# Patient Record
Sex: FEMALE | Race: WHITE | NOT HISPANIC OR LATINO | Employment: OTHER | URBAN - NONMETROPOLITAN AREA
[De-identification: names, ages, dates, MRNs, and addresses within clinical notes are randomized per-mention and may not be internally consistent; named-entity substitution may affect disease eponyms.]

---

## 2018-01-30 ENCOUNTER — TRANSCRIBE ORDERS (OUTPATIENT)
Dept: ADMINISTRATIVE | Facility: HOSPITAL | Age: 53
End: 2018-01-30

## 2018-01-30 ENCOUNTER — LAB (OUTPATIENT)
Dept: LAB | Facility: HOSPITAL | Age: 53
End: 2018-01-30

## 2018-01-30 DIAGNOSIS — K74.60 CIRRHOSIS OF LIVER WITHOUT ASCITES, UNSPECIFIED HEPATIC CIRRHOSIS TYPE (HCC): ICD-10-CM

## 2018-01-30 DIAGNOSIS — K74.60 CIRRHOSIS OF LIVER WITHOUT ASCITES, UNSPECIFIED HEPATIC CIRRHOSIS TYPE (HCC): Primary | ICD-10-CM

## 2018-01-30 LAB
ALBUMIN SERPL-MCNC: 3.7 G/DL (ref 3.5–5)
ALBUMIN/GLOB SERPL: 1.1 G/DL (ref 1–2)
ALP SERPL-CCNC: 118 U/L (ref 38–126)
ALT SERPL W P-5'-P-CCNC: 48 U/L (ref 13–69)
ANION GAP SERPL CALCULATED.3IONS-SCNC: 20.3 MMOL/L
AST SERPL-CCNC: 65 U/L (ref 15–46)
BILIRUB SERPL-MCNC: 3.1 MG/DL (ref 0.2–1.3)
BUN BLD-MCNC: 46 MG/DL (ref 7–20)
BUN/CREAT SERPL: 27.1 (ref 7.1–23.5)
CALCIUM SPEC-SCNC: 9.6 MG/DL (ref 8.4–10.2)
CHLORIDE SERPL-SCNC: 106 MMOL/L (ref 98–107)
CO2 SERPL-SCNC: 21 MMOL/L (ref 26–30)
CREAT BLD-MCNC: 1.7 MG/DL (ref 0.6–1.3)
DEPRECATED RDW RBC AUTO: 58 FL (ref 37–54)
ERYTHROCYTE [DISTWIDTH] IN BLOOD BY AUTOMATED COUNT: 16.5 % (ref 11.5–14.5)
GFR SERPL CREATININE-BSD FRML MDRD: 31 ML/MIN/1.73
GLOBULIN UR ELPH-MCNC: 3.4 GM/DL
GLUCOSE BLD-MCNC: 217 MG/DL (ref 74–98)
HCT VFR BLD AUTO: 21.8 % (ref 37–47)
HGB BLD-MCNC: 7.2 G/DL (ref 12–16)
INR PPP: 1.99 (ref 0.9–1.1)
MCH RBC QN AUTO: 32 PG (ref 27–31)
MCHC RBC AUTO-ENTMCNC: 33 G/DL (ref 30–37)
MCV RBC AUTO: 96.9 FL (ref 81–99)
PLATELET # BLD AUTO: 59 10*3/MM3 (ref 130–400)
PMV BLD AUTO: 11.8 FL (ref 6–12)
POTASSIUM BLD-SCNC: 4.3 MMOL/L (ref 3.5–5.1)
PROT SERPL-MCNC: 7.1 G/DL (ref 6.3–8.2)
PROTHROMBIN TIME: 22.5 SECONDS (ref 9.3–12.1)
RBC # BLD AUTO: 2.25 10*6/MM3 (ref 4.2–5.4)
SODIUM BLD-SCNC: 143 MMOL/L (ref 137–145)
WBC NRBC COR # BLD: 2.84 10*3/MM3 (ref 4.8–10.8)

## 2018-01-30 PROCEDURE — 36415 COLL VENOUS BLD VENIPUNCTURE: CPT

## 2018-01-30 PROCEDURE — 85027 COMPLETE CBC AUTOMATED: CPT

## 2018-01-30 PROCEDURE — 85610 PROTHROMBIN TIME: CPT

## 2018-01-30 PROCEDURE — 80053 COMPREHEN METABOLIC PANEL: CPT

## 2018-02-01 ENCOUNTER — HOSPITAL ENCOUNTER (OUTPATIENT)
Dept: INFUSION THERAPY | Facility: HOSPITAL | Age: 53
Setting detail: INFUSION SERIES
Discharge: HOME | End: 2018-02-01

## 2018-02-01 VITALS
SYSTOLIC BLOOD PRESSURE: 94 MMHG | DIASTOLIC BLOOD PRESSURE: 47 MMHG | RESPIRATION RATE: 18 BRPM | HEART RATE: 90 BPM | TEMPERATURE: 98.2 F | OXYGEN SATURATION: 96 %

## 2018-02-01 DIAGNOSIS — D64.9 ANEMIA, UNSPECIFIED TYPE: Primary | ICD-10-CM

## 2018-02-01 LAB
ABO GROUP BLD: NORMAL
ABO GROUP BLD: NORMAL
BLD GP AB SCN SERPL QL: NEGATIVE
RH BLD: POSITIVE
RH BLD: POSITIVE

## 2018-02-01 PROCEDURE — 86901 BLOOD TYPING SEROLOGIC RH(D): CPT

## 2018-02-01 PROCEDURE — 86900 BLOOD TYPING SEROLOGIC ABO: CPT

## 2018-02-01 PROCEDURE — 86920 COMPATIBILITY TEST SPIN: CPT

## 2018-02-01 PROCEDURE — 86900 BLOOD TYPING SEROLOGIC ABO: CPT | Performed by: INTERNAL MEDICINE

## 2018-02-01 PROCEDURE — 86850 RBC ANTIBODY SCREEN: CPT | Performed by: INTERNAL MEDICINE

## 2018-02-01 PROCEDURE — P9016 RBC LEUKOCYTES REDUCED: HCPCS

## 2018-02-01 PROCEDURE — 86901 BLOOD TYPING SEROLOGIC RH(D): CPT | Performed by: INTERNAL MEDICINE

## 2018-02-01 PROCEDURE — 36430 TRANSFUSION BLD/BLD COMPNT: CPT

## 2018-02-01 RX ORDER — SODIUM CHLORIDE 9 MG/ML
250 INJECTION, SOLUTION INTRAVENOUS AS NEEDED
Status: DISCONTINUED | OUTPATIENT
Start: 2018-02-01 | End: 2018-02-03 | Stop reason: HOSPADM

## 2018-02-01 RX ORDER — LEVOTHYROXINE SODIUM 0.15 MG/1
150 TABLET ORAL DAILY
COMMUNITY
End: 2019-09-11

## 2018-02-01 RX ORDER — ATORVASTATIN CALCIUM 20 MG/1
20 TABLET, FILM COATED ORAL DAILY
COMMUNITY
End: 2019-09-11

## 2018-02-01 RX ORDER — PANTOPRAZOLE SODIUM 40 MG/1
40 TABLET, DELAYED RELEASE ORAL DAILY
COMMUNITY

## 2018-02-01 RX ORDER — FUROSEMIDE 80 MG
80 TABLET ORAL 2 TIMES DAILY
COMMUNITY
End: 2019-09-11

## 2018-02-01 RX ORDER — QUETIAPINE 200 MG/1
200 TABLET, FILM COATED, EXTENDED RELEASE ORAL NIGHTLY
COMMUNITY
End: 2019-09-11

## 2018-02-01 RX ORDER — SPIRONOLACTONE 100 MG/1
100 TABLET, FILM COATED ORAL DAILY
COMMUNITY
End: 2021-03-02

## 2018-02-01 RX ADMIN — SODIUM CHLORIDE 250 ML: 9 INJECTION, SOLUTION INTRAVENOUS at 13:55

## 2018-02-02 LAB
ABO + RH BLD: NORMAL
BH BB BLOOD EXPIRATION DATE: NORMAL
BH BB BLOOD TYPE BARCODE: 5100
BH BB DISPENSE STATUS: NORMAL
BH BB PRODUCT CODE: NORMAL
BH BB UNIT NUMBER: NORMAL
CROSSMATCH INTERPRETATION: NORMAL
UNIT  ABO: NORMAL
UNIT  RH: NORMAL

## 2019-06-28 ENCOUNTER — LAB REQUISITION (OUTPATIENT)
Dept: LAB | Facility: HOSPITAL | Age: 54
End: 2019-06-28

## 2019-06-28 DIAGNOSIS — Z00.00 ROUTINE GENERAL MEDICAL EXAMINATION AT A HEALTH CARE FACILITY: ICD-10-CM

## 2019-06-28 LAB
ALBUMIN SERPL-MCNC: 3.6 G/DL (ref 3.5–5.2)
ALBUMIN/GLOB SERPL: 1.2 G/DL
ALP SERPL-CCNC: 113 U/L (ref 39–117)
ALT SERPL W P-5'-P-CCNC: 20 U/L (ref 1–33)
ANION GAP SERPL CALCULATED.3IONS-SCNC: 16 MMOL/L (ref 5–15)
AST SERPL-CCNC: 33 U/L (ref 1–32)
BASOPHILS # BLD AUTO: 0.03 10*3/MM3 (ref 0–0.2)
BASOPHILS NFR BLD AUTO: 1.4 % (ref 0–1.5)
BILIRUB SERPL-MCNC: 1 MG/DL (ref 0.2–1.2)
BUN BLD-MCNC: 28 MG/DL (ref 6–20)
BUN/CREAT SERPL: 20.6 (ref 7–25)
CALCIUM SPEC-SCNC: 9.1 MG/DL (ref 8.6–10.5)
CHLORIDE SERPL-SCNC: 100 MMOL/L (ref 98–107)
CO2 SERPL-SCNC: 23 MMOL/L (ref 22–29)
CREAT BLD-MCNC: 1.36 MG/DL (ref 0.57–1)
DEPRECATED RDW RBC AUTO: 57.6 FL (ref 37–54)
EOSINOPHIL # BLD AUTO: 0.03 10*3/MM3 (ref 0–0.4)
EOSINOPHIL NFR BLD AUTO: 1.4 % (ref 0.3–6.2)
ERYTHROCYTE [DISTWIDTH] IN BLOOD BY AUTOMATED COUNT: 16.7 % (ref 12.3–15.4)
GFR SERPL CREATININE-BSD FRML MDRD: 40 ML/MIN/1.73
GLOBULIN UR ELPH-MCNC: 2.9 GM/DL
GLUCOSE BLD-MCNC: 141 MG/DL (ref 65–99)
HCT VFR BLD AUTO: 28.4 % (ref 34–46.6)
HGB BLD-MCNC: 9 G/DL (ref 12–15.9)
IMM GRANULOCYTES # BLD AUTO: 0.04 10*3/MM3 (ref 0–0.05)
IMM GRANULOCYTES NFR BLD AUTO: 1.9 % (ref 0–0.5)
INR PPP: 1.7 (ref 0.85–1.16)
LYMPHOCYTES # BLD AUTO: 0.34 10*3/MM3 (ref 0.7–3.1)
LYMPHOCYTES NFR BLD AUTO: 16.3 % (ref 19.6–45.3)
MCH RBC QN AUTO: 29.6 PG (ref 26.6–33)
MCHC RBC AUTO-ENTMCNC: 31.7 G/DL (ref 31.5–35.7)
MCV RBC AUTO: 93.4 FL (ref 79–97)
MONOCYTES # BLD AUTO: 0.25 10*3/MM3 (ref 0.1–0.9)
MONOCYTES NFR BLD AUTO: 12 % (ref 5–12)
NEUTROPHILS # BLD AUTO: 1.39 10*3/MM3 (ref 1.7–7)
NEUTROPHILS NFR BLD AUTO: 67 % (ref 42.7–76)
NRBC BLD AUTO-RTO: 0 /100 WBC (ref 0–0.2)
PLATELET # BLD AUTO: 65 10*3/MM3 (ref 140–450)
PMV BLD AUTO: 12.7 FL (ref 6–12)
POTASSIUM BLD-SCNC: 3.6 MMOL/L (ref 3.5–5.2)
PROT SERPL-MCNC: 6.5 G/DL (ref 6–8.5)
PROTHROMBIN TIME: 19.2 SECONDS (ref 11.2–14.3)
RBC # BLD AUTO: 3.04 10*6/MM3 (ref 3.77–5.28)
SODIUM BLD-SCNC: 139 MMOL/L (ref 136–145)
WBC NRBC COR # BLD: 2.08 10*3/MM3 (ref 3.4–10.8)

## 2019-06-28 PROCEDURE — 80053 COMPREHEN METABOLIC PANEL: CPT

## 2019-06-28 PROCEDURE — 85025 COMPLETE CBC W/AUTO DIFF WBC: CPT

## 2019-06-28 PROCEDURE — 85610 PROTHROMBIN TIME: CPT

## 2019-09-11 PROBLEM — Z78.0 MENOPAUSE: Status: ACTIVE | Noted: 2019-09-11

## 2019-10-29 ENCOUNTER — LAB REQUISITION (OUTPATIENT)
Dept: LAB | Facility: HOSPITAL | Age: 54
End: 2019-10-29

## 2019-10-29 DIAGNOSIS — K75.81 NONALCOHOLIC STEATOHEPATITIS (NASH): ICD-10-CM

## 2019-10-29 DIAGNOSIS — Z51.81 ENCOUNTER FOR THERAPEUTIC DRUG LEVEL MONITORING: ICD-10-CM

## 2019-10-29 DIAGNOSIS — D63.8 ANEMIA IN OTHER CHRONIC DISEASES CLASSIFIED ELSEWHERE: ICD-10-CM

## 2019-10-29 DIAGNOSIS — Z00.00 ROUTINE GENERAL MEDICAL EXAMINATION AT A HEALTH CARE FACILITY: ICD-10-CM

## 2019-10-29 LAB
ALBUMIN SERPL-MCNC: 3.2 G/DL (ref 3.5–5.2)
ALBUMIN/GLOB SERPL: 1.2 G/DL
ALP SERPL-CCNC: 112 U/L (ref 39–117)
ALT SERPL W P-5'-P-CCNC: 14 U/L (ref 1–33)
ANION GAP SERPL CALCULATED.3IONS-SCNC: 14 MMOL/L (ref 5–15)
AST SERPL-CCNC: 29 U/L (ref 1–32)
BASOPHILS # BLD AUTO: 0.01 10*3/MM3 (ref 0–0.2)
BASOPHILS NFR BLD AUTO: 0.5 % (ref 0–1.5)
BILIRUB SERPL-MCNC: 1.3 MG/DL (ref 0.2–1.2)
BUN BLD-MCNC: 16 MG/DL (ref 6–20)
BUN/CREAT SERPL: 19 (ref 7–25)
CALCIUM SPEC-SCNC: 8.3 MG/DL (ref 8.6–10.5)
CHLORIDE SERPL-SCNC: 105 MMOL/L (ref 98–107)
CO2 SERPL-SCNC: 21 MMOL/L (ref 22–29)
CREAT BLD-MCNC: 0.84 MG/DL (ref 0.57–1)
DEPRECATED RDW RBC AUTO: 54.1 FL (ref 37–54)
EOSINOPHIL # BLD AUTO: 0.03 10*3/MM3 (ref 0–0.4)
EOSINOPHIL NFR BLD AUTO: 1.6 % (ref 0.3–6.2)
ERYTHROCYTE [DISTWIDTH] IN BLOOD BY AUTOMATED COUNT: 16 % (ref 12.3–15.4)
GFR SERPL CREATININE-BSD FRML MDRD: 70 ML/MIN/1.73
GLOBULIN UR ELPH-MCNC: 2.7 GM/DL
GLUCOSE BLD-MCNC: 173 MG/DL (ref 65–99)
HCT VFR BLD AUTO: 25.9 % (ref 34–46.6)
HGB BLD-MCNC: 7.9 G/DL (ref 12–15.9)
IMM GRANULOCYTES # BLD AUTO: 0.02 10*3/MM3 (ref 0–0.05)
IMM GRANULOCYTES NFR BLD AUTO: 1.1 % (ref 0–0.5)
INR PPP: 1.79 (ref 0.85–1.16)
LYMPHOCYTES # BLD AUTO: 0.25 10*3/MM3 (ref 0.7–3.1)
LYMPHOCYTES NFR BLD AUTO: 13.7 % (ref 19.6–45.3)
MCH RBC QN AUTO: 28.1 PG (ref 26.6–33)
MCHC RBC AUTO-ENTMCNC: 30.5 G/DL (ref 31.5–35.7)
MCV RBC AUTO: 92.2 FL (ref 79–97)
MONOCYTES # BLD AUTO: 0.15 10*3/MM3 (ref 0.1–0.9)
MONOCYTES NFR BLD AUTO: 8.2 % (ref 5–12)
NEUTROPHILS # BLD AUTO: 1.37 10*3/MM3 (ref 1.7–7)
NEUTROPHILS NFR BLD AUTO: 74.9 % (ref 42.7–76)
NRBC BLD AUTO-RTO: 0 /100 WBC (ref 0–0.2)
PLATELET # BLD AUTO: 62 10*3/MM3 (ref 140–450)
PMV BLD AUTO: 12.8 FL (ref 6–12)
POTASSIUM BLD-SCNC: 3.3 MMOL/L (ref 3.5–5.2)
PROT SERPL-MCNC: 5.9 G/DL (ref 6–8.5)
PROTHROMBIN TIME: 20 SECONDS (ref 11.2–14.3)
RBC # BLD AUTO: 2.81 10*6/MM3 (ref 3.77–5.28)
SODIUM BLD-SCNC: 140 MMOL/L (ref 136–145)
WBC NRBC COR # BLD: 1.83 10*3/MM3 (ref 3.4–10.8)

## 2019-10-29 PROCEDURE — 80053 COMPREHEN METABOLIC PANEL: CPT

## 2019-10-29 PROCEDURE — 85025 COMPLETE CBC W/AUTO DIFF WBC: CPT

## 2019-10-29 PROCEDURE — 85610 PROTHROMBIN TIME: CPT

## 2020-10-02 ENCOUNTER — APPOINTMENT (OUTPATIENT)
Dept: GENERAL RADIOLOGY | Facility: HOSPITAL | Age: 55
End: 2020-10-02

## 2020-10-02 ENCOUNTER — HOSPITAL ENCOUNTER (EMERGENCY)
Facility: HOSPITAL | Age: 55
Discharge: HOME | End: 2020-10-03
Attending: EMERGENCY MEDICINE | Admitting: EMERGENCY MEDICINE

## 2020-10-02 ENCOUNTER — APPOINTMENT (OUTPATIENT)
Dept: CT IMAGING | Facility: HOSPITAL | Age: 55
End: 2020-10-02

## 2020-10-02 DIAGNOSIS — Z86.39 HISTORY OF HYPOTHYROIDISM: ICD-10-CM

## 2020-10-02 DIAGNOSIS — Z86.73 HISTORY OF STROKE: ICD-10-CM

## 2020-10-02 DIAGNOSIS — K12.1 STOMATITIS: Primary | ICD-10-CM

## 2020-10-02 DIAGNOSIS — Z94.4 HISTORY OF LIVER TRANSPLANT (HCC): ICD-10-CM

## 2020-10-02 DIAGNOSIS — T88.7XXA NON-DOSE-RELATED ADVERSE EFFECT OF MEDICATION, INITIAL ENCOUNTER: ICD-10-CM

## 2020-10-02 DIAGNOSIS — F32.A ANXIETY AND DEPRESSION: ICD-10-CM

## 2020-10-02 DIAGNOSIS — F41.9 ANXIETY AND DEPRESSION: ICD-10-CM

## 2020-10-02 LAB
ALBUMIN SERPL-MCNC: 4.5 G/DL (ref 3.5–5.2)
ALBUMIN/GLOB SERPL: 1.6 G/DL
ALP SERPL-CCNC: 177 U/L (ref 39–117)
ALT SERPL W P-5'-P-CCNC: 84 U/L (ref 1–33)
ANION GAP SERPL CALCULATED.3IONS-SCNC: 14 MMOL/L (ref 5–15)
AST SERPL-CCNC: 45 U/L (ref 1–32)
BASOPHILS # BLD AUTO: 0.04 10*3/MM3 (ref 0–0.2)
BASOPHILS NFR BLD AUTO: 1.2 % (ref 0–1.5)
BILIRUB SERPL-MCNC: 0.8 MG/DL (ref 0–1.2)
BUN SERPL-MCNC: 18 MG/DL (ref 6–20)
BUN/CREAT SERPL: 19.6 (ref 7–25)
CALCIUM SPEC-SCNC: 9.4 MG/DL (ref 8.6–10.5)
CHLORIDE SERPL-SCNC: 100 MMOL/L (ref 98–107)
CO2 SERPL-SCNC: 28 MMOL/L (ref 22–29)
CREAT SERPL-MCNC: 0.92 MG/DL (ref 0.57–1)
CRP SERPL-MCNC: 2.65 MG/DL (ref 0–0.5)
D-LACTATE SERPL-SCNC: 0.7 MMOL/L (ref 0.5–2)
DEPRECATED RDW RBC AUTO: 51.8 FL (ref 37–54)
EOSINOPHIL # BLD AUTO: 0.06 10*3/MM3 (ref 0–0.4)
EOSINOPHIL NFR BLD AUTO: 1.7 % (ref 0.3–6.2)
ERYTHROCYTE [DISTWIDTH] IN BLOOD BY AUTOMATED COUNT: 16.6 % (ref 12.3–15.4)
ERYTHROCYTE [SEDIMENTATION RATE] IN BLOOD: 29 MM/HR (ref 0–30)
GFR SERPL CREATININE-BSD FRML MDRD: 63 ML/MIN/1.73
GLOBULIN UR ELPH-MCNC: 2.8 GM/DL
GLUCOSE SERPL-MCNC: 117 MG/DL (ref 65–99)
HCT VFR BLD AUTO: 44.8 % (ref 34–46.6)
HETEROPH AB SER QL LA: NEGATIVE
HGB BLD-MCNC: 14.2 G/DL (ref 12–15.9)
IMM GRANULOCYTES # BLD AUTO: 0.05 10*3/MM3 (ref 0–0.05)
IMM GRANULOCYTES NFR BLD AUTO: 1.4 % (ref 0–0.5)
LIPASE SERPL-CCNC: 17 U/L (ref 13–60)
LYMPHOCYTES # BLD AUTO: 0.62 10*3/MM3 (ref 0.7–3.1)
LYMPHOCYTES NFR BLD AUTO: 17.9 % (ref 19.6–45.3)
MAGNESIUM SERPL-MCNC: 2.1 MG/DL (ref 1.6–2.6)
MCH RBC QN AUTO: 27.4 PG (ref 26.6–33)
MCHC RBC AUTO-ENTMCNC: 31.7 G/DL (ref 31.5–35.7)
MCV RBC AUTO: 86.5 FL (ref 79–97)
MONOCYTES # BLD AUTO: 0.31 10*3/MM3 (ref 0.1–0.9)
MONOCYTES NFR BLD AUTO: 8.9 % (ref 5–12)
NEUTROPHILS NFR BLD AUTO: 2.39 10*3/MM3 (ref 1.7–7)
NEUTROPHILS NFR BLD AUTO: 68.9 % (ref 42.7–76)
NRBC BLD AUTO-RTO: 0 /100 WBC (ref 0–0.2)
PLATELET # BLD AUTO: 171 10*3/MM3 (ref 140–450)
PMV BLD AUTO: 11.6 FL (ref 6–12)
POTASSIUM SERPL-SCNC: 3.3 MMOL/L (ref 3.5–5.2)
PROT SERPL-MCNC: 7.3 G/DL (ref 6–8.5)
RBC # BLD AUTO: 5.18 10*6/MM3 (ref 3.77–5.28)
SODIUM SERPL-SCNC: 142 MMOL/L (ref 136–145)
WBC # BLD AUTO: 3.47 10*3/MM3 (ref 3.4–10.8)

## 2020-10-02 PROCEDURE — 83605 ASSAY OF LACTIC ACID: CPT | Performed by: PHYSICIAN ASSISTANT

## 2020-10-02 PROCEDURE — 80053 COMPREHEN METABOLIC PANEL: CPT | Performed by: PHYSICIAN ASSISTANT

## 2020-10-02 PROCEDURE — 74176 CT ABD & PELVIS W/O CONTRAST: CPT

## 2020-10-02 PROCEDURE — 83690 ASSAY OF LIPASE: CPT | Performed by: PHYSICIAN ASSISTANT

## 2020-10-02 PROCEDURE — 71045 X-RAY EXAM CHEST 1 VIEW: CPT

## 2020-10-02 PROCEDURE — 85025 COMPLETE CBC W/AUTO DIFF WBC: CPT | Performed by: PHYSICIAN ASSISTANT

## 2020-10-02 PROCEDURE — 85652 RBC SED RATE AUTOMATED: CPT | Performed by: PHYSICIAN ASSISTANT

## 2020-10-02 PROCEDURE — 86140 C-REACTIVE PROTEIN: CPT | Performed by: PHYSICIAN ASSISTANT

## 2020-10-02 PROCEDURE — 86308 HETEROPHILE ANTIBODY SCREEN: CPT | Performed by: PHYSICIAN ASSISTANT

## 2020-10-02 PROCEDURE — 86665 EPSTEIN-BARR CAPSID VCA: CPT | Performed by: PHYSICIAN ASSISTANT

## 2020-10-02 PROCEDURE — 81001 URINALYSIS AUTO W/SCOPE: CPT | Performed by: PHYSICIAN ASSISTANT

## 2020-10-02 PROCEDURE — 83735 ASSAY OF MAGNESIUM: CPT | Performed by: PHYSICIAN ASSISTANT

## 2020-10-02 PROCEDURE — 99284 EMERGENCY DEPT VISIT MOD MDM: CPT

## 2020-10-02 RX ORDER — POTASSIUM CHLORIDE 750 MG/1
40 CAPSULE, EXTENDED RELEASE ORAL ONCE
Status: COMPLETED | OUTPATIENT
Start: 2020-10-02 | End: 2020-10-02

## 2020-10-02 RX ADMIN — Medication 10 ML: at 21:00

## 2020-10-02 RX ADMIN — SODIUM CHLORIDE 1000 ML: 9 INJECTION, SOLUTION INTRAVENOUS at 21:00

## 2020-10-02 RX ADMIN — POTASSIUM CHLORIDE 40 MEQ: 750 CAPSULE, EXTENDED RELEASE ORAL at 23:53

## 2020-10-03 VITALS
WEIGHT: 150 LBS | SYSTOLIC BLOOD PRESSURE: 144 MMHG | HEART RATE: 91 BPM | BODY MASS INDEX: 28.32 KG/M2 | HEIGHT: 61 IN | OXYGEN SATURATION: 97 % | TEMPERATURE: 98 F | DIASTOLIC BLOOD PRESSURE: 104 MMHG | RESPIRATION RATE: 20 BRPM

## 2020-10-03 LAB
BACTERIA UR QL AUTO: ABNORMAL /HPF
BILIRUB UR QL STRIP: NEGATIVE
CLARITY UR: CLEAR
COLOR UR: ABNORMAL
GLUCOSE UR STRIP-MCNC: NEGATIVE MG/DL
HGB UR QL STRIP.AUTO: ABNORMAL
HYALINE CASTS UR QL AUTO: ABNORMAL /LPF
KETONES UR QL STRIP: NEGATIVE
LEUKOCYTE ESTERASE UR QL STRIP.AUTO: NEGATIVE
NITRITE UR QL STRIP: NEGATIVE
PH UR STRIP.AUTO: <=5 [PH] (ref 5–8)
PROT UR QL STRIP: ABNORMAL
RBC # UR: ABNORMAL /HPF
REF LAB TEST METHOD: ABNORMAL
SP GR UR STRIP: 1.03 (ref 1–1.03)
SQUAMOUS #/AREA URNS HPF: ABNORMAL /HPF
TRANS CELLS #/AREA URNS HPF: ABNORMAL /HPF
UROBILINOGEN UR QL STRIP: ABNORMAL
WBC UR QL AUTO: ABNORMAL /HPF

## 2020-10-03 PROCEDURE — 63710000001 PREDNISONE PER 1 MG: Performed by: PHYSICIAN ASSISTANT

## 2020-10-03 RX ORDER — PREDNISONE 20 MG/1
20 TABLET ORAL DAILY
Qty: 7 TABLET | Refills: 0 | Status: ON HOLD | OUTPATIENT
Start: 2020-10-03 | End: 2022-02-01

## 2020-10-03 RX ORDER — PREDNISONE 20 MG/1
20 TABLET ORAL ONCE
Status: COMPLETED | OUTPATIENT
Start: 2020-10-03 | End: 2020-10-03

## 2020-10-03 RX ADMIN — PREDNISONE 20 MG: 20 TABLET ORAL at 01:15

## 2020-10-03 NOTE — ED PROVIDER NOTES
Subjective   This is a 55-year-old female that presents to the ER with multiple complaints.  The main complaint she has is several ulcers in her mouth causing painful swallowing.  Patient says that she is unable to eat or drink anything due to the increased pain.  She has history of liver transplant in March, 2020 in [place] and is closely followed by them.  She receives her care at home through home health and a physician that works closely with her transplant team.  Patient reports the painful mouth ulcers for the last 3 days.  She says that she had a negative rapid strep test and is awaiting blood work to check for celiac disease.  Patient says that 2 days ago her primary physician called her in [facility] for questionable sinus infection.  She denies any fever, chills, nasal congestion, or ear pain.  Patient says that she takes Bactrim 3 days a week for antirejection.  Patient also reports increased diarrhea for 3 months, as well as diffuse abdominal cramping.  She has had 2-3 loose stools today but denies any blood or mucus.  Patient says that primary care physician is planning on checking her for mono and Guy-Barr antibodies due to some increased pain and swelling in the cervical lymph node region.  Patient denies any chest pain or shortness of breath.  She feels generally weak and dehydrated.  She denies any dizziness, near-syncope, or syncope.  Last EGD was prior to liver transplant.  Past medical history is significant for cirrhosis of the liver, status post liver transplant in March, 2020, hyperlipidemia, thyroid cancer, anxiety, hypothyroid, stroke, osteoporosis and osteoarthritis, and history of meningioma.      History provided by:  Patient  Illness  Location:  Generalized weakness, dehydration, multiple ulcers to mouth with painful swallowing, intermittent abdominal cramping, and diarrhea.  Duration:  3 days (Diarrhea x 3 months)  Timing:  Constant  Progression:  Unchanged  Chronicity:  New  Context:   Patient has multiple complaints.  See HPI for details  Relieved by:  Nothing  Worsened by:  Swallowing food or liquids  Ineffective treatments:  Patient has tried Orajel, Magic mouthwash, and most recently nystatin suspension  Associated symptoms: abdominal pain (diffuse crampy pain), diarrhea (x 2-3 today) and fatigue    Associated symptoms: no chest pain, no congestion, no cough, no ear pain, no fever, no headaches, no loss of consciousness, no myalgias, no nausea, no rhinorrhea, no shortness of breath, no sore throat, no vomiting and no wheezing        Review of Systems   Constitutional: Positive for activity change, appetite change and fatigue. Negative for chills, diaphoresis and fever.   HENT: Positive for mouth sores. Negative for congestion, ear discharge, ear pain, rhinorrhea and sore throat.    Respiratory: Negative.  Negative for cough, shortness of breath and wheezing.    Cardiovascular: Negative.  Negative for chest pain, palpitations and leg swelling.   Gastrointestinal: Positive for abdominal pain (diffuse crampy pain) and diarrhea (x 2-3 today). Negative for blood in stool, constipation, nausea and vomiting.        History of liver transplant in 3/2020     Genitourinary: Positive for decreased urine volume. Negative for dysuria, flank pain, frequency and urgency.   Musculoskeletal: Negative.  Negative for back pain and myalgias.   Skin: Negative.  Negative for color change.   Neurological: Positive for weakness. Negative for dizziness, loss of consciousness, syncope and headaches.   Hematological:        Swelling and pain to anterior cervical chain.   All other systems reviewed and are negative.      Past Medical History:   Diagnosis Date   • Anxiety    • Cirrhosis of liver (CMS/HCC)    • Depression    • Hypercholesteremia    • Hypothyroid    • Meningioma (CMS/HCC)    • Osteoarthritis    • Osteoporosis    • Renal impairment    • Stroke (CMS/HCC)    • Thyroid cancer (CMS/HCC)    • Vision impairment      left eye       Allergies   Allergen Reactions   • Penicillins Shortness Of Breath       Past Surgical History:   Procedure Laterality Date   • APPENDECTOMY     •  SECTION      x3   • CHOLECYSTECTOMY     • D&C HYSTEROSCOPY LAPAROSCOPY      x2 for endometriosis   • LIVER SURGERY      removed cysts    • ORIF ANKLE FRACTURE     • THYROIDECTOMY     • TONSILLECTOMY     • TOTAL ABDOMINAL HYSTERECTOMY WITH SALPINGO OOPHORECTOMY Bilateral        Family History   Problem Relation Age of Onset   • Colon cancer Father    • Osteoporosis Mother    • Ovarian cancer Paternal Grandmother    • Breast cancer Maternal Grandmother        Social History     Socioeconomic History   • Marital status:      Spouse name: Not on file   • Number of children: Not on file   • Years of education: Not on file   • Highest education level: Not on file   Tobacco Use   • Smoking status: Never Smoker   • Smokeless tobacco: Never Used   Substance and Sexual Activity   • Alcohol use: No     Frequency: Never   • Drug use: No   • Sexual activity: Yes     Partners: Male     Birth control/protection: Surgical, Post-menopausal           Objective   Physical Exam  Vitals signs and nursing note reviewed.   Constitutional:       Appearance: Normal appearance.   HENT:      Head: Normocephalic and atraumatic.      Right Ear: Tympanic membrane normal.      Left Ear: Tympanic membrane normal.      Nose: Nose normal.      Mouth/Throat:      Mouth: Mucous membranes are dry. Oral lesions present.      Pharynx: Oropharynx is clear.      Comments: Oral mucous membranes are dry.  Patient has multiple aphthous ulcers to the buccal mucosa and tongue, as well as gingiva.  No exudate or evidence of candidiasis.  Eyes:      Extraocular Movements: Extraocular movements intact.      Conjunctiva/sclera: Conjunctivae normal.      Pupils: Pupils are equal, round, and reactive to light.   Neck:      Musculoskeletal: Normal range of motion and neck supple.    Cardiovascular:      Rate and Rhythm: Normal rate and regular rhythm.      Pulses: Normal pulses.      Heart sounds: Normal heart sounds.      Comments: No pedal edema to lower extremities.  Strong distal pulses.  Pulmonary:      Effort: Pulmonary effort is normal.      Breath sounds: Normal breath sounds.      Comments: Lungs are clear to auscultation bilaterally.  Abdominal:      General: Bowel sounds are normal. There is no distension.      Palpations: Abdomen is soft.      Tenderness: There is generalized abdominal tenderness. There is no right CVA tenderness, left CVA tenderness, guarding or rebound. Negative signs include Suazo's sign and Rovsing's sign.      Hernia: No hernia is present.      Comments: Abdomen soft without distention.  Diffuse mild tenderness.  No rebound or guarding.  No flank or CVA tenderness.  Abdominal exam is benign.   Musculoskeletal: Normal range of motion.   Lymphadenopathy:      Cervical: Cervical adenopathy present.      Right cervical: Superficial cervical adenopathy present. No posterior cervical adenopathy.     Left cervical: Superficial cervical adenopathy present. No posterior cervical adenopathy.      Comments: Mild soft tissue swelling with adenopathy to the bilateral anterior cervical chain.  No posterior cervical lymphadenopathy appreciated.   Skin:     General: Skin is warm and dry.   Neurological:      General: No focal deficit present.      Mental Status: She is alert.      Cranial Nerves: Cranial nerves are intact.      Sensory: Sensation is intact.      Motor: Motor function is intact.      Comments: Generalized weakness.         Procedures           ED Course  ED Course as of Oct 03 0048   Fri Oct 02, 2020   2258 CBC reveals normal white blood cell count at 3.47.  Differential is within normal limits.  Chemistries were essentially normal except for potassium level of 3.3.  ALT is 84, AST 45, and alk phos 177.  Lactic acid is 0.7.  Magnesium is 2.1.  Sed rate is 29  and lipase is 17.  CRP is elevated at 2.65.  Chest x-ray shows no acute cardiopulmonary process.  CT scan of the abdomen and pelvis with contrast shows no renal or ureteral stones and no hydronephrosis.  Hepatic transplant.  Steatosis is noted within the transplanted liver.  No bowel obstruction.  No ascites.  Status post appendectomy and hysterectomy.    [FC]   Sat Oct 03, 2020   0037 Urinalysis showed 13-20 white blood cells, no bacteria, negative leukocytes and negative nitrite.  Patient denies any dysuria, urgency, or frequency.  Monospot is negative.  Awaiting Guy-Leslie viral antibodies, and those are send out studies.  Discussed the case in detail with Dr. Vee, ER attending physician.  He called on-call physician at the [facility] to discuss the case since 1 of her antirejection medications has a high incidence of stomatitis.  After discussion with the patient, she was recently increased on this antirejection medication, Zortress, 4 days ago and that is when the ulcerations in her mouth really increased.    [FC]   0041 Dr. Vee discussed the case in detail with Dr. Vasques, specialist from [facility].  He is very familiar with the patient.  He gets phone calls from her almost daily.  After reviewing epic, he had recently increased her Zortress from 0.5 mg q 12 hours to 1.5 mg q 12 hours.  This is apparently what has caused worsening of the stomatitis.  He recommended patient go back to taking Zortress 0.5 mg q 12 hours, he also recommended increasing Cellcept to 1000 mg BID and starting prednisone 20 mg by mouth daily.  We will prescribe Magic mouthwash on discharge, as well.  Patient needs to call him on Monday to let him know how she is doing.    [FC]      ED Course User Index  [FC] Simin Howell PA-C                Recent Results (from the past 24 hour(s))   Mononucleosis Screen    Collection Time: 10/02/20  9:23 PM    Specimen: Blood   Result Value Ref Range     Monospot Negative Negative   Comprehensive Metabolic Panel    Collection Time: 10/02/20  9:58 PM    Specimen: Blood   Result Value Ref Range    Glucose 117 (H) 65 - 99 mg/dL    BUN 18 6 - 20 mg/dL    Creatinine 0.92 0.57 - 1.00 mg/dL    Sodium 142 136 - 145 mmol/L    Potassium 3.3 (L) 3.5 - 5.2 mmol/L    Chloride 100 98 - 107 mmol/L    CO2 28.0 22.0 - 29.0 mmol/L    Calcium 9.4 8.6 - 10.5 mg/dL    Total Protein 7.3 6.0 - 8.5 g/dL    Albumin 4.50 3.50 - 5.20 g/dL    ALT (SGPT) 84 (H) 1 - 33 U/L    AST (SGOT) 45 (H) 1 - 32 U/L    Alkaline Phosphatase 177 (H) 39 - 117 U/L    Total Bilirubin 0.8 0.0 - 1.2 mg/dL    eGFR Non African Amer 63 >60 mL/min/1.73    Globulin 2.8 gm/dL    A/G Ratio 1.6 g/dL    BUN/Creatinine Ratio 19.6 7.0 - 25.0    Anion Gap 14.0 5.0 - 15.0 mmol/L   Lipase    Collection Time: 10/02/20  9:58 PM    Specimen: Blood   Result Value Ref Range    Lipase 17 13 - 60 U/L   Lactic Acid, Plasma    Collection Time: 10/02/20  9:58 PM    Specimen: Blood   Result Value Ref Range    Lactate 0.7 0.5 - 2.0 mmol/L   C-reactive Protein    Collection Time: 10/02/20  9:58 PM    Specimen: Blood   Result Value Ref Range    C-Reactive Protein 2.65 (H) 0.00 - 0.50 mg/dL   Sedimentation Rate    Collection Time: 10/02/20  9:58 PM    Specimen: Blood   Result Value Ref Range    Sed Rate 29 0 - 30 mm/hr   Magnesium    Collection Time: 10/02/20  9:58 PM    Specimen: Blood   Result Value Ref Range    Magnesium 2.1 1.6 - 2.6 mg/dL   CBC Auto Differential    Collection Time: 10/02/20  9:58 PM    Specimen: Blood   Result Value Ref Range    WBC 3.47 3.40 - 10.80 10*3/mm3    RBC 5.18 3.77 - 5.28 10*6/mm3    Hemoglobin 14.2 12.0 - 15.9 g/dL    Hematocrit 44.8 34.0 - 46.6 %    MCV 86.5 79.0 - 97.0 fL    MCH 27.4 26.6 - 33.0 pg    MCHC 31.7 31.5 - 35.7 g/dL    RDW 16.6 (H) 12.3 - 15.4 %    RDW-SD 51.8 37.0 - 54.0 fl    MPV 11.6 6.0 - 12.0 fL    Platelets 171 140 - 450 10*3/mm3    Neutrophil % 68.9 42.7 - 76.0 %    Lymphocyte % 17.9 (L)  19.6 - 45.3 %    Monocyte % 8.9 5.0 - 12.0 %    Eosinophil % 1.7 0.3 - 6.2 %    Basophil % 1.2 0.0 - 1.5 %    Immature Grans % 1.4 (H) 0.0 - 0.5 %    Neutrophils, Absolute 2.39 1.70 - 7.00 10*3/mm3    Lymphocytes, Absolute 0.62 (L) 0.70 - 3.10 10*3/mm3    Monocytes, Absolute 0.31 0.10 - 0.90 10*3/mm3    Eosinophils, Absolute 0.06 0.00 - 0.40 10*3/mm3    Basophils, Absolute 0.04 0.00 - 0.20 10*3/mm3    Immature Grans, Absolute 0.05 0.00 - 0.05 10*3/mm3    nRBC 0.0 0.0 - 0.2 /100 WBC   Urinalysis With Microscopic If Indicated (No Culture) - Urine, Clean Catch    Collection Time: 10/02/20 11:22 PM    Specimen: Urine, Clean Catch   Result Value Ref Range    Color, UA Dark Yellow (A) Yellow, Straw    Appearance, UA Clear Clear    pH, UA <=5.0 5.0 - 8.0    Specific Gravity, UA 1.028 1.001 - 1.030    Glucose, UA Negative Negative    Ketones, UA Negative Negative    Bilirubin, UA Negative Negative    Blood, UA Trace (A) Negative    Protein, UA >=300 mg/dL (3+) (A) Negative    Leuk Esterase, UA Negative Negative    Nitrite, UA Negative Negative    Urobilinogen, UA 1.0 E.U./dL 0.2 - 1.0 E.U./dL   Urinalysis, Microscopic Only - Urine, Clean Catch    Collection Time: 10/02/20 11:22 PM    Specimen: Urine, Clean Catch   Result Value Ref Range    RBC, UA 0-2 None Seen, 0-2 /HPF    WBC, UA 13-20 (A) None Seen, 0-2 /HPF    Bacteria, UA None Seen None Seen, Trace /HPF    Squamous Epithelial Cells, UA 3-6 (A) None Seen, 0-2 /HPF    Transitional Epithelial Cells, UA 0-2 0 - 2 /HPF    Hyaline Casts, UA None Seen 0 - 6 /LPF    Methodology Manual Light Microscopy      Note: In addition to lab results from this visit, the labs listed above may include labs taken at another facility or during a different encounter within the last 24 hours. Please correlate lab times with ED admission and discharge times for further clarification of the services performed during this visit.    CT Abdomen Pelvis Without Contrast   Final Result      1. No  "renal or ureteral stones. No hydronephrosis.   2. Hepatic transplant. Steatosis is noted within the transplanted liver.   3. No bowel obstruction.   4. Appendectomy and hysterectomy.   5. No ascites.               Signer Name: Velasquez Tom MD    Signed: 10/2/2020 10:47 PM    Workstation Name: [facility]     Radiology [facility]      XR Chest 1 View   Final Result   No acute cardiopulmonary findings.      Signer Name: Velasquez Tom MD    Signed: 10/2/2020 10:03 PM    Workstation Name: [facility]     Radiology [facility]        Vitals:    10/02/20 1818 10/02/20 2324 10/03/20 0000   BP: (!) 172/105 (!) 173/103 139/98   BP Location: Left arm     Patient Position: Sitting     Pulse: 91     Resp: 20     Temp: 98 °F (36.7 °C)     TempSrc: Infrared     SpO2: 95% 97% 93%   Weight: 68 kg (150 lb)     Height: 154.9 cm (61\")       Medications   predniSONE (DELTASONE) tablet 20 mg (has no administration in time range)   sodium chloride 0.9 % bolus 1,000 mL (0 mL Intravenous Stopped 10/2/20 2353)   magic mouthwash oral supsension 10 mL (10 mL Swish & Swallow Given by Other 10/2/20 2100)   potassium chloride (MICRO-K) CR capsule 40 mEq (40 mEq Oral Given 10/2/20 2353)     ECG/EMG Results (last 24 hours)     ** No results found for the last 24 hours. **        No orders to display                                  MDM    Final diagnoses:   Stomatitis   Non-dose-related adverse effect of medication, initial encounter   History of liver transplant (CMS/HCC)   History of stroke   History of hypothyroidism   Anxiety and depression            Simin Howell PA-C  10/03/20 0048    "

## 2020-10-03 NOTE — DISCHARGE INSTRUCTIONS
ER evaluation revealed stable work-up.  Discussed the case in detail with transplant specialist, Dr. Vasques.  He feels that stomatitis is due to increased dosage of Zortress from 0.5 mg every 12 hours to 1.5 mg every 12 hours.  He recommends patient to go back to original dosage of Zotress 0.5 mg every 12 hours.  She needs to increase her Cellcept to 1000 mg we will also prescribe prednisone 20 mg by mouth daily x1 week and Magic mouthwash which patienttwice daily, and can swish and spit 10 mL 5 times daily for the next 5 days.  Return to the ER if any worsening symptoms.  Continue with all other current medical management.

## 2020-10-05 LAB — EBV VCA IGM SER IA-ACNC: <36 U/ML (ref 0–35.9)

## 2021-03-02 ENCOUNTER — OFFICE VISIT (OUTPATIENT)
Dept: OBSTETRICS AND GYNECOLOGY | Facility: CLINIC | Age: 56
End: 2021-03-02

## 2021-03-02 VITALS
DIASTOLIC BLOOD PRESSURE: 84 MMHG | WEIGHT: 161.6 LBS | SYSTOLIC BLOOD PRESSURE: 120 MMHG | HEIGHT: 61 IN | BODY MASS INDEX: 30.51 KG/M2

## 2021-03-02 DIAGNOSIS — N94.12 DEEP DYSPAREUNIA: Primary | ICD-10-CM

## 2021-03-02 DIAGNOSIS — N95.2 VAGINAL ATROPHY: ICD-10-CM

## 2021-03-02 DIAGNOSIS — Z78.0 MENOPAUSE: ICD-10-CM

## 2021-03-02 PROCEDURE — 99396 PREV VISIT EST AGE 40-64: CPT | Performed by: OBSTETRICS & GYNECOLOGY

## 2021-03-02 RX ORDER — FLUTICASONE PROPIONATE 50 MCG
2 SPRAY, SUSPENSION (ML) NASAL 2 TIMES DAILY
COMMUNITY
Start: 2021-02-24 | End: 2022-07-28 | Stop reason: SDUPTHER

## 2021-03-02 RX ORDER — ROPINIROLE 2 MG/1
TABLET, FILM COATED ORAL
Status: ON HOLD | COMMUNITY
Start: 2021-02-17 | End: 2022-02-01

## 2021-03-02 RX ORDER — TRAZODONE HYDROCHLORIDE 100 MG/1
200 TABLET ORAL
COMMUNITY
Start: 2021-02-02 | End: 2022-12-11 | Stop reason: SDUPTHER

## 2021-03-02 RX ORDER — ALBUTEROL SULFATE 90 UG/1
2 AEROSOL, METERED RESPIRATORY (INHALATION)
Status: ON HOLD | COMMUNITY
Start: 2021-02-22 | End: 2022-02-01

## 2021-03-02 RX ORDER — SERTRALINE HYDROCHLORIDE 100 MG/1
100 TABLET, FILM COATED ORAL DAILY
COMMUNITY
Start: 2021-02-02 | End: 2022-03-28

## 2021-03-02 RX ORDER — MAGNESIUM OXIDE 400 MG/1
2 TABLET ORAL DAILY
Status: ON HOLD | COMMUNITY
Start: 2020-12-09 | End: 2022-02-01

## 2021-03-02 RX ORDER — GABAPENTIN 400 MG/1
400 CAPSULE ORAL 2 TIMES DAILY
Status: ON HOLD | COMMUNITY
Start: 2021-02-08 | End: 2022-02-01

## 2021-03-02 RX ORDER — POTASSIUM CHLORIDE 750 MG/1
10 CAPSULE, EXTENDED RELEASE ORAL DAILY
Status: ON HOLD | COMMUNITY
End: 2022-02-07 | Stop reason: SDUPTHER

## 2021-03-02 RX ORDER — METHOCARBAMOL 500 MG/1
500 TABLET, FILM COATED ORAL 3 TIMES DAILY PRN
Status: ON HOLD | COMMUNITY
Start: 2021-02-08 | End: 2022-02-07 | Stop reason: SDUPTHER

## 2021-03-02 RX ORDER — EVEROLIMUS TABLETS 0.5 MG/1
TABLET ORAL
Status: ON HOLD | COMMUNITY
Start: 2021-01-11 | End: 2022-02-07 | Stop reason: SDUPTHER

## 2021-03-02 RX ORDER — LEVOTHYROXINE SODIUM 0.12 MG/1
125 TABLET ORAL EVERY MORNING
COMMUNITY
Start: 2020-12-18 | End: 2022-01-11

## 2021-03-02 RX ORDER — ZOLPIDEM TARTRATE 5 MG/1
5 TABLET ORAL NIGHTLY
COMMUNITY
Start: 2021-02-22

## 2021-03-02 RX ORDER — BUPROPION HYDROCHLORIDE 100 MG/1
100 TABLET, EXTENDED RELEASE ORAL DAILY
COMMUNITY
Start: 2021-02-16 | End: 2022-04-28

## 2021-03-02 RX ORDER — ALLOPURINOL 300 MG/1
300 TABLET ORAL DAILY
COMMUNITY
Start: 2020-12-16 | End: 2022-07-28

## 2021-03-02 RX ORDER — COLCHICINE 0.6 MG/1
0.6 TABLET ORAL DAILY
COMMUNITY
Start: 2021-02-16 | End: 2022-04-05

## 2021-03-02 RX ORDER — ERGOCALCIFEROL 1.25 MG/1
50000 CAPSULE ORAL
COMMUNITY
Start: 2021-02-16

## 2021-03-02 RX ORDER — MYCOPHENOLIC ACID 360 MG/1
1 TABLET, DELAYED RELEASE ORAL 2 TIMES DAILY
COMMUNITY
Start: 2021-02-18 | End: 2022-01-05

## 2021-03-02 RX ORDER — AMLODIPINE BESYLATE 5 MG/1
5 TABLET ORAL DAILY
Status: ON HOLD | COMMUNITY
Start: 2021-01-12 | End: 2022-02-01

## 2021-03-02 NOTE — PROGRESS NOTES
Chief Complaint   Patient presents with   • Annual Exam     decreased libido.   • Dyspareunia   • Shortness of Breath   • Chest Pain       Lizette Frias is a 56 y.o. year old  presenting to be seen for her annual exam.  This patient has a complex medical history of hepatic failure with 2 liver transplants.  Her second transplant is also developing cystic change.  She is on anti-- rejection medication.  She has complaints of fatigue, lethargy, insomnia, and has now developed vaginal dryness and deep dyspareunia.  She also complains of decreased libido.  She has had an abdominal hysterectomy/bilateral salpingo-oophorectomy for adenomyosis and endometriosis.  She has previously had 3  sections, laparoscopy, cholecystectomy, and an appendectomy.    SCREENING TESTS  No recent mammograms here  Year 2012 2016 2017   Age                         PAP                         HPV high risk                         Mammogram                         REGIS score                         Breast MRI                         Lipids                         Vitamin D                         Colonoscopy                         DEXA  Frax (hip/any)                         Ovarian Screen                             She exercises regularly: no.  She wears her seat belt: yes.  She has concerns about domestic violence: no.  She has noticed changes in height: no    GYN screening history:  · No mammogram data- done elsewhere.    No Additional Complaints Reported    The following portions of the patient's history were reviewed and updated as appropriate:vital signs and   She  has a past medical history of Anxiety, Cirrhosis of liver (CMS/HCC), Depression, Hypercholesteremia, Hypothyroid, Meningioma (CMS/HCC), Osteoarthritis, Osteoporosis, Renal impairment, Stroke (CMS/HCC), Thyroid cancer (CMS/HCC), and Vision impairment.  She does not  have any pertinent problems on file.  She  has a past surgical history that includes Thyroidectomy; Cholecystectomy; Tonsillectomy; Appendectomy; ORIF ankle fracture; Liver surgery;  section; d&c hysteroscopy laparoscopy; Total abdominal hysterectomy w/ bilateral salpingoophorectomy (Bilateral); and Liver transplantation.  Her family history includes Breast cancer in her maternal grandmother; Colon cancer in her father; Osteoporosis in her mother; Ovarian cancer in her paternal grandmother.  She  reports that she has never smoked. She has never used smokeless tobacco. She reports that she does not drink alcohol or use drugs.  Current Outpatient Medications   Medication Sig Dispense Refill   • albuterol sulfate  (90 Base) MCG/ACT inhaler Inhale 2 puffs.     • cycloSPORINE (RESTASIS OP) Apply  to eye(s) as directed by provider.     • everolimus (ZORTRESS) 0.5 MG tablet Take 1mg (2 tabs) by mouth in the AM and 0.5mg (1 tab) in the evening.     • zolpidem (AMBIEN) 5 MG tablet Take 5 mg by mouth Every Night.     • allopurinol (ZYLOPRIM) 300 MG tablet Take 300 mg by mouth Daily.     • amLODIPine (NORVASC) 5 MG tablet Take 5 mg by mouth Daily.     • buPROPion SR (WELLBUTRIN SR) 100 MG 12 hr tablet TAKE 1 TABLET EVERY DAY FOR DEPRESSION     • colchicine 0.6 MG tablet TAKE 1 TABLET(S) EVERY DAY. MAY TAKE ADD L TAB FOR GOUT FLARES     • ferrous sulfate 325 (65 FE) MG tablet Take 3 tablets by mouth Daily.     • fluticasone (FLONASE) 50 MCG/ACT nasal spray      • fluticasone (VERAMYST) 27.5 MCG/SPRAY nasal spray 2 sprays into each nostril 2 (Two) Times a Day.     • gabapentin (NEURONTIN) 400 MG capsule Take 400 mg by mouth 2 (Two) Times a Day.     • levothyroxine (SYNTHROID, LEVOTHROID) 125 MCG tablet Take 125 mcg by mouth Every Morning.     • magnesium oxide (MAG-OX) 400 MG tablet Take 2 tablets by mouth Daily.     • methocarbamol (ROBAXIN) 500 MG tablet Take 500 mg by mouth 3 (Three) Times a Day As Needed.    "  • mycophenolate (MYFORTIC) 360 MG tablet delayed-release EC tablet Take 1 tablet by mouth 2 (two) times a day.     • pantoprazole (PROTONIX) 40 MG EC tablet Take 40 mg by mouth Daily.     • potassium chloride (MICRO-K) 10 MEQ CR capsule Take 10 mEq by mouth Daily.     • predniSONE (DELTASONE) 20 MG tablet Take 1 tablet by mouth Daily. 7 tablet 0   • rOPINIRole (REQUIP) 2 MG tablet TAKE 1 2 TABLET(S) ORAL EVERY NIGHT AT BEDTIME     • sertraline (ZOLOFT) 100 MG tablet Take 100 mg by mouth Daily.     • traZODone (DESYREL) 100 MG tablet Take 200 mg by mouth every night at bedtime.     • vitamin D (ERGOCALCIFEROL) 1.25 MG (63257 UT) capsule capsule Take 50,000 Units by mouth Every 7 (Seven) Days.         No current facility-administered medications for this visit.      She is allergic to penicillins..    Review of Systems  A review of systems was taken.  She denies cough, fever, and loss of her sense of taste or smell  Constitutional: positive for fatigue, malaise and night sweats  Respiratory: positive for dyspnea on exertion  Cardiovascular: positive for dyspnea  Gastrointestinal: negative  Genitourinary:positive for dyspareunia  Musculoskeletal:negative  Behavioral/Psych: negative   Counseling/Anticipatory Guidance Discussed: nutrition, physical activity, healthy weight, injury prevention, immunizations, screenings and self-breast exam      /84   Ht 154.9 cm (61\")   Wt 73.3 kg (161 lb 9.6 oz)   Breastfeeding No   BMI 30.53 kg/m²     MEDICALLY INDICATED   Physical Exam    General:  appears stated age   Skin:  No suspicious lesions seen   Thyroid: normal to inspection and palpation   Lungs:  breathing is unlabored  clear to auscultation bilaterally   Heart:  regular rate and rhythm, S1, S2 normal, no murmur, click, rub or gallop  normal apical impulse   Breasts:  Examined in supine position  Symmetric without masses or skin dimpling  Nipples normal without inversion, lesions or discharge  There are no " palpable axillary nodes   Abdomen: soft, non-tender; no masses  no umbilical or inguinal hernias are present  no hepato-splenomegaly   Pelvis: Clinical staff was present for exam  External genitalia:  normal appearance of the external genitalia including Bartholin's and [place]'s glands.  Vaginal:  atrophic mucosal changes are present;  Cervix:  absent.  Uterus:  absent.  Adnexa:  absent, bilateral.  Rectal:  anus visually normal appearing. recto-vaginal exam unremarkable and confirms findings;     Lab Review   CBC results and CMP results    Imaging  No data reviewed        Advance directives- YES      ASSESSMENT  Problems Addressed this Visit        Genitourinary and Reproductive     Menopause      Other Visit Diagnoses     Deep dyspareunia    -  Primary    Vaginal atrophy          Diagnoses       Codes Comments    Deep dyspareunia    -  Primary ICD-10-CM: N94.12  ICD-9-CM: 625.0     Menopause     ICD-10-CM: Z78.0  ICD-9-CM: 627.2     Vaginal atrophy     ICD-10-CM: N95.2  ICD-9-CM: 627.3               Substance History:   reports that she has never smoked. She has never used smokeless tobacco.   reports no history of alcohol use.   reports no history of drug use.    Substance use counseling is not indicated based on patient history.      PLAN    · Medications prescribed this encounter:  No orders of the defined types were placed in this encounter.  · Monthly self breast assessment, I have encouraged the patient to have breast imaging here  · If her oncologist approves I will start her on estradiol vaginal cream, 0.1 mg/gram in a dose of 0.5 g intravaginally twice a week and testosterone cream, 1% (5 mg) applied by topical route daily  · Calcium, 600 mg/ Vit. D, 400 IU daily; regular weight-bearing exercise  · Follow up: 12 month(s)  *Please note that portions of this documentation may have been completed with a voice recognition program.  Efforts were made to edit this dictation, but occasional words may have been  mistranscribed.       This note was electronically signed.    ROBERT Burnette MD  March 2, 2021  16:36 EST

## 2021-03-09 ENCOUNTER — APPOINTMENT (OUTPATIENT)
Dept: GENERAL RADIOLOGY | Facility: HOSPITAL | Age: 56
End: 2021-03-09

## 2021-03-09 ENCOUNTER — APPOINTMENT (OUTPATIENT)
Dept: CT IMAGING | Facility: HOSPITAL | Age: 56
End: 2021-03-09

## 2021-03-09 ENCOUNTER — HOSPITAL ENCOUNTER (EMERGENCY)
Facility: HOSPITAL | Age: 56
Discharge: HOME | End: 2021-03-09
Attending: EMERGENCY MEDICINE | Admitting: EMERGENCY MEDICINE

## 2021-03-09 VITALS
SYSTOLIC BLOOD PRESSURE: 144 MMHG | WEIGHT: 161 LBS | BODY MASS INDEX: 30.4 KG/M2 | HEIGHT: 61 IN | DIASTOLIC BLOOD PRESSURE: 90 MMHG | OXYGEN SATURATION: 96 % | TEMPERATURE: 98 F | RESPIRATION RATE: 16 BRPM | HEART RATE: 80 BPM

## 2021-03-09 DIAGNOSIS — R07.89 CHEST WALL PAIN: Primary | ICD-10-CM

## 2021-03-09 LAB
ALBUMIN SERPL-MCNC: 4.2 G/DL (ref 3.5–5.2)
ALBUMIN/GLOB SERPL: 1.9 G/DL
ALP SERPL-CCNC: 120 U/L (ref 39–117)
ALT SERPL W P-5'-P-CCNC: 36 U/L (ref 1–33)
ANION GAP SERPL CALCULATED.3IONS-SCNC: 11 MMOL/L (ref 5–15)
AST SERPL-CCNC: 17 U/L (ref 1–32)
B PARAPERT DNA SPEC QL NAA+PROBE: NOT DETECTED
B PERT DNA SPEC QL NAA+PROBE: NOT DETECTED
BASOPHILS # BLD AUTO: 0.02 10*3/MM3 (ref 0–0.2)
BASOPHILS NFR BLD AUTO: 0.6 % (ref 0–1.5)
BILIRUB SERPL-MCNC: 0.7 MG/DL (ref 0–1.2)
BUN SERPL-MCNC: 18 MG/DL (ref 6–20)
BUN/CREAT SERPL: 24 (ref 7–25)
C PNEUM DNA NPH QL NAA+NON-PROBE: NOT DETECTED
CALCIUM SPEC-SCNC: 8.9 MG/DL (ref 8.6–10.5)
CHLORIDE SERPL-SCNC: 104 MMOL/L (ref 98–107)
CO2 SERPL-SCNC: 25 MMOL/L (ref 22–29)
CREAT SERPL-MCNC: 0.75 MG/DL (ref 0.57–1)
DEPRECATED RDW RBC AUTO: 45.5 FL (ref 37–54)
EOSINOPHIL # BLD AUTO: 0.01 10*3/MM3 (ref 0–0.4)
EOSINOPHIL NFR BLD AUTO: 0.3 % (ref 0.3–6.2)
ERYTHROCYTE [DISTWIDTH] IN BLOOD BY AUTOMATED COUNT: 14.2 % (ref 12.3–15.4)
FLUAV SUBTYP SPEC NAA+PROBE: NOT DETECTED
FLUBV RNA ISLT QL NAA+PROBE: NOT DETECTED
GFR SERPL CREATININE-BSD FRML MDRD: 80 ML/MIN/1.73
GLOBULIN UR ELPH-MCNC: 2.2 GM/DL
GLUCOSE SERPL-MCNC: 131 MG/DL (ref 65–99)
HADV DNA SPEC NAA+PROBE: NOT DETECTED
HCOV 229E RNA SPEC QL NAA+PROBE: NOT DETECTED
HCOV HKU1 RNA SPEC QL NAA+PROBE: NOT DETECTED
HCOV NL63 RNA SPEC QL NAA+PROBE: NOT DETECTED
HCOV OC43 RNA SPEC QL NAA+PROBE: NOT DETECTED
HCT VFR BLD AUTO: 42.8 % (ref 34–46.6)
HGB BLD-MCNC: 13.8 G/DL (ref 12–15.9)
HMPV RNA NPH QL NAA+NON-PROBE: NOT DETECTED
HOLD SPECIMEN: NORMAL
HPIV1 RNA SPEC QL NAA+PROBE: NOT DETECTED
HPIV2 RNA SPEC QL NAA+PROBE: NOT DETECTED
HPIV3 RNA NPH QL NAA+PROBE: NOT DETECTED
HPIV4 P GENE NPH QL NAA+PROBE: NOT DETECTED
IMM GRANULOCYTES # BLD AUTO: 0.1 10*3/MM3 (ref 0–0.05)
IMM GRANULOCYTES NFR BLD AUTO: 3.2 % (ref 0–0.5)
LIPASE SERPL-CCNC: 16 U/L (ref 13–60)
LYMPHOCYTES # BLD AUTO: 0.6 10*3/MM3 (ref 0.7–3.1)
LYMPHOCYTES NFR BLD AUTO: 19 % (ref 19.6–45.3)
M PNEUMO IGG SER IA-ACNC: NOT DETECTED
MCH RBC QN AUTO: 28.6 PG (ref 26.6–33)
MCHC RBC AUTO-ENTMCNC: 32.2 G/DL (ref 31.5–35.7)
MCV RBC AUTO: 88.6 FL (ref 79–97)
MONOCYTES # BLD AUTO: 0.11 10*3/MM3 (ref 0.1–0.9)
MONOCYTES NFR BLD AUTO: 3.5 % (ref 5–12)
NEUTROPHILS NFR BLD AUTO: 2.31 10*3/MM3 (ref 1.7–7)
NEUTROPHILS NFR BLD AUTO: 73.4 % (ref 42.7–76)
NRBC BLD AUTO-RTO: 0 /100 WBC (ref 0–0.2)
NT-PROBNP SERPL-MCNC: 38.6 PG/ML (ref 0–900)
PLATELET # BLD AUTO: 129 10*3/MM3 (ref 140–450)
PMV BLD AUTO: 11 FL (ref 6–12)
POTASSIUM SERPL-SCNC: 4.2 MMOL/L (ref 3.5–5.2)
PROT SERPL-MCNC: 6.4 G/DL (ref 6–8.5)
RBC # BLD AUTO: 4.83 10*6/MM3 (ref 3.77–5.28)
RHINOVIRUS RNA SPEC NAA+PROBE: NOT DETECTED
RSV RNA NPH QL NAA+NON-PROBE: NOT DETECTED
SARS-COV-2 RNA NPH QL NAA+NON-PROBE: NOT DETECTED
SODIUM SERPL-SCNC: 140 MMOL/L (ref 136–145)
TROPONIN T SERPL-MCNC: <0.01 NG/ML (ref 0–0.03)
WBC # BLD AUTO: 3.15 10*3/MM3 (ref 3.4–10.8)
WHOLE BLOOD HOLD SPECIMEN: NORMAL
WHOLE BLOOD HOLD SPECIMEN: NORMAL

## 2021-03-09 PROCEDURE — 93010 ELECTROCARDIOGRAM REPORT: CPT | Performed by: INTERNAL MEDICINE

## 2021-03-09 PROCEDURE — 83690 ASSAY OF LIPASE: CPT | Performed by: EMERGENCY MEDICINE

## 2021-03-09 PROCEDURE — 0202U NFCT DS 22 TRGT SARS-COV-2: CPT | Performed by: EMERGENCY MEDICINE

## 2021-03-09 PROCEDURE — 93005 ELECTROCARDIOGRAM TRACING: CPT | Performed by: EMERGENCY MEDICINE

## 2021-03-09 PROCEDURE — 0 IOPAMIDOL PER 1 ML: Performed by: EMERGENCY MEDICINE

## 2021-03-09 PROCEDURE — 85025 COMPLETE CBC W/AUTO DIFF WBC: CPT

## 2021-03-09 PROCEDURE — 84484 ASSAY OF TROPONIN QUANT: CPT | Performed by: EMERGENCY MEDICINE

## 2021-03-09 PROCEDURE — 71045 X-RAY EXAM CHEST 1 VIEW: CPT

## 2021-03-09 PROCEDURE — 71275 CT ANGIOGRAPHY CHEST: CPT

## 2021-03-09 PROCEDURE — 80053 COMPREHEN METABOLIC PANEL: CPT | Performed by: EMERGENCY MEDICINE

## 2021-03-09 PROCEDURE — 83880 ASSAY OF NATRIURETIC PEPTIDE: CPT | Performed by: EMERGENCY MEDICINE

## 2021-03-09 PROCEDURE — 70450 CT HEAD/BRAIN W/O DYE: CPT

## 2021-03-09 PROCEDURE — 99284 EMERGENCY DEPT VISIT MOD MDM: CPT

## 2021-03-09 RX ORDER — SODIUM CHLORIDE 0.9 % (FLUSH) 0.9 %
10 SYRINGE (ML) INJECTION AS NEEDED
Status: DISCONTINUED | OUTPATIENT
Start: 2021-03-09 | End: 2021-03-09 | Stop reason: HOSPADM

## 2021-03-09 RX ORDER — ASPIRIN 81 MG/1
324 TABLET, CHEWABLE ORAL ONCE
Status: DISCONTINUED | OUTPATIENT
Start: 2021-03-09 | End: 2021-03-09 | Stop reason: HOSPADM

## 2021-03-09 RX ADMIN — IOPAMIDOL 80 ML: 755 INJECTION, SOLUTION INTRAVENOUS at 16:18

## 2021-03-09 NOTE — ED PROVIDER NOTES
"/ EMERGENCY DEPARTMENT ENCOUNTER max  -s       Pt Name: Lizette Frias  MRN: [id number]  Pt :   1965  Room Number:    Date of encounter:  3/9/2021  PCP: Provider, No Known  ED Provider: Michael Cassidy MD    Historian: Patient      HPI:  Chief Complaint: Chest pain      Context: Lizette Frias is a 56 y.o. female who presents to the ED c/o left-sided chest discomfort which has been ongoing for roughly 1 week.  Patient notes significantly increased discomfort when she takes a deep breath, coughs, moves her left shoulder/arm.  The discomfort is gradually been increasing.  She notes severe pain with trying to sit up unassisted while in bed.  She rates her discomfort 5 out of 10 on the pain scale.  She reports a chronic \"allergy cough\" but notes that her cough is worsened over the last 4 days.  Continues to be nonproductive.  She has received her first dose of Covid vaccine roughly 1 week ago.  Patient also reports a generalized headache for which she is been evaluated by her PCP.  This is been ongoing for the last 6 weeks.  Nothing new or unusual about the headache since onset.  She reports that no imaging has been performed on her head thus far during this episode.  The patient also reports a history of liver transplant on 3/20/2020 in [facility].      PAST MEDICAL HISTORY  Active Ambulatory Problems     Diagnosis Date Noted   • Menopause 2019   • Thyroid cancer (CMS/HCC)    • Stroke (CMS/HCC)    • Renal impairment    • Vision impairment    • Osteoporosis    • Osteoarthritis    • Meningioma (CMS/HCC)    • Hypothyroid    • Hypercholesteremia    • Depression    • Cirrhosis of liver (CMS/HCC)    • Anxiety      Resolved Ambulatory Problems     Diagnosis Date Noted   • No Resolved Ambulatory Problems     No Additional Past Medical History         PAST SURGICAL HISTORY  Past Surgical History:   Procedure Laterality Date   • APPENDECTOMY     •  SECTION      x3   • CHOLECYSTECTOMY   "   • D&C HYSTEROSCOPY LAPAROSCOPY      x2 for endometriosis   • LIVER SURGERY      removed cysts    • LIVER TRANSPLANTATION     • ORIF ANKLE FRACTURE     • THYROIDECTOMY     • TONSILLECTOMY     • TOTAL ABDOMINAL HYSTERECTOMY WITH SALPINGO OOPHORECTOMY Bilateral          FAMILY HISTORY  Family History   Problem Relation Age of Onset   • Colon cancer Father    • Osteoporosis Mother    • Ovarian cancer Paternal Grandmother    • Breast cancer Maternal Grandmother          SOCIAL HISTORY  Social History     Socioeconomic History   • Marital status:      Spouse name: Not on file   • Number of children: Not on file   • Years of education: Not on file   • Highest education level: Not on file   Tobacco Use   • Smoking status: Never Smoker   • Smokeless tobacco: Never Used   Substance and Sexual Activity   • Alcohol use: No   • Drug use: No   • Sexual activity: Yes     Partners: Male     Birth control/protection: Surgical, Post-menopausal         ALLERGIES  Penicillins        REVIEW OF SYSTEMS  Review of Systems     All systems reviewed and negative except for those discussed in HPI.       PHYSICAL EXAM    I have reviewed the triage vital signs and nursing notes.    ED Triage Vitals [03/09/21 1431]   Temp Heart Rate Resp BP SpO2   98 °F (36.7 °C) 85 16 158/92 98 %      Temp src Heart Rate Source Patient Position BP Location FiO2 (%)   Oral Monitor Sitting Left arm --       Physical Exam  GENERAL:   Appears nontoxic but a little uncomfortable.  She winces in pain with movements of her left upper extremity and when simply moving from semi-Fowlers to sitting upright..  HENT: Nares patent.  No sinus tenderness  EYES: No scleral icterus.  CV: Regular rhythm, regular rate.  No murmurs gallops rubs clear to auscultation  RESPIRATORY: Normal effort.  No audible wheezes, rales or rhonchi.  ABDOMEN: Soft, nontender.  MUSCULOSKELETAL: No deformities.   NEURO: Alert, moves all extremities, follows commands.  SKIN: Warm, dry, no  rash visualized.        LAB RESULTS  Recent Results (from the past 24 hour(s))   ECG 12 Lead    Collection Time: 03/09/21  2:35 PM   Result Value Ref Range    QT Interval 394 ms    QTC Interval 445 ms   Troponin    Collection Time: 03/09/21  2:41 PM    Specimen: Blood   Result Value Ref Range    Troponin T <0.010 0.000 - 0.030 ng/mL   Comprehensive Metabolic Panel    Collection Time: 03/09/21  2:41 PM    Specimen: Blood   Result Value Ref Range    Glucose 131 (H) 65 - 99 mg/dL    BUN 18 6 - 20 mg/dL    Creatinine 0.75 0.57 - 1.00 mg/dL    Sodium 140 136 - 145 mmol/L    Potassium 4.2 3.5 - 5.2 mmol/L    Chloride 104 98 - 107 mmol/L    CO2 25.0 22.0 - 29.0 mmol/L    Calcium 8.9 8.6 - 10.5 mg/dL    Total Protein 6.4 6.0 - 8.5 g/dL    Albumin 4.20 3.50 - 5.20 g/dL    ALT (SGPT) 36 (H) 1 - 33 U/L    AST (SGOT) 17 1 - 32 U/L    Alkaline Phosphatase 120 (H) 39 - 117 U/L    Total Bilirubin 0.7 0.0 - 1.2 mg/dL    eGFR Non African Amer 80 >60 mL/min/1.73    Globulin 2.2 gm/dL    A/G Ratio 1.9 g/dL    BUN/Creatinine Ratio 24.0 7.0 - 25.0    Anion Gap 11.0 5.0 - 15.0 mmol/L   Lipase    Collection Time: 03/09/21  2:41 PM    Specimen: Blood   Result Value Ref Range    Lipase 16 13 - 60 U/L   BNP    Collection Time: 03/09/21  2:41 PM    Specimen: Blood   Result Value Ref Range    proBNP 38.6 0.0 - 900.0 pg/mL   Light Blue Top    Collection Time: 03/09/21  2:41 PM   Result Value Ref Range    Extra Tube hold for add-on    Green Top (Gel)    Collection Time: 03/09/21  2:41 PM   Result Value Ref Range    Extra Tube Hold for add-ons.    Lavender Top    Collection Time: 03/09/21  2:41 PM   Result Value Ref Range    Extra Tube hold for add-on    Gold Top - SST    Collection Time: 03/09/21  2:41 PM   Result Value Ref Range    Extra Tube Hold for add-ons.    Gray Top - Ice    Collection Time: 03/09/21  2:41 PM   Result Value Ref Range    Extra Tube Hold for add-ons.    CBC Auto Differential    Collection Time: 03/09/21  2:41 PM     Specimen: Blood   Result Value Ref Range    WBC 3.15 (L) 3.40 - 10.80 10*3/mm3    RBC 4.83 3.77 - 5.28 10*6/mm3    Hemoglobin 13.8 12.0 - 15.9 g/dL    Hematocrit 42.8 34.0 - 46.6 %    MCV 88.6 79.0 - 97.0 fL    MCH 28.6 26.6 - 33.0 pg    MCHC 32.2 31.5 - 35.7 g/dL    RDW 14.2 12.3 - 15.4 %    RDW-SD 45.5 37.0 - 54.0 fl    MPV 11.0 6.0 - 12.0 fL    Platelets 129 (L) 140 - 450 10*3/mm3    Neutrophil % 73.4 42.7 - 76.0 %    Lymphocyte % 19.0 (L) 19.6 - 45.3 %    Monocyte % 3.5 (L) 5.0 - 12.0 %    Eosinophil % 0.3 0.3 - 6.2 %    Basophil % 0.6 0.0 - 1.5 %    Immature Grans % 3.2 (H) 0.0 - 0.5 %    Neutrophils, Absolute 2.31 1.70 - 7.00 10*3/mm3    Lymphocytes, Absolute 0.60 (L) 0.70 - 3.10 10*3/mm3    Monocytes, Absolute 0.11 0.10 - 0.90 10*3/mm3    Eosinophils, Absolute 0.01 0.00 - 0.40 10*3/mm3    Basophils, Absolute 0.02 0.00 - 0.20 10*3/mm3    Immature Grans, Absolute 0.10 (H) 0.00 - 0.05 10*3/mm3    nRBC 0.0 0.0 - 0.2 /100 WBC   Respiratory Panel PCR w/COVID-19(SARS-CoV-2) JENNIFER/NELA/RIGO/PAD/COR/MAD/GARRETT In-House, NP Swab in [facility], 3-4 HR TAT - Swab, Nasopharynx    Collection Time: 03/09/21  3:46 PM    Specimen: Nasopharynx; Swab   Result Value Ref Range    ADENOVIRUS, PCR Not Detected Not Detected    Coronavirus 229E Not Detected Not Detected    Coronavirus HKU1 Not Detected Not Detected    Coronavirus NL63 Not Detected Not Detected    Coronavirus OC43 Not Detected Not Detected    COVID19 Not Detected Not Detected - Ref. Range    Human Metapneumovirus Not Detected Not Detected    Human Rhinovirus/Enterovirus Not Detected Not Detected    Influenza A PCR Not Detected Not Detected    Influenza B PCR Not Detected Not Detected    Parainfluenza Virus 1 Not Detected Not Detected    Parainfluenza Virus 2 Not Detected Not Detected    Parainfluenza Virus 3 Not Detected Not Detected    Parainfluenza Virus 4 Not Detected Not Detected    RSV, PCR Not Detected Not Detected    Bordetella pertussis pcr Not Detected Not Detected     Bordetella parapertussis PCR Not Detected Not Detected    Chlamydophila pneumoniae PCR Not Detected Not Detected    Mycoplasma pneumo by PCR Not Detected Not Detected       If labs were ordered, I independently reviewed the results.        RADIOLOGY  CT Head Without Contrast    Result Date: 3/9/2021  EXAMINATION: CT HEAD WO CONTRAST-  INDICATION: headache  TECHNIQUE: Axial noncontrast CT of the head with multiplanar reconstruction  The radiation dose reduction device was turned on for each scan per the ALARA (As Low as Reasonably Achievable) protocol.  COMPARISON: NONE  FINDINGS: Gray-white differentiation is maintained and there is no evidence of intracranial hemorrhage, mass or mass effect. The ventricles are normal in size and figuration. The orbits are normal and the paranasal sinuses are grossly clear.      No acute intracranial abnormality.   This report was finalized on 3/9/2021 4:43 PM by Adama Bunch.      XR Chest 1 View    Result Date: 3/9/2021  EXAMINATION: XR CHEST 1 VW-  INDICATION: Chest Pain triage protocol  COMPARISON: 10/2/2020  FINDINGS: No focal airspace opacity. No pleural effusion or pneumothorax. Normal heart and mediastinal contours.      No evidence of acute disease in the chest.  This report was finalized on 3/9/2021 3:02 PM by Adama Bunch.      CT Angiogram Chest    Result Date: 3/9/2021  EXAMINATION: CT ANGIOGRAM CHEST-  INDICATION: cp, left ?pe  TECHNIQUE: Axial pulmonary arterial phase IV contrast enhanced CT angiogram of the chest per PE protocol. Two-dimensional reconstructions were postprocessed.  The radiation dose reduction device was turned on for each scan per the ALARA (As Low as Reasonably Achievable) protocol.  COMPARISON: NONE  FINDINGS: No pathologic axillary adenopathy or other worrisome body wall soft tissue finding in the chest. No acute findings in the partially imaged upper abdomen. Normal caliber thoracic aorta. No pleural or pericardial effusion. No  pathologic mediastinal or hilar adenopathy. The pulmonary arteries are well-opacified, without evidence of filling defect concerning for acute pulmonary embolus. There is no evidence of acute fracture or aggressive osseous lesion. Evaluation of the lung parenchyma demonstrates no evidence of acute infectious or inflammatory process.      No pulmonary embolus or other acute process in the chest.   This report was finalized on 3/9/2021 4:40 PM by Adama Bunch.        I ordered and reviewed the above noted radiographic studies.    I viewed images of chest x-ray which showed no active disease per my independent interpretation.  See radiologist's dictation for official interpretation.        PROCEDURES    Procedures    ECG 12 Lead   Final Result   Test Reason : chest pain   Blood Pressure : **/** mmHG   Vent. Rate : 077 BPM     Atrial Rate : 077 BPM      P-R Int : 154 ms          QRS Dur : 098 ms       QT Int : 394 ms       P-R-T Axes : 046 017 052 degrees      QTc Int : 445 ms      Normal sinus rhythm   Cannot rule out Anterior infarct , age undetermined   Abnormal ECG   No previous ECGs available   Reconfirmed by FRANCOIS NICOLE MD ([id number]) on 3/10/2021 12:52:23 AM      Referred By:  EDMD           Confirmed By:FRANCOIS NICOLE MD          MEDICATIONS GIVEN IN ER    Medications   iopamidol (ISOVUE-370) 76 % injection 100 mL (80 mL Intravenous Given 3/9/21 1618)         PROGRESS, DATA ANALYSIS, CONSULTS, AND MEDICAL DECISION MAKING    All labs have been independently reviewed by me.  All radiology studies have been reviewed by me and the radiologist dictating the report.   EKG's have been independently viewed and interpreted by me.      Differential diagnoses: Chest discomfort which could reflect pulmonary embolus, costochondritis, ACS, etc.  Chronic headache which could reflect intracranial neoplasm, sinusitis, migraine, etc.    ED Course as of Mar 10 0052   Tue Mar 09, 2021   1836 Stress before xplt.  Pcp - will see pt  this week.    [MS]   1912 HEART Pathway for Early Discharge in Acute Chest Pain - MDCalc  Calculated on Mar 09 2021 7:12 PM  3 points -> HEART Pathway Score  Low risk -> 0.9-1.7% 30-day MACE Repeat troponin at 3 hours and if negative, discharge home with outpatient follow-up.    [MS]   1913 The patient has been advised against NSAID use.  I do not believe that narcotics are indicated or would be beneficial in her care.  I feel she needs follow-up with her PCP and she already has an appointment scheduled for this week.  Given the duration of her headache asked her to discuss neurology follow-up with her PCP.    [MS]      ED Course User Index  [MS] Michael Cassidy MD             AS OF 00:52 EST VITALS:    BP - 144/90  HR - 80  TEMP - 98 °F (36.7 °C) (Oral)  O2 SATS - 96%        DIAGNOSIS  Final diagnoses:   Chest wall pain         DISPOSITION  DISCHARGE    FOLLOW-UP  Yisel Cottrell, APRN  [address] [id number]  [phone]      NEXT AVAILABLE APPOINTMENT - RECHECK OF CONDITION    [facility] Emergency Department  [address] [address] [phone]    IF YOU HAVE ANY CONCERN OF WORSENING CONDITION         Medication List      No changes were made to your prescriptions during this visit.                  Michael Cassidy MD  03/10/21 0053

## 2021-03-10 LAB
QT INTERVAL: 394 MS
QTC INTERVAL: 445 MS

## 2021-03-10 NOTE — DISCHARGE INSTRUCTIONS
Utilize Tylenol and heat to chest wall and left shoulder region.  Perform range of motion exercises at least 4 times a day to include your entire left upper extremity.    Return to the emergency department if you have any concerns worsening condition.    Please review the medications you are supposed to be taking according to prior physician recommendations. I have not changed your home medications during this visit. If your discharge instructions indicate that I have changed your home medications, this is not the case, and you should disregard. If you have any questions about the medication you should be taking at home, please call your physician.

## 2021-04-01 ENCOUNTER — TELEPHONE (OUTPATIENT)
Dept: OBSTETRICS AND GYNECOLOGY | Facility: CLINIC | Age: 56
End: 2021-04-01

## 2021-04-01 DIAGNOSIS — N95.2 VAGINAL ATROPHY: Primary | ICD-10-CM

## 2021-04-01 RX ORDER — ESTRADIOL 0.1 MG/G
CREAM VAGINAL
Qty: 42.5 G | Refills: 2 | Status: SHIPPED | OUTPATIENT
Start: 2021-04-01 | End: 2022-03-03

## 2021-04-01 NOTE — TELEPHONE ENCOUNTER
Patient has been informed that she has clearance to take testosterone cream and to use estradiol vaginal cream.  Estradiol cream prescription has been sent electronically to  [facility] in [place].  Testosterone cream prescription will be mailed to patient and she will fill at [facility].    The patient has been informed that she will need to have labs drawn in 4 months and will call the office prior to that time to make sure orders are in Epic.  Patient also requested number for scheduling a mammogram, and this was given to her.

## 2021-04-01 NOTE — TELEPHONE ENCOUNTER
----- Message from Adama Burnette MD sent at 4/1/2021 11:56 AM EDT -----  The patient's medical oncologist from the [facility] has authorized me to prescribe estradiol vaginal cream to treat vaginal atrophy and topical testosterone cream to treat altered libido.  She should be started on estradiol cream, 0.5 g intravaginally twice a week and topical testosterone cream, 1% (5 mg) applied by topical route daily.  ----- Message -----  From: Lizette An  Sent: 4/1/2021  11:51 AM EDT  To: Adama Burnette MD

## 2021-04-20 ENCOUNTER — TELEPHONE (OUTPATIENT)
Dept: OBSTETRICS AND GYNECOLOGY | Facility: CLINIC | Age: 56
End: 2021-04-20

## 2021-04-20 NOTE — TELEPHONE ENCOUNTER
JUST PICKED UP HER PRESCRIPTION FROM PHARMACY - PHARMACIST TOLD HER TO RUB ON HER ARM - SHE THOUGHT SHE WAS TO INSERT VAGINALLY - I TOLD HER NOT TO DO ANYTHING UNTIL THE NURSE CALLS HER TO MAKE SURE HOW TO APPLY

## 2021-04-20 NOTE — TELEPHONE ENCOUNTER
Patient has been given prescriptions for estradiol vaginal cream and testosterone cream.  I have called the patient to clarify directions.  The patient has been advised that the testosterone cream is to be applied topically every day.  The patient has been advised that the estradiol vaginal cream is to be inserted into the vagina as directed 2 X week.    The patient states that she understands the directions.

## 2021-08-16 ENCOUNTER — HOSPITAL ENCOUNTER (EMERGENCY)
Facility: HOSPITAL | Age: 56
Discharge: HOME | End: 2021-08-17
Attending: EMERGENCY MEDICINE | Admitting: EMERGENCY MEDICINE

## 2021-08-16 ENCOUNTER — APPOINTMENT (OUTPATIENT)
Dept: GENERAL RADIOLOGY | Facility: HOSPITAL | Age: 56
End: 2021-08-16

## 2021-08-16 ENCOUNTER — APPOINTMENT (OUTPATIENT)
Dept: CT IMAGING | Facility: HOSPITAL | Age: 56
End: 2021-08-16

## 2021-08-16 DIAGNOSIS — Z86.79 HISTORY OF HYPERTENSION: ICD-10-CM

## 2021-08-16 DIAGNOSIS — Z94.4 HISTORY OF LIVER TRANSPLANT (HCC): ICD-10-CM

## 2021-08-16 DIAGNOSIS — Z86.73 HISTORY OF STROKE: ICD-10-CM

## 2021-08-16 DIAGNOSIS — R06.02 SHORTNESS OF BREATH: Primary | ICD-10-CM

## 2021-08-16 DIAGNOSIS — R14.0 ABDOMINAL DISTENTION: ICD-10-CM

## 2021-08-16 DIAGNOSIS — K75.81 NASH (NONALCOHOLIC STEATOHEPATITIS): ICD-10-CM

## 2021-08-16 DIAGNOSIS — R63.5 WEIGHT GAIN: ICD-10-CM

## 2021-08-16 LAB
ALBUMIN SERPL-MCNC: 4.4 G/DL (ref 3.5–5.2)
ALBUMIN/GLOB SERPL: 1.8 G/DL
ALP SERPL-CCNC: 137 U/L (ref 39–117)
ALT SERPL W P-5'-P-CCNC: 107 U/L (ref 1–33)
ANION GAP SERPL CALCULATED.3IONS-SCNC: 9 MMOL/L (ref 5–15)
AST SERPL-CCNC: 52 U/L (ref 1–32)
BASOPHILS # BLD AUTO: 0.07 10*3/MM3 (ref 0–0.2)
BASOPHILS NFR BLD AUTO: 1.8 % (ref 0–1.5)
BILIRUB SERPL-MCNC: 1 MG/DL (ref 0–1.2)
BUN SERPL-MCNC: 18 MG/DL (ref 6–20)
BUN/CREAT SERPL: 19.8 (ref 7–25)
CALCIUM SPEC-SCNC: 9.3 MG/DL (ref 8.6–10.5)
CHLORIDE SERPL-SCNC: 103 MMOL/L (ref 98–107)
CO2 SERPL-SCNC: 25 MMOL/L (ref 22–29)
CREAT SERPL-MCNC: 0.91 MG/DL (ref 0.57–1)
DEPRECATED RDW RBC AUTO: 49.8 FL (ref 37–54)
EOSINOPHIL # BLD AUTO: 0.03 10*3/MM3 (ref 0–0.4)
EOSINOPHIL NFR BLD AUTO: 0.8 % (ref 0.3–6.2)
ERYTHROCYTE [DISTWIDTH] IN BLOOD BY AUTOMATED COUNT: 14.8 % (ref 12.3–15.4)
GFR SERPL CREATININE-BSD FRML MDRD: 64 ML/MIN/1.73
GLOBULIN UR ELPH-MCNC: 2.5 GM/DL
GLUCOSE SERPL-MCNC: 114 MG/DL (ref 65–99)
HCT VFR BLD AUTO: 43.6 % (ref 34–46.6)
HGB BLD-MCNC: 14.3 G/DL (ref 12–15.9)
HOLD SPECIMEN: NORMAL
IMM GRANULOCYTES # BLD AUTO: 0.17 10*3/MM3 (ref 0–0.05)
IMM GRANULOCYTES NFR BLD AUTO: 4.3 % (ref 0–0.5)
LYMPHOCYTES # BLD AUTO: 1.27 10*3/MM3 (ref 0.7–3.1)
LYMPHOCYTES NFR BLD AUTO: 31.8 % (ref 19.6–45.3)
MCH RBC QN AUTO: 30.1 PG (ref 26.6–33)
MCHC RBC AUTO-ENTMCNC: 32.8 G/DL (ref 31.5–35.7)
MCV RBC AUTO: 91.8 FL (ref 79–97)
MONOCYTES # BLD AUTO: 0.44 10*3/MM3 (ref 0.1–0.9)
MONOCYTES NFR BLD AUTO: 11 % (ref 5–12)
NEUTROPHILS NFR BLD AUTO: 2.02 10*3/MM3 (ref 1.7–7)
NEUTROPHILS NFR BLD AUTO: 50.3 % (ref 42.7–76)
NRBC BLD AUTO-RTO: 0 /100 WBC (ref 0–0.2)
NT-PROBNP SERPL-MCNC: 109.1 PG/ML (ref 0–900)
PLATELET # BLD AUTO: 168 10*3/MM3 (ref 140–450)
PMV BLD AUTO: 11.9 FL (ref 6–12)
POTASSIUM SERPL-SCNC: 4.1 MMOL/L (ref 3.5–5.2)
PROT SERPL-MCNC: 6.9 G/DL (ref 6–8.5)
RBC # BLD AUTO: 4.75 10*6/MM3 (ref 3.77–5.28)
SODIUM SERPL-SCNC: 137 MMOL/L (ref 136–145)
TROPONIN T SERPL-MCNC: <0.01 NG/ML (ref 0–0.03)
TROPONIN T SERPL-MCNC: <0.01 NG/ML (ref 0–0.03)
WBC # BLD AUTO: 4 10*3/MM3 (ref 3.4–10.8)
WHOLE BLOOD HOLD SPECIMEN: NORMAL

## 2021-08-16 PROCEDURE — 93005 ELECTROCARDIOGRAM TRACING: CPT | Performed by: PHYSICIAN ASSISTANT

## 2021-08-16 PROCEDURE — 85025 COMPLETE CBC W/AUTO DIFF WBC: CPT

## 2021-08-16 PROCEDURE — 80053 COMPREHEN METABOLIC PANEL: CPT

## 2021-08-16 PROCEDURE — 93005 ELECTROCARDIOGRAM TRACING: CPT | Performed by: EMERGENCY MEDICINE

## 2021-08-16 PROCEDURE — 99283 EMERGENCY DEPT VISIT LOW MDM: CPT

## 2021-08-16 PROCEDURE — 93005 ELECTROCARDIOGRAM TRACING: CPT

## 2021-08-16 PROCEDURE — 84550 ASSAY OF BLOOD/URIC ACID: CPT | Performed by: PHYSICIAN ASSISTANT

## 2021-08-16 PROCEDURE — 74176 CT ABD & PELVIS W/O CONTRAST: CPT

## 2021-08-16 PROCEDURE — 83880 ASSAY OF NATRIURETIC PEPTIDE: CPT

## 2021-08-16 PROCEDURE — 84484 ASSAY OF TROPONIN QUANT: CPT

## 2021-08-16 PROCEDURE — 71045 X-RAY EXAM CHEST 1 VIEW: CPT

## 2021-08-16 PROCEDURE — 71250 CT THORAX DX C-: CPT

## 2021-08-16 PROCEDURE — 84484 ASSAY OF TROPONIN QUANT: CPT | Performed by: PHYSICIAN ASSISTANT

## 2021-08-16 RX ORDER — SODIUM CHLORIDE 0.9 % (FLUSH) 0.9 %
10 SYRINGE (ML) INJECTION AS NEEDED
Status: DISCONTINUED | OUTPATIENT
Start: 2021-08-16 | End: 2021-08-17 | Stop reason: HOSPADM

## 2021-08-17 VITALS
HEIGHT: 61 IN | HEART RATE: 63 BPM | SYSTOLIC BLOOD PRESSURE: 120 MMHG | BODY MASS INDEX: 33.61 KG/M2 | TEMPERATURE: 98.8 F | WEIGHT: 178 LBS | DIASTOLIC BLOOD PRESSURE: 76 MMHG | OXYGEN SATURATION: 95 % | RESPIRATION RATE: 16 BRPM

## 2021-08-17 LAB
QT INTERVAL: 416 MS
QT INTERVAL: 418 MS
QTC INTERVAL: 429 MS
QTC INTERVAL: 438 MS
URATE SERPL-MCNC: 6.2 MG/DL (ref 2.4–5.7)

## 2021-08-17 NOTE — ED PROVIDER NOTES
Subjective   This is a 56-year-old female that presents to the ER with shortness of breath that has been persistent for the last week as well as abdominal distention and pedal edema with 16 pound weight gain over the last week.  Patient has history of liver transplant, with surgery being performed in [place].  She did very well initially following transplant, but she has now started to develop symptoms of cirrhosis, which she had prior to her transplant.  She was advised that she has significant fatty deposits on her new liver, and that MORRISON is more than likely genetic.  Patient has had increased abdominal distention, bloating, shortness of breath with exertion, and increased pedal edema.  She utilizes Aldactone as a diuretic, and her PCP recently called in Lasix, but she has not gotten this filled yet.  Patient does not follow with any GI here in [place], [place].  She denies any URI symptoms including rhinorrhea, nasal congestion, or cough.  She denies fever or chills.  Patient denies any chest pain.  She reports chronic diarrhea since May, 2021.  She has been evaluated by GI and she ultimately tested positive for Salmonella and cholera.  Patient completed 3 different antibiotic courses.  She still continues to have diarrhea, but it has markedly improved.  She usually has approximately 6 loose stools per day.  She denies any melena or hematochezia.  She denies any recent antibiotics.  Last EGD/colonoscopy was earlier in  and EGD revealed esophageal varices and patient did have some benign colon polyps.  She denies any dysuria, urgency, or frequency.  Past medical history is significant for cirrhosis of the liver secondary to MORRISON status post liver transplant in , history of thyroid cancer, hypertension, hyperlipidemia, hypothyroid, anxiety, osteoporosis, and stroke.  Previous abdominal surgeries include cholecystectomy, appendectomy, liver transplant, , and total abdominal  hysterectomy.  No other concerns at this time.      History provided by:  Patient  Shortness of Breath  Duration:  1 week  Timing:  Constant  Progression:  Worsening  Chronicity:  New  Context comment:  History of MORRISON cirrhosis with liver transplant in 3/2020.  Follows in [place].  Told MORRISON was genetic because she is now starting to have recurrent sxs.  Increased pedal edema, weight gain, SOA.  Relieved by:  Nothing  Worsened by:  Nothing  Ineffective treatments: Spironolactone and new Rx for Lasix, but patient says it's not ready at the pharmacy.  Associated symptoms: no abdominal pain, no chest pain, no cough, no diaphoresis, no fever, no sputum production, no syncope, no vomiting and no wheezing    Risk factors comment:  History of MORRISON s/p liver transplant in 3/2021      Review of Systems   Constitutional: Positive for unexpected weight change (16 lb weight gain in 1 week). Negative for activity change, appetite change, diaphoresis and fever.   HENT: Negative.  Negative for congestion, postnasal drip, rhinorrhea, sinus pressure and sinus pain.    Respiratory: Positive for chest tightness and shortness of breath. Negative for cough, sputum production and wheezing.    Cardiovascular: Positive for leg swelling. Negative for chest pain, palpitations and syncope.        History of pulmonary hypertension x 4 months.  Followed by Pulmonology, [facility].   Gastrointestinal: Positive for abdominal distention and diarrhea. Negative for abdominal pain, nausea and vomiting.        History of MORRISON s/p liver transplant in 3/2020.  History of chronic diarrhea since 5/2021.  Treated for cholera and salmonella.  Still having 6 loose stools daily.  Last EGD/colonoscopy was earlier in 2021 and pt reports esophageal varices and colon polyps.   Genitourinary: Negative.  Negative for decreased urine volume, dysuria, flank pain and frequency.   Musculoskeletal: Negative.  Negative for back pain.   Neurological: Negative.   Negative for dizziness, syncope and weakness.   All other systems reviewed and are negative.      Past Medical History:   Diagnosis Date   • Anxiety    • Cirrhosis of liver (CMS/HCC)    • Depression    • Hypercholesteremia    • Hypertension    • Hypothyroid    • Meningioma (CMS/HCC)    • Osteoarthritis    • Osteoporosis    • Renal impairment    • Stroke (CMS/HCC)    • Thyroid cancer (CMS/HCC)    • Vision impairment     left eye       Allergies   Allergen Reactions   • Penicillins Shortness Of Breath       Past Surgical History:   Procedure Laterality Date   • APPENDECTOMY     •  SECTION      x3   • CHOLECYSTECTOMY     • D & C HYSTEROSCOPY LAPAROSCOPY      x2 for endometriosis   • LIVER SURGERY      removed cysts    • LIVER TRANSPLANTATION     • ORIF ANKLE FRACTURE     • THYROIDECTOMY     • TONSILLECTOMY     • TOTAL ABDOMINAL HYSTERECTOMY WITH SALPINGO OOPHORECTOMY Bilateral        Family History   Problem Relation Age of Onset   • Colon cancer Father    • Osteoporosis Mother    • Ovarian cancer Paternal Grandmother    • Breast cancer Maternal Grandmother        Social History     Socioeconomic History   • Marital status:      Spouse name: Not on file   • Number of children: Not on file   • Years of education: Not on file   • Highest education level: Not on file   Tobacco Use   • Smoking status: Never Smoker   • Smokeless tobacco: Never Used   Substance and Sexual Activity   • Alcohol use: No   • Drug use: Not Currently     Comment: Tried an edible last week.    • Sexual activity: Yes     Partners: Male     Birth control/protection: Surgical, Post-menopausal           Objective   Physical Exam  Vitals and nursing note reviewed.   Constitutional:       Appearance: Normal appearance.   HENT:      Head: Normocephalic and atraumatic.      Right Ear: Tympanic membrane normal.      Left Ear: Tympanic membrane normal.      Nose: Nose normal.      Mouth/Throat:      Mouth: Mucous membranes are moist.       Pharynx: Oropharynx is clear.   Eyes:      Extraocular Movements: Extraocular movements intact.      Conjunctiva/sclera: Conjunctivae normal.      Pupils: Pupils are equal, round, and reactive to light.   Cardiovascular:      Rate and Rhythm: Normal rate and regular rhythm.  No extrasystoles are present.     Pulses: Normal pulses.      Heart sounds: Normal heart sounds.      Comments: Regular rate and rhythm.  No ectopy.  Patient does not have any pedal edema to bilateral lower extremities.  Pulmonary:      Effort: Pulmonary effort is normal. No tachypnea or accessory muscle usage.      Breath sounds: Normal breath sounds. No decreased air movement. No decreased breath sounds, wheezing, rhonchi or rales.      Comments: No tachypnea or retractions.  Lungs are clear to auscultation bilaterally.  There is no evidence of rales or decreased breath sounds concerning for consolidation.  Abdominal:      General: Bowel sounds are normal. There is distension.      Palpations: Abdomen is soft.      Tenderness: There is no abdominal tenderness. There is no right CVA tenderness, left CVA tenderness, guarding or rebound.      Comments: Positive abdominal distention.  Soft with active bowel sounds.  Nontender to palpation.  No flank or CVA tenderness.   Musculoskeletal:         General: Normal range of motion.      Cervical back: Normal range of motion and neck supple.      Right lower leg: No edema.      Left lower leg: No edema.   Skin:     General: Skin is warm and dry.      Coloration: Skin is not jaundiced.      Comments: No jaundice appreciated.   Neurological:      General: No focal deficit present.      Mental Status: She is alert.      Cranial Nerves: Cranial nerves are intact.      Sensory: Sensation is intact.      Motor: Motor function is intact.      Coordination: Coordination is intact.      Comments: Neuro intact nonfocal.   Psychiatric:         Mood and Affect: Mood normal.         Speech: Speech normal.          Behavior: Behavior normal. Behavior is cooperative.         Cognition and Memory: Cognition normal.      Comments: Pleasant talkative.  No acute sign of pain or distress.         Procedures           ED Course  ED Course as of Aug 17 0509   Tue Aug 17, 2021   0504 EKGs x2 showed normal sinus rhythm.  No acute ST-T wave changes consistent with ischemia.  CBC was within normal limits.  Chemistries revealed elevated LFTs.  , AST 52, and alk phos 137, normal total bilirubin at 1.0.  BNP is 109.  Troponin is less than 0.010x2 sets.  CT of the chest without contrast reveals very subtle faint groundglass infiltrates in the right upper lobe.  The remaining lung fields are clear.  No effusion.  CT of the abdomen/pelvis without contrast reveals post liver transplant with no acute findings in the abdomen or pelvis.  There was no evidence of ascites.  Patient has no evidence of pleural effusions or overt failure on chest CT.  O2 sat is 95 to 98% on room air.  Vital signs and work-up are stable.  Patient requested that we perform a uric acid level that her PCP requested.  We did perform this and it was mildly elevated at 6.2.  Patient needs to contact her specialist in [place] that perform liver transplant.  She has follow-up in September, and with increased abdominal distention, she needs to see if they want to evaluate her sooner.  We also will give her follow-up information for LAQUITA Parker here at [facility] so that she can have a GI on board with her care here in [place].  She may utilize spironolactone and take the new Rx for Lasix as prescribed.  Exam and vital signs are stable.  Patient is agreeable with above treatment plan.  Return to the ER if worsening symptoms.    [FC]      ED Course User Index  [FC] Simin Howell PA-C                 Recent Results (from the past 24 hour(s))   Comprehensive Metabolic Panel    Collection Time: 08/16/21  5:52 PM    Specimen: Blood   Result Value Ref  Range    Glucose 114 (H) 65 - 99 mg/dL    BUN 18 6 - 20 mg/dL    Creatinine 0.91 0.57 - 1.00 mg/dL    Sodium 137 136 - 145 mmol/L    Potassium 4.1 3.5 - 5.2 mmol/L    Chloride 103 98 - 107 mmol/L    CO2 25.0 22.0 - 29.0 mmol/L    Calcium 9.3 8.6 - 10.5 mg/dL    Total Protein 6.9 6.0 - 8.5 g/dL    Albumin 4.40 3.50 - 5.20 g/dL    ALT (SGPT) 107 (H) 1 - 33 U/L    AST (SGOT) 52 (H) 1 - 32 U/L    Alkaline Phosphatase 137 (H) 39 - 117 U/L    Total Bilirubin 1.0 0.0 - 1.2 mg/dL    eGFR Non African Amer 64 >60 mL/min/1.73    Globulin 2.5 gm/dL    A/G Ratio 1.8 g/dL    BUN/Creatinine Ratio 19.8 7.0 - 25.0    Anion Gap 9.0 5.0 - 15.0 mmol/L   BNP    Collection Time: 08/16/21  5:52 PM    Specimen: Blood   Result Value Ref Range    proBNP 109.1 0.0 - 900.0 pg/mL   Troponin    Collection Time: 08/16/21  5:52 PM    Specimen: Blood   Result Value Ref Range    Troponin T <0.010 0.000 - 0.030 ng/mL   Green Top (Gel)    Collection Time: 08/16/21  5:52 PM   Result Value Ref Range    Extra Tube Hold for add-ons.    Lavender Top    Collection Time: 08/16/21  5:52 PM   Result Value Ref Range    Extra Tube hold for add-on    Gold Top - SST    Collection Time: 08/16/21  5:52 PM   Result Value Ref Range    Extra Tube Hold for add-ons.    Gray Top    Collection Time: 08/16/21  5:52 PM   Result Value Ref Range    Extra Tube Hold for add-ons.    CBC Auto Differential    Collection Time: 08/16/21  5:52 PM    Specimen: Blood   Result Value Ref Range    WBC 4.00 3.40 - 10.80 10*3/mm3    RBC 4.75 3.77 - 5.28 10*6/mm3    Hemoglobin 14.3 12.0 - 15.9 g/dL    Hematocrit 43.6 34.0 - 46.6 %    MCV 91.8 79.0 - 97.0 fL    MCH 30.1 26.6 - 33.0 pg    MCHC 32.8 31.5 - 35.7 g/dL    RDW 14.8 12.3 - 15.4 %    RDW-SD 49.8 37.0 - 54.0 fl    MPV 11.9 6.0 - 12.0 fL    Platelets 168 140 - 450 10*3/mm3    Neutrophil % 50.3 42.7 - 76.0 %    Lymphocyte % 31.8 19.6 - 45.3 %    Monocyte % 11.0 5.0 - 12.0 %    Eosinophil % 0.8 0.3 - 6.2 %    Basophil % 1.8 (H) 0.0 - 1.5  %    Immature Grans % 4.3 (H) 0.0 - 0.5 %    Neutrophils, Absolute 2.02 1.70 - 7.00 10*3/mm3    Lymphocytes, Absolute 1.27 0.70 - 3.10 10*3/mm3    Monocytes, Absolute 0.44 0.10 - 0.90 10*3/mm3    Eosinophils, Absolute 0.03 0.00 - 0.40 10*3/mm3    Basophils, Absolute 0.07 0.00 - 0.20 10*3/mm3    Immature Grans, Absolute 0.17 (H) 0.00 - 0.05 10*3/mm3    nRBC 0.0 0.0 - 0.2 /100 WBC   Troponin    Collection Time: 08/16/21 11:08 PM    Specimen: Blood   Result Value Ref Range    Troponin T <0.010 0.000 - 0.030 ng/mL   Uric Acid    Collection Time: 08/16/21 11:08 PM    Specimen: Blood   Result Value Ref Range    Uric Acid 6.2 (H) 2.4 - 5.7 mg/dL     Note: In addition to lab results from this visit, the labs listed above may include labs taken at another facility or during a different encounter within the last 24 hours. Please correlate lab times with ED admission and discharge times for further clarification of the services performed during this visit.    CT Chest Without Contrast Diagnostic   Final Result   There are very subtle faint groundglass infiltrates in the right upper lobe. The remaining lung fields are clear. No effusion.      Signer Name: Velasquez Thomas MD    Signed: 8/16/2021 11:43 PM    Workstation Name: [facility]      CT Abdomen Pelvis Without Contrast   Final Result   Post liver transplant with no acute findings in the abdomen or pelvis.               Signer Name: Velasquez Thomas MD    Signed: 8/16/2021 11:45 PM    [facility]      XR Chest 1 View   Preliminary Result   No acute cardiopulmonary disease.                Vitals:    08/16/21 1730 08/16/21 1746 08/16/21 2232 08/17/21 0030   BP: 155/94  128/74 120/76   BP Location:   Right arm Right arm   Patient Position:   Lying Lying   Pulse: 68  66 63   Resp: 20  16 16   Temp: 98.8 °F (37.1 °C)      SpO2: 96%  98% 95%   Weight:  80.7 kg (178 lb)     Height: 154.9  "cm (61\")        Medications - No data to display  ECG/EMG Results (last 24 hours)     Procedure Component Value Units Date/Time    ECG 12 Lead [[id number]] Collected: 08/16/21 1748     Updated: 08/16/21 1747        ECG 12 Lead         ECG 12 Lead                                          MDM    Final diagnoses:   Shortness of breath   Abdominal distention   Weight gain   MORRISON (nonalcoholic steatohepatitis)   History of liver transplant (CMS/HCC)   History of hypertension   History of stroke       ED Disposition  ED Disposition     ED Disposition Condition Comment    Discharge Stable           Dano Serrano MD  [address] [place] [address]  [phone]    Call in 1 day  Call in the morning for close recheck    Transplant specialist at [place]    Call in 1 day  Call in the morning to see if they want to see her sooner than September, 2021    [facility] Emergency Department  [address] [address] [facility] [address] [phone]    If symptoms worsen         Medication List      No changes were made to your prescriptions during this visit.          Simin Howell PA-C  08/17/21 0509    "

## 2021-08-17 NOTE — DISCHARGE INSTRUCTIONS
ER evaluation revealed normal cardiac work-up with 2 stable EKGs and 2 normal troponins.  CT of the chest and abdomen revealed no evidence of heart failure, pleural effusion or fluid, or evidence of pneumonia.  There was no evidence of ascites on CT of the abdomen/pelvis.  All other labs, including BNP and cardiac enzymes or troponins were within normal limits.  Recommend patient to follow-up with LAQUITA Parker here at [facility] to see if he will consult on her MORRISON with history of liver transplant.  She needs to call her liver transplant specialist in [place] to see if they want to see her sooner than September.  Patient was recently prescribed Lasix and she needs to take that as directed.  Continue with all other current medical management.  Return to the ER if worsening symptoms.

## 2021-12-30 ENCOUNTER — APPOINTMENT (OUTPATIENT)
Dept: GENERAL RADIOLOGY | Facility: HOSPITAL | Age: 56
End: 2021-12-30

## 2021-12-30 ENCOUNTER — HOSPITAL ENCOUNTER (EMERGENCY)
Facility: HOSPITAL | Age: 56
Discharge: HOME | End: 2021-12-30
Attending: EMERGENCY MEDICINE | Admitting: EMERGENCY MEDICINE

## 2021-12-30 VITALS
SYSTOLIC BLOOD PRESSURE: 147 MMHG | HEIGHT: 61 IN | WEIGHT: 179 LBS | OXYGEN SATURATION: 93 % | TEMPERATURE: 98.9 F | DIASTOLIC BLOOD PRESSURE: 92 MMHG | RESPIRATION RATE: 19 BRPM | HEART RATE: 92 BPM | BODY MASS INDEX: 33.79 KG/M2

## 2021-12-30 DIAGNOSIS — R07.9 CHEST PAIN, UNSPECIFIED TYPE: Primary | ICD-10-CM

## 2021-12-30 DIAGNOSIS — R06.00 DYSPNEA, UNSPECIFIED TYPE: ICD-10-CM

## 2021-12-30 LAB
ALBUMIN SERPL-MCNC: 4.6 G/DL (ref 3.5–5.2)
ALBUMIN/GLOB SERPL: 2.3 G/DL
ALP SERPL-CCNC: 86 U/L (ref 39–117)
ALT SERPL W P-5'-P-CCNC: 74 U/L (ref 1–33)
ANION GAP SERPL CALCULATED.3IONS-SCNC: 19 MMOL/L (ref 5–15)
AST SERPL-CCNC: 37 U/L (ref 1–32)
BASOPHILS # BLD AUTO: 0.08 10*3/MM3 (ref 0–0.2)
BASOPHILS NFR BLD AUTO: 1.1 % (ref 0–1.5)
BILIRUB SERPL-MCNC: 0.8 MG/DL (ref 0–1.2)
BUN SERPL-MCNC: 19 MG/DL (ref 6–20)
BUN/CREAT SERPL: 19.4 (ref 7–25)
CALCIUM SPEC-SCNC: 8.6 MG/DL (ref 8.6–10.5)
CHLORIDE SERPL-SCNC: 102 MMOL/L (ref 98–107)
CO2 SERPL-SCNC: 20 MMOL/L (ref 22–29)
CREAT SERPL-MCNC: 0.98 MG/DL (ref 0.57–1)
DEPRECATED RDW RBC AUTO: 47 FL (ref 37–54)
EOSINOPHIL # BLD AUTO: 0.01 10*3/MM3 (ref 0–0.4)
EOSINOPHIL NFR BLD AUTO: 0.1 % (ref 0.3–6.2)
ERYTHROCYTE [DISTWIDTH] IN BLOOD BY AUTOMATED COUNT: 14.5 % (ref 12.3–15.4)
FLUAV SUBTYP SPEC NAA+PROBE: NOT DETECTED
FLUBV RNA ISLT QL NAA+PROBE: NOT DETECTED
GFR SERPL CREATININE-BSD FRML MDRD: 58 ML/MIN/1.73
GLOBULIN UR ELPH-MCNC: 2 GM/DL
GLUCOSE SERPL-MCNC: 248 MG/DL (ref 65–99)
HCT VFR BLD AUTO: 43.4 % (ref 34–46.6)
HGB BLD-MCNC: 14.5 G/DL (ref 12–15.9)
HOLD SPECIMEN: NORMAL
IMM GRANULOCYTES # BLD AUTO: 0.38 10*3/MM3 (ref 0–0.05)
IMM GRANULOCYTES NFR BLD AUTO: 5.2 % (ref 0–0.5)
LIPASE SERPL-CCNC: 18 U/L (ref 13–60)
LYMPHOCYTES # BLD AUTO: 0.64 10*3/MM3 (ref 0.7–3.1)
LYMPHOCYTES NFR BLD AUTO: 8.8 % (ref 19.6–45.3)
MCH RBC QN AUTO: 30.1 PG (ref 26.6–33)
MCHC RBC AUTO-ENTMCNC: 33.4 G/DL (ref 31.5–35.7)
MCV RBC AUTO: 90 FL (ref 79–97)
MONOCYTES # BLD AUTO: 0.26 10*3/MM3 (ref 0.1–0.9)
MONOCYTES NFR BLD AUTO: 3.6 % (ref 5–12)
NEUTROPHILS NFR BLD AUTO: 5.87 10*3/MM3 (ref 1.7–7)
NEUTROPHILS NFR BLD AUTO: 81.2 % (ref 42.7–76)
NRBC BLD AUTO-RTO: 0 /100 WBC (ref 0–0.2)
NT-PROBNP SERPL-MCNC: 183.5 PG/ML (ref 0–900)
PLATELET # BLD AUTO: 145 10*3/MM3 (ref 140–450)
PMV BLD AUTO: 10.9 FL (ref 6–12)
POTASSIUM SERPL-SCNC: 3.5 MMOL/L (ref 3.5–5.2)
PROT SERPL-MCNC: 6.6 G/DL (ref 6–8.5)
RBC # BLD AUTO: 4.82 10*6/MM3 (ref 3.77–5.28)
SARS-COV-2 RNA PNL SPEC NAA+PROBE: NOT DETECTED
SODIUM SERPL-SCNC: 141 MMOL/L (ref 136–145)
TROPONIN T SERPL-MCNC: <0.01 NG/ML (ref 0–0.03)
WBC NRBC COR # BLD: 7.24 10*3/MM3 (ref 3.4–10.8)
WHOLE BLOOD HOLD SPECIMEN: NORMAL
WHOLE BLOOD HOLD SPECIMEN: NORMAL

## 2021-12-30 PROCEDURE — 84484 ASSAY OF TROPONIN QUANT: CPT | Performed by: EMERGENCY MEDICINE

## 2021-12-30 PROCEDURE — 87636 SARSCOV2 & INF A&B AMP PRB: CPT | Performed by: EMERGENCY MEDICINE

## 2021-12-30 PROCEDURE — C9803 HOPD COVID-19 SPEC COLLECT: HCPCS | Performed by: EMERGENCY MEDICINE

## 2021-12-30 PROCEDURE — 83690 ASSAY OF LIPASE: CPT | Performed by: EMERGENCY MEDICINE

## 2021-12-30 PROCEDURE — 80053 COMPREHEN METABOLIC PANEL: CPT | Performed by: EMERGENCY MEDICINE

## 2021-12-30 PROCEDURE — 99284 EMERGENCY DEPT VISIT MOD MDM: CPT

## 2021-12-30 PROCEDURE — 83880 ASSAY OF NATRIURETIC PEPTIDE: CPT | Performed by: EMERGENCY MEDICINE

## 2021-12-30 PROCEDURE — 85025 COMPLETE CBC W/AUTO DIFF WBC: CPT | Performed by: EMERGENCY MEDICINE

## 2021-12-30 PROCEDURE — 71045 X-RAY EXAM CHEST 1 VIEW: CPT

## 2021-12-30 PROCEDURE — 93005 ELECTROCARDIOGRAM TRACING: CPT | Performed by: EMERGENCY MEDICINE

## 2021-12-30 RX ORDER — SODIUM CHLORIDE 0.9 % (FLUSH) 0.9 %
10 SYRINGE (ML) INJECTION AS NEEDED
Status: DISCONTINUED | OUTPATIENT
Start: 2021-12-30 | End: 2021-12-30 | Stop reason: HOSPADM

## 2021-12-30 RX ORDER — ASPIRIN 81 MG/1
324 TABLET, CHEWABLE ORAL ONCE
Status: DISCONTINUED | OUTPATIENT
Start: 2021-12-30 | End: 2021-12-30 | Stop reason: HOSPADM

## 2021-12-30 RX ORDER — ACETAMINOPHEN 500 MG
1000 TABLET ORAL ONCE
Status: COMPLETED | OUTPATIENT
Start: 2021-12-30 | End: 2021-12-30

## 2021-12-30 RX ADMIN — ACETAMINOPHEN 1000 MG: 500 TABLET ORAL at 18:19

## 2022-01-01 LAB
QT INTERVAL: 394 MS
QTC INTERVAL: 484 MS

## 2022-01-05 ENCOUNTER — HOSPITAL ENCOUNTER (OUTPATIENT)
Dept: CARDIOLOGY | Facility: HOSPITAL | Age: 57
Discharge: HOME | End: 2022-01-05

## 2022-01-05 ENCOUNTER — OFFICE VISIT (OUTPATIENT)
Dept: CARDIOLOGY | Facility: HOSPITAL | Age: 57
End: 2022-01-05

## 2022-01-05 VITALS
SYSTOLIC BLOOD PRESSURE: 141 MMHG | TEMPERATURE: 96.9 F | HEART RATE: 86 BPM | HEIGHT: 61 IN | OXYGEN SATURATION: 97 % | WEIGHT: 170 LBS | DIASTOLIC BLOOD PRESSURE: 83 MMHG | BODY MASS INDEX: 32.1 KG/M2 | RESPIRATION RATE: 18 BRPM

## 2022-01-05 DIAGNOSIS — R53.83 OTHER FATIGUE: ICD-10-CM

## 2022-01-05 DIAGNOSIS — E78.00 HYPERCHOLESTEREMIA: ICD-10-CM

## 2022-01-05 DIAGNOSIS — I10 PRIMARY HYPERTENSION: ICD-10-CM

## 2022-01-05 DIAGNOSIS — R07.2 PRECORDIAL PAIN: Primary | ICD-10-CM

## 2022-01-05 DIAGNOSIS — I63.9 CEREBROVASCULAR ACCIDENT (CVA), UNSPECIFIED MECHANISM: ICD-10-CM

## 2022-01-05 DIAGNOSIS — R07.2 PRECORDIAL PAIN: ICD-10-CM

## 2022-01-05 DIAGNOSIS — R06.09 DOE (DYSPNEA ON EXERTION): ICD-10-CM

## 2022-01-05 LAB
QT INTERVAL: 398 MS
QTC INTERVAL: 486 MS

## 2022-01-05 PROCEDURE — 99204 OFFICE O/P NEW MOD 45 MIN: CPT | Performed by: NURSE PRACTITIONER

## 2022-01-05 PROCEDURE — 93005 ELECTROCARDIOGRAM TRACING: CPT | Performed by: NURSE PRACTITIONER

## 2022-01-05 PROCEDURE — 93010 ELECTROCARDIOGRAM REPORT: CPT | Performed by: INTERNAL MEDICINE

## 2022-01-05 RX ORDER — SULFAMETHOXAZOLE AND TRIMETHOPRIM 800; 160 MG/1; MG/1
TABLET ORAL
COMMUNITY
Start: 2021-12-23 | End: 2022-01-06

## 2022-01-05 RX ORDER — AMLODIPINE BESYLATE 10 MG/1
TABLET ORAL
Status: ON HOLD | COMMUNITY
Start: 2021-10-19 | End: 2022-02-07 | Stop reason: SDUPTHER

## 2022-01-05 RX ORDER — TACROLIMUS 0.5 MG/1
2 CAPSULE ORAL EVERY 12 HOURS
Status: ON HOLD | COMMUNITY
Start: 2021-09-18 | End: 2022-02-07 | Stop reason: SDUPTHER

## 2022-01-05 RX ORDER — BENZONATATE 100 MG/1
CAPSULE ORAL
Status: ON HOLD | COMMUNITY
End: 2022-02-01

## 2022-01-05 RX ORDER — METOPROLOL SUCCINATE 50 MG/1
50 TABLET, EXTENDED RELEASE ORAL 2 TIMES DAILY
COMMUNITY
End: 2022-03-03

## 2022-01-05 RX ORDER — SPIRONOLACTONE 50 MG/1
TABLET, FILM COATED ORAL
Status: ON HOLD | COMMUNITY
Start: 2021-10-26 | End: 2022-02-07 | Stop reason: SDUPTHER

## 2022-01-05 RX ORDER — IPRATROPIUM BROMIDE AND ALBUTEROL SULFATE 2.5; .5 MG/3ML; MG/3ML
3 SOLUTION RESPIRATORY (INHALATION) EVERY 4 HOURS PRN
COMMUNITY
End: 2023-02-17

## 2022-01-05 RX ORDER — CHOLECALCIFEROL (VITAMIN D3) 1250 MCG
50000 CAPSULE ORAL
COMMUNITY
End: 2022-03-08 | Stop reason: SDUPTHER

## 2022-01-05 RX ORDER — GABAPENTIN 100 MG/1
CAPSULE ORAL EVERY 12 HOURS SCHEDULED
Status: ON HOLD | COMMUNITY
Start: 2021-07-08 | End: 2022-02-07 | Stop reason: SDUPTHER

## 2022-01-05 RX ORDER — CHOLECALCIFEROL (VITAMIN D3) 125 MCG
CAPSULE ORAL
Status: ON HOLD | COMMUNITY
Start: 2021-07-05 | End: 2022-02-07 | Stop reason: SDUPTHER

## 2022-01-05 RX ORDER — LEVALBUTEROL TARTRATE 45 UG/1
AEROSOL, METERED ORAL
COMMUNITY
Start: 2021-10-12 | End: 2022-03-03

## 2022-01-05 RX ORDER — NITROGLYCERIN 0.4 MG/1
0.4 TABLET SUBLINGUAL
COMMUNITY

## 2022-01-05 RX ORDER — ATORVASTATIN CALCIUM 40 MG/1
1 TABLET, FILM COATED ORAL DAILY
COMMUNITY
End: 2022-12-24

## 2022-01-05 RX ORDER — SODIUM CHLORIDE 0.65 %
DROPS NASAL
COMMUNITY
End: 2022-03-02

## 2022-01-05 RX ORDER — TRAZODONE HYDROCHLORIDE 100 MG/1
TABLET ORAL
COMMUNITY
Start: 2021-03-23 | End: 2022-01-05

## 2022-01-05 RX ORDER — CLONIDINE HYDROCHLORIDE 0.1 MG/1
0.1 TABLET ORAL DAILY PRN
COMMUNITY
End: 2023-02-24

## 2022-01-05 RX ORDER — MIRTAZAPINE 30 MG/1
TABLET, FILM COATED ORAL
Status: ON HOLD | COMMUNITY
Start: 2021-10-12 | End: 2022-02-07 | Stop reason: SDUPTHER

## 2022-01-05 RX ORDER — MONTELUKAST SODIUM 10 MG/1
TABLET ORAL
Status: ON HOLD | COMMUNITY
Start: 2021-10-09 | End: 2022-02-01

## 2022-01-05 RX ORDER — ROPINIROLE 2 MG/1
4 TABLET, FILM COATED ORAL NIGHTLY
COMMUNITY
Start: 2021-07-15 | End: 2022-03-28

## 2022-01-05 RX ORDER — BUPROPION HYDROCHLORIDE 150 MG/1
TABLET, EXTENDED RELEASE ORAL
COMMUNITY
Start: 2021-10-23 | End: 2022-02-07 | Stop reason: HOSPADM

## 2022-01-05 RX ORDER — LEVOTHYROXINE SODIUM 175 UG/1
175 TABLET ORAL DAILY
COMMUNITY
Start: 2021-12-12 | End: 2022-06-13 | Stop reason: SDUPTHER

## 2022-01-05 RX ORDER — ISOSORBIDE DINITRATE 30 MG/1
TABLET ORAL
COMMUNITY
End: 2022-01-21 | Stop reason: SDUPTHER

## 2022-01-05 RX ORDER — CYCLOSPORINE 0.5 MG/ML
EMULSION OPHTHALMIC EVERY 12 HOURS
Status: ON HOLD | COMMUNITY
End: 2022-02-01

## 2022-01-05 RX ORDER — MYCOPHENOLIC ACID 360 MG/1
2 TABLET, DELAYED RELEASE ORAL 2 TIMES DAILY
Status: ON HOLD | COMMUNITY
Start: 2021-10-26 | End: 2022-02-07 | Stop reason: SDUPTHER

## 2022-01-05 RX ORDER — PREDNISONE 10 MG/1
10 TABLET ORAL
Status: ON HOLD | COMMUNITY
Start: 2021-03-23 | End: 2022-02-07 | Stop reason: SDUPTHER

## 2022-01-05 RX ORDER — ALENDRONATE SODIUM 70 MG/1
TABLET ORAL
Status: ON HOLD | COMMUNITY
Start: 2021-12-14 | End: 2022-02-01

## 2022-01-05 RX ORDER — TRAMADOL HYDROCHLORIDE 50 MG/1
TABLET ORAL EVERY 12 HOURS SCHEDULED
Status: ON HOLD | COMMUNITY
Start: 2021-10-19 | End: 2022-02-07 | Stop reason: SDUPTHER

## 2022-01-05 RX ORDER — POTASSIUM CHLORIDE 750 MG/1
10 CAPSULE, EXTENDED RELEASE ORAL
COMMUNITY
Start: 2021-03-23 | End: 2022-01-05

## 2022-01-05 RX ORDER — ICOSAPENT ETHYL 1000 MG/1
2 CAPSULE ORAL 2 TIMES DAILY WITH MEALS
COMMUNITY

## 2022-01-05 RX ORDER — FUROSEMIDE 20 MG/1
20 TABLET ORAL DAILY
Status: ON HOLD | COMMUNITY
End: 2022-02-07 | Stop reason: SDUPTHER

## 2022-01-05 NOTE — PROGRESS NOTES
"Chief Complaint  Establish Care and Chest Pain    Subjective    History of Present Illness {CC  Problem List  Visit  Diagnosis   Encounters  Notes  Medications  Labs  Result Review Imaging  Media :23}       History of Present Illness   56-year-old female presents the office today at the request of Dr. Pabon for ongoing evaluation of her chest pain and dyspnea.  Patient presented to [facility] ED on 12/30/2021 with complaints of dyspnea and chest pain.  She reports chest pain has been intermittent for the past few months.  She describes the chest pain as a squeezing sensation with intermittent sharp pains.  Shortness of breath is constant.  She has been evaluated by pulmonary and was diagnosed with interstitial lung disease.  Patient notes that she had a liver transplant March 2020 in [place] secondary to [place].  She had an echo at [facility] yesterday, results pending.  She reports that her primary care recently increased her Imdur from 30 mg to 60 mg and she is scheduled to pick that up today.  Reports she has taken intermittent nitroglycerin with improvement in her symptoms.  Chest pain comes in \"waves\" and is not related to exertion.  Patient reports often experiencing chest pain while seated.  She does note radiation into her back.  Notes significant fatigue.  Objective     Vital Signs:   Vitals:    01/05/22 1354 01/05/22 1356 01/05/22 1357   BP: 147/83 156/92 141/83   BP Location: Right arm Left arm Left arm   Patient Position: Sitting Standing Sitting   Cuff Size: Adult Adult Adult   Pulse: 85 92 86   Resp:   18   Temp: 96.9 °F (36.1 °C) 96.9 °F (36.1 °C) 96.9 °F (36.1 °C)   TempSrc: Temporal Temporal Temporal   SpO2: 97% 96% 97%   Weight:   77.1 kg (170 lb)   Height:   154.9 cm (61\")     Body mass index is 32.12 kg/m².  Physical Exam  Vitals and nursing note reviewed.   Constitutional:       Appearance: Normal appearance.   HENT:      Head: Normocephalic.   Eyes:      Pupils: " Pupils are equal, round, and reactive to light.   Cardiovascular:      Rate and Rhythm: Normal rate and regular rhythm.      Pulses: Normal pulses.      Heart sounds: Normal heart sounds. No murmur heard.      Pulmonary:      Effort: Pulmonary effort is normal.      Breath sounds: Normal breath sounds.   Chest:      Comments: Large breasts noted   Abdominal:      General: Bowel sounds are normal.      Palpations: Abdomen is soft.   Musculoskeletal:         General: Normal range of motion.      Cervical back: Normal range of motion.      Right lower leg: No edema.      Left lower leg: No edema.   Skin:     General: Skin is warm and dry.      Capillary Refill: Capillary refill takes less than 2 seconds.   Neurological:      Mental Status: She is alert and oriented to person, place, and time.   Psychiatric:         Mood and Affect: Mood normal.         Thought Content: Thought content normal.              Result Review  Data Reviewed:{ Labs  Result Review  Imaging  Med Tab  Media :23}     Ek21:   Normal sinus rhythm  Cannot rule out Anterior infarct , age undetermined  Abnormal ECG  When compared with ECG of 16-AUG-2021 23:03,  Minimal criteria for Anterior infarct are now present  Nonspecific T wave abnormality, worse in Anterolateral leads  QT has lengthened  Confirmed by JANET MORAN () on 2022 1:14:03 AM    EKG today shows normal sinus rhythm at 90 bpm, possible inferior infarct, possible anterior infarct age undetermined      Lab Results   Component Value Date    GLUCOSE 248 (H) 2021    CALCIUM 8.6 2021     2021    K 3.5 2021    CO2 20.0 (L) 2021     2021    BUN 19 2021    CREATININE 0.98 2021    EGFRIFNONA 58 (L) 2021    BCR 19.4 2021    ANIONGAP 19.0 (H) 2021     Lab Results   Component Value Date    WBC 7.24 2021    HGB 14.5 2021    HCT 43.4 2021    MCV 90.0 2021     2021      Assessment and Plan {CC Problem List  Visit Diagnosis  ROS  Review (Popup)  Health Maintenance  Quality  BestPractice  Medications  SmartSets  SnapShot Encounters  Media :23}   1. Precordial pain  Nitroglycerin 0.4 mg given sublingual p.o. in office with improvement in chest pain  Patient instructed to begin increased dose of Imdur 60 mg daily today  - Stress Test With Pet Myocardial Perfusion (Multi Study); Future PET due to large breast and body morphology  - ECG 12 Lead; Future  Patient instructed to present to ED if chest pain does not relieve with NTG  2. HSULTZ (dyspnea on exertion)  Recently diagnosed with interstitial lung disease and followed by Dr. Galicia at [facility]  - Stress Test With Pet Myocardial Perfusion (Multi Study); Future    3. Other fatigue    - Stress Test With Pet Myocardial Perfusion (Multi Study); Future    4. Hypercholesteremia  Stable on Lipitor  - Stress Test With Pet Myocardial Perfusion (Multi Study); Future    5. Cerebrovascular accident (CVA), unspecified mechanism (HCC)  History of CVA   - Stress Test With Pet Myocardial Perfusion (Multi Study); Future    6. Primary hypertension  Well-controlled on amlodipine, Toprol, Aldactone with use of as needed clonidine  - Stress Test With Pet Myocardial Perfusion (Multi Study); Future        Follow Up {Instructions Charge Capture  Follow-up Communications :23}   No follow-ups on file.    Patient was given instructions and counseling regarding her condition or for health maintenance advice. Please see specific information pulled into the AVS if appropriate.  Patient was instructed to call the [facility] with any questions, concerns, or worsening symptoms.

## 2022-01-06 ENCOUNTER — HOSPITAL ENCOUNTER (OUTPATIENT)
Dept: CARDIOLOGY | Facility: HOSPITAL | Age: 57
Discharge: HOME | End: 2022-01-06
Admitting: NURSE PRACTITIONER

## 2022-01-06 VITALS
HEIGHT: 61 IN | WEIGHT: 169.97 LBS | DIASTOLIC BLOOD PRESSURE: 78 MMHG | HEART RATE: 65 BPM | SYSTOLIC BLOOD PRESSURE: 121 MMHG | BODY MASS INDEX: 32.09 KG/M2

## 2022-01-06 DIAGNOSIS — R53.83 OTHER FATIGUE: ICD-10-CM

## 2022-01-06 DIAGNOSIS — I63.9 CEREBROVASCULAR ACCIDENT (CVA), UNSPECIFIED MECHANISM: ICD-10-CM

## 2022-01-06 DIAGNOSIS — E78.00 HYPERCHOLESTEREMIA: ICD-10-CM

## 2022-01-06 DIAGNOSIS — R07.2 PRECORDIAL PAIN: ICD-10-CM

## 2022-01-06 DIAGNOSIS — R06.09 DOE (DYSPNEA ON EXERTION): ICD-10-CM

## 2022-01-06 DIAGNOSIS — I10 PRIMARY HYPERTENSION: ICD-10-CM

## 2022-01-06 LAB
BH CV REST NUCLEAR ISOTOPE DOSE: 29.9 MCI
BH CV STRESS BP STAGE 1: NORMAL
BH CV STRESS BP STAGE 2: NORMAL
BH CV STRESS BP STAGE 4: NORMAL
BH CV STRESS COMMENTS STAGE 1: NORMAL
BH CV STRESS DOSE REGADENOSON STAGE 1: 0.4
BH CV STRESS DURATION MIN STAGE 1: 1
BH CV STRESS DURATION MIN STAGE 2: 1
BH CV STRESS DURATION MIN STAGE 3: 1
BH CV STRESS DURATION MIN STAGE 4: 1
BH CV STRESS HR STAGE 1: 69
BH CV STRESS HR STAGE 2: 77
BH CV STRESS HR STAGE 3: 77
BH CV STRESS HR STAGE 4: 75
BH CV STRESS NUCLEAR ISOTOPE DOSE: 29.9 MCI
BH CV STRESS O2 STAGE 1: 97
BH CV STRESS O2 STAGE 2: 98
BH CV STRESS O2 STAGE 3: 97
BH CV STRESS PROTOCOL 1: NORMAL
BH CV STRESS RECOVERY BP: NORMAL MMHG
BH CV STRESS RECOVERY HR: 73 BPM
BH CV STRESS RECOVERY O2: 96 %
BH CV STRESS STAGE 1: 1
BH CV STRESS STAGE 2: 2
BH CV STRESS STAGE 3: 3
BH CV STRESS STAGE 4: 4
LV EF NUC BP: 73 %
MAXIMAL PREDICTED HEART RATE: 163 BPM
PERCENT MAX PREDICTED HR: 47.85 %
STRESS BASELINE BP: NORMAL MMHG
STRESS BASELINE HR: 65 BPM
STRESS O2 SAT REST: 95 %
STRESS PERCENT HR: 56 %
STRESS POST EXERCISE DUR MIN: 4 MIN
STRESS POST EXERCISE DUR SEC: 0 SEC
STRESS POST O2 SAT PEAK: 96 %
STRESS POST PEAK BP: NORMAL MMHG
STRESS POST PEAK HR: 78 BPM
STRESS TARGET HR: 139 BPM

## 2022-01-06 PROCEDURE — 93018 CV STRESS TEST I&R ONLY: CPT | Performed by: INTERNAL MEDICINE

## 2022-01-06 PROCEDURE — 25010000002 REGADENOSON 0.4 MG/5ML SOLUTION: Performed by: NURSE PRACTITIONER

## 2022-01-06 PROCEDURE — 93017 CV STRESS TEST TRACING ONLY: CPT

## 2022-01-06 PROCEDURE — 78492 MYOCRD IMG PET MLT RST&STRS: CPT

## 2022-01-06 PROCEDURE — 0 RUBIDIUM CHLORIDE: Performed by: NURSE PRACTITIONER

## 2022-01-06 PROCEDURE — A9555 RB82 RUBIDIUM: HCPCS | Performed by: NURSE PRACTITIONER

## 2022-01-06 PROCEDURE — 78492 MYOCRD IMG PET MLT RST&STRS: CPT | Performed by: INTERNAL MEDICINE

## 2022-01-06 RX ADMIN — RUBIDIUM CHLORIDE RB-82 1 DOSE: 150 INJECTION, SOLUTION INTRAVENOUS at 14:51

## 2022-01-06 RX ADMIN — REGADENOSON 0.4 MG: 0.08 INJECTION, SOLUTION INTRAVENOUS at 14:55

## 2022-01-06 RX ADMIN — RUBIDIUM CHLORIDE RB-82 1 DOSE: 150 INJECTION, SOLUTION INTRAVENOUS at 15:03

## 2022-01-07 ENCOUNTER — TELEPHONE (OUTPATIENT)
Dept: CARDIOLOGY | Facility: HOSPITAL | Age: 57
End: 2022-01-07

## 2022-01-07 NOTE — TELEPHONE ENCOUNTER
Reviewed stress with patient and stress showed no blockages. Will request echo from [facility] and will refer to cardiology for further evaluation

## 2022-01-11 ENCOUNTER — LAB (OUTPATIENT)
Dept: LAB | Facility: HOSPITAL | Age: 57
End: 2022-01-11

## 2022-01-11 ENCOUNTER — OFFICE VISIT (OUTPATIENT)
Dept: ENDOCRINOLOGY | Facility: CLINIC | Age: 57
End: 2022-01-11

## 2022-01-11 VITALS
BODY MASS INDEX: 34.17 KG/M2 | DIASTOLIC BLOOD PRESSURE: 80 MMHG | WEIGHT: 181 LBS | SYSTOLIC BLOOD PRESSURE: 128 MMHG | HEIGHT: 61 IN | OXYGEN SATURATION: 96 % | HEART RATE: 97 BPM

## 2022-01-11 DIAGNOSIS — C73 THYROID CANCER: ICD-10-CM

## 2022-01-11 DIAGNOSIS — E89.0 POSTOPERATIVE HYPOTHYROIDISM: ICD-10-CM

## 2022-01-11 DIAGNOSIS — E89.0 POSTOPERATIVE HYPOTHYROIDISM: Primary | ICD-10-CM

## 2022-01-11 DIAGNOSIS — Z85.850 PERSONAL HISTORY OF MALIGNANT NEOPLASM OF THYROID: ICD-10-CM

## 2022-01-11 PROCEDURE — 86800 THYROGLOBULIN ANTIBODY: CPT

## 2022-01-11 PROCEDURE — 99203 OFFICE O/P NEW LOW 30 MIN: CPT | Performed by: INTERNAL MEDICINE

## 2022-01-11 PROCEDURE — 84432 ASSAY OF THYROGLOBULIN: CPT

## 2022-01-11 PROCEDURE — 84443 ASSAY THYROID STIM HORMONE: CPT

## 2022-01-11 PROCEDURE — 84439 ASSAY OF FREE THYROXINE: CPT

## 2022-01-11 NOTE — PROGRESS NOTES
"     Office Note      Date: 2022  Patient Name: Lizette Frias  MRN: [id number]  : 1965    Chief Complaint   Patient presents with   • Hypothyroidism       History of Present Illness:   Lizette Frias is a 56 y.o. female who presents for Hypothyroidism  She is seen as a new patient today  ------------------------------------------  Her thyroid story starts some 18 years ago. She had thyroidectomy for papillary thyroid cancer. She recalls getting one treatment with [facility].  She had follow up scans which showed no recurrence. ( I had seen her way back then )  She is currently on levothyroxine 175.  And they have to increase her dose.  Things have changed over the last couple of years. She had to have a liver transplant from [place].   Since then she is on anti- rejection drugs, has had trouble with her heart and lungs     Her tsh had come down from 13 to 8 with the latest increase and her dose was kept at 175.  She notes weight gain   She is still on prednisone     Subjective          Review of Systems:   Review of Systems   Constitutional: Positive for fatigue and unexpected weight change.   HENT: Positive for trouble swallowing.    Respiratory: Positive for shortness of breath and wheezing.    Cardiovascular: Positive for chest pain.   Skin:        Dry skin    Psychiatric/Behavioral: Positive for sleep disturbance.       The following portions of the patient's history were reviewed and updated as appropriate: allergies, current medications, past family history, past medical history, past social history, past surgical history and problem list.    Objective     Visit Vitals  /80 (BP Location: Left arm, Patient Position: Sitting, Cuff Size: Adult)   Pulse 97   Ht 154.9 cm (61\")   Wt 82.1 kg (181 lb)   SpO2 96%   BMI 34.20 kg/m²       Labs:    CBC w/DIFF  Lab Results   Component Value Date    WBC 7.24 2021    RBC 4.82 2021    HGB 14.5 2021    HCT 43.4 2021    MCV 90.0 2021 "    MCH 30.1 12/30/2021    MCHC 33.4 12/30/2021    RDW 14.5 12/30/2021    RDWSD 47.0 12/30/2021    MPV 10.9 12/30/2021     12/30/2021    NEUTRORELPCT 81.2 (H) 12/30/2021    LYMPHORELPCT 8.8 (L) 12/30/2021    MONORELPCT 3.6 (L) 12/30/2021    EOSRELPCT 0.1 (L) 12/30/2021    BASORELPCT 1.1 12/30/2021    AUTOIGPER 5.2 (H) 12/30/2021    NEUTROABS 5.87 12/30/2021    LYMPHSABS 0.64 (L) 12/30/2021    MONOSABS 0.26 12/30/2021    EOSABS 0.01 12/30/2021    BASOSABS 0.08 12/30/2021    AUTOIGNUM 0.38 (H) 12/30/2021    NRBC 0.0 12/30/2021       T4  Free T4   Date Value Ref Range Status   01/16/2014 0.70 (L) 0.89 - 1.76 ng/dL Final       TSH  No results found for: TSHBASE     Physical Exam:  Physical Exam  Vitals reviewed.   Constitutional:       Comments: Cushingoid    HENT:      Head: Normocephalic and atraumatic.   Eyes:      Extraocular Movements: Extraocular movements intact.   Neck:      Comments: No palpable thyroid tissue  Lymphadenopathy:      Cervical: No cervical adenopathy.   Psychiatric:         Mood and Affect: Mood normal.         Thought Content: Thought content normal.         Judgment: Judgment normal.         Assessment / Plan      Assessment & Plan:  Problem List Items Addressed This Visit        Other    Personal history of malignant neoplasm of thyroid    Overview     Surgery in about 2000 for papillary thyroid cancer. I 131 therapy with clean scan afterwards.          Current Assessment & Plan     Low risk of recurrence. But not zero. Will check tg          Relevant Orders    Thyroglobulin + Thyroglobulin Antibody (UK)    Hypothyroid - Primary    Overview     Due to remote thyroidectomy for cancer         Current Assessment & Plan     Has had trouble with high tsh lately. This is likely due to altered metabolism of thyroid medication due to all her other meds and her new liver          Relevant Medications    predniSONE (DELTASONE) 20 MG tablet    metoprolol succinate XL (TOPROL-XL) 50 MG 24 hr tablet     predniSONE (DELTASONE) 10 MG tablet    levothyroxine (SYNTHROID, LEVOTHROID) 175 MCG tablet    Other Relevant Orders    TSH    T4, Free           Ming Stroud MD   01/11/2022

## 2022-01-11 NOTE — ASSESSMENT & PLAN NOTE
Has had trouble with high tsh lately. This is likely due to altered metabolism of thyroid medication due to all her other meds and her new liver

## 2022-01-12 LAB
T4 FREE SERPL-MCNC: 1.41 NG/DL (ref 0.93–1.7)
TSH SERPL DL<=0.05 MIU/L-ACNC: 3.23 UIU/ML (ref 0.27–4.2)

## 2022-01-13 LAB — REF LAB TEST METHOD: NORMAL

## 2022-01-14 ENCOUNTER — DOCUMENTATION (OUTPATIENT)
Dept: CARDIOLOGY | Facility: HOSPITAL | Age: 57
End: 2022-01-14

## 2022-01-14 DIAGNOSIS — R06.09 DOE (DYSPNEA ON EXERTION): ICD-10-CM

## 2022-01-14 DIAGNOSIS — E78.00 HYPERCHOLESTEREMIA: ICD-10-CM

## 2022-01-14 DIAGNOSIS — R53.83 OTHER FATIGUE: ICD-10-CM

## 2022-01-14 DIAGNOSIS — I63.9 CEREBROVASCULAR ACCIDENT (CVA), UNSPECIFIED MECHANISM: ICD-10-CM

## 2022-01-14 DIAGNOSIS — I10 PRIMARY HYPERTENSION: ICD-10-CM

## 2022-01-14 DIAGNOSIS — R07.2 PRECORDIAL PAIN: Primary | ICD-10-CM

## 2022-01-14 DIAGNOSIS — I51.7 LVH (LEFT VENTRICULAR HYPERTROPHY): ICD-10-CM

## 2022-01-14 NOTE — PROGRESS NOTES
Reviewed echo from [facility] January 3, 2022: EF 70% with concentric LVH, dilated left atrium, trace AR, small anterior pericardial effusion with no tamponade physiology, grade 1 diastolic dysfunction with no significant valvular abnormalities noted.  Patient is requesting a referral to cardiology.  Ambulatory referral placed to cardiology.

## 2022-01-21 ENCOUNTER — APPOINTMENT (OUTPATIENT)
Dept: GENERAL RADIOLOGY | Facility: HOSPITAL | Age: 57
End: 2022-01-21

## 2022-01-21 ENCOUNTER — HOSPITAL ENCOUNTER (EMERGENCY)
Facility: HOSPITAL | Age: 57
Discharge: HOME | End: 2022-01-21
Attending: EMERGENCY MEDICINE | Admitting: EMERGENCY MEDICINE

## 2022-01-21 VITALS
TEMPERATURE: 98.5 F | SYSTOLIC BLOOD PRESSURE: 121 MMHG | HEART RATE: 95 BPM | WEIGHT: 179 LBS | RESPIRATION RATE: 18 BRPM | DIASTOLIC BLOOD PRESSURE: 80 MMHG | BODY MASS INDEX: 33.79 KG/M2 | HEIGHT: 61 IN | OXYGEN SATURATION: 92 %

## 2022-01-21 DIAGNOSIS — Z91.89 MULTIPLE RISK FACTORS FOR CORONARY ARTERY DISEASE: ICD-10-CM

## 2022-01-21 DIAGNOSIS — R07.2 PRECORDIAL CHEST PAIN: Primary | ICD-10-CM

## 2022-01-21 LAB
ALBUMIN SERPL-MCNC: 4.4 G/DL (ref 3.5–5.2)
ALBUMIN/GLOB SERPL: 1.9 G/DL
ALP SERPL-CCNC: 73 U/L (ref 39–117)
ALT SERPL W P-5'-P-CCNC: 126 U/L (ref 1–33)
ANION GAP SERPL CALCULATED.3IONS-SCNC: 16 MMOL/L (ref 5–15)
AST SERPL-CCNC: 71 U/L (ref 1–32)
BASOPHILS # BLD AUTO: 0.05 10*3/MM3 (ref 0–0.2)
BASOPHILS NFR BLD AUTO: 1.2 % (ref 0–1.5)
BILIRUB SERPL-MCNC: 1.5 MG/DL (ref 0–1.2)
BUN SERPL-MCNC: 27 MG/DL (ref 6–20)
BUN/CREAT SERPL: 29.7 (ref 7–25)
CALCIUM SPEC-SCNC: 9.5 MG/DL (ref 8.6–10.5)
CHLORIDE SERPL-SCNC: 101 MMOL/L (ref 98–107)
CO2 SERPL-SCNC: 23 MMOL/L (ref 22–29)
CREAT SERPL-MCNC: 0.91 MG/DL (ref 0.57–1)
D DIMER PPP FEU-MCNC: <0.27 MCGFEU/ML (ref 0–0.56)
DEPRECATED RDW RBC AUTO: 44.9 FL (ref 37–54)
EOSINOPHIL # BLD AUTO: 0.02 10*3/MM3 (ref 0–0.4)
EOSINOPHIL NFR BLD AUTO: 0.5 % (ref 0.3–6.2)
ERYTHROCYTE [DISTWIDTH] IN BLOOD BY AUTOMATED COUNT: 13.8 % (ref 12.3–15.4)
GFR SERPL CREATININE-BSD FRML MDRD: 64 ML/MIN/1.73
GLOBULIN UR ELPH-MCNC: 2.3 GM/DL
GLUCOSE SERPL-MCNC: 137 MG/DL (ref 65–99)
HCT VFR BLD AUTO: 42.6 % (ref 34–46.6)
HGB BLD-MCNC: 14.7 G/DL (ref 12–15.9)
HOLD SPECIMEN: NORMAL
IMM GRANULOCYTES # BLD AUTO: 0.11 10*3/MM3 (ref 0–0.05)
IMM GRANULOCYTES NFR BLD AUTO: 2.7 % (ref 0–0.5)
LIPASE SERPL-CCNC: 18 U/L (ref 13–60)
LYMPHOCYTES # BLD AUTO: 0.63 10*3/MM3 (ref 0.7–3.1)
LYMPHOCYTES NFR BLD AUTO: 15.6 % (ref 19.6–45.3)
MCH RBC QN AUTO: 30.6 PG (ref 26.6–33)
MCHC RBC AUTO-ENTMCNC: 34.5 G/DL (ref 31.5–35.7)
MCV RBC AUTO: 88.6 FL (ref 79–97)
MONOCYTES # BLD AUTO: 0.44 10*3/MM3 (ref 0.1–0.9)
MONOCYTES NFR BLD AUTO: 10.9 % (ref 5–12)
NEUTROPHILS NFR BLD AUTO: 2.78 10*3/MM3 (ref 1.7–7)
NEUTROPHILS NFR BLD AUTO: 69.1 % (ref 42.7–76)
NRBC BLD AUTO-RTO: 0 /100 WBC (ref 0–0.2)
NT-PROBNP SERPL-MCNC: 70.9 PG/ML (ref 0–900)
PLATELET # BLD AUTO: 142 10*3/MM3 (ref 140–450)
PMV BLD AUTO: 11 FL (ref 6–12)
POTASSIUM SERPL-SCNC: 3.7 MMOL/L (ref 3.5–5.2)
PROT SERPL-MCNC: 6.7 G/DL (ref 6–8.5)
QT INTERVAL: 376 MS
QT INTERVAL: 388 MS
QTC INTERVAL: 479 MS
QTC INTERVAL: 480 MS
RBC # BLD AUTO: 4.81 10*6/MM3 (ref 3.77–5.28)
SODIUM SERPL-SCNC: 140 MMOL/L (ref 136–145)
TROPONIN T SERPL-MCNC: <0.01 NG/ML (ref 0–0.03)
TROPONIN T SERPL-MCNC: <0.01 NG/ML (ref 0–0.03)
WBC NRBC COR # BLD: 4.03 10*3/MM3 (ref 3.4–10.8)
WHOLE BLOOD HOLD SPECIMEN: NORMAL
WHOLE BLOOD HOLD SPECIMEN: NORMAL

## 2022-01-21 PROCEDURE — 71045 X-RAY EXAM CHEST 1 VIEW: CPT

## 2022-01-21 PROCEDURE — 83690 ASSAY OF LIPASE: CPT | Performed by: EMERGENCY MEDICINE

## 2022-01-21 PROCEDURE — 96366 THER/PROPH/DIAG IV INF ADDON: CPT

## 2022-01-21 PROCEDURE — 85025 COMPLETE CBC W/AUTO DIFF WBC: CPT | Performed by: EMERGENCY MEDICINE

## 2022-01-21 PROCEDURE — 96365 THER/PROPH/DIAG IV INF INIT: CPT

## 2022-01-21 PROCEDURE — 83880 ASSAY OF NATRIURETIC PEPTIDE: CPT | Performed by: EMERGENCY MEDICINE

## 2022-01-21 PROCEDURE — 99283 EMERGENCY DEPT VISIT LOW MDM: CPT | Performed by: INTERNAL MEDICINE

## 2022-01-21 PROCEDURE — 93005 ELECTROCARDIOGRAM TRACING: CPT | Performed by: EMERGENCY MEDICINE

## 2022-01-21 PROCEDURE — 80053 COMPREHEN METABOLIC PANEL: CPT | Performed by: EMERGENCY MEDICINE

## 2022-01-21 PROCEDURE — 84484 ASSAY OF TROPONIN QUANT: CPT | Performed by: EMERGENCY MEDICINE

## 2022-01-21 PROCEDURE — 99284 EMERGENCY DEPT VISIT MOD MDM: CPT

## 2022-01-21 PROCEDURE — 85379 FIBRIN DEGRADATION QUANT: CPT | Performed by: EMERGENCY MEDICINE

## 2022-01-21 RX ORDER — SODIUM CHLORIDE 0.9 % (FLUSH) 0.9 %
10 SYRINGE (ML) INJECTION AS NEEDED
Status: DISCONTINUED | OUTPATIENT
Start: 2022-01-21 | End: 2022-01-21 | Stop reason: HOSPADM

## 2022-01-21 RX ORDER — NITROGLYCERIN 0.4 MG/1
0.4 TABLET SUBLINGUAL ONCE
Status: COMPLETED | OUTPATIENT
Start: 2022-01-21 | End: 2022-01-21

## 2022-01-21 RX ORDER — NITROGLYCERIN 20 MG/100ML
10-50 INJECTION INTRAVENOUS
Status: DISCONTINUED | OUTPATIENT
Start: 2022-01-21 | End: 2022-01-21 | Stop reason: HOSPADM

## 2022-01-21 RX ORDER — ISOSORBIDE DINITRATE 30 MG/1
30 TABLET ORAL 3 TIMES DAILY
Qty: 90 TABLET | Refills: 3 | Status: SHIPPED | OUTPATIENT
Start: 2022-01-21 | End: 2022-05-05

## 2022-01-21 RX ORDER — ASPIRIN 81 MG/1
324 TABLET, CHEWABLE ORAL ONCE
Status: DISCONTINUED | OUTPATIENT
Start: 2022-01-21 | End: 2022-01-21 | Stop reason: HOSPADM

## 2022-01-21 RX ADMIN — NITROGLYCERIN 0.4 MG: 0.4 TABLET SUBLINGUAL at 12:12

## 2022-01-21 RX ADMIN — LIDOCAINE HYDROCHLORIDE: 20 SOLUTION ORAL; TOPICAL at 14:26

## 2022-01-21 RX ADMIN — NITROGLYCERIN 10 MCG/MIN: 20 INJECTION INTRAVENOUS at 12:13

## 2022-01-21 NOTE — DISCHARGE INSTRUCTIONS
Follow recommendations for medication changes given to you by Dr. Ventura.    Please review the medications you are supposed to be taking according to prior physician recommendations. I have not changed your home medications during this visit. If your discharge instructions indicate that I have changed your home medications, this is not the case, and you should disregard. If you have any questions about the medication you should be taking at home, please call your physician.

## 2022-01-21 NOTE — H&P
[facility]   Consult Note    Referring Provider: Dr. Cassidy    Reason for Consultation: chest pain    Patient Care Team:  System, Provider Not In as PCP - General  Ming Stroud MD as Consulting Physician (Endocrinology)     Problem List:  1. Chest pain  2. Hypertension  3. Dyslipidemia  4. Borderline DM  5. STEVE with CPAP and oxygen  6. Anxiety/depression  7. Liver cirrhosis  8. Hypothyroidism  9. Meningioma  10. Arthritis  11. Gout  12. Osteoporosis  13. History of CVA  1. 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 History of thyroid cancer  15. Bilateral hearing aids  16. Surgeries:  1. Appendectomy  2.  section x3  3. Cholecystectomy  4. D&C  5. Liver cyst removal  6. Liver transplant  7. Ankle ORIF, right  8. Thyroidectomy  9. BRITNI  10. Right ear cyst removal with mastoid surgery          Allergies   Allergen Reactions   • Penicillins Shortness Of Breath   • Cyclosporine Swelling           Current Facility-Administered Medications:   •  aspirin chewable tablet 324 mg, 324 mg, Oral, Once, Michael Cassidy MD  •  nitroglycerin (TRIDIL) 200 mcg/ml infusion, 10-50 mcg/min, Intravenous, Titrated, Michael Cassidy MD, Last Rate: 3 mL/hr at 22 1213, 10 mcg/min at 22 1213  •  sodium chloride 0.9 % flush 10 mL, 10 mL, Intravenous, PRN, Michael Cassidy MD    Current Outpatient Medications:   •  albuterol sulfate  (90 Base) MCG/ACT inhaler, Inhale 2 puffs., Disp: , Rfl:   •  alendronate (FOSAMAX) 70 MG tablet, alendronate 70 mg tablet  Take 1 tablet every week by oral route., Disp: , Rfl:   •  allopurinol (ZYLOPRIM) 300 MG tablet, Take 300 mg by mouth Daily., Disp: , Rfl:   •  amLODIPine (NORVASC) 10 MG tablet, , Disp: , Rfl:   •  amLODIPine (NORVASC) 5 MG tablet, Take 5 mg by mouth Daily., Disp: , Rfl:   •  atorvastatin (LIPITOR) 40 MG tablet, Take 1 tablet by mouth Daily., Disp: , Rfl:   •  benzonatate  (TESSALON) 100 MG capsule, Tessalon Noel, Disp: , Rfl:   •  buPROPion SR (WELLBUTRIN SR) 100 MG 12 hr tablet, Take 150 mg by mouth Daily., Disp: , Rfl:   •  buPROPion SR (WELLBUTRIN SR) 150 MG 12 hr tablet, , Disp: , Rfl:   •  calcium carbonate-vitamin d 600-400 MG-UNIT per tablet, , Disp: , Rfl:   •  Cholecalciferol (Vitamin D3) 1.25 MG (51734 UT) capsule, Vitamin D3  2000 units, Disp: , Rfl:   •  cloNIDine (CATAPRES) 0.1 MG tablet, Take 0.1 mg by mouth Daily As Needed for High Blood Pressure., Disp: , Rfl:   •  colchicine 0.6 MG tablet, TAKE 1 TABLET(S) EVERY DAY. MAY TAKE ADD L TAB FOR GOUT FLARES, Disp: , Rfl:   •  cycloSPORINE (Restasis) 0.05 % ophthalmic emulsion, Apply  to eye(s) as directed by provider Every 12 (Twelve) Hours., Disp: , Rfl:   •  estradiol (ESTRACE VAGINAL) 0.1 MG/GM vaginal cream, Insert o.5 gm intravaginally 2 times each week, Disp: 42.5 g, Rfl: 2  •  everolimus (ZORTRESS) 0.5 MG tablet, Take 1mg (2 tabs) by mouth in the AM and 0.5mg (1 tab) in the evening., Disp: , Rfl:   •  ferrous sulfate 325 (65 FE) MG tablet, Take 3 tablets by mouth Daily., Disp: , Rfl:   •  fluticasone (FLONASE) 50 MCG/ACT nasal spray, , Disp: , Rfl:   •  fluticasone (VERAMYST) 27.5 MCG/SPRAY nasal spray, 2 sprays into each nostril 2 (Two) Times a Day., Disp: , Rfl:   •  furosemide (LASIX) 20 MG tablet, Take 20 mg by mouth Daily., Disp: , Rfl:   •  gabapentin (NEURONTIN) 100 MG capsule, Every 12 (Twelve) Hours., Disp: , Rfl:   •  gabapentin (NEURONTIN) 400 MG capsule, Take 400 mg by mouth 2 (Two) Times a Day., Disp: , Rfl:   •  icosapent ethyl (Vascepa) 1 g capsule capsule, Take 2 capsules by mouth., Disp: , Rfl:   •  ipratropium-albuterol (DUO-NEB) 0.5-2.5 mg/3 ml nebulizer, ipratropium 0.5 mg-albuterol 2.5 mg/2.5 mL solution for nebulization  Inhale by inhalation route., Disp: , Rfl:   •  isosorbide dinitrate (ISORDIL) 30 MG tablet, isosorbide dinitrate 30 mg tablet  Take 1 tablet every day by oral route., Disp: ,  Rfl:   •  levalbuterol (XOPENEX HFA) 45 MCG/ACT inhaler, , Disp: , Rfl:   •  levothyroxine (SYNTHROID, LEVOTHROID) 175 MCG tablet, 200 mcg., Disp: , Rfl:   •  magnesium oxide (MAG-OX) 400 MG tablet, Take 2 tablets by mouth Daily., Disp: , Rfl:   •  melatonin 5 MG tablet tablet, melatonin 5 mg tablet  Take 1 tablet by oral route., Disp: , Rfl:   •  methocarbamol (ROBAXIN) 500 MG tablet, Take 500 mg by mouth 3 (Three) Times a Day As Needed., Disp: , Rfl:   •  metoprolol succinate XL (TOPROL-XL) 50 MG 24 hr tablet, 50 mg 2 (Two) Times a Day., Disp: , Rfl:   •  mirtazapine (REMERON) 30 MG tablet, , Disp: , Rfl:   •  montelukast (SINGULAIR) 10 MG tablet, , Disp: , Rfl:   •  mycophenolate (MYFORTIC) 360 MG tablet delayed-release EC tablet, Take 2 tablets by mouth 2 (Two) Times a Day., Disp: , Rfl:   •  nitroglycerin (NITROSTAT) 0.4 MG SL tablet, Place 0.4 mg under the tongue., Disp: , Rfl:   •  pantoprazole (PROTONIX) 40 MG EC tablet, Take 40 mg by mouth Daily., Disp: , Rfl:   •  potassium chloride (MICRO-K) 10 MEQ CR capsule, Take 10 mEq by mouth Daily., Disp: , Rfl:   •  predniSONE (DELTASONE) 10 MG tablet, Take 10 mg by mouth., Disp: , Rfl:   •  predniSONE (DELTASONE) 20 MG tablet, Take 1 tablet by mouth Daily., Disp: 7 tablet, Rfl: 0  •  rOPINIRole (REQUIP) 2 MG tablet, , Disp: , Rfl:   •  rOPINIRole (REQUIP) 2 MG tablet, ropinirole 2 mg tablet  Take 1 tablet every day by oral route., Disp: , Rfl:   •  Saline (Brady Saline Nasal Drops) 0.65 % solution, Ayr Saline Gel nasal spray  Take by nasal route., Disp: , Rfl:   •  sertraline (ZOLOFT) 100 MG tablet, Take 100 mg by mouth Daily., Disp: , Rfl:   •  spironolactone (ALDACTONE) 50 MG tablet, , Disp: , Rfl:   •  tacrolimus (PROGRAF) 0.5 MG capsule, Take 2 capsules by mouth Every 12 (Twelve) Hours., Disp: , Rfl:   •  traMADol (ULTRAM) 50 MG tablet, Every 12 (Twelve) Hours., Disp: , Rfl:   •  traZODone (DESYREL) 100 MG tablet, Take 200 mg by mouth every night at bedtime.,  Disp: , Rfl:   •  vitamin D (ERGOCALCIFEROL) 1.25 MG (42015 UT) capsule capsule, Take 50,000 Units by mouth Every 7 (Seven) Days.  , Disp: , Rfl:   •  zolpidem (AMBIEN) 5 MG tablet, Take 5 mg by mouth Every Night., Disp: , Rfl:     nitroglycerin, 10-50 mcg/min, Last Rate: 10 mcg/min (01/21/22 1213)        (Not in a hospital admission)        Subjective .   History of present illness:    Patient is a 56-year-old  female who is being seen today for further evaluation of chest pain.  She has no previous history of obstructive coronary disease.  She actually underwent myocardial perfusion study earlier this year which was normal.  She notes that over the last 2 to 3 months she has had recurrent midsternal chest pain.  This has been increasing in frequency and severity over the course of the last 2 months.  It is now occurring on a daily basis.  Typically she has been able to alleviate this discomfort with nitroglycerin.  However, she has had a episode that started today which was not alleviated by 3 sublingual nitro and she subsequently presented to the hospital for further evaluation.  Here she is currently on IV nitroglycerin and still rates pain 5/10.  She has been recently evaluated by pulmonary per her report at which time she was told that she had a relatively normal evaluation.  States that she had a CT of her chest with contrast that was negative per her report.  She notes that her chest pain can occur with exertion and at rest.  She does have some associated radiation into her shoulders.  Notes that she becomes severely short of breath with almost any type of exertion.  Notes that just walking room to room in her house she becomes short of breath.  States that she was told by pulmonary that they believe that this is more cardiac related.      Social History     Socioeconomic History   • Marital status:    Tobacco Use   • Smoking status: Never Smoker   • Smokeless tobacco: Never Used   Vaping Use  "  • Vaping Use: Never used   Substance and Sexual Activity   • Alcohol use: No   • Drug use: Not Currently     Comment: Tried an edible last week.    • Sexual activity: Yes     Partners: Male     Birth control/protection: Surgical, Post-menopausal     Family History   Problem Relation Age of Onset   • Colon cancer Father    • Osteoporosis Mother    • Rheum arthritis Mother    • Ovarian cancer Paternal Grandmother    • Breast cancer Maternal Grandmother    • Stroke Maternal Grandmother          Review of Systems:  Review of Systems   Constitutional: Positive for malaise/fatigue. Negative for fever.   HENT: Negative for nosebleeds.    Eyes: Negative for redness and visual disturbance.   Cardiovascular: Positive for chest pain and dyspnea on exertion. Negative for orthopnea, palpitations and paroxysmal nocturnal dyspnea.   Respiratory: Positive for shortness of breath, snoring and wheezing. Negative for cough and sputum production.    Hematologic/Lymphatic: Negative for bleeding problem.   Skin: Negative for flushing, itching and rash.   Musculoskeletal: Positive for arthritis and joint pain. Negative for falls and muscle cramps.   Gastrointestinal: Negative for abdominal pain, diarrhea, heartburn, nausea and vomiting.   Genitourinary: Negative for hematuria.   Neurological: Negative for excessive daytime sleepiness, dizziness, headaches, tremors and weakness.   Psychiatric/Behavioral: Negative for substance abuse. The patient is nervous/anxious.               Objective   Vitals:  /94   Pulse 95   Temp 98.5 °F (36.9 °C) (Oral)   Resp 18   Ht 154.9 cm (61\")   Wt 81.2 kg (179 lb)   SpO2 93%   BMI 33.82 kg/m²        Vitals reviewed.   Constitutional:       Appearance: Well-developed.      Comments: Obese  Complains of chest pain despite IV NTG   Neck:      Vascular: No JVD.      Trachea: No tracheal deviation.   Pulmonary:      Effort: Pulmonary effort is normal.      Comments: decreased  Cardiovascular:      " Normal rate. Regular rhythm.   Pulses:     Intact distal pulses.   Edema:     Peripheral edema absent.   Abdominal:      General: Bowel sounds are normal.      Palpations: Abdomen is soft.      Tenderness: There is no abdominal tenderness.   Musculoskeletal:         General: No deformity. Skin:     General: Skin is warm and dry.   Neurological:      Mental Status: Alert and oriented to person, place, and time.              Results Review:  I reviewed the patient's new clinical results.  Results from last 7 days   Lab Units 01/21/22  1141   WBC 10*3/mm3 4.03   HEMOGLOBIN g/dL 14.7   HEMATOCRIT % 42.6   PLATELETS 10*3/mm3 142     Results from last 7 days   Lab Units 01/21/22  1141   SODIUM mmol/L 140   POTASSIUM mmol/L 3.7   CHLORIDE mmol/L 101   CO2 mmol/L 23.0   BUN mg/dL 27*   CREATININE mg/dL 0.91   CALCIUM mg/dL 9.5   BILIRUBIN mg/dL 1.5*   ALK PHOS U/L 73   ALT (SGPT) U/L 126*   AST (SGOT) U/L 71*   GLUCOSE mg/dL 137*     Results from last 7 days   Lab Units 01/21/22  1141   SODIUM mmol/L 140   POTASSIUM mmol/L 3.7   CHLORIDE mmol/L 101   CO2 mmol/L 23.0   BUN mg/dL 27*   CREATININE mg/dL 0.91   GLUCOSE mg/dL 137*   CALCIUM mg/dL 9.5         Lab Results   Lab Value Date/Time    TROPONINT <0.010 01/21/2022 1141    TROPONINT <0.010 12/30/2021 1706    TROPONINT <0.010 08/16/2021 2308    TROPONINT <0.010 08/16/2021 1752    TROPONINT <0.010 03/09/2021 1441             Results from last 7 days   Lab Units 01/21/22  1141   PROBNP pg/mL 70.9           Tele:  SR    EKG: SR possible Old AMI      Assessment/Plan     1. Persistent chest pain, ongoing chest pain despite IV NTG. No acute EKG changes. Troponin normal x 2. CTA reviewed from March of last year with no significant coronary calcification. MPS 1/6/2022 normal.  2. Hypertension, stable  3. Dyslipidemia  4. History of liver transplant  5. History of CVA related to hypotension intraoperatively during liver surgery in remote past  6. Borderline DM per patient  report    -Currently no clinical evidence for ACS.  Cannot completely exclude underlying obstructive coronary disease however underlying coronary microvascular dysfunction versus intermittent vasospasm are more likely.    Plan:    1. Stable for discharge from the ED from a cardiac standpoint  2. Increase Isordil to 30 mg p.o. 3 times daily.  3. We will arrange for outpatient cardiac catheterization +/- acetylcholine provocation testing      AISLINN العراقي obtained past medical, family history, social history, review of systems and functioned as a scribe for the remainder of the dictation for Dr. Ventura.       Dictated utilizing Dragon dictation

## 2022-01-21 NOTE — ED PROVIDER NOTES
EMERGENCY DEPARTMENT ENCOUNTER    Pt Name: Lizette Frias  MRN: [id number]  Pt :   1965  Room Number:    Date of encounter:  2022  PCP: Bill Ventura III, MD  ED Provider: Michael Cassidy MD    Historian: Patient      HPI:  Chief Complaint: Chest discomfort        Context: Lizette Frias is a 56 y.o. female who presents to the ED c/o anterior chest discomfort ongoing for the last 3 months but significantly worse since early this morning.  The patient has been taking 1 or 2 sublingual nitroglycerin on a daily basis and this has been relieving her discomfort previously.  This morning she took 2 sublingual nitroglycerin and continued to have pain that she rated 7-8 on the pain scale.  In route our facility she had a third nitroglycerin which partially relieved her discomfort.  It is now returned to the 7 out of 10 region.  She describes this as a pressure sensation.  She also notes that her blood pressure was elevated.  She has taken her clonidine and metoprolol this morning.  The patient has been evaluated by pulmonology, Dr. Ramsey Galicia.  The patient reports that Dr. Glass feels this is more likely to be cardiac than pulmonary in etiology.  Within the last 3 weeks she had undergone echocardiogram and CTA of the chest both being negative.  Patient denies history of tobacco, alcohol, recreational drug use.  She has no first-degree family history of coronary artery disease.        PAST MEDICAL HISTORY  Past Medical History:   Diagnosis Date   • Anxiety    • Cirrhosis of liver (HCC)    • Depression    • Hypercholesteremia    • Hypertension    • Hypothyroid    • Meningioma (HCC)    • Osteoarthritis    • Osteoporosis    • Renal impairment    • Stroke (HCC)    • Thyroid cancer (HCC)    • Vision impairment     left eye         PAST SURGICAL HISTORY  Past Surgical History:   Procedure Laterality Date   • APPENDECTOMY     •  SECTION      x3   • CHOLECYSTECTOMY     • D & C HYSTEROSCOPY  LAPAROSCOPY      x2 for endometriosis   • LIVER SURGERY      removed cysts    • LIVER TRANSPLANTATION     • ORIF ANKLE FRACTURE     • THYROIDECTOMY     • TONSILLECTOMY     • TOTAL ABDOMINAL HYSTERECTOMY WITH SALPINGO OOPHORECTOMY Bilateral          FAMILY HISTORY  Family History   Problem Relation Age of Onset   • Colon cancer Father    • Osteoporosis Mother    • Rheum arthritis Mother    • Ovarian cancer Paternal Grandmother    • Breast cancer Maternal Grandmother    • Stroke Maternal Grandmother          SOCIAL HISTORY  Social History     Socioeconomic History   • Marital status:    Tobacco Use   • Smoking status: Never Smoker   • Smokeless tobacco: Never Used   Vaping Use   • Vaping Use: Never used   Substance and Sexual Activity   • Alcohol use: No   • Drug use: Not Currently     Comment: Tried an edible last week.    • Sexual activity: Yes     Partners: Male     Birth control/protection: Surgical, Post-menopausal         ALLERGIES  Penicillins and Cyclosporine        REVIEW OF SYSTEMS  Review of Systems       All systems reviewed and negative except for those discussed in HPI.       PHYSICAL EXAM    I have reviewed the triage vital signs and nursing notes.    ED Triage Vitals [01/21/22 1138]   Temp Heart Rate Resp BP SpO2   98.5 °F (36.9 °C) 101 18 142/94 93 %      Temp src Heart Rate Source Patient Position BP Location FiO2 (%)   Oral -- -- -- --       Physical Exam  GENERAL:   Appears mildly anxious but nontoxic  HENT: Nares patent.  EYES: No scleral icterus.  CV: Regular rhythm, tachycardic rate.  No murmurs gallops rubs.  RESPIRATORY: Normal effort.  No audible wheezes, rales or rhonchi.  Clear to auscultation  ABDOMEN: Soft, nontender  MUSCULOSKELETAL: No deformities.  Some reproduction of chest discomfort with palpation of the lower half of the sternum.  NEURO: Alert, moves all extremities, follows commands.  SKIN: Warm, dry, no rash visualized.        LAB RESULTS  Recent Results (from the past 24  hour(s))   Troponin    Collection Time: 01/21/22 11:41 AM    Specimen: Blood   Result Value Ref Range    Troponin T <0.010 0.000 - 0.030 ng/mL   Comprehensive Metabolic Panel    Collection Time: 01/21/22 11:41 AM    Specimen: Blood   Result Value Ref Range    Glucose 137 (H) 65 - 99 mg/dL    BUN 27 (H) 6 - 20 mg/dL    Creatinine 0.91 0.57 - 1.00 mg/dL    Sodium 140 136 - 145 mmol/L    Potassium 3.7 3.5 - 5.2 mmol/L    Chloride 101 98 - 107 mmol/L    CO2 23.0 22.0 - 29.0 mmol/L    Calcium 9.5 8.6 - 10.5 mg/dL    Total Protein 6.7 6.0 - 8.5 g/dL    Albumin 4.40 3.50 - 5.20 g/dL    ALT (SGPT) 126 (H) 1 - 33 U/L    AST (SGOT) 71 (H) 1 - 32 U/L    Alkaline Phosphatase 73 39 - 117 U/L    Total Bilirubin 1.5 (H) 0.0 - 1.2 mg/dL    eGFR Non African Amer 64 >60 mL/min/1.73    Globulin 2.3 gm/dL    A/G Ratio 1.9 g/dL    BUN/Creatinine Ratio 29.7 (H) 7.0 - 25.0    Anion Gap 16.0 (H) 5.0 - 15.0 mmol/L   Lipase    Collection Time: 01/21/22 11:41 AM    Specimen: Blood   Result Value Ref Range    Lipase 18 13 - 60 U/L   BNP    Collection Time: 01/21/22 11:41 AM    Specimen: Blood   Result Value Ref Range    proBNP 70.9 0.0 - 900.0 pg/mL   Green Top (Gel)    Collection Time: 01/21/22 11:41 AM   Result Value Ref Range    Extra Tube Hold for add-ons.    Lavender Top    Collection Time: 01/21/22 11:41 AM   Result Value Ref Range    Extra Tube hold for add-on    Gold Top - SST    Collection Time: 01/21/22 11:41 AM   Result Value Ref Range    Extra Tube Hold for add-ons.    Gray Top    Collection Time: 01/21/22 11:41 AM   Result Value Ref Range    Extra Tube Hold for add-ons.    Light Blue Top    Collection Time: 01/21/22 11:41 AM   Result Value Ref Range    Extra Tube hold for add-on    CBC Auto Differential    Collection Time: 01/21/22 11:41 AM    Specimen: Blood   Result Value Ref Range    WBC 4.03 3.40 - 10.80 10*3/mm3    RBC 4.81 3.77 - 5.28 10*6/mm3    Hemoglobin 14.7 12.0 - 15.9 g/dL    Hematocrit 42.6 34.0 - 46.6 %    MCV 88.6  79.0 - 97.0 fL    MCH 30.6 26.6 - 33.0 pg    MCHC 34.5 31.5 - 35.7 g/dL    RDW 13.8 12.3 - 15.4 %    RDW-SD 44.9 37.0 - 54.0 fl    MPV 11.0 6.0 - 12.0 fL    Platelets 142 140 - 450 10*3/mm3    Neutrophil % 69.1 42.7 - 76.0 %    Lymphocyte % 15.6 (L) 19.6 - 45.3 %    Monocyte % 10.9 5.0 - 12.0 %    Eosinophil % 0.5 0.3 - 6.2 %    Basophil % 1.2 0.0 - 1.5 %    Immature Grans % 2.7 (H) 0.0 - 0.5 %    Neutrophils, Absolute 2.78 1.70 - 7.00 10*3/mm3    Lymphocytes, Absolute 0.63 (L) 0.70 - 3.10 10*3/mm3    Monocytes, Absolute 0.44 0.10 - 0.90 10*3/mm3    Eosinophils, Absolute 0.02 0.00 - 0.40 10*3/mm3    Basophils, Absolute 0.05 0.00 - 0.20 10*3/mm3    Immature Grans, Absolute 0.11 (H) 0.00 - 0.05 10*3/mm3    nRBC 0.0 0.0 - 0.2 /100 WBC   D-dimer, Quantitative    Collection Time: 01/21/22 11:41 AM    Specimen: Blood   Result Value Ref Range    D-Dimer, Quantitative <0.27 0.00 - 0.56 MCGFEU/mL   ECG 12 Lead    Collection Time: 01/21/22 11:43 AM   Result Value Ref Range    QT Interval 376 ms    QTC Interval 480 ms   Troponin    Collection Time: 01/21/22  1:49 PM    Specimen: Blood   Result Value Ref Range    Troponin T <0.010 0.000 - 0.030 ng/mL   ECG 12 Lead    Collection Time: 01/21/22  1:50 PM   Result Value Ref Range    QT Interval 388 ms    QTC Interval 479 ms       If labs were ordered, I independently reviewed the results.        RADIOLOGY  XR Chest 1 View    Result Date: 1/21/2022  EXAMINATION: XR CHEST 1 VW-  INDICATION: Chest Pain triage protocol  TECHNIQUE: Frontal view of the chest  COMPARISON: 12/30/2021  FINDINGS:  Normal cardiomediastinal silhouette. Similar streaky linear opacity in the left base for prior, likely scarring or atelectasis. Otherwise lungs are clear without focal consolidation. No pleural effusion or pneumothorax. No acute osseous findings.       Stable chest without acute cardiopulmonary findings.  This report was finalized on 1/21/2022 12:51 PM by Bronson Watkins MD.        I ordered and  reviewed the above noted radiographic studies.      I viewed images of chest x-ray which showed no active disease per my independent interpretation.    See radiologist's dictation for official interpretation.        PROCEDURES    Procedures    ECG 12 Lead   Final Result   Test Reason : CHEST PAIN   Blood Pressure :   */*   mmHG   Vent. Rate :  92 BPM     Atrial Rate :  92 BPM      P-R Int : 142 ms          QRS Dur :  96 ms       QT Int : 388 ms       P-R-T Axes :  41   5  54 degrees      QTc Int : 479 ms      Normal sinus rhythm   Cannot rule out Anterior infarct (cited on or before 30-DEC-2021)   Abnormal ECG   When compared with ECG of 21-JAN-2022 11:43,   No significant change was found   Confirmed by FRANCOIS NICOLE MD ([id number]) on 1/21/2022 4:01:41 PM      Referred By: BONNIE           Confirmed By: FRANCOIS NICOLE MD      ECG 12 Lead   Final Result   Test Reason : CHEST PAIN   Blood Pressure :   */*   mmHG   Vent. Rate :  98 BPM     Atrial Rate :  98 BPM      P-R Int : 142 ms          QRS Dur :  94 ms       QT Int : 376 ms       P-R-T Axes :  39   5  41 degrees      QTc Int : 480 ms      Normal sinus rhythm   Cannot rule out Anterior infarct (cited on or before 30-DEC-2021)   Abnormal ECG   Confirmed by FRANCOIS NICOLE MD ([id number]) on 1/21/2022 12:07:56 PM      Referred By: EDMD           Confirmed By: FRANCOIS NICOLE MD          MEDICATIONS GIVEN IN ER    Medications   sodium chloride 0.9 % flush 10 mL (has no administration in time range)   aspirin chewable tablet 324 mg (324 mg Oral Not Given 1/21/22 1140)   nitroglycerin (TRIDIL) 200 mcg/ml infusion (50 mcg/min Intravenous Currently Infusing 1/21/22 1547)   nitroglycerin (NITROSTAT) SL tablet 0.4 mg (0.4 mg Sublingual Given 1/21/22 1212)   aluminum-magnesium hydroxide-simethicone (MAALOX MAX) 400-400-40 MG/5ML 15 mL, Lidocaine Viscous HCl (XYLOCAINE) 2 % 15 mL suspension ( Oral Given 1/21/22 1426)         PROGRESS, DATA ANALYSIS, CONSULTS, AND MEDICAL DECISION  MAKING    All labs have been independently reviewed by me.  All radiology studies have been reviewed by me and the radiologist dictating the report.   EKG's have been independently viewed and interpreted by me.      Differential diagnoses: ACS versus PE versus anxiety versus pneumonia, etc.      ED Course as of 01/21/22 1602   Fri Jan 21, 2022   1147 Records review, 1/5/2022  Nuclear Perfusion Findings    Study Impression Myocardial perfusion imaging indicates a normal myocardial perfusion study with no evidence of ischemia. Impressions are consistent with a low risk study. Rest EF = 67%  Stress EF = 73%.     [MS]   1157 HEART Pathway for Early Discharge in Acute Chest Pain - MDCalc  Calculated on Jan 21 2022 11:57 AM  4 points -> HEART Pathway Score  High risk -> 12-65% 30-day MACE Admit to hospital or observation. Further testing indicated. [MS]   1204 I have consulted the on-call physician through [facility], [place], for evaluation in the emergency department. [MS]   1355 The patient received a sublingual nitroglycerin followed by nitroglycerin drip.  Dosage has been gradually titrated upward.  Patient still complains of pain 7 out of 10 on the pain scale.  I have ordered a GI cocktail.  Currently awaiting cardiology consultation. [MS]   1451 The patient reports no change status post GI cocktail.  Cardiology nurse practitioner has evaluated.  We are awaiting cardiologist evaluation.  Patient appears stable at this point. [MS]   1600 The patient was seen by Dr. Ventura, cardiology.  He is adjusted her medications and we will set her up for an outpatient cardiac catheterization.  He spoke with her about discharge instructions and she is comfortable with this plan.  Her long-acting nitrate will be increased by him. [MS]      ED Course User Index  [MS] Michael Cassidy MD             AS OF 16:02 EST VITALS:    BP - 121/80  HR - 95  TEMP - 98.5 °F (36.9 °C) (Oral)  O2 SATS - 92%                   DIAGNOSIS  Final diagnoses:   Precordial chest pain   Multiple risk factors for coronary artery disease         DISPOSITION  DISCHARGE    Patient discharged in stable condition.    Reviewed implications of results, diagnosis, meds, responsibility to follow up, warning signs and symptoms of possible worsening, potential complications and reasons to return to ER.    Patient/Family voiced understanding of above instructions.    Discussed plan for discharge, as there is no emergent indication for admission.  Pt/family is agreeable and understands need for follow up and possible repeat testing.  Pt/family is aware that discharge does not mean that nothing is wrong but that it indicates no emergency is currently present that requires admission and they must continue care with follow-up as given below or with a physician of their choice.     FOLLOW-UP  Bill Ventura III, MD  [address] [facility]  [address]  [place] [address]  [phone]      NEXT AVAILABLE APPOINTMENT - RECHECK OF CONDITION    [facility] Emergency Department  [address] [place] [id number] [phone]    IF YOU HAVE ANY CONCERN OF WORSENING CONDITION         Medication List      Changed    isosorbide dinitrate 30 MG tablet  Commonly known as: ISORDIL  Take 1 tablet by mouth 3 (Three) Times a Day.  What changed: See the new instructions.           Where to Get Your Medications      These medications were sent to [id number] - [place], [place] - [address] [place] [address] - [phone]  - [phone] [address] [address]    Phone: [phone]   · isosorbide dinitrate 30 MG tablet                  Michael Cassidy MD  01/21/22 6552

## 2022-02-01 ENCOUNTER — HOSPITAL ENCOUNTER (INPATIENT)
Facility: HOSPITAL | Age: 57
LOS: 6 days | Discharge: HOME | End: 2022-02-07
Attending: EMERGENCY MEDICINE | Admitting: INTERNAL MEDICINE

## 2022-02-01 ENCOUNTER — APPOINTMENT (OUTPATIENT)
Dept: GENERAL RADIOLOGY | Facility: HOSPITAL | Age: 57
End: 2022-02-01

## 2022-02-01 DIAGNOSIS — J12.82 PNEUMONIA DUE TO COVID-19 VIRUS: Primary | ICD-10-CM

## 2022-02-01 DIAGNOSIS — R07.89 CHEST PAIN, ATYPICAL: ICD-10-CM

## 2022-02-01 DIAGNOSIS — E43 SEVERE MALNUTRITION: ICD-10-CM

## 2022-02-01 DIAGNOSIS — U07.1 PNEUMONIA DUE TO COVID-19 VIRUS: Primary | ICD-10-CM

## 2022-02-01 DIAGNOSIS — Z79.899 IMMUNOSUPPRESSION DUE TO DRUG THERAPY: ICD-10-CM

## 2022-02-01 DIAGNOSIS — M54.50 CHRONIC LOW BACK PAIN, UNSPECIFIED BACK PAIN LATERALITY, UNSPECIFIED WHETHER SCIATICA PRESENT: ICD-10-CM

## 2022-02-01 DIAGNOSIS — E87.6 HYPOKALEMIA: ICD-10-CM

## 2022-02-01 DIAGNOSIS — D84.821 IMMUNOSUPPRESSION DUE TO DRUG THERAPY: ICD-10-CM

## 2022-02-01 DIAGNOSIS — H54.7 VISION IMPAIRMENT: ICD-10-CM

## 2022-02-01 DIAGNOSIS — N28.9 ACUTE RENAL INSUFFICIENCY: ICD-10-CM

## 2022-02-01 DIAGNOSIS — N28.9 RENAL IMPAIRMENT: ICD-10-CM

## 2022-02-01 DIAGNOSIS — E86.9 VOLUME DEPLETION: ICD-10-CM

## 2022-02-01 DIAGNOSIS — G89.29 CHRONIC LOW BACK PAIN, UNSPECIFIED BACK PAIN LATERALITY, UNSPECIFIED WHETHER SCIATICA PRESENT: ICD-10-CM

## 2022-02-01 PROBLEM — E83.42 HYPOMAGNESEMIA: Status: ACTIVE | Noted: 2022-02-01

## 2022-02-01 PROBLEM — Z94.4 LIVER TRANSPLANT RECIPIENT: Status: ACTIVE | Noted: 2022-02-01

## 2022-02-01 PROBLEM — R79.89 ELEVATED SERUM CREATININE: Status: ACTIVE | Noted: 2022-02-01

## 2022-02-01 LAB
ALBUMIN SERPL-MCNC: 4.3 G/DL (ref 3.5–5.2)
ALBUMIN/GLOB SERPL: 1.5 G/DL
ALP SERPL-CCNC: 87 U/L (ref 39–117)
ALT SERPL W P-5'-P-CCNC: 106 U/L (ref 1–33)
ANION GAP SERPL CALCULATED.3IONS-SCNC: 16 MMOL/L (ref 5–15)
ANISOCYTOSIS BLD QL: ABNORMAL
AST SERPL-CCNC: 92 U/L (ref 1–32)
BACTERIA UR QL AUTO: ABNORMAL /HPF
BASOPHILS # BLD MANUAL: 0 10*3/MM3 (ref 0–0.2)
BASOPHILS NFR BLD MANUAL: 0 % (ref 0–1.5)
BILIRUB SERPL-MCNC: 0.6 MG/DL (ref 0–1.2)
BILIRUB UR QL STRIP: NEGATIVE
BUN SERPL-MCNC: 34 MG/DL (ref 6–20)
BUN/CREAT SERPL: 23 (ref 7–25)
CALCIUM SPEC-SCNC: 9.3 MG/DL (ref 8.6–10.5)
CHLORIDE SERPL-SCNC: 102 MMOL/L (ref 98–107)
CLARITY UR: CLEAR
CO2 SERPL-SCNC: 23 MMOL/L (ref 22–29)
COLOR UR: YELLOW
CREAT SERPL-MCNC: 1.48 MG/DL (ref 0.57–1)
D DIMER PPP FEU-MCNC: 0.32 MCGFEU/ML (ref 0–0.56)
D-LACTATE SERPL-SCNC: 1 MMOL/L (ref 0.5–2)
DEPRECATED RDW RBC AUTO: 44.5 FL (ref 37–54)
EOSINOPHIL # BLD MANUAL: 0 10*3/MM3 (ref 0–0.4)
EOSINOPHIL NFR BLD MANUAL: 0 % (ref 0.3–6.2)
ERYTHROCYTE [DISTWIDTH] IN BLOOD BY AUTOMATED COUNT: 13.6 % (ref 12.3–15.4)
FLUAV RNA RESP QL NAA+PROBE: NOT DETECTED
FLUBV RNA RESP QL NAA+PROBE: NOT DETECTED
GFR SERPL CREATININE-BSD FRML MDRD: 36 ML/MIN/1.73
GLOBULIN UR ELPH-MCNC: 2.9 GM/DL
GLUCOSE SERPL-MCNC: 123 MG/DL (ref 65–99)
GLUCOSE UR STRIP-MCNC: NEGATIVE MG/DL
HCT VFR BLD AUTO: 47 % (ref 34–46.6)
HGB BLD-MCNC: 15.6 G/DL (ref 12–15.9)
HGB UR QL STRIP.AUTO: NEGATIVE
HOLD SPECIMEN: NORMAL
HYALINE CASTS UR QL AUTO: ABNORMAL /LPF
KETONES UR QL STRIP: ABNORMAL
LEUKOCYTE ESTERASE UR QL STRIP.AUTO: ABNORMAL
LIPASE SERPL-CCNC: 27 U/L (ref 13–60)
LYMPHOCYTES # BLD MANUAL: 0.55 10*3/MM3 (ref 0.7–3.1)
LYMPHOCYTES NFR BLD MANUAL: 5 % (ref 5–12)
MAGNESIUM SERPL-MCNC: 1.4 MG/DL (ref 1.6–2.6)
MCH RBC QN AUTO: 29.8 PG (ref 26.6–33)
MCHC RBC AUTO-ENTMCNC: 33.2 G/DL (ref 31.5–35.7)
MCV RBC AUTO: 89.7 FL (ref 79–97)
METAMYELOCYTES NFR BLD MANUAL: 5 % (ref 0–0)
MONOCYTES # BLD: 0.15 10*3/MM3 (ref 0.1–0.9)
NEUTROPHILS # BLD AUTO: 2.19 10*3/MM3 (ref 1.7–7)
NEUTROPHILS NFR BLD MANUAL: 66 % (ref 42.7–76)
NEUTS BAND NFR BLD MANUAL: 6 % (ref 0–5)
NITRITE UR QL STRIP: NEGATIVE
PH UR STRIP.AUTO: 5.5 [PH] (ref 5–8)
PLAT MORPH BLD: NORMAL
PLATELET # BLD AUTO: 169 10*3/MM3 (ref 140–450)
PMV BLD AUTO: 11.2 FL (ref 6–12)
POTASSIUM SERPL-SCNC: 3 MMOL/L (ref 3.5–5.2)
PROCALCITONIN SERPL-MCNC: 0.06 NG/ML (ref 0–0.25)
PROT SERPL-MCNC: 7.2 G/DL (ref 6–8.5)
PROT UR QL STRIP: ABNORMAL
RBC # BLD AUTO: 5.24 10*6/MM3 (ref 3.77–5.28)
RBC # UR STRIP: ABNORMAL /HPF
REF LAB TEST METHOD: ABNORMAL
SARS-COV-2 RNA RESP QL NAA+PROBE: DETECTED
SODIUM SERPL-SCNC: 141 MMOL/L (ref 136–145)
SP GR UR STRIP: 1.02 (ref 1–1.03)
SQUAMOUS #/AREA URNS HPF: ABNORMAL /HPF
TROPONIN T SERPL-MCNC: <0.01 NG/ML (ref 0–0.03)
UROBILINOGEN UR QL STRIP: ABNORMAL
VARIANT LYMPHS NFR BLD MANUAL: 18 % (ref 19.6–45.3)
WBC # UR STRIP: ABNORMAL /HPF
WBC MORPH BLD: NORMAL
WBC NRBC COR # BLD: 3.04 10*3/MM3 (ref 3.4–10.8)
WHOLE BLOOD HOLD SPECIMEN: NORMAL
WHOLE BLOOD HOLD SPECIMEN: NORMAL

## 2022-02-01 PROCEDURE — 25010000002 ENOXAPARIN PER 10 MG: Performed by: INTERNAL MEDICINE

## 2022-02-01 PROCEDURE — 25010000002 REMDESIVIR 100 MG/20ML SOLUTION 1 EACH VIAL: Performed by: INTERNAL MEDICINE

## 2022-02-01 PROCEDURE — 84145 PROCALCITONIN (PCT): CPT | Performed by: INTERNAL MEDICINE

## 2022-02-01 PROCEDURE — XW033E5 INTRODUCTION OF REMDESIVIR ANTI-INFECTIVE INTO PERIPHERAL VEIN, PERCUTANEOUS APPROACH, NEW TECHNOLOGY GROUP 5: ICD-10-PCS | Performed by: INTERNAL MEDICINE

## 2022-02-01 PROCEDURE — 99223 1ST HOSP IP/OBS HIGH 75: CPT | Performed by: INTERNAL MEDICINE

## 2022-02-01 PROCEDURE — 25010000002 ONDANSETRON PER 1 MG: Performed by: INTERNAL MEDICINE

## 2022-02-01 PROCEDURE — 87636 SARSCOV2 & INF A&B AMP PRB: CPT | Performed by: INTERNAL MEDICINE

## 2022-02-01 PROCEDURE — 85025 COMPLETE CBC W/AUTO DIFF WBC: CPT | Performed by: EMERGENCY MEDICINE

## 2022-02-01 PROCEDURE — 85007 BL SMEAR W/DIFF WBC COUNT: CPT | Performed by: EMERGENCY MEDICINE

## 2022-02-01 PROCEDURE — 99284 EMERGENCY DEPT VISIT MOD MDM: CPT

## 2022-02-01 PROCEDURE — 81001 URINALYSIS AUTO W/SCOPE: CPT

## 2022-02-01 PROCEDURE — 71045 X-RAY EXAM CHEST 1 VIEW: CPT

## 2022-02-01 PROCEDURE — 93005 ELECTROCARDIOGRAM TRACING: CPT | Performed by: INTERNAL MEDICINE

## 2022-02-01 PROCEDURE — 85379 FIBRIN DEGRADATION QUANT: CPT | Performed by: INTERNAL MEDICINE

## 2022-02-01 PROCEDURE — 83690 ASSAY OF LIPASE: CPT

## 2022-02-01 PROCEDURE — 83735 ASSAY OF MAGNESIUM: CPT | Performed by: EMERGENCY MEDICINE

## 2022-02-01 PROCEDURE — 83605 ASSAY OF LACTIC ACID: CPT

## 2022-02-01 PROCEDURE — 80053 COMPREHEN METABOLIC PANEL: CPT

## 2022-02-01 PROCEDURE — 25010000002 DEXAMETHASONE PER 1 MG: Performed by: INTERNAL MEDICINE

## 2022-02-01 PROCEDURE — 84484 ASSAY OF TROPONIN QUANT: CPT | Performed by: INTERNAL MEDICINE

## 2022-02-01 RX ORDER — SODIUM CHLORIDE 9 MG/ML
10 INJECTION INTRAVENOUS AS NEEDED
Status: DISCONTINUED | OUTPATIENT
Start: 2022-02-01 | End: 2022-02-07 | Stop reason: HOSPADM

## 2022-02-01 RX ORDER — METOPROLOL SUCCINATE 50 MG/1
50 TABLET, EXTENDED RELEASE ORAL 2 TIMES DAILY
Status: DISCONTINUED | OUTPATIENT
Start: 2022-02-01 | End: 2022-02-07 | Stop reason: HOSPADM

## 2022-02-01 RX ORDER — METHOCARBAMOL 500 MG/1
500 TABLET, FILM COATED ORAL 3 TIMES DAILY PRN
Status: DISCONTINUED | OUTPATIENT
Start: 2022-02-01 | End: 2022-02-07 | Stop reason: HOSPADM

## 2022-02-01 RX ORDER — ONDANSETRON 4 MG/1
4 TABLET, FILM COATED ORAL EVERY 6 HOURS PRN
Status: DISCONTINUED | OUTPATIENT
Start: 2022-02-01 | End: 2022-02-07 | Stop reason: HOSPADM

## 2022-02-01 RX ORDER — POTASSIUM CHLORIDE 750 MG/1
40 CAPSULE, EXTENDED RELEASE ORAL AS NEEDED
Status: DISCONTINUED | OUTPATIENT
Start: 2022-02-01 | End: 2022-02-07 | Stop reason: HOSPADM

## 2022-02-01 RX ORDER — SODIUM CHLORIDE 0.9 % (FLUSH) 0.9 %
1-10 SYRINGE (ML) INJECTION AS NEEDED
Status: DISCONTINUED | OUTPATIENT
Start: 2022-02-01 | End: 2022-02-07 | Stop reason: HOSPADM

## 2022-02-01 RX ORDER — GABAPENTIN 100 MG/1
100 CAPSULE ORAL EVERY 12 HOURS SCHEDULED
Status: DISCONTINUED | OUTPATIENT
Start: 2022-02-01 | End: 2022-02-07 | Stop reason: HOSPADM

## 2022-02-01 RX ORDER — MIRTAZAPINE 15 MG/1
30 TABLET, FILM COATED ORAL NIGHTLY
Status: DISCONTINUED | OUTPATIENT
Start: 2022-02-01 | End: 2022-02-05

## 2022-02-01 RX ORDER — POTASSIUM CHLORIDE 1.5 G/1.77G
40 POWDER, FOR SOLUTION ORAL AS NEEDED
Status: DISCONTINUED | OUTPATIENT
Start: 2022-02-01 | End: 2022-02-07 | Stop reason: HOSPADM

## 2022-02-01 RX ORDER — TACROLIMUS 0.5 MG/1
0.5 CAPSULE ORAL EVERY 12 HOURS SCHEDULED
Status: DISCONTINUED | OUTPATIENT
Start: 2022-02-02 | End: 2022-02-01

## 2022-02-01 RX ORDER — ALLOPURINOL 300 MG/1
300 TABLET ORAL DAILY
Status: DISCONTINUED | OUTPATIENT
Start: 2022-02-02 | End: 2022-02-07 | Stop reason: HOSPADM

## 2022-02-01 RX ORDER — FLUTICASONE PROPIONATE 50 MCG
2 SPRAY, SUSPENSION (ML) NASAL DAILY
Status: DISCONTINUED | OUTPATIENT
Start: 2022-02-02 | End: 2022-02-07 | Stop reason: HOSPADM

## 2022-02-01 RX ORDER — MAGNESIUM SULFATE HEPTAHYDRATE 40 MG/ML
2 INJECTION, SOLUTION INTRAVENOUS AS NEEDED
Status: DISCONTINUED | OUTPATIENT
Start: 2022-02-01 | End: 2022-02-07 | Stop reason: HOSPADM

## 2022-02-01 RX ORDER — AMLODIPINE BESYLATE 10 MG/1
10 TABLET ORAL
Status: DISCONTINUED | OUTPATIENT
Start: 2022-02-02 | End: 2022-02-07 | Stop reason: HOSPADM

## 2022-02-01 RX ORDER — DEXTROMETHORPHAN POLISTIREX 30 MG/5ML
60 SUSPENSION ORAL EVERY 12 HOURS SCHEDULED
Status: DISCONTINUED | OUTPATIENT
Start: 2022-02-01 | End: 2022-02-07 | Stop reason: HOSPADM

## 2022-02-01 RX ORDER — BUPROPION HYDROCHLORIDE 150 MG/1
150 TABLET, EXTENDED RELEASE ORAL DAILY
Status: DISCONTINUED | OUTPATIENT
Start: 2022-02-02 | End: 2022-02-07 | Stop reason: HOSPADM

## 2022-02-01 RX ORDER — DEXAMETHASONE SODIUM PHOSPHATE 4 MG/ML
6 INJECTION, SOLUTION INTRA-ARTICULAR; INTRALESIONAL; INTRAMUSCULAR; INTRAVENOUS; SOFT TISSUE DAILY
Status: DISCONTINUED | OUTPATIENT
Start: 2022-02-01 | End: 2022-02-02

## 2022-02-01 RX ORDER — ACETAMINOPHEN 325 MG/1
650 TABLET ORAL EVERY 4 HOURS PRN
Status: DISCONTINUED | OUTPATIENT
Start: 2022-02-01 | End: 2022-02-07 | Stop reason: HOSPADM

## 2022-02-01 RX ORDER — ATORVASTATIN CALCIUM 40 MG/1
40 TABLET, FILM COATED ORAL DAILY
Status: DISCONTINUED | OUTPATIENT
Start: 2022-02-02 | End: 2022-02-07 | Stop reason: HOSPADM

## 2022-02-01 RX ORDER — ZOLPIDEM TARTRATE 5 MG/1
5 TABLET ORAL NIGHTLY
Status: DISCONTINUED | OUTPATIENT
Start: 2022-02-01 | End: 2022-02-07 | Stop reason: HOSPADM

## 2022-02-01 RX ORDER — SODIUM CHLORIDE 0.9 % (FLUSH) 0.9 %
10 SYRINGE (ML) INJECTION EVERY 12 HOURS SCHEDULED
Status: DISCONTINUED | OUTPATIENT
Start: 2022-02-01 | End: 2022-02-07 | Stop reason: HOSPADM

## 2022-02-01 RX ORDER — TACROLIMUS 1 MG/1
1 CAPSULE ORAL EVERY 12 HOURS SCHEDULED
Status: DISCONTINUED | OUTPATIENT
Start: 2022-02-02 | End: 2022-02-02

## 2022-02-01 RX ORDER — LEVOTHYROXINE SODIUM 0.1 MG/1
200 TABLET ORAL
Status: DISCONTINUED | OUTPATIENT
Start: 2022-02-02 | End: 2022-02-07 | Stop reason: HOSPADM

## 2022-02-01 RX ORDER — TRAZODONE HYDROCHLORIDE 100 MG/1
200 TABLET ORAL NIGHTLY
Status: DISCONTINUED | OUTPATIENT
Start: 2022-02-01 | End: 2022-02-07 | Stop reason: HOSPADM

## 2022-02-01 RX ORDER — PANTOPRAZOLE SODIUM 40 MG/1
40 TABLET, DELAYED RELEASE ORAL DAILY
Status: DISCONTINUED | OUTPATIENT
Start: 2022-02-02 | End: 2022-02-07 | Stop reason: HOSPADM

## 2022-02-01 RX ORDER — SERTRALINE HYDROCHLORIDE 100 MG/1
100 TABLET, FILM COATED ORAL DAILY
Status: DISCONTINUED | OUTPATIENT
Start: 2022-02-02 | End: 2022-02-07 | Stop reason: HOSPADM

## 2022-02-01 RX ORDER — BENZONATATE 100 MG/1
100 CAPSULE ORAL 3 TIMES DAILY PRN
Status: DISCONTINUED | OUTPATIENT
Start: 2022-02-01 | End: 2022-02-07 | Stop reason: HOSPADM

## 2022-02-01 RX ORDER — ACETAMINOPHEN 650 MG/1
650 SUPPOSITORY RECTAL EVERY 4 HOURS PRN
Status: DISCONTINUED | OUTPATIENT
Start: 2022-02-01 | End: 2022-02-07 | Stop reason: HOSPADM

## 2022-02-01 RX ORDER — MAGNESIUM SULFATE HEPTAHYDRATE 40 MG/ML
4 INJECTION, SOLUTION INTRAVENOUS AS NEEDED
Status: DISCONTINUED | OUTPATIENT
Start: 2022-02-01 | End: 2022-02-07 | Stop reason: HOSPADM

## 2022-02-01 RX ORDER — CHOLECALCIFEROL (VITAMIN D3) 125 MCG
5 CAPSULE ORAL NIGHTLY PRN
Status: DISCONTINUED | OUTPATIENT
Start: 2022-02-01 | End: 2022-02-05

## 2022-02-01 RX ORDER — MYCOPHENOLIC ACID 180 MG/1
360 TABLET, DELAYED RELEASE ORAL EVERY 12 HOURS SCHEDULED
Status: DISCONTINUED | OUTPATIENT
Start: 2022-02-02 | End: 2022-02-02

## 2022-02-01 RX ORDER — ACETAMINOPHEN 160 MG/5ML
650 SOLUTION ORAL EVERY 4 HOURS PRN
Status: DISCONTINUED | OUTPATIENT
Start: 2022-02-01 | End: 2022-02-07 | Stop reason: HOSPADM

## 2022-02-01 RX ORDER — ISOSORBIDE DINITRATE 20 MG/1
30 TABLET ORAL 3 TIMES DAILY
Status: DISCONTINUED | OUTPATIENT
Start: 2022-02-01 | End: 2022-02-07 | Stop reason: HOSPADM

## 2022-02-01 RX ORDER — ROPINIROLE 2 MG/1
2 TABLET, FILM COATED ORAL DAILY
Status: DISCONTINUED | OUTPATIENT
Start: 2022-02-02 | End: 2022-02-07 | Stop reason: HOSPADM

## 2022-02-01 RX ORDER — ALBUTEROL SULFATE 90 UG/1
2 AEROSOL, METERED RESPIRATORY (INHALATION)
Status: DISCONTINUED | OUTPATIENT
Start: 2022-02-01 | End: 2022-02-05

## 2022-02-01 RX ORDER — ONDANSETRON 2 MG/ML
4 INJECTION INTRAMUSCULAR; INTRAVENOUS EVERY 6 HOURS PRN
Status: DISCONTINUED | OUTPATIENT
Start: 2022-02-01 | End: 2022-02-07 | Stop reason: HOSPADM

## 2022-02-01 RX ORDER — POTASSIUM CHLORIDE 7.45 MG/ML
10 INJECTION INTRAVENOUS
Status: DISCONTINUED | OUTPATIENT
Start: 2022-02-01 | End: 2022-02-07 | Stop reason: HOSPADM

## 2022-02-01 RX ADMIN — METOPROLOL SUCCINATE 50 MG: 50 TABLET, EXTENDED RELEASE ORAL at 22:47

## 2022-02-01 RX ADMIN — MIRTAZAPINE 30 MG: 15 TABLET, FILM COATED ORAL at 22:47

## 2022-02-01 RX ADMIN — ACETAMINOPHEN 650 MG: 325 TABLET, FILM COATED ORAL at 22:43

## 2022-02-01 RX ADMIN — POTASSIUM CHLORIDE 40 MEQ: 10 CAPSULE, COATED, EXTENDED RELEASE ORAL at 22:48

## 2022-02-01 RX ADMIN — GABAPENTIN 100 MG: 100 CAPSULE ORAL at 22:47

## 2022-02-01 RX ADMIN — TRAZODONE HYDROCHLORIDE 200 MG: 100 TABLET ORAL at 22:48

## 2022-02-01 RX ADMIN — ZOLPIDEM TARTRATE 5 MG: 5 TABLET ORAL at 22:47

## 2022-02-01 RX ADMIN — ISOSORBIDE DINITRATE 30 MG: 20 TABLET ORAL at 22:47

## 2022-02-01 RX ADMIN — DEXAMETHASONE SODIUM PHOSPHATE 6 MG: 4 INJECTION, SOLUTION INTRA-ARTICULAR; INTRALESIONAL; INTRAMUSCULAR; INTRAVENOUS; SOFT TISSUE at 22:46

## 2022-02-01 RX ADMIN — ENOXAPARIN SODIUM 40 MG: 40 INJECTION SUBCUTANEOUS at 22:46

## 2022-02-01 RX ADMIN — NITROGLYCERIN 1 INCH: 20 OINTMENT TOPICAL at 20:48

## 2022-02-01 RX ADMIN — SODIUM CHLORIDE 1000 ML: 9 INJECTION, SOLUTION INTRAVENOUS at 15:45

## 2022-02-01 RX ADMIN — REMDESIVIR 200 MG: 100 INJECTION, POWDER, LYOPHILIZED, FOR SOLUTION INTRAVENOUS at 22:44

## 2022-02-01 RX ADMIN — ONDANSETRON 4 MG: 2 INJECTION INTRAMUSCULAR; INTRAVENOUS at 23:25

## 2022-02-01 NOTE — ED PROVIDER NOTES
Subjective   Ms. Juarez is a 55 yo female here with ongoing COVID illness. She became sick about 10 days ago with fever and chills and respiratory symptoms. A couple days later she developed vomiting and diarrhea and has been ill with those ever since, including today. She is not able to take anything in because it makes her feel sick. The diarrhea is quite watery. Eating and drinking make her feel worse, nothing makes her feel better. She is also having increased problems with dyspnea, noting increasing problems with shortness of breath. She is winded just going to the bathroom and back. Her cough has been persistent. She is immunocompromised s/p kidney transplant and wasn't able to tolerate taking some of her medications today due to her GI distress.  Past and social histories reviewed.      History provided by:  Patient      Review of Systems   Constitutional: Positive for chills and fever.   HENT: Negative.    Respiratory: Positive for cough and shortness of breath.    Cardiovascular: Negative.  Negative for chest pain and leg swelling.   Gastrointestinal: Positive for diarrhea, nausea and vomiting.   Neurological: Positive for weakness.   Psychiatric/Behavioral: Negative.    All other systems reviewed and are negative.      Past Medical History:   Diagnosis Date   • Anxiety    • Cirrhosis of liver (HCC)    • Depression    • Hypercholesteremia    • Hypertension    • Hypothyroid    • Meningioma (HCC)    • Osteoarthritis    • Osteoporosis    • Renal impairment    • Stroke (HCC)    • Thyroid cancer (HCC)    • Vision impairment     left eye       Allergies   Allergen Reactions   • Penicillins Shortness Of Breath   • Cyclosporine Swelling       Past Surgical History:   Procedure Laterality Date   • APPENDECTOMY     •  SECTION      x3   • CHOLECYSTECTOMY     • D & C HYSTEROSCOPY LAPAROSCOPY      x2 for endometriosis   • LIVER SURGERY      removed cysts    • LIVER TRANSPLANTATION     • ORIF ANKLE FRACTURE      • THYROIDECTOMY     • TONSILLECTOMY     • TOTAL ABDOMINAL HYSTERECTOMY WITH SALPINGO OOPHORECTOMY Bilateral        Family History   Problem Relation Age of Onset   • Colon cancer Father    • Osteoporosis Mother    • Rheum arthritis Mother    • Ovarian cancer Paternal Grandmother    • Breast cancer Maternal Grandmother    • Stroke Maternal Grandmother        Social History     Socioeconomic History   • Marital status:    Tobacco Use   • Smoking status: Never Smoker   • Smokeless tobacco: Never Used   Vaping Use   • Vaping Use: Never used   Substance and Sexual Activity   • Alcohol use: No   • Drug use: Not Currently     Comment: Tried an edible last week.    • Sexual activity: Yes     Partners: Male     Birth control/protection: Surgical, Post-menopausal           Objective   Physical Exam  Vitals and nursing note reviewed.   Constitutional:       General: She is not in acute distress.     Appearance: She is obese.      Comments: Weary appearing female, not toxic though.   HENT:      Head: Atraumatic.      Mouth/Throat:      Comments: Airway patent  Eyes:      Conjunctiva/sclera: Conjunctivae normal.   Neck:      Trachea: Phonation normal.   Cardiovascular:      Rate and Rhythm: Normal rate and regular rhythm.      Heart sounds: Normal heart sounds.   Pulmonary:      Effort: No respiratory distress.      Breath sounds: Normal breath sounds.      Comments: A little tachypneic  Abdominal:      Palpations: Abdomen is soft. There is no mass.      Tenderness: There is no abdominal tenderness. There is no guarding.   Musculoskeletal:      Cervical back: Neck supple.      Right lower leg: No edema.      Left lower leg: No edema.   Skin:     General: Skin is warm and dry.   Neurological:      Mental Status: She is alert and oriented to person, place, and time.   Psychiatric:         Behavior: Behavior normal.         Procedures           ED Course  ED Course as of 02/01/22 1827   Tue Feb 01, 2022   1632 While  getting IV fluids, she was on a continuous pulse ox with sats generally in the mid to upper 90s when a good pleth was present. Awaiting CXR but anticipate discharge with symptomatic treatment and potassium replacement. [LI]   1740 She relates getting breathless at home and finding her pulse ox reading 88-92% after simple exertion such as going to the bathroom and back. With her immunosuppression, significant infiltrate on CXR, and this additional information about desaturation, I will discuss hospitalization with our admitting doctor. [LI]      ED Course User Index  [LI] Jose Obrien MD      Recent Results (from the past 24 hour(s))   Comprehensive Metabolic Panel    Collection Time: 02/01/22  2:58 PM    Specimen: Blood   Result Value Ref Range    Glucose 123 (H) 65 - 99 mg/dL    BUN 34 (H) 6 - 20 mg/dL    Creatinine 1.48 (H) 0.57 - 1.00 mg/dL    Sodium 141 136 - 145 mmol/L    Potassium 3.0 (L) 3.5 - 5.2 mmol/L    Chloride 102 98 - 107 mmol/L    CO2 23.0 22.0 - 29.0 mmol/L    Calcium 9.3 8.6 - 10.5 mg/dL    Total Protein 7.2 6.0 - 8.5 g/dL    Albumin 4.30 3.50 - 5.20 g/dL    ALT (SGPT) 106 (H) 1 - 33 U/L    AST (SGOT) 92 (H) 1 - 32 U/L    Alkaline Phosphatase 87 39 - 117 U/L    Total Bilirubin 0.6 0.0 - 1.2 mg/dL    eGFR Non African Amer 36 (L) >60 mL/min/1.73    Globulin 2.9 gm/dL    A/G Ratio 1.5 g/dL    BUN/Creatinine Ratio 23.0 7.0 - 25.0    Anion Gap 16.0 (H) 5.0 - 15.0 mmol/L   Lipase    Collection Time: 02/01/22  2:58 PM    Specimen: Blood   Result Value Ref Range    Lipase 27 13 - 60 U/L   Urinalysis With Microscopic If Indicated (No Culture) - Urine, Clean Catch    Collection Time: 02/01/22  2:58 PM    Specimen: Urine, Clean Catch   Result Value Ref Range    Color, UA Yellow Yellow, Straw    Appearance, UA Clear Clear    pH, UA 5.5 5.0 - 8.0    Specific Gravity, UA 1.018 1.001 - 1.030    Glucose, UA Negative Negative    Ketones, UA Trace (A) Negative    Bilirubin, UA Negative Negative    Blood, UA  Negative Negative    Protein,  mg/dL (2+) (A) Negative    Leuk Esterase, UA Trace (A) Negative    Nitrite, UA Negative Negative    Urobilinogen, UA 0.2 E.U./dL 0.2 - 1.0 E.U./dL   Lactic Acid, Plasma    Collection Time: 02/01/22  2:58 PM    Specimen: Blood   Result Value Ref Range    Lactate 1.0 0.5 - 2.0 mmol/L   Green Top (Gel)    Collection Time: 02/01/22  2:58 PM   Result Value Ref Range    Extra Tube Hold for add-ons.    Lavender Top    Collection Time: 02/01/22  2:58 PM   Result Value Ref Range    Extra Tube hold for add-on    Gold Top - SST    Collection Time: 02/01/22  2:58 PM   Result Value Ref Range    Extra Tube Hold for add-ons.    Light Blue Top    Collection Time: 02/01/22  2:58 PM   Result Value Ref Range    Extra Tube hold for add-on    CBC Auto Differential    Collection Time: 02/01/22  2:58 PM    Specimen: Blood   Result Value Ref Range    WBC 3.04 (L) 3.40 - 10.80 10*3/mm3    RBC 5.24 3.77 - 5.28 10*6/mm3    Hemoglobin 15.6 12.0 - 15.9 g/dL    Hematocrit 47.0 (H) 34.0 - 46.6 %    MCV 89.7 79.0 - 97.0 fL    MCH 29.8 26.6 - 33.0 pg    MCHC 33.2 31.5 - 35.7 g/dL    RDW 13.6 12.3 - 15.4 %    RDW-SD 44.5 37.0 - 54.0 fl    MPV 11.2 6.0 - 12.0 fL    Platelets 169 140 - 450 10*3/mm3   Urinalysis, Microscopic Only - Urine, Clean Catch    Collection Time: 02/01/22  2:58 PM    Specimen: Urine, Clean Catch   Result Value Ref Range    RBC, UA 3-6 (A) None Seen, 0-2 /HPF    WBC, UA 6-12 (A) None Seen, 0-2 /HPF    Bacteria, UA None Seen None Seen, Trace /HPF    Squamous Epithelial Cells, UA 7-12 (A) None Seen, 0-2 /HPF    Hyaline Casts, UA 7-12 0 - 6 /LPF    Methodology Automated Microscopy    Magnesium    Collection Time: 02/01/22  2:58 PM    Specimen: Blood   Result Value Ref Range    Magnesium 1.4 (L) 1.6 - 2.6 mg/dL   Manual Differential    Collection Time: 02/01/22  2:58 PM    Specimen: Blood   Result Value Ref Range    Neutrophil % 66.0 42.7 - 76.0 %    Lymphocyte % 18.0 (L) 19.6 - 45.3 %    Monocyte  "% 5.0 5.0 - 12.0 %    Eosinophil % 0.0 (L) 0.3 - 6.2 %    Basophil % 0.0 0.0 - 1.5 %    Bands %  6.0 (H) 0.0 - 5.0 %    Metamyelocyte % 5.0 (H) 0.0 - 0.0 %    Neutrophils Absolute 2.19 1.70 - 7.00 10*3/mm3    Lymphocytes Absolute 0.55 (L) 0.70 - 3.10 10*3/mm3    Monocytes Absolute 0.15 0.10 - 0.90 10*3/mm3    Eosinophils Absolute 0.00 0.00 - 0.40 10*3/mm3    Basophils Absolute 0.00 0.00 - 0.20 10*3/mm3    Anisocytosis Large/3+ None Seen    WBC Morphology Normal Normal    Platelet Morphology Normal Normal   Procalcitonin    Collection Time: 02/01/22  2:58 PM    Specimen: Blood   Result Value Ref Range    Procalcitonin 0.06 0.00 - 0.25 ng/mL     Note: In addition to lab results from this visit, the labs listed above may include labs taken at another facility or during a different encounter within the last 24 hours. Please correlate lab times with ED admission and discharge times for further clarification of the services performed during this visit.    XR Chest 1 View   Final Result           1.  New patchy bilateral airspace disease consistent with multifocal   pneumonia.           This report was finalized on 2/1/2022 4:52 PM by Nael Horn MD.            Vitals:    02/01/22 1349 02/01/22 1620   BP: 121/89    Pulse: 82    Resp: 24    Temp: 99.6 °F (37.6 °C)    TempSrc: Oral    SpO2: 92% 99%   Weight: 75.8 kg (167 lb)    Height: 154.9 cm (61\")      Medications   Sodium Chloride (PF) 0.9 % 10 mL (has no administration in time range)   sodium chloride 0.9 % bolus 1,000 mL (0 mL Intravenous Stopped 2/1/22 1620)     ECG/EMG Results (last 24 hours)     ** No results found for the last 24 hours. **        No orders to display                                                  MDM    Final diagnoses:   Pneumonia due to COVID-19 virus   Volume depletion   Immunosuppression due to drug therapy (HCC)   Acute renal insufficiency   Hypokalemia       ED Disposition  ED Disposition     ED Disposition Condition Comment    Decision " to Admit  Level of Care: Telemetry [5]   Diagnosis: Pneumonia due to COVID-19 virus [[id number]]   Admitting Physician: NADIA RENTERIA [[id number]]   Attending Physician: NADIA RENTERIA [[id number]]   Isolate for COVID?: Yes [1]   Certification: I Certify That Inpatient Hospital Services Are Medically Necessary For Greater Than 2 Midnights            No follow-up provider specified.       Medication List      No changes were made to your prescriptions during this visit.          Jose Obrien MD  02/01/22 6510

## 2022-02-02 LAB
ALBUMIN SERPL-MCNC: 3.6 G/DL (ref 3.5–5.2)
ALBUMIN/GLOB SERPL: 1.4 G/DL
ALP SERPL-CCNC: 72 U/L (ref 39–117)
ALT SERPL W P-5'-P-CCNC: 89 U/L (ref 1–33)
ANION GAP SERPL CALCULATED.3IONS-SCNC: 14 MMOL/L (ref 5–15)
APTT PPP: 33.8 SECONDS (ref 22–39)
AST SERPL-CCNC: 89 U/L (ref 1–32)
BASOPHILS # BLD AUTO: 0.02 10*3/MM3 (ref 0–0.2)
BASOPHILS NFR BLD AUTO: 1 % (ref 0–1.5)
BILIRUB CONJ SERPL-MCNC: <0.2 MG/DL (ref 0–0.3)
BILIRUB SERPL-MCNC: 0.4 MG/DL (ref 0–1.2)
BUN SERPL-MCNC: 25 MG/DL (ref 6–20)
BUN/CREAT SERPL: 22.3 (ref 7–25)
CALCIUM SPEC-SCNC: 8.3 MG/DL (ref 8.6–10.5)
CHLORIDE SERPL-SCNC: 108 MMOL/L (ref 98–107)
CHOLEST SERPL-MCNC: 95 MG/DL (ref 0–200)
CO2 SERPL-SCNC: 20 MMOL/L (ref 22–29)
CREAT SERPL-MCNC: 1.12 MG/DL (ref 0.57–1)
CRP SERPL-MCNC: 6.45 MG/DL (ref 0–0.5)
DEPRECATED RDW RBC AUTO: 46.7 FL (ref 37–54)
EOSINOPHIL # BLD AUTO: 0 10*3/MM3 (ref 0–0.4)
EOSINOPHIL NFR BLD AUTO: 0 % (ref 0.3–6.2)
ERYTHROCYTE [DISTWIDTH] IN BLOOD BY AUTOMATED COUNT: 13.8 % (ref 12.3–15.4)
GFR SERPL CREATININE-BSD FRML MDRD: 50 ML/MIN/1.73
GLOBULIN UR ELPH-MCNC: 2.5 GM/DL
GLUCOSE SERPL-MCNC: 206 MG/DL (ref 65–99)
HBA1C MFR BLD: 6.1 % (ref 4.8–5.6)
HCT VFR BLD AUTO: 41.3 % (ref 34–46.6)
HDLC SERPL-MCNC: 22 MG/DL (ref 40–60)
HGB BLD-MCNC: 13.5 G/DL (ref 12–15.9)
IMM GRANULOCYTES # BLD AUTO: 0.18 10*3/MM3 (ref 0–0.05)
IMM GRANULOCYTES NFR BLD AUTO: 8.8 % (ref 0–0.5)
INR PPP: 1.06 (ref 0.85–1.16)
LDLC SERPL CALC-MCNC: 40 MG/DL (ref 0–100)
LDLC/HDLC SERPL: 1.46 {RATIO}
LYMPHOCYTES # BLD AUTO: 0.32 10*3/MM3 (ref 0.7–3.1)
LYMPHOCYTES NFR BLD AUTO: 15.7 % (ref 19.6–45.3)
MCH RBC QN AUTO: 30.1 PG (ref 26.6–33)
MCHC RBC AUTO-ENTMCNC: 32.7 G/DL (ref 31.5–35.7)
MCV RBC AUTO: 92 FL (ref 79–97)
MONOCYTES # BLD AUTO: 0.12 10*3/MM3 (ref 0.1–0.9)
MONOCYTES NFR BLD AUTO: 5.9 % (ref 5–12)
NEUTROPHILS NFR BLD AUTO: 1.4 10*3/MM3 (ref 1.7–7)
NEUTROPHILS NFR BLD AUTO: 68.6 % (ref 42.7–76)
NRBC BLD AUTO-RTO: 0 /100 WBC (ref 0–0.2)
PLAT MORPH BLD: NORMAL
PLATELET # BLD AUTO: 128 10*3/MM3 (ref 140–450)
PMV BLD AUTO: 12.1 FL (ref 6–12)
POLYCHROMASIA BLD QL SMEAR: NORMAL
POTASSIUM SERPL-SCNC: 4.4 MMOL/L (ref 3.5–5.2)
PROT SERPL-MCNC: 6.1 G/DL (ref 6–8.5)
PROTHROMBIN TIME: 13.5 SECONDS (ref 11.4–14.4)
RBC # BLD AUTO: 4.49 10*6/MM3 (ref 3.77–5.28)
SODIUM SERPL-SCNC: 142 MMOL/L (ref 136–145)
TRIGL SERPL-MCNC: 204 MG/DL (ref 0–150)
TSH SERPL DL<=0.05 MIU/L-ACNC: 0.25 UIU/ML (ref 0.27–4.2)
UFH PPP CHRO-ACNC: 0.1 IU/ML (ref 0.3–0.7)
VLDLC SERPL-MCNC: 33 MG/DL (ref 5–40)
WBC MORPH BLD: NORMAL
WBC NRBC COR # BLD: 2.04 10*3/MM3 (ref 3.4–10.8)

## 2022-02-02 PROCEDURE — 80050 GENERAL HEALTH PANEL: CPT | Performed by: INTERNAL MEDICINE

## 2022-02-02 PROCEDURE — 63710000001 MYCOPHENOLATE PER 180 MG: Performed by: INTERNAL MEDICINE

## 2022-02-02 PROCEDURE — 86769 SARS-COV-2 COVID-19 ANTIBODY: CPT | Performed by: INTERNAL MEDICINE

## 2022-02-02 PROCEDURE — 83036 HEMOGLOBIN GLYCOSYLATED A1C: CPT | Performed by: INTERNAL MEDICINE

## 2022-02-02 PROCEDURE — 85610 PROTHROMBIN TIME: CPT

## 2022-02-02 PROCEDURE — 25010000002 MAGNESIUM SULFATE 2 GM/50ML SOLUTION: Performed by: INTERNAL MEDICINE

## 2022-02-02 PROCEDURE — 63710000001 DEXAMETHASONE PER 0.25 MG: Performed by: INTERNAL MEDICINE

## 2022-02-02 PROCEDURE — 99233 SBSQ HOSP IP/OBS HIGH 50: CPT | Performed by: INTERNAL MEDICINE

## 2022-02-02 PROCEDURE — 94799 UNLISTED PULMONARY SVC/PX: CPT

## 2022-02-02 PROCEDURE — 94640 AIRWAY INHALATION TREATMENT: CPT

## 2022-02-02 PROCEDURE — 85730 THROMBOPLASTIN TIME PARTIAL: CPT

## 2022-02-02 PROCEDURE — 97535 SELF CARE MNGMENT TRAINING: CPT

## 2022-02-02 PROCEDURE — 85007 BL SMEAR W/DIFF WBC COUNT: CPT | Performed by: INTERNAL MEDICINE

## 2022-02-02 PROCEDURE — 25010000002 REMDESIVIR 100 MG/20ML SOLUTION 1 EACH VIAL: Performed by: INTERNAL MEDICINE

## 2022-02-02 PROCEDURE — 63710000001 TACROLIMUS PER 1 MG: Performed by: INTERNAL MEDICINE

## 2022-02-02 PROCEDURE — 25010000002 ENOXAPARIN PER 10 MG: Performed by: INTERNAL MEDICINE

## 2022-02-02 PROCEDURE — 97166 OT EVAL MOD COMPLEX 45 MIN: CPT

## 2022-02-02 PROCEDURE — 80061 LIPID PANEL: CPT | Performed by: INTERNAL MEDICINE

## 2022-02-02 PROCEDURE — 86140 C-REACTIVE PROTEIN: CPT | Performed by: NURSE PRACTITIONER

## 2022-02-02 PROCEDURE — 82248 BILIRUBIN DIRECT: CPT | Performed by: INTERNAL MEDICINE

## 2022-02-02 PROCEDURE — 85520 HEPARIN ASSAY: CPT

## 2022-02-02 PROCEDURE — 94664 DEMO&/EVAL PT USE INHALER: CPT

## 2022-02-02 PROCEDURE — 94760 N-INVAS EAR/PLS OXIMETRY 1: CPT

## 2022-02-02 RX ADMIN — METOPROLOL SUCCINATE 50 MG: 50 TABLET, EXTENDED RELEASE ORAL at 20:39

## 2022-02-02 RX ADMIN — POTASSIUM CHLORIDE 40 MEQ: 10 CAPSULE, COATED, EXTENDED RELEASE ORAL at 03:35

## 2022-02-02 RX ADMIN — PANTOPRAZOLE SODIUM 40 MG: 40 TABLET, DELAYED RELEASE ORAL at 08:24

## 2022-02-02 RX ADMIN — ISOSORBIDE DINITRATE 30 MG: 20 TABLET ORAL at 20:38

## 2022-02-02 RX ADMIN — GABAPENTIN 100 MG: 100 CAPSULE ORAL at 08:26

## 2022-02-02 RX ADMIN — ISOSORBIDE DINITRATE 30 MG: 20 TABLET ORAL at 08:24

## 2022-02-02 RX ADMIN — FLUTICASONE PROPIONATE 2 SPRAY: 50 SPRAY, METERED NASAL at 08:27

## 2022-02-02 RX ADMIN — Medication 60 MG: at 00:07

## 2022-02-02 RX ADMIN — BUPROPION HYDROCHLORIDE 150 MG: 150 TABLET, EXTENDED RELEASE ORAL at 08:27

## 2022-02-02 RX ADMIN — POTASSIUM CHLORIDE 40 MEQ: 10 CAPSULE, COATED, EXTENDED RELEASE ORAL at 08:43

## 2022-02-02 RX ADMIN — TRAZODONE HYDROCHLORIDE 200 MG: 100 TABLET ORAL at 20:40

## 2022-02-02 RX ADMIN — REMDESIVIR 100 MG: 100 INJECTION, POWDER, LYOPHILIZED, FOR SOLUTION INTRAVENOUS at 17:04

## 2022-02-02 RX ADMIN — ALLOPURINOL 300 MG: 300 TABLET ORAL at 08:24

## 2022-02-02 RX ADMIN — ACETAMINOPHEN 650 MG: 325 TABLET, FILM COATED ORAL at 13:58

## 2022-02-02 RX ADMIN — ZOLPIDEM TARTRATE 5 MG: 5 TABLET ORAL at 20:38

## 2022-02-02 RX ADMIN — SODIUM CHLORIDE, PRESERVATIVE FREE 10 ML: 5 INJECTION INTRAVENOUS at 20:42

## 2022-02-02 RX ADMIN — METHOCARBAMOL 500 MG: 500 TABLET, FILM COATED ORAL at 03:35

## 2022-02-02 RX ADMIN — METOPROLOL SUCCINATE 50 MG: 50 TABLET, EXTENDED RELEASE ORAL at 08:23

## 2022-02-02 RX ADMIN — ENOXAPARIN SODIUM 40 MG: 40 INJECTION SUBCUTANEOUS at 20:37

## 2022-02-02 RX ADMIN — MIRTAZAPINE 30 MG: 15 TABLET, FILM COATED ORAL at 20:38

## 2022-02-02 RX ADMIN — ISOSORBIDE DINITRATE 30 MG: 20 TABLET ORAL at 17:06

## 2022-02-02 RX ADMIN — AMLODIPINE BESYLATE 10 MG: 10 TABLET ORAL at 08:24

## 2022-02-02 RX ADMIN — GABAPENTIN 100 MG: 100 CAPSULE ORAL at 20:40

## 2022-02-02 RX ADMIN — Medication 60 MG: at 20:40

## 2022-02-02 RX ADMIN — MYCOPHENOLIC ACID 360 MG: 180 TABLET, DELAYED RELEASE ORAL at 00:08

## 2022-02-02 RX ADMIN — SERTRALINE HYDROCHLORIDE 100 MG: 100 TABLET ORAL at 08:23

## 2022-02-02 RX ADMIN — SODIUM CHLORIDE, PRESERVATIVE FREE 10 ML: 5 INJECTION INTRAVENOUS at 08:27

## 2022-02-02 RX ADMIN — DEXAMETHASONE 6 MG: 2 TABLET ORAL at 08:43

## 2022-02-02 RX ADMIN — LEVOTHYROXINE SODIUM 200 MCG: 100 TABLET ORAL at 06:11

## 2022-02-02 RX ADMIN — ATORVASTATIN CALCIUM 40 MG: 40 TABLET, FILM COATED ORAL at 08:26

## 2022-02-02 RX ADMIN — ALBUTEROL SULFATE 2 PUFF: 90 AEROSOL, METERED RESPIRATORY (INHALATION) at 12:32

## 2022-02-02 RX ADMIN — MAGNESIUM SULFATE HEPTAHYDRATE 2 G: 2 INJECTION, SOLUTION INTRAVENOUS at 08:42

## 2022-02-02 RX ADMIN — ALBUTEROL SULFATE 2 PUFF: 90 AEROSOL, METERED RESPIRATORY (INHALATION) at 15:30

## 2022-02-02 RX ADMIN — Medication 60 MG: at 08:26

## 2022-02-02 RX ADMIN — TACROLIMUS 1 MG: 0.5 CAPSULE ORAL at 00:07

## 2022-02-02 RX ADMIN — MAGNESIUM SULFATE HEPTAHYDRATE 2 G: 2 INJECTION, SOLUTION INTRAVENOUS at 03:36

## 2022-02-02 RX ADMIN — MAGNESIUM SULFATE HEPTAHYDRATE 2 G: 2 INJECTION, SOLUTION INTRAVENOUS at 01:37

## 2022-02-02 NOTE — PLAN OF CARE
Goal Outcome Evaluation:  Plan of Care Reviewed With: patient           Outcome Summary: OT silvaal complete. Pt presents w/ SOA, decreased activity tolerance, generalized weakness, and mild balance deficits limiting her ADL independence. Pt CGA for bed mobility, STS, and bed-to-chair w/ no AD, dep for LBD, min A for UBD, SUA for grooming tasks in supported sititng. Pt req PLB instruction throughout session d/t noted O2 desat w/ minimal activity. Pt would benefit from continued skilled IPOT services to address current functional deficits. Recommend home w/ A & OP vs HHOT/PT at discharge.

## 2022-02-02 NOTE — THERAPY EVALUATION
Patient Name: Lizette Frias  : 1965    MRN: [id number]                              Today's Date: 2022       Admit Date: 2022    Visit Dx:     ICD-10-CM ICD-9-CM   1. Pneumonia due to COVID-19 virus  U07.1 480.8    J12.82 079.89   2. Volume depletion  E86.9 276.50   3. Immunosuppression due to drug therapy (HCC)  D84.821 V58.69    Z79.899    4. Acute renal insufficiency  N28.9 593.9   5. Hypokalemia  E87.6 276.8     Patient Active Problem List   Diagnosis   • Menopause   • Personal history of malignant neoplasm of thyroid   • Stroke (HCC)   • Renal impairment   • Vision impairment   • Osteoporosis   • Osteoarthritis   • Meningioma (HCC)   • Hypothyroid   • Hypercholesteremia   • Depression   • Cirrhosis of liver (HCC)   • Anxiety   • Pneumonia due to COVID-19 virus   • Elevated serum creatinine   • Liver transplant recipient (HCC)   • Chest pain, atypical   • Hypokalemia   • Hypomagnesemia     Past Medical History:   Diagnosis Date   • Anxiety    • Cirrhosis of liver (HCC)    • Depression    • Hypercholesteremia    • Hypertension    • Hypothyroid    • Meningioma (HCC)    • Osteoarthritis    • Osteoporosis    • Renal impairment    • Stroke (HCC)    • Thyroid cancer (HCC)    • Vision impairment     left eye     Past Surgical History:   Procedure Laterality Date   • APPENDECTOMY     •  SECTION      x3   • CHOLECYSTECTOMY     • D & C HYSTEROSCOPY LAPAROSCOPY      x2 for endometriosis   • LIVER SURGERY      removed cysts    • LIVER TRANSPLANTATION     • ORIF ANKLE FRACTURE     • THYROIDECTOMY     • TONSILLECTOMY     • TOTAL ABDOMINAL HYSTERECTOMY WITH SALPINGO OOPHORECTOMY Bilateral       General Information     Row Name 22 1006          OT Time and Intention    Document Type evaluation  -MA     Mode of Treatment individual therapy; occupational therapy  -MA     Row Name 22 1006          General Information    Patient Profile Reviewed yes  -MA     Prior Level of Function  independent:; bed mobility; ADL's; transfer; all household mobility  Pt denies DME use at home, pt reports she has a CPAP and has been using 2LNC w/ activity during day for ~ past 3 months. Pt reports home O2 is not portable. Pt reports difficulty with stairs at baseline d/t visual deficits  -MA     Existing Precautions/Restrictions fall; oxygen therapy device and L/min; other (see comments)  hx of stroke w/ residual L visual deficits (minimal peripheral vision only in L eye), SOA w/ minimal activity.  -MA     Barriers to Rehab medically complex; previous functional deficit  -MA     Row Name 02/02/22 1006          Living Environment    Lives With spouse  -MA     Row Name 02/02/22 1006          Home Main Entrance    Number of Stairs, Main Entrance one  -MA     Row Name 02/02/22 1006          Stairs Within Home, Primary    Number of Stairs, Within Home, Primary none  -MA     Row Name 02/02/22 1006          Cognition    Orientation Status (Cognition) oriented x 4  -MA     Row Name 02/02/22 1006          Safety Issues, Functional Mobility    Safety Issues Affecting Function (Mobility) safety precaution awareness; safety precautions follow-through/compliance; sequencing abilities  -MA     Impairments Affecting Function (Mobility) balance; endurance/activity tolerance; strength; shortness of breath; visual/perceptual  -MA           User Key  (r) = Recorded By, (t) = Taken By, (c) = Cosigned By    Initials Name Provider Type    Sommer Cheung OT Occupational Therapist                 Mobility/ADL's     Row Name 02/02/22 1009          Bed Mobility    Bed Mobility supine-sit  -MA     Supine-Sit [place] (Bed Mobility) contact guard; verbal cues  -MA     Assistive Device (Bed Mobility) bed rails; head of bed elevated  -MA     Comment (Bed Mobility) extended time and effort required  -MA     Row Name 02/02/22 1009          Transfers    Transfers sit-stand transfer; bed-chair transfer  -MA     Comment (Transfers) Pt  CGA for STS & 3 steps from bed-to-chair, noted O2 desat to 84% on 2LNC, O2 sats quickly returned >89% w/ sitting rest break & PLB instruction.  -MA     Bed-Chair [place] (Transfers) contact guard; verbal cues  -MA     Assistive Device (Bed-Chair Transfers) --  No AD  -MA     Sit-Stand [place] (Transfers) contact guard; verbal cues  -MA     Row Name 02/02/22 1009          Sit-Stand Transfer    Assistive Device (Sit-Stand Transfers) --  No AD  -MA     Row Name 02/02/22 1009          Functional Mobility    Functional Mobility- Comment Deferred to PT  -MA     [facility] Name 02/02/22 1009          Activities of Daily Living    BADL Assessment/Intervention lower body dressing; upper body dressing; grooming  -MA     [facility] Name 02/02/22 1009          Lower Body Dressing Assessment/Training    [place] Level (Lower Body Dressing) don; socks; dependent (less than 25% patient effort); verbal cues  -MA     Position (Lower Body Dressing) edge of bed sitting  -MA     Comment (Lower Body Dressing) Pt dep for donning socks, unable to attempt d/t SOA w/ minimal activity. Pt may benefit from AE training for LB ADLs.  -MA     Row Name 02/02/22 1009          Upper Body Dressing Assessment/Training    [place] Level (Upper Body Dressing) don; pajama/robe; minimum assist (75% patient effort); verbal cues  -MA     Position (Upper Body Dressing) edge of bed sitting  -MA     Row Name 02/02/22 1009          Grooming Assessment/Training    [place] Level (Grooming) oral care regimen; hair care, combing/brushing; set up  -MA     Position (Grooming) supported sitting  -MA           User Key  (r) = Recorded By, (t) = Taken By, (c) = Cosigned By    Initials Name Provider Type    Sommer Cheung OT Occupational Therapist               Obj/Interventions     Row Name 02/02/22 1011          Sensory Assessment (Somatosensory)    Sensory Assessment (Somatosensory) UE sensation intact  -[facility] Name 02/02/22 1011          Vision  Assessment/Intervention    Visual Impairment/Limitations visual/perceptual impairments present  -MA     Vision Assessment Comment Hx of CVA, minimal peripheral vision only in L eye.  -MA     Row Name 02/02/22 1011          Strength Comprehensive (MMT)    General Manual Muscle Testing (MMT) Assessment upper extremity strength deficits identified  -MA     Comment, General Manual Muscle Testing (MMT) Assessment BUE grossly 4/5  -MA     Row Name 02/02/22 1011          Balance    Balance Assessment sitting static balance; sitting dynamic balance; standing static balance; standing dynamic balance  -MA     Static Sitting Balance WFL; unsupported; sitting, edge of bed; sitting in chair  -MA     Dynamic Sitting Balance WFL; unsupported; sitting in chair; sitting, edge of bed  -MA     Static Standing Balance mild impairment; supported; standing  -MA     Dynamic Standing Balance mild impairment; supported; standing  -MA     Balance Interventions sit to stand; occupation based/functional task  -MA           User Key  (r) = Recorded By, (t) = Taken By, (c) = Cosigned By    Initials Name Provider Type    MA Sommer Hester OT Occupational Therapist               Goals/Plan     [facility] Name 02/02/22 1015          Transfer Goal 1 (OT)    Activity/Assistive Device (Transfer Goal 1, OT) bed-to-chair/chair-to-bed; toilet; commode  -MA     [place] Level/Cues Needed (Transfer Goal 1, OT) supervision required  -MA     Time Frame (Transfer Goal 1, OT) long term goal (LTG); 10 days  -MA     Progress/Outcome (Transfer Goal 1, OT) goal ongoing  -MA     Row Name 02/02/22 1015          Dressing Goal 1 (OT)    Activity/Device (Dressing Goal 1, OT) lower body dressing  don/doff socks w/ AAD  -MA     [place]/Cues Needed (Dressing Goal 1, OT) minimum assist (75% or more patient effort); verbal cues required  -MA     Time Frame (Dressing Goal 1, OT) long term goal (LTG); 10 days  -MA     Progress/Outcome (Dressing Goal 1, OT) goal ongoing   -MA     Row Name 02/02/22 1015          Grooming Goal 1 (OT)    Activity/Device (Grooming Goal 1, OT) hair care; oral care; wash face, hands  standing sinkside maintaining O2 sats >89%  -MA     [place] (Grooming Goal 1, OT) set-up required  -MA     Time Frame (Grooming Goal 1, OT) long term goal (LTG); 10 days  -MA     Progress/Outcome (Grooming Goal 1, OT) goal ongoing  -MA           User Key  (r) = Recorded By, (t) = Taken By, (c) = Cosigned By    Initials Name Provider Type    Sommer Cheung, REYES Occupational Therapist               Clinical Impression     Row Name 02/02/22 1012          Pain Assessment    Additional Documentation Pain Scale: Numbers Pre/Post-Treatment (Group)  -[facility] Name 02/02/22 1012          Pain Scale: Numbers Pre/Post-Treatment    Pretreatment Pain Rating 0/10 - no pain  -MA     Posttreatment Pain Rating 0/10 - no pain  -[facility] Name 02/02/22 1012          Plan of Care Review    Plan of Care Reviewed With patient  -MA     Outcome Summary OT eval complete. Pt presents w/ SOA, decreased activity tolerance, generalized weakness, and mild balance deficits limiting her ADL independence. Pt CGA for bed mobility, STS, and bed-to-chair w/ no AD, dep for LBD, min A for UBD, SUA for grooming tasks in supported sititng. Pt req PLB instruction throughout session d/t noted O2 desat w/ minimal activity. Pt would benefit from continued skilled IPOT services to address current functional deficits. Recommend home w/ A & OP vs HHOT/PT at discharge.  -MA     Row Name 02/02/22 1012          Therapy Assessment/Plan (OT)    Rehab Potential (OT) good, to achieve stated therapy goals  -MA     Criteria for Skilled Therapeutic Interventions Met (OT) yes; skilled treatment is necessary  -MA     Therapy Frequency (OT) daily  -MA     Row Name 02/02/22 1012          Therapy Plan Review/Discharge Plan (OT)    Anticipated Discharge Disposition (OT) home with assist; home with home health; home with  outpatient therapy services  -MA     Row Name 02/02/22 1012          Vital Signs    Pre Systolic BP Rehab 146  -MA     Pre Treatment Diastolic BP 96  -MA     Pre SpO2 (%) 95  -MA     O2 Delivery Pre Treatment nasal cannula  2LNC  -MA     Intra SpO2 (%) 84  -MA     O2 Delivery Intra Treatment nasal cannula  2LNC  -MA     Post SpO2 (%) 94  -MA     O2 Delivery Post Treatment nasal cannula  2LNC  -MA     Pre Patient Position Supine  -MA     Intra Patient Position Standing  -MA     Post Patient Position Sitting  -MA     Row Name 02/02/22 1012          Positioning and Restraints    Pre-Treatment Position in bed  -MA     Post Treatment Position chair  -MA     In Chair notified nsg; reclined; call light within reach; encouraged to call for assist; exit alarm on; waffle cushion; legs elevated  -MA           User Key  (r) = Recorded By, (t) = Taken By, (c) = Cosigned By    Initials Name Provider Type    Sommer Cheung OT Occupational Therapist               Outcome Measures     Row Name 02/02/22 1016          How much help from another is currently needed...    Putting on and taking off regular lower body clothing? 2  -MA     Bathing (including washing, rinsing, and drying) 2  -MA     Toileting (which includes using toilet bed pan or urinal) 3  -MA     Putting on and taking off regular upper body clothing 3  -MA     Taking care of personal grooming (such as brushing teeth) 3  -MA     Eating meals 4  -MA     AM-PAC 6 Clicks Score (OT) 17  -MA     Row Name 02/02/22 1016          Functional Assessment    Outcome Measure Options AM-PAC 6 Clicks Daily Activity (OT)  -MA           User Key  (r) = Recorded By, (t) = Taken By, (c) = Cosigned By    Initials Name Provider Type    Sommer Cheung OT Occupational Therapist                Occupational Therapy Education                 Title: PT OT SLP Therapies (In Progress)     Topic: Occupational Therapy (In Progress)     Point: ADL training (Done)     Description:   Instruct  learner(s) on proper safety adaptation and remediation techniques during self care or transfers.   Instruct in proper use of assistive devices.              Learning Progress Summary           Patient Acceptance, E, VU by MA at 2/2/2022 1017                   Point: Home exercise program (Not Started)     Description:   Instruct learner(s) on appropriate technique for monitoring, assisting and/or progressing therapeutic exercises/activities.              Learner Progress:  Not documented in this visit.          Point: Precautions (Done)     Description:   Instruct learner(s) on prescribed precautions during self-care and functional transfers.              Learning Progress Summary           Patient Acceptance, E, VU by MA at 2/2/2022 1017                   Point: Body mechanics (Done)     Description:   Instruct learner(s) on proper positioning and spine alignment during self-care, functional mobility activities and/or exercises.              Learning Progress Summary           Patient Acceptance, E, VU by MA at 2/2/2022 1017                               User Key     Initials Effective Dates Name Provider Type Discipline    MA 11/19/20 -  Sommer Hester OT Occupational Therapist OT              OT Recommendation and Plan  Therapy Frequency (OT): daily  Plan of Care Review  Plan of Care Reviewed With: patient  Outcome Summary: OT eval complete. Pt presents w/ SOA, decreased activity tolerance, generalized weakness, and mild balance deficits limiting her ADL independence. Pt CGA for bed mobility, STS, and bed-to-chair w/ no AD, dep for LBD, min A for UBD, SUA for grooming tasks in supported sititng. Pt req PLB instruction throughout session d/t noted O2 desat w/ minimal activity. Pt would benefit from continued skilled IPOT services to address current functional deficits. Recommend home w/ A & OP vs HHOT/PT at discharge.     Time Calculation:    Time Calculation- OT     Row Name 02/02/22 1018             Time  Calculation- OT    OT Start Time 0930  -MA      OT Received On 02/02/22  -MA      OT Goal Re-Cert Due Date 02/12/22  -MA              Timed Charges    22057 - OT Therapeutic Activity Minutes 4  -MA      00311 - OT Self Care/Mgmt Minutes 6  -MA              Untimed Charges    OT Eval/Re-eval Minutes 35  -MA              Total Minutes    Timed Charges Total Minutes 10  -MA      Untimed Charges Total Minutes 35  -MA       Total Minutes 45  -MA            User Key  (r) = Recorded By, (t) = Taken By, (c) = Cosigned By    Initials Name Provider Type    Sommer Cheung OT Occupational Therapist              Therapy Charges for Today     Code Description Service Date Service Provider Modifiers Qty    90398243984 HC OT SELF CARE/MGMT/TRAIN EA 15 MIN 2/2/2022 Sommer Hester OT GO 1    98066168135 HC OT EVAL MOD COMPLEXITY 3 2/2/2022 Sommer Hester OT GO 1               Sommer Hester OT  2/2/2022

## 2022-02-02 NOTE — CASE MANAGEMENT/SOCIAL WORK
Discharge Planning Assessment  [facility]     Patient Name: Lizetet Frias  MRN: [id number]  Today's Date: 2/2/2022    Admit Date: 2/1/2022     Discharge Needs Assessment     Row Name 02/02/22 0957       Living Environment    Lives With spouse    Name(s) of Who Lives With Patient [place]- spouse    Current Living Arrangements home/apartment/condo    Primary Care Provided by self    Provides Primary Care For no one    Family Caregiver if Needed spouse    Family Caregiver Names [place]- spouse    Quality of Family Relationships helpful; supportive    Able to Return to Prior Arrangements yes       Resource/Environmental Concerns    Resource/Environmental Concerns none    Transportation Concerns car, none       Transition Planning    Patient/Family Anticipates Transition to home with family    Patient/Family Anticipated Services at Transition     Transportation Anticipated family or friend will provide       Discharge Needs Assessment    Equipment Currently Used at Home cpap; oxygen; pulse ox    Concerns to be Addressed discharge planning    Anticipated Changes Related to Illness none    Equipment Needed After Discharge none    Provided Post Acute Provider List? N/A    N/A Provider List Comment Current with [facility] for DME               Discharge Plan     Row Name 02/02/22 0959       Plan    Plan Home    Patient/Family in Agreement with Plan yes    Plan Comments Spoke with pt. by phone due to Covid positive. Lives with her spouse in [facility] Independent with ADL's at baseline. Uses a CPAP with O2 supplied through [employer]. Has a pulse oximeter. Her plan is to dc to home. Family to provide transport.    Final Discharge Disposition Code 01 - home or self-care              Continued Care and Services - Admitted Since 2/1/2022    Coordination has not been started for this encounter.          Demographic Summary    No documentation.                Functional Status     Row Name 02/02/22 0957       Functional  Status    Usual Activity Tolerance good       Functional Status, IADL    Medications independent    Meal Preparation independent    Housekeeping independent    Laundry independent    Shopping independent       Mental Status Summary    Recent Changes in Mental Status/Cognitive Functioning no changes               Psychosocial    No documentation.                Abuse/Neglect    No documentation.                Legal    No documentation.                Substance Abuse    No documentation.                Patient Forms    No documentation.                   Quiana Thomas RN

## 2022-02-02 NOTE — PROGRESS NOTES
[facility] Medicine Services  PROGRESS NOTE    Patient Name: Lizette Frias  : 1965  MRN: [id number]    Date of Admission: 2022  Primary Care Physician: Yisel Cottrell APRN    Subjective   Subjective     CC:  Covid    HPI:  More short of breath with activity but feeling better overall    ROS:  Gen- No fevers, chills  CV- No chest pain, palpitations  Resp- No cough, mild dyspnea  GI- No N/V/D, abd pain        Objective   Objective     Vital Signs:   Temp:  [98.6 °F (37 °C)-101.1 °F (38.4 °C)] 98.6 °F (37 °C)  Heart Rate:  [82-97] 85  Resp:  [18-24] 18  BP: (101-141)/(51-89) 141/80  Flow (L/min):  [2] 2     Physical Exam:  Constitutional: No acute distress, awake, alert  HENT: NCAT, mucous membranes moist  Respiratory:mild conversational dypsnea, no accessory muscle use  Cardiovascular: RRR, no murmurs, rubs, or gallops  Gastrointestinal: obese, soft, nontender, nondistended  Musculoskeletal: trace edema  Psychiatric: Appropriate affect, cooperative  Neurologic: Oriented x 3, speech clear  Skin: No rashes      Results Reviewed:  LAB RESULTS:      Lab 22  0520 22  1458   WBC 2.04* 3.04*   HEMOGLOBIN 13.5 15.6   HEMATOCRIT 41.3 47.0*   PLATELETS 128* 169   NEUTROS ABS 1.40* 2.19   IMMATURE GRANS (ABS) 0.18*  --    LYMPHS ABS 0.32*  --    MONOS ABS 0.12  --    EOS ABS 0.00 0.00   MCV 92.0 89.7   PROCALCITONIN  --  0.06   LACTATE  --  1.0   D DIMER QUANT  --  0.32         Lab 22  0520 22  1458   SODIUM 142 141   POTASSIUM 4.4 3.0*   CHLORIDE 108* 102   CO2 20.0* 23.0   ANION GAP 14.0 16.0*   BUN 25* 34*   CREATININE 1.12* 1.48*   GLUCOSE 206* 123*   CALCIUM 8.3* 9.3   MAGNESIUM  --  1.4*   TSH 0.247*  --          Lab 22  0520 22  1458   TOTAL PROTEIN 6.1 7.2   ALBUMIN 3.60 4.30   GLOBULIN 2.5 2.9   ALT (SGPT) 89* 106*   AST (SGOT) 89* 92*   BILIRUBIN 0.4 0.6   BILIRUBIN DIRECT <0.2  --    ALK PHOS 72 87   LIPASE  --  27         Lab  02/01/22 2059   TROPONIN T <0.010         Lab 02/02/22  0520   CHOLESTEROL 95   LDL CHOL 40   HDL CHOL 22*   TRIGLYCERIDES 204*             Brief Urine Lab Results  (Last result in the past 365 days)      Color   Clarity   Blood   Leuk Est   Nitrite   Protein   CREAT   Urine HCG        02/01/22 1458 Yellow   Clear   Negative   Trace   Negative   100 mg/dL (2+)                 Microbiology Results Abnormal     None          XR Chest 1 View    Result Date: 2/1/2022  XR CHEST 1 VW-  Date of Exam: 2/1/2022 3:51 PM  Indication: cough, COVID.  Comparison:?1/21/2022  Technique:?A single view of the chest was obtained.  FINDINGS:  ?Heart size upper vessels are within normal limits.  There is been interval development of patchy bilateral airspace disease compatible with multifocal pneumonia.  No pleural effusion or pneumothorax.  Bony structures are unremarkable.  Multiple surgical clips project over the upper abdomen.        Impression:   1.  New patchy bilateral airspace disease consistent with multifocal pneumonia.   This report was finalized on 2/1/2022 4:52 PM by Nael Horn MD.            I have reviewed the medications:  Scheduled Meds:albuterol sulfate HFA, 2 puff, Inhalation, 4x Daily - RT  allopurinol, 300 mg, Oral, Daily  amLODIPine, 10 mg, Oral, Q24H  atorvastatin, 40 mg, Oral, Daily  buPROPion SR, 150 mg, Oral, Daily  dexamethasone, 6 mg, Oral, Daily With Breakfast  dextromethorphan polistirex ER, 60 mg, Oral, Q12H  enoxaparin, 40 mg, Subcutaneous, Q24H  fluticasone, 2 spray, Each Nare, Daily  gabapentin, 100 mg, Oral, Q12H  isosorbide dinitrate, 30 mg, Oral, TID  levothyroxine, 200 mcg, Oral, Q AM  metoprolol succinate XL, 50 mg, Oral, BID  mirtazapine, 30 mg, Oral, Nightly  nitroglycerin, 1 inch, Topical, Q6H  pantoprazole, 40 mg, Oral, Daily  remdesivir, 100 mg, Intravenous, Daily With Lunch  rOPINIRole, 2 mg, Oral, Daily  sertraline, 100 mg, Oral, Daily  sodium chloride, 10 mL, Intravenous,  Q12H  traZODone, 200 mg, Oral, Nightly  zolpidem, 5 mg, Oral, Nightly      Continuous Infusions:Pharmacy Consult - Remdesivir,       PRN Meds:.•  acetaminophen **OR** acetaminophen **OR** acetaminophen  •  benzonatate  •  magnesium sulfate **OR** magnesium sulfate **OR** magnesium sulfate  •  melatonin  •  methocarbamol  •  ondansetron **OR** ondansetron  •  Pharmacy Consult - Remdesivir  •  potassium chloride **OR** potassium chloride **OR** potassium chloride  •  Sodium Chloride (PF)  •  sodium chloride    Assessment/Plan   Assessment & Plan     Active Hospital Problems    Diagnosis  POA   • Pneumonia due to COVID-19 virus [U07.1, J12.82]  Yes   • Elevated serum creatinine [R79.89]  Unknown   • Liver transplant recipient (HCC) [Z94.4]  Not Applicable   • Chest pain, atypical [R07.89]  Unknown   • Hypokalemia [E87.6]  Unknown   • Hypomagnesemia [E83.42]  Unknown   • Hypothyroid [E03.9]  Yes   • Cirrhosis of liver (HCC) [K74.60]  Yes   • Personal history of malignant neoplasm of thyroid [Z85.850]  Not Applicable   • Vision impairment [H54.7]  Yes   • Stroke (HCC) [I63.9]  Yes      Resolved Hospital Problems   No resolved problems to display.        Brief Hospital Course to date:  Lizette Frias is a 56 y.o. 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  PT/OT consulted  --Continue Decadron and Remdesivir, day 2  --ID following  --Fully vaccinated and boosted  -- initially symptomatic on 1/22/22, out of isolation on 2/11/22  -- d-dimer negative, defer CTA chest  -- procal wnl, defer ABX     H/o  MORRISON cirrhosis s/p liver transplant  Elevated LFTs  Chronic Immunosuppression   -- hold immunosuppressants for now-on tacrolimus and CellCept  --Hold home prednisone dose (used for immunosuppression) secondary to Decadron for treatment of Covid infection     Leukopenia  -- likely related to above  -- monitor     ZAYRA  --Improved, recheck in a.m.    Chest pain  -- apparently this is chronic, had recent MPS in January which was unremarkable  -- pt reports that Dr. Ventura is to set up outpatient heart cath to investigate for Pulm HTN, likely right heart cath  --Troponin negative  --dc nitro paste    Hypokalemia  Hypomagenesemia  --replacement per protocol     HTN  HLD  --continue home meds      Hypothyroidism  --continue Synthroid     H/o Gout  -- continue Allopurinol   --Colchicine on hold     Mood Disorder  -- pt on multiple psychiatric meds, continue      H/o CVA  -- with residual left eye blindness     STEVE  --CPAP at night    DVT prophylaxis:  Medical DVT prophylaxis orders are present.            Disposition: I expect the patient to be discharged TBD    CODE STATUS:   Code Status and Medical Interventions:   Ordered at: 02/01/22 6365     Level Of Support Discussed With:    Patient     Code Status (Patient has no pulse and is not breathing):    CPR (Attempt to Resuscitate)     Medical Interventions (Patient has pulse or is breathing):    Full Support       Amy Wilson, DO  02/02/22

## 2022-02-02 NOTE — ED NOTES
Report given to Adriana SOLIS on 6A. Patient ready for transport.      Peyman Michele, IRMA  02/01/22 1922

## 2022-02-02 NOTE — PROGRESS NOTES
Malnutrition Severity Assessment    Patient Name:  Lizette Frias  YOB: 1965  MRN: [id number]  Admit Date:  2/1/2022    Patient meets criteria for : Severe Malnutrition (Patient currently meets criteria for Severe, Acute Malnutrition based on reported intake hx and documented/reported weight hx.)    Malnutrition Severity Assessment  Malnutrition Type: Acute Disease or Injury - Related Malnutrition  Malnutrition Type (last 8 hours)     Malnutrition Severity Assessment     Row Name 02/02/22 1213       Malnutrition Severity Assessment    Malnutrition Type Acute Disease or Injury - Related Malnutrition    Row Name 02/02/22 1213       Insufficient Energy Intake     Insufficient Energy Intake Findings Severe  <50% of usual PO intake x 9 days    Row Name 02/02/22 1213       Unintentional Weight Loss     Unintentional Weight Loss Findings Severe  Unintentional weight loss of 13 lbs (7.2) x 5 days    Row Name 02/02/22 1213       Criteria Met (Must meet criteria for severity in at least 2 of these categories: M Wasting, Fat Loss, Fluid, Secondary Signs, Wt. Status, Intake)    Patient meets criteria for  Severe Malnutrition  Patient currently meets criteria for Severe, Acute Malnutrition based on reported intake hx and documented/reported weight hx.                Electronically signed by:  Lupe Miranda RD  02/02/22 12:13 EST

## 2022-02-02 NOTE — PROGRESS NOTES
"Clinical Nutrition   Reason For Visit: Identified at risk by screening criteria, MST score 2+, Unintentional weight loss    Patient Name: Lizette Frias  YOB: 1965  MRN: [id number]  Date of Encounter: 22 12:04 EST  Admission date: 2022      Patient currently meets criteria for Severe, Acute Malnutrition based on <50% of usual PO intake x 9 days (since ) and unintentional weight loss of 13 lbs (7.2%) x 5 days. MSA note available in EMR. MD may include dx in hospital diagnoses list as deemed appropriate.    Nutrition Assessment     Admission Problem List:  Pneumonia due to COVID-19 virus  Elevated serum creatinine  Hypokalemia  Hypomagnesemia  Elevated LFTs      Applicable PMH:  Prediabetes  CVA with residual left eye blindness  MORRISON cirrhosis s/p liver transplant (3/2020) with chronic immunosuppression      Applicable medical tests/procedures since admission:        PMH: She  has a past medical history of Anxiety, Cirrhosis of liver (HCC), Depression, Hypercholesteremia, Hypertension, Hypothyroid, Meningioma (HCC), Osteoarthritis, Osteoporosis, Renal impairment, Stroke (HCC), Thyroid cancer (HCC), and Vision impairment.   PSxH: She  has a past surgical history that includes Thyroidectomy; Cholecystectomy; Tonsillectomy; Appendectomy; ORIF ankle fracture; Liver surgery;  section; d & c hysteroscopy laparoscopy; Total abdominal hysterectomy w/ bilateral salpingoophorectomy (Bilateral); and Liver transplantation.        Reported/Observed/Food/Nutrition Related History   RD called and spoke to patient via phone due to current airborne isolation. Patient reports <50% of usual PO intake since () with resulting unintentional weight loss of 13 lbs. Reports weighing 180 lbs on () and then 167 lbs prior to this admission (x 5 days). Typically \"doctors up\" and drinks peach Boost Breeze at home. Dislikes full liquid ONS drinks. Declines ONS drinks during this admission. NKFA. Denies " difficulty chewing/swallowing. Reports appetite starting to improve a little bit today. RD obtained meal orders for tonight and tomorrow morning to share with diet office.      Anthropometrics   Height: 61 in  Weight: 167 lbs (2/1 standing scale at home per pt)  BMI: 31.6  BMI classification: Obese Class I: 30-34.9kg/m2   IBW: 105 lbs    UBW: 180 lbs per patient;   Per EMR - 181 lbs (1/11/22 MDIRINEO)    Weight change: Unintentional weight loss of 13 lbs (7.2%) x 5 days.      Labs reviewed   Labs reviewed: Yes    Medications reviewed   Medications reviewed: Yes  Scheduled: steroid, remeron, protonix  PRN: magnesium, KCl, zofran    Current Nutrition Prescription   PO: Diet Regular; Cardiac, Consistent Carbohydrate    Average PO intake: insufficient data    Nutrition Diagnosis     Problem Malnutrition  (severe, acute)   Etiology Poor appetite, nausea   Signs/Symptoms <50% of usual PO intake x 9 days; Unintentional weight loss of 13 lbs (7.2) x 5 days       Goal:   General: Nutrition to support treatment  PO: Increase intake    Intervention   Intervention: Follow treatment progress, Care plan reviewed, Advise alternate selection, Interview for preferences, Encourage intake, Supplement offered/refused     -Communicated food/beverage preferences to diet office.    Monitoring/Evaluation:   Monitoring/Evaluation: Per protocol, PO intake, Pertinent labs, Weight, Symptoms    Lupe Miranda RD  Time Spent: 45 min

## 2022-02-02 NOTE — H&P
[facility] Medicine Services  HISTORY AND PHYSICAL    Patient Name: Lizette Frias  : 1965  MRN: [id number]  Primary Care Physician: Yisel Cottrell APRN  Date of admission: 2022      Subjective   Subjective     Chief Complaint:  SOA    HPI:  Lizette Frias is a 56 y.o. female fully vaccinated and boosted for COVID19 with PMH of HTN, HLD, borderline DM, STEVE on CPAP QHS, Anxiety/depression, thyroid cancer s/p resection with resultant hypothyroidism, meningoima, gout, h/o CVA during liver surgery (due to hypotension) with residual left eye blindness, and MORRISON cirrhosis s/p liver transplant in 2020 who is on chronic immunosuppression and presented with COVID19 infection. Pt reports that she initially started having symptoms ten days ago with cough.  She was diagnosed the day after she became symptomatic.  Her symptoms have worsened to the point where she was unable to ambulate much due to her dyspnea. Pt also reports nausea and anorexia that has worsened since symptom onset. Pt's initial O2 sat in the ED was 99% on RA, however, it was noted that she desatted to 89% with minimal exertion.     COVID Details:    Symptoms:    [] NONE [] Fever [x]  Cough [x] Shortness of breath [] Change in taste/smell      Review of Systems   General- no F/C  HEENT- no blurry vision, no nasal congestion, no hearing loss, no sore throat, no dysphagia, no oral ulcers, no dental caries, no bleeding gums  CVS- intermittent chest pain (has been seen recently by Cards and planned for outpatient heart cath)   Pulm- as above  GI- no diarrhea, no constipation, no BRBPR, no nausea, no vomiting  MSK- no joint pain, no swelling, no erythema  - no dysuria, no hematuria  Neuro- no headaches, no focal motor deficits  Psych- no SI/ HI, no depression/anxiety    All other systems reviewed and are negative.     Personal History     Past Medical History:   Diagnosis Date   • Anxiety    • Cirrhosis of  liver (HCC)    • Depression    • Hypercholesteremia    • Hypertension    • Hypothyroid    • Meningioma (HCC)    • Osteoarthritis    • Osteoporosis    • Renal impairment    • Stroke (HCC)    • Thyroid cancer (HCC)    • Vision impairment     left eye       Past Surgical History:   Procedure Laterality Date   • APPENDECTOMY     •  SECTION      x3   • CHOLECYSTECTOMY     • D & C HYSTEROSCOPY LAPAROSCOPY      x2 for endometriosis   • LIVER SURGERY      removed cysts    • LIVER TRANSPLANTATION     • ORIF ANKLE FRACTURE     • THYROIDECTOMY     • TONSILLECTOMY     • TOTAL ABDOMINAL HYSTERECTOMY WITH SALPINGO OOPHORECTOMY Bilateral        Family History:  family history includes Breast cancer in her maternal grandmother; Colon cancer in her father; Osteoporosis in her mother; Ovarian cancer in her paternal grandmother; Rheum arthritis in her mother; Stroke in her maternal grandmother. Otherwise pertinent FHx was reviewed and unremarkable.     Social History:  reports that she has never smoked. She has never used smokeless tobacco. She reports previous drug use. She reports that she does not drink alcohol.  Social History     Social History Narrative    Caffeine: decaf, occ soda       Medications:  Available home medication information reviewed.  Medications Prior to Admission   Medication Sig Dispense Refill Last Dose   • allopurinol (ZYLOPRIM) 300 MG tablet Take 300 mg by mouth Daily.      • amLODIPine (NORVASC) 10 MG tablet       • atorvastatin (LIPITOR) 40 MG tablet Take 1 tablet by mouth Daily.      • buPROPion SR (WELLBUTRIN SR) 100 MG 12 hr tablet Take 150 mg by mouth Daily.      • buPROPion SR (WELLBUTRIN SR) 150 MG 12 hr tablet       • calcium carbonate-vitamin d 600-400 MG-UNIT per tablet       • Cholecalciferol (Vitamin D3) 1.25 MG (31633 UT) capsule Vitamin D3   2000 units      • cloNIDine (CATAPRES) 0.1 MG tablet Take 0.1 mg by mouth Daily As Needed for High Blood Pressure.      • colchicine 0.6 MG tablet  TAKE 1 TABLET(S) EVERY DAY. MAY TAKE ADD L TAB FOR GOUT FLARES      • estradiol (ESTRACE VAGINAL) 0.1 MG/GM vaginal cream Insert o.5 gm intravaginally 2 times each week 42.5 g 2    • everolimus (ZORTRESS) 0.5 MG tablet Take 1mg (2 tabs) by mouth in the AM and 0.5mg (1 tab) in the evening.      • fluticasone (FLONASE) 50 MCG/ACT nasal spray       • fluticasone (VERAMYST) 27.5 MCG/SPRAY nasal spray 2 sprays into each nostril 2 (Two) Times a Day.      • furosemide (LASIX) 20 MG tablet Take 20 mg by mouth Daily.      • gabapentin (NEURONTIN) 100 MG capsule Every 12 (Twelve) Hours.      • icosapent ethyl (Vascepa) 1 g capsule capsule Take 2 capsules by mouth.      • ipratropium-albuterol (DUO-NEB) 0.5-2.5 mg/3 ml nebulizer ipratropium 0.5 mg-albuterol 2.5 mg/2.5 mL solution for nebulization   Inhale by inhalation route.      • isosorbide dinitrate (ISORDIL) 30 MG tablet Take 1 tablet by mouth 3 (Three) Times a Day. 90 tablet 3    • levalbuterol (XOPENEX HFA) 45 MCG/ACT inhaler       • levothyroxine (SYNTHROID, LEVOTHROID) 175 MCG tablet 200 mcg.      • melatonin 5 MG tablet tablet melatonin 5 mg tablet   Take 1 tablet by oral route.      • methocarbamol (ROBAXIN) 500 MG tablet Take 500 mg by mouth 3 (Three) Times a Day As Needed.      • metoprolol succinate XL (TOPROL-XL) 50 MG 24 hr tablet 50 mg 2 (Two) Times a Day.      • mirtazapine (REMERON) 30 MG tablet       • mycophenolate (MYFORTIC) 360 MG tablet delayed-release EC tablet Take 2 tablets by mouth 2 (Two) Times a Day.      • nitroglycerin (NITROSTAT) 0.4 MG SL tablet Place 0.4 mg under the tongue.      • pantoprazole (PROTONIX) 40 MG EC tablet Take 40 mg by mouth Daily.      • potassium chloride (MICRO-K) 10 MEQ CR capsule Take 10 mEq by mouth Daily.      • predniSONE (DELTASONE) 10 MG tablet Take 10 mg by mouth.      • rOPINIRole (REQUIP) 2 MG tablet ropinirole 2 mg tablet   Take 1 tablet every day by oral route.      • Saline (Lamont Saline Nasal Drops) 0.65 %  solution Ayr Saline Gel nasal spray   Take by nasal route.      • sertraline (ZOLOFT) 100 MG tablet Take 100 mg by mouth Daily.      • spironolactone (ALDACTONE) 50 MG tablet       • tacrolimus (PROGRAF) 0.5 MG capsule Take 2 capsules by mouth Every 12 (Twelve) Hours.      • traMADol (ULTRAM) 50 MG tablet Every 12 (Twelve) Hours.      • traZODone (DESYREL) 100 MG tablet Take 200 mg by mouth every night at bedtime.      • vitamin D (ERGOCALCIFEROL) 1.25 MG (19312 UT) capsule capsule Take 50,000 Units by mouth Every 7 (Seven) Days.        • zolpidem (AMBIEN) 5 MG tablet Take 5 mg by mouth Every Night.          Allergies   Allergen Reactions   • Penicillins Shortness Of Breath   • Cyclosporine Swelling       Objective   Objective     Vital Signs:   Temp:  [99.6 °F (37.6 °C)-101.1 °F (38.4 °C)] 101.1 °F (38.4 °C)  Heart Rate:  [82-89] 87  Resp:  [20-24] 20  BP: (121-131)/(82-89) 130/86       Physical Exam   Constitutional: Awake, alert, obese, mildly dyspneic with conversation  Eyes: PERRLA, sclerae anicteric, no conjunctival injection  HENT: NCAT, mucous membranes moist  Neck: Supple, no thyromegaly, no lymphadenopathy, trachea midline  Respiratory: poor air movement bilaterally, mildly dyspneic with conversation but on RA  Cardiovascular: RRR, no murmurs, rubs, or gallops, palpable pedal pulses bilaterally  Gastrointestinal: Positive bowel sounds, soft, nontender, nondistended  Musculoskeletal: No bilateral ankle edema, no clubbing or cyanosis to extremities  Psychiatric: Appropriate affect, cooperative  Neurologic: Oriented x 3, strength symmetric in all extremities, Cranial Nerves grossly intact to confrontation, speech clear  Skin: No rashes      Result Review:  I have personally reviewed the results from the time of this admission to 2/1/2022 21:40 EST and agree with these findings:  [x]  Laboratory  [x]  Microbiology  [x]  Radiology  []  EKG/Telemetry   []  Cardiology/Vascular   []  Pathology  [x]  Old  records  []  Other:  Most notable findings include: mild leukopenia, hypokalemia, hypomagnesemia, ZAYRA, mildly elevated LFTs      LAB RESULTS:      Lab 02/01/22  1458   WBC 3.04*   HEMOGLOBIN 15.6   HEMATOCRIT 47.0*   PLATELETS 169   NEUTROS ABS 2.19   EOS ABS 0.00   MCV 89.7   PROCALCITONIN 0.06   LACTATE 1.0   D DIMER QUANT 0.32         Lab 02/01/22  1458   SODIUM 141   POTASSIUM 3.0*   CHLORIDE 102   CO2 23.0   ANION GAP 16.0*   BUN 34*   CREATININE 1.48*   GLUCOSE 123*   CALCIUM 9.3   MAGNESIUM 1.4*         Lab 02/01/22  1458   TOTAL PROTEIN 7.2   ALBUMIN 4.30   GLOBULIN 2.9   ALT (SGPT) 106*   AST (SGOT) 92*   BILIRUBIN 0.6   ALK PHOS 87   LIPASE 27         Lab 02/01/22  2059   TROPONIN T <0.010                 UA    Urinalysis 2/1/22 2/1/22    1458 1458   Squamous Epithelial Cells, UA  7-12 (A)   Specific Gravity, UA 1.018    Ketones, UA Trace (A)    Blood, UA Negative    Leukocytes, UA Trace (A)    Nitrite, UA Negative    RBC, UA  3-6 (A)   WBC, UA  6-12 (A)   Bacteria, UA  None Seen   (A) Abnormal value              Microbiology Results (last 10 days)     Procedure Component Value - Date/Time    COVID PRE-OP / PRE-PROCEDURE SCREENING ORDER (NO ISOLATION) - Swab, Nasopharynx [[id number]]  (Abnormal) Collected: 02/01/22 1819    Lab Status: Final result Specimen: Swab from Nasopharynx Updated: 02/01/22 1902    Narrative:      The following orders were created for panel order COVID PRE-OP / PRE-PROCEDURE SCREENING ORDER (NO ISOLATION) - Swab, Nasopharynx.  Procedure                               Abnormality         Status                     ---------                               -----------         ------                     COVID-19 and FLU A/B PCR...[[id number]]  Abnormal            Final result                 Please view results for these tests on the individual orders.    COVID-19 and FLU A/B PCR - Swab, Nasopharynx [[id number]]  (Abnormal) Collected: 02/01/22 1819    Lab Status: Final result Specimen: Swab  from Nasopharynx Updated: 02/01/22 1902     COVID19 Detected     Influenza A PCR Not Detected     Influenza B PCR Not Detected    Narrative:      Fact sheet for providers: https://www.fda.gov/media/614478/download    Fact sheet for patients: https://www.fda.gov/media/148997/download    Test performed by PCR.  Influenza A and Influenza B negative results should be considered presumptive in samples that have a positive SARS-CoV-2 result.    Competitive inhibition studies showed that SARS-CoV-2 virus, when present at concentrations above 3.6E+04 copies/mL, can inhibit the detection and amplification of influenza A and influenza B virus RNA if present at or below 1.8E+02 copies/mL or 4.9E+02 copies/mL, respectively, and may lead to false negative influenza virus results. If co-infection with influenza A or influenza B virus is suspected in samples with a positive SARS-CoV-2 result, the sample should be re-tested with another FDA cleared, approved, or authorized influenza test, if influenza virus detection would change clinical management.          XR Chest 1 View    Result Date: 2/1/2022  XR CHEST 1 VW-  Date of Exam: 2/1/2022 3:51 PM  Indication: cough, COVID.  Comparison:?1/21/2022  Technique:?A single view of the chest was obtained.  FINDINGS:  ?Heart size upper vessels are within normal limits.  There is been interval development of patchy bilateral airspace disease compatible with multifocal pneumonia.  No pleural effusion or pneumothorax.  Bony structures are unremarkable.  Multiple surgical clips project over the upper abdomen.        Impression:   1.  New patchy bilateral airspace disease consistent with multifocal pneumonia.   This report was finalized on 2/1/2022 4:52 PM by Nael Horn MD.            Assessment/Plan   Assessment & Plan     Active Hospital Problems    Diagnosis  POA   • Pneumonia due to COVID-19 virus [U07.1, J12.82]  Yes     Priority: High   • Elevated serum creatinine [R79.89]  Unknown      Priority: High   • Liver transplant recipient (HCC) [Z94.4]  Not Applicable     Priority: High   • Chest pain, atypical [R07.89]  Unknown     Priority: Low   • Hypothyroid [E03.9]  Yes     Priority: Low     Due to remote thyroidectomy for cancer     • Cirrhosis of liver (HCC) [K74.60]  Yes     Priority: Low   • Personal history of malignant neoplasm of thyroid [Z85.850]  Not Applicable     Priority: Low     Surgery in about 2000 for papillary thyroid cancer. I 131 therapy with clean scan afterwards.      • Stroke (HCC) [I63.9]  Yes     Priority: Low     History of stroke during initial liver surgery     • Vision impairment [H54.7]  Yes     left eye         Ms. Frias is a 57 yo WF fully vaccinated and boosted for COVID19 with PMH of HTN, HLD, borderline DM, STEVE on CPAP QHS, Anxiety/depression, thyroid cancer s/p resection with resultant hypothyroidism, meningoima, gout, h/o CVA during liver surgery (due to hypotension) with residual left eye blindness, and MORRISON cirrhosis s/p liver transplant in March 2020 who is on chronic immunosuppression and presented with COVID19 infection. Admitted to our service for symptomatic COVID19.    Plan:    COVID19 PNA  -- pt is not hypoxic at rest, however, significant desats on ambulation   -- given this and her immunosuppression, will initiate Decadron and Remdesivir  -- consult ID in am as well given her complexity  --  Fully vaccinated and boosted  -- initially symptomatic on 1/22/22, out of isolation on 2/11/22  -- d-dimer negative, defer CTA chest  -- procal wnl, defer ABX    H/o MORRISON cirrhosis s/p liver transplant  Elevated LFTs  Chronic Immunosuppression   -- hold immunosuppressants for now, hold PDS 10mg as pt is on Decadron  -- consult ID for above    Leukopenia  -- likely related to above  -- monitor    Elevated Creatinine  -- monitor with initiation of Remdesivir    Chest pain  -- apparently this is chronic, had recent MPS in January which was unremarkable  -- pt reports  that Dr. Ventura is to set up outpatient heart cath to investigate for Pulm HTN (so I suspect it's a RHC)  -- NTG paste for this evening  -- troponin negative x 1, will repeat in am  -- EKG unremarkable    Hypokalemia  Hypomagenesemia  --replacement per protocol    HTN  HLD  --continue home meds (of note, she is on Metoprolol succinate BID, will need to verify this in am)    Hypothyroidism  --continue Synthroid    H/o Gout  -- continue Allopurinol   --hold colchicine due to ZAYRA    Mood Disorder  -- pt on multiple psychiatric meds, continue     H/o CVA  -- with residual left eye blindness    STEVE  -- CPAP QHS    DVT prophylaxis:  LMWH      CODE STATUS:  Full Code  Code Status and Medical Interventions:   Ordered at: 02/01/22 2138     Level Of Support Discussed With:    Patient     Code Status (Patient has no pulse and is not breathing):    CPR (Attempt to Resuscitate)     Medical Interventions (Patient has pulse or is breathing):    Full Support       Admission Status:  I believe this patient meets INPATIENT status due to COVID19 PNA.  I feel patient’s risk for adverse outcomes and need for care warrant INPATIENT evaluation and I predict the patient’s care encounter to likely last beyond 2 midnights.      Melinda Serrano MD  02/01/22

## 2022-02-02 NOTE — CONSULTS
INFECTIOUS DISEASE CONSULT/INITIAL HOSPITAL VISIT    Lizette Frias  1965  [id number]    Date of Consult: 2022    Admission Date: 2022      Requesting Provider: No ref. provider found  Evaluating Physician: Michael Edwards MD    Reason for Consultation: COVID 19 pneumonia     History of present illness:    Patient is a 56 y.o. female, with PMH  MORRISON/cirrhosis/liver transplant, STEVE, CAD, thyroid cancer/resection, CVA, fully vaccinated, seen today for COVID 19 pneumonia.  She developed fever and chills, with shortness of breath, cough, congestion  On 22, the day after she was in the ED with chest pain.  She had tested positive for COVID last Saturday by her HH nurse. Over the past several days,she developed nausea, vomiting prompting her presentation to the ED. She is currently on 2L. Admitting labs with PCT 0.06, WBC 3.04, normal lactate, AST 92, , Scr 1.48, CXR with patchy bilateral airspace disease consistent with multifocal pneumonia. She was started on Remdesivir, Dexamethasone and we were consulted for evaluation and treatment.  She has not received Evusheld preventative monoclonal antibody therapy and has not been given any information regarding this by her transplant physicians.    Past Medical History:   Diagnosis Date   • Anxiety    • Cirrhosis of liver (HCC)    • Depression    • Hypercholesteremia    • Hypertension    • Hypothyroid    • Meningioma (HCC)    • Osteoarthritis    • Osteoporosis    • Renal impairment    • Stroke (HCC)    • Thyroid cancer (HCC)    • Vision impairment     left eye       Past Surgical History:   Procedure Laterality Date   • APPENDECTOMY     •  SECTION      x3   • CHOLECYSTECTOMY     • D & C HYSTEROSCOPY LAPAROSCOPY      x2 for endometriosis   • LIVER SURGERY      removed cysts    • LIVER TRANSPLANTATION     • ORIF ANKLE FRACTURE     • THYROIDECTOMY     • TONSILLECTOMY     • TOTAL ABDOMINAL HYSTERECTOMY WITH SALPINGO OOPHORECTOMY Bilateral         Family History   Problem Relation Age of Onset   • Colon cancer Father    • Osteoporosis Mother    • Rheum arthritis Mother    • Ovarian cancer Paternal Grandmother    • Breast cancer Maternal Grandmother    • Stroke Maternal Grandmother        Social History     Socioeconomic History   • Marital status:    Tobacco Use   • Smoking status: Never Smoker   • Smokeless tobacco: Never Used   Vaping Use   • Vaping Use: Never used   Substance and Sexual Activity   • Alcohol use: No   • Drug use: Not Currently     Comment: Tried an edible last week.    • Sexual activity: Yes     Partners: Male     Birth control/protection: Surgical, Post-menopausal       Allergies   Allergen Reactions   • Penicillins Shortness Of Breath   • Cyclosporine Swelling         Medication:    Current Facility-Administered Medications:   •  acetaminophen (TYLENOL) tablet 650 mg, 650 mg, Oral, Q4H PRN, 650 mg at 02/02/22 1358 **OR** acetaminophen (TYLENOL) 160 MG/5ML solution 650 mg, 650 mg, Oral, Q4H PRN **OR** acetaminophen (TYLENOL) suppository 650 mg, 650 mg, Rectal, Q4H PRN, Melinda Serrano MD  •  albuterol sulfate HFA (PROVENTIL HFA;VENTOLIN HFA;PROAIR HFA) inhaler 2 puff, 2 puff, Inhalation, 4x Daily - RT, Melinda Serrano MD, 2 puff at 02/02/22 1232  •  allopurinol (ZYLOPRIM) tablet 300 mg, 300 mg, Oral, Daily, Melinda Serrano MD, 300 mg at 02/02/22 0824  •  amLODIPine (NORVASC) tablet 10 mg, 10 mg, Oral, Q24H, Melinda Serrano MD, 10 mg at 02/02/22 0824  •  atorvastatin (LIPITOR) tablet 40 mg, 40 mg, Oral, Daily, Melinda Serrano MD, 40 mg at 02/02/22 0826  •  benzonatate (TESSALON) capsule 100 mg, 100 mg, Oral, TID PRN, Melinda Serrano MD  •  buPROPion SR (WELLBUTRIN SR) 12 hr tablet 150 mg, 150 mg, Oral, Daily, Melinda Serrano MD, 150 mg at 02/02/22 0827  •  dexamethasone (DECADRON) tablet 6 mg, 6 mg, Oral, Daily With Breakfast, Amy Wilson DO, 6 mg at 02/02/22 0843  •   dextromethorphan polistirex ER (DELSYM) 30 MG/5ML oral suspension 60 mg, 60 mg, Oral, Q12H, Melinda Serrano MD, 60 mg at 02/02/22 0826  •  enoxaparin (LOVENOX) syringe 40 mg, 40 mg, Subcutaneous, Q24H, Melinda Serrano MD, 40 mg at 02/01/22 2246  •  fluticasone (FLONASE) 50 MCG/ACT nasal spray 2 spray, 2 spray, Each Nare, Daily, Melinda Serrano MD, 2 spray at 02/02/22 0827  •  gabapentin (NEURONTIN) capsule 100 mg, 100 mg, Oral, Q12H, Melinda Serrano MD, 100 mg at 02/02/22 0826  •  isosorbide dinitrate (ISORDIL) tablet 30 mg, 30 mg, Oral, TID, Melinda Serrano MD, 30 mg at 02/02/22 0824  •  levothyroxine (SYNTHROID, LEVOTHROID) tablet 200 mcg, 200 mcg, Oral, Q AM, Melinda Serrano MD, 200 mcg at 02/02/22 0611  •  Magnesium Sulfate 2 gram Bolus, followed by 8 gram infusion (total Mg dose 10 grams)- Mg less than or equal to 1mg/dL, 2 g, Intravenous, PRN, 2 g at 02/02/22 0842 **OR** Magnesium Sulfate 2 gram / 50mL Infusion (GIVE X 3 BAGS TO EQUAL 6GM TOTAL DOSE) - Mg 1.1 - 1.5 mg/dl, 2 g, Intravenous, PRN, Last Rate: 25 mL/hr at 02/02/22 0336, 2 g at 02/02/22 0336 **OR** Magnesium Sulfate 4 gram infusion- Mg 1.6-1.9 mg/dL, 4 g, Intravenous, PRN, Melinda Serrano MD  •  melatonin tablet 5 mg, 5 mg, Oral, Nightly PRN, Melinda Serrano MD  •  methocarbamol (ROBAXIN) tablet 500 mg, 500 mg, Oral, TID PRN, Melinda Serrano MD, 500 mg at 02/02/22 0335  •  metoprolol succinate XL (TOPROL-XL) 24 hr tablet 50 mg, 50 mg, Oral, BID, Melinda Serrano MD, 50 mg at 02/02/22 0823  •  mirtazapine (REMERON) tablet 30 mg, 30 mg, Oral, Nightly, Melinda Serrano MD, 30 mg at 02/01/22 2247  •  ondansetron (ZOFRAN) tablet 4 mg, 4 mg, Oral, Q6H PRN **OR** ondansetron (ZOFRAN) injection 4 mg, 4 mg, Intravenous, Q6H PRN, Melinda Serrano MD, 4 mg at 02/01/22 2325  •  pantoprazole (PROTONIX) EC tablet 40 mg, 40 mg, Oral, Daily, Melinda Serrano MD, 40 mg at 02/02/22  "0824  •  Pharmacy Consult - Remdesivir, , Does not apply, Continuous PRN, Melinda Serrano MD  •  potassium chloride (MICRO-K) CR capsule 40 mEq, 40 mEq, Oral, PRN, 40 mEq at 02/02/22 0843 **OR** potassium chloride (KLOR-CON) packet 40 mEq, 40 mEq, Oral, PRN **OR** potassium chloride 10 mEq in 100 mL IVPB, 10 mEq, Intravenous, Q1H PRN, Melinda Serrano MD  •  [COMPLETED] remdesivir 200 mg in sodium chloride 0.9 % 290 mL IVPB (powder vial), 200 mg, Intravenous, Q24H, 200 mg at 02/01/22 2244 **FOLLOWED BY** remdesivir 100 mg in sodium chloride 0.9 % 250 mL IVPB (powder vial), 100 mg, Intravenous, Daily With Lunch, Melinda Serrano MD  •  rOPINIRole (REQUIP) tablet 2 mg, 2 mg, Oral, Daily, Melinda Serrano MD  •  sertraline (ZOLOFT) tablet 100 mg, 100 mg, Oral, Daily, Melinda Serrano MD, 100 mg at 02/02/22 0823  •  Sodium Chloride (PF) 0.9 % 10 mL, 10 mL, Intravenous, PRN, Jose Obrine MD  •  sodium chloride 0.9 % flush 1-10 mL, 1-10 mL, Intravenous, PRN, Melinda Serrano MD  •  sodium chloride 0.9 % flush 10 mL, 10 mL, Intravenous, Q12H, Melinda Serrano MD, 10 mL at 02/02/22 0827  •  traZODone (DESYREL) tablet 200 mg, 200 mg, Oral, Nightly, Melinda Serrnao MD, 200 mg at 02/01/22 2248  •  zolpidem (AMBIEN) tablet 5 mg, 5 mg, Oral, Nightly, Melinda Serrano MD, 5 mg at 02/01/22 2247    Antibiotics:  Anti-Infectives (From admission, onward)    Ordered     Dose/Rate Route Frequency Start Stop    02/01/22 2138  remdesivir 100 mg in sodium chloride 0.9 % 250 mL IVPB (powder vial)        Ordering Provider: Melinda Serrano MD   \"Followed by\" Linked Group Details    100 mg  over 60 Minutes Intravenous Daily With Lunch 02/02/22 1800 02/06/22 1159    02/01/22 2132  remdesivir 200 mg in sodium chloride 0.9 % 290 mL IVPB (powder vial)        Ordering Provider: Melinda Serrano MD   \"Followed by\" Linked Group Details    200 mg  over 60 Minutes Intravenous " Every 24 Hours 22 2359 22 2349            Review of Systems:  Constitutional-- No Fever,  + chills or sweats.  Poor appetite, general malaise. + fatigue.  HEENT-- No new vision, hearing or throat complaints.  No epistaxis or oral sores.  Denies odynophagia or dysphagia. No headache, photophobia or neck stiffness.  CV-- No chest pain, palpitation or syncope  Resp-- + SOB/cough  GI- + nausea, vomiting, or diarrhea.  No hematochezia, melena, or hematemesis. Denies jaundice   -- No dysuria, hematuria, or flank pain.  Denies hesitancy, urgency or flank pain.  Lymph- no swollen lymph nodes in neck/axilla or groin.   Heme- No active bruising or bleeding; no Hx of DVT or PE.  MS-- no swelling or pain in the bones or joints of arms/legs.  No new back pain.  Neuro-- No acute focal weakness or numbness in the arms or legs.  No seizures.  Skin--No rashes or lesions      Physical Exam:   Vital Signs  Temp (24hrs), Av.6 °F (37.6 °C), Min:98.5 °F (36.9 °C), Max:101.1 °F (38.4 °C)    Temp  Min: 98.5 °F (36.9 °C)  Max: 101.1 °F (38.4 °C)  BP  Min: 101/51  Max: 146/96  Pulse  Min: 76  Max: 97  Resp  Min: 18  Max: 20  SpO2  Min: 90 %  Max: 99 %    GENERAL: Awake and alert, in no acute distress. cushingoid appearance   HEENT: Normocephalic, atraumatic.  PERRL. EOMI. No conjunctival injection. No icterus. Oropharynx clear without evidence of thrush or exudate. No evidence of peridontal disease.    NECK: Supple without nuchal rigidity. No mass.  LYMPH: No cervical, axillary or inguinal lymphadenopathy.  HEART: RRR; No murmur, rubs, gallops.   LUNGS: Clear to auscultation bilaterally without wheezing, rales, rhonchi. Normal respiratory effort. Nonlabored. No dullness.  ABDOMEN: Soft, nontender, nondistended. Positive bowel sounds. No rebound or guarding. NO mass or HSM.  EXT:  No cyanosis, clubbing or edema. No cord.  :  Without Banuelos catheter.  MSK:  No joint effusions   SKIN: Warm and dry without cutaneous eruptions  on Inspection/palpation.    NEURO: Oriented to PPT. Motor strength 5/5/  PSYCHIATRIC: Normal insight and judgement. Cooperative with PE    Laboratory Data    Results from last 7 days   Lab Units 02/02/22  0520 02/01/22  1458   WBC 10*3/mm3 2.04* 3.04*   HEMOGLOBIN g/dL 13.5 15.6   HEMATOCRIT % 41.3 47.0*   PLATELETS 10*3/mm3 128* 169     Results from last 7 days   Lab Units 02/02/22  0520   SODIUM mmol/L 142   POTASSIUM mmol/L 4.4   CHLORIDE mmol/L 108*   CO2 mmol/L 20.0*   BUN mg/dL 25*   CREATININE mg/dL 1.12*   GLUCOSE mg/dL 206*   CALCIUM mg/dL 8.3*     Results from last 7 days   Lab Units 02/02/22  0520   ALK PHOS U/L 72   BILIRUBIN mg/dL 0.4   BILIRUBIN DIRECT mg/dL <0.2   ALT (SGPT) U/L 89*   AST (SGOT) U/L 89*         Results from last 7 days   Lab Units 02/02/22  0520   CRP mg/dL 6.45*     Results from last 7 days   Lab Units 02/01/22  1458   LACTATE mmol/L 1.0             Estimated Creatinine Clearance: 53.9 mL/min (A) (by C-G formula based on SCr of 1.12 mg/dL (H)).      Microbiology:  No results found for: ACANTHNAEG, AFBCX, BPERTUSSISCX, BLOODCX  No results found for: BCIDPCR, CXREFLEX, CSFCX, CULTURETIS  No results found for: CULTURES, HSVCX, URCX  No results found for: EYECULTURE, GCCX, HSVCULTURE, LABHSV  No results found for: LEGIONELLA, MRSACX, MUMPSCX, MYCOPLASCX  No results found for: NOCARDIACX, STOOLCX  No results found for: THROATCX, UNSTIMCULT, URINECX, CULTURE, VZVCULTUR  No results found for: VIRALCULTU, WOUNDCX        Radiology:  Imaging Results (Last 72 Hours)     Procedure Component Value Units Date/Time    XR Chest 1 View [[id number]] Collected: 02/01/22 1652     Updated: 02/01/22 1655    Narrative:      XR CHEST 1 VW-     Date of Exam: 2/1/2022 3:51 PM     Indication: cough, COVID.     Comparison:?1/21/2022     Technique:?A single view of the chest was obtained.     FINDINGS:     ?Heart size upper vessels are within normal limits.  There is been  interval development of patchy bilateral  airspace disease compatible  with multifocal pneumonia.  No pleural effusion or pneumothorax.  Bony  structures are unremarkable.  Multiple surgical clips project over the  upper abdomen.             Impression:            1.  New patchy bilateral airspace disease consistent with multifocal  pneumonia.        This report was finalized on 2022 4:52 PM by Nael Horn MD.         I read her radiographic studies.      Impression:   1.  COVID 19 pneumonia- in a fully vaccinated/boosted host who is profoundly immunocompromised due to her liver transplant..  She is unlikely to have responded optimally to vaccination.  I will check a spike protein antibody.  She should have already received Evusheld preventative monoclonal antibody therapy.  I discussed this issue with her today.  I will plan for her to proceed with Evusheld approximately 1 month after discharge.  We will treat her with remdesivir and Decadron.  We discussed potential for transaminitis on remdesivir, however her ALT is actually improved on remdesivir today.  2. Acute hypoxic respiratory failure, on 2L   3.  MORRISON/cirrhoisis s/p liver transplant   4.  Acute/chronic kidney disease  5.  Pyuria/UTI asymptomatic  6.  CVA  7.  Immunocompromised host    PLAN/RECOMMENDATIONS:   Thank you for asking us to see Lizette Frias, I recommend the followin. Dexamethasone x 10 days   2.  Remdesivir IV x 5 days   3.  Monitor CBC CMP CRP PCT  4.  DVT prophylaxis   5.  COVID-19 Roberto protein antibody  6.  Recommend Evusheld MAB therapy one month after discharge     Dr. Edwards has obtained the history, performed the physical exam and formulated the above treatment plan.         Michael Edwards MD  2022  14:37 EST

## 2022-02-03 PROBLEM — E43 SEVERE MALNUTRITION: Status: ACTIVE | Noted: 2022-02-03

## 2022-02-03 LAB
ALBUMIN SERPL-MCNC: 3.5 G/DL (ref 3.5–5.2)
ALBUMIN/GLOB SERPL: 1.4 G/DL
ALP SERPL-CCNC: 68 U/L (ref 39–117)
ALT SERPL W P-5'-P-CCNC: 60 U/L (ref 1–33)
ANION GAP SERPL CALCULATED.3IONS-SCNC: 11 MMOL/L (ref 5–15)
AST SERPL-CCNC: 35 U/L (ref 1–32)
BASOPHILS # BLD AUTO: 0.01 10*3/MM3 (ref 0–0.2)
BASOPHILS NFR BLD AUTO: 0.5 % (ref 0–1.5)
BILIRUB CONJ SERPL-MCNC: <0.2 MG/DL (ref 0–0.3)
BILIRUB SERPL-MCNC: 0.3 MG/DL (ref 0–1.2)
BUN SERPL-MCNC: 23 MG/DL (ref 6–20)
BUN/CREAT SERPL: 30.7 (ref 7–25)
CALCIUM SPEC-SCNC: 8.3 MG/DL (ref 8.6–10.5)
CHLORIDE SERPL-SCNC: 112 MMOL/L (ref 98–107)
CO2 SERPL-SCNC: 19 MMOL/L (ref 22–29)
CREAT SERPL-MCNC: 0.75 MG/DL (ref 0.57–1)
DEPRECATED RDW RBC AUTO: 44.5 FL (ref 37–54)
EOSINOPHIL # BLD AUTO: 0 10*3/MM3 (ref 0–0.4)
EOSINOPHIL NFR BLD AUTO: 0 % (ref 0.3–6.2)
ERYTHROCYTE [DISTWIDTH] IN BLOOD BY AUTOMATED COUNT: 13.8 % (ref 12.3–15.4)
GFR SERPL CREATININE-BSD FRML MDRD: 80 ML/MIN/1.73
GLOBULIN UR ELPH-MCNC: 2.5 GM/DL
GLUCOSE SERPL-MCNC: 143 MG/DL (ref 65–99)
HCT VFR BLD AUTO: 38.1 % (ref 34–46.6)
HGB BLD-MCNC: 12.9 G/DL (ref 12–15.9)
IMM GRANULOCYTES # BLD AUTO: 0.09 10*3/MM3 (ref 0–0.05)
IMM GRANULOCYTES NFR BLD AUTO: 4.8 % (ref 0–0.5)
LYMPHOCYTES # BLD AUTO: 0.39 10*3/MM3 (ref 0.7–3.1)
LYMPHOCYTES NFR BLD AUTO: 21 % (ref 19.6–45.3)
MCH RBC QN AUTO: 29.6 PG (ref 26.6–33)
MCHC RBC AUTO-ENTMCNC: 33.9 G/DL (ref 31.5–35.7)
MCV RBC AUTO: 87.4 FL (ref 79–97)
MONOCYTES # BLD AUTO: 0.21 10*3/MM3 (ref 0.1–0.9)
MONOCYTES NFR BLD AUTO: 11.3 % (ref 5–12)
NEUTROPHILS NFR BLD AUTO: 1.16 10*3/MM3 (ref 1.7–7)
NEUTROPHILS NFR BLD AUTO: 62.4 % (ref 42.7–76)
NRBC BLD AUTO-RTO: 0 /100 WBC (ref 0–0.2)
PLATELET # BLD AUTO: 128 10*3/MM3 (ref 140–450)
PMV BLD AUTO: 11.7 FL (ref 6–12)
POTASSIUM SERPL-SCNC: 5.2 MMOL/L (ref 3.5–5.2)
PROT SERPL-MCNC: 6 G/DL (ref 6–8.5)
RBC # BLD AUTO: 4.36 10*6/MM3 (ref 3.77–5.28)
SARS-COV-2 AB SERPL IA-ACNC: 12.7 U/ML
SARS-COV-2 AB SERPL-IMP: POSITIVE
SODIUM SERPL-SCNC: 142 MMOL/L (ref 136–145)
T4 FREE SERPL-MCNC: 1.76 NG/DL (ref 0.93–1.7)
WBC NRBC COR # BLD: 1.86 10*3/MM3 (ref 3.4–10.8)

## 2022-02-03 PROCEDURE — 99233 SBSQ HOSP IP/OBS HIGH 50: CPT | Performed by: INTERNAL MEDICINE

## 2022-02-03 PROCEDURE — 25010000002 REMDESIVIR 100 MG/20ML SOLUTION 1 EACH VIAL: Performed by: INTERNAL MEDICINE

## 2022-02-03 PROCEDURE — 25010000002 ONDANSETRON PER 1 MG: Performed by: INTERNAL MEDICINE

## 2022-02-03 PROCEDURE — 82248 BILIRUBIN DIRECT: CPT | Performed by: INTERNAL MEDICINE

## 2022-02-03 PROCEDURE — 84439 ASSAY OF FREE THYROXINE: CPT | Performed by: INTERNAL MEDICINE

## 2022-02-03 PROCEDURE — 85025 COMPLETE CBC W/AUTO DIFF WBC: CPT | Performed by: INTERNAL MEDICINE

## 2022-02-03 PROCEDURE — 63710000001 DEXAMETHASONE PER 0.25 MG: Performed by: INTERNAL MEDICINE

## 2022-02-03 PROCEDURE — 80053 COMPREHEN METABOLIC PANEL: CPT | Performed by: INTERNAL MEDICINE

## 2022-02-03 PROCEDURE — 97116 GAIT TRAINING THERAPY: CPT

## 2022-02-03 PROCEDURE — 25010000002 ENOXAPARIN PER 10 MG: Performed by: INTERNAL MEDICINE

## 2022-02-03 PROCEDURE — 97162 PT EVAL MOD COMPLEX 30 MIN: CPT

## 2022-02-03 RX ORDER — HYDRALAZINE HYDROCHLORIDE 25 MG/1
25 TABLET, FILM COATED ORAL EVERY 8 HOURS PRN
Status: DISCONTINUED | OUTPATIENT
Start: 2022-02-03 | End: 2022-02-07 | Stop reason: HOSPADM

## 2022-02-03 RX ORDER — HYDROXYZINE HYDROCHLORIDE 25 MG/1
25 TABLET, FILM COATED ORAL 3 TIMES DAILY PRN
Status: DISCONTINUED | OUTPATIENT
Start: 2022-02-03 | End: 2022-02-07 | Stop reason: HOSPADM

## 2022-02-03 RX ADMIN — PANTOPRAZOLE SODIUM 40 MG: 40 TABLET, DELAYED RELEASE ORAL at 09:01

## 2022-02-03 RX ADMIN — ATORVASTATIN CALCIUM 40 MG: 40 TABLET, FILM COATED ORAL at 08:58

## 2022-02-03 RX ADMIN — METOPROLOL SUCCINATE 50 MG: 50 TABLET, EXTENDED RELEASE ORAL at 20:33

## 2022-02-03 RX ADMIN — ISOSORBIDE DINITRATE 30 MG: 20 TABLET ORAL at 08:59

## 2022-02-03 RX ADMIN — GABAPENTIN 100 MG: 100 CAPSULE ORAL at 20:33

## 2022-02-03 RX ADMIN — ISOSORBIDE DINITRATE 30 MG: 20 TABLET ORAL at 17:01

## 2022-02-03 RX ADMIN — SODIUM CHLORIDE, PRESERVATIVE FREE 10 ML: 5 INJECTION INTRAVENOUS at 20:42

## 2022-02-03 RX ADMIN — ENOXAPARIN SODIUM 40 MG: 40 INJECTION SUBCUTANEOUS at 20:34

## 2022-02-03 RX ADMIN — ONDANSETRON 4 MG: 2 INJECTION INTRAMUSCULAR; INTRAVENOUS at 17:05

## 2022-02-03 RX ADMIN — TRAZODONE HYDROCHLORIDE 200 MG: 100 TABLET ORAL at 20:34

## 2022-02-03 RX ADMIN — AMLODIPINE BESYLATE 10 MG: 10 TABLET ORAL at 08:56

## 2022-02-03 RX ADMIN — BUPROPION HYDROCHLORIDE 150 MG: 150 TABLET, EXTENDED RELEASE ORAL at 08:57

## 2022-02-03 RX ADMIN — Medication 60 MG: at 09:01

## 2022-02-03 RX ADMIN — METHOCARBAMOL 500 MG: 500 TABLET, FILM COATED ORAL at 08:57

## 2022-02-03 RX ADMIN — Medication 60 MG: at 20:33

## 2022-02-03 RX ADMIN — METOPROLOL SUCCINATE 50 MG: 50 TABLET, EXTENDED RELEASE ORAL at 08:56

## 2022-02-03 RX ADMIN — MIRTAZAPINE 30 MG: 15 TABLET, FILM COATED ORAL at 20:34

## 2022-02-03 RX ADMIN — LEVOTHYROXINE SODIUM 200 MCG: 100 TABLET ORAL at 09:00

## 2022-02-03 RX ADMIN — GABAPENTIN 100 MG: 100 CAPSULE ORAL at 08:58

## 2022-02-03 RX ADMIN — DEXAMETHASONE 6 MG: 2 TABLET ORAL at 08:58

## 2022-02-03 RX ADMIN — ZOLPIDEM TARTRATE 5 MG: 5 TABLET ORAL at 20:34

## 2022-02-03 RX ADMIN — ROPINIROLE HYDROCHLORIDE 2 MG: 2 TABLET, FILM COATED ORAL at 08:58

## 2022-02-03 RX ADMIN — SERTRALINE HYDROCHLORIDE 100 MG: 100 TABLET ORAL at 09:00

## 2022-02-03 RX ADMIN — FLUTICASONE PROPIONATE 2 SPRAY: 50 SPRAY, METERED NASAL at 09:03

## 2022-02-03 RX ADMIN — ISOSORBIDE DINITRATE 30 MG: 20 TABLET ORAL at 20:33

## 2022-02-03 RX ADMIN — ALLOPURINOL 300 MG: 300 TABLET ORAL at 09:01

## 2022-02-03 RX ADMIN — SODIUM CHLORIDE, PRESERVATIVE FREE 10 ML: 5 INJECTION INTRAVENOUS at 09:02

## 2022-02-03 RX ADMIN — REMDESIVIR 100 MG: 100 INJECTION, POWDER, LYOPHILIZED, FOR SOLUTION INTRAVENOUS at 11:08

## 2022-02-03 NOTE — PROGRESS NOTES
INFECTIOUS DISEASE Progress Note    Lizette Frias  1965  [id number]    Admission Date: 2022      Requesting Provider: No ref. provider found  Evaluating Physician: Michael Edwards MD    Reason for Consultation: COVID 19 pneumonia     History of present illness:    22: Patient is a 56 y.o. female, with PMH  MORRISON/cirrhosis/liver transplant, STEVE, CAD, thyroid cancer/resection, CVA, fully vaccinated, seen today for COVID 19 pneumonia.  She developed fever and chills, with shortness of breath, cough, congestion  On 22, the day after she was in the ED with chest pain.  She had tested positive for COVID last Saturday by her HH nurse. Over the past several days,she developed nausea, vomiting prompting her presentation to the ED. She is currently on 2L. Admitting labs with PCT 0.06, WBC 3.04, normal lactate, AST 92, , Scr 1.48, CXR with patchy bilateral airspace disease consistent with multifocal pneumonia. She was started on Remdesivir, Dexamethasone and we were consulted for evaluation and treatment.  She has not received Evusheld preventative monoclonal antibody therapy and has not been given any information regarding this by her transplant physicians.    2/3/22: Her fevers have abated and she has been afebrile over the last 24 hours. Her O2 saturation is 90-92% on 2 L of oxygen. Her ALT has continued to improve and is now down to 60. Later this afternoon, she has required 4 L of oxygen.  She denies increased cough and sputum production.  Her immunosuppressive therapy has been held.        Past Medical History:   Diagnosis Date   • Anxiety    • Cirrhosis of liver (HCC)    • Depression    • Hypercholesteremia    • Hypertension    • Hypothyroid    • Meningioma (HCC)    • Osteoarthritis    • Osteoporosis    • Renal impairment    • Stroke (HCC)    • Thyroid cancer (HCC)    • Vision impairment     left eye       Past Surgical History:   Procedure Laterality Date   • APPENDECTOMY     •   SECTION      x3   • CHOLECYSTECTOMY     • D & C HYSTEROSCOPY LAPAROSCOPY      x2 for endometriosis   • LIVER SURGERY      removed cysts    • LIVER TRANSPLANTATION     • ORIF ANKLE FRACTURE     • THYROIDECTOMY     • TONSILLECTOMY     • TOTAL ABDOMINAL HYSTERECTOMY WITH SALPINGO OOPHORECTOMY Bilateral        Family History   Problem Relation Age of Onset   • Colon cancer Father    • Osteoporosis Mother    • Rheum arthritis Mother    • Ovarian cancer Paternal Grandmother    • Breast cancer Maternal Grandmother    • Stroke Maternal Grandmother        Social History     Socioeconomic History   • Marital status:    Tobacco Use   • Smoking status: Never Smoker   • Smokeless tobacco: Never Used   Vaping Use   • Vaping Use: Never used   Substance and Sexual Activity   • Alcohol use: No   • Drug use: Not Currently     Comment: Tried an edible last week.    • Sexual activity: Yes     Partners: Male     Birth control/protection: Surgical, Post-menopausal       Allergies   Allergen Reactions   • Penicillins Shortness Of Breath   • Cyclosporine Swelling         Medication:    Current Facility-Administered Medications:   •  acetaminophen (TYLENOL) tablet 650 mg, 650 mg, Oral, Q4H PRN, 650 mg at 02/02/22 1358 **OR** acetaminophen (TYLENOL) 160 MG/5ML solution 650 mg, 650 mg, Oral, Q4H PRN **OR** acetaminophen (TYLENOL) suppository 650 mg, 650 mg, Rectal, Q4H PRN, Melinda Serrano MD  •  albuterol sulfate HFA (PROVENTIL HFA;VENTOLIN HFA;PROAIR HFA) inhaler 2 puff, 2 puff, Inhalation, 4x Daily - RT, Melinda Serrano MD, 2 puff at 02/02/22 1530  •  allopurinol (ZYLOPRIM) tablet 300 mg, 300 mg, Oral, Daily, Melinda Serrano MD, 300 mg at 02/02/22 0824  •  amLODIPine (NORVASC) tablet 10 mg, 10 mg, Oral, Q24H, Melinda Serrano MD, 10 mg at 02/02/22 0824  •  atorvastatin (LIPITOR) tablet 40 mg, 40 mg, Oral, Daily, Melinda Serrano MD, 40 mg at 02/02/22 0826  •  benzonatate (TESSALON) capsule 100 mg,  100 mg, Oral, TID PRN, Melinda Serrano MD  •  buPROPion SR (WELLBUTRIN SR) 12 hr tablet 150 mg, 150 mg, Oral, Daily, Melinda Serrano MD, 150 mg at 02/02/22 0827  •  dexamethasone (DECADRON) tablet 6 mg, 6 mg, Oral, Daily With Breakfast, Amy iWlson DO, 6 mg at 02/02/22 0843  •  dextromethorphan polistirex ER (DELSYM) 30 MG/5ML oral suspension 60 mg, 60 mg, Oral, Q12H, Melinda Serrano MD, 60 mg at 02/02/22 2040  •  enoxaparin (LOVENOX) syringe 40 mg, 40 mg, Subcutaneous, Q24H, Melinda Serrano MD, 40 mg at 02/02/22 2037  •  fluticasone (FLONASE) 50 MCG/ACT nasal spray 2 spray, 2 spray, Each Nare, Daily, Melinda Serrano MD, 2 spray at 02/02/22 0827  •  gabapentin (NEURONTIN) capsule 100 mg, 100 mg, Oral, Q12H, Melinda Serrano MD, 100 mg at 02/02/22 2040  •  isosorbide dinitrate (ISORDIL) tablet 30 mg, 30 mg, Oral, TID, Melinda Serrano MD, 30 mg at 02/02/22 2038  •  levothyroxine (SYNTHROID, LEVOTHROID) tablet 200 mcg, 200 mcg, Oral, Q AM, Melinda Serrano MD, 200 mcg at 02/02/22 0611  •  Magnesium Sulfate 2 gram Bolus, followed by 8 gram infusion (total Mg dose 10 grams)- Mg less than or equal to 1mg/dL, 2 g, Intravenous, PRN, 2 g at 02/02/22 0842 **OR** Magnesium Sulfate 2 gram / 50mL Infusion (GIVE X 3 BAGS TO EQUAL 6GM TOTAL DOSE) - Mg 1.1 - 1.5 mg/dl, 2 g, Intravenous, PRN, Last Rate: 25 mL/hr at 02/02/22 0336, 2 g at 02/02/22 0336 **OR** Magnesium Sulfate 4 gram infusion- Mg 1.6-1.9 mg/dL, 4 g, Intravenous, PRN, Melinda Serrano MD  •  melatonin tablet 5 mg, 5 mg, Oral, Nightly PRN, Melinda Serrano MD  •  methocarbamol (ROBAXIN) tablet 500 mg, 500 mg, Oral, TID PRN, Melinda Serrano MD, 500 mg at 02/02/22 0335  •  metoprolol succinate XL (TOPROL-XL) 24 hr tablet 50 mg, 50 mg, Oral, BID, Melinda Serrano MD, 50 mg at 02/02/22 2039  •  mirtazapine (REMERON) tablet 30 mg, 30 mg, Oral, Nightly, Melinda Serrano MD, 30 mg at  "02/02/22 2038  •  ondansetron (ZOFRAN) tablet 4 mg, 4 mg, Oral, Q6H PRN **OR** ondansetron (ZOFRAN) injection 4 mg, 4 mg, Intravenous, Q6H PRN, Melinda Serrano MD, 4 mg at 02/01/22 2325  •  pantoprazole (PROTONIX) EC tablet 40 mg, 40 mg, Oral, Daily, Melinda Serrano MD, 40 mg at 02/02/22 0824  •  Pharmacy Consult - Remdesivir, , Does not apply, Continuous PRN, Melinda Serrano MD  •  potassium chloride (MICRO-K) CR capsule 40 mEq, 40 mEq, Oral, PRN, 40 mEq at 02/02/22 0843 **OR** potassium chloride (KLOR-CON) packet 40 mEq, 40 mEq, Oral, PRN **OR** potassium chloride 10 mEq in 100 mL IVPB, 10 mEq, Intravenous, Q1H PRN, Melinda Serrano MD  •  [COMPLETED] remdesivir 200 mg in sodium chloride 0.9 % 290 mL IVPB (powder vial), 200 mg, Intravenous, Q24H, 200 mg at 02/01/22 2244 **FOLLOWED BY** remdesivir 100 mg in sodium chloride 0.9 % 250 mL IVPB (powder vial), 100 mg, Intravenous, Daily With Lunch, Melinda Serrano MD, 100 mg at 02/02/22 1704  •  rOPINIRole (REQUIP) tablet 2 mg, 2 mg, Oral, Daily, Melinda Serrano MD  •  sertraline (ZOLOFT) tablet 100 mg, 100 mg, Oral, Daily, Melinda Serrano MD, 100 mg at 02/02/22 0823  •  Sodium Chloride (PF) 0.9 % 10 mL, 10 mL, Intravenous, PRN, Jose Obrien MD  •  sodium chloride 0.9 % flush 1-10 mL, 1-10 mL, Intravenous, PRN, Melinda Serrano MD  •  sodium chloride 0.9 % flush 10 mL, 10 mL, Intravenous, Q12H, Melinda Serrano MD, 10 mL at 02/02/22 2042  •  traZODone (DESYREL) tablet 200 mg, 200 mg, Oral, Nightly, Melinda Serrano MD, 200 mg at 02/02/22 2040  •  zolpidem (AMBIEN) tablet 5 mg, 5 mg, Oral, Nightly, Melinda Serrano MD, 5 mg at 02/02/22 2038    Antibiotics:  Anti-Infectives (From admission, onward)    Ordered     Dose/Rate Route Frequency Start Stop    02/01/22 2138  remdesivir 100 mg in sodium chloride 0.9 % 250 mL IVPB (powder vial)        Ordering Provider: Melinda Serrano MD   \"Followed " "by\" Linked Group Details    100 mg  over 60 Minutes Intravenous Daily With Lunch 22 1800 22 1159    22 2138  remdesivir 200 mg in sodium chloride 0.9 % 290 mL IVPB (powder vial)        Ordering Provider: Melinda Serrano MD   \"Followed by\" Linked Group Details    200 mg  over 60 Minutes Intravenous Every 24 Hours 22 2359 22 2348            Review of Systems:  see HPI      Physical Exam:   Vital Signs  Temp (24hrs), Av.4 °F (36.9 °C), Min:98 °F (36.7 °C), Max:98.6 °F (37 °C)    Temp  Min: 98 °F (36.7 °C)  Max: 98.6 °F (37 °C)  BP  Min: 123/79  Max: 146/96  Pulse  Min: 62  Max: 85  Resp  Min: 18  Max: 18  SpO2  Min: 90 %  Max: 94 %    GENERAL: Awake and alert, in no acute distress. cushingoid appearance   HEENT: Normocephalic, atraumatic.  PERRL. EOMI. No conjunctival injection. No icterus. Oropharynx clear without evidence of thrush or exudate..  NECK: Supple .  HEART: RRR; No murmur, rubs, gallops.   LUNGS: Clear to auscultation bilaterally without wheezing, rales, rhonchi. Normal respiratory effort. Nonlabored. .  ABDOMEN: Soft, nontender, nondistended. . No rebound or guarding. NO mass or HSM.  EXT:  No cyanosis, clubbing or edema.   :  Without Banuelos catheter.  MSK:  No joint effusions   SKIN: Warm and dry without cutaneous eruptions on Inspection/palpation.    NEURO: Oriented to PPT. Motor strength 5/5  PSYCHIATRIC: Normal insight and judgement. Cooperative with PE    Laboratory Data    Results from last 7 days   Lab Units 22  0520 22  1458   WBC 10*3/mm3 2.04* 3.04*   HEMOGLOBIN g/dL 13.5 15.6   HEMATOCRIT % 41.3 47.0*   PLATELETS 10*3/mm3 128* 169     Results from last 7 days   Lab Units 22  0535   SODIUM mmol/L 142   POTASSIUM mmol/L 5.2   CHLORIDE mmol/L 112*   CO2 mmol/L 19.0*   BUN mg/dL 23*   CREATININE mg/dL 0.75   GLUCOSE mg/dL 143*   CALCIUM mg/dL 8.3*     Results from last 7 days   Lab Units 22  0535   ALK PHOS U/L 68   BILIRUBIN mg/dL " 0.3   BILIRUBIN DIRECT mg/dL <0.2   ALT (SGPT) U/L 60*   AST (SGOT) U/L 35*         Results from last 7 days   Lab Units 02/02/22  0520   CRP mg/dL 6.45*     Results from last 7 days   Lab Units 02/01/22  1458   LACTATE mmol/L 1.0             Estimated Creatinine Clearance: 80.5 mL/min (by C-G formula based on SCr of 0.75 mg/dL).      Microbiology:  No results found for: ACANTHNAEG, AFBCX, BPERTUSSISCX, BLOODCX  No results found for: BCIDPCR, CXREFLEX, CSFCX, CULTURETIS  No results found for: CULTURES, HSVCX, URCX  No results found for: EYECULTURE, GCCX, HSVCULTURE, LABHSV  No results found for: LEGIONELLA, MRSACX, MUMPSCX, MYCOPLASCX  No results found for: NOCARDIACX, STOOLCX  No results found for: THROATCX, UNSTIMCULT, URINECX, CULTURE, VZVCULTUR  No results found for: VIRALCULTU, WOUNDCX        Radiology:  Imaging Results (Last 72 Hours)     Procedure Component Value Units Date/Time    XR Chest 1 View [[id number]] Collected: 02/01/22 1652     Updated: 02/01/22 1655    Narrative:      XR CHEST 1 VW-     Date of Exam: 2/1/2022 3:51 PM     Indication: cough, COVID.     Comparison:?1/21/2022     Technique:?A single view of the chest was obtained.     FINDINGS:     ?Heart size upper vessels are within normal limits.  There is been  interval development of patchy bilateral airspace disease compatible  with multifocal pneumonia.  No pleural effusion or pneumothorax.  Bony  structures are unremarkable.  Multiple surgical clips project over the  upper abdomen.             Impression:            1.  New patchy bilateral airspace disease consistent with multifocal  pneumonia.        This report was finalized on 2/1/2022 4:52 PM by Nael Horn MD.         I read her radiographic studies.      Impression:   1.  COVID 19 pneumonia- in a fully vaccinated/boosted host who is profoundly immunocompromised due to her liver transplant..  Not surprisingly, she did not respond optimally to vaccination with a very low spike protein  antibody level. She should have already received Evusheld preventative monoclonal antibody therapy.  I discussed this issue with her and her  today. I will plan for her to proceed with Evusheld approximately 1 month after discharge.  We will treat her with remdesivir and Decadron.  We will monitor  for transaminitis on remdesivir  2.  Acute hypoxic respiratory failure-Worse.  3.  MORRISON/cirrhoisis s/p liver transplant 2020  4.  Acute/chronic kidney disease  5.  Pyuria/UTI asymptomatic  6.  CVA  7.  Immunocompromised host    PLAN/RECOMMENDATIONS:   1. Dexamethasone x 10 days   2.  Remdesivir IV x 5 days   3.  Monitor CBC CMP CRP PCT  4.  DVT prophylaxis   5.  Consider Tocilizumab if she requires high flow oxygen.  6.  Recommend Evusheld MAB therapy one month after discharge   7.  Hold immunosuppressive therapy    I discussed her complex situation with Dr. Wilson.  We will hold her immunosuppressive therapy for the time being.  Dr. Wilson has contacted her transplant coordinator and has attempted to contact her transplant physician.  If she worsens, we will need to consider giving her tociluzimab  I spent over 35 minutes on her complex care today.       Michael Edwards MD  2/3/2022  07:04 EST

## 2022-02-03 NOTE — PLAN OF CARE
Goal Outcome Evaluation:           Progress: no change  Outcome Summary: PT eval complete. Pt presented with decreased activity tolerance, generalized weakness, SOA, mild balance deficits, and decreased functional endurance limiting her functional mobility. Pt performed bed mobility and STS with CGA. Pt amb 40' with FWW and CGA. Pt demonstrated sigificant SOA, increased RR and depth of breathing limiting her mobility. O2 saturation at 88% when returned to sitting. IPPT warranted. Recommend home with assist and HHPT to address identified deficits.

## 2022-02-03 NOTE — PLAN OF CARE
Goal Outcome Evaluation:           Progress: no change  Outcome Summary: VSS. Pt remains NSR and on 2LNC. Pt is very SHULTZ. Anytime of movement will cause her to dyspneic Pt does have a nonproductive dry cough at times. Improved with Delsym. Instructed on IS use this shift.

## 2022-02-03 NOTE — THERAPY EVALUATION
Patient Name: Lizette Frias  : 1965    MRN: [id number]                              Today's Date: 2/3/2022       Admit Date: 2022    Visit Dx:     ICD-10-CM ICD-9-CM   1. Pneumonia due to COVID-19 virus  U07.1 480.8    J12.82 079.89   2. Volume depletion  E86.9 276.50   3. Immunosuppression due to drug therapy (HCC)  D84.821 V58.69    Z79.899    4. Acute renal insufficiency  N28.9 593.9   5. Hypokalemia  E87.6 276.8     Patient Active Problem List   Diagnosis   • Menopause   • Personal history of malignant neoplasm of thyroid   • Stroke (HCC)   • Renal impairment   • Vision impairment   • Osteoporosis   • Osteoarthritis   • Meningioma (HCC)   • Hypothyroid   • Hypercholesteremia   • Depression   • Cirrhosis of liver (HCC)   • Anxiety   • Pneumonia due to COVID-19 virus   • Elevated serum creatinine   • Liver transplant recipient (HCC)   • Chest pain, atypical   • Hypokalemia   • Hypomagnesemia   • Severe malnutrition (CMS/HCC)     Past Medical History:   Diagnosis Date   • Anxiety    • Cirrhosis of liver (HCC)    • Depression    • Hypercholesteremia    • Hypertension    • Hypothyroid    • Meningioma (HCC)    • Osteoarthritis    • Osteoporosis    • Renal impairment    • Stroke (HCC)    • Thyroid cancer (HCC)    • Vision impairment     left eye     Past Surgical History:   Procedure Laterality Date   • APPENDECTOMY     •  SECTION      x3   • CHOLECYSTECTOMY     • D & C HYSTEROSCOPY LAPAROSCOPY      x2 for endometriosis   • LIVER SURGERY      removed cysts    • LIVER TRANSPLANTATION     • ORIF ANKLE FRACTURE     • THYROIDECTOMY     • TONSILLECTOMY     • TOTAL ABDOMINAL HYSTERECTOMY WITH SALPINGO OOPHORECTOMY Bilateral       General Information     Row Name 22 0951          Physical Therapy Time and Intention    Document Type evaluation  -HP     Mode of Treatment physical therapy  -HP     Row Name 22 0951          General Information    Patient Profile Reviewed yes  -HP     Prior Level  of Function independent:; all household mobility; bed mobility; ADL's  Pt denied DME use at home but reported she uses her furniture for stability and would get SOA walking <>bathrooim for ~3mo. Difficulty with stairs due to visual issues, does not leave house much but holds onto spouse's arm when she does.  -     Existing Precautions/Restrictions fall; oxygen therapy device and L/min; other (see comments)  hx of stroke w/ residual L visual deficits (minimal peripheral vision only in L eye), SOA w/ minimal activity.  -     Barriers to Rehab medically complex; previous functional deficit; visual deficit  -     Row Name 02/03/22 0951          Living Environment    Lives With spouse  -     Row Name 02/03/22 0951          Home Main Entrance    Number of Stairs, Main Entrance one  -     Row Name 02/03/22 0951          Stairs Within Home, Primary    Number of Stairs, Within Home, Primary none  -     Row Name 02/03/22 0951          Cognition    Orientation Status (Cognition) oriented x 4  -     Row Name 02/03/22 0951          Safety Issues, Functional Mobility    Safety Issues Affecting Function (Mobility) insight into deficits/self-awareness; awareness of need for assistance; safety precaution awareness  -     Impairments Affecting Function (Mobility) balance; endurance/activity tolerance; strength; shortness of breath; visual/perceptual  -           User Key  (r) = Recorded By, (t) = Taken By, (c) = Cosigned By    Initials Name Provider Type     Dee Mota, PT Physical Therapist               Mobility     Row Name 02/03/22 0955          Bed Mobility    Bed Mobility supine-sit; scooting/bridging; sit-supine  -     Scooting/Bridging [place] (Bed Mobility) contact guard; verbal cues; nonverbal cues (demo/gesture)  -     Supine-Sit [place] (Bed Mobility) contact guard; verbal cues  -     Sit-Supine [place] (Bed Mobility) contact guard; verbal cues  -     Assistive Device (Bed  Mobility) bed rails; head of bed elevated  -     Comment (Bed Mobility) Pt perrformed bed mobility with increased time and effort and SOA requiring rest break to recover  -     Row Name 02/03/22 0955          Transfers    Comment (Transfers) Pt performed STS from EOB with CGA and FWW. O2 saturation remained >88% throughout transition  -     Row Name 02/03/22 0955          Sit-Stand Transfer    Sit-Stand [place] (Transfers) contact guard; verbal cues  -     Assistive Device (Sit-Stand Transfers) walker, front-wheeled  -     Row Name 02/03/22 0955          Gait/Stairs (Locomotion)    Golden Level (Gait) contact guard  -     Assistive Device (Gait) walker, front-wheeled  -     Distance in Feet (Gait) 40  -     Deviations/Abnormal Patterns (Gait) bilateral deviations; tino decreased; gait speed decreased; stride length decreased  -     Bilateral Gait Deviations forward flexed posture  -     Comment (Gait/Stairs) Pt amb 40' with FWW and CGA. Pt demonstrated sigificant SOA, increased RR and depth of breathing limiting her mobility. O2 saturation at 88% when returned to sitting.  -           User Key  (r) = Recorded By, (t) = Taken By, (c) = Cosigned By    Initials Name Provider Type     Dee Mota, PT Physical Therapist               Obj/Interventions     Row Name 02/03/22 0958          Range of Motion Comprehensive    General Range of Motion no range of motion deficits identified  -     Row Name 02/03/22 0958          Strength Comprehensive (MMT)    General Manual Muscle Testing (MMT) Assessment lower extremity strength deficits identified  -     Comment, General Manual Muscle Testing (MMT) Assessment BLE grossly 4/5  -     Row Name 02/03/22 0958          Balance    Balance Assessment sitting static balance; sitting dynamic balance; standing static balance; standing dynamic balance  -     Static Sitting Balance WFL; sitting, edge of bed  -     Dynamic Sitting Balance  WFL; sitting, edge of bed  -HP     Static Standing Balance WFL; supported  -HP     Dynamic Standing Balance mild impairment; supported  -HP     Balance Interventions occupation based/functional task; sitting; standing; sit to stand  -HP           User Key  (r) = Recorded By, (t) = Taken By, (c) = Cosigned By    Initials Name Provider Type     Dee Mota PT Physical Therapist               Goals/Plan     Row Name 02/03/22 1002          Bed Mobility Goal 1 (PT)    Activity/Assistive Device (Bed Mobility Goal 1, PT) sit to supine/supine to sit  -HP     [place] Level/Cues Needed (Bed Mobility Goal 1, PT) modified independence  -HP     Time Frame (Bed Mobility Goal 1, PT) long term goal (LTG); 5 days  -HP     Progress/Outcomes (Bed Mobility Goal 1, PT) goal ongoing  -     Row Name 02/03/22 1002          Transfer Goal 1 (PT)    Activity/Assistive Device (Transfer Goal 1, PT) sit-to-stand/stand-to-sit  -HP     [place] Level/Cues Needed (Transfer Goal 1, PT) modified independence  -HP     Time Frame (Transfer Goal 1, PT) long term goal (LTG); 5 days  -HP     Progress/Outcome (Transfer Goal 1, PT) goal ongoing  -     Row Name 02/03/22 1002          Gait Training Goal 1 (PT)    Activity/Assistive Device (Gait Training Goal 1, PT) gait (walking locomotion)  -HP     [place] Level (Gait Training Goal 1, PT) supervision required  -HP     Distance (Gait Training Goal 1, PT) 100  -HP     Time Frame (Gait Training Goal 1, PT) long term goal (LTG); 5 days  -HP     Progress/Outcome (Gait Training Goal 1, PT) goal ongoing  -           User Key  (r) = Recorded By, (t) = Taken By, (c) = Cosigned By    Initials Name Provider Type     Dee Mota PT Physical Therapist               Clinical Impression     Row Name 02/03/22 0959          Pain    Additional Documentation Pain Scale: Numbers Pre/Post-Treatment (Group)  -     Row Name 02/03/22 0959          Pain Scale: Numbers Pre/Post-Treatment     Pretreatment Pain Rating 0/10 - no pain  -HP     Posttreatment Pain Rating 0/10 - no pain  -HP     Row Name 02/03/22 0959          Plan of Care Review    Progress no change  -HP     Outcome Summary PT eval complete. Pt presented with decreased activity tolerance, generalized weakness, SOA, mild balance deficits, and decreased functional endurance limiting her functional mobility. Pt performed bed mobility and STS with CGA. Pt amb 40' with FWW and CGA. Pt demonstrated sigificant SOA, increased RR and depth of breathing limiting her mobility. O2 saturation at 88% when returned to sitting. IPPT warranted. Recommend home with assist and HHPT to address identified deficits.  -     Row Name 02/03/22 0959          Therapy Assessment/Plan (PT)    Rehab Potential (PT) good, to achieve stated therapy goals  -     Criteria for Skilled Interventions Met (PT) yes; meets criteria; skilled treatment is necessary  -HP     Predicted Duration of Therapy Intervention (PT) two weeks  -HP     Row Name 02/03/22 0959          Vital Signs    Pre Systolic BP Rehab --  VSS; RN cleared for therapy  -HP     Pre SpO2 (%) 90  -HP     O2 Delivery Pre Treatment supplemental O2  -HP     Intra SpO2 (%) 88  -HP     O2 Delivery Intra Treatment supplemental O2  -HP     Post SpO2 (%) 91  -HP     O2 Delivery Post Treatment supplemental O2  -HP     Pre Patient Position Supine  -HP     Intra Patient Position Standing  -HP     Post Patient Position Supine  -HP     Row Name 02/03/22 0959          Positioning and Restraints    Pre-Treatment Position in bed  -HP     Post Treatment Position bed  -HP     In Bed notified nsg; supine; fowlers; call light within reach; encouraged to call for assist; exit alarm on; side rails up x2; legs elevated  -HP           User Key  (r) = Recorded By, (t) = Taken By, (c) = Cosigned By    Initials Name Provider Type    HP Dee Mota, PT Physical Therapist               Outcome Measures     Row Name 02/03/22 1004           How much help from another person do you currently need...    Turning from your back to your side while in flat bed without using bedrails? 4  -HP     Moving from lying on back to sitting on the side of a flat bed without bedrails? 4  -HP     Moving to and from a bed to a chair (including a wheelchair)? 3  -HP     Standing up from a chair using your arms (e.g., wheelchair, bedside chair)? 3  -HP     Climbing 3-5 steps with a railing? 2  -HP     To walk in hospital room? 2  -HP     AM-PAC 6 Clicks Score (PT) 18  -HP     Row Name 02/03/22 1004          Functional Assessment    Outcome Measure Options AM-PAC 6 Clicks Basic Mobility (PT)  -           User Key  (r) = Recorded By, (t) = Taken By, (c) = Cosigned By    Initials Name Provider Type     Dee Mota, FRIEDA Physical Therapist                             Physical Therapy Education                 Title: PT OT SLP Therapies (In Progress)     Topic: Physical Therapy (In Progress)     Point: Mobility training (Done)     Learning Progress Summary           Patient Acceptance, E,D, VU,NR by  at 2/3/2022 1004                   Point: Home exercise program (Not Started)     Learner Progress:  Not documented in this visit.          Point: Body mechanics (Done)     Learning Progress Summary           Patient Acceptance, E,D, VU,NR by  at 2/3/2022 1004                   Point: Precautions (Done)     Learning Progress Summary           Patient Acceptance, E,D, VU,NR by  at 2/3/2022 1004                               User Key     Initials Effective Dates Name Provider Type Discipline     06/01/21 -  Dee Mota, FRIEDA Physical Therapist PT              PT Recommendation and Plan  Planned Therapy Interventions (PT): balance training, bed mobility training, gait training, home exercise program, patient/family education, strengthening, stretching, transfer training  Progress: no change  Outcome Summary: PT eval complete. Pt presented with decreased activity  tolerance, generalized weakness, SOA, mild balance deficits, and decreased functional endurance limiting her functional mobility. Pt performed bed mobility and STS with CGA. Pt amb 40' with FWW and CGA. Pt demonstrated sigificant SOA, increased RR and depth of breathing limiting her mobility. O2 saturation at 88% when returned to sitting. IPPT warranted. Recommend home with assist and HHPT to address identified deficits.     Time Calculation:    PT Charges     Row Name 02/03/22 0858             Time Calculation    Start Time 0858  -HP      PT Received On 02/03/22  -HP      PT Goal Re-Cert Due Date 02/13/22  -HP              Timed Charges    41226 - Gait Training Minutes  10  -HP              Untimed Charges    PT Eval/Re-eval Minutes 48  -HP              Total Minutes    Timed Charges Total Minutes 10  -HP      Untimed Charges Total Minutes 48  -HP       Total Minutes 58  -HP            User Key  (r) = Recorded By, (t) = Taken By, (c) = Cosigned By    Initials Name Provider Type     Dee Mota, PT Physical Therapist              Therapy Charges for Today     Code Description Service Date Service Provider Modifiers Qty    57969170438 HC GAIT TRAINING EA 15 MIN 2/3/2022 Dee Mota, PT GP 1    11041638679 HC PT EVAL MOD COMPLEXITY 4 2/3/2022 Dee Mota, PT GP 1          PT G-Codes  Outcome Measure Options: AM-PAC 6 Clicks Basic Mobility (PT)  AM-PAC 6 Clicks Score (PT): 18  AM-PAC 6 Clicks Score (OT): 17    Dee Mota PT  2/3/2022

## 2022-02-03 NOTE — CASE MANAGEMENT/SOCIAL WORK
Continued Stay Note  [facility]     Patient Name: Lizette Frias  MRN: [id number]  Today's Date: 2/3/2022    Admit Date: 2/1/2022     Discharge Plan     Row Name 02/03/22 1144       Plan    Plan Home with HH    Plan Comments Unable to reach pt. by phone. Per PT/OT recommendations, will arrange HH closer to dc. Will monitor O2 needs.    Final Discharge Disposition Code 06 - home with home health care               Discharge Codes    No documentation.                     Quiana Thomas RN

## 2022-02-03 NOTE — PROGRESS NOTES
[facility] Medicine Services  PROGRESS NOTE    Patient Name: Lizette Frias  : 1965  MRN: [id number]    Date of Admission: 2022  Primary Care Physician: Yisel Cottrell APRN    Subjective   Subjective     CC:  COVID     HPI:  More anxious today, BP elevated and feels like she is having more trouble breathing and worried about needing a ventilator    ROS:  Gen- No fevers, chills  CV- No chest pain, palpitations  Resp- +c ough, + dyspnea  GI- No N/V/D, abd pain         Objective   Objective     Vital Signs:   Temp:  [98 °F (36.7 °C)-98.6 °F (37 °C)] 98.3 °F (36.8 °C)  Heart Rate:  [62-78] 78  Resp:  [18-20] 20  BP: (123-133)/(79-92) 133/92  Flow (L/min):  [1-2] 2     Physical Exam:  Constitutional: Up in bed, awake, alert  HENT: NCAT, mucous membranes moist  Respiratory: + cough, mild converstational dyspnea, anxious  Cardiovascular: RRR, no murmurs, rubs, or gallops  Gastrointestinal: obese, soft, nontender, nondistended  Musculoskeletal: trace LE edema  Psychiatric: Appropriate affect, cooperative  Neurologic: Oriented x 3, strength symmetric in all extremities, Cranial Nerves grossly intact to confrontation, speech clear  Skin: No rashes      Results Reviewed:  LAB RESULTS:      Lab 22  0535 22  1441 22  0520 22  1458   WBC 1.86*  --  2.04* 3.04*   HEMOGLOBIN 12.9  --  13.5 15.6   HEMATOCRIT 38.1  --  41.3 47.0*   PLATELETS 128*  --  128* 169   NEUTROS ABS 1.16*  --  1.40* 2.19   IMMATURE GRANS (ABS) 0.09*  --  0.18*  --    LYMPHS ABS 0.39*  --  0.32*  --    MONOS ABS 0.21  --  0.12  --    EOS ABS 0.00  --  0.00 0.00   MCV 87.4  --  92.0 89.7   CRP  --   --  6.45*  --    PROCALCITONIN  --   --   --  0.06   LACTATE  --   --   --  1.0   PROTIME  --  13.5  --   --    APTT  --  33.8  --   --    HEPARIN ANTI-XA  --  0.10*  --   --    D DIMER QUANT  --   --   --  0.32         [facility] 22  0535 22  0520 22  1458   SODIUM 142 142 141    POTASSIUM 5.2 4.4 3.0*   CHLORIDE 112* 108* 102   CO2 19.0* 20.0* 23.0   ANION GAP 11.0 14.0 16.0*   BUN 23* 25* 34*   CREATININE 0.75 1.12* 1.48*   GLUCOSE 143* 206* 123*   CALCIUM 8.3* 8.3* 9.3   MAGNESIUM  --   --  1.4*   HEMOGLOBIN A1C  --  6.10*  --    TSH  --  0.247*  --          Lab 02/03/22  0535 02/02/22  0520 02/01/22  1458   TOTAL PROTEIN 6.0 6.1 7.2   ALBUMIN 3.50 3.60 4.30   GLOBULIN 2.5 2.5 2.9   ALT (SGPT) 60* 89* 106*   AST (SGOT) 35* 89* 92*   BILIRUBIN 0.3 0.4 0.6   BILIRUBIN DIRECT <0.2 <0.2  --    ALK PHOS 68 72 87   LIPASE  --   --  27         Lab 02/02/22  1441 02/01/22 2059   TROPONIN T  --  <0.010   PROTIME 13.5  --    INR 1.06  --          Lab 02/02/22  0520   CHOLESTEROL 95   LDL CHOL 40   HDL CHOL 22*   TRIGLYCERIDES 204*             Brief Urine Lab Results  (Last result in the past 365 days)      Color   Clarity   Blood   Leuk Est   Nitrite   Protein   CREAT   Urine HCG        02/01/22 1458 Yellow   Clear   Negative   Trace   Negative   100 mg/dL (2+)                 Microbiology Results Abnormal     None          XR Chest 1 View    Result Date: 2/1/2022  XR CHEST 1 VW-  Date of Exam: 2/1/2022 3:51 PM  Indication: cough, COVID.  Comparison:?1/21/2022  Technique:?A single view of the chest was obtained.  FINDINGS:  ?Heart size upper vessels are within normal limits.  There is been interval development of patchy bilateral airspace disease compatible with multifocal pneumonia.  No pleural effusion or pneumothorax.  Bony structures are unremarkable.  Multiple surgical clips project over the upper abdomen.        Impression:   1.  New patchy bilateral airspace disease consistent with multifocal pneumonia.   This report was finalized on 2/1/2022 4:52 PM by Nael Horn MD.            I have reviewed the medications:  Scheduled Meds:albuterol sulfate HFA, 2 puff, Inhalation, 4x Daily - RT  allopurinol, 300 mg, Oral, Daily  amLODIPine, 10 mg, Oral, Q24H  atorvastatin, 40 mg, Oral,  Daily  buPROPion SR, 150 mg, Oral, Daily  dexamethasone, 6 mg, Oral, Daily With Breakfast  dextromethorphan polistirex ER, 60 mg, Oral, Q12H  enoxaparin, 40 mg, Subcutaneous, Q24H  fluticasone, 2 spray, Each Nare, Daily  gabapentin, 100 mg, Oral, Q12H  isosorbide dinitrate, 30 mg, Oral, TID  levothyroxine, 200 mcg, Oral, Q AM  metoprolol succinate XL, 50 mg, Oral, BID  mirtazapine, 30 mg, Oral, Nightly  pantoprazole, 40 mg, Oral, Daily  remdesivir, 100 mg, Intravenous, Daily With Lunch  rOPINIRole, 2 mg, Oral, Daily  sertraline, 100 mg, Oral, Daily  sodium chloride, 10 mL, Intravenous, Q12H  traZODone, 200 mg, Oral, Nightly  zolpidem, 5 mg, Oral, Nightly      Continuous Infusions:Pharmacy Consult - Remdesivir,       PRN Meds:.•  acetaminophen **OR** acetaminophen **OR** acetaminophen  •  benzonatate  •  hydrALAZINE  •  hydrOXYzine  •  magnesium sulfate **OR** magnesium sulfate **OR** magnesium sulfate  •  melatonin  •  methocarbamol  •  ondansetron **OR** ondansetron  •  Pharmacy Consult - Remdesivir  •  potassium chloride **OR** potassium chloride **OR** potassium chloride  •  Sodium Chloride (PF)  •  sodium chloride    Assessment/Plan   Assessment & Plan     Active Hospital Problems    Diagnosis  POA   • Severe malnutrition (CMS/HCC) [E43]  Yes   • Pneumonia due to COVID-19 virus [U07.1, J12.82]  Yes   • Elevated serum creatinine [R79.89]  Unknown   • Liver transplant recipient (HCC) [Z94.4]  Not Applicable   • Chest pain, atypical [R07.89]  Unknown   • Hypokalemia [E87.6]  Unknown   • Hypomagnesemia [E83.42]  Unknown   • Hypothyroid [E03.9]  Yes   • Cirrhosis of liver (HCC) [K74.60]  Yes   • Personal history of malignant neoplasm of thyroid [Z85.850]  Not Applicable   • Vision impairment [H54.7]  Yes   • Stroke (HCC) [I63.9]  Yes      Resolved Hospital Problems   No resolved problems to display.        Brief Hospital Course to date:  Lizette Frias is a 56 y.o. female  fully vaccinated and boosted for COVID19  with PMH of HTN, HLD, borderline DM, STEVE on CPAP QHS, Anxiety/depression, thyroid cancer s/p resection with resultant hypothyroidism, meningoima, gout, h/o CVA during liver surgery (due to hypotension) with residual left eye blindness, and MORRISON cirrhosis s/p liver transplant in March 2020 who is on chronic immunosuppression, presented with COVID19 infection-symptomatic     COVID19 PNA  --Desats with activity, O2 as needed.  PT/OT consulted  --Continue Decadron and Remdesivir, day 3  --ID following, discussed with Dr. Edwards -recommended that she get Evusheld preventative monoclonal antibody therapy from transplant center 1 month after DC  --Fully vaccinated and boosted  -- initially symptomatic on 1/22/22, out of isolation on 2/11/22  -- d-dimer negative, CTA chest deferred  --Pro-Mt normal     H/o MORRISON cirrhosis s/p liver transplant  Elevated LFTs  Chronic Immunosuppression   -- hold immunosuppressants for now-on tacrolimus and CellCept- txp team recommends holding for at least 2 weeks after covid diagnosis  --Hold home prednisone dose (used for immunosuppression) secondary to Decadron for treatment of Covid infection     Leukopenia  -- likely related to above  -- monitor     ZAYRA  --Resolved, monitor for now     Chest pain  -- apparently this is chronic, had recent MPS in January which was unremarkable  -- pt reports that Dr. Ventura is to set up outpatient heart cath to investigate for Pulm HTN, likely right heart cath  --Troponin negative  --nitro paste discontinued      --add hydroxyzine     Hypokalemia  Hypomagenesemia  --replacement per protocol     HTN  HLD  --continue home meds   --add prn hydralazine for sbp above 160     Hypothyroidism  --continue Synthroid  --TSH low, free t4 also low- recommend recheck in 4-6 weeks with PCP      H/o Gout  -- continue Allopurinol, can resume colchicine at DC     Mood Disorder  --continue home meds     H/o CVA  -- with residual left eye blindness     STEVE  --CPAP at  night    Updated her  today    DVT prophylaxis:  Medical DVT prophylaxis orders are present.       AM-PAC 6 Clicks Score (PT): 18 (02/03/22 1004)    Disposition: I expect the patient to be discharged with completion of remdesivir, day 3 and stable resp status    CODE STATUS:   Code Status and Medical Interventions:   Ordered at: 02/01/22 2030     Level Of Support Discussed With:    Patient     Code Status (Patient has no pulse and is not breathing):    CPR (Attempt to Resuscitate)     Medical Interventions (Patient has pulse or is breathing):    Full Support       Amy Wilson, DO  02/03/22

## 2022-02-04 LAB
ALBUMIN SERPL-MCNC: 3.6 G/DL (ref 3.5–5.2)
ALBUMIN/GLOB SERPL: 1.5 G/DL
ALP SERPL-CCNC: 70 U/L (ref 39–117)
ALT SERPL W P-5'-P-CCNC: 56 U/L (ref 1–33)
ANION GAP SERPL CALCULATED.3IONS-SCNC: 12 MMOL/L (ref 5–15)
AST SERPL-CCNC: 38 U/L (ref 1–32)
BASOPHILS # BLD AUTO: 0.03 10*3/MM3 (ref 0–0.2)
BASOPHILS NFR BLD AUTO: 1.1 % (ref 0–1.5)
BILIRUB CONJ SERPL-MCNC: <0.2 MG/DL (ref 0–0.3)
BILIRUB SERPL-MCNC: 0.4 MG/DL (ref 0–1.2)
BUN SERPL-MCNC: 21 MG/DL (ref 6–20)
BUN/CREAT SERPL: 30.9 (ref 7–25)
CALCIUM SPEC-SCNC: 8.3 MG/DL (ref 8.6–10.5)
CHLORIDE SERPL-SCNC: 110 MMOL/L (ref 98–107)
CO2 SERPL-SCNC: 19 MMOL/L (ref 22–29)
CREAT SERPL-MCNC: 0.68 MG/DL (ref 0.57–1)
DEPRECATED RDW RBC AUTO: 46.6 FL (ref 37–54)
EOSINOPHIL # BLD AUTO: 0 10*3/MM3 (ref 0–0.4)
EOSINOPHIL NFR BLD AUTO: 0 % (ref 0.3–6.2)
ERYTHROCYTE [DISTWIDTH] IN BLOOD BY AUTOMATED COUNT: 14 % (ref 12.3–15.4)
GFR SERPL CREATININE-BSD FRML MDRD: 89 ML/MIN/1.73
GLOBULIN UR ELPH-MCNC: 2.4 GM/DL
GLUCOSE SERPL-MCNC: 90 MG/DL (ref 65–99)
HCT VFR BLD AUTO: 42.2 % (ref 34–46.6)
HGB BLD-MCNC: 13.8 G/DL (ref 12–15.9)
IMM GRANULOCYTES # BLD AUTO: 0.14 10*3/MM3 (ref 0–0.05)
IMM GRANULOCYTES NFR BLD AUTO: 5 % (ref 0–0.5)
LYMPHOCYTES # BLD AUTO: 0.5 10*3/MM3 (ref 0.7–3.1)
LYMPHOCYTES NFR BLD AUTO: 17.8 % (ref 19.6–45.3)
MCH RBC QN AUTO: 29.7 PG (ref 26.6–33)
MCHC RBC AUTO-ENTMCNC: 32.7 G/DL (ref 31.5–35.7)
MCV RBC AUTO: 90.8 FL (ref 79–97)
MONOCYTES # BLD AUTO: 0.24 10*3/MM3 (ref 0.1–0.9)
MONOCYTES NFR BLD AUTO: 8.5 % (ref 5–12)
NEUTROPHILS NFR BLD AUTO: 1.9 10*3/MM3 (ref 1.7–7)
NEUTROPHILS NFR BLD AUTO: 67.6 % (ref 42.7–76)
NRBC BLD AUTO-RTO: 0 /100 WBC (ref 0–0.2)
PLATELET # BLD AUTO: 171 10*3/MM3 (ref 140–450)
PMV BLD AUTO: 11.5 FL (ref 6–12)
POTASSIUM SERPL-SCNC: 4.8 MMOL/L (ref 3.5–5.2)
PROT SERPL-MCNC: 6 G/DL (ref 6–8.5)
RBC # BLD AUTO: 4.65 10*6/MM3 (ref 3.77–5.28)
SODIUM SERPL-SCNC: 141 MMOL/L (ref 136–145)
WBC NRBC COR # BLD: 2.81 10*3/MM3 (ref 3.4–10.8)

## 2022-02-04 PROCEDURE — 25010000002 ENOXAPARIN PER 10 MG: Performed by: INTERNAL MEDICINE

## 2022-02-04 PROCEDURE — 85025 COMPLETE CBC W/AUTO DIFF WBC: CPT | Performed by: INTERNAL MEDICINE

## 2022-02-04 PROCEDURE — 99233 SBSQ HOSP IP/OBS HIGH 50: CPT | Performed by: INTERNAL MEDICINE

## 2022-02-04 PROCEDURE — 25010000002 REMDESIVIR 100 MG/20ML SOLUTION 1 EACH VIAL: Performed by: INTERNAL MEDICINE

## 2022-02-04 PROCEDURE — 82248 BILIRUBIN DIRECT: CPT | Performed by: INTERNAL MEDICINE

## 2022-02-04 PROCEDURE — 80053 COMPREHEN METABOLIC PANEL: CPT | Performed by: INTERNAL MEDICINE

## 2022-02-04 PROCEDURE — 63710000001 DEXAMETHASONE PER 0.25 MG: Performed by: INTERNAL MEDICINE

## 2022-02-04 RX ORDER — FAMOTIDINE 20 MG/1
20 TABLET, FILM COATED ORAL
Status: DISCONTINUED | OUTPATIENT
Start: 2022-02-04 | End: 2022-02-07 | Stop reason: HOSPADM

## 2022-02-04 RX ADMIN — Medication 60 MG: at 08:18

## 2022-02-04 RX ADMIN — METOPROLOL SUCCINATE 50 MG: 50 TABLET, EXTENDED RELEASE ORAL at 08:19

## 2022-02-04 RX ADMIN — GABAPENTIN 100 MG: 100 CAPSULE ORAL at 08:16

## 2022-02-04 RX ADMIN — ALLOPURINOL 300 MG: 300 TABLET ORAL at 08:18

## 2022-02-04 RX ADMIN — Medication 60 MG: at 21:05

## 2022-02-04 RX ADMIN — AMLODIPINE BESYLATE 10 MG: 10 TABLET ORAL at 08:19

## 2022-02-04 RX ADMIN — DEXAMETHASONE 6 MG: 2 TABLET ORAL at 08:16

## 2022-02-04 RX ADMIN — BENZONATATE 100 MG: 100 CAPSULE ORAL at 03:29

## 2022-02-04 RX ADMIN — BUPROPION HYDROCHLORIDE 150 MG: 150 TABLET, EXTENDED RELEASE ORAL at 08:23

## 2022-02-04 RX ADMIN — PANTOPRAZOLE SODIUM 40 MG: 40 TABLET, DELAYED RELEASE ORAL at 08:18

## 2022-02-04 RX ADMIN — GABAPENTIN 100 MG: 100 CAPSULE ORAL at 21:06

## 2022-02-04 RX ADMIN — ISOSORBIDE DINITRATE 30 MG: 20 TABLET ORAL at 17:30

## 2022-02-04 RX ADMIN — METOPROLOL SUCCINATE 50 MG: 50 TABLET, EXTENDED RELEASE ORAL at 21:06

## 2022-02-04 RX ADMIN — ROPINIROLE HYDROCHLORIDE 2 MG: 2 TABLET, FILM COATED ORAL at 08:17

## 2022-02-04 RX ADMIN — Medication 5 MG: at 03:29

## 2022-02-04 RX ADMIN — SODIUM CHLORIDE, PRESERVATIVE FREE 10 ML: 5 INJECTION INTRAVENOUS at 21:12

## 2022-02-04 RX ADMIN — METHOCARBAMOL 500 MG: 500 TABLET, FILM COATED ORAL at 03:29

## 2022-02-04 RX ADMIN — SERTRALINE HYDROCHLORIDE 100 MG: 100 TABLET ORAL at 08:18

## 2022-02-04 RX ADMIN — MIRTAZAPINE 30 MG: 15 TABLET, FILM COATED ORAL at 21:06

## 2022-02-04 RX ADMIN — ISOSORBIDE DINITRATE 30 MG: 20 TABLET ORAL at 21:05

## 2022-02-04 RX ADMIN — FLUTICASONE PROPIONATE 2 SPRAY: 50 SPRAY, METERED NASAL at 08:19

## 2022-02-04 RX ADMIN — TRAZODONE HYDROCHLORIDE 200 MG: 100 TABLET ORAL at 21:05

## 2022-02-04 RX ADMIN — FAMOTIDINE 20 MG: 20 TABLET, FILM COATED ORAL at 09:06

## 2022-02-04 RX ADMIN — ZOLPIDEM TARTRATE 5 MG: 5 TABLET ORAL at 21:06

## 2022-02-04 RX ADMIN — ATORVASTATIN CALCIUM 40 MG: 40 TABLET, FILM COATED ORAL at 08:17

## 2022-02-04 RX ADMIN — SODIUM CHLORIDE, PRESERVATIVE FREE 10 ML: 5 INJECTION INTRAVENOUS at 08:19

## 2022-02-04 RX ADMIN — REMDESIVIR 100 MG: 100 INJECTION, POWDER, LYOPHILIZED, FOR SOLUTION INTRAVENOUS at 11:27

## 2022-02-04 RX ADMIN — ENOXAPARIN SODIUM 40 MG: 40 INJECTION SUBCUTANEOUS at 21:06

## 2022-02-04 RX ADMIN — LEVOTHYROXINE SODIUM 200 MCG: 100 TABLET ORAL at 05:53

## 2022-02-04 RX ADMIN — ISOSORBIDE DINITRATE 30 MG: 20 TABLET ORAL at 08:16

## 2022-02-04 RX ADMIN — FAMOTIDINE 20 MG: 20 TABLET, FILM COATED ORAL at 17:30

## 2022-02-04 NOTE — CASE MANAGEMENT/SOCIAL WORK
Continued Stay Note  [facility]     Patient Name: Lizette Frias  MRN: [id number]  Today's Date: 2/4/2022    Admit Date: 2/1/2022     Discharge Plan     Row Name 02/04/22 1159       Plan    Plan Home with HH    Patient/Family in Agreement with Plan yes    Plan Comments Have arranged HH through [facility] have spoken with Mike. Still on 4L O2 NC. Will monitor for home O2 needs through [facility]. Will need new order for continuous O2.    Final Discharge Disposition Code 06 - home with home health care               Discharge Codes    No documentation.                     Quiana Thomas RN

## 2022-02-04 NOTE — PROGRESS NOTES
Discussed home health with patient and she is agreeable to [facility]--Mike Carlton RN [facility] Liaison

## 2022-02-04 NOTE — PROGRESS NOTES
[facility] Medicine Services  PROGRESS NOTE    Patient Name: Lizette Frias  : 1965  MRN: [id number]    Date of Admission: 2022  Primary Care Physician: Yisel Cottrell APRN    Subjective   Subjective     CC:  COVID     HPI:  On 4L. Patient feeling ok. Reports she has difficulty understanding with masks and has trouble hearing. Feels like she is doing better. Asking if she can go home soon.     ROS:  Gen- No fevers, chills  CV- No chest pain, palpitations  Resp- +c ough, + dyspnea  GI- No N/V/D, abd pain         Objective   Objective     Vital Signs:   Temp:  [98.3 °F (36.8 °C)-98.6 °F (37 °C)] 98.4 °F (36.9 °C)  Heart Rate:  [68-78] 68  Resp:  [16-20] 16  BP: (133-158)/(89-96) 158/96  Flow (L/min):  [2-4] 4     Physical Exam:  Constitutional: Up in bed, awake, alert  HENT: NCAT, mucous membranes moist  Respiratory: + cough, good effort, high 90s on 4L  Cardiovascular: RRR, no murmurs, rubs, or gallops  Psychiatric: Appropriate affect, cooperative  Neurologic: Oriented x 3, strength symmetric in all extremities, Cranial Nerves grossly intact to confrontation, speech clear  Skin: No rashes      Results Reviewed:  LAB RESULTS:      Lab 22  0351 22  0535 22  1441 22  0520 22  1458   WBC 2.81* 1.86*  --  2.04* 3.04*   HEMOGLOBIN 13.8 12.9  --  13.5 15.6   HEMATOCRIT 42.2 38.1  --  41.3 47.0*   PLATELETS 171 128*  --  128* 169   NEUTROS ABS 1.90 1.16*  --  1.40* 2.19   IMMATURE GRANS (ABS) 0.14* 0.09*  --  0.18*  --    LYMPHS ABS 0.50* 0.39*  --  0.32*  --    MONOS ABS 0.24 0.21  --  0.12  --    EOS ABS 0.00 0.00  --  0.00 0.00   MCV 90.8 87.4  --  92.0 89.7   CRP  --   --   --  6.45*  --    PROCALCITONIN  --   --   --   --  0.06   LACTATE  --   --   --   --  1.0   PROTIME  --   --  13.5  --   --    APTT  --   --  33.8  --   --    HEPARIN ANTI-XA  --   --  0.10*  --   --    D DIMER QUANT  --   --   --   --  0.32         Lab 22  0351  02/03/22  0535 02/02/22  0520 02/01/22  1458   SODIUM 141 142 142 141   POTASSIUM 4.8 5.2 4.4 3.0*   CHLORIDE 110* 112* 108* 102   CO2 19.0* 19.0* 20.0* 23.0   ANION GAP 12.0 11.0 14.0 16.0*   BUN 21* 23* 25* 34*   CREATININE 0.68 0.75 1.12* 1.48*   GLUCOSE 90 143* 206* 123*   CALCIUM 8.3* 8.3* 8.3* 9.3   MAGNESIUM  --   --   --  1.4*   HEMOGLOBIN A1C  --   --  6.10*  --    TSH  --   --  0.247*  --          Lab 02/04/22  0351 02/03/22  0535 02/02/22  0520 02/01/22  1458   TOTAL PROTEIN 6.0 6.0 6.1 7.2   ALBUMIN 3.60 3.50 3.60 4.30   GLOBULIN 2.4 2.5 2.5 2.9   ALT (SGPT) 56* 60* 89* 106*   AST (SGOT) 38* 35* 89* 92*   BILIRUBIN 0.4 0.3 0.4 0.6   BILIRUBIN DIRECT <0.2 <0.2 <0.2  --    ALK PHOS 70 68 72 87   LIPASE  --   --   --  27         Lab 02/02/22  1441 02/01/22 2059   TROPONIN T  --  <0.010   PROTIME 13.5  --    INR 1.06  --          Lab 02/02/22  0520   CHOLESTEROL 95   LDL CHOL 40   HDL CHOL 22*   TRIGLYCERIDES 204*             Brief Urine Lab Results  (Last result in the past 365 days)      Color   Clarity   Blood   Leuk Est   Nitrite   Protein   CREAT   Urine HCG        02/01/22 1458 Yellow   Clear   Negative   Trace   Negative   100 mg/dL (2+)                 Microbiology Results Abnormal     None          No radiology results from the last 24 hrs        I have reviewed the medications:  Scheduled Meds:albuterol sulfate HFA, 2 puff, Inhalation, 4x Daily - RT  allopurinol, 300 mg, Oral, Daily  amLODIPine, 10 mg, Oral, Q24H  atorvastatin, 40 mg, Oral, Daily  buPROPion SR, 150 mg, Oral, Daily  dexamethasone, 6 mg, Oral, Daily With Breakfast  dextromethorphan polistirex ER, 60 mg, Oral, Q12H  enoxaparin, 40 mg, Subcutaneous, Q24H  fluticasone, 2 spray, Each Nare, Daily  gabapentin, 100 mg, Oral, Q12H  isosorbide dinitrate, 30 mg, Oral, TID  levothyroxine, 200 mcg, Oral, Q AM  metoprolol succinate XL, 50 mg, Oral, BID  mirtazapine, 30 mg, Oral, Nightly  pantoprazole, 40 mg, Oral, Daily  remdesivir, 100 mg,  Intravenous, Daily With Lunch  rOPINIRole, 2 mg, Oral, Daily  sertraline, 100 mg, Oral, Daily  sodium chloride, 10 mL, Intravenous, Q12H  traZODone, 200 mg, Oral, Nightly  zolpidem, 5 mg, Oral, Nightly      Continuous Infusions:Pharmacy Consult - Remdesivir,       PRN Meds:.•  acetaminophen **OR** acetaminophen **OR** acetaminophen  •  benzonatate  •  hydrALAZINE  •  hydrOXYzine  •  magnesium sulfate **OR** magnesium sulfate **OR** magnesium sulfate  •  melatonin  •  methocarbamol  •  ondansetron **OR** ondansetron  •  Pharmacy Consult - Remdesivir  •  potassium chloride **OR** potassium chloride **OR** potassium chloride  •  Sodium Chloride (PF)  •  sodium chloride    Assessment/Plan   Assessment & Plan     Active Hospital Problems    Diagnosis  POA   • Severe malnutrition (CMS/HCC) [E43]  Yes   • Pneumonia due to COVID-19 virus [U07.1, J12.82]  Yes   • Elevated serum creatinine [R79.89]  Unknown   • Liver transplant recipient (HCC) [Z94.4]  Not Applicable   • Chest pain, atypical [R07.89]  Unknown   • Hypokalemia [E87.6]  Unknown   • Hypomagnesemia [E83.42]  Unknown   • Hypothyroid [E03.9]  Yes   • Cirrhosis of liver (HCC) [K74.60]  Yes   • Personal history of malignant neoplasm of thyroid [Z85.850]  Not Applicable   • Vision impairment [H54.7]  Yes   • Stroke (HCC) [I63.9]  Yes      Resolved Hospital Problems   No resolved problems to display.        Brief Hospital Course to date:  Lizette Frias is a 56 y.o. female  fully vaccinated and boosted for COVID19 with PMH of HTN, HLD, borderline DM, STEVE on CPAP QHS, Anxiety/depression, thyroid cancer s/p resection with resultant hypothyroidism, meningoima, gout, h/o CVA during liver surgery (due to hypotension) with residual left eye blindness, and MORRISON cirrhosis s/p liver transplant in March 2020 who is on chronic immunosuppression, presented with COVID19 infection-symptomatic     COVID19 PNA  --Desats with activity, O2 as needed.  PT/OT consulted  --Continue  Decadron and Remdesivir, day 4  --ID following, my partner discussed with Dr. Edwards -recommended that she get Evusheld preventative monoclonal antibody therapy from transplant center 1 month after DC. My partner d/w transplant coordinator 2/3, I will attempt to get in touch with them today   --Fully vaccinated and boosted  -- initially symptomatic on 1/22/22, out of isolation on 2/11/22  -- d-dimer negative, CTA chest deferred  --Pro-Mt normal     H/o MORRISON cirrhosis s/p liver transplant  Elevated LFTs  Chronic Immunosuppression   -- hold immunosuppressants for now-on tacrolimus and CellCept  --Hold home prednisone dose (used for immunosuppression) secondary to Decadron for treatment of Covid infection     Leukopenia  -- likely related to above  -- monitor     ZAYRA  --Resolved, monitor for now     Chest pain  -- apparently this is chronic, had recent MPS in January which was unremarkable  -- pt reports that Dr. Ventura is to set up outpatient heart cath to investigate for Pulm HTN, likely right heart cath  --Troponin negative  --nitro paste discontinued, no current pain      --add hydroxyzine     Hypokalemia  Hypomagenesemia  --replacement per protocol     HTN  HLD  --continue home meds   --add prn hydralazine for sbp above 160     Hypothyroidism  --continue Synthroid  --TSH low, add free T4     H/o Gout  -- continue Allopurinol, can resume colchicine at DC     Mood Disorder  --continue home meds     H/o CVA  -- with residual left eye blindness     STEVE  --CPAP at night      I have attempted to reach the transplant line today but no answer after being on hold for over 30 min      Updated son Saúl 137pm- he plans to update the remainder of the family     DVT prophylaxis:  Medical DVT prophylaxis orders are present.       AM-PAC 6 Clicks Score (PT): 18 (02/03/22 1004)    Disposition: I expect the patient to be discharged with completion of remdesivir, day  4 and stable resp status    CODE STATUS:   Code Status and  Medical Interventions:   Ordered at: 02/01/22 2138     Level Of Support Discussed With:    Patient     Code Status (Patient has no pulse and is not breathing):    CPR (Attempt to Resuscitate)     Medical Interventions (Patient has pulse or is breathing):    Full Support       Jasmina Mcnair MD  02/04/22

## 2022-02-04 NOTE — PROGRESS NOTES
INFECTIOUS DISEASE Progress Note    Lizette Frias  1965  [id number]    Admission Date: 2/1/2022      Requesting Provider: No ref. provider found  Evaluating Physician: Michael Edwards MD    Reason for Consultation: COVID 19 pneumonia     History of present illness:    2/2/22: Patient is a 56 y.o. female, with PMH  MORRISON/cirrhosis/liver transplant, STEVE, CAD, thyroid cancer/resection, CVA, fully vaccinated, seen today for COVID 19 pneumonia.  She developed fever and chills, with shortness of breath, cough, congestion  On 1/22/22, the day after she was in the ED with chest pain.  She had tested positive for COVID last Saturday by her HH nurse. Over the past several days,she developed nausea, vomiting prompting her presentation to the ED. She is currently on 2L. Admitting labs with PCT 0.06, WBC 3.04, normal lactate, AST 92, , Scr 1.48, CXR with patchy bilateral airspace disease consistent with multifocal pneumonia. She was started on Remdesivir, Dexamethasone and we were consulted for evaluation and treatment.  She has not received Evusheld preventative monoclonal antibody therapy and has not been given any information regarding this by her transplant physicians.    2/3/22: Her fevers have abated and she has been afebrile over the last 24 hours. Her O2 saturation is 90-92% on 2 L of oxygen. Her ALT has continued to improve and is now down to 60. Later this afternoon, she has required 4 L of oxygen.  She denies increased cough and sputum production.  Her immunosuppressive therapy has been held.    2/4/22: She is currently on 4L of oxygen with an O2 saturation of 89-93%. She remains afebrile.  Her ALT today is down to 56.  Her white blood cell count is 2.8. She will complete her remdesivir are 2/5.  She feels better today.  She denies increased cough and sputum production.  She has anorexia but denies nausea and vomiting.        Past Medical History:   Diagnosis Date   • Anxiety    • Cirrhosis of liver  (HCC)    • Depression    • Hypercholesteremia    • Hypertension    • Hypothyroid    • Meningioma (HCC)    • Osteoarthritis    • Osteoporosis    • Renal impairment    • Stroke (HCC)    • Thyroid cancer (HCC)    • Vision impairment     left eye       Past Surgical History:   Procedure Laterality Date   • APPENDECTOMY     •  SECTION      x3   • CHOLECYSTECTOMY     • D & C HYSTEROSCOPY LAPAROSCOPY      x2 for endometriosis   • LIVER SURGERY      removed cysts    • LIVER TRANSPLANTATION     • ORIF ANKLE FRACTURE     • THYROIDECTOMY     • TONSILLECTOMY     • TOTAL ABDOMINAL HYSTERECTOMY WITH SALPINGO OOPHORECTOMY Bilateral        Family History   Problem Relation Age of Onset   • Colon cancer Father    • Osteoporosis Mother    • Rheum arthritis Mother    • Ovarian cancer Paternal Grandmother    • Breast cancer Maternal Grandmother    • Stroke Maternal Grandmother        Social History     Socioeconomic History   • Marital status:    Tobacco Use   • Smoking status: Never Smoker   • Smokeless tobacco: Never Used   Vaping Use   • Vaping Use: Never used   Substance and Sexual Activity   • Alcohol use: No   • Drug use: Not Currently     Comment: Tried an edible last week.    • Sexual activity: Yes     Partners: Male     Birth control/protection: Surgical, Post-menopausal       Allergies   Allergen Reactions   • Penicillins Shortness Of Breath   • Cyclosporine Swelling         Medication:    Current Facility-Administered Medications:   •  acetaminophen (TYLENOL) tablet 650 mg, 650 mg, Oral, Q4H PRN, 650 mg at 22 1358 **OR** acetaminophen (TYLENOL) 160 MG/5ML solution 650 mg, 650 mg, Oral, Q4H PRN **OR** acetaminophen (TYLENOL) suppository 650 mg, 650 mg, Rectal, Q4H PRN, Melinda Serrano MD  •  albuterol sulfate HFA (PROVENTIL HFA;VENTOLIN HFA;PROAIR HFA) inhaler 2 puff, 2 puff, Inhalation, 4x Daily - RT, Melinda Serrano MD, 2 puff at 22 1530  •  allopurinol (ZYLOPRIM) tablet 300 mg, 300  mg, Oral, Daily, Melinda Serrano MD, 300 mg at 02/03/22 0901  •  amLODIPine (NORVASC) tablet 10 mg, 10 mg, Oral, Q24H, Melinda Serrano MD, 10 mg at 02/03/22 0856  •  atorvastatin (LIPITOR) tablet 40 mg, 40 mg, Oral, Daily, Melinda Serrano MD, 40 mg at 02/03/22 0858  •  benzonatate (TESSALON) capsule 100 mg, 100 mg, Oral, TID PRN, Melinad Serrano MD, 100 mg at 02/04/22 0329  •  buPROPion SR (WELLBUTRIN SR) 12 hr tablet 150 mg, 150 mg, Oral, Daily, Melinda Serrano MD, 150 mg at 02/03/22 0857  •  dexamethasone (DECADRON) tablet 6 mg, 6 mg, Oral, Daily With Breakfast, Amy Wilson DO, 6 mg at 02/03/22 0858  •  dextromethorphan polistirex ER (DELSYM) 30 MG/5ML oral suspension 60 mg, 60 mg, Oral, Q12H, Melinda Serrano MD, 60 mg at 02/03/22 2033  •  enoxaparin (LOVENOX) syringe 40 mg, 40 mg, Subcutaneous, Q24H, Melinda Serrano MD, 40 mg at 02/03/22 2034  •  fluticasone (FLONASE) 50 MCG/ACT nasal spray 2 spray, 2 spray, Each Nare, Daily, Melinda Serrano MD, 2 spray at 02/03/22 0903  •  gabapentin (NEURONTIN) capsule 100 mg, 100 mg, Oral, Q12H, Melinda Serrano MD, 100 mg at 02/03/22 2033  •  hydrALAZINE (APRESOLINE) tablet 25 mg, 25 mg, Oral, Q8H PRN, Amy Wilson, DO  •  hydrOXYzine (ATARAX) tablet 25 mg, 25 mg, Oral, TID PRN, Amy Wilson, DO  •  isosorbide dinitrate (ISORDIL) tablet 30 mg, 30 mg, Oral, TID, Melinda Serrano MD, 30 mg at 02/03/22 2033  •  levothyroxine (SYNTHROID, LEVOTHROID) tablet 200 mcg, 200 mcg, Oral, Q AM, Melinda Serrano MD, 200 mcg at 02/04/22 0553  •  Magnesium Sulfate 2 gram Bolus, followed by 8 gram infusion (total Mg dose 10 grams)- Mg less than or equal to 1mg/dL, 2 g, Intravenous, PRN, 2 g at 02/02/22 0842 **OR** Magnesium Sulfate 2 gram / 50mL Infusion (GIVE X 3 BAGS TO EQUAL 6GM TOTAL DOSE) - Mg 1.1 - 1.5 mg/dl, 2 g, Intravenous, PRN, Last Rate: 25 mL/hr at 02/02/22 0336, 2 g at 02/02/22 0336 **OR**  Magnesium Sulfate 4 gram infusion- Mg 1.6-1.9 mg/dL, 4 g, Intravenous, PRN, Melinda Serrano MD  •  melatonin tablet 5 mg, 5 mg, Oral, Nightly PRN, Melinda Serrano MD, 5 mg at 02/04/22 0329  •  methocarbamol (ROBAXIN) tablet 500 mg, 500 mg, Oral, TID PRN, Melinda Serrano MD, 500 mg at 02/04/22 0329  •  metoprolol succinate XL (TOPROL-XL) 24 hr tablet 50 mg, 50 mg, Oral, BID, Melinda Serrano MD, 50 mg at 02/03/22 2033  •  mirtazapine (REMERON) tablet 30 mg, 30 mg, Oral, Nightly, Melinda Serrano MD, 30 mg at 02/03/22 2034  •  ondansetron (ZOFRAN) tablet 4 mg, 4 mg, Oral, Q6H PRN **OR** ondansetron (ZOFRAN) injection 4 mg, 4 mg, Intravenous, Q6H PRN, Melinda Serrano MD, 4 mg at 02/03/22 1705  •  pantoprazole (PROTONIX) EC tablet 40 mg, 40 mg, Oral, Daily, Melinda Serrano MD, 40 mg at 02/03/22 0901  •  Pharmacy Consult - Remdesivir, , Does not apply, Continuous PRN, Mleinda Serrano MD  •  potassium chloride (MICRO-K) CR capsule 40 mEq, 40 mEq, Oral, PRN, 40 mEq at 02/02/22 0843 **OR** potassium chloride (KLOR-CON) packet 40 mEq, 40 mEq, Oral, PRN **OR** potassium chloride 10 mEq in 100 mL IVPB, 10 mEq, Intravenous, Q1H PRN, Melinda Serrano MD  •  [COMPLETED] remdesivir 200 mg in sodium chloride 0.9 % 290 mL IVPB (powder vial), 200 mg, Intravenous, Q24H, 200 mg at 02/01/22 2244 **FOLLOWED BY** remdesivir 100 mg in sodium chloride 0.9 % 250 mL IVPB (powder vial), 100 mg, Intravenous, Daily With Lunch, Melinda Serrano MD, 100 mg at 02/03/22 1108  •  rOPINIRole (REQUIP) tablet 2 mg, 2 mg, Oral, Daily, Melinda Serrano MD, 2 mg at 02/03/22 0858  •  sertraline (ZOLOFT) tablet 100 mg, 100 mg, Oral, Daily, Melinda Serrano MD, 100 mg at 02/03/22 0900  •  Sodium Chloride (PF) 0.9 % 10 mL, 10 mL, Intravenous, PRN, Jose Obrien MD  •  sodium chloride 0.9 % flush 1-10 mL, 1-10 mL, Intravenous, PRN, Melinda Serrano MD  •  sodium chloride 0.9  "% flush 10 mL, 10 mL, Intravenous, Q12H, Melinda Serrano MD, 10 mL at 22  •  traZODone (DESYREL) tablet 200 mg, 200 mg, Oral, Nightly, Melinda Serrano MD, 200 mg at 22  •  zolpidem (AMBIEN) tablet 5 mg, 5 mg, Oral, Nightly, Melinda Serrano MD, 5 mg at 22    Antibiotics:  Anti-Infectives (From admission, onward)    Ordered     Dose/Rate Route Frequency Start Stop    22  remdesivir 100 mg in sodium chloride 0.9 % 250 mL IVPB (powder vial)        Ordering Provider: Melinda Serrano MD   \"Followed by\" Linked Group Details    100 mg  over 60 Minutes Intravenous Daily With Lunch 22 1800 22 1159    22  remdesivir 200 mg in sodium chloride 0.9 % 290 mL IVPB (powder vial)        Ordering Provider: Melinda Serrano MD   \"Followed by\" Linked Group Details    200 mg  over 60 Minutes Intravenous Every 24 Hours 22 2359 22 234            Review of Systems:  see HPI      Physical Exam:   Vital Signs  Temp (24hrs), Av.5 °F (36.9 °C), Min:98.3 °F (36.8 °C), Max:98.6 °F (37 °C)    Temp  Min: 98.3 °F (36.8 °C)  Max: 98.6 °F (37 °C)  BP  Min: 133/89  Max: 158/96  Pulse  Min: 68  Max: 78  Resp  Min: 16  Max: 20  SpO2  Min: 92 %  Max: 96 %    GENERAL: Awake and alert, in no acute distress. cushingoid appearance   HEENT: Normocephalic, atraumatic.  PERRL. EOMI. No conjunctival injection. No icterus. Oropharynx clear without evidence of thrush or exudate..  NECK: Supple .  HEART: RRR; No murmur, rubs, gallops.   LUNGS: Clear to auscultation bilaterally without wheezing, rales, rhonchi. Normal respiratory effort. Nonlabored. .  ABDOMEN: Soft, nontender, nondistended. . No rebound or guarding. NO mass or HSM.  EXT:  No cyanosis, clubbing or edema.   :  Without Banuelos catheter.  MSK:  No joint effusions   SKIN: Warm and dry without cutaneous eruptions on Inspection/palpation.    NEURO: Oriented to PPT. Motor strength " 5/5  PSYCHIATRIC: Normal insight and judgement. Cooperative with PE    Laboratory Data    Results from last 7 days   Lab Units 02/04/22  0351 02/03/22  0535 02/02/22  0520   WBC 10*3/mm3 2.81* 1.86* 2.04*   HEMOGLOBIN g/dL 13.8 12.9 13.5   HEMATOCRIT % 42.2 38.1 41.3   PLATELETS 10*3/mm3 171 128* 128*     Results from last 7 days   Lab Units 02/04/22  0351   SODIUM mmol/L 141   POTASSIUM mmol/L 4.8   CHLORIDE mmol/L 110*   CO2 mmol/L 19.0*   BUN mg/dL 21*   CREATININE mg/dL 0.68   GLUCOSE mg/dL 90   CALCIUM mg/dL 8.3*     Results from last 7 days   Lab Units 02/04/22  0351   ALK PHOS U/L 70   BILIRUBIN mg/dL 0.4   BILIRUBIN DIRECT mg/dL <0.2   ALT (SGPT) U/L 56*   AST (SGOT) U/L 38*         Results from last 7 days   Lab Units 02/02/22  0520   CRP mg/dL 6.45*     Results from last 7 days   Lab Units 02/01/22  1458   LACTATE mmol/L 1.0             Estimated Creatinine Clearance: 88.8 mL/min (by C-G formula based on SCr of 0.68 mg/dL).      Microbiology:  No results found for: ACANTHNAEG, AFBCX, BPERTUSSISCX, BLOODCX  No results found for: BCIDPCR, CXREFLEX, CSFCX, CULTURETIS  No results found for: CULTURES, HSVCX, URCX  No results found for: EYECULTURE, GCCX, HSVCULTURE, LABHSV  No results found for: LEGIONELLA, MRSACX, MUMPSCX, MYCOPLASCX  No results found for: NOCARDIACX, STOOLCX  No results found for: THROATCX, UNSTIMCULT, URINECX, CULTURE, VZVCULTUR  No results found for: VIRALCULTU, WOUNDCX        Radiology:  Imaging Results (Last 72 Hours)     Procedure Component Value Units Date/Time    XR Chest 1 View [[id number]] Collected: 02/01/22 1652     Updated: 02/01/22 1655    Narrative:      XR CHEST 1 VW-     Date of Exam: 2/1/2022 3:51 PM     Indication: cough, COVID.     Comparison:?1/21/2022     Technique:?A single view of the chest was obtained.     FINDINGS:     ?Heart size upper vessels are within normal limits.  There is been  interval development of patchy bilateral airspace disease compatible  with  multifocal pneumonia.  No pleural effusion or pneumothorax.  Bony  structures are unremarkable.  Multiple surgical clips project over the  upper abdomen.             Impression:            1.  New patchy bilateral airspace disease consistent with multifocal  pneumonia.        This report was finalized on 2/1/2022 4:52 PM by Nael Horn MD.         I read her radiographic studies.      Impression:   1.  COVID 19 pneumonia- in a fully vaccinated/boosted host who is profoundly immunocompromised due to her liver transplant..  Not surprisingly, she did not respond optimally to vaccination with a very low spike protein antibody level. I will plan for her to proceed with Evusheld approximately 1 month after discharge.  She is on Decadron and remdesivir. We will monitor  for transaminitis on remdesivir  2.  Acute hypoxic respiratory failure  3.  MORRISON/cirrhoisis s/p liver transplant 2020  4.  Acute/chronic kidney disease  5.  Pyuria/UTI asymptomatic  6.  CVA  7.  Immunocompromised host    PLAN/RECOMMENDATIONS:   1.  Dexamethasone x 10 days   2.  Remdesivir IV x 5 days-this will be completed on 2/5.   3.  Monitor CBC CMP CRP PCT  4.  DVT prophylaxis   5.  Consider Tocilizumab if she requires high flow oxygen.  6.  Evusheld MAB therapy one month after discharge   7.  Hold immunosuppressive therapy    We will hold her immunosuppressive therapy for the time being.  Dr. Wilson has contacted her transplant coordinator and has attempted to contact her transplant physician.  Dr. Mcnair also attempted to contact her transplant team today.  If she worsens, we will need to consider giving her tociluzimab    I will plan to see her again on Monday.  If new problems develop prior to that, my partners are available to assess her.    Michael Edwards MD  2/4/2022  07:26 EST

## 2022-02-04 NOTE — PLAN OF CARE
Goal Outcome Evaluation:  Pt A&Ox4, VSS, O2 >90% on 4LNC. Remains in NSR. Pt continues to have shortness of breath and wheezes with exertion. Pt up to toilet with assist. Pt rested well overnight, no acute events.

## 2022-02-05 LAB
ALBUMIN SERPL-MCNC: 3.5 G/DL (ref 3.5–5.2)
ALBUMIN/GLOB SERPL: 1.4 G/DL
ALP SERPL-CCNC: 73 U/L (ref 39–117)
ALT SERPL W P-5'-P-CCNC: 50 U/L (ref 1–33)
ANION GAP SERPL CALCULATED.3IONS-SCNC: 10 MMOL/L (ref 5–15)
AST SERPL-CCNC: 30 U/L (ref 1–32)
BASOPHILS # BLD MANUAL: 0 10*3/MM3 (ref 0–0.2)
BASOPHILS NFR BLD MANUAL: 0 % (ref 0–1.5)
BILIRUB CONJ SERPL-MCNC: <0.2 MG/DL (ref 0–0.3)
BILIRUB SERPL-MCNC: 0.5 MG/DL (ref 0–1.2)
BUN SERPL-MCNC: 20 MG/DL (ref 6–20)
BUN/CREAT SERPL: 24.7 (ref 7–25)
CALCIUM SPEC-SCNC: 8.4 MG/DL (ref 8.6–10.5)
CHLORIDE SERPL-SCNC: 110 MMOL/L (ref 98–107)
CO2 SERPL-SCNC: 23 MMOL/L (ref 22–29)
CREAT SERPL-MCNC: 0.81 MG/DL (ref 0.57–1)
DEPRECATED RDW RBC AUTO: 46.3 FL (ref 37–54)
EOSINOPHIL # BLD MANUAL: 0 10*3/MM3 (ref 0–0.4)
EOSINOPHIL NFR BLD MANUAL: 0 % (ref 0.3–6.2)
ERYTHROCYTE [DISTWIDTH] IN BLOOD BY AUTOMATED COUNT: 13.6 % (ref 12.3–15.4)
GFR SERPL CREATININE-BSD FRML MDRD: 73 ML/MIN/1.73
GLOBULIN UR ELPH-MCNC: 2.5 GM/DL
GLUCOSE SERPL-MCNC: 106 MG/DL (ref 65–99)
HCT VFR BLD AUTO: 42 % (ref 34–46.6)
HGB BLD-MCNC: 13.4 G/DL (ref 12–15.9)
LYMPHOCYTES # BLD MANUAL: 0.37 10*3/MM3 (ref 0.7–3.1)
LYMPHOCYTES NFR BLD MANUAL: 6 % (ref 5–12)
MCH RBC QN AUTO: 29.4 PG (ref 26.6–33)
MCHC RBC AUTO-ENTMCNC: 31.9 G/DL (ref 31.5–35.7)
MCV RBC AUTO: 92.1 FL (ref 79–97)
METAMYELOCYTES NFR BLD MANUAL: 1 % (ref 0–0)
MONOCYTES # BLD: 0.17 10*3/MM3 (ref 0.1–0.9)
NEUTROPHILS # BLD AUTO: 2.3 10*3/MM3 (ref 1.7–7)
NEUTROPHILS NFR BLD MANUAL: 72 % (ref 42.7–76)
NEUTS BAND NFR BLD MANUAL: 8 % (ref 0–5)
PLAT MORPH BLD: NORMAL
PLATELET # BLD AUTO: 150 10*3/MM3 (ref 140–450)
PMV BLD AUTO: 11.6 FL (ref 6–12)
POTASSIUM SERPL-SCNC: 4.6 MMOL/L (ref 3.5–5.2)
PROT SERPL-MCNC: 6 G/DL (ref 6–8.5)
RBC # BLD AUTO: 4.56 10*6/MM3 (ref 3.77–5.28)
RBC MORPH BLD: NORMAL
SCAN SLIDE: NORMAL
SODIUM SERPL-SCNC: 143 MMOL/L (ref 136–145)
VARIANT LYMPHS NFR BLD MANUAL: 13 % (ref 19.6–45.3)
WBC MORPH BLD: NORMAL
WBC NRBC COR # BLD: 2.87 10*3/MM3 (ref 3.4–10.8)

## 2022-02-05 PROCEDURE — 99233 SBSQ HOSP IP/OBS HIGH 50: CPT | Performed by: PHYSICIAN ASSISTANT

## 2022-02-05 PROCEDURE — 63710000001 DEXAMETHASONE PER 0.25 MG: Performed by: INTERNAL MEDICINE

## 2022-02-05 PROCEDURE — 25010000002 REMDESIVIR 100 MG/20ML SOLUTION 1 EACH VIAL: Performed by: INTERNAL MEDICINE

## 2022-02-05 PROCEDURE — 97110 THERAPEUTIC EXERCISES: CPT

## 2022-02-05 PROCEDURE — 85025 COMPLETE CBC W/AUTO DIFF WBC: CPT | Performed by: INTERNAL MEDICINE

## 2022-02-05 PROCEDURE — 97530 THERAPEUTIC ACTIVITIES: CPT

## 2022-02-05 PROCEDURE — 25010000002 ENOXAPARIN PER 10 MG: Performed by: INTERNAL MEDICINE

## 2022-02-05 PROCEDURE — 80053 COMPREHEN METABOLIC PANEL: CPT | Performed by: INTERNAL MEDICINE

## 2022-02-05 PROCEDURE — 82248 BILIRUBIN DIRECT: CPT | Performed by: INTERNAL MEDICINE

## 2022-02-05 PROCEDURE — 85007 BL SMEAR W/DIFF WBC COUNT: CPT | Performed by: INTERNAL MEDICINE

## 2022-02-05 RX ORDER — ALBUTEROL SULFATE 90 UG/1
2 AEROSOL, METERED RESPIRATORY (INHALATION) EVERY 4 HOURS PRN
Status: DISCONTINUED | OUTPATIENT
Start: 2022-02-05 | End: 2022-02-07 | Stop reason: HOSPADM

## 2022-02-05 RX ORDER — CHOLECALCIFEROL (VITAMIN D3) 125 MCG
5 CAPSULE ORAL NIGHTLY
Status: DISCONTINUED | OUTPATIENT
Start: 2022-02-05 | End: 2022-02-07 | Stop reason: HOSPADM

## 2022-02-05 RX ORDER — MIRTAZAPINE 15 MG/1
7.5 TABLET, FILM COATED ORAL NIGHTLY
Status: DISCONTINUED | OUTPATIENT
Start: 2022-02-05 | End: 2022-02-07 | Stop reason: HOSPADM

## 2022-02-05 RX ADMIN — AMLODIPINE BESYLATE 10 MG: 10 TABLET ORAL at 08:46

## 2022-02-05 RX ADMIN — Medication 60 MG: at 21:20

## 2022-02-05 RX ADMIN — MIRTAZAPINE 7.5 MG: 15 TABLET, FILM COATED ORAL at 21:20

## 2022-02-05 RX ADMIN — Medication 5 MG: at 21:19

## 2022-02-05 RX ADMIN — PANTOPRAZOLE SODIUM 40 MG: 40 TABLET, DELAYED RELEASE ORAL at 08:46

## 2022-02-05 RX ADMIN — FAMOTIDINE 20 MG: 20 TABLET, FILM COATED ORAL at 16:56

## 2022-02-05 RX ADMIN — ISOSORBIDE DINITRATE 30 MG: 20 TABLET ORAL at 08:46

## 2022-02-05 RX ADMIN — SODIUM CHLORIDE, PRESERVATIVE FREE 10 ML: 5 INJECTION INTRAVENOUS at 08:48

## 2022-02-05 RX ADMIN — LEVOTHYROXINE SODIUM 200 MCG: 100 TABLET ORAL at 05:44

## 2022-02-05 RX ADMIN — FAMOTIDINE 20 MG: 20 TABLET, FILM COATED ORAL at 08:45

## 2022-02-05 RX ADMIN — ALLOPURINOL 300 MG: 300 TABLET ORAL at 08:45

## 2022-02-05 RX ADMIN — GABAPENTIN 100 MG: 100 CAPSULE ORAL at 08:46

## 2022-02-05 RX ADMIN — BUPROPION HYDROCHLORIDE 150 MG: 150 TABLET, EXTENDED RELEASE ORAL at 12:32

## 2022-02-05 RX ADMIN — ENOXAPARIN SODIUM 40 MG: 40 INJECTION SUBCUTANEOUS at 21:20

## 2022-02-05 RX ADMIN — Medication 60 MG: at 08:47

## 2022-02-05 RX ADMIN — ROPINIROLE HYDROCHLORIDE 2 MG: 2 TABLET, FILM COATED ORAL at 08:47

## 2022-02-05 RX ADMIN — TRAZODONE HYDROCHLORIDE 200 MG: 100 TABLET ORAL at 21:20

## 2022-02-05 RX ADMIN — ATORVASTATIN CALCIUM 40 MG: 40 TABLET, FILM COATED ORAL at 08:47

## 2022-02-05 RX ADMIN — GABAPENTIN 100 MG: 100 CAPSULE ORAL at 21:19

## 2022-02-05 RX ADMIN — ISOSORBIDE DINITRATE 30 MG: 20 TABLET ORAL at 16:55

## 2022-02-05 RX ADMIN — ISOSORBIDE DINITRATE 30 MG: 20 TABLET ORAL at 21:20

## 2022-02-05 RX ADMIN — SERTRALINE HYDROCHLORIDE 100 MG: 100 TABLET ORAL at 08:46

## 2022-02-05 RX ADMIN — DEXAMETHASONE 6 MG: 2 TABLET ORAL at 08:46

## 2022-02-05 RX ADMIN — METOPROLOL SUCCINATE 50 MG: 50 TABLET, EXTENDED RELEASE ORAL at 21:19

## 2022-02-05 RX ADMIN — REMDESIVIR 100 MG: 100 INJECTION, POWDER, LYOPHILIZED, FOR SOLUTION INTRAVENOUS at 12:32

## 2022-02-05 RX ADMIN — SODIUM CHLORIDE, PRESERVATIVE FREE 10 ML: 5 INJECTION INTRAVENOUS at 21:21

## 2022-02-05 RX ADMIN — METOPROLOL SUCCINATE 50 MG: 50 TABLET, EXTENDED RELEASE ORAL at 08:46

## 2022-02-05 RX ADMIN — ZOLPIDEM TARTRATE 5 MG: 5 TABLET ORAL at 21:20

## 2022-02-05 NOTE — PLAN OF CARE
"Goal Outcome Evaluation:               Resting comfortably on 3L O2 (92-95%) Tolerated final does of Remdesivir, only complaint is a \"scratchy\" throat. Lozenges & throat spray ordered per APRN, waiting for RX delivery. Will continue to monitor.   "

## 2022-02-05 NOTE — PLAN OF CARE
Goal Outcome Evaluation:  Plan of Care Reviewed With: patient        Progress: improving  Outcome Summary: Pt demonstrating improvement w/ bed mobility - SUP, functional mobility - CGA w/o AD. Pt gave good effort towards BUE pulmonary ther ex while sitting unsupported. Pt's O2 sats dropped w/ functional mobility to 87% recovering 18 seconds to 91% while on 4 L of O2 via NC. Continue to progress per current POC.

## 2022-02-05 NOTE — CASE MANAGEMENT/SOCIAL WORK
Discharge Planning Assessment  [facility]     Patient Name: Lizette Frias  MRN: [id number]  Today's Date: 2/5/2022    Admit Date: 2/1/2022     Discharge Needs Assessment    No documentation.                Discharge Plan     Row Name 02/05/22 1642       Plan    Plan Home with [facility] O2    Patient/Family in Agreement with Plan yes    Plan Comments Per hospitalist note today, expect to be DC on Monday if stable and cleared by ID. Now on 3LNC. Participating with therapy.    Final Discharge Disposition Code 30 - still a patient              Continued Care and Services - Admitted Since 2/1/2022     Durable Medical Equipment     Service Provider Request Status Selected Services Address Phone Fax Patient Preferred    [facility] - [address]  Pending - No Request Sent N/A [address] [facility] [address] [phone] --          Home Medical Care Coordination complete.    Service Provider Request Status Selected Services Address Phone Fax Patient Preferred     [facility]   Selected Home Health Services [address] [facility] [address] [phone] --                 Demographic Summary    No documentation.                Functional Status    No documentation.                Psychosocial    No documentation.                Abuse/Neglect    No documentation.                Legal    No documentation.                Substance Abuse    No documentation.                Patient Forms    No documentation.                   Tasha Xiong, IRMA

## 2022-02-05 NOTE — PROGRESS NOTES
[facility] Medicine Services  PROGRESS NOTE    Patient Name: Lizette Frias  : 1965  MRN: [id number]    Date of Admission: 2022  Primary Care Physician: Yisel Cottrell APRN    Subjective     CC: f/u acute respiratory failure secondary to COVID-19 pneumonia     HPI:  In bed. She says she is feeling better. Throat is sore. I was able to wean O2 from 4L to 3.5L. Says sats dropped into high 80's while working with therapy today but recovered quickly. She is hopeful she will be able to go home soon. Hasn't been sleeping well here.      ROS:  Gen- No fevers, chills  CV- No chest pain, palpitations  Resp- (+) cough, dyspnea and hypoxia   GI- No N/V or abdominal pain, diarrhea improving     Objective     Vital Signs:   Temp:  [98.3 °F (36.8 °C)-99.2 °F (37.3 °C)] 99.2 °F (37.3 °C)  Heart Rate:  [62-69] 67  Resp:  [16-18] 16  BP: (121-160)/(83-95) 121/83  Flow (L/min):  [3-4] 3     Physical Exam:  Constitutional: No acute distress, awake, alert and conversant. Sitting upright in bed   HENT: NCAT, mucous membranes moist  Respiratory: (+) cough with deep breathing, no overt wheezes or rales but lungs sound tight, 92-94% on 3.5L NC  Cardiovascular: RRR, no murmurs, rubs, or gallops  Gastrointestinal: Positive bowel sounds, soft, nontender, nondistended  Musculoskeletal: No bilateral ankle edema  Psychiatric: Appropriate affect, cooperative  Neurologic: Oriented x 3, moves all extremities spontaneously without focal deficits, speech clear  Skin: No rashes    Results Reviewed:  LAB RESULTS:      Lab 22  0451 22  0351 22  0535 22  1441 22  0520 22  1458   WBC 2.87* 2.81* 1.86*  --  2.04* 3.04*   HEMOGLOBIN 13.4 13.8 12.9  --  13.5 15.6   HEMATOCRIT 42.0 42.2 38.1  --  41.3 47.0*   PLATELETS 150 171 128*  --  128* 169   NEUTROS ABS 2.30 1.90 1.16*  --  1.40* 2.19   IMMATURE GRANS (ABS)  --  0.14* 0.09*  --  0.18*  --    LYMPHS ABS  --  0.50* 0.39*   --  0.32*  --    MONOS ABS  --  0.24 0.21  --  0.12  --    EOS ABS 0.00 0.00 0.00  --  0.00 0.00   MCV 92.1 90.8 87.4  --  92.0 89.7   CRP  --   --   --   --  6.45*  --    PROCALCITONIN  --   --   --   --   --  0.06   LACTATE  --   --   --   --   --  1.0   PROTIME  --   --   --  13.5  --   --    APTT  --   --   --  33.8  --   --    HEPARIN ANTI-XA  --   --   --  0.10*  --   --    D DIMER QUANT  --   --   --   --   --  0.32         Lab 02/05/22 0451 02/04/22 0351 02/03/22 0535 02/02/22 0520 02/01/22  1458   SODIUM 143 141 142 142 141   POTASSIUM 4.6 4.8 5.2 4.4 3.0*   CHLORIDE 110* 110* 112* 108* 102   CO2 23.0 19.0* 19.0* 20.0* 23.0   ANION GAP 10.0 12.0 11.0 14.0 16.0*   BUN 20 21* 23* 25* 34*   CREATININE 0.81 0.68 0.75 1.12* 1.48*   GLUCOSE 106* 90 143* 206* 123*   CALCIUM 8.4* 8.3* 8.3* 8.3* 9.3   MAGNESIUM  --   --   --   --  1.4*   HEMOGLOBIN A1C  --   --   --  6.10*  --    TSH  --   --   --  0.247*  --          Lab 02/05/22 0451 02/04/22 0351 02/03/22  0535 02/02/22  0520 02/01/22  1458   TOTAL PROTEIN 6.0 6.0 6.0 6.1 7.2   ALBUMIN 3.50 3.60 3.50 3.60 4.30   GLOBULIN 2.5 2.4 2.5 2.5 2.9   ALT (SGPT) 50* 56* 60* 89* 106*   AST (SGOT) 30 38* 35* 89* 92*   BILIRUBIN 0.5 0.4 0.3 0.4 0.6   BILIRUBIN DIRECT <0.2 <0.2 <0.2 <0.2  --    ALK PHOS 73 70 68 72 87   LIPASE  --   --   --   --  27         Lab 02/02/22  1441 02/01/22 2059   TROPONIN T  --  <0.010   PROTIME 13.5  --    INR 1.06  --          Lab 02/02/22  0520   CHOLESTEROL 95   LDL CHOL 40   HDL CHOL 22*   TRIGLYCERIDES 204*     Brief Urine Lab Results  (Last result in the past 365 days)      Color   Clarity   Blood   Leuk Est   Nitrite   Protein   CREAT   Urine HCG        02/01/22 1458 Yellow   Clear   Negative   Trace   Negative   100 mg/dL (2+)               Microbiology Results Abnormal     None        No radiology results from the last 24 hrs    I have reviewed the medications:  Scheduled Meds:allopurinol, 300 mg, Oral, Daily  amLODIPine, 10 mg,  Oral, Q24H  atorvastatin, 40 mg, Oral, Daily  buPROPion SR, 150 mg, Oral, Daily  dexamethasone, 6 mg, Oral, Daily With Breakfast  dextromethorphan polistirex ER, 60 mg, Oral, Q12H  enoxaparin, 40 mg, Subcutaneous, Q24H  famotidine, 20 mg, Oral, BID AC  fluticasone, 2 spray, Each Nare, Daily  gabapentin, 100 mg, Oral, Q12H  isosorbide dinitrate, 30 mg, Oral, TID  levothyroxine, 200 mcg, Oral, Q AM  melatonin, 5 mg, Oral, Nightly  metoprolol succinate XL, 50 mg, Oral, BID  mirtazapine, 7.5 mg, Oral, Nightly  pantoprazole, 40 mg, Oral, Daily  rOPINIRole, 2 mg, Oral, Daily  sertraline, 100 mg, Oral, Daily  sodium chloride, 10 mL, Intravenous, Q12H  traZODone, 200 mg, Oral, Nightly  zolpidem, 5 mg, Oral, Nightly      Continuous Infusions:Pharmacy Consult - Remdesivir,       PRN Meds:.•  acetaminophen **OR** acetaminophen **OR** acetaminophen  •  albuterol sulfate HFA  •  benzocaine-menthol  •  benzonatate  •  hydrALAZINE  •  hydrOXYzine  •  magnesium sulfate **OR** magnesium sulfate **OR** magnesium sulfate  •  methocarbamol  •  ondansetron **OR** ondansetron  •  Pharmacy Consult - Remdesivir  •  phenol  •  potassium chloride **OR** potassium chloride **OR** potassium chloride  •  Sodium Chloride (PF)  •  sodium chloride    Assessment & Plan     Active Hospital Problems    Diagnosis  POA   • Severe malnutrition (CMS/HCC) [E43]  Yes   • Pneumonia due to COVID-19 virus [U07.1, J12.82]  Yes   • Elevated serum creatinine [R79.89]  Unknown   • Liver transplant recipient (HCC) [Z94.4]  Not Applicable   • Chest pain, atypical [R07.89]  Unknown   • Hypokalemia [E87.6]  Unknown   • Hypomagnesemia [E83.42]  Unknown   • Hypothyroid [E03.9]  Yes   • Cirrhosis of liver (HCC) [K74.60]  Yes   • Personal history of malignant neoplasm of thyroid [Z85.850]  Not Applicable   • Vision impairment [H54.7]  Yes   • Stroke (HCC) [I63.9]  Yes      Resolved Hospital Problems   No resolved problems to display.     Brief Hospital Course to  date:  Lizette Frias is a 56 y.o. female with PMH significant for HTN, HLD, prediabetes, anxiety/depression, thyroid cancer (s/p resection with resultant hypothyroidism), gout, meningioma, STEVE on CPAP, MORRISON cirrhosis (s/p liver transplant in March 2020 with history of intraoperative stroke due to hypotension, on chronic immunosuppression). She is fully vaccinated / boosted for COVID-19. She was [facility] for acute hypoxic respiratory failure secondary to COVID-19 pneumonia.     Acute hypoxic respiratory secondary to COVID-19 pneumonia   - Fully vaccinated / boosted.   - Initially symptomatic on 1/22/22. Isolation 20 days from onset of symptoms (1/22-2/10/22). First day out of isolation 2/11/22  - Appreciate [facility] assistance.  - D-dimer negative, CTA chest deferred.   - Procalcitonin reassuring   - s/p Remdesivir x 5 days, remains on Dexamethasone to complete total 10 days  - ID recommends Evusheld preventative monoclonal antibody therapy from transplant center 1 month after DC. My partner discussed with transplant coordinator on 2/3/22 - attempts to speak to her transplant physician have thus far, been unsuccessful  - Continue PT/OT  - Continue supportive therapy. Wean O2 as able      H/o MORRISON cirrhosis s/p liver transplant  Elevated LFTs  Chronic Immunosuppression   - ID recommends holding of home immunosuppressants (Tacrolimus / CellCept) x 2 weeks  - Holding home prednisone while on Dexamethasone      Leukopenia  - Stable. Likely related to above issues      ZAYRA, resolved. Renal function remains stable      Chest pain  - Apparently this is chronic - had recent MPS in January which was unremarkable  - Patient reports that Dr. Ventura is planning for [facility] to investigate for Pulm HTN  - EKG / troponins reassuring. Nitropaste discontinued     Hypokalemia  Hypomagenesemia  - Replace per protocol      HTN, continue Amlodipine 10mg daily, Metoprolol XL 50mg daily   HLD, continue  Atorvastatin     Hypothyroidism  - Continue Synthroid  - TSH borderline low at 0.247, free T4 borderline high at 1.76   - Continue current dose for now. Needs repeat TSH / free T4 in 4-6 weeks    Insomnia  - Continue Trazodone 200mg QHS, Ambien 5mg QHS  - Add Melatonin 5mg QHS and decrease Mirtazapine to 7.5mg QHS (better effects for insomnia at lower doses)    Gout, continue Allopurinol. Can resume cholchicine if needed now that renal function has returned to baseline    Mood Disorder, continue home medications   H/o CVA, with residual left eye blindness  STEVE, CPAP QHS and PRN    DVT prophylaxis:Medical DVT prophylaxis orders are present.     AM-PAC 6 Clicks Score (PT): 20 (02/05/22 7784)    Disposition: I expect the patient to be discharged on Monday if she remains stable and is cleared by ID     CODE STATUS:   Code Status and Medical Interventions:   Ordered at: 02/01/22 9588     Level Of Support Discussed With:    Patient     Code Status (Patient has no pulse and is not breathing):    CPR (Attempt to Resuscitate)     Medical Interventions (Patient has pulse or is breathing):    Full Support     Elizabet Willard PA-C  02/05/22

## 2022-02-05 NOTE — THERAPY TREATMENT NOTE
Patient Name: Lizette Frias  : 1965    MRN: [id number]                              Today's Date: 2022       Admit Date: 2022    Visit Dx:     ICD-10-CM ICD-9-CM   1. Pneumonia due to COVID-19 virus  U07.1 480.8    J12.82 079.89   2. Volume depletion  E86.9 276.50   3. Immunosuppression due to drug therapy (HCC)  D84.821 V58.69    Z79.899    4. Acute renal insufficiency  N28.9 593.9   5. Hypokalemia  E87.6 276.8   6. Renal impairment  N28.9 593.9   7. Vision impairment  H54.7 369.9   8. Chest pain, atypical  R07.89 786.59   9. Severe malnutrition (CMS/HCC)  E43 261     Patient Active Problem List   Diagnosis   • Menopause   • Personal history of malignant neoplasm of thyroid   • Stroke (HCC)   • Renal impairment   • Vision impairment   • Osteoporosis   • Osteoarthritis   • Meningioma (HCC)   • Hypothyroid   • Hypercholesteremia   • Depression   • Cirrhosis of liver (HCC)   • Anxiety   • Pneumonia due to COVID-19 virus   • Elevated serum creatinine   • Liver transplant recipient (HCC)   • Chest pain, atypical   • Hypokalemia   • Hypomagnesemia   • Severe malnutrition (CMS/HCC)     Past Medical History:   Diagnosis Date   • Anxiety    • Cirrhosis of liver (HCC)    • Depression    • Hypercholesteremia    • Hypertension    • Hypothyroid    • Meningioma (HCC)    • Osteoarthritis    • Osteoporosis    • Renal impairment    • Stroke (HCC)    • Thyroid cancer (HCC)    • Vision impairment     left eye     Past Surgical History:   Procedure Laterality Date   • APPENDECTOMY     •  SECTION      x3   • CHOLECYSTECTOMY     • D & C HYSTEROSCOPY LAPAROSCOPY      x2 for endometriosis   • LIVER SURGERY      removed cysts    • LIVER TRANSPLANTATION     • ORIF ANKLE FRACTURE     • THYROIDECTOMY     • TONSILLECTOMY     • TOTAL ABDOMINAL HYSTERECTOMY WITH SALPINGO OOPHORECTOMY Bilateral       General Information     Row Name 22 1403          OT Time and Intention    Document Type evaluation  -MR     Mode of  Treatment occupational therapy  -MR     Row Name 02/05/22 1403          General Information    Patient Profile Reviewed yes  -MR     Existing Precautions/Restrictions fall; oxygen therapy device and L/min; other (see comments)  -MR     Barriers to Rehab medically complex; previous functional deficit  -MR     Row Name 02/05/22 1403          Cognition    Orientation Status (Cognition) oriented x 4  -MR     Row Name 02/05/22 1403          Safety Issues, Functional Mobility    Safety Issues Affecting Function (Mobility) insight into deficits/self-awareness; awareness of need for assistance; safety precaution awareness  -MR     Impairments Affecting Function (Mobility) balance; endurance/activity tolerance; strength; shortness of breath; visual/perceptual  -MR           User Key  (r) = Recorded By, (t) = Taken By, (c) = Cosigned By    Initials Name Provider Type    MR Ambar Maldonado OT Occupational Therapist                 Mobility/ADL's     Row Name 02/05/22 1404          Bed Mobility    Bed Mobility supine-sit; sit-supine  -MR     Supine-Sit [place] (Bed Mobility) supervision; verbal cues  -MR     Sit-Supine [place] (Bed Mobility) supervision; verbal cues  -MR     Assistive Device (Bed Mobility) bed rails  -MR     Row Name 02/05/22 1404          Transfers    Transfers sit-stand transfer  -MR     Sit-Stand [place] (Transfers) contact guard; verbal cues  -MR     Row Name 02/05/22 1404          Sit-Stand Transfer    Assistive Device (Sit-Stand Transfers) other (see comments)  unsupported w/o AD  -MR     Row Name 02/05/22 1404          Functional Mobility    Functional Mobility- Ind. Level contact guard assist  -MR     Functional Mobility- Device other (see comments)  unsupported w/o AD  -MR     Functional Mobility- Safety Issues supplemental O2  -MR     Functional Mobility- Comment Pt requiring CGA for functional mobility w/ assistance for O2 tubing management  -MR     Row Name 02/05/22 1403           Activities of Daily Living    BADL Assessment/Intervention lower body dressing  -MR     Row Name 02/05/22 1404          Lower Body Dressing Assessment/Training    [place] Level (Lower Body Dressing) don; shoes/slippers; set up  -     Position (Lower Body Dressing) edge of bed sitting  -           User Key  (r) = Recorded By, (t) = Taken By, (c) = Cosigned By    Initials Name Provider Type    MR Ambar Maldonado OT Occupational Therapist               Obj/Interventions     Row Name 02/05/22 1406          Shoulder (Therapeutic Exercise)    Shoulder (Therapeutic Exercise) AROM (active range of motion)  -     Shoulder AROM (Therapeutic Exercise) bilateral; flexion; extension; aBduction; aDduction; scapular protraction; scapular retraction; external rotation; internal rotation; sitting; 5 repetitions  -MR     Row Name 02/05/22 1406          Elbow/Forearm (Therapeutic Exercise)    Elbow/Forearm (Therapeutic Exercise) AROM (active range of motion)  -MR     Elbow/Forearm AROM (Therapeutic Exercise) bilateral; flexion; extension; supination; pronation; sitting; 5 repetitions  -MR     Row Name 02/05/22 1406          Motor Skills    Motor Skills therapeutic exercise; functional endurance  -     Functional Endurance OT facilitated pt in [facility] for approx 2 minutes while observing O2 saturation. Pt maintained >90% while on 4L of O2 via NC. Pt reporting labored breathing w/ activity but O2 sats maintained  -MR     Row Name 02/05/22 1406          Balance    Balance Assessment sitting static balance; sitting dynamic balance; standing static balance; standing dynamic balance  -     Static Sitting Balance WFL; unsupported; sitting, edge of bed  -MR     Dynamic Sitting Balance WFL; unsupported; sitting, edge of bed  -MR     Static Standing Balance WFL; unsupported; standing  -MR     Dynamic Standing Balance mild impairment; supported; standing  -MR     Balance Interventions sitting; standing; sit to stand;  supported; static; dynamic; minimal challenge; occupation based/functional task  -MR     Comment, Balance Dynamic unsupported standing marches for approx 2 minutes w/ CGA for safety.  -     Row Name 02/05/22 1406          Therapeutic Exercise    Therapeutic Exercise shoulder; elbow/forearm; other (see comments)  Pulmonary Ther Ex Set  -MR           User Key  (r) = Recorded By, (t) = Taken By, (c) = Cosigned By    Initials Name Provider Type    MR Ambar Maldonado, OT Occupational Therapist               Goals/Plan    No documentation.                Clinical Impression     Row Name 02/05/22 1408          Pain Assessment    Additional Documentation Pain Scale: Numbers Pre/Post-Treatment (Group)  -MR     Row Name 02/05/22 1408          Pain Scale: Numbers Pre/Post-Treatment    Pretreatment Pain Rating 2/10  -MR     Posttreatment Pain Rating 2/10  -MR     Pain Location other (see comments)  esophagus - pain from coughing  -MR     Pre/Posttreatment Pain Comment RN notified  -MR     Pain Intervention(s) Ambulation/increased activity; Repositioned  -MR     Row Name 02/05/22 1408          Plan of Care Review    Plan of Care Reviewed With patient  -MR     Progress improving  -MR     Outcome Summary Pt demonstrating improvement w/ bed mobility - SUP, functional mobility - CGA w/o AD. Pt gave good effort towards BUE pulmonary ther ex while sitting unsupported. Pt's O2 sats dropped w/ functional mobility to 87% recovering 18 seconds to 91% while on 4 L of O2 via NC. Continue to progress per current POC.  -     Row Name 02/05/22 1408          Therapy Plan Review/Discharge Plan (OT)    Anticipated Discharge Disposition (OT) home with assist; home with home health; home with outpatient therapy services  -     Row Name 02/05/22 1408          Vital Signs    Pre Systolic BP Rehab 160  -MR     Pre Treatment Diastolic BP 91  -MR     Pretreatment Heart Rate (beats/min) 61  -MR     Posttreatment Heart Rate (beats/min) 71  -MR      Pre SpO2 (%) 90  -MR     O2 Delivery Pre Treatment nasal cannula  -MR     Intra SpO2 (%) 87  -MR     O2 Delivery Intra Treatment nasal cannula  -MR     Post SpO2 (%) 91  -MR     O2 Delivery Post Treatment nasal cannula  -MR     Pre Patient Position Supine  -MR     Intra Patient Position Standing  -MR     Post Patient Position Supine  -MR     Row Name 02/05/22 1408          Positioning and Restraints    Pre-Treatment Position in bed  -MR     Post Treatment Position bed  -MR     In Bed notified nsg; call light within reach; encouraged to call for assist; side rails up x2; side lying left  Pt cleared by RN to be ad surya in room.  -MR           User Key  (r) = Recorded By, (t) = Taken By, (c) = Cosigned By    Initials Name Provider Type    MR Joel Ambar, OT Occupational Therapist               Outcome Measures     Row Name 02/05/22 1411          How much help from another is currently needed...    Putting on and taking off regular lower body clothing? 4  -MR     Bathing (including washing, rinsing, and drying) 3  -MR     Toileting (which includes using toilet bed pan or urinal) 4  -MR     Putting on and taking off regular upper body clothing 4  -MR     Taking care of personal grooming (such as brushing teeth) 4  -MR     Eating meals 4  -MR     AM-PAC 6 Clicks Score (OT) 23  -MR     Row Name 02/05/22 0840          How much help from another person do you currently need...    Turning from your back to your side while in flat bed without using bedrails? 4  -AS     Moving from lying on back to sitting on the side of a flat bed without bedrails? 4  -AS     Moving to and from a bed to a chair (including a wheelchair)? 3  -AS     Standing up from a chair using your arms (e.g., wheelchair, bedside chair)? 3  -AS     Climbing 3-5 steps with a railing? 3  -AS     To walk in hospital room? 3  -AS     AM-PAC 6 Clicks Score (PT) 20  -AS     Row Name 02/05/22 1411          Functional Assessment    Outcome Measure Options AM-PAC 6  Clicks Daily Activity (OT)  -MR           User Key  (r) = Recorded By, (t) = Taken By, (c) = Cosigned By    Initials Name Provider Type    AS Carolann Negrete, RN Registered Nurse    Ambar Jin, OT Occupational Therapist                Occupational Therapy Education                 Title: PT OT SLP Therapies (In Progress)     Topic: Occupational Therapy (Done)     Point: ADL training (Done)     Description:   Instruct learner(s) on proper safety adaptation and remediation techniques during self care or transfers.   Instruct in proper use of assistive devices.              Learning Progress Summary           Patient Acceptance, E, VU,DU by  at 2/5/2022 1412    Acceptance, E, VU by MA at 2/2/2022 1017                   Point: Home exercise program (Done)     Description:   Instruct learner(s) on appropriate technique for monitoring, assisting and/or progressing therapeutic exercises/activities.              Learning Progress Summary           Patient Acceptance, E, VU,DU by MR at 2/5/2022 1412                   Point: Precautions (Done)     Description:   Instruct learner(s) on prescribed precautions during self-care and functional transfers.              Learning Progress Summary           Patient Acceptance, E, VU,DU by  at 2/5/2022 1412    Acceptance, E, VU by MA at 2/2/2022 1017                   Point: Body mechanics (Done)     Description:   Instruct learner(s) on proper positioning and spine alignment during self-care, functional mobility activities and/or exercises.              Learning Progress Summary           Patient Acceptance, E, VU,DU by MR at 2/5/2022 1412    Acceptance, E, VU by MA at 2/2/2022 1017                               User Key     Initials Effective Dates Name Provider Type Discipline    MA 11/19/20 -  Sommer Hester, OT Occupational Therapist OT     10/06/21 -  Ambar Maldonado, REYES Occupational Therapist OT              OT Recommendation and Plan     Plan of Care  Review  Plan of Care Reviewed With: patient  Progress: improving  Outcome Summary: Pt demonstrating improvement w/ bed mobility - SUP, functional mobility - CGA w/o AD. Pt gave good effort towards BUE pulmonary ther ex while sitting unsupported. Pt's O2 sats dropped w/ functional mobility to 87% recovering 18 seconds to 91% while on 4 L of O2 via NC. Continue to progress per current POC.     Time Calculation:    Time Calculation- OT     Row Name 02/05/22 1412             Time Calculation- OT    OT Start Time 1035  -MR      OT Received On 02/05/22  -MR      OT Goal Re-Cert Due Date 02/12/22  -MR              Timed Charges    27433 - OT Therapeutic Exercise Minutes 10  -MR      02330 - OT Therapeutic Activity Minutes 12  -MR      88414 - OT Self Care/Mgmt Minutes 5  -MR              Total Minutes    Timed Charges Total Minutes 27  -MR       Total Minutes 27  -MR            User Key  (r) = Recorded By, (t) = Taken By, (c) = Cosigned By    Initials Name Provider Type    MR Garrick Maldonado OT Occupational Therapist              Therapy Charges for Today     Code Description Service Date Service Provider Modifiers Qty    09426682418 HC OT THER PROC EA 15 MIN 2/5/2022 Garrick Maldonado OT GO 1    34184506486 HC OT THERAPEUTIC ACT EA 15 MIN 2/5/2022 Garrick Maldonado OT GO 1               GARRICK MALDONADO OT  2/5/2022

## 2022-02-06 ENCOUNTER — APPOINTMENT (OUTPATIENT)
Dept: GENERAL RADIOLOGY | Facility: HOSPITAL | Age: 57
End: 2022-02-06

## 2022-02-06 LAB
QT INTERVAL: 364 MS
QTC INTERVAL: 440 MS

## 2022-02-06 PROCEDURE — 99233 SBSQ HOSP IP/OBS HIGH 50: CPT | Performed by: PHYSICIAN ASSISTANT

## 2022-02-06 PROCEDURE — 72100 X-RAY EXAM L-S SPINE 2/3 VWS: CPT

## 2022-02-06 PROCEDURE — 63710000001 DEXAMETHASONE PER 0.25 MG: Performed by: INTERNAL MEDICINE

## 2022-02-06 PROCEDURE — 25010000002 ENOXAPARIN PER 10 MG: Performed by: INTERNAL MEDICINE

## 2022-02-06 RX ORDER — LIDOCAINE 50 MG/G
1 PATCH TOPICAL
Status: DISCONTINUED | OUTPATIENT
Start: 2022-02-07 | End: 2022-02-06

## 2022-02-06 RX ORDER — BUDESONIDE 0.5 MG/2ML
0.5 INHALANT ORAL
Status: CANCELLED | OUTPATIENT
Start: 2022-02-06

## 2022-02-06 RX ORDER — LIDOCAINE 50 MG/G
1 PATCH TOPICAL NIGHTLY
Status: DISCONTINUED | OUTPATIENT
Start: 2022-02-06 | End: 2022-02-07 | Stop reason: HOSPADM

## 2022-02-06 RX ADMIN — ISOSORBIDE DINITRATE 30 MG: 20 TABLET ORAL at 09:13

## 2022-02-06 RX ADMIN — GABAPENTIN 100 MG: 100 CAPSULE ORAL at 09:13

## 2022-02-06 RX ADMIN — Medication 60 MG: at 20:46

## 2022-02-06 RX ADMIN — Medication 5 MG: at 20:46

## 2022-02-06 RX ADMIN — ZOLPIDEM TARTRATE 5 MG: 5 TABLET ORAL at 20:47

## 2022-02-06 RX ADMIN — FAMOTIDINE 20 MG: 20 TABLET, FILM COATED ORAL at 16:40

## 2022-02-06 RX ADMIN — ACETAMINOPHEN 650 MG: 325 TABLET, FILM COATED ORAL at 20:54

## 2022-02-06 RX ADMIN — FLUTICASONE PROPIONATE 2 SPRAY: 50 SPRAY, METERED NASAL at 09:14

## 2022-02-06 RX ADMIN — Medication 60 MG: at 09:13

## 2022-02-06 RX ADMIN — AMLODIPINE BESYLATE 10 MG: 10 TABLET ORAL at 09:13

## 2022-02-06 RX ADMIN — LIDOCAINE 1 PATCH: 50 PATCH CUTANEOUS at 21:20

## 2022-02-06 RX ADMIN — LEVOTHYROXINE SODIUM 200 MCG: 100 TABLET ORAL at 09:13

## 2022-02-06 RX ADMIN — ISOSORBIDE DINITRATE 30 MG: 20 TABLET ORAL at 16:40

## 2022-02-06 RX ADMIN — SODIUM CHLORIDE, PRESERVATIVE FREE 10 ML: 5 INJECTION INTRAVENOUS at 09:15

## 2022-02-06 RX ADMIN — SODIUM CHLORIDE, PRESERVATIVE FREE 10 ML: 5 INJECTION INTRAVENOUS at 20:46

## 2022-02-06 RX ADMIN — PANTOPRAZOLE SODIUM 40 MG: 40 TABLET, DELAYED RELEASE ORAL at 09:13

## 2022-02-06 RX ADMIN — ENOXAPARIN SODIUM 40 MG: 40 INJECTION SUBCUTANEOUS at 20:45

## 2022-02-06 RX ADMIN — TRAZODONE HYDROCHLORIDE 200 MG: 100 TABLET ORAL at 20:46

## 2022-02-06 RX ADMIN — METOPROLOL SUCCINATE 50 MG: 50 TABLET, EXTENDED RELEASE ORAL at 20:46

## 2022-02-06 RX ADMIN — ATORVASTATIN CALCIUM 40 MG: 40 TABLET, FILM COATED ORAL at 09:13

## 2022-02-06 RX ADMIN — MIRTAZAPINE 7.5 MG: 15 TABLET, FILM COATED ORAL at 20:46

## 2022-02-06 RX ADMIN — METOPROLOL SUCCINATE 50 MG: 50 TABLET, EXTENDED RELEASE ORAL at 09:13

## 2022-02-06 RX ADMIN — ROPINIROLE HYDROCHLORIDE 2 MG: 2 TABLET, FILM COATED ORAL at 09:13

## 2022-02-06 RX ADMIN — METHOCARBAMOL 500 MG: 500 TABLET, FILM COATED ORAL at 21:20

## 2022-02-06 RX ADMIN — BUPROPION HYDROCHLORIDE 150 MG: 150 TABLET, EXTENDED RELEASE ORAL at 09:13

## 2022-02-06 RX ADMIN — ALLOPURINOL 300 MG: 300 TABLET ORAL at 09:13

## 2022-02-06 RX ADMIN — FAMOTIDINE 20 MG: 20 TABLET, FILM COATED ORAL at 09:13

## 2022-02-06 RX ADMIN — GABAPENTIN 100 MG: 100 CAPSULE ORAL at 20:46

## 2022-02-06 RX ADMIN — SERTRALINE HYDROCHLORIDE 100 MG: 100 TABLET ORAL at 09:13

## 2022-02-06 RX ADMIN — ISOSORBIDE DINITRATE 30 MG: 20 TABLET ORAL at 20:46

## 2022-02-06 RX ADMIN — DEXAMETHASONE 6 MG: 2 TABLET ORAL at 09:12

## 2022-02-06 NOTE — PROGRESS NOTES
[facility]  PROGRESS NOTE    Patient Name: Lizette Frias  : 1965  MRN: [id number]    Date of Admission: 2022  Primary Care Physician: Yisel Cottrell APRN    Subjective     CC: f/u acute respiratory failure secondary to COVID-19 pneumonia     HPI:  In bed. Slept a little better last night. Nursing has weaned O2 to 3L NC and she is tolerating this. Coughing up clear sputum. Still having small-volume, loose stools. Says maybe 2-3 times per day. Hopes she'll be able to go home tomorrow.     ROS:  Gen- No fevers, chills  CV- No chest pain, palpitations  Resp- (+) cough, dyspnea and hypoxia   GI- No N/V or abdominal pain, diarrhea improving     Objective     Vital Signs:   Temp:  [98.7 °F (37.1 °C)-99.2 °F (37.3 °C)] 98.7 °F (37.1 °C)  Heart Rate:  [55-70] 55  Resp:  [16] 16  BP: (121-142)/(78-86) 142/85  Flow (L/min):  [3] 3     Physical Exam:  Constitutional: No acute distress, awake, alert and conversant. Sitting upright in bed   HENT: NCAT, mucous membranes moist  Respiratory: (+) cough with deep breathing, no overt wheezes or rales. Inspiratory rhonchi cleared after cough. 94% on 3L NC  Cardiovascular: RRR, no murmurs, rubs, or gallops  Gastrointestinal: Positive bowel sounds, soft, nontender, nondistended  Musculoskeletal: No bilateral ankle edema  Psychiatric: Appropriate affect, cooperative  Neurologic: Oriented x 3, moves all extremities spontaneously without focal deficits, speech clear  Skin: No rashes    Results Reviewed:  LAB RESULTS:      Lab 22  0451 22  0351 22  0535 22  1441 22  0520 22  1458   WBC 2.87* 2.81* 1.86*  --  2.04* 3.04*   HEMOGLOBIN 13.4 13.8 12.9  --  13.5 15.6   HEMATOCRIT 42.0 42.2 38.1  --  41.3 47.0*   PLATELETS 150 171 128*  --  128* 169   NEUTROS ABS 2.30 1.90 1.16*  --  1.40* 2.19   IMMATURE GRANS (ABS)  --  0.14* 0.09*  --  0.18*  --    LYMPHS ABS  --  0.50* 0.39*  --  0.32*  --     MONOS ABS  --  0.24 0.21  --  0.12  --    EOS ABS 0.00 0.00 0.00  --  0.00 0.00   MCV 92.1 90.8 87.4  --  92.0 89.7   CRP  --   --   --   --  6.45*  --    PROCALCITONIN  --   --   --   --   --  0.06   LACTATE  --   --   --   --   --  1.0   PROTIME  --   --   --  13.5  --   --    APTT  --   --   --  33.8  --   --    HEPARIN ANTI-XA  --   --   --  0.10*  --   --    D DIMER QUANT  --   --   --   --   --  0.32         Lab 02/05/22 0451 02/04/22 0351 02/03/22 0535 02/02/22 0520 02/01/22  1458   SODIUM 143 141 142 142 141   POTASSIUM 4.6 4.8 5.2 4.4 3.0*   CHLORIDE 110* 110* 112* 108* 102   CO2 23.0 19.0* 19.0* 20.0* 23.0   ANION GAP 10.0 12.0 11.0 14.0 16.0*   BUN 20 21* 23* 25* 34*   CREATININE 0.81 0.68 0.75 1.12* 1.48*   GLUCOSE 106* 90 143* 206* 123*   CALCIUM 8.4* 8.3* 8.3* 8.3* 9.3   MAGNESIUM  --   --   --   --  1.4*   HEMOGLOBIN A1C  --   --   --  6.10*  --    TSH  --   --   --  0.247*  --          Lab 02/05/22 0451 02/04/22 0351 02/03/22  0535 02/02/22  0520 02/01/22  1458   TOTAL PROTEIN 6.0 6.0 6.0 6.1 7.2   ALBUMIN 3.50 3.60 3.50 3.60 4.30   GLOBULIN 2.5 2.4 2.5 2.5 2.9   ALT (SGPT) 50* 56* 60* 89* 106*   AST (SGOT) 30 38* 35* 89* 92*   BILIRUBIN 0.5 0.4 0.3 0.4 0.6   BILIRUBIN DIRECT <0.2 <0.2 <0.2 <0.2  --    ALK PHOS 73 70 68 72 87   LIPASE  --   --   --   --  27     Brief Urine Lab Results  (Last result in the past 365 days)      Color   Clarity   Blood   Leuk Est   Nitrite   Protein   CREAT   Urine HCG        02/01/22 1458 Yellow   Clear   Negative   Trace   Negative   100 mg/dL (2+)               Microbiology Results Abnormal     None        No radiology results from the last 24 hrs    I have reviewed the medications:  Scheduled Meds:allopurinol, 300 mg, Oral, Daily  amLODIPine, 10 mg, Oral, Q24H  atorvastatin, 40 mg, Oral, Daily  buPROPion SR, 150 mg, Oral, Daily  dexamethasone, 6 mg, Oral, Daily With Breakfast  dextromethorphan polistirex ER, 60 mg, Oral, Q12H  enoxaparin, 40 mg, Subcutaneous,  Q24H  famotidine, 20 mg, Oral, BID AC  fluticasone, 2 spray, Each Nare, Daily  gabapentin, 100 mg, Oral, Q12H  isosorbide dinitrate, 30 mg, Oral, TID  levothyroxine, 200 mcg, Oral, Q AM  melatonin, 5 mg, Oral, Nightly  metoprolol succinate XL, 50 mg, Oral, BID  mirtazapine, 7.5 mg, Oral, Nightly  pantoprazole, 40 mg, Oral, Daily  rOPINIRole, 2 mg, Oral, Daily  sertraline, 100 mg, Oral, Daily  sodium chloride, 10 mL, Intravenous, Q12H  traZODone, 200 mg, Oral, Nightly  zolpidem, 5 mg, Oral, Nightly      Continuous Infusions:Pharmacy Consult - Remdesivir,       PRN Meds:.•  acetaminophen **OR** acetaminophen **OR** acetaminophen  •  albuterol sulfate HFA  •  benzocaine-menthol  •  benzonatate  •  hydrALAZINE  •  hydrOXYzine  •  magnesium sulfate **OR** magnesium sulfate **OR** magnesium sulfate  •  methocarbamol  •  ondansetron **OR** ondansetron  •  Pharmacy Consult - Remdesivir  •  phenol  •  potassium chloride **OR** potassium chloride **OR** potassium chloride  •  Sodium Chloride (PF)  •  sodium chloride    Assessment & Plan     Active Hospital Problems    Diagnosis  POA   • Severe malnutrition (CMS/HCC) [E43]  Yes   • Pneumonia due to COVID-19 virus [U07.1, J12.82]  Yes   • Elevated serum creatinine [R79.89]  Unknown   • Liver transplant recipient (HCC) [Z94.4]  Not Applicable   • Chest pain, atypical [R07.89]  Unknown   • Hypokalemia [E87.6]  Unknown   • Hypomagnesemia [E83.42]  Unknown   • Hypothyroid [E03.9]  Yes   • Cirrhosis of liver (HCC) [K74.60]  Yes   • Personal history of malignant neoplasm of thyroid [Z85.850]  Not Applicable   • Vision impairment [H54.7]  Yes   • Stroke (HCC) [I63.9]  Yes      Resolved Hospital Problems   No resolved problems to display.     Brief Hospital Course to date:  Lizette Frias is a 56 y.o. female with PMH significant for HTN, HLD, prediabetes, anxiety/depression, thyroid cancer (s/p resection with resultant hypothyroidism), gout, meningioma, STEVE on CPAP, MORRISON cirrhosis  (s/p liver transplant in March 2020 with history of intraoperative stroke due to hypotension, on chronic immunosuppression). She is fully vaccinated / boosted for COVID-19. She was admitted to [facility] 2/1/22 for acute hypoxic respiratory failure secondary to COVID-19 pneumonia.     Acute hypoxic respiratory secondary to COVID-19 pneumonia   - Fully vaccinated / boosted.   - Initially symptomatic on 1/22/22. Isolation 20 days from onset of symptoms (1/22-2/10/22). First day out of isolation 2/11/22  - Appreciate [facility] assistance.  - D-dimer negative, CTA chest deferred.   - Procalcitonin reassuring   - s/p Remdesivir x 5 days, remains on Dexamethasone to complete total 10 days  - ID recommends Evusheld preventative monoclonal antibody therapy from transplant center 1 month after DC. My partner discussed with transplant coordinator on 2/3/22 - attempts to speak to her transplant physician have thus far, been unsuccessful. Will try again tomorrow   - Continue PT/OT  - Continue supportive therapy. Wean O2 as able - on 3L NC      H/o MORRISON cirrhosis s/p liver transplant  Elevated LFTs  Chronic Immunosuppression   - ID recommends holding of home immunosuppressants (Tacrolimus / CellCept) x 2 weeks  - Holding home prednisone while on Dexamethasone      Leukopenia  - Stable. Likely related to above issues      ZAYRA, resolved. Renal function remains stable      Chest pain  - Apparently this is chronic - had recent MPS in January which was unremarkable  - Patient reports that Dr. Ventura is planning for [facility] to investigate for Pulm HTN  - EKG / troponins reassuring. Nitropaste discontinued     Hypokalemia  Hypomagenesemia  - Replace per protocol      HTN, continue Amlodipine 10mg daily, Metoprolol XL 50mg daily   HLD, continue Atorvastatin     Hypothyroidism  - Continue Synthroid  - TSH borderline low at 0.247, free T4 borderline high at 1.76   - Continue current dose for now. Needs repeat TSH / free T4 in 4-6  weeks    Insomnia  - Slept a little better last night    - Continue Trazodone 200mg QHS, Ambien 5mg QHS, Melatonin 5mg QHS and Mirtazapine to 7.5mg QHS   Gout, continue Allopurinol. Can resume cholchicine if needed now that renal function has returned to baseline    Mood Disorder, continue home medications   H/o CVA, with residual left eye blindness  STEVE, CPAP QHS and PRN    DVT prophylaxis:Medical DVT prophylaxis orders are present.     AM-PAC 6 Clicks Score (PT): 20 (02/05/22 0840)    Disposition: I expect the patient to be discharged on Monday if she remains stable and is cleared by ID     CODE STATUS:   Code Status and Medical Interventions:   Ordered at: 02/01/22 9120     Level Of Support Discussed With:    Patient     Code Status (Patient has no pulse and is not breathing):    CPR (Attempt to Resuscitate)     Medical Interventions (Patient has pulse or is breathing):    Full Support     Elizabet Willard PA-C  02/06/22

## 2022-02-07 ENCOUNTER — HOME HEALTH ADMISSION (OUTPATIENT)
Dept: HOME HEALTH SERVICES | Facility: HOME HEALTHCARE | Age: 57
End: 2022-02-07

## 2022-02-07 ENCOUNTER — READMISSION MANAGEMENT (OUTPATIENT)
Dept: CALL CENTER | Facility: HOSPITAL | Age: 57
End: 2022-02-07

## 2022-02-07 VITALS
BODY MASS INDEX: 34.25 KG/M2 | SYSTOLIC BLOOD PRESSURE: 135 MMHG | TEMPERATURE: 98 F | DIASTOLIC BLOOD PRESSURE: 84 MMHG | RESPIRATION RATE: 18 BRPM | WEIGHT: 181.4 LBS | OXYGEN SATURATION: 92 % | HEIGHT: 61 IN | HEART RATE: 71 BPM

## 2022-02-07 PROBLEM — J96.01 ACUTE HYPOXEMIC RESPIRATORY FAILURE DUE TO COVID-19: Status: ACTIVE | Noted: 2022-02-01

## 2022-02-07 PROBLEM — G47.00 INSOMNIA: Status: ACTIVE | Noted: 2022-02-07

## 2022-02-07 PROBLEM — D84.821 IMMUNOSUPPRESSION DUE TO DRUG THERAPY: Status: ACTIVE | Noted: 2022-02-07

## 2022-02-07 PROBLEM — E87.6 HYPOKALEMIA: Status: RESOLVED | Noted: 2022-02-01 | Resolved: 2022-02-07

## 2022-02-07 PROBLEM — E83.42 HYPOMAGNESEMIA: Status: RESOLVED | Noted: 2022-02-01 | Resolved: 2022-02-07

## 2022-02-07 PROBLEM — D89.832 CYTOKINE RELEASE SYNDROME, GRADE 2: Status: ACTIVE | Noted: 2022-02-07

## 2022-02-07 PROBLEM — D72.819 LEUKOPENIA: Status: ACTIVE | Noted: 2022-02-07

## 2022-02-07 PROBLEM — R07.89 CHEST PAIN, ATYPICAL: Status: RESOLVED | Noted: 2022-02-01 | Resolved: 2022-02-07

## 2022-02-07 PROBLEM — N17.9 ACUTE KIDNEY INJURY: Status: RESOLVED | Noted: 2022-02-01 | Resolved: 2022-02-07

## 2022-02-07 PROBLEM — Z79.899 IMMUNOSUPPRESSION DUE TO DRUG THERAPY: Status: ACTIVE | Noted: 2022-02-07

## 2022-02-07 PROBLEM — N17.9 ACUTE KIDNEY INJURY: Status: ACTIVE | Noted: 2022-02-01

## 2022-02-07 PROBLEM — G47.30 SLEEP APNEA: Status: ACTIVE | Noted: 2022-02-07

## 2022-02-07 PROBLEM — M10.9 GOUT: Status: ACTIVE | Noted: 2022-02-07

## 2022-02-07 LAB
ANION GAP SERPL CALCULATED.3IONS-SCNC: 9 MMOL/L (ref 5–15)
BUN SERPL-MCNC: 24 MG/DL (ref 6–20)
BUN/CREAT SERPL: 26.1 (ref 7–25)
CALCIUM SPEC-SCNC: 8.3 MG/DL (ref 8.6–10.5)
CHLORIDE SERPL-SCNC: 108 MMOL/L (ref 98–107)
CO2 SERPL-SCNC: 24 MMOL/L (ref 22–29)
CREAT SERPL-MCNC: 0.92 MG/DL (ref 0.57–1)
DEPRECATED RDW RBC AUTO: 46.2 FL (ref 37–54)
ERYTHROCYTE [DISTWIDTH] IN BLOOD BY AUTOMATED COUNT: 13.5 % (ref 12.3–15.4)
GFR SERPL CREATININE-BSD FRML MDRD: 63 ML/MIN/1.73
GLUCOSE SERPL-MCNC: 103 MG/DL (ref 65–99)
HCT VFR BLD AUTO: 41.1 % (ref 34–46.6)
HGB BLD-MCNC: 13.1 G/DL (ref 12–15.9)
MCH RBC QN AUTO: 29.9 PG (ref 26.6–33)
MCHC RBC AUTO-ENTMCNC: 31.9 G/DL (ref 31.5–35.7)
MCV RBC AUTO: 93.8 FL (ref 79–97)
PLATELET # BLD AUTO: 158 10*3/MM3 (ref 140–450)
PMV BLD AUTO: 11.4 FL (ref 6–12)
POTASSIUM SERPL-SCNC: 4.3 MMOL/L (ref 3.5–5.2)
RBC # BLD AUTO: 4.38 10*6/MM3 (ref 3.77–5.28)
SODIUM SERPL-SCNC: 141 MMOL/L (ref 136–145)
WBC NRBC COR # BLD: 3.6 10*3/MM3 (ref 3.4–10.8)

## 2022-02-07 PROCEDURE — 99239 HOSP IP/OBS DSCHRG MGMT >30: CPT | Performed by: PHYSICIAN ASSISTANT

## 2022-02-07 PROCEDURE — 25010000002 KETOROLAC TROMETHAMINE PER 15 MG: Performed by: NURSE PRACTITIONER

## 2022-02-07 PROCEDURE — 85027 COMPLETE CBC AUTOMATED: CPT | Performed by: PHYSICIAN ASSISTANT

## 2022-02-07 PROCEDURE — 80048 BASIC METABOLIC PNL TOTAL CA: CPT | Performed by: PHYSICIAN ASSISTANT

## 2022-02-07 PROCEDURE — 63710000001 DEXAMETHASONE PER 0.25 MG: Performed by: INTERNAL MEDICINE

## 2022-02-07 RX ORDER — KETOROLAC TROMETHAMINE 30 MG/ML
30 INJECTION, SOLUTION INTRAMUSCULAR; INTRAVENOUS ONCE
Status: COMPLETED | OUTPATIENT
Start: 2022-02-07 | End: 2022-02-07

## 2022-02-07 RX ORDER — FUROSEMIDE 20 MG/1
TABLET ORAL
Start: 2022-02-07 | End: 2023-03-16

## 2022-02-07 RX ORDER — TRAMADOL HYDROCHLORIDE 50 MG/1
50 TABLET ORAL EVERY 6 HOURS PRN
Qty: 18 TABLET | Refills: 0 | Status: SHIPPED | OUTPATIENT
Start: 2022-02-07 | End: 2022-02-07 | Stop reason: SDUPTHER

## 2022-02-07 RX ORDER — MIRTAZAPINE 7.5 MG/1
7.5 TABLET, FILM COATED ORAL NIGHTLY
Qty: 30 TABLET | Refills: 2 | Status: SHIPPED | OUTPATIENT
Start: 2022-02-07 | End: 2022-07-28

## 2022-02-07 RX ORDER — DEXTROMETHORPHAN POLISTIREX 30 MG/5ML
60 SUSPENSION ORAL 2 TIMES DAILY
Qty: 280 ML | Refills: 1 | Status: SHIPPED | OUTPATIENT
Start: 2022-02-07 | End: 2022-02-07

## 2022-02-07 RX ORDER — EVEROLIMUS TABLETS 0.5 MG/1
TABLET ORAL
Qty: 60 TABLET
Start: 2022-02-07 | End: 2022-04-05

## 2022-02-07 RX ORDER — TRAMADOL HYDROCHLORIDE 50 MG/1
50 TABLET ORAL EVERY 6 HOURS PRN
Qty: 18 TABLET | Refills: 0 | Status: SHIPPED | OUTPATIENT
Start: 2022-02-07 | End: 2022-11-30 | Stop reason: SDUPTHER

## 2022-02-07 RX ORDER — BENZONATATE 100 MG/1
100 CAPSULE ORAL 3 TIMES DAILY PRN
Qty: 60 CAPSULE | Refills: 1 | Status: SHIPPED | OUTPATIENT
Start: 2022-02-07 | End: 2022-03-03

## 2022-02-07 RX ORDER — DEXTROMETHORPHAN POLISTIREX 30 MG/5ML
60 SUSPENSION ORAL 2 TIMES DAILY
Qty: 280 ML | Refills: 1 | Status: SHIPPED | OUTPATIENT
Start: 2022-02-07 | End: 2022-03-03

## 2022-02-07 RX ORDER — CHOLECALCIFEROL (VITAMIN D3) 125 MCG
5 CAPSULE ORAL NIGHTLY
Start: 2022-02-07 | End: 2022-03-28

## 2022-02-07 RX ORDER — MYCOPHENOLIC ACID 360 MG/1
TABLET, DELAYED RELEASE ORAL
Qty: 120 TABLET
Start: 2022-02-07 | End: 2022-03-03

## 2022-02-07 RX ORDER — POTASSIUM CHLORIDE 750 MG/1
CAPSULE, EXTENDED RELEASE ORAL
Start: 2022-02-07

## 2022-02-07 RX ORDER — SPIRONOLACTONE 50 MG/1
50 TABLET, FILM COATED ORAL DAILY
Start: 2022-02-07 | End: 2023-01-06

## 2022-02-07 RX ORDER — TACROLIMUS 0.5 MG/1
CAPSULE ORAL
Start: 2022-02-07 | End: 2022-03-08

## 2022-02-07 RX ORDER — PREDNISONE 10 MG/1
TABLET ORAL
Start: 2022-02-07 | End: 2022-12-24

## 2022-02-07 RX ORDER — GABAPENTIN 100 MG/1
100 CAPSULE ORAL EVERY 12 HOURS SCHEDULED
Start: 2022-02-07 | End: 2022-03-03

## 2022-02-07 RX ORDER — METHOCARBAMOL 500 MG/1
500 TABLET, FILM COATED ORAL 3 TIMES DAILY PRN
Qty: 30 TABLET | Refills: 0 | Status: SHIPPED | OUTPATIENT
Start: 2022-02-07 | End: 2022-04-05

## 2022-02-07 RX ORDER — DEXAMETHASONE 6 MG/1
6 TABLET ORAL
Qty: 3 TABLET | Refills: 0 | Status: SHIPPED | OUTPATIENT
Start: 2022-02-08 | End: 2022-02-07

## 2022-02-07 RX ORDER — ACETAMINOPHEN 325 MG/1
650 TABLET ORAL EVERY 4 HOURS PRN
Start: 2022-02-07 | End: 2022-03-03

## 2022-02-07 RX ORDER — BENZONATATE 100 MG/1
100 CAPSULE ORAL 3 TIMES DAILY PRN
Qty: 60 CAPSULE | Refills: 1 | Status: SHIPPED | OUTPATIENT
Start: 2022-02-07 | End: 2022-02-07

## 2022-02-07 RX ORDER — AMLODIPINE BESYLATE 10 MG/1
10 TABLET ORAL DAILY
Start: 2022-02-07

## 2022-02-07 RX ORDER — DEXAMETHASONE 6 MG/1
6 TABLET ORAL
Qty: 3 TABLET | Refills: 0 | Status: SHIPPED | OUTPATIENT
Start: 2022-02-08 | End: 2022-02-11

## 2022-02-07 RX ADMIN — METOPROLOL SUCCINATE 50 MG: 50 TABLET, EXTENDED RELEASE ORAL at 08:57

## 2022-02-07 RX ADMIN — GABAPENTIN 100 MG: 100 CAPSULE ORAL at 08:57

## 2022-02-07 RX ADMIN — ALLOPURINOL 300 MG: 300 TABLET ORAL at 08:57

## 2022-02-07 RX ADMIN — LEVOTHYROXINE SODIUM 200 MCG: 100 TABLET ORAL at 05:26

## 2022-02-07 RX ADMIN — BUPROPION HYDROCHLORIDE 150 MG: 150 TABLET, EXTENDED RELEASE ORAL at 08:57

## 2022-02-07 RX ADMIN — Medication 60 MG: at 08:57

## 2022-02-07 RX ADMIN — ATORVASTATIN CALCIUM 40 MG: 40 TABLET, FILM COATED ORAL at 08:58

## 2022-02-07 RX ADMIN — ROPINIROLE HYDROCHLORIDE 2 MG: 2 TABLET, FILM COATED ORAL at 08:58

## 2022-02-07 RX ADMIN — KETOROLAC TROMETHAMINE 30 MG: 30 INJECTION, SOLUTION INTRAMUSCULAR at 00:58

## 2022-02-07 RX ADMIN — DEXAMETHASONE 6 MG: 2 TABLET ORAL at 08:57

## 2022-02-07 RX ADMIN — METHOCARBAMOL 500 MG: 500 TABLET, FILM COATED ORAL at 08:58

## 2022-02-07 RX ADMIN — SODIUM CHLORIDE, PRESERVATIVE FREE 10 ML: 5 INJECTION INTRAVENOUS at 08:58

## 2022-02-07 RX ADMIN — FLUTICASONE PROPIONATE 2 SPRAY: 50 SPRAY, METERED NASAL at 08:59

## 2022-02-07 RX ADMIN — FAMOTIDINE 20 MG: 20 TABLET, FILM COATED ORAL at 08:57

## 2022-02-07 RX ADMIN — PANTOPRAZOLE SODIUM 40 MG: 40 TABLET, DELAYED RELEASE ORAL at 08:57

## 2022-02-07 RX ADMIN — SERTRALINE HYDROCHLORIDE 100 MG: 100 TABLET ORAL at 08:58

## 2022-02-07 RX ADMIN — ISOSORBIDE DINITRATE 30 MG: 20 TABLET ORAL at 08:58

## 2022-02-07 RX ADMIN — AMLODIPINE BESYLATE 10 MG: 10 TABLET ORAL at 08:58

## 2022-02-07 RX ADMIN — BENZONATATE 100 MG: 100 CAPSULE ORAL at 14:45

## 2022-02-07 NOTE — CASE MANAGEMENT/SOCIAL WORK
Case Management Discharge Note      Final Note: Pt's plan is to dc to home. Romm air sat is 93%. Has pulse oximeter at home. No other needs noted. Family to provide transport. HH set up with [facility]. Spoke with Charmaine to alert to pt's DC.     Provided Post Acute Provider List?: N/A  N/A Provider List Comment: Current with [facility] for DME    Selected Continued Care - Admitted Since 2/1/2022     Destination    No services have been selected for the patient.              Durable Medical Equipment Coordination complete.    Service Provider Selected Services Address Phone Fax Patient Preferred    [facility] [place] [facility]  Durable Medical Equipment [address] [facility] [address] [phone] --          Dialysis/Infusion    No services have been selected for the patient.              Home Medical Care Coordination complete.    Service Provider Selected Services Address Phone Fax Patient Preferred    [facility]  Home Health Services [address] [address] [phone] --          Therapy    No services have been selected for the patient.              Community Resources    No services have been selected for the patient.              Community & DME    No services have been selected for the patient.                       Final Discharge Disposition Code: 01 - home or self-care

## 2022-02-07 NOTE — DISCHARGE SUMMARY
[facility] Medicine Services  DISCHARGE SUMMARY    Patient Name: Lizette Frias  : 1965  MRN: [id number]    Date of Admission: 2022  5:00 PM  Date of Discharge: 2022  Primary Care Physician: Yisel Cottrell APRN    Consults     Date and Time Order Name Status Description    2022  9:38 PM Inpatient Infectious Diseases Consult Completed         Hospital Course     Presenting Problem:   Pneumonia due to COVID-19 virus [U07.1, J12.82]    Active Hospital Problems    Diagnosis  POA   • **Acute hypoxemic respiratory failure due to COVID-19 (HCC) [U07.1, J96.01]  Yes   • Leukopenia [D72.819]  Yes   • Immunosuppression due to drug therapy (HCC) [D84.821, Z79.899]  Not Applicable   • Insomnia [G47.00]  Yes   • Sleep apnea [G47.30]  Yes   • Gout [M10.9]  Yes   • Cytokine release syndrome, grade 2 [D89.832]  No   • Severe malnutrition (CMS/HCC) [E43]  Yes   • Liver transplant recipient (HCC) [Z94.4]  Not Applicable   • Hypothyroid [E03.9]  Yes   • Cirrhosis of liver (HCC) [K74.60]  Yes   • Personal history of malignant neoplasm of thyroid [Z85.850]  Not Applicable   • Vision impairment [H54.7]  Yes   • History of stroke [Z86.73]  Not Applicable      Resolved Hospital Problems    Diagnosis Date Resolved POA   • Acute kidney injury (HCC) [N17.9] 2022 Unknown   • Chest pain, atypical [R07.89] 2022 Unknown   • Hypokalemia [E87.6] 2022 Unknown   • Hypomagnesemia [E83.42] 2022 Unknown      Hospital Course:  Lizette Frias is a 56 y.o. female with PMH significant for HTN, HLD, prediabetes, anxiety/depression, thyroid cancer (s/p resection with resultant hypothyroidism), gout, meningioma, STEVE on CPAP, MORRISON cirrhosis (s/p liver transplant in 2020 with history of intraoperative stroke due to hypotension with residual L eye blindness, on chronic immunosuppression). She is fully vaccinated / boosted for COVID-19. She was admitted to [facility]  [place] 2/1/22 for acute hypoxic respiratory failure secondary to COVID-19 pneumonia.      Acute hypoxic respiratory secondary to COVID-19 pneumonia   - Fully vaccinated / boosted.   - Initially symptomatic on 1/22/22. Isolation 20 days from onset of symptoms (1/22-2/11/22). First day out of isolation 2/12/22  - Appreciate [facility] assistance.  - D-dimer negative, CTA chest deferred.   - Procalcitonin reassuring   - s/p Remdesivir x 5 days, remains on Dexamethasone to complete total 10 days  - ID recommends Evusheld preventative monoclonal antibody therapy from transplant center 1 month after DC. My partner discussed with transplant coordinator on 2/3/22 - attempts to speak to her transplant physician have thus far, been unsuccessful. Dr. Edwards working with ID pharmacist to see if we can arrange here  - Transplant coordinator - Leslee Quinn [phone]   - Continue supportive therapy  - Home on O2, has weaned to 2L NC     H/o MORRISON cirrhosis s/p liver transplant  Elevated LFTs  Chronic Immunosuppression   - ID recommends holding of home immunosuppressants ( Everolimus /Tacrolimus / CellCept) x 2 weeks  - Holding home prednisone while on Dexamethasone     Leukopenia  - Stable. Likely related to above issues      ZAYRA, resolved. Renal function remains stable   - Resume Aldactone at DC. Will continue holding Lasix until PCP follow up (BUN/creatinine up a little over past couple of days, don't want to dry her out at home)     Chest pain  - Apparently this is chronic - had recent MPS in January which was unremarkable  - Patient reports that Dr. Ventura is planning for [facility] to investigate for Pulm HTN  - EKG / troponins reassuring. Nitropaste discontinued     Hypokalemia  Hypomagenesemia  - Replace per protocol      HTN, continue Amlodipine 10mg daily, Metoprolol XL 50mg daily   HLD, continue Atorvastatin     Hypothyroidism  - Continue Synthroid  - TSH borderline low at 0.247, free T4 borderline high at 1.76   - Continue  current dose for now, given acute illness. Needs repeat TSH / free T4 in 4-6 weeks     Insomnia  - Slept a little better last night     - Continue Trazodone 200mg QHS, Ambien 5mg QHS, Melatonin 5mg QHS and Mirtazapine to 7.5mg QHS   Gout, continue Allopurinol. Can resume cholchicine if needed now that renal function has returned to baseline     Mood Disorder, continue home medications   H/o CVA, with residual left eye blindness  STEVE, CPAP QHS and PRN    Day of Discharge     HPI:   In bed. Strained her back last night, says she was bending over to get something out of her bag and developed severe pain. Has had this happen at home before, reports it usually resolves with heat and time. This pain feels similar and she does not want further imaging at this time (XR showed possible age-indeterminate partial T12 compression fracture, only visualized on frontal view and not appreciated on lateral view)    Review of Systems  Gen- No fevers, chills  CV- No chest pain, palpitations  Resp- (+) cough, dyspnea and hypoxia   GI- No N/V or abdominal pain, diarrhea improved but not resolved (still 2-3 loose stools per day)     Vital Signs:   Temp:  [98 °F (36.7 °C)-98.7 °F (37.1 °C)] 98 °F (36.7 °C)  Heart Rate:  [62-70] 70  Resp:  [16-18] 18  BP: ()/(66-95) 135/84  Flow (L/min):  [2-3] 2    Physical Exam:  Constitutional: No acute distress, awake, alert and conversant. Sitting upright in bed   HENT: NCAT, mucous membranes moist  Respiratory: (+) cough with deep breathing, no overt wheezes or rales. 94% on 2L NC  Cardiovascular: RRR, no murmurs, rubs, or gallops  Gastrointestinal: Positive bowel sounds, soft, nontender, nondistended  Musculoskeletal: No bilateral ankle edema  Psychiatric: Appropriate affect, cooperative  Neurologic: Oriented x 3, moves all extremities spontaneously without focal deficits, speech clear  Skin: No rashes     Pertinent  and/or Most Recent Results     LAB RESULTS:      Lab 02/07/22  8096  02/05/22 0451 02/04/22 0351 02/03/22  0535 02/02/22  1441 02/02/22  0520 02/01/22  1458 02/01/22  1458   WBC 3.60 2.87* 2.81* 1.86*  --  2.04*   < > 3.04*   HEMOGLOBIN 13.1 13.4 13.8 12.9  --  13.5   < > 15.6   HEMATOCRIT 41.1 42.0 42.2 38.1  --  41.3   < > 47.0*   PLATELETS 158 150 171 128*  --  128*   < > 169   NEUTROS ABS  --  2.30 1.90 1.16*  --  1.40*  --  2.19   IMMATURE GRANS (ABS)  --   --  0.14* 0.09*  --  0.18*  --   --    LYMPHS ABS  --   --  0.50* 0.39*  --  0.32*  --   --    MONOS ABS  --   --  0.24 0.21  --  0.12  --   --    EOS ABS  --  0.00 0.00 0.00  --  0.00  --  0.00   MCV 93.8 92.1 90.8 87.4  --  92.0   < > 89.7   CRP  --   --   --   --   --  6.45*  --   --    PROCALCITONIN  --   --   --   --   --   --   --  0.06   LACTATE  --   --   --   --   --   --   --  1.0   PROTIME  --   --   --   --  13.5  --   --   --    APTT  --   --   --   --  33.8  --   --   --    HEPARIN ANTI-XA  --   --   --   --  0.10*  --   --   --    D DIMER QUANT  --   --   --   --   --   --   --  0.32    < > = values in this interval not displayed.         Lab 02/07/22 0312 02/05/22 0451 02/04/22 0351 02/03/22  0535 02/02/22  0520 02/01/22  1458 02/01/22  1458   SODIUM 141 143 141 142 142   < > 141   POTASSIUM 4.3 4.6 4.8 5.2 4.4   < > 3.0*   CHLORIDE 108* 110* 110* 112* 108*   < > 102   CO2 24.0 23.0 19.0* 19.0* 20.0*   < > 23.0   ANION GAP 9.0 10.0 12.0 11.0 14.0   < > 16.0*   BUN 24* 20 21* 23* 25*   < > 34*   CREATININE 0.92 0.81 0.68 0.75 1.12*   < > 1.48*   GLUCOSE 103* 106* 90 143* 206*   < > 123*   CALCIUM 8.3* 8.4* 8.3* 8.3* 8.3*   < > 9.3   MAGNESIUM  --   --   --   --   --   --  1.4*   HEMOGLOBIN A1C  --   --   --   --  6.10*  --   --    TSH  --   --   --   --  0.247*  --   --     < > = values in this interval not displayed.         Lab 02/05/22  0451 02/04/22  0351 02/03/22  0535 02/02/22  0520 02/01/22  1458   TOTAL PROTEIN 6.0 6.0 6.0 6.1 7.2   ALBUMIN 3.50 3.60 3.50 3.60 4.30   GLOBULIN 2.5 2.4 2.5 2.5 2.9    ALT (SGPT) 50* 56* 60* 89* 106*   AST (SGOT) 30 38* 35* 89* 92*   BILIRUBIN 0.5 0.4 0.3 0.4 0.6   BILIRUBIN DIRECT <0.2 <0.2 <0.2 <0.2  --    ALK PHOS 73 70 68 72 87   LIPASE  --   --   --   --  27         Lab 02/02/22  1441 02/01/22 2059   TROPONIN T  --  <0.010   PROTIME 13.5  --    INR 1.06  --          Lab 02/02/22  0520   CHOLESTEROL 95   LDL CHOL 40   HDL CHOL 22*   TRIGLYCERIDES 204*     Brief Urine Lab Results  (Last result in the past 365 days)      Color   Clarity   Blood   Leuk Est   Nitrite   Protein   CREAT   Urine HCG        02/01/22 1458 Yellow   Clear   Negative   Trace   Negative   100 mg/dL (2+)               Microbiology Results (last 10 days)     Procedure Component Value - Date/Time    COVID PRE-OP / PRE-PROCEDURE SCREENING ORDER (NO ISOLATION) - Swab, Nasopharynx [[id number]]  (Abnormal) Collected: 02/01/22 1819    Lab Status: Final result Specimen: Swab from Nasopharynx Updated: 02/01/22 1902    Narrative:      The following orders were created for panel order COVID PRE-OP / PRE-PROCEDURE SCREENING ORDER (NO ISOLATION) - Swab, Nasopharynx.  Procedure                               Abnormality         Status                     ---------                               -----------         ------                     COVID-19 and FLU A/B PCR...[[id number]]  Abnormal            Final result                 Please view results for these tests on the individual orders.    COVID-19 and FLU A/B PCR - Swab, Nasopharynx [[id number]]  (Abnormal) Collected: 02/01/22 1819    Lab Status: Final result Specimen: Swab from Nasopharynx Updated: 02/01/22 1902     COVID19 Detected     Influenza A PCR Not Detected     Influenza B PCR Not Detected    Narrative:      Fact sheet for providers: https://www.fda.gov/media/456466/download    Fact sheet for patients: https://www.fda.gov/media/040046/download    Test performed by PCR.  Influenza A and Influenza B negative results should be considered presumptive in samples  that have a positive SARS-CoV-2 result.    Competitive inhibition studies showed that SARS-CoV-2 virus, when present at concentrations above 3.6E+04 copies/mL, can inhibit the detection and amplification of influenza A and influenza B virus RNA if present at or below 1.8E+02 copies/mL or 4.9E+02 copies/mL, respectively, and may lead to false negative influenza virus results. If co-infection with influenza A or influenza B virus is suspected in samples with a positive SARS-CoV-2 result, the sample should be re-tested with another FDA cleared, approved, or authorized influenza test, if influenza virus detection would change clinical management.        XR Spine Lumbar 2 or 3 View    Result Date: 2/6/2022  4 views lumbar spine CLINICAL INDICATION: Low back pain. COMPARISON: None. FINDINGS: There are 6 lumbar-like vertebral bodies. Question vertebral body T12 vertebral compression noted, but seen only on the frontal view. Lumbar alignment, vertebral body height and disc spaces are otherwise maintained throughout. No acute fracture  or dislocation is seen. SI joints are symmetric. Soft tissues unremarkable.     1. Age-indeterminate partial T12 vertebral body compression. This is seen on only one frontal view. No other potential fractures are seen. 2. 6 lumbar-like vertebral bodies noted. Electronically signed by:  Ramsey Sainz M.D.  2/6/2022 9:08 PM Mountain Time    XR Chest 1 View    Result Date: 2/1/2022  XR CHEST 1 VW-  Date of Exam: 2/1/2022 3:51 PM  Indication: cough, COVID.  Comparison:?1/21/2022  Technique:?A single view of the chest was obtained.  FINDINGS:  ?Heart size upper vessels are within normal limits.  There is been interval development of patchy bilateral airspace disease compatible with multifocal pneumonia.  No pleural effusion or pneumothorax.  Bony structures are unremarkable.  Multiple surgical clips project over the upper abdomen.          1.  New patchy bilateral airspace disease consistent with  multifocal pneumonia.   This report was finalized on 2/1/2022 4:52 PM by Nael Horn MD.      Discharge Details        Discharge Medications      New Medications      Instructions Start Date   acetaminophen 325 MG tablet  Commonly known as: TYLENOL   650 mg, Oral, Every 4 Hours PRN      benzonatate 100 MG capsule  Commonly known as: TESSALON   100 mg, Oral, 3 Times Daily PRN      dexamethasone 6 MG tablet  Commonly known as: DECADRON   6 mg, Oral, Daily With Breakfast   Start Date: February 8, 2022     dextromethorphan polistirex ER 30 MG/5ML Suspension Extended Release oral suspension  Commonly known as: DELSYM   60 mg, Oral, 2 Times Daily         Changes to Medications      Instructions Start Date   amLODIPine 10 MG tablet  Commonly known as: NORVASC  What changed:   how much to take  how to take this  when to take this   10 mg, Oral, Daily      buPROPion  MG 12 hr tablet  Commonly known as: WELLBUTRIN SR  What changed: Another medication with the same name was removed. Continue taking this medication, and follow the directions you see here.   150 mg, Oral, Daily      everolimus 0.5 MG tablet  Commonly known as: ZORTRESS  What changed: See the new instructions.   HOLD FOR 7 DAYS. RESUME ON 2/15/22 (2 tablet (1mg) by mouth every morning and 1 tablet (0.5mg) by mouth every evening)      furosemide 20 MG tablet  Commonly known as: LASIX  What changed:   how much to take  how to take this  when to take this  additional instructions   Hold until follow up with your primary care provider      gabapentin 100 MG capsule  Commonly known as: NEURONTIN  What changed:   how much to take  how to take this   100 mg, Oral, Every 12 Hours Scheduled      melatonin 5 MG tablet tablet  What changed: See the new instructions.   5 mg, Oral, Nightly      methocarbamol 500 MG tablet  Commonly known as: ROBAXIN  What changed: reasons to take this   500 mg, Oral, 3 Times Daily PRN      mirtazapine 7.5 MG tablet  Commonly known  as: REMERON  What changed:   medication strength  how much to take  how to take this  when to take this   7.5 mg, Oral, Nightly      mycophenolate 360 MG tablet delayed-release EC tablet  Commonly known as: MYFORTIC  What changed:   how much to take  how to take this  when to take this  additional instructions   HOLD FOR 7 DAYS. RESUME ON 2/15/22 (2 tablets by mouth two times per day)      potassium chloride 10 MEQ CR capsule  Commonly known as: MICRO-K  What changed:   how much to take  how to take this  when to take this  additional instructions   Hold until Lasix (furosemide) restarted      predniSONE 10 MG tablet  Commonly known as: DELTASONE  What changed:   how much to take  how to take this  additional instructions   Hold while taking Dexamethasone. Once out of Dexamethasone, resume taking 1 tablet by mouth daily      spironolactone 50 MG tablet  Commonly known as: ALDACTONE  What changed:   how much to take  how to take this  when to take this   50 mg, Oral, Daily      tacrolimus 0.5 MG capsule  Commonly known as: PROGRAF  What changed:   how much to take  how to take this  when to take this  additional instructions   HOLD FOR 7 DAYS. RESUME ON 2/15/22 (Two tablets by mouth twice per day)      traMADol 50 MG tablet  Commonly known as: ULTRAM  What changed:   how much to take  how to take this  when to take this  reasons to take this   50 mg, Oral, Every 6 Hours PRN         Continue These Medications      Instructions Start Date   allopurinol 300 MG tablet  Commonly known as: ZYLOPRIM   300 mg, Oral, Daily      atorvastatin 40 MG tablet  Commonly known as: LIPITOR   1 tablet, Oral, Daily      Ayr Saline Nasal Drops 0.65 % solution  Generic drug: Saline   Ayr Saline Gel nasal spray   Take by nasal route.      calcium carbonate-vitamin d 600-400 MG-UNIT per tablet   No dose, route, or frequency recorded.      cloNIDine 0.1 MG tablet  Commonly known as: CATAPRES   0.1 mg, Oral, Daily PRN      colchicine 0.6 MG  tablet   TAKE 1 TABLET(S) EVERY DAY. MAY TAKE ADD L TAB FOR GOUT FLARES      estradiol 0.1 MG/GM vaginal cream  Commonly known as: ESTRACE VAGINAL   Insert o.5 gm intravaginally 2 times each week      fluticasone 50 MCG/ACT nasal spray  Commonly known as: FLONASE   No dose, route, or frequency recorded.      ipratropium-albuterol 0.5-2.5 mg/3 ml nebulizer  Commonly known as: DUO-NEB   ipratropium 0.5 mg-albuterol 2.5 mg/2.5 mL solution for nebulization   Inhale by inhalation route.      isosorbide dinitrate 30 MG tablet  Commonly known as: ISORDIL   30 mg, Oral, 3 Times Daily      levalbuterol 45 MCG/ACT inhaler  Commonly known as: XOPENEX HFA   No dose, route, or frequency recorded.      levothyroxine 175 MCG tablet  Commonly known as: SYNTHROID, LEVOTHROID   200 mcg      metoprolol succinate XL 50 MG 24 hr tablet  Commonly known as: TOPROL-XL   50 mg, 2 Times Daily      nitroglycerin 0.4 MG SL tablet  Commonly known as: NITROSTAT   0.4 mg, Sublingual      pantoprazole 40 MG EC tablet  Commonly known as: PROTONIX   40 mg, Oral, Daily      rOPINIRole 2 MG tablet  Commonly known as: REQUIP   ropinirole 2 mg tablet   Take 1 tablet every day by oral route.      sertraline 100 MG tablet  Commonly known as: ZOLOFT   100 mg, Oral, Daily      traZODone 100 MG tablet  Commonly known as: DESYREL   200 mg, Oral, Every Night at Bedtime      Vascepa 1 g capsule capsule  Generic drug: icosapent ethyl   2 capsules, Oral      vitamin D 1.25 MG (52425 UT) capsule capsule  Commonly known as: ERGOCALCIFEROL   50,000 Units, Oral, Every 7 Days,        Vitamin D3 1.25 MG (95864 UT) capsule   Vitamin D3   2000 units      zolpidem 5 MG tablet  Commonly known as: AMBIEN   5 mg, Oral, Nightly         Stop These Medications    fluticasone 27.5 MCG/SPRAY nasal spray  Commonly known as: VERAMYST          Allergies   Allergen Reactions   • Penicillins Shortness Of Breath   • Cyclosporine Swelling     Discharge Disposition:  Home or Self  Care    Diet:  Diet Order   Procedures   • Diet Regular; Cardiac, Consistent Carbohydrate     Activity:  Activity Instructions     Activity as Tolerated          CODE STATUS:    Code Status and Medical Interventions:   Ordered at: 02/01/22 2138     Level Of Support Discussed With:    Patient     Code Status (Patient has no pulse and is not breathing):    CPR (Attempt to Resuscitate)     Medical Interventions (Patient has pulse or is breathing):    Full Support     Future Appointments   Date Time Provider Department Center   3/2/2022 11:00 AM Bill Venutra III, MD [facility]   3/3/2022  3:45 PM Carmita Nelson APRN [facility]       Additional Instructions for the Follow-ups that You Need to Schedule     Ambulatory Referral to Home Health   As directed      Face to Face Visit Date: 2/4/2022    Follow-up provider for Plan of Care?: I treated the patient in an acute care facility and will not continue treatment after discharge.    Follow-up provider: YISEL COTTRELL [[id number]]    Reason/Clinical Findings: Pneumonia due to Covid 19, Atypical chest pain    Describe mobility limitations that make leaving home difficult: Impaired functional mobility, gait, balance and endurance    Nursing/Therapeutic Services Requested: Skilled Nursing Physical Therapy Occupational Therapy    Skilled nursing orders: O2 instruction CHF management Telehealth Cardiopulmonary assessments    PT orders: Therapeutic exercise Strengthening Home safety assessment    Occupational orders: Activities of daily living Energy conservation Strengthening    Frequency: 1 Week 1         Discharge Follow-up with PCP   As directed       Currently Documented PCP:    Yisel Cottrell APRN    PCP Phone Number:    [phone]     Follow Up Details: 1 week (telemedicine if before 2/12)         Discharge Follow-up with Specified Provider: Michael Edwards on Thursday 2/17/22   As directed      To: Michael Edwards on Thursday 2/17/22              Elizabet Willard PA-C  02/07/22    Time Spent on Discharge:  I spent 45 minutes on this discharge activity which included: face-to-face encounter with the patient, reviewing the data in the system, coordination of the care with the nursing staff as well as consultants, documentation, and entering orders.

## 2022-02-07 NOTE — PROGRESS NOTES
INFECTIOUS DISEASE Progress Note    Lizette Frias  1965  [id number]    Admission Date: 2/1/2022      Requesting Provider: No ref. provider found  Evaluating Physician: Michael Edwards MD    Reason for Consultation: COVID 19 pneumonia     History of present illness:    2/2/22: Patient is a 56 y.o. female, with PMH  MORRISON/cirrhosis/liver transplant, STEVE, CAD, thyroid cancer/resection, CVA, fully vaccinated, seen today for COVID 19 pneumonia.  She developed fever and chills, with shortness of breath, cough, congestion  On 1/22/22, the day after she was in the ED with chest pain.  She had tested positive for COVID last Saturday by her HH nurse. Over the past several days,she developed nausea, vomiting prompting her presentation to the ED. She is currently on 2L. Admitting labs with PCT 0.06, WBC 3.04, normal lactate, AST 92, , Scr 1.48, CXR with patchy bilateral airspace disease consistent with multifocal pneumonia. She was started on Remdesivir, Dexamethasone and we were consulted for evaluation and treatment.  She has not received Evusheld preventative monoclonal antibody therapy and has not been given any information regarding this by her transplant physicians.    2/3/22: Her fevers have abated and she has been afebrile over the last 24 hours. Her O2 saturation is 90-92% on 2 L of oxygen. Her ALT has continued to improve and is now down to 60. Later this afternoon, she has required 4 L of oxygen.  She denies increased cough and sputum production.  Her immunosuppressive therapy has been held.    2/4/22: She is currently on 4L of oxygen with an O2 saturation of 89-93%. She remains afebrile.  Her ALT today is down to 56.  Her white blood cell count is 2.8. She will complete her remdesivir are 2/5.  She feels better today.  She denies increased cough and sputum production.  She has anorexia but denies nausea and vomiting.    2/7/22: She has remained afebrile. She is now on 2 L of oxygen with an O2  saturation of 94%. She has completed her remdesivir therapy.  Her white blood cell count is 3.6 and her creatinine is 0.92. She feels much better.  She would like to go home.      Past Medical History:   Diagnosis Date   • Anxiety    • Cirrhosis of liver (HCC)    • Depression    • Hypercholesteremia    • Hypertension    • Hypothyroid    • Meningioma (HCC)    • Osteoarthritis    • Osteoporosis    • Renal impairment    • Stroke (HCC)    • Thyroid cancer (HCC)    • Vision impairment     left eye       Past Surgical History:   Procedure Laterality Date   • APPENDECTOMY     •  SECTION      x3   • CHOLECYSTECTOMY     • D & C HYSTEROSCOPY LAPAROSCOPY      x2 for endometriosis   • LIVER SURGERY      removed cysts    • LIVER TRANSPLANTATION     • ORIF ANKLE FRACTURE     • THYROIDECTOMY     • TONSILLECTOMY     • TOTAL ABDOMINAL HYSTERECTOMY WITH SALPINGO OOPHORECTOMY Bilateral        Family History   Problem Relation Age of Onset   • Colon cancer Father    • Osteoporosis Mother    • Rheum arthritis Mother    • Ovarian cancer Paternal Grandmother    • Breast cancer Maternal Grandmother    • Stroke Maternal Grandmother        Social History     Socioeconomic History   • Marital status:    Tobacco Use   • Smoking status: Never Smoker   • Smokeless tobacco: Never Used   Vaping Use   • Vaping Use: Never used   Substance and Sexual Activity   • Alcohol use: No   • Drug use: Not Currently     Comment: Tried an edible last week.    • Sexual activity: Yes     Partners: Male     Birth control/protection: Surgical, Post-menopausal       Allergies   Allergen Reactions   • Penicillins Shortness Of Breath   • Cyclosporine Swelling         Medication:    Current Facility-Administered Medications:   •  acetaminophen (TYLENOL) tablet 650 mg, 650 mg, Oral, Q4H PRN, 650 mg at 22 **OR** acetaminophen (TYLENOL) 160 MG/5ML solution 650 mg, 650 mg, Oral, Q4H PRN **OR** acetaminophen (TYLENOL) suppository 650 mg, 650 mg,  Rectal, Q4H PRN, Melinda Serrano MD  •  albuterol sulfate HFA (PROVENTIL HFA;VENTOLIN HFA;PROAIR HFA) inhaler 2 puff, 2 puff, Inhalation, Q4H PRN, Elizabet Willard PA-C  •  allopurinol (ZYLOPRIM) tablet 300 mg, 300 mg, Oral, Daily, Melinda Serrano MD, 300 mg at 02/06/22 0913  •  amLODIPine (NORVASC) tablet 10 mg, 10 mg, Oral, Q24H, Melinda Serrano MD, 10 mg at 02/06/22 0913  •  atorvastatin (LIPITOR) tablet 40 mg, 40 mg, Oral, Daily, Melinda Serrano MD, 40 mg at 02/06/22 0913  •  benzocaine-menthol (CEPACOL) lozenge 1 lozenge, 1 lozenge, Mouth/Throat, Q2H PRN, Elizabet Willard, PA-C  •  benzonatate (TESSALON) capsule 100 mg, 100 mg, Oral, TID PRN, Melinda Serrano MD, 100 mg at 02/04/22 0329  •  buPROPion SR (WELLBUTRIN SR) 12 hr tablet 150 mg, 150 mg, Oral, Daily, Melinda Serrano MD, 150 mg at 02/06/22 0913  •  dexamethasone (DECADRON) tablet 6 mg, 6 mg, Oral, Daily With Breakfast, Amy Wilson DO, 6 mg at 02/06/22 0912  •  dextromethorphan polistirex ER (DELSYM) 30 MG/5ML oral suspension 60 mg, 60 mg, Oral, Q12H, Melinda Serrano MD, 60 mg at 02/06/22 2046  •  enoxaparin (LOVENOX) syringe 40 mg, 40 mg, Subcutaneous, Q24H, Melinda Serrano MD, 40 mg at 02/06/22 2045  •  famotidine (PEPCID) tablet 20 mg, 20 mg, Oral, BID AC, Jasmina Mcnair MD, 20 mg at 02/06/22 1640  •  fluticasone (FLONASE) 50 MCG/ACT nasal spray 2 spray, 2 spray, Each Nare, Daily, Melinda Serrano MD, 2 spray at 02/06/22 0914  •  gabapentin (NEURONTIN) capsule 100 mg, 100 mg, Oral, Q12H, Melinda Serrano MD, 100 mg at 02/06/22 2046  •  hydrALAZINE (APRESOLINE) tablet 25 mg, 25 mg, Oral, Q8H PRN, Amy Wilson, DO  •  hydrOXYzine (ATARAX) tablet 25 mg, 25 mg, Oral, TID PRN, Amy Wilson, DO  •  isosorbide dinitrate (ISORDIL) tablet 30 mg, 30 mg, Oral, TID, Melinda Serrano MD, 30 mg at 02/06/22 2046  •  levothyroxine (SYNTHROID, LEVOTHROID) tablet 200  mcg, 200 mcg, Oral, Q AM, Melinda Serrano MD, 200 mcg at 02/07/22 0526  •  lidocaine (LIDODERM) 5 % 1 patch, 1 patch, Transdermal, Nightly, Jasmina Mcnair MD, 1 patch at 02/06/22 2120  •  Magnesium Sulfate 2 gram Bolus, followed by 8 gram infusion (total Mg dose 10 grams)- Mg less than or equal to 1mg/dL, 2 g, Intravenous, PRN, 2 g at 02/02/22 0842 **OR** Magnesium Sulfate 2 gram / 50mL Infusion (GIVE X 3 BAGS TO EQUAL 6GM TOTAL DOSE) - Mg 1.1 - 1.5 mg/dl, 2 g, Intravenous, PRN, Last Rate: 25 mL/hr at 02/02/22 0336, 2 g at 02/02/22 0336 **OR** Magnesium Sulfate 4 gram infusion- Mg 1.6-1.9 mg/dL, 4 g, Intravenous, PRN, Melinda Serrano MD  •  melatonin tablet 5 mg, 5 mg, Oral, Nightly, Elizabet Willard PA-C, 5 mg at 02/06/22 2046  •  methocarbamol (ROBAXIN) tablet 500 mg, 500 mg, Oral, TID PRN, Melinda Serrano MD, 500 mg at 02/06/22 2120  •  metoprolol succinate XL (TOPROL-XL) 24 hr tablet 50 mg, 50 mg, Oral, BID, Melinda Serrano MD, 50 mg at 02/06/22 2046  •  mirtazapine (REMERON) tablet 7.5 mg, 7.5 mg, Oral, Nightly, Elizabet Willard PA-C, 7.5 mg at 02/06/22 2046  •  ondansetron (ZOFRAN) tablet 4 mg, 4 mg, Oral, Q6H PRN **OR** ondansetron (ZOFRAN) injection 4 mg, 4 mg, Intravenous, Q6H PRN, Melinda Serrano MD, 4 mg at 02/03/22 1705  •  pantoprazole (PROTONIX) EC tablet 40 mg, 40 mg, Oral, Daily, Melinda Serrano MD, 40 mg at 02/06/22 0913  •  phenol (CHLORASEPTIC) 1.4 % liquid 2 spray, 2 spray, Mouth/Throat, Q2H PRN, Elizabet Willard PA-C  •  potassium chloride (MICRO-K) CR capsule 40 mEq, 40 mEq, Oral, PRN, 40 mEq at 02/02/22 0843 **OR** potassium chloride (KLOR-CON) packet 40 mEq, 40 mEq, Oral, PRN **OR** potassium chloride 10 mEq in 100 mL IVPB, 10 mEq, Intravenous, Q1H PRN, Melinda Serrano MD  •  rOPINIRole (REQUIP) tablet 2 mg, 2 mg, Oral, Daily, Melinda Serrano MD, 2 mg at 02/06/22 0913  •  sertraline (ZOLOFT) tablet 100 mg, 100 mg,  "Oral, Daily, Melinda Serrano MD, 100 mg at 22 0913  •  Sodium Chloride (PF) 0.9 % 10 mL, 10 mL, Intravenous, PRN, Jose Obrien MD  •  sodium chloride 0.9 % flush 1-10 mL, 1-10 mL, Intravenous, PRN, Melinda Serrano MD  •  sodium chloride 0.9 % flush 10 mL, 10 mL, Intravenous, Q12H, Melinda Serrano MD, 10 mL at 22  •  traZODone (DESYREL) tablet 200 mg, 200 mg, Oral, Nightly, Melinda Serrano MD, 200 mg at 22  •  zolpidem (AMBIEN) tablet 5 mg, 5 mg, Oral, Nightly, Melinda Serrano MD, 5 mg at 22    Antibiotics:  Anti-Infectives (From admission, onward)    Ordered     Dose/Rate Route Frequency Start Stop    22  remdesivir 100 mg in sodium chloride 0.9 % 250 mL IVPB (powder vial)        Ordering Provider: Melinda Serrano MD   \"Followed by\" Linked Group Details    100 mg  over 60 Minutes Intravenous Daily With Lunch 22 1800 22 1332    22  remdesivir 200 mg in sodium chloride 0.9 % 290 mL IVPB (powder vial)        Ordering Provider: Melinda Serrano MD   \"Followed by\" Linked Group Details    200 mg  over 60 Minutes Intravenous Every 24 Hours 22 2359 22 2344            Review of Systems:  see HPI      Physical Exam:   Vital Signs  Temp (24hrs), Av.4 °F (36.9 °C), Min:98.1 °F (36.7 °C), Max:98.7 °F (37.1 °C)    Temp  Min: 98.1 °F (36.7 °C)  Max: 98.7 °F (37.1 °C)  BP  Min: 99/75  Max: 156/95  Pulse  Min: 55  Max: 70  Resp  Min: 16  Max: 18  SpO2  Min: 90 %  Max: 99 %    GENERAL: Awake and alert, in no acute distress. cushingoid appearance   HEENT: Normocephalic, atraumatic.  PERRL. EOMI. No conjunctival injection. No icterus. Oropharynx clear without evidence of thrush or exudate..  NECK: Supple .  HEART: RRR; No murmur, rubs, gallops.   LUNGS: Clear to auscultation bilaterally without wheezing, rales, rhonchi. Normal respiratory effort. Nonlabored. .  ABDOMEN: Soft, nontender, " nondistended. . No rebound or guarding. NO mass or HSM.  EXT:  No cyanosis, clubbing or edema.   :  Without Banuelos catheter.  MSK:  No joint effusions   SKIN: Warm and dry without cutaneous eruptions on Inspection/palpation.    NEURO: Oriented to PPT. Motor strength 5/5  PSYCHIATRIC: Normal insight and judgement. Cooperative with PE    Laboratory Data    Results from last 7 days   Lab Units 02/07/22  0312 02/05/22  0451 02/04/22  0351   WBC 10*3/mm3 3.60 2.87* 2.81*   HEMOGLOBIN g/dL 13.1 13.4 13.8   HEMATOCRIT % 41.1 42.0 42.2   PLATELETS 10*3/mm3 158 150 171     Results from last 7 days   Lab Units 02/07/22  0312   SODIUM mmol/L 141   POTASSIUM mmol/L 4.3   CHLORIDE mmol/L 108*   CO2 mmol/L 24.0   BUN mg/dL 24*   CREATININE mg/dL 0.92   GLUCOSE mg/dL 103*   CALCIUM mg/dL 8.3*     Results from last 7 days   Lab Units 02/05/22  0451   ALK PHOS U/L 73   BILIRUBIN mg/dL 0.5   BILIRUBIN DIRECT mg/dL <0.2   ALT (SGPT) U/L 50*   AST (SGOT) U/L 30         Results from last 7 days   Lab Units 02/02/22  0520   CRP mg/dL 6.45*     Results from last 7 days   Lab Units 02/01/22  1458   LACTATE mmol/L 1.0             Estimated Creatinine Clearance: 65.6 mL/min (by C-G formula based on SCr of 0.92 mg/dL).      Microbiology:  No results found for: ACANTHNAEG, AFBCX, BPERTUSSISCX, BLOODCX  No results found for: BCIDPCR, CXREFLEX, CSFCX, CULTURETIS  No results found for: CULTURES, HSVCX, URCX  No results found for: EYECULTURE, GCCX, HSVCULTURE, LABHSV  No results found for: LEGIONELLA, MRSACX, MUMPSCX, MYCOPLASCX  No results found for: NOCARDIACX, STOOLCX  No results found for: THROATCX, UNSTIMCULT, URINECX, CULTURE, VZVCULTUR  No results found for: VIRALCULTU, WOUNDCX        Radiology:  Imaging Results (Last 72 Hours)     Procedure Component Value Units Date/Time    XR Spine Lumbar 2 or 3 View [[id number]] Collected: 02/06/22 2305     Updated: 02/06/22 2309    Narrative:      4 views lumbar spine    CLINICAL INDICATION: Low back  pain.    COMPARISON: None.    FINDINGS: There are 6 lumbar-like vertebral bodies. Question vertebral body T12 vertebral compression noted, but seen only on the frontal view. Lumbar alignment, vertebral body height and disc spaces are otherwise maintained throughout. No acute fracture   or dislocation is seen. SI joints are symmetric. Soft tissues unremarkable.      Impression:      1. Age-indeterminate partial T12 vertebral body compression. This is seen on only one frontal view. No other potential fractures are seen.  2. 6 lumbar-like vertebral bodies noted.    Electronically signed by:  Ramsey Sainz M.D.    2/6/2022 9:08 PM Mountain Time      I read her radiographic studies.      Impression:   1.  COVID 19 pneumonia- in a fully vaccinated/boosted host who is profoundly immunocompromised due to her liver transplant..  Not surprisingly, she did not respond optimally to vaccination with a very low spike protein antibody level. I will plan for her to proceed with Evusheld approximately 1 month after discharge.  She is on Decadron and remdesivir. We will monitor  for transaminitis on remdesivir  2.  Acute hypoxic respiratory failure  3.  MORRISON/cirrhoisis s/p liver transplant 2020  4.  Acute/chronic kidney disease  5.  Pyuria/UTI asymptomatic  6.  CVA  7.  Immunocompromised host    PLAN/RECOMMENDATIONS:   1.  Dexamethasone x 10 days   2.  S/P Remdesivir IV x 5 days-completed on 2/5.   3.  Hold routine immunosuppressive therapy for 2 weeks  4.  DVT prophylaxis   5.  Discharge to home on supplement oxygen  6.  Evusheld MAB therapy one month after discharge-I contacted pharmacy regarding this.   7.  Isolate Covid 19 until 2/12  8.  Follow-up with me next week on Thursday 2/17-An appointment has been made.        Michael Edwards MD  2/7/2022  07:48 EST

## 2022-02-07 NOTE — PLAN OF CARE
Problem: Adult Inpatient Plan of Care  Goal: Plan of Care Review  Outcome: Ongoing, Progressing  Flowsheets (Taken 2/7/2022 0114)  Outcome Summary: Patient now 2L NC.  Patient c/o lower back pain 8/10 after bending over to get something out of her bag.  She states she has chronic back pain in the same area but this is more severe than her regular pain and she is having trouble ambulating to the bathroom. Sherry TAO notified with order for Kpad, Lidocaine patch, and XRay of lumbar spine. Tylenol and Robaxin also given.  During re-eval of pain, patient states she is still in a lot of pain, again rating it 8/10.  Sherry TAO notified again with orders for Toradol 30mg x1 dose.  No other complaints overnight.  VSS.

## 2022-02-07 NOTE — DISCHARGE INSTRUCTIONS
First day out of isolation 2/12/22    ID recommends holding of home immunosuppressants ( Everolimus /Tacrolimus / CellCept) x 2 weeks      Needs repeat TSH / free T4 in 4-6 weeks

## 2022-02-08 ENCOUNTER — READMISSION MANAGEMENT (OUTPATIENT)
Dept: CALL CENTER | Facility: HOSPITAL | Age: 57
End: 2022-02-08

## 2022-02-08 NOTE — OUTREACH NOTE
COVID-19 Week 1 Survey      Responses   [facility] patient discharged from? [place]   Does the patient have one of the following disease processes/diagnoses(primary or secondary)? COVID-19   COVID-19 underlying condition? None   Call Number Call 1   Week 1 Call successful? No   Discharge diagnosis Acute hypoxemic respiratory failure due to COVID-19 Liver transplant recipient           Brittaney Licona RN

## 2022-02-08 NOTE — OUTREACH NOTE
Prep Survey      Responses   [facility] discharged from? [place]   Is LACE score < 7 ? No   Emergency Room discharge w/ pulse ox? No   Eligibility Readm Mgmt   Discharge diagnosis Acute hypoxemic respiratory failure due to COVID-19 Liver transplant recipient    Does the patient have one of the following disease processes/diagnoses(primary or secondary)? COVID-19   Does the patient have Home health ordered? Yes   What is the Home health agency?  [facility]   Is there a DME ordered? Yes   What DME was ordered? [facility]   Prep survey completed? Yes          Hoda Caputo RN

## 2022-02-09 ENCOUNTER — HOME CARE VISIT (OUTPATIENT)
Dept: HOME HEALTH SERVICES | Facility: HOME HEALTHCARE | Age: 57
End: 2022-02-09

## 2022-02-09 ENCOUNTER — READMISSION MANAGEMENT (OUTPATIENT)
Dept: CALL CENTER | Facility: HOSPITAL | Age: 57
End: 2022-02-09

## 2022-02-09 VITALS
SYSTOLIC BLOOD PRESSURE: 130 MMHG | OXYGEN SATURATION: 97 % | DIASTOLIC BLOOD PRESSURE: 86 MMHG | HEART RATE: 70 BPM | TEMPERATURE: 98 F

## 2022-02-09 PROCEDURE — G0299 HHS/HOSPICE OF RN EA 15 MIN: HCPCS

## 2022-02-09 NOTE — OUTREACH NOTE
COVID-19 Week 1 Survey      Responses   [facility] patient discharged from? [place]   Does the patient have one of the following disease processes/diagnoses(primary or secondary)? COVID-19   COVID-19 underlying condition? None   Call Number Call 2   Week 1 Call successful? Yes   Call start time 1005   Call end time 1007   Discharge diagnosis Acute hypoxemic respiratory failure due to COVID-19 Liver transplant recipient    Meds reviewed with patient/caregiver? Yes   Is the patient having any side effects they believe may be caused by any medication additions or changes? No   Does the patient have all medications ordered at discharge? Yes   Is the patient taking all medications as directed (includes completed medication regime)? Yes   Does the patient have a primary care provider?  Yes   Does the patient have an appointment with their PCP or specialist within 7 days of discharge? No   What is preventing the patient from scheduling follow up appointments within 7 days of discharge? Haven't had time   Nursing Interventions Educated patient on importance of making appointment,  Advised patient to make appointment   Has the patient kept scheduled appointments due by today? N/A   What is the Home health agency?  [facility]   Has home health visited the patient within 72 hours of discharge? Call prior to 72 hours   Home health comments States they called and nurse is to be there today   What DME was ordered? Oxygen   Has all DME been delivered? Yes   Did the patient receive a copy of their discharge instructions? Yes   Did the patient receive a copy of COVID-19 specific instructions? Yes   Nursing interventions Reviewed instructions with patient   What is the patient's perception of their health status since discharge? Improving   Does the patient have any of the following symptoms? Cough,  Shortness of breath   Nursing Interventions Nurse provided patient education   Pulse Ox monitoring Intermittent   Pulse  "Ox device source Patient   O2 Sat comments Has been checking   O2 Sat: education provided Sat levels,  Monitoring frequency,  When to seek care   O2 Sat education comments If sats are below 90% while at rest and using oxygen, she needs to go to ED   Is the patient/caregiver able to teach back steps to recovery at home? Set small, achievable goals for return to baseline health,  Rest and rebuild strength, gradually increase activity,  Eat a well-balance diet,  Make a list of questions for provider's appointment   If the patient is a current smoker, are they able to teach back resources for cessation? Not a smoker   Is the patient/caregiver able to teach back the hierarchy of who to call/visit for symptoms/problems? PCP, Specialist, Home health nurse, Urgent Care, ED, 911 Yes   COVID-19 call completed? Yes   Wrap up additional comments States she is doing \"ok\".  She will clean highly used surfaces of home, wash bedding, change out toothbrush and discard makeup products.            Radha Dueñas, CHRISTINA  "

## 2022-02-10 ENCOUNTER — READMISSION MANAGEMENT (OUTPATIENT)
Dept: CALL CENTER | Facility: HOSPITAL | Age: 57
End: 2022-02-10

## 2022-02-10 NOTE — OUTREACH NOTE
COVID-19 Week 1 Survey      Responses   [facility] patient discharged from? [place]   Does the patient have one of the following disease processes/diagnoses(primary or secondary)? COVID-19   COVID-19 underlying condition? None   Call Number Call 3   Week 1 Call successful? Yes   Call start time 1355   Call end time 1358   Discharge diagnosis Acute hypoxemic respiratory failure due to COVID-19 Liver transplant recipient    Meds reviewed with patient/caregiver? Yes   Is the patient taking all medications as directed (includes completed medication regime)? Yes   Comments regarding appointments ID appt is next week,  Pt has a telehealth at noon on 2/11/22.   Has the patient kept scheduled appointments due by today? N/A   Psychosocial issues? No   What is the patient's perception of their health status since discharge? Same   Does the patient have any of the following symptoms? Shortness of breath,  Loss of taste/smell,  Cough   Nursing Interventions Nurse provided patient education   Pulse Ox monitoring Continuous   O2 Sat comments HH is checking   Is the patient/caregiver able to teach back steps to recovery at home? Eat a well-balance diet,  Rest and rebuild strength, gradually increase activity   COVID-19 call completed? Yes          Geno Laughlin RN

## 2022-02-11 ENCOUNTER — HOME CARE VISIT (OUTPATIENT)
Dept: HOME HEALTH SERVICES | Facility: HOME HEALTHCARE | Age: 57
End: 2022-02-11

## 2022-02-11 PROCEDURE — G0152 HHCP-SERV OF OT,EA 15 MIN: HCPCS

## 2022-02-11 PROCEDURE — G0299 HHS/HOSPICE OF RN EA 15 MIN: HCPCS

## 2022-02-11 PROCEDURE — G0151 HHCP-SERV OF PT,EA 15 MIN: HCPCS

## 2022-02-12 VITALS — HEART RATE: 56 BPM | RESPIRATION RATE: 20 BRPM | OXYGEN SATURATION: 95 %

## 2022-02-12 NOTE — HOME HEALTH
Lizette Frias is a 56 y.o. 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 She is fully vaccinated / boosted for COVID-19. She was admitted to [facility] 2/1/22 for acute hypoxic respiratory failure secondary to COVID-19 pneumonia.   Acute hypoxic respiratory secondary to COVID-19 pneumonia   - Fully vaccinated / boosted.   - Initially symptomatic on 1/22/22. Isolation 20 days from onset of symptoms (1/22-2/11/22). First day out of isolation 2/12/22   - Appreciate LIDC assistance.   - D-dimer negative, CTA chest deferred.   - Procalcitonin reassuring   - s/p Remdesivir x 5 days, remains on Dexamethasone to complete total 10 days   - ID recommends Evusheld preventative monoclonal antibody therapy from transplant center 1 month after DC. My partner discussed with transplant coordinator on 2/3/22 - attempts to speak to her transplant physician have thus far, been unsuccessful. Dr. Edwards working with ID pharmacist to see if we can arrange here   - Transplant coordinator - Leslee Quinn [phone]   - Continue supportive therapy   - Home on O2, has weaned to 2L NC      PLOF is IND with all mobility and ADLs

## 2022-02-13 VITALS
RESPIRATION RATE: 18 BRPM | HEART RATE: 57 BPM | DIASTOLIC BLOOD PRESSURE: 63 MMHG | TEMPERATURE: 98.1 F | OXYGEN SATURATION: 95 % | SYSTOLIC BLOOD PRESSURE: 105 MMHG

## 2022-02-14 ENCOUNTER — TELEPHONE (OUTPATIENT)
Dept: INTERNAL MEDICINE | Facility: CLINIC | Age: 57
End: 2022-02-14

## 2022-02-14 ENCOUNTER — READMISSION MANAGEMENT (OUTPATIENT)
Dept: CALL CENTER | Facility: HOSPITAL | Age: 57
End: 2022-02-14

## 2022-02-14 NOTE — OUTREACH NOTE
COVID-19 Week 2 Survey      Responses   [facility] patient discharged from? [place]   Does the patient have one of the following disease processes/diagnoses(primary or secondary)? COVID-19   COVID-19 underlying condition? None   Call Number Call 1   COVID-19 Week 2: Call 1 attempt successful? No   Discharge diagnosis Acute hypoxemic respiratory failure due to COVID-19 Liver transplant recipient           Anastacia Baer RN

## 2022-02-14 NOTE — TELEPHONE ENCOUNTER
Caller: Lizette Rhodes    Relationship: Self    Best call back number: [phone]    What is the best time to reach you: ANYTIME    Who are you requesting to speak with (clinical staff, provider,  specific staff member): PROVIDER    Do you know the name of the person who called: SELF    What was the call regarding: PATIENT STATES THAT SHE IS THE MOTHER -IN-LAW TO TERRY MCDUFFIE BIRTHDAY 10/12/1992. MS. RHODES WOULD LIKE TO BE A PATIENT OF DANIELLE SWANSON.    Do you require a callback: YES

## 2022-02-15 ENCOUNTER — HOME CARE VISIT (OUTPATIENT)
Dept: HOME HEALTH SERVICES | Facility: HOME HEALTHCARE | Age: 57
End: 2022-02-15

## 2022-02-15 VITALS
DIASTOLIC BLOOD PRESSURE: 60 MMHG | TEMPERATURE: 97.5 F | OXYGEN SATURATION: 94 % | HEART RATE: 59 BPM | RESPIRATION RATE: 16 BRPM | SYSTOLIC BLOOD PRESSURE: 104 MMHG

## 2022-02-15 PROCEDURE — G0299 HHS/HOSPICE OF RN EA 15 MIN: HCPCS

## 2022-02-15 NOTE — HOME HEALTH
OT INITIAL EVAL COMPLETED THIS DATE. OT PROVIDED SKILLED INSTRUCTION ON HOME SAFETY, EC/WS AND BREATING TECHNIQUES DURING ADLS. PT VERBALIXED OR DEMOED UNDERSTANDING. OT TO CONTINUE INSTRUCTION NEXT VISIT.

## 2022-02-16 VITALS
TEMPERATURE: 97.8 F | RESPIRATION RATE: 16 BRPM | HEART RATE: 68 BPM | SYSTOLIC BLOOD PRESSURE: 114 MMHG | OXYGEN SATURATION: 96 % | DIASTOLIC BLOOD PRESSURE: 65 MMHG

## 2022-02-17 ENCOUNTER — HOME CARE VISIT (OUTPATIENT)
Dept: HOME HEALTH SERVICES | Facility: HOME HEALTHCARE | Age: 57
End: 2022-02-17

## 2022-02-17 ENCOUNTER — READMISSION MANAGEMENT (OUTPATIENT)
Dept: CALL CENTER | Facility: HOSPITAL | Age: 57
End: 2022-02-17

## 2022-02-17 VITALS
TEMPERATURE: 97.3 F | HEART RATE: 80 BPM | SYSTOLIC BLOOD PRESSURE: 150 MMHG | RESPIRATION RATE: 18 BRPM | OXYGEN SATURATION: 91 % | DIASTOLIC BLOOD PRESSURE: 92 MMHG

## 2022-02-17 PROCEDURE — G0151 HHCP-SERV OF PT,EA 15 MIN: HCPCS

## 2022-02-17 NOTE — OUTREACH NOTE
"COVID-19 Week 2 Survey      Responses   [facility] patient discharged from? [place]   Does the patient have one of the following disease processes/diagnoses(primary or secondary)? COVID-19   COVID-19 underlying condition? None   Call Number Call 2   COVID-19 Week 2: Call 1 attempt successful? Yes   Call start time 1442   Call end time 1444   Meds reviewed with patient/caregiver? Yes   Is the patient taking all medications as directed (includes completed medication regime)? Yes   Comments regarding PCP Pt had a telehealth appt with PCP last week   Has the patient kept scheduled appointments due by today? Yes   What is the patient's perception of their health status since discharge? Improving   Does the patient have any of the following symptoms? Cough,  Shortness of breath,  Loss of taste/smell  [Cough has improved]   Pulse Ox monitoring Continuous   Is the patient/caregiver able to teach back steps to recovery at home? Rest and rebuild strength, gradually increase activity,  Eat a well-balance diet   COVID-19 call completed? Yes   Revoked No further contact(revokes)-requires comment   Is the patient interested in additional calls from an ambulatory ?  NOTE:  applies to high risk patients requiring additional follow-up. No   Graduated/Revoked comments Pt reports, \"I think I'm on the mend now\" when asked about another call          Geno Laughlin RN  "

## 2022-02-18 ENCOUNTER — HOME CARE VISIT (OUTPATIENT)
Dept: HOME HEALTH SERVICES | Facility: HOME HEALTHCARE | Age: 57
End: 2022-02-18

## 2022-02-18 PROCEDURE — G0299 HHS/HOSPICE OF RN EA 15 MIN: HCPCS

## 2022-02-19 ENCOUNTER — HOME CARE VISIT (OUTPATIENT)
Dept: HOME HEALTH SERVICES | Facility: HOME HEALTHCARE | Age: 57
End: 2022-02-19

## 2022-02-21 ENCOUNTER — HOME CARE VISIT (OUTPATIENT)
Dept: HOME HEALTH SERVICES | Facility: HOME HEALTHCARE | Age: 57
End: 2022-02-21

## 2022-02-21 VITALS
RESPIRATION RATE: 18 BRPM | TEMPERATURE: 98.2 F | DIASTOLIC BLOOD PRESSURE: 83 MMHG | SYSTOLIC BLOOD PRESSURE: 113 MMHG | HEART RATE: 64 BPM

## 2022-02-21 PROCEDURE — G0299 HHS/HOSPICE OF RN EA 15 MIN: HCPCS

## 2022-02-23 VITALS
TEMPERATURE: 97.4 F | SYSTOLIC BLOOD PRESSURE: 122 MMHG | HEART RATE: 88 BPM | RESPIRATION RATE: 16 BRPM | OXYGEN SATURATION: 98 % | DIASTOLIC BLOOD PRESSURE: 84 MMHG

## 2022-02-25 ENCOUNTER — HOME CARE VISIT (OUTPATIENT)
Dept: HOME HEALTH SERVICES | Facility: HOME HEALTHCARE | Age: 57
End: 2022-02-25

## 2022-02-25 VITALS
HEART RATE: 72 BPM | RESPIRATION RATE: 18 BRPM | SYSTOLIC BLOOD PRESSURE: 130 MMHG | TEMPERATURE: 97.8 F | DIASTOLIC BLOOD PRESSURE: 84 MMHG | OXYGEN SATURATION: 96 %

## 2022-02-25 PROCEDURE — G0151 HHCP-SERV OF PT,EA 15 MIN: HCPCS

## 2022-02-25 PROCEDURE — G0152 HHCP-SERV OF OT,EA 15 MIN: HCPCS

## 2022-02-25 NOTE — HOME HEALTH
Reports breathing has been more difficult this week; using oxygen and unable to go without this week

## 2022-03-01 ENCOUNTER — HOME CARE VISIT (OUTPATIENT)
Dept: HOME HEALTH SERVICES | Facility: HOME HEALTHCARE | Age: 57
End: 2022-03-01

## 2022-03-01 VITALS
SYSTOLIC BLOOD PRESSURE: 142 MMHG | OXYGEN SATURATION: 98 % | TEMPERATURE: 97.7 F | DIASTOLIC BLOOD PRESSURE: 90 MMHG | RESPIRATION RATE: 18 BRPM | HEART RATE: 72 BPM

## 2022-03-01 PROCEDURE — G0151 HHCP-SERV OF PT,EA 15 MIN: HCPCS

## 2022-03-02 ENCOUNTER — OFFICE VISIT (OUTPATIENT)
Dept: CARDIOLOGY | Facility: CLINIC | Age: 57
End: 2022-03-02

## 2022-03-02 VITALS
DIASTOLIC BLOOD PRESSURE: 84 MMHG | OXYGEN SATURATION: 95 % | HEART RATE: 82 BPM | SYSTOLIC BLOOD PRESSURE: 126 MMHG | HEIGHT: 61 IN | WEIGHT: 179 LBS | BODY MASS INDEX: 33.79 KG/M2

## 2022-03-02 DIAGNOSIS — R07.2 PRECORDIAL PAIN: Primary | ICD-10-CM

## 2022-03-02 PROCEDURE — 99213 OFFICE O/P EST LOW 20 MIN: CPT | Performed by: INTERNAL MEDICINE

## 2022-03-02 RX ORDER — METFORMIN HYDROCHLORIDE 500 MG/1
1000 TABLET, EXTENDED RELEASE ORAL 2 TIMES DAILY
COMMUNITY
Start: 2022-02-24 | End: 2022-04-19

## 2022-03-02 NOTE — PROGRESS NOTES
[facility] Cardiology  Office visit  Lizette Frias  1965  [phone]  There is no work phone number on file.    VISIT DATE:  3/2/2022    PCP: Ad Oquendo MD  [address] [facility] [address]  [facility] [address]    CC:  Chief Complaint   Patient presents with   • Precordial pain     consult       Previous cardiac studies and procedure  January 2022   TTE: EF 70%, concentric LVH, grade 1 diastolic dysfunction, no valvular abnormalities, small anterior pericardial effusion.  PET myocardial perfusion imaging  · Myocardial perfusion imaging indicates a normal myocardial perfusion study with no evidence of ischemia.  · Left ventricular ejection fraction is hyperdynamic (Calculated EF > 70%).      ASSESSMENT:   Diagnosis Plan   1. Precordial pain         PLAN:  Vasospastic angina: Currently well controlled.  Continue combination of amlodipine and Isordil.  Afterload well controlled.  Continue atorvastatin 40 mg p.o. daily.  Previously discussed left heart cath with provocative testing to evaluate for vasospastic angina and flow reserve, currently with well-controlled symptoms, will delay this evaluation for the time being.    Subjective:  Interval assessment: Recovering from recent Covid pneumonia.  Has been started on supplemental oxygen.  Episodes of chest discomfort have significantly proved with titration of Isordil.  Blood pressures running less than 130/80 mmHg.    Initial evaluation: Lizette Frias is a 56 y.o. 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  She has no previous history of obstructive coronary disease.  She actually underwent myocardial perfusion study earlier this  "year which was normal.  She notes that over the last 2 to 3 months she has had recurrent midsternal chest pain.  This has been increasing in frequency and severity over the course of the last 2 months.  It is now occurring on a daily basis.  Typically she has been able to alleviate this discomfort with nitroglycerin.  However, she has had a episode that started today which was not alleviated by 3 sublingual nitro and she subsequently presented to the hospital for further evaluation.  Here she is currently on IV nitroglycerin and still rates pain 5/10.  She has been recently evaluated by pulmonary per her report at which time she was told that she had a relatively normal evaluation.  States that she had a CT of her chest with contrast that was negative per her report.  She notes that her chest pain can occur with exertion and at rest.  She does have some associated radiation into her shoulders.  Notes that she becomes severely short of breath with almost any type of exertion.  Notes that just walking room to room in her house she becomes short of breath.  States that she was told by pulmonary that they believe that this is more cardiac related.    PHYSICAL EXAMINATION:  Vitals:    03/02/22 1129   BP: 126/84   BP Location: Right arm   Patient Position: Sitting   Pulse: 82   SpO2: 95%   Weight: 81.2 kg (179 lb)   Height: 154.9 cm (61\")     General Appearance:    Alert, cooperative, no distress, appears stated age   Head:    Normocephalic, without obvious abnormality, atraumatic   Eyes:    conjunctiva/corneas clear   Nose:   Nares normal, septum midline, mucosa normal, no drainage   Throat:   Lips, teeth and gums normal   Neck:   Supple, symmetrical, trachea midline, no carotid    bruit or JVD   Lungs:     Clear to auscultation bilaterally, respirations unlabored   Chest Wall:    No tenderness or deformity    Heart:    Regular rate and rhythm, S1 and S2 normal, no murmur, rub   or gallop, normal carotid impulse " bilaterally without bruit.   Abdomen:     Soft, non-tender   Extremities:   Extremities normal, atraumatic, no cyanosis or edema   Pulses:   2+ and symmetric all extremities   Skin:   Skin color, texture, turgor normal, no rashes or lesions       Diagnostic Data:  Procedures  Lab Results   Component Value Date    CHLPL 254 (H) 04/05/2021    TRIG 204 (H) 02/02/2022    HDL 22 (L) 02/02/2022     Lab Results   Component Value Date    GLUCOSE 103 (H) 02/07/2022    BUN 24 (H) 02/07/2022    CREATININE 0.92 02/07/2022     02/07/2022    K 4.3 02/07/2022     (H) 02/07/2022    CO2 24.0 02/07/2022     Lab Results   Component Value Date    HGBA1C 6.10 (H) 02/02/2022     Lab Results   Component Value Date    WBC 3.60 02/07/2022    HGB 13.1 02/07/2022    HCT 41.1 02/07/2022     02/07/2022       Allergies  Allergies   Allergen Reactions   • Penicillins Shortness Of Breath   • Cyclosporine Swelling       Current Medications    Current Outpatient Medications:   •  acetaminophen (TYLENOL) 325 MG tablet, Take 2 tablets by mouth Every 4 (Four) Hours As Needed for Mild Pain , Moderate Pain  or Headache., Disp: , Rfl:   •  allopurinol (ZYLOPRIM) 300 MG tablet, Take 300 mg by mouth Daily., Disp: , Rfl:   •  amLODIPine (NORVASC) 10 MG tablet, Take 1 tablet by mouth Daily., Disp: , Rfl:   •  atorvastatin (LIPITOR) 40 MG tablet, Take 1 tablet by mouth Daily., Disp: , Rfl:   •  benzonatate (TESSALON) 100 MG capsule, Take 1 capsule by mouth 3 (Three) Times a Day As Needed for Cough., Disp: 60 capsule, Rfl: 1  •  buPROPion SR (WELLBUTRIN SR) 100 MG 12 hr tablet, Take 150 mg by mouth Daily., Disp: , Rfl:   •  calcium carbonate-vitamin d 600-400 MG-UNIT per tablet, , Disp: , Rfl:   •  Cholecalciferol (Vitamin D3) 1.25 MG (57678 UT) capsule, Vitamin D3  2000 units, Disp: , Rfl:   •  cloNIDine (CATAPRES) 0.1 MG tablet, Take 0.1 mg by mouth Daily As Needed for High Blood Pressure., Disp: , Rfl:   •  colchicine 0.6 MG tablet, TAKE 1  TABLET(S) EVERY DAY. MAY TAKE ADD L TAB FOR GOUT FLARES, Disp: , Rfl:   •  fluticasone (FLONASE) 50 MCG/ACT nasal spray, , Disp: , Rfl:   •  furosemide (LASIX) 20 MG tablet, Hold until follow up with your primary care provider, Disp: , Rfl:   •  icosapent ethyl (Vascepa) 1 g capsule capsule, Take 2 capsules by mouth 2 (Two) Times a Day With Meals., Disp: , Rfl:   •  ipratropium-albuterol (DUO-NEB) 0.5-2.5 mg/3 ml nebulizer, ipratropium 0.5 mg-albuterol 2.5 mg/2.5 mL solution for nebulization  Inhale by inhalation route., Disp: , Rfl:   •  isosorbide dinitrate (ISORDIL) 30 MG tablet, Take 1 tablet by mouth 3 (Three) Times a Day. (Patient taking differently: Take 30 mg by mouth Take As Directed. 30mg morning and afternoon and 60mg qhs), Disp: 90 tablet, Rfl: 3  •  levalbuterol (XOPENEX HFA) 45 MCG/ACT inhaler, , Disp: , Rfl:   •  levothyroxine (SYNTHROID, LEVOTHROID) 175 MCG tablet, 200 mcg., Disp: , Rfl:   •  melatonin 5 MG tablet tablet, Take 1 tablet by mouth Every Night., Disp: , Rfl:   •  metFORMIN ER (GLUCOPHAGE-XR) 500 MG 24 hr tablet, 2 (Two) Times a Day., Disp: , Rfl:   •  methocarbamol (ROBAXIN) 500 MG tablet, Take 1 tablet by mouth 3 (Three) Times a Day As Needed for Muscle Spasms., Disp: 30 tablet, Rfl: 0  •  metoprolol succinate XL (TOPROL-XL) 50 MG 24 hr tablet, 50 mg 2 (Two) Times a Day., Disp: , Rfl:   •  mirtazapine (REMERON) 7.5 MG tablet, Take 1 tablet by mouth Every Night., Disp: 30 tablet, Rfl: 2  •  nitroglycerin (NITROSTAT) 0.4 MG SL tablet, Place 0.4 mg under the tongue., Disp: , Rfl:   •  O2 (OXYGEN), Inhale 2 L/min Continuous., Disp: , Rfl:   •  pantoprazole (PROTONIX) 40 MG EC tablet, Take 40 mg by mouth Daily., Disp: , Rfl:   •  potassium chloride (MICRO-K) 10 MEQ CR capsule, Hold until Lasix (furosemide) restarted, Disp: , Rfl:   •  predniSONE (DELTASONE) 10 MG tablet, Hold while taking Dexamethasone. Once out of Dexamethasone, resume taking 1 tablet by mouth daily, Disp: , Rfl:   •   rOPINIRole (REQUIP) 2 MG tablet, 4 mg Every Night., Disp: , Rfl:   •  sertraline (ZOLOFT) 100 MG tablet, Take 100 mg by mouth Daily., Disp: , Rfl:   •  spironolactone (ALDACTONE) 50 MG tablet, Take 1 tablet by mouth Daily., Disp: , Rfl:   •  tacrolimus (PROGRAF) 0.5 MG capsule, HOLD FOR 7 DAYS. RESUME ON 2/15/22 (Two tablets by mouth twice per day), Disp: , Rfl:   •  traMADol (ULTRAM) 50 MG tablet, Take 1 tablet by mouth Every 6 (Six) Hours As Needed for Moderate Pain ., Disp: 18 tablet, Rfl: 0  •  vitamin D (ERGOCALCIFEROL) 1.25 MG (97366 UT) capsule capsule, Take 50,000 Units by mouth Every 7 (Seven) Days.  , Disp: , Rfl:   •  zolpidem (AMBIEN) 5 MG tablet, Take 5 mg by mouth Every Night., Disp: , Rfl:   •  dextromethorphan polistirex ER (DELSYM) 30 MG/5ML Suspension Extended Release oral suspension, Take 10 mL by mouth 2 (Two) Times a Day., Disp: 280 mL, Rfl: 1  •  estradiol (ESTRACE VAGINAL) 0.1 MG/GM vaginal cream, Insert o.5 gm intravaginally 2 times each week, Disp: 42.5 g, Rfl: 2  •  everolimus (ZORTRESS) 0.5 MG tablet, HOLD FOR 7 DAYS. RESUME ON 2/15/22 (2 tablet (1mg) by mouth every morning and 1 tablet (0.5mg) by mouth every evening), Disp: 60 tablet, Rfl:   •  gabapentin (NEURONTIN) 100 MG capsule, Take 1 capsule by mouth Every 12 (Twelve) Hours., Disp: , Rfl:   •  mycophenolate (MYFORTIC) 360 MG tablet delayed-release EC tablet, HOLD FOR 7 DAYS. RESUME ON 2/15/22 (2 tablets by mouth two times per day), Disp: 120 tablet, Rfl:   •  traZODone (DESYREL) 100 MG tablet, Take 200 mg by mouth every night at bedtime., Disp: , Rfl:           ROS  ROS      SOCIAL HX  Social History     Socioeconomic History   • Marital status:    Tobacco Use   • Smoking status: Never Smoker   • Smokeless tobacco: Never Used   Vaping Use   • Vaping Use: Never used   Substance and Sexual Activity   • Alcohol use: No   • Drug use: Not Currently     Comment: Tried an edible last week.    • Sexual activity: Yes     Partners:  Male     Birth control/protection: Surgical, Post-menopausal       FAMILY HX  Family History   Problem Relation Age of Onset   • Colon cancer Father    • Osteoporosis Mother    • Rheum arthritis Mother    • Ovarian cancer Paternal Grandmother    • Breast cancer Maternal Grandmother    • Stroke Maternal Grandmother              Bill Ventura III, MD, FACC

## 2022-03-03 ENCOUNTER — HOME CARE VISIT (OUTPATIENT)
Dept: HOME HEALTH SERVICES | Facility: HOME HEALTHCARE | Age: 57
End: 2022-03-03

## 2022-03-03 ENCOUNTER — OFFICE VISIT (OUTPATIENT)
Dept: OBSTETRICS AND GYNECOLOGY | Facility: CLINIC | Age: 57
End: 2022-03-03

## 2022-03-03 VITALS
WEIGHT: 180 LBS | HEIGHT: 61 IN | DIASTOLIC BLOOD PRESSURE: 76 MMHG | BODY MASS INDEX: 33.99 KG/M2 | SYSTOLIC BLOOD PRESSURE: 120 MMHG

## 2022-03-03 DIAGNOSIS — Z01.411 ENCOUNTER FOR GYNECOLOGICAL EXAMINATION WITH ABNORMAL FINDING: Primary | ICD-10-CM

## 2022-03-03 DIAGNOSIS — N94.10 FEMALE DYSPAREUNIA: ICD-10-CM

## 2022-03-03 DIAGNOSIS — N95.2 ATROPHY OF VAGINA: ICD-10-CM

## 2022-03-03 PROCEDURE — 99396 PREV VISIT EST AGE 40-64: CPT | Performed by: NURSE PRACTITIONER

## 2022-03-03 RX ORDER — METOPROLOL SUCCINATE 100 MG/1
100 TABLET, EXTENDED RELEASE ORAL DAILY
COMMUNITY
Start: 2022-01-25

## 2022-03-03 RX ORDER — PRASTERONE 6.5 MG/1
1 INSERT VAGINAL DAILY
Qty: 28 EACH | Refills: 11 | Status: SHIPPED | OUTPATIENT
Start: 2022-03-03 | End: 2022-07-28

## 2022-03-03 RX ORDER — ISOSORBIDE MONONITRATE 60 MG/1
1 TABLET, EXTENDED RELEASE ORAL NIGHTLY
COMMUNITY
Start: 2022-01-25

## 2022-03-03 NOTE — PROGRESS NOTES
Chief Complaint  Lizette Frias is a 57 y.o.  female presenting for Annual Exam (s/p Covid infection and hospitalization.)    History of Present Illness  Very pleasant 56yo woman, long-time pt of Dr. Britt (x > 30 yrs), here today for annual gyn exam.  She has a complex medical hx with hepatic failure and two (2) liver transplants.  She has polypharmacy because of the antirejection drugs, steroids, etc.  She has also recently had COVID.  She is using portable O2 tank today.  Her only gyn c/o is dyspareunia related to vaginal dryness.  Describes an ache in the vagina when trying to be SA.  No other vaginal pain at other times.    The following portions of the patient's history were reviewed and updated as appropriate: allergies, current medications, past family history, past medical history, past social history, past surgical history and problem list.    Allergies   Allergen Reactions   • Penicillins Shortness Of Breath   • Cyclosporine Swelling         Current Outpatient Medications:   •  isosorbide mononitrate (IMDUR) 60 MG 24 hr tablet, Take 1 tablet by mouth Every Night., Disp: , Rfl:   •  metoprolol succinate XL (TOPROL-XL) 100 MG 24 hr tablet, Take 200 mg by mouth Daily., Disp: , Rfl:   •  allopurinol (ZYLOPRIM) 300 MG tablet, Take 300 mg by mouth Daily., Disp: , Rfl:   •  amLODIPine (NORVASC) 10 MG tablet, Take 1 tablet by mouth Daily., Disp: , Rfl:   •  atorvastatin (LIPITOR) 40 MG tablet, Take 1 tablet by mouth Daily., Disp: , Rfl:   •  buPROPion SR (WELLBUTRIN SR) 100 MG 12 hr tablet, Take 150 mg by mouth Daily., Disp: , Rfl:   •  calcium carbonate-vitamin d 600-400 MG-UNIT per tablet, , Disp: , Rfl:   •  Cholecalciferol (Vitamin D3) 1.25 MG (58457 UT) capsule, Vitamin D3  2000 units, Disp: , Rfl:   •  cloNIDine (CATAPRES) 0.1 MG tablet, Take 0.1 mg by mouth Daily As Needed for High Blood Pressure., Disp: , Rfl:   •  colchicine 0.6 MG tablet, TAKE 1 TABLET(S) EVERY DAY. MAY TAKE ADD L TAB FOR GOUT  FLARES, Disp: , Rfl:   •  everolimus (ZORTRESS) 0.5 MG tablet, HOLD FOR 7 DAYS. RESUME ON 2/15/22 (2 tablet (1mg) by mouth every morning and 1 tablet (0.5mg) by mouth every evening), Disp: 60 tablet, Rfl:   •  fluticasone (FLONASE) 50 MCG/ACT nasal spray, , Disp: , Rfl:   •  furosemide (LASIX) 20 MG tablet, Hold until follow up with your primary care provider, Disp: , Rfl:   •  icosapent ethyl (Vascepa) 1 g capsule capsule, Take 2 capsules by mouth 2 (Two) Times a Day With Meals., Disp: , Rfl:   •  ipratropium-albuterol (DUO-NEB) 0.5-2.5 mg/3 ml nebulizer, ipratropium 0.5 mg-albuterol 2.5 mg/2.5 mL solution for nebulization  Inhale by inhalation route., Disp: , Rfl:   •  isosorbide dinitrate (ISORDIL) 30 MG tablet, Take 1 tablet by mouth 3 (Three) Times a Day. (Patient taking differently: Take 30 mg by mouth 2 (Two) Times a Day. 30mg morning and afternoon and 60mg qhs), Disp: 90 tablet, Rfl: 3  •  levothyroxine (SYNTHROID, LEVOTHROID) 175 MCG tablet, 200 mcg., Disp: , Rfl:   •  melatonin 5 MG tablet tablet, Take 1 tablet by mouth Every Night., Disp: , Rfl:   •  metFORMIN ER (GLUCOPHAGE-XR) 500 MG 24 hr tablet, 2 (Two) Times a Day., Disp: , Rfl:   •  methocarbamol (ROBAXIN) 500 MG tablet, Take 1 tablet by mouth 3 (Three) Times a Day As Needed for Muscle Spasms., Disp: 30 tablet, Rfl: 0  •  mirtazapine (REMERON) 7.5 MG tablet, Take 1 tablet by mouth Every Night., Disp: 30 tablet, Rfl: 2  •  nitroglycerin (NITROSTAT) 0.4 MG SL tablet, Place 0.4 mg under the tongue., Disp: , Rfl:   •  O2 (OXYGEN), Inhale 2 L/min Continuous., Disp: , Rfl:   •  pantoprazole (PROTONIX) 40 MG EC tablet, Take 40 mg by mouth Daily., Disp: , Rfl:   •  potassium chloride (MICRO-K) 10 MEQ CR capsule, Hold until Lasix (furosemide) restarted, Disp: , Rfl:   •  Prasterone (Intrarosa) 6.5 MG insert, Insert 1 suppository into the vagina Daily., Disp: 28 each, Rfl: 11  •  predniSONE (DELTASONE) 10 MG tablet, Hold while taking Dexamethasone. Once out  "of Dexamethasone, resume taking 1 tablet by mouth daily, Disp: , Rfl:   •  rOPINIRole (REQUIP) 2 MG tablet, 4 mg Every Night., Disp: , Rfl:   •  sertraline (ZOLOFT) 100 MG tablet, Take 100 mg by mouth Daily., Disp: , Rfl:   •  spironolactone (ALDACTONE) 50 MG tablet, Take 1 tablet by mouth Daily., Disp: , Rfl:   •  tacrolimus (PROGRAF) 0.5 MG capsule, HOLD FOR 7 DAYS. RESUME ON 2/15/22 (Two tablets by mouth twice per day), Disp: , Rfl:   •  traMADol (ULTRAM) 50 MG tablet, Take 1 tablet by mouth Every 6 (Six) Hours As Needed for Moderate Pain ., Disp: 18 tablet, Rfl: 0  •  traZODone (DESYREL) 100 MG tablet, Take 200 mg by mouth every night at bedtime., Disp: , Rfl:   •  vitamin D (ERGOCALCIFEROL) 1.25 MG (93866 UT) capsule capsule, Take 50,000 Units by mouth Every 7 (Seven) Days.  , Disp: , Rfl:   •  zolpidem (AMBIEN) 5 MG tablet, Take 5 mg by mouth Every Night., Disp: , Rfl:     Past Medical History:   Diagnosis Date   • Anxiety    • Cirrhosis of liver (HCC)    • COVID    • Depression    • Hypercholesteremia    • Hypertension    • Hypothyroid    • Meningioma (HCC)    • Osteoarthritis    • Osteoporosis    • Renal impairment    • Stroke (HCC)    • Thyroid cancer (HCC)    • Vision impairment     left eye        Past Surgical History:   Procedure Laterality Date   • APPENDECTOMY     •  SECTION      x3   • CHOLECYSTECTOMY     • D & C HYSTEROSCOPY LAPAROSCOPY      x2 for endometriosis   • LIVER SURGERY      removed cysts    • LIVER TRANSPLANTATION     • ORIF ANKLE FRACTURE     • THYROIDECTOMY     • TONSILLECTOMY     • TOTAL ABDOMINAL HYSTERECTOMY WITH SALPINGO OOPHORECTOMY Bilateral        Objective  /76   Ht 154.9 cm (61\")   Wt 81.6 kg (180 lb)   Breastfeeding No   BMI 34.01 kg/m²     Physical Exam  Exam conducted with a chaperone present.   Constitutional:       Appearance: Normal appearance.   HENT:      Head: Normocephalic.   Neck:      Thyroid: No thyroid mass or thyromegaly.   Cardiovascular:      " Rate and Rhythm: Normal rate and regular rhythm.      Heart sounds: Normal heart sounds.   Pulmonary:      Effort: Pulmonary effort is normal.      Breath sounds: Normal breath sounds.   Chest:   Breasts:      Right: No inverted nipple, mass, nipple discharge, axillary adenopathy or supraclavicular adenopathy.      Left: No inverted nipple, mass, nipple discharge, axillary adenopathy or supraclavicular adenopathy.       Abdominal:      Palpations: Abdomen is soft. There is no mass.      Tenderness: There is no abdominal tenderness.   Genitourinary:     General: Normal vulva.      Labia:         Right: No lesion.         Left: No lesion.       Vagina: No erythema.      Uterus: Absent.       Adnexa:         Right: No mass or tenderness.          Left: No mass or tenderness.        Comments: She actually appears fairly well estrogenized.  (Though she quit the vaginal estrogen cream because it wasn't helpful for the dyspareunia.)  Anus appears wnl.  No rectal exam performed.  Lymphadenopathy:      Upper Body:      Right upper body: No supraclavicular or axillary adenopathy.      Left upper body: No supraclavicular or axillary adenopathy.   Neurological:      Mental Status: She is alert.   Psychiatric:         Mood and Affect: Mood normal.         Behavior: Behavior normal.         Assessment/Plan   Diagnoses and all orders for this visit:    1. Encounter for gynecological examination with abnormal finding (Primary)    2. Atrophy of vagina  -     Prasterone (Intrarosa) 6.5 MG insert; Insert 1 suppository into the vagina Daily.  Dispense: 28 each; Refill: 11    3. Female dyspareunia  -     Prasterone (Intrarosa) 6.5 MG insert; Insert 1 suppository into the vagina Daily.  Dispense: 28 each; Refill: 11    She will call me or send pt portal msg in ~ 6-8 wks to let me know if the IntraRosa is helpful.     Procedures    40 to 64: Counseling/Anticipatory Guidance Discussed: self-breast exam        Return in about 1 year (around  3/3/2023) for Annual physical.    Carmita Nelson, APRN  03/03/2022

## 2022-03-03 NOTE — CASE COMMUNICATION
3/3/22 S/W Mrs. Frias on 3/2/22 to schedule SNV. She asked to delay visit until next week due to muliple provider appointments today 3/3/22 & tomorrow 3/4/22.

## 2022-03-04 ENCOUNTER — HOME CARE VISIT (OUTPATIENT)
Dept: HOME HEALTH SERVICES | Facility: HOME HEALTHCARE | Age: 57
End: 2022-03-04

## 2022-03-04 PROCEDURE — G0152 HHCP-SERV OF OT,EA 15 MIN: HCPCS

## 2022-03-07 ENCOUNTER — HOME CARE VISIT (OUTPATIENT)
Dept: HOME HEALTH SERVICES | Facility: HOME HEALTHCARE | Age: 57
End: 2022-03-07

## 2022-03-07 PROCEDURE — G0299 HHS/HOSPICE OF RN EA 15 MIN: HCPCS

## 2022-03-08 ENCOUNTER — LAB (OUTPATIENT)
Dept: LAB | Facility: HOSPITAL | Age: 57
End: 2022-03-08

## 2022-03-08 ENCOUNTER — OFFICE VISIT (OUTPATIENT)
Dept: INTERNAL MEDICINE | Facility: CLINIC | Age: 57
End: 2022-03-08

## 2022-03-08 VITALS
HEART RATE: 76 BPM | HEIGHT: 61 IN | BODY MASS INDEX: 33.23 KG/M2 | DIASTOLIC BLOOD PRESSURE: 80 MMHG | SYSTOLIC BLOOD PRESSURE: 122 MMHG | TEMPERATURE: 98.9 F | RESPIRATION RATE: 20 BRPM | WEIGHT: 176 LBS

## 2022-03-08 DIAGNOSIS — C73 THYROID CANCER: ICD-10-CM

## 2022-03-08 DIAGNOSIS — R73.02 IMPAIRED GLUCOSE TOLERANCE: ICD-10-CM

## 2022-03-08 DIAGNOSIS — D64.9 ANEMIA, UNSPECIFIED TYPE: ICD-10-CM

## 2022-03-08 DIAGNOSIS — I10 ESSENTIAL HYPERTENSION: ICD-10-CM

## 2022-03-08 DIAGNOSIS — E03.9 HYPOTHYROIDISM, UNSPECIFIED TYPE: ICD-10-CM

## 2022-03-08 DIAGNOSIS — E78.5 HYPERLIPIDEMIA, UNSPECIFIED HYPERLIPIDEMIA TYPE: ICD-10-CM

## 2022-03-08 DIAGNOSIS — D84.9 IMMUNOSUPPRESSED STATUS: ICD-10-CM

## 2022-03-08 DIAGNOSIS — K74.60 CIRRHOSIS OF LIVER WITHOUT ASCITES, UNSPECIFIED HEPATIC CIRRHOSIS TYPE: ICD-10-CM

## 2022-03-08 DIAGNOSIS — Z12.31 ENCOUNTER FOR SCREENING MAMMOGRAM FOR BREAST CANCER: Primary | ICD-10-CM

## 2022-03-08 LAB
A/C: ABNORMAL
ALBUMIN SERPL-MCNC: 4.5 G/DL (ref 3.5–5.2)
ALBUMIN/GLOB SERPL: 2 G/DL
ALP SERPL-CCNC: 79 U/L (ref 39–117)
ALT SERPL W P-5'-P-CCNC: 79 U/L (ref 1–33)
ANION GAP SERPL CALCULATED.3IONS-SCNC: 13.8 MMOL/L (ref 5–15)
AST SERPL-CCNC: 58 U/L (ref 1–32)
BASOPHILS # BLD AUTO: 0.07 10*3/MM3 (ref 0–0.2)
BASOPHILS NFR BLD AUTO: 1.2 % (ref 0–1.5)
BILIRUB BLD-MCNC: NEGATIVE MG/DL
BILIRUB SERPL-MCNC: 0.8 MG/DL (ref 0–1.2)
BUN SERPL-MCNC: 26 MG/DL (ref 6–20)
BUN/CREAT SERPL: 29.5 (ref 7–25)
CALCIUM SPEC-SCNC: 9.3 MG/DL (ref 8.6–10.5)
CHLORIDE SERPL-SCNC: 105 MMOL/L (ref 98–107)
CHOLEST SERPL-MCNC: 126 MG/DL (ref 0–200)
CLARITY, POC: CLEAR
CO2 SERPL-SCNC: 24.2 MMOL/L (ref 22–29)
COLOR UR: YELLOW
CREAT SERPL-MCNC: 0.88 MG/DL (ref 0.57–1)
DEPRECATED RDW RBC AUTO: 49.5 FL (ref 37–54)
EGFRCR SERPLBLD CKD-EPI 2021: 76.3 ML/MIN/1.73
EOSINOPHIL # BLD AUTO: 0.02 10*3/MM3 (ref 0–0.4)
EOSINOPHIL NFR BLD AUTO: 0.3 % (ref 0.3–6.2)
ERYTHROCYTE [DISTWIDTH] IN BLOOD BY AUTOMATED COUNT: 15.3 % (ref 12.3–15.4)
EXPIRATION DATE: ABNORMAL
EXPIRATION DATE: NORMAL
GLOBULIN UR ELPH-MCNC: 2.3 GM/DL
GLUCOSE SERPL-MCNC: 155 MG/DL (ref 65–99)
GLUCOSE UR STRIP-MCNC: NEGATIVE MG/DL
HBA1C MFR BLD: 5.9 % (ref 4.8–5.6)
HCT VFR BLD AUTO: 40.5 % (ref 34–46.6)
HDLC SERPL-MCNC: 30 MG/DL (ref 40–60)
HGB BLD-MCNC: 13.3 G/DL (ref 12–15.9)
IMM GRANULOCYTES # BLD AUTO: 0.22 10*3/MM3 (ref 0–0.05)
IMM GRANULOCYTES NFR BLD AUTO: 3.7 % (ref 0–0.5)
KETONES UR QL: NEGATIVE
LDLC SERPL CALC-MCNC: 40 MG/DL (ref 0–100)
LDLC/HDLC SERPL: 0.68 {RATIO}
LEUKOCYTE EST, POC: NEGATIVE
LYMPHOCYTES # BLD AUTO: 0.74 10*3/MM3 (ref 0.7–3.1)
LYMPHOCYTES NFR BLD AUTO: 12.3 % (ref 19.6–45.3)
Lab: ABNORMAL
Lab: NORMAL
MCH RBC QN AUTO: 29.2 PG (ref 26.6–33)
MCHC RBC AUTO-ENTMCNC: 32.8 G/DL (ref 31.5–35.7)
MCV RBC AUTO: 89 FL (ref 79–97)
MONOCYTES # BLD AUTO: 0.38 10*3/MM3 (ref 0.1–0.9)
MONOCYTES NFR BLD AUTO: 6.3 % (ref 5–12)
NEUTROPHILS NFR BLD AUTO: 4.59 10*3/MM3 (ref 1.7–7)
NEUTROPHILS NFR BLD AUTO: 76.2 % (ref 42.7–76)
NITRITE UR-MCNC: NEGATIVE MG/ML
NRBC BLD AUTO-RTO: 0.2 /100 WBC (ref 0–0.2)
PH UR: 5 [PH] (ref 5–8)
PLATELET # BLD AUTO: 139 10*3/MM3 (ref 140–450)
PMV BLD AUTO: 12.1 FL (ref 6–12)
POC CREATININE URINE: ABNORMAL
POC MICROALBUMIN URINE: ABNORMAL
POTASSIUM SERPL-SCNC: 3.7 MMOL/L (ref 3.5–5.2)
PROT SERPL-MCNC: 6.8 G/DL (ref 6–8.5)
PROT UR STRIP-MCNC: NEGATIVE MG/DL
RBC # BLD AUTO: 4.55 10*6/MM3 (ref 3.77–5.28)
RBC # UR STRIP: NEGATIVE /UL
SODIUM SERPL-SCNC: 143 MMOL/L (ref 136–145)
SP GR UR: 1.01 (ref 1–1.03)
T4 FREE SERPL-MCNC: 1.12 NG/DL (ref 0.93–1.7)
TRIGL SERPL-MCNC: 378 MG/DL (ref 0–150)
TSH SERPL DL<=0.05 MIU/L-ACNC: 7.71 UIU/ML (ref 0.27–4.2)
UROBILINOGEN UR QL: NORMAL
VLDLC SERPL-MCNC: 56 MG/DL (ref 5–40)
WBC NRBC COR # BLD: 6.02 10*3/MM3 (ref 3.4–10.8)

## 2022-03-08 PROCEDURE — 99214 OFFICE O/P EST MOD 30 MIN: CPT | Performed by: INTERNAL MEDICINE

## 2022-03-08 PROCEDURE — 83036 HEMOGLOBIN GLYCOSYLATED A1C: CPT | Performed by: INTERNAL MEDICINE

## 2022-03-08 PROCEDURE — 84439 ASSAY OF FREE THYROXINE: CPT | Performed by: INTERNAL MEDICINE

## 2022-03-08 PROCEDURE — 80061 LIPID PANEL: CPT | Performed by: INTERNAL MEDICINE

## 2022-03-08 PROCEDURE — 80050 GENERAL HEALTH PANEL: CPT | Performed by: INTERNAL MEDICINE

## 2022-03-08 PROCEDURE — 81003 URINALYSIS AUTO W/O SCOPE: CPT | Performed by: INTERNAL MEDICINE

## 2022-03-08 PROCEDURE — 82044 UR ALBUMIN SEMIQUANTITATIVE: CPT | Performed by: INTERNAL MEDICINE

## 2022-03-09 ENCOUNTER — PATIENT ROUNDING (BHMG ONLY) (OUTPATIENT)
Dept: INTERNAL MEDICINE | Facility: CLINIC | Age: 57
End: 2022-03-09

## 2022-03-09 VITALS
TEMPERATURE: 98 F | SYSTOLIC BLOOD PRESSURE: 139 MMHG | RESPIRATION RATE: 22 BRPM | DIASTOLIC BLOOD PRESSURE: 87 MMHG | HEART RATE: 114 BPM | OXYGEN SATURATION: 97 %

## 2022-03-09 NOTE — PROGRESS NOTES
A [place] message has been sent to the patient for patient rounding with [facility].

## 2022-03-10 ENCOUNTER — HOME CARE VISIT (OUTPATIENT)
Dept: HOME HEALTH SERVICES | Facility: HOME HEALTHCARE | Age: 57
End: 2022-03-10

## 2022-03-10 VITALS — RESPIRATION RATE: 18 BRPM | HEART RATE: 84 BPM | OXYGEN SATURATION: 97 %

## 2022-03-10 PROCEDURE — G0151 HHCP-SERV OF PT,EA 15 MIN: HCPCS

## 2022-03-14 ENCOUNTER — TELEPHONE (OUTPATIENT)
Dept: INTERNAL MEDICINE | Facility: CLINIC | Age: 57
End: 2022-03-14

## 2022-03-14 ENCOUNTER — HOSPITAL ENCOUNTER (EMERGENCY)
Facility: HOSPITAL | Age: 57
Discharge: HOME | End: 2022-03-14
Attending: EMERGENCY MEDICINE | Admitting: EMERGENCY MEDICINE

## 2022-03-14 ENCOUNTER — APPOINTMENT (OUTPATIENT)
Dept: GENERAL RADIOLOGY | Facility: HOSPITAL | Age: 57
End: 2022-03-14

## 2022-03-14 VITALS
HEART RATE: 89 BPM | HEART RATE: 77 BPM | SYSTOLIC BLOOD PRESSURE: 132 MMHG | TEMPERATURE: 98.1 F | TEMPERATURE: 97.8 F | RESPIRATION RATE: 15 BRPM | OXYGEN SATURATION: 97 % | DIASTOLIC BLOOD PRESSURE: 79 MMHG | RESPIRATION RATE: 16 BRPM | DIASTOLIC BLOOD PRESSURE: 67 MMHG | OXYGEN SATURATION: 95 % | SYSTOLIC BLOOD PRESSURE: 108 MMHG

## 2022-03-14 VITALS
HEART RATE: 76 BPM | WEIGHT: 171 LBS | TEMPERATURE: 98.3 F | OXYGEN SATURATION: 92 % | DIASTOLIC BLOOD PRESSURE: 71 MMHG | HEIGHT: 61 IN | RESPIRATION RATE: 18 BRPM | BODY MASS INDEX: 32.28 KG/M2 | SYSTOLIC BLOOD PRESSURE: 103 MMHG

## 2022-03-14 DIAGNOSIS — R07.81 RIB PAIN: Primary | ICD-10-CM

## 2022-03-14 PROCEDURE — 99282 EMERGENCY DEPT VISIT SF MDM: CPT

## 2022-03-14 PROCEDURE — 71045 X-RAY EXAM CHEST 1 VIEW: CPT

## 2022-03-14 NOTE — ED PROVIDER NOTES
Subjective   57-year-old female presents emergency door today with right-sided rib pain.  States she was sitting at her office chair at home went to reach over maximum and felt a pop in the right rib area.  She reports he has pain with movement.  She is not short of breath.  Last month she had Covid so she expected would probably see some type of pneumonia on her chest x-ray.      History provided by:  Patient   used: No    Trauma  Mechanism of injury: Leaning over the arm of a chair felt a pop in the right rib cage.  Injury location: Right lateral chest wall.  Incident location: home  Time since incident: 2 days  Arrived directly from scene: no     Protective equipment:        No eye protection, gloves, helmet, life jacket, protective jacket, protective pants or protective suit.        Suspicion of alcohol use: no       Suspicion of drug use: no    EMS/PTA data:       Loss of consciousness: no    Current symptoms:       Pain quality: stabbing and sharp       Pain timing: intermittent       Associated symptoms:             Denies abdominal pain, back pain, chest pain, difficulty breathing, headache, hearing loss, loss of consciousness, nausea, neck pain, seizures and vomiting.     Relevant PMH:       Medical risk factors:             No asthma.             Recent Covid infection with pneumonia       Pharmacological risk factors:             Steroid therapy.             No anticoagulation therapy, antiplatelet therapy or beta blocker therapy.        The patient has not been admitted to the hospital due to injury in the past year, and has not been treated and released from the ED due to injury in the past year.      Review of Systems   Constitutional: Negative for chills and fever.   HENT: Negative for hearing loss.    Respiratory: Negative for chest tightness, shortness of breath and wheezing.    Cardiovascular: Negative for chest pain and palpitations.   Gastrointestinal: Negative for abdominal  pain, nausea and vomiting.   Genitourinary: Negative for dysuria, frequency and urgency.   Musculoskeletal: Negative for back pain and neck pain.   Skin: Negative for pallor and rash.   Neurological: Negative for seizures, loss of consciousness and headaches.   All other systems reviewed and are negative.      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, mastoid damage   • Hypercholesteremia    • Hypertension    • Hyperthyroidism    • Hypothyroid    • Low back pain    • Meningioma (HCC)    • Osteoarthritis    • Osteopenia    • Osteoporosis    • Pancreatitis     Pretransplant   • Renal impairment    • Stroke (HCC)    • Thyroid cancer (HCC)    • Vision impairment     left eye       Allergies   Allergen Reactions   • Penicillins Shortness Of Breath   • Cyclosporine Swelling       Past Surgical History:   Procedure Laterality Date   • APPENDECTOMY     •  SECTION      x3   • CHOLECYSTECTOMY     • D & C HYSTEROSCOPY LAPAROSCOPY      x2 for endometriosis   • FRACTURE SURGERY      Right ankle has screws   • LIVER SURGERY      removed cysts    • LIVER TRANSPLANTATION     • ORIF ANKLE FRACTURE     • THYROIDECTOMY     • TONSILLECTOMY     • TOTAL ABDOMINAL HYSTERECTOMY WITH SALPINGO OOPHORECTOMY Bilateral        Family History   Problem Relation Age of Onset   • Arthritis Mother    • Osteoporosis Mother    • Rheum arthritis Mother    • Anemia Mother    • Alcohol abuse Father    • Mental illness Father    • Hyperlipidemia Father    • Hypertension Father    • Colon cancer Father    • ADD / ADHD Son    • ADD / ADHD Son     • Breast cancer Maternal Grandmother    • Stroke Maternal Grandmother    • Mental illness Paternal Grandmother    • Ovarian cancer Paternal Grandmother        Social History     Socioeconomic History   • Marital status:    Tobacco Use   • Smoking status: Never Smoker   • Smokeless tobacco: Never Used   Vaping Use   • Vaping Use: Never used   Substance and Sexual Activity   • Alcohol use: No   • Drug use: Never     Comment: Tried an edible last week.    • Sexual activity: Yes     Partners: Male     Birth control/protection: Post-menopausal, Surgical           Objective   Physical Exam  Vitals and nursing note reviewed.   Constitutional:       General: She is not in acute distress.     Appearance: She is well-developed. She is not diaphoretic.   HENT:      Head: Normocephalic and atraumatic.      Nose: Nose normal.   Eyes:      General: No scleral icterus.     Conjunctiva/sclera: Conjunctivae normal.   Cardiovascular:      Rate and Rhythm: Normal rate and regular rhythm.      Heart sounds: Normal heart sounds. No murmur heard.  Pulmonary:      Effort: Pulmonary effort is normal. No respiratory distress.      Breath sounds: No rhonchi or rales.      Comments: Chest wall is tender.  No crepitus no flail segment  Chest:      Chest wall: No tenderness.   Abdominal:      General: Bowel sounds are normal.      Palpations: Abdomen is soft.      Tenderness: There is no abdominal tenderness.   Musculoskeletal:         General: Normal range of motion.      Cervical back: Normal range of motion and neck supple.   Skin:     General: Skin is warm and dry.   Neurological:      Mental Status: She is alert and oriented to person, place, and time.   Psychiatric:         Behavior: Behavior normal.         Procedures           ED Course                                         No results found for this or any previous visit (from the past 24 hour(s)).  Note: In addition to lab results from this visit, the labs listed above may  "include labs taken at another facility or during a different encounter within the last 24 hours. Please correlate lab times with ED admission and discharge times for further clarification of the services performed during this visit.    XR Chest 1 View   Final Result   1.  Interval improvement in ill-defined infiltrates and interstitial   changes throughout the lungs bilaterally.   2.  No evidence for displaced rib fracture. There is no evidence for   pneumothorax.       This report was finalized on 3/14/2022 3:46 PM by Myles Faye MD.            Vitals:    03/14/22 1433   BP: 103/71   BP Location: Left arm   Patient Position: Sitting   Pulse: 76   Resp: 18   Temp: 98.3 °F (36.8 °C)   TempSrc: Tympanic   SpO2: 92%   Weight: 77.6 kg (171 lb)   Height: 154.9 cm (61\")     Medications - No data to display  ECG/EMG Results (last 24 hours)     ** No results found for the last 24 hours. **        No orders to display               MDM  Number of Diagnoses or Management Options  Rib pain: new and requires workup     Amount and/or Complexity of Data Reviewed  Tests in the radiology section of CPT®: reviewed and ordered  Discuss the patient with other providers: yes    Patient Progress  Patient progress: stable      Final diagnoses:   Rib pain       ED Disposition  ED Disposition     ED Disposition   Discharge    Condition   Stable    Comment   --             Ad Oquendo MD  [address] [place], [place] [id number] [phone]               Medication List      New Prescriptions    diclofenac 50 MG EC tablet  Commonly known as: VOLTAREN  Take 1 tablet by mouth 3 (Three) Times a Day.        Changed    everolimus 0.5 MG tablet  Commonly known as: ZORTRESS  HOLD FOR 7 DAYS. RESUME ON 2/15/22 (2 tablet (1mg) by mouth every morning and 1 tablet (0.5mg) by mouth every evening)  What changed:   · how much to take  · how to take this  · when to take this  · additional instructions     isosorbide dinitrate 30 " MG tablet  Commonly known as: ISORDIL  Take 1 tablet by mouth 3 (Three) Times a Day.  What changed:   · when to take this  · additional instructions           Where to Get Your Medications      These medications were sent to [facility]/pharmacy #[id number] - [place], [place] - [address] [place] [address] AT [facility] - [phone]  - [phone] [address] [address]    Phone: [phone]   · diclofenac 50 MG EC tablet          Bill Ulloa PA  03/15/22 6351

## 2022-03-14 NOTE — HOME HEALTH
OT DISCHARGE COMPLETED THIS DATE WITH GOALS MET. PT INSTRUCTED ON HOME PROGRAM AND IS IND WITH FOLLOWTHROUGH.

## 2022-03-14 NOTE — TELEPHONE ENCOUNTER
S/W Kathi at [facility], states they do service [facility] and do accept [employer] insurance.  States to fax demographics, insurance, order and last office notes and they will see if they can meet pt's needs and let us know.      Fax # [phone]

## 2022-03-14 NOTE — HOME HEALTH
OT PROVIDED SKILLED INSTUCTION ON HEP, EC/WS, AE NEEDS, ADLS/IADL RETRAINING ABND HOME SAFETY. PT VERBALIZED OR DEMOS UNDERSTANDING. OT TO CONTINUE SKILLED INSTRUCTION NEXT VISIT.

## 2022-03-14 NOTE — TELEPHONE ENCOUNTER
Caller: Lizette Frias    Relationship to patient: Self    Best call back number: [phone]    Chief complaint: PATIENT FELT SHE HAD BROKEN HER RIBS AND COMPLAINED THAT SHE COULD FEEL BONES MOVING IN THE AREA ON HER CHEST WALL.    Patient directed to call 911 or go to their nearest emergency room.     Patient verbalized understanding: [x] Yes  [] No  If no, why?    Additional notes: DUE TO SEVERE TRAUMA, DIRECTED VIA RED FLAG PROCESS INSTEAD OF WARM TRANSFERRING FOR BROKEN BONE.

## 2022-03-15 NOTE — TELEPHONE ENCOUNTER
Called and checked on patient.  She is on an anti-inflammatory and is feeling better today.  Ad Oquendo MD  12:01 EDT  03/15/22

## 2022-03-16 NOTE — TELEPHONE ENCOUNTER
Tracy called back-their director has reviewed and they have maxed out pts with Misty so unable to take pt    Please find another agency

## 2022-03-16 NOTE — TELEPHONE ENCOUNTER
called Tommy at [phone]  talked to Tracy- went through and they dont have-will refax and she will call me back if she does not get-they may be out until next week

## 2022-03-17 NOTE — TELEPHONE ENCOUNTER
S/W Clotilde with [facility] at [phone].  States to fax everything and they will verify insurance and check staffing capability and let us know.  Please fax insurance and orders along with demo and notes to Fax # [phone]

## 2022-03-21 NOTE — TELEPHONE ENCOUNTER
[facility] [phone]  talked to Shirley-they did receive but no tentative start date yet  she talked to intake and fax did not go all the way through-she said to fax to [facility] # [phone]

## 2022-03-22 NOTE — TELEPHONE ENCOUNTER
Called [facility] at [phone]  talked to Toña-they got referral-lady who reviews is not there-she got some more info-she will update me-try to go out within 2 days but understaffed-they will call when they admit her to give us report

## 2022-03-25 NOTE — TELEPHONE ENCOUNTER
[facility] [phone]  [facility] checking on status

## 2022-03-28 RX ORDER — ROPINIROLE 2 MG/1
TABLET, FILM COATED ORAL
Qty: 180 TABLET | Refills: 1 | Status: SHIPPED | OUTPATIENT
Start: 2022-03-28 | End: 2022-08-30 | Stop reason: SDUPTHER

## 2022-03-28 RX ORDER — MULTIVITAMIN/IRON/FOLIC ACID 18MG-0.4MG
TABLET ORAL
Qty: 90 TABLET | Refills: 1 | Status: SHIPPED | OUTPATIENT
Start: 2022-03-28 | End: 2022-12-11 | Stop reason: SDUPTHER

## 2022-03-28 RX ORDER — SERTRALINE HYDROCHLORIDE 100 MG/1
100 TABLET, FILM COATED ORAL DAILY
Qty: 90 TABLET | Refills: 1 | Status: SHIPPED | OUTPATIENT
Start: 2022-03-28 | End: 2023-01-03

## 2022-03-28 RX ORDER — LEVOFLOXACIN 750 MG/1
TABLET ORAL
Qty: 7 TABLET | OUTPATIENT
Start: 2022-03-28

## 2022-03-28 RX ORDER — SERTRALINE HYDROCHLORIDE 100 MG/1
TABLET, FILM COATED ORAL
Qty: 90 TABLET | Refills: 1 | Status: SHIPPED | OUTPATIENT
Start: 2022-03-28 | End: 2022-03-28 | Stop reason: SDUPTHER

## 2022-03-31 ENCOUNTER — TELEPHONE (OUTPATIENT)
Dept: INTERNAL MEDICINE | Facility: CLINIC | Age: 57
End: 2022-03-31

## 2022-03-31 NOTE — TELEPHONE ENCOUNTER
Reached Junie at [phone]  to see what labs they need ordered  She stated they need TSH, and something to do with her liver transplant-she has it written down at the office and will call back

## 2022-03-31 NOTE — TELEPHONE ENCOUNTER
Caller: MARELY    Relationship:  [facility]        Best call back number: [phone]    What form or medical record are you requesting: LAST 2 OFFICE NOTES      Who is requesting this form or medical record from you: [facility]      How would you like to receive the form or medical records (pick-up, mail, fax):FAX PLEASE   If fax, what is the fax number:     [phone]  PAM YAP      If mail, what is the address:N/A    f pick-up, provide patient with address and location details N/A    Timeframe paperwork needed: ASAP      Additional notes:   PLEASE ALSO INCLUDE A STANDING FOR LAB WORK    THEY WOULD LIKE ORDER FOR 2 VISITS FOR FIRST 2 WEEKS THE FOLLW UP WITH ONCE EVERY 6 WEEKS    PLEASE ADVISE IF CHANGES ARE NEEDS           RESPIRATORY THERAPY MECHANICAL VENTILATION PROGRESS NOTE    Ventilator Weaning:  Patient meets criteria for weaning? yes Weaning was attempted yes using pressure support 25 cmH2O + PEEP 5 cmH2O. The patient tolerated fair for 2 hours.  The patient was retur

## 2022-03-31 NOTE — TELEPHONE ENCOUNTER
Received voice mail from Junie Ponce at [facility] on 3/30/2022 at 4:39 pm that they did get referral and do have her scheduled for Thursday but when they were trying to schedule her, she said she was just discharged from [facility] (I think [facility]3 weeks ago but there were some other things going on related to thyroid and labs that need to be drawn and called wanted to follow up to get verbal orders for labs.  [facility] office phone number is [phone] but might be better to call her cell at [phone]

## 2022-04-05 ENCOUNTER — OFFICE VISIT (OUTPATIENT)
Dept: INTERNAL MEDICINE | Facility: CLINIC | Age: 57
End: 2022-04-05

## 2022-04-05 VITALS
BODY MASS INDEX: 31.93 KG/M2 | RESPIRATION RATE: 20 BRPM | TEMPERATURE: 98.4 F | DIASTOLIC BLOOD PRESSURE: 76 MMHG | HEART RATE: 68 BPM | SYSTOLIC BLOOD PRESSURE: 128 MMHG | WEIGHT: 169 LBS

## 2022-04-05 DIAGNOSIS — I10 ESSENTIAL HYPERTENSION: ICD-10-CM

## 2022-04-05 DIAGNOSIS — E78.5 HYPERLIPIDEMIA, UNSPECIFIED HYPERLIPIDEMIA TYPE: Primary | ICD-10-CM

## 2022-04-05 DIAGNOSIS — R73.02 IMPAIRED GLUCOSE TOLERANCE: ICD-10-CM

## 2022-04-05 PROCEDURE — 99214 OFFICE O/P EST MOD 30 MIN: CPT | Performed by: INTERNAL MEDICINE

## 2022-04-05 RX ORDER — EPINEPHRINE 1 MG/ML
0.3 INJECTION, SOLUTION INTRAMUSCULAR; SUBCUTANEOUS AS NEEDED
Status: CANCELLED | OUTPATIENT
Start: 2022-04-08

## 2022-04-05 RX ORDER — DIPHENHYDRAMINE HCL 50 MG
50 CAPSULE ORAL ONCE AS NEEDED
Status: CANCELLED | OUTPATIENT
Start: 2022-04-08

## 2022-04-05 RX ORDER — TACROLIMUS 0.5 MG/1
2 CAPSULE ORAL 2 TIMES DAILY
COMMUNITY
Start: 2022-03-14

## 2022-04-05 RX ORDER — ALENDRONATE SODIUM 70 MG/1
1 TABLET ORAL WEEKLY
COMMUNITY
Start: 2022-02-28

## 2022-04-05 NOTE — PROGRESS NOTES
Chief Complaint   Patient presents with   • Follow-up     4 week follow up chronic medical problems       History of Present Illness      The patient presents for a follow-up related to hyperlipidemia. She is following a low fat diet. She reports that she is exercising. She is taking atorvastatin. The patient is taking her medication as prescribed. She reports no medication side effects, including muscle cramps, abdominal pain, headaches or weakness. She denies chest pain, shortness of breath, orthopnea, paroxysmal nocturnal dyspnea, dyspnea on exertion, edema, palpitations or syncope.    The patient presents for a follow-up related to hypertension. The patient reports that she has had no headaches or blurred vision. She states that she is taking her medication as prescribed. She is not having medication side effects.    The patient presents for a follow-up related to impaired glucose tolerance. She does not check her blood sugars at home. She denies hypoglycemic symptoms. The patient denies polyuria, polydypsia or polyphagia. She reports no symptoms of neuropathy. The patient takes her medication as prescribed. She is not taking insulin. The patient does check her feet daily at home. She is having GI issues with metformin.    Review of Systems    CONSTITUTIONAL- Denies Unexplained Weight Loss, Fever, Chills, Sweats, Fatigue, Weakness or Malaise.    HENT- Denies Nasal Discharge, Sore Throat, Ear Pain, Ear Drainage, Sinus Pain, Nasal Congestion, Decreased Hearing or Tinnitus.    GASTROINTESTINAL- Denies Abdominal Pain, Nausea, Vomiting, Diarrhea, Blood per Rectum, Constipation or Heartburn.    PULMONARY- Denies Wheezing, Sputum Production, Cough, Hemoptysis or Pleuritic Chest Pain.    CARDIOVASCULAR- Denies Claudication or Irregular Heart Beat.    Medications      Current Outpatient Medications:   •  allopurinol (ZYLOPRIM) 300 MG tablet, Take 300 mg by mouth Daily., Disp: , Rfl:   •  amLODIPine (NORVASC) 10 MG  tablet, Take 1 tablet by mouth Daily., Disp: , Rfl:   •  atorvastatin (LIPITOR) 40 MG tablet, Take 1 tablet by mouth Daily., Disp: , Rfl:   •  buPROPion SR (WELLBUTRIN SR) 100 MG 12 hr tablet, Take 100 mg by mouth Daily., Disp: , Rfl:   •  calcium carbonate-vitamin d 600-400 MG-UNIT per tablet, Take 1 tablet by mouth Daily., Disp: , Rfl:   •  cloNIDine (CATAPRES) 0.1 MG tablet, Take 0.1 mg by mouth Daily As Needed for High Blood Pressure., Disp: , Rfl:   •  [employer] Melatonin 5 MG tablet tablet, 1 TABLET(S) BY MOUTH EVERY EVENING TAKE 1-2 HRS PRIOR TO BEDTIME, Disp: 90 tablet, Rfl: 1  •  fluticasone (FLONASE) 50 MCG/ACT nasal spray, 2 sprays into the nostril(s) as directed by provider 2 (Two) Times a Day., Disp: , Rfl:   •  furosemide (LASIX) 20 MG tablet, Hold until follow up with your primary care provider (Patient taking differently: Take 20 mg by mouth Daily.), Disp: , Rfl:   •  icosapent ethyl (VASCEPA) 1 g capsule capsule, Take 2 capsules by mouth 2 (Two) Times a Day With Meals., Disp: , Rfl:   •  isosorbide dinitrate (ISORDIL) 30 MG tablet, Take 1 tablet by mouth 3 (Three) Times a Day. (Patient taking differently: Take 30 mg by mouth 2 (Two) Times a Day. 30mg morning and afternoon and 60mg qhs), Disp: 90 tablet, Rfl: 3  •  isosorbide mononitrate (IMDUR) 60 MG 24 hr tablet, Take 1 tablet by mouth Every Night., Disp: , Rfl:   •  levothyroxine (SYNTHROID, LEVOTHROID) 175 MCG tablet, Take 175 mcg by mouth Daily., Disp: , Rfl:   •  metFORMIN ER (GLUCOPHAGE-XR) 500 MG 24 hr tablet, Take 1,000 mg by mouth 2 (Two) Times a Day., Disp: , Rfl:   •  metoprolol succinate XL (TOPROL-XL) 100 MG 24 hr tablet, Take 200 mg by mouth Daily., Disp: , Rfl:   •  mirtazapine (REMERON) 7.5 MG tablet, Take 1 tablet by mouth Every Night., Disp: 30 tablet, Rfl: 2  •  O2 (OXYGEN), Inhale 2 L/min Every Night., Disp: , Rfl:   •  pantoprazole (PROTONIX) 40 MG EC tablet, Take 40 mg by mouth Daily., Disp: , Rfl:   •  potassium chloride (MICRO-K)  10 MEQ CR capsule, Hold until Lasix (furosemide) restarted (Patient taking differently: Take 10 mEq by mouth Daily. Hold until Lasix (furosemide) restarted), Disp: , Rfl:   •  predniSONE (DELTASONE) 10 MG tablet, Hold while taking Dexamethasone. Once out of Dexamethasone, resume taking 1 tablet by mouth daily, Disp: , Rfl:   •  rOPINIRole (REQUIP) 2 MG tablet, TAKE 1-2 TABLET(S) ORAL EVERY NIGHT AT BEDTIME, Disp: 180 tablet, Rfl: 1  •  sertraline (ZOLOFT) 100 MG tablet, Take 1 tablet by mouth Daily., Disp: 90 tablet, Rfl: 1  •  spironolactone (ALDACTONE) 50 MG tablet, Take 1 tablet by mouth Daily., Disp: , Rfl:   •  tacrolimus (PROGRAF) 0.5 MG capsule, Take 2 capsules by mouth 2 (Two) Times a Day., Disp: , Rfl:   •  traMADol (ULTRAM) 50 MG tablet, Take 1 tablet by mouth Every 6 (Six) Hours As Needed for Moderate Pain ., Disp: 18 tablet, Rfl: 0  •  traZODone (DESYREL) 100 MG tablet, Take 200 mg by mouth every night at bedtime., Disp: , Rfl:   •  vitamin D (ERGOCALCIFEROL) 1.25 MG (71932 UT) capsule capsule, Take 50,000 Units by mouth Every 7 (Seven) Days.  , Disp: , Rfl:   •  zolpidem (AMBIEN) 5 MG tablet, Take 5 mg by mouth Every Night., Disp: , Rfl:   •  alendronate (FOSAMAX) 70 MG tablet, Take 1 tablet by mouth 1 (One) Time Per Week., Disp: , Rfl:   •  ipratropium-albuterol (DUO-NEB) 0.5-2.5 mg/3 ml nebulizer, Take 3 mL by nebulization Every 4 (Four) Hours As Needed., Disp: , Rfl:   •  nitroglycerin (NITROSTAT) 0.4 MG SL tablet, Place 0.4 mg under the tongue., Disp: , Rfl:   •  Prasterone (Intrarosa) 6.5 MG insert, Insert 1 suppository into the vagina Daily., Disp: 28 each, Rfl: 11     Allergies    Allergies   Allergen Reactions   • Penicillins Shortness Of Breath   • Cyclosporine Swelling       Problem List    Patient Active Problem List   Diagnosis   • Menopause   • Personal history of malignant neoplasm of thyroid   • History of stroke   • Renal impairment   • Vision impairment   • Osteoporosis   •  Osteoarthritis   • Meningioma (HCC)   • Hypothyroid   • Hypercholesteremia   • Depression   • Cirrhosis of liver (HCC)   • Anxiety   • Acute hypoxemic respiratory failure due to COVID-19 (HCC)   • Liver transplant recipient (HCC)   • Severe malnutrition (CMS/HCC)   • Leukopenia   • Immunosuppression due to drug therapy (HCC)   • Insomnia   • Sleep apnea   • Gout   • Cytokine release syndrome, grade 2   • Precordial pain       Medications, Allergies, Problems List and Past History were reviewed and updated.    Physical Examination    /76 (BP Location: Left arm, Patient Position: Sitting, Cuff Size: Adult)   Pulse 68   Temp 98.4 °F (36.9 °C) (Infrared)   Resp 20   Wt 76.7 kg (169 lb)   LMP  (LMP Unknown) Comment: Last pap 3/3/2022 by [facility]  Breastfeeding No   BMI 31.93 kg/m²     HEENT: Facies- Within normal limits. Lids- Normal bilaterally. Conjunctiva- Clear bilaterally. Sclera- Anicteric bilaterally.    Neck: Thyroid- non enlarged, symmetric and has no nodules. No bruits are detected.    Lungs: Auscultation- Clear to auscultation bilaterally. There are no retractions, clubbing or cyanosis. The Expiratory to Inspiratory ratio is equal.    Cardiovascular: There are no carotid bruits. Heart- Normal Rate with Regular rhythm and no murmurs. There are no gallops. There are no rubs. In the lower extremities there is no edema. The upper extremities do not have edema.    Abdomen: Soft, benign, non-tender with no masses, hernias, organomegaly or scars.    Impression and Assessment    Hyperlipidemia.    Essential Hypertension.    Impaired Glucose Tolerance.    Plan    Hyperlipidemia Plan: The patient was instructed to exercise daily, eat a low fat diet and continue her medications.    Essential Hypertension Plan: The patient was instructed to continue the current medications.    Impaired Glucose Tolerance Plan: She is going to d/c metformin due to GI issues. Will monitor over the next three months.  Consider glimiperide.    Diagnoses and all orders for this visit:    1. Hyperlipidemia, unspecified hyperlipidemia type (Primary)    2. Essential hypertension    3. Impaired glucose tolerance        Return to Office    The patient was instructed to return for follow-up in 3 months. The patient was instructed to return sooner if the condition changes, worsens, or does not resolve.

## 2022-04-08 ENCOUNTER — INFUSION (OUTPATIENT)
Dept: ONCOLOGY | Facility: HOSPITAL | Age: 57
End: 2022-04-08

## 2022-04-11 ENCOUNTER — OUTSIDE FACILITY SERVICE (OUTPATIENT)
Dept: INTERNAL MEDICINE | Facility: CLINIC | Age: 57
End: 2022-04-11

## 2022-04-11 ENCOUNTER — TELEPHONE (OUTPATIENT)
Dept: INTERNAL MEDICINE | Facility: CLINIC | Age: 57
End: 2022-04-11

## 2022-04-11 PROCEDURE — OUTSIDEPOS PR OUTSIDE POS PLACEHOLDER: Performed by: INTERNAL MEDICINE

## 2022-04-11 NOTE — TELEPHONE ENCOUNTER
Caller: JOSE A ROMERO    Relationship: Home Health    Best call back number: [phone]    What is the best time to reach you: ANYTIME    Who are you requesting to speak with (clinical staff, provider,  specific staff member): CLINICAL STAFF OR PROVIDER    What was the call regarding: JOSE A HAS QUESTION REGARDING PATIENT. HAS A QUESTION REGARDING THE MEDICATION AMIRYL THAT PATIENT DISCUSSED WITH PROVIDER AND JOSE A STATES THAT PATIENT GAINED 9 POUNDS IN A WEEK SINCE SHE STOP TAKING METFORMIN AND NEEDS TO KNOW IF PATIENT SHOULD INCREASE FUROSEMIDE    Do you require a callback: YES

## 2022-04-12 NOTE — TELEPHONE ENCOUNTER
Patient has only had 2 office visits with Dr Oquendo.  Most recent 4/5/2022-that is closed but the one before that-3/8/2022 is still open.  Also, are there standing lab orders and please advise on the order for 2 VISITS FOR FIRST 2 WEEKS THE FOLLW UP WITH ONCE EVERY 6 WEEKS

## 2022-04-14 ENCOUNTER — TELEPHONE (OUTPATIENT)
Dept: INTERNAL MEDICINE | Facility: CLINIC | Age: 57
End: 2022-04-14

## 2022-04-14 NOTE — TELEPHONE ENCOUNTER
"S/W Leslee, pt's Home Health nurse, informed that Dr Astudillo said  \"Her TG were ordered and were part of her lipid profile.  No need to recheck.      She can take and extra lasix and an extra spironolactone.     Based on her last A1c, I would like to monitor sugars over 3 months prior to starting amaryl as it can cause blood sugars to go too low.\"     Verb good understanding, agreement and apprec.  States she will review this with pt.   "

## 2022-04-14 NOTE — TELEPHONE ENCOUNTER
Her TG were ordered and were part of her lipid profile.  No need to recheck.     She can take and extra lasix and an extra spironolactone.    Based on her last A1c, I would like to monitor sugars over 3 months prior to starting amaryl as it can cause blood sugars to go too low.

## 2022-04-14 NOTE — TELEPHONE ENCOUNTER
Leslee the patients Home Health nurse called in regard to the patient gaining a little weight with a little shortness of air which Leslee states that the patient would like to know if she can take an extra Lasix 20 mg and Spironolactone 50 mg? Also Leslee states that the last lab work did not have Triglycerides ordered and she would like to know if she could collect that at the patients next home visit?.     Leslee also states the patient advised her that she is ready to start the Amaryl medication as well.     Please advise and give Leslee a call back at [phone].

## 2022-04-18 NOTE — TELEPHONE ENCOUNTER
If she has gained that much weight, she should be seen for re-evaluation.  Ad Oquendo MD  07:42 EDT  04/18/22

## 2022-04-19 ENCOUNTER — INFUSION (OUTPATIENT)
Dept: ONCOLOGY | Facility: HOSPITAL | Age: 57
End: 2022-04-19

## 2022-04-19 VITALS
SYSTOLIC BLOOD PRESSURE: 105 MMHG | HEART RATE: 64 BPM | DIASTOLIC BLOOD PRESSURE: 70 MMHG | RESPIRATION RATE: 18 BRPM | TEMPERATURE: 98.4 F

## 2022-04-19 DIAGNOSIS — Z79.899 IMMUNOSUPPRESSION DUE TO DRUG THERAPY: Primary | ICD-10-CM

## 2022-04-19 DIAGNOSIS — D84.821 IMMUNOSUPPRESSION DUE TO DRUG THERAPY: Primary | ICD-10-CM

## 2022-04-19 PROCEDURE — M0220 HC INJECTION, TIXAGEVIMAB AND CILGAVIMAB: HCPCS | Performed by: INTERNAL MEDICINE

## 2022-04-19 PROCEDURE — 25010000002 INJECTION, TIXAGEVIMAB AND CILGAVIMAB,: Performed by: INTERNAL MEDICINE

## 2022-04-19 RX ORDER — DIPHENHYDRAMINE HCL 50 MG
50 CAPSULE ORAL ONCE AS NEEDED
OUTPATIENT
Start: 2022-04-19

## 2022-04-19 RX ORDER — EPINEPHRINE 1 MG/ML
0.3 INJECTION, SOLUTION INTRAMUSCULAR; SUBCUTANEOUS AS NEEDED
OUTPATIENT
Start: 2022-04-19

## 2022-04-19 RX ADMIN — AZD7442 300 MG: KIT at 13:15

## 2022-04-20 ENCOUNTER — TELEPHONE (OUTPATIENT)
Dept: INTERNAL MEDICINE | Facility: CLINIC | Age: 57
End: 2022-04-20

## 2022-04-20 NOTE — TELEPHONE ENCOUNTER
Caller: JOSE A    Relationship: HOME HEALTH    Best call back number: [phone]    What is the best time to reach you: ANY TIME    Who are you requesting to speak with (clinical staff, provider,  specific staff member): NURSE    Do you know the name of the person who called: JOSE A    What was the call regarding: PATIENT HAD CONCERNS OF HEADACHE FOR PAST  WEEK, INFORMATION SHE WANTED TO SHARE WITH DR SWANSON ABOUT ANTI-DIABETIC MEDICINE FROM HER LIVER DOCTOR.    Do you require a callback: YES

## 2022-04-20 NOTE — TELEPHONE ENCOUNTER
VM for Leslee with Home Health that we were returning her call and to please call back.  Ofc. # given.

## 2022-04-20 NOTE — TELEPHONE ENCOUNTER
S/W Leslee, pt's Home Health nurse.  States pt is reporting that she has had a bad HA off and on for a month now.  States has been present everyday for a week at this point.  Tylenol does not help and has even taken Tramadol at least once for the HA.  States usually when she has HA's it is because her B/P has been elevated but that her B/P has been running really good.  States all V/S's have been WNL. States it is frontal headache and denies any other symptoms at all except nasal and post nasal drng at night only.  Feels maybe sinus related.      Other issue is that pt has gained wt since stopping the Metformin due to constant diarrhea.  States pt was 165# on admission and today weighed 172#.  States main complaint with this is increased SOA and cannot lay flat.  States her lungs sound clear but pt fear she may be retaining more fluid.    States her liver transplant doctor also sent a message to pt that he was fine with Dr Astudillo stopping pt's Metformin and monitoring her for now but that if he does want to restart a medication that he would like Dr Astudillo to consider a sulfonylurea, GLP-1RA such as Liraglutide or Semaglutide.      Expl will forward message to Dr Astudillo and let her know what he advises.  Verb apprec.

## 2022-04-25 NOTE — TELEPHONE ENCOUNTER
LM for pt to please return call.  Ofc # given.     HUB:  Please inform that Dr Astudillo wants to see her this week and see if she can come to appt on Thursday, 4/28 at 4pm with Dr Astudillo.      Document if notified.

## 2022-04-25 NOTE — TELEPHONE ENCOUNTER
Please schedule an appt this week or next to review these issues.  Tuesday or Thursday end of day would work.

## 2022-04-28 ENCOUNTER — OFFICE VISIT (OUTPATIENT)
Dept: INTERNAL MEDICINE | Facility: CLINIC | Age: 57
End: 2022-04-28

## 2022-04-28 VITALS
RESPIRATION RATE: 20 BRPM | SYSTOLIC BLOOD PRESSURE: 114 MMHG | BODY MASS INDEX: 31.91 KG/M2 | OXYGEN SATURATION: 98 % | HEART RATE: 64 BPM | DIASTOLIC BLOOD PRESSURE: 72 MMHG | HEIGHT: 61 IN | WEIGHT: 169 LBS | TEMPERATURE: 97.7 F

## 2022-04-28 DIAGNOSIS — R06.02 SHORTNESS OF BREATH: ICD-10-CM

## 2022-04-28 DIAGNOSIS — R73.02 IMPAIRED GLUCOSE TOLERANCE: Primary | ICD-10-CM

## 2022-04-28 DIAGNOSIS — E03.9 HYPOTHYROIDISM, UNSPECIFIED TYPE: ICD-10-CM

## 2022-04-28 DIAGNOSIS — I10 ESSENTIAL HYPERTENSION: ICD-10-CM

## 2022-04-28 PROCEDURE — 99214 OFFICE O/P EST MOD 30 MIN: CPT | Performed by: INTERNAL MEDICINE

## 2022-04-28 RX ORDER — BUPROPION HYDROCHLORIDE 150 MG/1
TABLET, EXTENDED RELEASE ORAL
COMMUNITY
Start: 2022-04-10 | End: 2023-02-24

## 2022-04-28 RX ORDER — MIRTAZAPINE 30 MG/1
30 TABLET, FILM COATED ORAL
COMMUNITY
Start: 2022-04-12

## 2022-05-05 RX ORDER — ISOSORBIDE DINITRATE 30 MG/1
TABLET ORAL
Qty: 270 TABLET | Refills: 1 | Status: SHIPPED | OUTPATIENT
Start: 2022-05-05 | End: 2022-11-15

## 2022-05-13 ENCOUNTER — TELEPHONE (OUTPATIENT)
Dept: INTERNAL MEDICINE | Facility: CLINIC | Age: 57
End: 2022-05-13

## 2022-05-13 DIAGNOSIS — M25.562 ACUTE PAIN OF LEFT KNEE: Primary | ICD-10-CM

## 2022-05-13 NOTE — TELEPHONE ENCOUNTER
Leslee from [facility] called and states patient fell. She states patient has no injuries.

## 2022-05-16 NOTE — TELEPHONE ENCOUNTER
Leslee called back from [facility]-patient fell again over the weekend and is still in pain at her left knee   And patient stated it feels like maybe a hairline fracture-can you order [facility] for left knee/shin?

## 2022-05-17 NOTE — TELEPHONE ENCOUNTER
S/W Leslee with Home Health.  Informed that Dr Astudillo has written order for xray for pt.  States she needs it sent to [facility] and to call them at [phone].      S/W Joanne at [facility] states to fax order and face sheet to [phone].

## 2022-05-26 ENCOUNTER — TELEPHONE (OUTPATIENT)
Dept: INTERNAL MEDICINE | Facility: CLINIC | Age: 57
End: 2022-05-26

## 2022-05-26 NOTE — TELEPHONE ENCOUNTER
Caller: JOSE A    Relationship: PROVIDER    Best call back number: [phone]    What orders are you requesting (i.e. lab or imaging): PHYSICAL THERAPY AND OCCUPATIONAL THERAPY    In what timeframe would the patient need to come in: N/A    Where will you receive your lab/imaging services: [facility]     Additional notes:JOSE A [facility] WITH [facility] STATED THAT PATIENT HAS HAD TWO FALLS IN THE PAST AND WOULD LIKE TO GET ORDERS FOR PHYSICAL THERAPY AND OCCUPATIONAL THERAPY FOR PATIENT    PLEASE ADVISE

## 2022-05-26 NOTE — TELEPHONE ENCOUNTER
S/W Leslee with [facility], Gave okay to eval and treat for PT and OT.  Verb good understanding and great apprec.

## 2022-06-01 ENCOUNTER — TELEPHONE (OUTPATIENT)
Dept: INTERNAL MEDICINE | Facility: CLINIC | Age: 57
End: 2022-06-01

## 2022-06-01 NOTE — TELEPHONE ENCOUNTER
Caller: RIC     Relationship: [facility]     Best call back number: [phone]    What orders are you requesting (i.e. lab or imaging): PHYSICAL THERAPY ORDERS    In what timeframe would the patient need to come in:N/A    Where will you receive your lab/imaging services: [facility]    Additional notes: RIC FROM [facility] WOULD NEED [facility] ORDERS FOR PHYSICAL THERAPY 1X WEEK FOR ONE WEEK AND 2X WEEK FOR 3 WEEKS

## 2022-06-08 ENCOUNTER — OFFICE VISIT (OUTPATIENT)
Dept: INTERNAL MEDICINE | Facility: CLINIC | Age: 57
End: 2022-06-08

## 2022-06-08 ENCOUNTER — LAB (OUTPATIENT)
Dept: LAB | Facility: HOSPITAL | Age: 57
End: 2022-06-08

## 2022-06-08 VITALS
DIASTOLIC BLOOD PRESSURE: 90 MMHG | TEMPERATURE: 98.4 F | BODY MASS INDEX: 32.52 KG/M2 | HEART RATE: 82 BPM | SYSTOLIC BLOOD PRESSURE: 134 MMHG | RESPIRATION RATE: 22 BRPM | WEIGHT: 172 LBS

## 2022-06-08 DIAGNOSIS — E11.9 TYPE 2 DIABETES MELLITUS WITHOUT COMPLICATION, WITHOUT LONG-TERM CURRENT USE OF INSULIN: Primary | ICD-10-CM

## 2022-06-08 DIAGNOSIS — Z94.4 LIVER TRANSPLANT RECIPIENT: ICD-10-CM

## 2022-06-08 LAB
ALBUMIN SERPL-MCNC: 4.7 G/DL (ref 3.5–5.2)
ALBUMIN/GLOB SERPL: 2.4 G/DL
ALP SERPL-CCNC: 64 U/L (ref 39–117)
ALT SERPL W P-5'-P-CCNC: 41 U/L (ref 1–33)
ANION GAP SERPL CALCULATED.3IONS-SCNC: 13.5 MMOL/L (ref 5–15)
AST SERPL-CCNC: 24 U/L (ref 1–32)
BASOPHILS # BLD AUTO: 0.05 10*3/MM3 (ref 0–0.2)
BASOPHILS NFR BLD AUTO: 1 % (ref 0–1.5)
BILIRUB SERPL-MCNC: 0.4 MG/DL (ref 0–1.2)
BUN SERPL-MCNC: 16 MG/DL (ref 6–20)
BUN/CREAT SERPL: 16.7 (ref 7–25)
CALCIUM SPEC-SCNC: 9.3 MG/DL (ref 8.6–10.5)
CHLORIDE SERPL-SCNC: 102 MMOL/L (ref 98–107)
CO2 SERPL-SCNC: 24.5 MMOL/L (ref 22–29)
CREAT SERPL-MCNC: 0.96 MG/DL (ref 0.57–1)
DEPRECATED RDW RBC AUTO: 46.7 FL (ref 37–54)
EGFRCR SERPLBLD CKD-EPI 2021: 68.7 ML/MIN/1.73
EOSINOPHIL # BLD AUTO: 0.02 10*3/MM3 (ref 0–0.4)
EOSINOPHIL NFR BLD AUTO: 0.4 % (ref 0.3–6.2)
ERYTHROCYTE [DISTWIDTH] IN BLOOD BY AUTOMATED COUNT: 15.1 % (ref 12.3–15.4)
GLOBULIN UR ELPH-MCNC: 2 GM/DL
GLUCOSE SERPL-MCNC: 159 MG/DL (ref 65–99)
HCT VFR BLD AUTO: 41 % (ref 34–46.6)
HGB BLD-MCNC: 13.8 G/DL (ref 12–15.9)
IMM GRANULOCYTES # BLD AUTO: 0.15 10*3/MM3 (ref 0–0.05)
IMM GRANULOCYTES NFR BLD AUTO: 2.9 % (ref 0–0.5)
LYMPHOCYTES # BLD AUTO: 0.89 10*3/MM3 (ref 0.7–3.1)
LYMPHOCYTES NFR BLD AUTO: 17.4 % (ref 19.6–45.3)
MCH RBC QN AUTO: 29.4 PG (ref 26.6–33)
MCHC RBC AUTO-ENTMCNC: 33.7 G/DL (ref 31.5–35.7)
MCV RBC AUTO: 87.4 FL (ref 79–97)
MONOCYTES # BLD AUTO: 0.23 10*3/MM3 (ref 0.1–0.9)
MONOCYTES NFR BLD AUTO: 4.5 % (ref 5–12)
NEUTROPHILS NFR BLD AUTO: 3.78 10*3/MM3 (ref 1.7–7)
NEUTROPHILS NFR BLD AUTO: 73.8 % (ref 42.7–76)
NRBC BLD AUTO-RTO: 0 /100 WBC (ref 0–0.2)
PLATELET # BLD AUTO: 162 10*3/MM3 (ref 140–450)
PMV BLD AUTO: 11.1 FL (ref 6–12)
POTASSIUM SERPL-SCNC: 4.3 MMOL/L (ref 3.5–5.2)
PROT SERPL-MCNC: 6.7 G/DL (ref 6–8.5)
RBC # BLD AUTO: 4.69 10*6/MM3 (ref 3.77–5.28)
SODIUM SERPL-SCNC: 140 MMOL/L (ref 136–145)
WBC NRBC COR # BLD: 5.12 10*3/MM3 (ref 3.4–10.8)

## 2022-06-08 PROCEDURE — 99213 OFFICE O/P EST LOW 20 MIN: CPT | Performed by: INTERNAL MEDICINE

## 2022-06-08 PROCEDURE — 80053 COMPREHEN METABOLIC PANEL: CPT | Performed by: INTERNAL MEDICINE

## 2022-06-08 PROCEDURE — 85025 COMPLETE CBC W/AUTO DIFF WBC: CPT | Performed by: INTERNAL MEDICINE

## 2022-06-08 PROCEDURE — 36415 COLL VENOUS BLD VENIPUNCTURE: CPT | Performed by: INTERNAL MEDICINE

## 2022-06-08 PROCEDURE — 80197 ASSAY OF TACROLIMUS: CPT | Performed by: INTERNAL MEDICINE

## 2022-06-08 RX ORDER — METFORMIN HYDROCHLORIDE 500 MG/1
1000 TABLET, EXTENDED RELEASE ORAL DAILY
COMMUNITY
Start: 2022-05-24 | End: 2022-06-08

## 2022-06-08 RX ORDER — SEMAGLUTIDE 1.34 MG/ML
0.5 INJECTION, SOLUTION SUBCUTANEOUS WEEKLY
Qty: 1.5 ML | Refills: 2
Start: 2022-06-08 | End: 2022-09-19 | Stop reason: SDUPTHER

## 2022-06-13 LAB — TACROLIMUS BLD LC/MS/MS-MCNC: 4.7 NG/ML (ref 2–20)

## 2022-06-13 RX ORDER — LEVOTHYROXINE SODIUM 175 UG/1
175 TABLET ORAL DAILY
Qty: 90 TABLET | Refills: 1 | Status: SHIPPED | OUTPATIENT
Start: 2022-06-13 | End: 2022-08-01

## 2022-06-13 NOTE — TELEPHONE ENCOUNTER
Caller: Lizette Frias    Relationship: Self    Best call back number: [phone]    Requested Prescriptions:   Requested Prescriptions     Pending Prescriptions Disp Refills   • levothyroxine (SYNTHROID, LEVOTHROID) 175 MCG tablet       Sig: Take 1 tablet by mouth Daily.        Pharmacy where request should be sent: [id number] - [address] - [phone]  - [phone] FX     Additional details provided by patient: PATIENT IS OUT OF MEDICATION    Does the patient have less than a 3 day supply:  [x] Yes  [] No    Frantz Scott Rep   06/13/22 12:19 EDT

## 2022-06-21 ENCOUNTER — TELEPHONE (OUTPATIENT)
Dept: INTERNAL MEDICINE | Facility: CLINIC | Age: 57
End: 2022-06-21

## 2022-06-21 DIAGNOSIS — Z94.4 LIVER TRANSPLANT RECIPIENT: Primary | ICD-10-CM

## 2022-06-21 NOTE — PROGRESS NOTES
Chief Complaint   Patient presents with   • Follow-up     Follow up, HLD, Essential HTN   • Shortness of Breath     Spontaneous SOB with lightheadedness, Gasping when prone/reclined       History of Present Illness    The patient presents for a follow-up related to impaired glucose tolerance. She does not check her blood sugars at home. She denies hypoglycemic symptoms. The patient denies polyuria, polydypsia or polyphagia. She reports no symptoms of neuropathy. The patient is not on medication for her impaired glucose tolerance. The patient does check her feet daily at home. She reports shortness of breath but denies having chest pain, orthopnea, paroxysmal nocturnal dyspnea, dyspnea on exertion, edema or palpitations.    The patient presents for a follow-up related to hypertension. The patient reports that she has had no headaches or blurred vision. She states that she is taking her medication as prescribed. She is not having medication side effects.    The patient presents for a follow-up related to hypothyroidism. She reports that she does not have as much energy as usual. She reports no hair loss, heat intolerance, cold intolerance, diarrhea, constipation or sweats. She is taking her medication as prescribed.    Patient presents with a 3 month history of intermittent shortness of breath. She has noted no alleviating factors. The shortness of breath has a sudden onset. The symptoms is not made worse with activity and it is not relieved with rest. The shortness of breath is continuous. The dyspnea is associated with lightheadedness but is not associated with belching, indigestion, dizziness or clamminess. The patient denies orthopnea.         Review of Systems    CONSTITUTIONAL- Denies Unexplained Weight Loss, Fever, Chills, Sweats, Weakness or Malaise.    HENT- Denies Nasal Discharge, Sore Throat, Ear Pain, Ear Drainage, Sinus Pain, Nasal Congestion, Decreased Hearing or Tinnitus.    GASTROINTESTINAL- Denies  Abdominal Pain, Nausea, Vomiting, Blood per Rectum or Heartburn.    PULMONARY- Denies Wheezing, Sputum Production, Cough or Hemoptysis.    CARDIOVASCULAR- Denies Claudication or Irregular Heart Beat.    Medications      Current Outpatient Medications:   •  alendronate (FOSAMAX) 70 MG tablet, Take 1 tablet by mouth 1 (One) Time Per Week., Disp: , Rfl:   •  allopurinol (ZYLOPRIM) 300 MG tablet, Take 300 mg by mouth Daily., Disp: , Rfl:   •  amLODIPine (NORVASC) 10 MG tablet, Take 1 tablet by mouth Daily., Disp: , Rfl:   •  atorvastatin (LIPITOR) 40 MG tablet, Take 1 tablet by mouth Daily., Disp: , Rfl:   •  buPROPion SR (WELLBUTRIN SR) 150 MG 12 hr tablet, TAKE 1 TABLET BY MOUTH EVERY DAY FOR DEPRESSION, Disp: , Rfl:   •  calcium carbonate-vitamin d 600-400 MG-UNIT per tablet, Take 1 tablet by mouth Daily., Disp: , Rfl:   •  cloNIDine (CATAPRES) 0.1 MG tablet, Take 0.1 mg by mouth Daily As Needed for High Blood Pressure., Disp: , Rfl:   •  [employer] Melatonin 5 MG tablet tablet, 1 TABLET(S) BY MOUTH EVERY EVENING TAKE 1-2 HRS PRIOR TO BEDTIME, Disp: 90 tablet, Rfl: 1  •  fluticasone (FLONASE) 50 MCG/ACT nasal spray, 2 sprays into the nostril(s) as directed by provider 2 (Two) Times a Day., Disp: , Rfl:   •  furosemide (LASIX) 20 MG tablet, Hold until follow up with your primary care provider (Patient taking differently: Take 20 mg by mouth Daily.), Disp: , Rfl:   •  icosapent ethyl (VASCEPA) 1 g capsule capsule, Take 2 capsules by mouth 2 (Two) Times a Day With Meals., Disp: , Rfl:   •  ipratropium-albuterol (DUO-NEB) 0.5-2.5 mg/3 ml nebulizer, Take 3 mL by nebulization Every 4 (Four) Hours As Needed., Disp: , Rfl:   •  isosorbide mononitrate (IMDUR) 60 MG 24 hr tablet, Take 1 tablet by mouth Every Night., Disp: , Rfl:   •  metoprolol succinate XL (TOPROL-XL) 100 MG 24 hr tablet, Take 200 mg by mouth Daily., Disp: , Rfl:   •  nitroglycerin (NITROSTAT) 0.4 MG SL tablet, Place 0.4 mg under the tongue., Disp: , Rfl:   •  O2  (OXYGEN), Inhale 2 L/min Every Night., Disp: , Rfl:   •  pantoprazole (PROTONIX) 40 MG EC tablet, Take 40 mg by mouth Daily., Disp: , Rfl:   •  potassium chloride (MICRO-K) 10 MEQ CR capsule, Hold until Lasix (furosemide) restarted (Patient taking differently: Take 10 mEq by mouth Daily. Hold until Lasix (furosemide) restarted), Disp: , Rfl:   •  Prasterone (Intrarosa) 6.5 MG insert, Insert 1 suppository into the vagina Daily., Disp: 28 each, Rfl: 11  •  predniSONE (DELTASONE) 10 MG tablet, Hold while taking Dexamethasone. Once out of Dexamethasone, resume taking 1 tablet by mouth daily, Disp: , Rfl:   •  rOPINIRole (REQUIP) 2 MG tablet, TAKE 1-2 TABLET(S) ORAL EVERY NIGHT AT BEDTIME, Disp: 180 tablet, Rfl: 1  •  sertraline (ZOLOFT) 100 MG tablet, Take 1 tablet by mouth Daily., Disp: 90 tablet, Rfl: 1  •  spironolactone (ALDACTONE) 50 MG tablet, Take 1 tablet by mouth Daily., Disp: , Rfl:   •  tacrolimus (PROGRAF) 0.5 MG capsule, Take 2 capsules by mouth 2 (Two) Times a Day., Disp: , Rfl:   •  traMADol (ULTRAM) 50 MG tablet, Take 1 tablet by mouth Every 6 (Six) Hours As Needed for Moderate Pain ., Disp: 18 tablet, Rfl: 0  •  traZODone (DESYREL) 100 MG tablet, Take 200 mg by mouth every night at bedtime., Disp: , Rfl:   •  vitamin D (ERGOCALCIFEROL) 1.25 MG (82254 UT) capsule capsule, Take 50,000 Units by mouth Every 7 (Seven) Days.  , Disp: , Rfl:   •  zolpidem (AMBIEN) 5 MG tablet, Take 5 mg by mouth Every Night., Disp: , Rfl:   •  isosorbide dinitrate (ISORDIL) 30 MG tablet, TAKE 1 TABLET BY MOUTH THREE TIMES A DAY, Disp: 270 tablet, Rfl: 1  •  levothyroxine (SYNTHROID, LEVOTHROID) 175 MCG tablet, Take 1 tablet by mouth Daily., Disp: 90 tablet, Rfl: 1  •  mirtazapine (REMERON) 30 MG tablet, Take 30 mg by mouth every night at bedtime., Disp: , Rfl:   •  mirtazapine (REMERON) 7.5 MG tablet, Take 1 tablet by mouth Every Night., Disp: 30 tablet, Rfl: 2  •  Semaglutide,0.25 or 0.5MG/DOS, (Ozempic, 0.25 or 0.5 MG/DOSE,)  "2 MG/1.5ML solution pen-injector, Inject 0.5 mg under the skin into the appropriate area as directed 1 (One) Time Per Week., Disp: 1.5 mL, Rfl: 2     Allergies    Allergies   Allergen Reactions   • Penicillins Shortness Of Breath   • Cyclosporine Swelling       Problem List    Patient Active Problem List   Diagnosis   • Menopause   • Personal history of malignant neoplasm of thyroid   • History of stroke   • Renal impairment   • Vision impairment   • Osteoporosis   • Osteoarthritis   • Meningioma (HCC)   • Hypothyroid   • Hypercholesteremia   • Depression   • Cirrhosis of liver (HCC)   • Anxiety   • Acute hypoxemic respiratory failure due to COVID-19 (HCC)   • Liver transplant recipient (HCC)   • Severe malnutrition (CMS/HCC)   • Leukopenia   • Immunosuppression due to drug therapy (HCC)   • Insomnia   • Sleep apnea   • Gout   • Cytokine release syndrome, grade 2   • Precordial pain       Medications, Allergies, Problems List and Past History were reviewed and updated.    Physical Examination    /72   Pulse 64   Temp 97.7 °F (36.5 °C) (Infrared)   Resp 20   Ht 154.9 cm (60.98\")   Wt 76.7 kg (169 lb)   LMP  (LMP Unknown) Comment: Last pap 3/3/2022 by [facility] [place]  SpO2 98%   BMI 31.95 kg/m²     HEENT: Facies- Within normal limits. Lids- Normal bilaterally. Conjunctiva- Clear bilaterally. Sclera- Anicteric bilaterally.    Neck: Thyroid- non enlarged, symmetric and has no nodules. No bruits are detected.    Lungs: Auscultation- Clear to auscultation bilaterally. There are no retractions, clubbing or cyanosis. The Expiratory to Inspiratory ratio is equal.    Cardiovascular: There are no carotid bruits. Heart- Normal Rate with Regular rhythm and no murmurs. There are no gallops. There are no rubs. In the lower extremities there is no edema. The upper extremities do not have edema.    Abdomen: Soft, benign, non-tender with no masses, hernias, organomegaly or scars.    Impression and " Assessment    Impaired Glucose Tolerance.    Essential Hypertension.    Hypothyroidism.    Shortness of Breath.    Plan    Essential Hypertension Plan: The patient was instructed to continue the current medications.    Impaired Glucose Tolerance Plan: The patient was instructed to continue the current medications.    Hypothyroidism Plan: The patient was instructed to continue the current medications.    Shortness of Breath Plan: Continue current medication and follow-up with cardiology.    Diagnoses and all orders for this visit:    1. Impaired glucose tolerance (Primary)    2. Essential hypertension    3. Hypothyroidism, unspecified type    4. Shortness of breath          Return to Office    The patient was instructed to return for follow-up in 1 month. The patient was instructed to return sooner if the condition changes, worsens, or does not resolve.

## 2022-06-23 ENCOUNTER — OUTSIDE FACILITY SERVICE (OUTPATIENT)
Dept: INTERNAL MEDICINE | Facility: CLINIC | Age: 57
End: 2022-06-23

## 2022-07-11 NOTE — TELEPHONE ENCOUNTER
Left message voice mail for patient to please return call.  Ofc # given.     HUB:  Please inform patient that Dr Oquendo said standing orders entered for every 2 weeks.     Document if notified.

## 2022-07-12 ENCOUNTER — TELEPHONE (OUTPATIENT)
Dept: INTERNAL MEDICINE | Facility: CLINIC | Age: 57
End: 2022-07-12

## 2022-07-12 ENCOUNTER — LAB (OUTPATIENT)
Dept: INTERNAL MEDICINE | Facility: CLINIC | Age: 57
End: 2022-07-12

## 2022-07-12 DIAGNOSIS — R30.0 DYSURIA: ICD-10-CM

## 2022-07-12 DIAGNOSIS — Z94.4 LIVER TRANSPLANT RECIPIENT: ICD-10-CM

## 2022-07-12 DIAGNOSIS — R30.0 DYSURIA: Primary | ICD-10-CM

## 2022-07-12 LAB
BASOPHILS # BLD AUTO: 0.07 10*3/MM3 (ref 0–0.2)
BASOPHILS NFR BLD AUTO: 1.4 % (ref 0–1.5)
BILIRUB BLD-MCNC: NEGATIVE MG/DL
CLARITY, POC: CLEAR
COLOR UR: YELLOW
DEPRECATED RDW RBC AUTO: 46.6 FL (ref 37–54)
EOSINOPHIL # BLD AUTO: 0.01 10*3/MM3 (ref 0–0.4)
EOSINOPHIL NFR BLD AUTO: 0.2 % (ref 0.3–6.2)
ERYTHROCYTE [DISTWIDTH] IN BLOOD BY AUTOMATED COUNT: 14.7 % (ref 12.3–15.4)
EXPIRATION DATE: ABNORMAL
GLUCOSE UR STRIP-MCNC: NEGATIVE MG/DL
HCT VFR BLD AUTO: 44 % (ref 34–46.6)
HGB BLD-MCNC: 14.2 G/DL (ref 12–15.9)
IMM GRANULOCYTES # BLD AUTO: 0.1 10*3/MM3 (ref 0–0.05)
IMM GRANULOCYTES NFR BLD AUTO: 2 % (ref 0–0.5)
KETONES UR QL: NEGATIVE
LEUKOCYTE EST, POC: ABNORMAL
LYMPHOCYTES # BLD AUTO: 0.97 10*3/MM3 (ref 0.7–3.1)
LYMPHOCYTES NFR BLD AUTO: 19.1 % (ref 19.6–45.3)
Lab: ABNORMAL
MCH RBC QN AUTO: 28.6 PG (ref 26.6–33)
MCHC RBC AUTO-ENTMCNC: 32.3 G/DL (ref 31.5–35.7)
MCV RBC AUTO: 88.7 FL (ref 79–97)
MONOCYTES # BLD AUTO: 0.34 10*3/MM3 (ref 0.1–0.9)
MONOCYTES NFR BLD AUTO: 6.7 % (ref 5–12)
NEUTROPHILS NFR BLD AUTO: 3.58 10*3/MM3 (ref 1.7–7)
NEUTROPHILS NFR BLD AUTO: 70.6 % (ref 42.7–76)
NITRITE UR-MCNC: NEGATIVE MG/ML
NRBC BLD AUTO-RTO: 0 /100 WBC (ref 0–0.2)
PH UR: 5 [PH] (ref 5–8)
PLATELET # BLD AUTO: 167 10*3/MM3 (ref 140–450)
PMV BLD AUTO: 11.4 FL (ref 6–12)
PROT UR STRIP-MCNC: NEGATIVE MG/DL
RBC # BLD AUTO: 4.96 10*6/MM3 (ref 3.77–5.28)
RBC # UR STRIP: NEGATIVE /UL
SP GR UR: 1.01 (ref 1–1.03)
UROBILINOGEN UR QL: NORMAL
WBC NRBC COR # BLD: 5.07 10*3/MM3 (ref 3.4–10.8)

## 2022-07-12 PROCEDURE — 87086 URINE CULTURE/COLONY COUNT: CPT | Performed by: INTERNAL MEDICINE

## 2022-07-12 PROCEDURE — 81003 URINALYSIS AUTO W/O SCOPE: CPT | Performed by: INTERNAL MEDICINE

## 2022-07-12 PROCEDURE — 80197 ASSAY OF TACROLIMUS: CPT | Performed by: INTERNAL MEDICINE

## 2022-07-12 PROCEDURE — 36415 COLL VENOUS BLD VENIPUNCTURE: CPT | Performed by: INTERNAL MEDICINE

## 2022-07-12 PROCEDURE — 85025 COMPLETE CBC W/AUTO DIFF WBC: CPT | Performed by: INTERNAL MEDICINE

## 2022-07-12 PROCEDURE — 80053 COMPREHEN METABOLIC PANEL: CPT | Performed by: INTERNAL MEDICINE

## 2022-07-12 NOTE — TELEPHONE ENCOUNTER
Caller: Lizette Frias    Relationship: Self    Best call back number: [phone]    What was the call regarding:   PATIENT WAS INFORMED OF THE MESSAGE AND UNDERSTANDS AND STATED THAT SHE WOULD LIKE FOR LAB ORDERS TO BE PLACED TO CHECK FOR UTI THAT SHE THINKS SHE HAS AT THIS TIME     YESTERDAY AT 19:12 IN NOTES   Susannah Miranda, RN    Registered Nurse      Telephone Encounter       Signed   Encounter Date:  6/21/2022                 Signed              Show:Clear all  [x]Manual[]Template[x]Copied    Added by:  [x]Susannah Miranda, RN      []Hover for details    Left message voice mail for patient to please return call.  Ofc # given.      HUB:  Please inform patient that Dr Oquendo said standing orders entered for every 2 weeks.      Document if notified.

## 2022-07-13 LAB
ALBUMIN SERPL-MCNC: 4.4 G/DL (ref 3.5–5.2)
ALBUMIN/GLOB SERPL: 1.8 G/DL
ALP SERPL-CCNC: 71 U/L (ref 39–117)
ALT SERPL W P-5'-P-CCNC: 48 U/L (ref 1–33)
ANION GAP SERPL CALCULATED.3IONS-SCNC: 12.6 MMOL/L (ref 5–15)
AST SERPL-CCNC: 25 U/L (ref 1–32)
BACTERIA SPEC AEROBE CULT: NO GROWTH
BILIRUB SERPL-MCNC: 0.8 MG/DL (ref 0–1.2)
BUN SERPL-MCNC: 15 MG/DL (ref 6–20)
BUN/CREAT SERPL: 14.4 (ref 7–25)
CALCIUM SPEC-SCNC: 10 MG/DL (ref 8.6–10.5)
CHLORIDE SERPL-SCNC: 101 MMOL/L (ref 98–107)
CO2 SERPL-SCNC: 27.4 MMOL/L (ref 22–29)
CREAT SERPL-MCNC: 1.04 MG/DL (ref 0.57–1)
EGFRCR SERPLBLD CKD-EPI 2021: 62.4 ML/MIN/1.73
GLOBULIN UR ELPH-MCNC: 2.5 GM/DL
GLUCOSE SERPL-MCNC: 118 MG/DL (ref 65–99)
POTASSIUM SERPL-SCNC: 4.9 MMOL/L (ref 3.5–5.2)
PROT SERPL-MCNC: 6.9 G/DL (ref 6–8.5)
SODIUM SERPL-SCNC: 141 MMOL/L (ref 136–145)

## 2022-07-14 RX ORDER — NITROFURANTOIN 25; 75 MG/1; MG/1
100 CAPSULE ORAL 2 TIMES DAILY
Qty: 20 CAPSULE | Refills: 0 | Status: SHIPPED | OUTPATIENT
Start: 2022-07-14 | End: 2022-07-24

## 2022-07-18 ENCOUNTER — OFFICE VISIT (OUTPATIENT)
Dept: CARDIOLOGY | Facility: CLINIC | Age: 57
End: 2022-07-18

## 2022-07-18 VITALS
DIASTOLIC BLOOD PRESSURE: 76 MMHG | SYSTOLIC BLOOD PRESSURE: 130 MMHG | OXYGEN SATURATION: 96 % | WEIGHT: 168 LBS | HEIGHT: 61 IN | BODY MASS INDEX: 31.72 KG/M2 | HEART RATE: 93 BPM

## 2022-07-18 DIAGNOSIS — E78.00 HYPERCHOLESTEREMIA: ICD-10-CM

## 2022-07-18 DIAGNOSIS — R07.2 PRECORDIAL PAIN: Primary | ICD-10-CM

## 2022-07-18 LAB — TACROLIMUS BLD LC/MS/MS-MCNC: 4.2 NG/ML (ref 2–20)

## 2022-07-18 PROCEDURE — 99213 OFFICE O/P EST LOW 20 MIN: CPT | Performed by: INTERNAL MEDICINE

## 2022-07-18 NOTE — PROGRESS NOTES
[facility]  Office visit  Lizette Frias  1965  [phone]  There is no work phone number on file.    VISIT DATE:  7/18/2022    PCP: Ad Oquendo MD  [address] [id number]    CC:  Chief Complaint   Patient presents with   • Precordial pain       Previous cardiac studies and procedure  January 2022   TTE: EF 70%, concentric LVH, grade 1 diastolic dysfunction, no valvular abnormalities, small anterior pericardial effusion.  PET myocardial perfusion imaging  · Myocardial perfusion imaging indicates a normal myocardial perfusion study with no evidence of ischemia.  · Left ventricular ejection fraction is hyperdynamic (Calculated EF > 70%).      ASSESSMENT:   Diagnosis Plan   1. Precordial pain     2. Hypercholesteremia         PLAN:  Vasospastic angina: Currently well controlled.  Continue combination of amlodipine and Isordil.  Afterload well controlled.  Continue atorvastatin 40 mg p.o. daily.  Previously discussed left heart cath with provocative testing to evaluate for vasospastic angina and flow reserve, currently with well-controlled symptoms, will delay this evaluation for the time being.    Subjective:  Interval assessment: Persistent shortness of breath in a class II pattern.  Episodes of chest discomfort are being well controlled on current medical therapy.  Blood pressures running less than 130/80 mmHg.  Previous evaluations for dyspnea included unremarkable transthoracic echo, myocardial perfusion imaging, CT imaging, D-dimer, and unremarkable PFTs.    Initial evaluation: Lizette Frias is a 56 y.o. female with PMH significant for HTN, HLD, prediabetes, anxiety/depression, thyroid cancer (s/p resection with resultant hypothyroidism), gout, meningioma, STEVE on CPAP, MORRISON cirrhosis (s/p liver transplant in March 2020 with history of intraoperative stroke due to hypotension with residual L eye blindness, on chronic  "immunosuppression).  She has no previous history of obstructive coronary disease.  She actually underwent myocardial perfusion study earlier this year which was normal.  She notes that over the last 2 to 3 months she has had recurrent midsternal chest pain.  This has been increasing in frequency and severity over the course of the last 2 months.  It is now occurring on a daily basis.  Typically she has been able to alleviate this discomfort with nitroglycerin.  However, she has had a episode that started today which was not alleviated by 3 sublingual nitro and she subsequently presented to the hospital for further evaluation.  Here she is currently on IV nitroglycerin and still rates pain 5/10.  She has been recently evaluated by pulmonary per her report at which time she was told that she had a relatively normal evaluation.  States that she had a CT of her chest with contrast that was negative per her report.  She notes that her chest pain can occur with exertion and at rest.  She does have some associated radiation into her shoulders.  Notes that she becomes severely short of breath with almost any type of exertion.  Notes that just walking room to room in her house she becomes short of breath.  States that she was told by pulmonary that they believe that this is more cardiac related.    PHYSICAL EXAMINATION:  Vitals:    07/18/22 1526   BP: 130/76   BP Location: Left arm   Patient Position: Sitting   Cuff Size: Adult   Pulse: 93   SpO2: 96%   Weight: 76.2 kg (168 lb)   Height: 154.9 cm (61\")     General Appearance:    Alert, cooperative, no distress, appears stated age   Head:    Normocephalic, without obvious abnormality, atraumatic   Eyes:    conjunctiva/corneas clear   Nose:   Nares normal, septum midline, mucosa normal, no drainage   Throat:   Lips, teeth and gums normal   Neck:   Supple, symmetrical, trachea midline, no carotid    bruit or JVD   Lungs:     Clear to auscultation bilaterally, respirations " unlabored   Chest Wall:    No tenderness or deformity    Heart:    Regular rate and rhythm, S1 and S2 normal, no murmur, rub   or gallop, normal carotid impulse bilaterally without bruit.   Abdomen:     Soft, non-tender   Extremities:   Extremities normal, atraumatic, no cyanosis or edema   Pulses:   2+ and symmetric all extremities   Skin:   Skin color, texture, turgor normal, no rashes or lesions       Diagnostic Data:  Procedures  Lab Results   Component Value Date    CHLPL 254 (H) 04/05/2021    TRIG 378 (H) 03/08/2022    HDL 30 (L) 03/08/2022     Lab Results   Component Value Date    GLUCOSE 118 (H) 07/12/2022    BUN 15 07/12/2022    CREATININE 1.04 (H) 07/12/2022     07/12/2022    K 4.9 07/12/2022     07/12/2022    CO2 27.4 07/12/2022     Lab Results   Component Value Date    HGBA1C 5.90 (H) 03/08/2022     Lab Results   Component Value Date    WBC 5.07 07/12/2022    HGB 14.2 07/12/2022    HCT 44.0 07/12/2022     07/12/2022       Allergies  Allergies   Allergen Reactions   • Penicillins Shortness Of Breath   • Cyclosporine Swelling       Current Medications    Current Outpatient Medications:   •  alendronate (FOSAMAX) 70 MG tablet, Take 1 tablet by mouth 1 (One) Time Per Week., Disp: , Rfl:   •  allopurinol (ZYLOPRIM) 300 MG tablet, Take 300 mg by mouth Daily., Disp: , Rfl:   •  amLODIPine (NORVASC) 10 MG tablet, Take 1 tablet by mouth Daily., Disp: , Rfl:   •  atorvastatin (LIPITOR) 40 MG tablet, Take 1 tablet by mouth Daily., Disp: , Rfl:   •  buPROPion SR (WELLBUTRIN SR) 150 MG 12 hr tablet, TAKE 1 TABLET BY MOUTH EVERY DAY FOR DEPRESSION, Disp: , Rfl:   •  calcium carbonate-vitamin d 600-400 MG-UNIT per tablet, Take 1 tablet by mouth Daily., Disp: , Rfl:   •  cloNIDine (CATAPRES) 0.1 MG tablet, Take 0.1 mg by mouth Daily As Needed for High Blood Pressure., Disp: , Rfl:   •  [employer] Melatonin 5 MG tablet tablet, 1 TABLET(S) BY MOUTH EVERY EVENING TAKE 1-2 HRS PRIOR TO BEDTIME, Disp: 90 tablet,  Rfl: 1  •  fluticasone (FLONASE) 50 MCG/ACT nasal spray, 2 sprays into the nostril(s) as directed by provider 2 (Two) Times a Day., Disp: , Rfl:   •  furosemide (LASIX) 20 MG tablet, Hold until follow up with your primary care provider (Patient taking differently: Take 20 mg by mouth Daily.), Disp: , Rfl:   •  icosapent ethyl (VASCEPA) 1 g capsule capsule, Take 2 capsules by mouth 2 (Two) Times a Day With Meals., Disp: , Rfl:   •  ipratropium-albuterol (DUO-NEB) 0.5-2.5 mg/3 ml nebulizer, Take 3 mL by nebulization Every 4 (Four) Hours As Needed., Disp: , Rfl:   •  isosorbide dinitrate (ISORDIL) 30 MG tablet, TAKE 1 TABLET BY MOUTH THREE TIMES A DAY (Patient taking differently: 30 mg 2 (Two) Times a Day.), Disp: 270 tablet, Rfl: 1  •  isosorbide mononitrate (IMDUR) 60 MG 24 hr tablet, Take 1 tablet by mouth Every Night., Disp: , Rfl:   •  levothyroxine (SYNTHROID, LEVOTHROID) 175 MCG tablet, Take 1 tablet by mouth Daily., Disp: 90 tablet, Rfl: 1  •  metoprolol succinate XL (TOPROL-XL) 100 MG 24 hr tablet, Take 100 mg by mouth Daily., Disp: , Rfl:   •  mirtazapine (REMERON) 30 MG tablet, Take 30 mg by mouth every night at bedtime., Disp: , Rfl:   •  nitrofurantoin, macrocrystal-monohydrate, (MACROBID) 100 MG capsule, Take 1 capsule by mouth 2 (Two) Times a Day for 10 days., Disp: 20 capsule, Rfl: 0  •  nitroglycerin (NITROSTAT) 0.4 MG SL tablet, Place 0.4 mg under the tongue., Disp: , Rfl:   •  O2 (OXYGEN), Inhale 2 L/min Every Night., Disp: , Rfl:   •  pantoprazole (PROTONIX) 40 MG EC tablet, Take 40 mg by mouth Daily., Disp: , Rfl:   •  potassium chloride (MICRO-K) 10 MEQ CR capsule, Hold until Lasix (furosemide) restarted (Patient taking differently: Take 10 mEq by mouth Daily. Hold until Lasix (furosemide) restarted), Disp: , Rfl:   •  predniSONE (DELTASONE) 10 MG tablet, Hold while taking Dexamethasone. Once out of Dexamethasone, resume taking 1 tablet by mouth daily, Disp: , Rfl:   •  rOPINIRole (REQUIP) 2 MG  tablet, TAKE 1-2 TABLET(S) ORAL EVERY NIGHT AT BEDTIME, Disp: 180 tablet, Rfl: 1  •  Semaglutide,0.25 or 0.5MG/DOS, (Ozempic, 0.25 or 0.5 MG/DOSE,) 2 MG/1.5ML solution pen-injector, Inject 0.5 mg under the skin into the appropriate area as directed 1 (One) Time Per Week., Disp: 1.5 mL, Rfl: 2  •  sertraline (ZOLOFT) 100 MG tablet, Take 1 tablet by mouth Daily., Disp: 90 tablet, Rfl: 1  •  spironolactone (ALDACTONE) 50 MG tablet, Take 1 tablet by mouth Daily., Disp: , Rfl:   •  tacrolimus (PROGRAF) 0.5 MG capsule, Take 2 capsules by mouth 2 (Two) Times a Day., Disp: , Rfl:   •  traMADol (ULTRAM) 50 MG tablet, Take 1 tablet by mouth Every 6 (Six) Hours As Needed for Moderate Pain ., Disp: 18 tablet, Rfl: 0  •  traZODone (DESYREL) 100 MG tablet, Take 200 mg by mouth every night at bedtime., Disp: , Rfl:   •  vitamin D (ERGOCALCIFEROL) 1.25 MG (81122 UT) capsule capsule, Take 50,000 Units by mouth Every 7 (Seven) Days.  , Disp: , Rfl:   •  zolpidem (AMBIEN) 5 MG tablet, Take 5 mg by mouth Every Night., Disp: , Rfl:   •  mirtazapine (REMERON) 7.5 MG tablet, Take 1 tablet by mouth Every Night., Disp: 30 tablet, Rfl: 2  •  Prasterone (Intrarosa) 6.5 MG insert, Insert 1 suppository into the vagina Daily., Disp: 28 each, Rfl: 11          ROS  ROS      SOCIAL HX  Social History     Socioeconomic History   • Marital status:    Tobacco Use   • Smoking status: Never Smoker   • Smokeless tobacco: Never Used   Vaping Use   • Vaping Use: Never used   Substance and Sexual Activity   • Alcohol use: No   • Drug use: Never     Comment: Tried an edible last week.    • Sexual activity: Not Currently     Partners: Male     Birth control/protection: Post-menopausal, Surgical       FAMILY HX  Family History   Problem Relation Age of Onset   • Arthritis Mother    • Osteoporosis Mother    • Rheum arthritis Mother    • Anemia Mother    • Alcohol abuse Father    • Mental illness Father    • Hyperlipidemia Father    • Hypertension Father     • Colon cancer Father    • ADD / ADHD Son    • ADD / ADHD Son    • Breast cancer Maternal Grandmother    • Stroke Maternal Grandmother    • Mental illness Paternal Grandmother    • Ovarian cancer Paternal Grandmother              Bill Ventura III, MD, FACC

## 2022-07-23 PROCEDURE — G0179 MD RECERTIFICATION HHA PT: HCPCS | Performed by: INTERNAL MEDICINE

## 2022-07-28 ENCOUNTER — OFFICE VISIT (OUTPATIENT)
Dept: INTERNAL MEDICINE | Facility: CLINIC | Age: 57
End: 2022-07-28

## 2022-07-28 VITALS
HEART RATE: 84 BPM | WEIGHT: 165 LBS | SYSTOLIC BLOOD PRESSURE: 122 MMHG | DIASTOLIC BLOOD PRESSURE: 84 MMHG | BODY MASS INDEX: 31.18 KG/M2 | RESPIRATION RATE: 16 BRPM | TEMPERATURE: 98.4 F

## 2022-07-28 DIAGNOSIS — E78.5 HYPERLIPIDEMIA, UNSPECIFIED HYPERLIPIDEMIA TYPE: ICD-10-CM

## 2022-07-28 DIAGNOSIS — I10 ESSENTIAL HYPERTENSION: ICD-10-CM

## 2022-07-28 DIAGNOSIS — E11.9 TYPE 2 DIABETES MELLITUS WITHOUT COMPLICATION, WITHOUT LONG-TERM CURRENT USE OF INSULIN: Primary | ICD-10-CM

## 2022-07-28 DIAGNOSIS — E03.9 HYPOTHYROIDISM, UNSPECIFIED TYPE: ICD-10-CM

## 2022-07-28 LAB
BASOPHILS # BLD AUTO: 0.06 10*3/MM3 (ref 0–0.2)
BASOPHILS NFR BLD AUTO: 1.4 % (ref 0–1.5)
DEPRECATED RDW RBC AUTO: 44.7 FL (ref 37–54)
EOSINOPHIL # BLD AUTO: 0.03 10*3/MM3 (ref 0–0.4)
EOSINOPHIL NFR BLD AUTO: 0.7 % (ref 0.3–6.2)
ERYTHROCYTE [DISTWIDTH] IN BLOOD BY AUTOMATED COUNT: 14.4 % (ref 12.3–15.4)
EXPIRATION DATE: NORMAL
GLUCOSE BLDC GLUCOMTR-MCNC: 103 MG/DL (ref 70–130)
HBA1C MFR BLD: 5.5 % (ref 4.8–5.6)
HCT VFR BLD AUTO: 46.3 % (ref 34–46.6)
HGB BLD-MCNC: 15.2 G/DL (ref 12–15.9)
IMM GRANULOCYTES # BLD AUTO: 0.09 10*3/MM3 (ref 0–0.05)
IMM GRANULOCYTES NFR BLD AUTO: 2.1 % (ref 0–0.5)
LYMPHOCYTES # BLD AUTO: 1 10*3/MM3 (ref 0.7–3.1)
LYMPHOCYTES NFR BLD AUTO: 23 % (ref 19.6–45.3)
Lab: NORMAL
MCH RBC QN AUTO: 28.5 PG (ref 26.6–33)
MCHC RBC AUTO-ENTMCNC: 32.8 G/DL (ref 31.5–35.7)
MCV RBC AUTO: 86.9 FL (ref 79–97)
MONOCYTES # BLD AUTO: 0.39 10*3/MM3 (ref 0.1–0.9)
MONOCYTES NFR BLD AUTO: 9 % (ref 5–12)
NEUTROPHILS NFR BLD AUTO: 2.78 10*3/MM3 (ref 1.7–7)
NEUTROPHILS NFR BLD AUTO: 63.8 % (ref 42.7–76)
NRBC BLD AUTO-RTO: 0.2 /100 WBC (ref 0–0.2)
PLATELET # BLD AUTO: 165 10*3/MM3 (ref 140–450)
PMV BLD AUTO: 11.6 FL (ref 6–12)
RBC # BLD AUTO: 5.33 10*6/MM3 (ref 3.77–5.28)
WBC NRBC COR # BLD: 4.35 10*3/MM3 (ref 3.4–10.8)

## 2022-07-28 PROCEDURE — 80061 LIPID PANEL: CPT | Performed by: INTERNAL MEDICINE

## 2022-07-28 PROCEDURE — 82962 GLUCOSE BLOOD TEST: CPT | Performed by: INTERNAL MEDICINE

## 2022-07-28 PROCEDURE — 84439 ASSAY OF FREE THYROXINE: CPT | Performed by: INTERNAL MEDICINE

## 2022-07-28 PROCEDURE — 83036 HEMOGLOBIN GLYCOSYLATED A1C: CPT | Performed by: INTERNAL MEDICINE

## 2022-07-28 PROCEDURE — 99214 OFFICE O/P EST MOD 30 MIN: CPT | Performed by: INTERNAL MEDICINE

## 2022-07-28 PROCEDURE — 80050 GENERAL HEALTH PANEL: CPT | Performed by: INTERNAL MEDICINE

## 2022-07-28 RX ORDER — FLUTICASONE PROPIONATE 50 MCG
2 SPRAY, SUSPENSION (ML) NASAL 2 TIMES DAILY
Qty: 16 G | Refills: 5 | Status: SHIPPED | OUTPATIENT
Start: 2022-07-28 | End: 2023-02-02

## 2022-07-28 NOTE — PROGRESS NOTES
Chief Complaint   Patient presents with   • Follow-up     8 week follow up chronic medical problems       History of Present Illness    The patient presents for a follow-up related to Type 2 Diabetes Mellitus. She does not check her blood sugars at home. She denies hypoglycemic symptoms. The patient denies polyuria, polydypsia or polyphagia. She reports no symptoms of neuropathy. The patient takes her medication as prescribed. She is not taking insulin. The patient does check her feet daily at home. She denies chest pain, shortness of breath, orthopnea, paroxysmal nocturnal dyspnea, dyspnea on exertion, edema, palpitations or syncope.    The patient presents for a follow-up related to hyperlipidemia. She is following a low fat diet. She reports that she is exercising. She is taking atorvastatin. The patient is taking her medication as prescribed. She reports no medication side effects, including muscle cramps, abdominal pain, headaches or weakness.    The patient presents for a follow-up related to hypertension. The patient reports that she has had no headaches or blurred vision. She states that she is taking her medication as prescribed. She is not having medication side effects.    The patient presents for a follow-up related to hypothyroidism. She reports a good energy level. She reports no hair loss, heat intolerance, cold intolerance, diarrhea, constipation or sweats. She is taking her medication as prescribed.    Review of Systems    CONSTITUTIONAL- Denies Unexplained Weight Loss, Fever, Chills, Sweats, Weakness or Malaise.    HENT- Denies Nasal Discharge, Sore Throat, Ear Pain, Ear Drainage, Sinus Pain, Nasal Congestion, Decreased Hearing or Tinnitus.    GASTROINTESTINAL- Denies Abdominal Pain, Nausea, Vomiting, Blood per Rectum or Heartburn.    PULMONARY- Denies Wheezing, Sputum Production, Cough, Hemoptysis or Pleuritic Chest Pain.    CARDIOVASCULAR- Denies Claudication or Irregular Heart  Beat.    Medications      Current Outpatient Medications:   •  amLODIPine (NORVASC) 10 MG tablet, Take 1 tablet by mouth Daily., Disp: , Rfl:   •  atorvastatin (LIPITOR) 40 MG tablet, Take 1 tablet by mouth Daily., Disp: , Rfl:   •  buPROPion SR (WELLBUTRIN SR) 150 MG 12 hr tablet, TAKE 1 TABLET BY MOUTH EVERY DAY FOR DEPRESSION, Disp: , Rfl:   •  calcium carbonate-vitamin d 600-400 MG-UNIT per tablet, Take 1 tablet by mouth Daily., Disp: , Rfl:   •  cloNIDine (CATAPRES) 0.1 MG tablet, Take 0.1 mg by mouth Daily As Needed for High Blood Pressure., Disp: , Rfl:   •  [employer] Melatonin 5 MG tablet tablet, 1 TABLET(S) BY MOUTH EVERY EVENING TAKE 1-2 HRS PRIOR TO BEDTIME, Disp: 90 tablet, Rfl: 1  •  fluticasone (FLONASE) 50 MCG/ACT nasal spray, 2 sprays into the nostril(s) as directed by provider 2 (Two) Times a Day., Disp: 16 g, Rfl: 5  •  furosemide (LASIX) 20 MG tablet, Hold until follow up with your primary care provider (Patient taking differently: Take 20 mg by mouth Daily.), Disp: , Rfl:   •  icosapent ethyl (VASCEPA) 1 g capsule capsule, Take 2 capsules by mouth 2 (Two) Times a Day With Meals., Disp: , Rfl:   •  ipratropium-albuterol (DUO-NEB) 0.5-2.5 mg/3 ml nebulizer, Take 3 mL by nebulization Every 4 (Four) Hours As Needed., Disp: , Rfl:   •  isosorbide dinitrate (ISORDIL) 30 MG tablet, TAKE 1 TABLET BY MOUTH THREE TIMES A DAY (Patient taking differently: 30 mg 2 (Two) Times a Day.), Disp: 270 tablet, Rfl: 1  •  isosorbide mononitrate (IMDUR) 60 MG 24 hr tablet, Take 1 tablet by mouth Every Night., Disp: , Rfl:   •  levothyroxine (SYNTHROID, LEVOTHROID) 175 MCG tablet, Take 1 tablet by mouth Daily., Disp: 90 tablet, Rfl: 1  •  metoprolol succinate XL (TOPROL-XL) 100 MG 24 hr tablet, Take 100 mg by mouth Daily., Disp: , Rfl:   •  mirtazapine (REMERON) 30 MG tablet, Take 30 mg by mouth every night at bedtime., Disp: , Rfl:   •  O2 (OXYGEN), Inhale 2 L/min Every Night., Disp: , Rfl:   •  pantoprazole (PROTONIX) 40 MG  EC tablet, Take 40 mg by mouth Daily., Disp: , Rfl:   •  predniSONE (DELTASONE) 10 MG tablet, Hold while taking Dexamethasone. Once out of Dexamethasone, resume taking 1 tablet by mouth daily, Disp: , Rfl:   •  rOPINIRole (REQUIP) 2 MG tablet, TAKE 1-2 TABLET(S) ORAL EVERY NIGHT AT BEDTIME, Disp: 180 tablet, Rfl: 1  •  Semaglutide,0.25 or 0.5MG/DOS, (Ozempic, 0.25 or 0.5 MG/DOSE,) 2 MG/1.5ML solution pen-injector, Inject 0.5 mg under the skin into the appropriate area as directed 1 (One) Time Per Week., Disp: 1.5 mL, Rfl: 2  •  sertraline (ZOLOFT) 100 MG tablet, Take 1 tablet by mouth Daily., Disp: 90 tablet, Rfl: 1  •  spironolactone (ALDACTONE) 50 MG tablet, Take 1 tablet by mouth Daily., Disp: , Rfl:   •  tacrolimus (PROGRAF) 0.5 MG capsule, Take 2 capsules by mouth 2 (Two) Times a Day., Disp: , Rfl:   •  traMADol (ULTRAM) 50 MG tablet, Take 1 tablet by mouth Every 6 (Six) Hours As Needed for Moderate Pain ., Disp: 18 tablet, Rfl: 0  •  traZODone (DESYREL) 100 MG tablet, Take 200 mg by mouth every night at bedtime., Disp: , Rfl:   •  vitamin D (ERGOCALCIFEROL) 1.25 MG (63201 UT) capsule capsule, Take 50,000 Units by mouth Every 7 (Seven) Days.  , Disp: , Rfl:   •  zolpidem (AMBIEN) 5 MG tablet, Take 5 mg by mouth Every Night., Disp: , Rfl:   •  alendronate (FOSAMAX) 70 MG tablet, Take 1 tablet by mouth 1 (One) Time Per Week., Disp: , Rfl:   •  nitroglycerin (NITROSTAT) 0.4 MG SL tablet, Place 0.4 mg under the tongue., Disp: , Rfl:   •  potassium chloride (MICRO-K) 10 MEQ CR capsule, Hold until Lasix (furosemide) restarted (Patient taking differently: Take 10 mEq by mouth Daily. Hold until Lasix (furosemide) restarted), Disp: , Rfl:      Allergies    Allergies   Allergen Reactions   • Penicillins Shortness Of Breath   • Cyclosporine Swelling       Problem List    Patient Active Problem List   Diagnosis   • Menopause   • Personal history of malignant neoplasm of thyroid   • History of stroke   • Renal impairment    • Vision impairment   • Osteoporosis   • Osteoarthritis   • Meningioma (HCC)   • Hypothyroid   • Hypercholesteremia   • Depression   • Cirrhosis of liver (HCC)   • Anxiety   • Acute hypoxemic respiratory failure due to COVID-19 (HCC)   • Liver transplant recipient (HCC)   • Severe malnutrition (CMS/HCC)   • Leukopenia   • Immunosuppression due to drug therapy (HCC)   • Insomnia   • Sleep apnea   • Gout   • Cytokine release syndrome, grade 2   • Precordial pain       Medications, Allergies, Problems List and Past History were reviewed and updated.    Physical Examination    /84 (BP Location: Left arm, Patient Position: Sitting, Cuff Size: Adult)   Pulse 84   Temp 98.4 °F (36.9 °C) (Infrared)   Resp 16   Wt 74.8 kg (165 lb)   LMP  (LMP Unknown) Comment: Last pap 3/3/2022 [facility]  BMI 31.18 kg/m²     HEENT: Facies- Within normal limits. Lids- Normal bilaterally. Conjunctiva- Clear bilaterally. Sclera- Anicteric bilaterally.    Neck: Thyroid- non enlarged, symmetric and has no nodules. No bruits are detected.    Lungs: Auscultation- Clear to auscultation bilaterally. There are no retractions, clubbing or cyanosis. The Expiratory to Inspiratory ratio is equal.    Cardiovascular: There are no carotid bruits. Heart- Normal Rate with Regular rhythm and no murmurs. There are no gallops. There are no rubs. In the lower extremities there is no edema. The upper extremities do not have edema.    Abdomen: Soft, benign, non-tender with no masses, hernias, organomegaly or scars.    Impression and Assessment    Type 2 Diabetes Mellitus without complication without insulin usage.    Hyperlipidemia.    Essential Hypertension.    Hypothyroidism.    Plan    Hyperlipidemia Plan: The patient was instructed to exercise daily, eat a low fat diet and continue her medications.    Essential Hypertension Plan: The patient was instructed to continue the current medications.    Type 2 Diabetes Mellitus without  complication without insulin usage Plan: The patient was instructed to continue the current medications.    Hypothyroidism Plan: The patient was instructed to continue the current medications.    Diagnoses and all orders for this visit:    1. Type 2 diabetes mellitus without complication, without long-term current use of insulin (HCC) (Primary)  -     Comprehensive Metabolic Panel; Future  -     Hemoglobin A1c; Future  -     POC Glucose  -     Hemoglobin A1c  -     Comprehensive Metabolic Panel    2. Essential hypertension  -     CBC & Differential; Future  -     Comprehensive Metabolic Panel; Future  -     Comprehensive Metabolic Panel  -     CBC & Differential    3. Hypothyroidism, unspecified type  -     TSH; Future  -     T4, Free; Future  -     T4, Free  -     TSH    4. Hyperlipidemia, unspecified hyperlipidemia type  -     Comprehensive Metabolic Panel; Future  -     Lipid Panel; Future  -     Lipid Panel  -     Comprehensive Metabolic Panel    Other orders  -     fluticasone (FLONASE) 50 MCG/ACT nasal spray; 2 sprays into the nostril(s) as directed by provider 2 (Two) Times a Day.  Dispense: 16 g; Refill: 5        Return to Office    The patient was instructed to return for follow-up in 4 months. The patient was instructed to return sooner if the condition changes, worsens, or does not resolve.

## 2022-07-29 LAB
ALBUMIN SERPL-MCNC: 4.8 G/DL (ref 3.5–5.2)
ALBUMIN/GLOB SERPL: 2.7 G/DL
ALP SERPL-CCNC: 78 U/L (ref 39–117)
ALT SERPL W P-5'-P-CCNC: 35 U/L (ref 1–33)
ANION GAP SERPL CALCULATED.3IONS-SCNC: 12.2 MMOL/L (ref 5–15)
AST SERPL-CCNC: 18 U/L (ref 1–32)
BILIRUB SERPL-MCNC: 0.6 MG/DL (ref 0–1.2)
BUN SERPL-MCNC: 20 MG/DL (ref 6–20)
BUN/CREAT SERPL: 20.4 (ref 7–25)
CALCIUM SPEC-SCNC: 9.4 MG/DL (ref 8.6–10.5)
CHLORIDE SERPL-SCNC: 102 MMOL/L (ref 98–107)
CHOLEST SERPL-MCNC: 202 MG/DL (ref 0–200)
CO2 SERPL-SCNC: 25.8 MMOL/L (ref 22–29)
CREAT SERPL-MCNC: 0.98 MG/DL (ref 0.57–1)
EGFRCR SERPLBLD CKD-EPI 2021: 67 ML/MIN/1.73
GLOBULIN UR ELPH-MCNC: 1.8 GM/DL
GLUCOSE SERPL-MCNC: 115 MG/DL (ref 65–99)
HDLC SERPL-MCNC: 30 MG/DL (ref 40–60)
LDLC SERPL CALC-MCNC: 119 MG/DL (ref 0–100)
LDLC/HDLC SERPL: 3.71 {RATIO}
POTASSIUM SERPL-SCNC: 4.2 MMOL/L (ref 3.5–5.2)
PROT SERPL-MCNC: 6.6 G/DL (ref 6–8.5)
SODIUM SERPL-SCNC: 140 MMOL/L (ref 136–145)
T4 FREE SERPL-MCNC: 2.53 NG/DL (ref 0.93–1.7)
TRIGL SERPL-MCNC: 303 MG/DL (ref 0–150)
TSH SERPL DL<=0.05 MIU/L-ACNC: 0.14 UIU/ML (ref 0.27–4.2)
VLDLC SERPL-MCNC: 53 MG/DL (ref 5–40)

## 2022-08-01 ENCOUNTER — TELEPHONE (OUTPATIENT)
Dept: INTERNAL MEDICINE | Facility: CLINIC | Age: 57
End: 2022-08-01

## 2022-08-01 RX ORDER — LEVOTHYROXINE SODIUM 0.15 MG/1
150 TABLET ORAL DAILY
Qty: 90 TABLET | Refills: 1 | Status: SHIPPED | OUTPATIENT
Start: 2022-08-01 | End: 2022-09-19 | Stop reason: DRUGHIGH

## 2022-08-01 NOTE — TELEPHONE ENCOUNTER
Patient called back and was read message. She veralized understanding.    Carolann Cuadra MA   8/1/2022  8:26 AM EDT Back to [facility] with office number provided for pt to return call.     Ad Oquendo MD   8/1/2022  6:50 AM EDT         Please let her know that her labs are essentially normal except her thyroid dosage is a bit too high.  I have sent in a new rx for levothyroxine 150 mcg daily.

## 2022-08-24 ENCOUNTER — TELEPHONE (OUTPATIENT)
Dept: INTERNAL MEDICINE | Facility: CLINIC | Age: 57
End: 2022-08-24

## 2022-08-24 NOTE — TELEPHONE ENCOUNTER
Spoke with patient, Explained that only rx we have sent in was for #180 with sig of 1-2 at night.  States her bottle label reads 1-2 nightly but only # 90 and that she is out and pharmacy states she cannot refill til 9/8/22.    Spoke with Mike Schulz.  States rx they have was written by Dr Bettye Cottrell out of [place] and was filled for #90 in April and July. Has no more refills remaining.  States they do not have rx Dr Oquendo sent in March.    Spoke with patient, states Dr Cottrell was her PCP prior to Dr Oquendo and she no longer sees her at all.  States she is suprised they refilled rxs for her after no longer seeing her.  Explained we will send in new rx.     Rx sent via erx.

## 2022-08-24 NOTE — TELEPHONE ENCOUNTER
Caller: Lizette Frias    Relationship: Self    Best call back number: [phone]    What is the best time to reach you: ANYTIME     Who are you requesting to speak with (clinical staff, provider,  specific staff member): CLINICAL STAFF     What was the call regarding: PATIENT IS ASKING THAT HER ROPINIROLE WAS FILLED FOR 90 PILLS FOR 90 DAYS AND STATED THAT SHE CAN TAKE 1-2 TABLETS DAILY. SHE IS ASKING TO HAVE THE PRESCRIPTION REWRITTEN TO TWO TABLETS DAILY. SHE SAYS THAT THIS DOSAGE HELPS HER SLEEP AT NIGHT WITH HER RESTLESS LEGS. SHE IS CURRENTLY OUT OF HER MEDICATION AND IN NEED OF MORE.     Do you require a callback: YES

## 2022-08-30 RX ORDER — ROPINIROLE 2 MG/1
TABLET, FILM COATED ORAL
Qty: 180 TABLET | Refills: 1 | Status: SHIPPED | OUTPATIENT
Start: 2022-08-30 | End: 2023-02-16

## 2022-09-06 ENCOUNTER — LAB (OUTPATIENT)
Dept: INTERNAL MEDICINE | Facility: CLINIC | Age: 57
End: 2022-09-06

## 2022-09-06 ENCOUNTER — LAB (OUTPATIENT)
Dept: LAB | Facility: HOSPITAL | Age: 57
End: 2022-09-06

## 2022-09-06 DIAGNOSIS — Z94.4 LIVER TRANSPLANT RECIPIENT: ICD-10-CM

## 2022-09-06 PROCEDURE — 36415 COLL VENOUS BLD VENIPUNCTURE: CPT

## 2022-09-06 PROCEDURE — 80053 COMPREHEN METABOLIC PANEL: CPT

## 2022-09-06 PROCEDURE — 85025 COMPLETE CBC W/AUTO DIFF WBC: CPT

## 2022-09-06 PROCEDURE — 80197 ASSAY OF TACROLIMUS: CPT

## 2022-09-07 LAB
ALBUMIN SERPL-MCNC: 4.4 G/DL (ref 3.5–5.2)
ALBUMIN/GLOB SERPL: 2.4 G/DL
ALP SERPL-CCNC: 73 U/L (ref 39–117)
ALT SERPL W P-5'-P-CCNC: 38 U/L (ref 1–33)
ANION GAP SERPL CALCULATED.3IONS-SCNC: 11 MMOL/L (ref 5–15)
AST SERPL-CCNC: 22 U/L (ref 1–32)
BASOPHILS # BLD AUTO: 0.05 10*3/MM3 (ref 0–0.2)
BASOPHILS NFR BLD AUTO: 1.2 % (ref 0–1.5)
BILIRUB SERPL-MCNC: 0.4 MG/DL (ref 0–1.2)
BUN SERPL-MCNC: 17 MG/DL (ref 6–20)
BUN/CREAT SERPL: 15.9 (ref 7–25)
CALCIUM SPEC-SCNC: 9.3 MG/DL (ref 8.6–10.5)
CHLORIDE SERPL-SCNC: 104 MMOL/L (ref 98–107)
CO2 SERPL-SCNC: 26 MMOL/L (ref 22–29)
CREAT SERPL-MCNC: 1.07 MG/DL (ref 0.57–1)
DEPRECATED RDW RBC AUTO: 42.4 FL (ref 37–54)
EGFRCR SERPLBLD CKD-EPI 2021: 60.3 ML/MIN/1.73
EOSINOPHIL # BLD AUTO: 0.04 10*3/MM3 (ref 0–0.4)
EOSINOPHIL NFR BLD AUTO: 0.9 % (ref 0.3–6.2)
ERYTHROCYTE [DISTWIDTH] IN BLOOD BY AUTOMATED COUNT: 14.2 % (ref 12.3–15.4)
GLOBULIN UR ELPH-MCNC: 1.8 GM/DL
GLUCOSE SERPL-MCNC: 98 MG/DL (ref 65–99)
HCT VFR BLD AUTO: 42.1 % (ref 34–46.6)
HGB BLD-MCNC: 13.9 G/DL (ref 12–15.9)
IMM GRANULOCYTES # BLD AUTO: 0.07 10*3/MM3 (ref 0–0.05)
IMM GRANULOCYTES NFR BLD AUTO: 1.6 % (ref 0–0.5)
LYMPHOCYTES # BLD AUTO: 1.39 10*3/MM3 (ref 0.7–3.1)
LYMPHOCYTES NFR BLD AUTO: 32.3 % (ref 19.6–45.3)
MCH RBC QN AUTO: 27.4 PG (ref 26.6–33)
MCHC RBC AUTO-ENTMCNC: 33 G/DL (ref 31.5–35.7)
MCV RBC AUTO: 83 FL (ref 79–97)
MONOCYTES # BLD AUTO: 0.33 10*3/MM3 (ref 0.1–0.9)
MONOCYTES NFR BLD AUTO: 7.7 % (ref 5–12)
NEUTROPHILS NFR BLD AUTO: 2.43 10*3/MM3 (ref 1.7–7)
NEUTROPHILS NFR BLD AUTO: 56.3 % (ref 42.7–76)
NRBC BLD AUTO-RTO: 0 /100 WBC (ref 0–0.2)
PLATELET # BLD AUTO: 162 10*3/MM3 (ref 140–450)
PMV BLD AUTO: 11.5 FL (ref 6–12)
POTASSIUM SERPL-SCNC: 3.8 MMOL/L (ref 3.5–5.2)
PROT SERPL-MCNC: 6.2 G/DL (ref 6–8.5)
RBC # BLD AUTO: 5.07 10*6/MM3 (ref 3.77–5.28)
SODIUM SERPL-SCNC: 141 MMOL/L (ref 136–145)
WBC NRBC COR # BLD: 4.31 10*3/MM3 (ref 3.4–10.8)

## 2022-09-11 LAB — TACROLIMUS BLD LC/MS/MS-MCNC: 3 NG/ML (ref 2–20)

## 2022-09-19 ENCOUNTER — LAB (OUTPATIENT)
Dept: LAB | Facility: HOSPITAL | Age: 57
End: 2022-09-19

## 2022-09-19 ENCOUNTER — OFFICE VISIT (OUTPATIENT)
Dept: INTERNAL MEDICINE | Facility: CLINIC | Age: 57
End: 2022-09-19

## 2022-09-19 VITALS
WEIGHT: 158 LBS | DIASTOLIC BLOOD PRESSURE: 80 MMHG | RESPIRATION RATE: 18 BRPM | TEMPERATURE: 98.7 F | HEART RATE: 92 BPM | SYSTOLIC BLOOD PRESSURE: 116 MMHG | BODY MASS INDEX: 29.85 KG/M2

## 2022-09-19 DIAGNOSIS — Z18.9 RETAINED SUTURE, INITIAL ENCOUNTER: ICD-10-CM

## 2022-09-19 DIAGNOSIS — R53.83 FATIGUE, UNSPECIFIED TYPE: ICD-10-CM

## 2022-09-19 DIAGNOSIS — R19.7 DIARRHEA, UNSPECIFIED TYPE: ICD-10-CM

## 2022-09-19 DIAGNOSIS — T81.89XA RETAINED SUTURE, INITIAL ENCOUNTER: ICD-10-CM

## 2022-09-19 DIAGNOSIS — Z94.4 LIVER TRANSPLANT RECIPIENT: ICD-10-CM

## 2022-09-19 DIAGNOSIS — E11.9 TYPE 2 DIABETES MELLITUS WITHOUT COMPLICATION, WITHOUT LONG-TERM CURRENT USE OF INSULIN: Primary | ICD-10-CM

## 2022-09-19 DIAGNOSIS — M25.50 ARTHRALGIA, UNSPECIFIED JOINT: ICD-10-CM

## 2022-09-19 LAB
ALBUMIN SERPL-MCNC: 4.2 G/DL (ref 3.5–5.2)
ALBUMIN/GLOB SERPL: 2 G/DL
ALP SERPL-CCNC: 68 U/L (ref 39–117)
ALT SERPL W P-5'-P-CCNC: 49 U/L (ref 1–33)
ANION GAP SERPL CALCULATED.3IONS-SCNC: 13.6 MMOL/L (ref 5–15)
AST SERPL-CCNC: 30 U/L (ref 1–32)
BASOPHILS # BLD AUTO: 0.06 10*3/MM3 (ref 0–0.2)
BASOPHILS NFR BLD AUTO: 1.3 % (ref 0–1.5)
BILIRUB SERPL-MCNC: 0.7 MG/DL (ref 0–1.2)
BUN SERPL-MCNC: 18 MG/DL (ref 6–20)
BUN/CREAT SERPL: 15 (ref 7–25)
CALCIUM SPEC-SCNC: 9.8 MG/DL (ref 8.6–10.5)
CHLORIDE SERPL-SCNC: 101 MMOL/L (ref 98–107)
CO2 SERPL-SCNC: 26.4 MMOL/L (ref 22–29)
CREAT SERPL-MCNC: 1.2 MG/DL (ref 0.57–1)
DEPRECATED RDW RBC AUTO: 44.8 FL (ref 37–54)
EGFRCR SERPLBLD CKD-EPI 2021: 52.6 ML/MIN/1.73
EOSINOPHIL # BLD AUTO: 0.05 10*3/MM3 (ref 0–0.4)
EOSINOPHIL NFR BLD AUTO: 1 % (ref 0.3–6.2)
ERYTHROCYTE [DISTWIDTH] IN BLOOD BY AUTOMATED COUNT: 14.5 % (ref 12.3–15.4)
GLOBULIN UR ELPH-MCNC: 2.1 GM/DL
GLUCOSE SERPL-MCNC: 92 MG/DL (ref 65–99)
HCT VFR BLD AUTO: 45.6 % (ref 34–46.6)
HGB BLD-MCNC: 14.9 G/DL (ref 12–15.9)
IMM GRANULOCYTES # BLD AUTO: 0.05 10*3/MM3 (ref 0–0.05)
IMM GRANULOCYTES NFR BLD AUTO: 1 % (ref 0–0.5)
LYMPHOCYTES # BLD AUTO: 2.02 10*3/MM3 (ref 0.7–3.1)
LYMPHOCYTES NFR BLD AUTO: 42.2 % (ref 19.6–45.3)
MCH RBC QN AUTO: 27.7 PG (ref 26.6–33)
MCHC RBC AUTO-ENTMCNC: 32.7 G/DL (ref 31.5–35.7)
MCV RBC AUTO: 84.9 FL (ref 79–97)
MONOCYTES # BLD AUTO: 0.48 10*3/MM3 (ref 0.1–0.9)
MONOCYTES NFR BLD AUTO: 10 % (ref 5–12)
NEUTROPHILS NFR BLD AUTO: 2.13 10*3/MM3 (ref 1.7–7)
NEUTROPHILS NFR BLD AUTO: 44.5 % (ref 42.7–76)
NRBC BLD AUTO-RTO: 0.2 /100 WBC (ref 0–0.2)
PLATELET # BLD AUTO: 174 10*3/MM3 (ref 140–450)
PMV BLD AUTO: 11.4 FL (ref 6–12)
POTASSIUM SERPL-SCNC: 3.5 MMOL/L (ref 3.5–5.2)
PROT SERPL-MCNC: 6.3 G/DL (ref 6–8.5)
RBC # BLD AUTO: 5.37 10*6/MM3 (ref 3.77–5.28)
SODIUM SERPL-SCNC: 141 MMOL/L (ref 136–145)
TSH SERPL DL<=0.05 MIU/L-ACNC: 0.05 UIU/ML (ref 0.27–4.2)
WBC NRBC COR # BLD: 4.79 10*3/MM3 (ref 3.4–10.8)

## 2022-09-19 PROCEDURE — 80197 ASSAY OF TACROLIMUS: CPT | Performed by: INTERNAL MEDICINE

## 2022-09-19 PROCEDURE — 36415 COLL VENOUS BLD VENIPUNCTURE: CPT | Performed by: INTERNAL MEDICINE

## 2022-09-19 PROCEDURE — 80050 GENERAL HEALTH PANEL: CPT | Performed by: INTERNAL MEDICINE

## 2022-09-19 PROCEDURE — 86747 PARVOVIRUS ANTIBODY: CPT | Performed by: INTERNAL MEDICINE

## 2022-09-19 PROCEDURE — 99214 OFFICE O/P EST MOD 30 MIN: CPT | Performed by: INTERNAL MEDICINE

## 2022-09-19 RX ORDER — PREDNISONE 20 MG/1
20 TABLET ORAL DAILY
Qty: 10 TABLET | Refills: 0 | OUTPATIENT
Start: 2022-09-19 | End: 2022-12-24

## 2022-09-19 RX ORDER — SEMAGLUTIDE 1.34 MG/ML
0.5 INJECTION, SOLUTION SUBCUTANEOUS WEEKLY
Qty: 1.5 ML | Refills: 2 | Status: SHIPPED | OUTPATIENT
Start: 2022-09-19 | End: 2022-11-21 | Stop reason: SDUPTHER

## 2022-09-19 RX ORDER — LEVOTHYROXINE SODIUM 175 UG/1
1 TABLET ORAL DAILY
COMMUNITY
Start: 2022-09-11 | End: 2022-09-22 | Stop reason: SDUPTHER

## 2022-09-20 ENCOUNTER — TELEPHONE (OUTPATIENT)
Dept: INTERNAL MEDICINE | Facility: CLINIC | Age: 57
End: 2022-09-20

## 2022-09-22 ENCOUNTER — HOSPITAL ENCOUNTER (EMERGENCY)
Facility: HOSPITAL | Age: 57
Discharge: HOME | End: 2022-09-22
Attending: EMERGENCY MEDICINE | Admitting: EMERGENCY MEDICINE

## 2022-09-22 ENCOUNTER — APPOINTMENT (OUTPATIENT)
Dept: GENERAL RADIOLOGY | Facility: HOSPITAL | Age: 57
End: 2022-09-22

## 2022-09-22 ENCOUNTER — OFFICE VISIT (OUTPATIENT)
Dept: INTERNAL MEDICINE | Facility: CLINIC | Age: 57
End: 2022-09-22

## 2022-09-22 VITALS
HEIGHT: 61 IN | OXYGEN SATURATION: 97 % | BODY MASS INDEX: 29.27 KG/M2 | TEMPERATURE: 98.1 F | DIASTOLIC BLOOD PRESSURE: 87 MMHG | RESPIRATION RATE: 16 BRPM | WEIGHT: 155 LBS | SYSTOLIC BLOOD PRESSURE: 131 MMHG | HEART RATE: 78 BPM

## 2022-09-22 VITALS
SYSTOLIC BLOOD PRESSURE: 122 MMHG | HEART RATE: 92 BPM | OXYGEN SATURATION: 94 % | TEMPERATURE: 98 F | DIASTOLIC BLOOD PRESSURE: 82 MMHG | BODY MASS INDEX: 29.48 KG/M2 | RESPIRATION RATE: 18 BRPM | WEIGHT: 156 LBS

## 2022-09-22 DIAGNOSIS — R19.7 DIARRHEA, UNSPECIFIED TYPE: Primary | ICD-10-CM

## 2022-09-22 DIAGNOSIS — A08.4 VIRAL GASTROENTERITIS: Primary | ICD-10-CM

## 2022-09-22 DIAGNOSIS — Z13.9 ENCOUNTER FOR SCREENING: ICD-10-CM

## 2022-09-22 DIAGNOSIS — E86.0 DEHYDRATION: ICD-10-CM

## 2022-09-22 LAB
ALBUMIN SERPL-MCNC: 4.1 G/DL (ref 3.5–5.2)
ALBUMIN/GLOB SERPL: 2 G/DL
ALP SERPL-CCNC: 71 U/L (ref 39–117)
ALT SERPL W P-5'-P-CCNC: 41 U/L (ref 1–33)
ANION GAP SERPL CALCULATED.3IONS-SCNC: 10 MMOL/L (ref 5–15)
AST SERPL-CCNC: 25 U/L (ref 1–32)
B19V IGG SER IA-ACNC: 1 INDEX (ref 0–0.8)
B19V IGM SER IA-ACNC: 0.1 INDEX (ref 0–0.8)
BASOPHILS # BLD AUTO: 0.03 10*3/MM3 (ref 0–0.2)
BASOPHILS NFR BLD AUTO: 0.9 % (ref 0–1.5)
BILIRUB SERPL-MCNC: 0.7 MG/DL (ref 0–1.2)
BUN SERPL-MCNC: 21 MG/DL (ref 6–20)
BUN/CREAT SERPL: 19.8 (ref 7–25)
CALCIUM SPEC-SCNC: 8.7 MG/DL (ref 8.6–10.5)
CHLORIDE SERPL-SCNC: 103 MMOL/L (ref 98–107)
CO2 SERPL-SCNC: 28 MMOL/L (ref 22–29)
CREAT SERPL-MCNC: 1.06 MG/DL (ref 0.57–1)
DEPRECATED RDW RBC AUTO: 42.8 FL (ref 37–54)
EGFRCR SERPLBLD CKD-EPI 2021: 61 ML/MIN/1.73
EOSINOPHIL # BLD AUTO: 0.03 10*3/MM3 (ref 0–0.4)
EOSINOPHIL NFR BLD AUTO: 0.9 % (ref 0.3–6.2)
ERYTHROCYTE [DISTWIDTH] IN BLOOD BY AUTOMATED COUNT: 13.9 % (ref 12.3–15.4)
GLOBULIN UR ELPH-MCNC: 2.1 GM/DL
GLUCOSE SERPL-MCNC: 89 MG/DL (ref 65–99)
HCT VFR BLD AUTO: 43.5 % (ref 34–46.6)
HGB BLD-MCNC: 14.4 G/DL (ref 12–15.9)
HOLD SPECIMEN: NORMAL
IMM GRANULOCYTES # BLD AUTO: 0.06 10*3/MM3 (ref 0–0.05)
IMM GRANULOCYTES NFR BLD AUTO: 1.7 % (ref 0–0.5)
LYMPHOCYTES # BLD AUTO: 1.22 10*3/MM3 (ref 0.7–3.1)
LYMPHOCYTES NFR BLD AUTO: 35.1 % (ref 19.6–45.3)
MAGNESIUM SERPL-MCNC: 1.7 MG/DL (ref 1.6–2.6)
MCH RBC QN AUTO: 28 PG (ref 26.6–33)
MCHC RBC AUTO-ENTMCNC: 33.1 G/DL (ref 31.5–35.7)
MCV RBC AUTO: 84.5 FL (ref 79–97)
MONOCYTES # BLD AUTO: 0.31 10*3/MM3 (ref 0.1–0.9)
MONOCYTES NFR BLD AUTO: 8.9 % (ref 5–12)
NEUTROPHILS NFR BLD AUTO: 1.83 10*3/MM3 (ref 1.7–7)
NEUTROPHILS NFR BLD AUTO: 52.5 % (ref 42.7–76)
NRBC BLD AUTO-RTO: 0 /100 WBC (ref 0–0.2)
PLATELET # BLD AUTO: 157 10*3/MM3 (ref 140–450)
PMV BLD AUTO: 11.1 FL (ref 6–12)
POTASSIUM SERPL-SCNC: 4 MMOL/L (ref 3.5–5.2)
PROT SERPL-MCNC: 6.2 G/DL (ref 6–8.5)
RBC # BLD AUTO: 5.15 10*6/MM3 (ref 3.77–5.28)
SODIUM SERPL-SCNC: 141 MMOL/L (ref 136–145)
TACROLIMUS BLD LC/MS/MS-MCNC: 4.3 NG/ML (ref 2–20)
TROPONIN T SERPL-MCNC: <0.01 NG/ML (ref 0–0.03)
WBC NRBC COR # BLD: 3.48 10*3/MM3 (ref 3.4–10.8)
WHOLE BLOOD HOLD COAG: NORMAL
WHOLE BLOOD HOLD SPECIMEN: NORMAL

## 2022-09-22 PROCEDURE — 83735 ASSAY OF MAGNESIUM: CPT

## 2022-09-22 PROCEDURE — 36415 COLL VENOUS BLD VENIPUNCTURE: CPT

## 2022-09-22 PROCEDURE — 99284 EMERGENCY DEPT VISIT MOD MDM: CPT

## 2022-09-22 PROCEDURE — 80053 COMPREHEN METABOLIC PANEL: CPT

## 2022-09-22 PROCEDURE — 85025 COMPLETE CBC W/AUTO DIFF WBC: CPT

## 2022-09-22 PROCEDURE — 84484 ASSAY OF TROPONIN QUANT: CPT

## 2022-09-22 PROCEDURE — 93005 ELECTROCARDIOGRAM TRACING: CPT

## 2022-09-22 PROCEDURE — 71045 X-RAY EXAM CHEST 1 VIEW: CPT

## 2022-09-22 PROCEDURE — 96360 HYDRATION IV INFUSION INIT: CPT | Performed by: STUDENT IN AN ORGANIZED HEALTH CARE EDUCATION/TRAINING PROGRAM

## 2022-09-22 PROCEDURE — 99215 OFFICE O/P EST HI 40 MIN: CPT | Performed by: STUDENT IN AN ORGANIZED HEALTH CARE EDUCATION/TRAINING PROGRAM

## 2022-09-22 RX ORDER — SODIUM CHLORIDE 9 MG/ML
1000 INJECTION, SOLUTION INTRAVENOUS ONCE
Status: COMPLETED | OUTPATIENT
Start: 2022-09-22 | End: 2022-09-22

## 2022-09-22 RX ORDER — LEVOTHYROXINE SODIUM 0.15 MG/1
150 TABLET ORAL DAILY
Qty: 30 TABLET | Refills: 0 | Status: SHIPPED | OUTPATIENT
Start: 2022-09-22 | End: 2022-10-26 | Stop reason: SDUPTHER

## 2022-09-22 RX ORDER — SODIUM CHLORIDE 0.9 % (FLUSH) 0.9 %
10 SYRINGE (ML) INJECTION AS NEEDED
Status: DISCONTINUED | OUTPATIENT
Start: 2022-09-22 | End: 2022-09-22 | Stop reason: HOSPADM

## 2022-09-22 RX ADMIN — SODIUM CHLORIDE 1000 ML: 9 INJECTION, SOLUTION INTRAVENOUS at 17:07

## 2022-09-22 RX ADMIN — SODIUM CHLORIDE 1000 ML/HR: 9 INJECTION, SOLUTION INTRAVENOUS at 12:20

## 2022-09-22 RX ADMIN — SODIUM CHLORIDE 1000 ML: 9 INJECTION, SOLUTION INTRAVENOUS at 18:35

## 2022-09-22 NOTE — PROGRESS NOTES
Follow Up Office Visit      Date: 2022   Patient Name: Lizette Frias  : 1965   MRN: [id number]     Chief Complaint:    Chief Complaint   Patient presents with   • Dizziness     nausea       History of Present Illness: Lizette Frias is a 57 y.o. female who is here today for constant diarrhea and dizziness.    Viral Gastroenteritis  - diarrhea present for 1 week  - notes staggering when trying to walk  - states worse when standing from seated position  - dizziness began last night  - notes more fatigue, largely sleeping around the clock  - states that her joints hurt  - states that the bones in her feet and ankles are in pain while walking  - no febrile episodes  - notes approximately 8 bowel movements per day constantly        Subjective      Review of Systems:   Review of Systems   Constitutional: Positive for activity change. Negative for appetite change, fatigue and fever.   Eyes: Negative for blurred vision, photophobia and visual disturbance.   Respiratory: Negative for cough, chest tightness and shortness of breath.    Cardiovascular: Negative for chest pain and palpitations.   Gastrointestinal: Positive for diarrhea and nausea. Negative for abdominal distention, abdominal pain, blood in stool, constipation and vomiting.   Genitourinary: Negative for dysuria and hematuria.   Musculoskeletal: Negative for arthralgias, back pain and joint swelling.   Skin: Negative for rash and wound.   Neurological: Positive for weakness. Negative for headache and confusion.       I have reviewed the patients family history, social history, past medical history, past surgical history and have updated it as appropriate.     Medications:     Current Outpatient Medications:   •  alendronate (FOSAMAX) 70 MG tablet, Take 1 tablet by mouth 1 (One) Time Per Week., Disp: , Rfl:   •  amLODIPine (NORVASC) 10 MG tablet, Take 1 tablet by mouth Daily., Disp: , Rfl:   •  atorvastatin (LIPITOR) 40 MG tablet, Take 1 tablet  by mouth Daily., Disp: , Rfl:   •  buPROPion SR (WELLBUTRIN SR) 150 MG 12 hr tablet, TAKE 1 TABLET BY MOUTH EVERY DAY FOR DEPRESSION, Disp: , Rfl:   •  calcium carbonate-vitamin d 600-400 MG-UNIT per tablet, Take 1 tablet by mouth Daily., Disp: , Rfl:   •  cloNIDine (CATAPRES) 0.1 MG tablet, Take 0.1 mg by mouth Daily As Needed for High Blood Pressure., Disp: , Rfl:   •  [employer] Melatonin 5 MG tablet tablet, 1 TABLET(S) BY MOUTH EVERY EVENING TAKE 1-2 HRS PRIOR TO BEDTIME, Disp: 90 tablet, Rfl: 1  •  fluticasone (FLONASE) 50 MCG/ACT nasal spray, 2 sprays into the nostril(s) as directed by provider 2 (Two) Times a Day., Disp: 16 g, Rfl: 5  •  furosemide (LASIX) 20 MG tablet, Hold until follow up with your primary care provider (Patient taking differently: Take 20 mg by mouth Daily.), Disp: , Rfl:   •  icosapent ethyl (VASCEPA) 1 g capsule capsule, Take 2 capsules by mouth 2 (Two) Times a Day With Meals., Disp: , Rfl:   •  ipratropium-albuterol (DUO-NEB) 0.5-2.5 mg/3 ml nebulizer, Take 3 mL by nebulization Every 4 (Four) Hours As Needed., Disp: , Rfl:   •  isosorbide dinitrate (ISORDIL) 30 MG tablet, TAKE 1 TABLET BY MOUTH THREE TIMES A DAY (Patient taking differently: 30 mg 2 (Two) Times a Day.), Disp: 270 tablet, Rfl: 1  •  isosorbide mononitrate (IMDUR) 60 MG 24 hr tablet, Take 1 tablet by mouth Every Night., Disp: , Rfl:   •  levothyroxine (SYNTHROID, LEVOTHROID) 175 MCG tablet, Take 1 tablet by mouth Daily., Disp: , Rfl:   •  metoprolol succinate XL (TOPROL-XL) 100 MG 24 hr tablet, Take 100 mg by mouth Daily., Disp: , Rfl:   •  mirtazapine (REMERON) 30 MG tablet, Take 30 mg by mouth every night at bedtime., Disp: , Rfl:   •  nitroglycerin (NITROSTAT) 0.4 MG SL tablet, Place 0.4 mg under the tongue., Disp: , Rfl:   •  O2 (OXYGEN), Inhale 2 L/min Every Night., Disp: , Rfl:   •  pantoprazole (PROTONIX) 40 MG EC tablet, Take 40 mg by mouth Daily., Disp: , Rfl:   •  potassium chloride (MICRO-K) 10 MEQ CR capsule, Hold  until Lasix (furosemide) restarted (Patient taking differently: Take 10 mEq by mouth Daily. Hold until Lasix (furosemide) restarted), Disp: , Rfl:   •  predniSONE (DELTASONE) 10 MG tablet, Hold while taking Dexamethasone. Once out of Dexamethasone, resume taking 1 tablet by mouth daily, Disp: , Rfl:   •  predniSONE (DELTASONE) 20 MG tablet, Take 1 tablet by mouth Daily., Disp: 10 tablet, Rfl: 0  •  rOPINIRole (REQUIP) 2 MG tablet, Take 1 - 2 tablet(s) by mouth every night at bedtime., Disp: 180 tablet, Rfl: 1  •  Semaglutide,0.25 or 0.5MG/DOS, (Ozempic, 0.25 or 0.5 MG/DOSE,) 2 MG/1.5ML solution pen-injector, Inject 0.5 mg under the skin into the appropriate area as directed 1 (One) Time Per Week., Disp: 1.5 mL, Rfl: 2  •  sertraline (ZOLOFT) 100 MG tablet, Take 1 tablet by mouth Daily., Disp: 90 tablet, Rfl: 1  •  spironolactone (ALDACTONE) 50 MG tablet, Take 1 tablet by mouth Daily., Disp: , Rfl:   •  tacrolimus (PROGRAF) 0.5 MG capsule, Take 2 capsules by mouth 2 (Two) Times a Day., Disp: , Rfl:   •  traMADol (ULTRAM) 50 MG tablet, Take 1 tablet by mouth Every 6 (Six) Hours As Needed for Moderate Pain ., Disp: 18 tablet, Rfl: 0  •  traZODone (DESYREL) 100 MG tablet, Take 200 mg by mouth every night at bedtime., Disp: , Rfl:   •  vitamin D (ERGOCALCIFEROL) 1.25 MG (22200 UT) capsule capsule, Take 50,000 Units by mouth Every 7 (Seven) Days.  , Disp: , Rfl:   •  zolpidem (AMBIEN) 5 MG tablet, Take 5 mg by mouth Every Night., Disp: , Rfl:     Current Facility-Administered Medications:   •  lactated ringers bolus 1,000 mL, 1,000 mL, Intravenous, Once, Lars Fermin MD    Allergies:   Allergies   Allergen Reactions   • Penicillins Shortness Of Breath   • Cyclosporine Swelling       Objective     Physical Exam: Please see above  Vital Signs:   Vitals:    09/22/22 1139   BP: 122/82   BP Location: Right arm   Patient Position: Sitting   Cuff Size: Adult   Pulse: 92   Resp: 18   Temp: 98 °F (36.7 °C)   TempSrc: Temporal    SpO2: 94%   Weight: 70.8 kg (156 lb)   PainSc: 10-Worst pain ever     Body mass index is 29.48 kg/m².    Physical Exam  Vitals and nursing note reviewed.   Constitutional:       General: She is not in acute distress.     Appearance: Normal appearance. She is normal weight. She is ill-appearing. She is not toxic-appearing.      Comments: Fatigued but easily aroused   HENT:      Nose: No congestion or rhinorrhea.   Eyes:      General:         Right eye: No discharge.         Left eye: No discharge.      Conjunctiva/sclera: Conjunctivae normal.   Pulmonary:      Effort: Pulmonary effort is normal. No respiratory distress.   Abdominal:      General: Abdomen is flat. There is no distension.   Musculoskeletal:      Cervical back: Normal range of motion.   Skin:     Coloration: Skin is not jaundiced.      Findings: No rash.   Neurological:      General: No focal deficit present.      Mental Status: Mental status is at baseline.      Coordination: Coordination normal.      Gait: Gait normal.   Psychiatric:         Mood and Affect: Mood normal.         Behavior: Behavior normal.         Thought Content: Thought content normal.         Judgment: Judgment normal.         Procedures    Results:   Labs:   Hemoglobin A1C   Date Value Ref Range Status   07/28/2022 5.50 4.80 - 5.60 % Final     TSH   Date Value Ref Range Status   09/19/2022 0.053 (L) 0.270 - 4.200 uIU/mL Final   09/04/2019 5.685 (H) 0.300 - 5.000 uIU/mL Final        Imaging:   No valid procedures specified.     Assessment / Plan      Assessment/Plan:   Problem List Items Addressed This Visit        Other    Viral gastroenteritis - Primary    Current Assessment & Plan     -   Symptoms present for approximately 1 week prior to clinical exam on 9/22/2022  -   Patient with worsening diarrhea with approximately 8 episodes per day: Watery in nature without blood in the stool  -   Patient with significant dizziness and fatigue that is likely associated with orthostatic  hypotension and hypovolemia  -   Patient at baseline immunosuppression status post liver transplant  Plan:  -   Initiate 9/22/2022: Bolus 1 L lactated Ringer's due to prominent hypovolemia with concern of sending to the emergency department with immunosuppression if improvement could be obtained as an outpatient  -   Patient did not improve with 1 L bolus and was still presyncopal with balance issues  -   Coordination completed with EMS and handoff was performed with [facility] emergency department to have patient present for admission  -   C. difficile testing ordered on 9/22/2022: Pending  -   Consider ova/parasite testing if C. difficile is negative with continued symptoms         Relevant Medications    lactated ringers bolus 1,000 mL    Other Relevant Orders    Clostridioides difficile EIA - Stool, Per Rectum      Other Visit Diagnoses     Encounter for screening        Relevant Orders    Mammo Screening Digital Tomosynthesis Bilateral With CAD            Follow Up:   Return in about 5 days (around 9/27/2022) for Recheck.    Lars Fermin MD  [facility] [place] [facility]

## 2022-09-22 NOTE — ASSESSMENT & PLAN NOTE
-   Symptoms present for approximately 1 week prior to clinical exam on 9/22/2022  -   Patient with worsening diarrhea with approximately 8 episodes per day: Watery in nature without blood in the stool  -   Patient with significant dizziness and fatigue that is likely associated with orthostatic hypotension and hypovolemia  -   Patient at baseline immunosuppression status post liver transplant  Plan:  -   Initiate 9/22/2022: Bolus 1 L lactated Ringer's due to prominent hypovolemia with concern of sending to the emergency department with immunosuppression if improvement could be obtained as an outpatient  -   Patient did not improve with 1 L bolus and was still presyncopal with balance issues  -   Coordination completed with EMS and handoff was performed with [facility] emergency department to have patient present for admission  -   C. difficile testing ordered on 9/22/2022: Pending  -   Consider ova/parasite testing if C. difficile is negative with continued symptoms

## 2022-09-23 ENCOUNTER — TELEPHONE (OUTPATIENT)
Dept: EMERGENCY DEPT | Facility: HOSPITAL | Age: 57
End: 2022-09-23

## 2022-09-23 ENCOUNTER — LAB (OUTPATIENT)
Dept: LAB | Facility: HOSPITAL | Age: 57
End: 2022-09-23

## 2022-09-23 ENCOUNTER — TELEPHONE (OUTPATIENT)
Dept: INTERNAL MEDICINE | Facility: CLINIC | Age: 57
End: 2022-09-23

## 2022-09-23 LAB
C DIFF GDH + TOXINS A+B STL QL IA.RAPID: NEGATIVE
C DIFF TOX GENS STL QL NAA+PROBE: DETECTED
QT INTERVAL: 430 MS
QTC INTERVAL: 495 MS

## 2022-09-23 PROCEDURE — 87449 NOS EACH ORGANISM AG IA: CPT | Performed by: EMERGENCY MEDICINE

## 2022-09-23 PROCEDURE — 87493 C DIFF AMPLIFIED PROBE: CPT | Performed by: EMERGENCY MEDICINE

## 2022-09-23 RX ORDER — VANCOMYCIN HYDROCHLORIDE 125 MG/1
125 CAPSULE ORAL 4 TIMES DAILY
Qty: 40 CAPSULE | Refills: 0 | OUTPATIENT
Start: 2022-09-23 | End: 2022-12-24

## 2022-09-23 NOTE — TELEPHONE ENCOUNTER
Eft voice mail message requesting Pt to call back to verify Dx and current Sx experiencing. Office number given.

## 2022-09-23 NOTE — TELEPHONE ENCOUNTER
I spoke with the patient advised of her findings she received it.  She tells me she still having a large amount of watery foul-smelling yellow stools.  This is consistent with C. difficile.  I have ordered oral vancomycin and sent it to her pharmacy and advised her on the appropriate follow-up.  As well as the signs and symptoms of worsening condition.  Patient was thankful for my call.

## 2022-09-28 ENCOUNTER — OFFICE VISIT (OUTPATIENT)
Dept: INTERNAL MEDICINE | Facility: CLINIC | Age: 57
End: 2022-09-28

## 2022-09-28 VITALS
TEMPERATURE: 97.1 F | SYSTOLIC BLOOD PRESSURE: 128 MMHG | WEIGHT: 158 LBS | BODY MASS INDEX: 29.85 KG/M2 | RESPIRATION RATE: 18 BRPM | HEART RATE: 81 BPM | DIASTOLIC BLOOD PRESSURE: 88 MMHG | OXYGEN SATURATION: 93 %

## 2022-09-28 DIAGNOSIS — A04.72 C. DIFFICILE DIARRHEA: Primary | ICD-10-CM

## 2022-09-28 DIAGNOSIS — Z91.81 AT HIGH RISK FOR INJURY RELATED TO FALL: ICD-10-CM

## 2022-09-28 DIAGNOSIS — E89.0 POSTOPERATIVE HYPOTHYROIDISM: ICD-10-CM

## 2022-09-28 DIAGNOSIS — M15.9 PRIMARY OSTEOARTHRITIS INVOLVING MULTIPLE JOINTS: ICD-10-CM

## 2022-09-28 PROCEDURE — 99214 OFFICE O/P EST MOD 30 MIN: CPT | Performed by: STUDENT IN AN ORGANIZED HEALTH CARE EDUCATION/TRAINING PROGRAM

## 2022-09-28 NOTE — ASSESSMENT & PLAN NOTE
-   Patient with chronic symptoms prior to clinical exam on 9/20/2022 related to hypothyroidism post thyroidectomy  -   Patient with decreased TSH level below normal limits and subsequent modulation of thyroid medication  -   Patient without symptomatic changes after changing thyroid dose  Plan:  -   Continue Synthroid 150 mcg daily  -   Plan to retest TSH during follow-up exam approximately 6 weeks after medication change

## 2022-09-28 NOTE — PROGRESS NOTES
Follow Up Office Visit      Date: 2022   Patient Name: Lizette Frias  : 1965   MRN: [id number]     Chief Complaint:    Chief Complaint   Patient presents with   • Dizziness       History of Present Illness: Lizette Frias is a 57 y.o. female who is here today to follow up with C diff infection and hypothyroidism.    C. Difficile  - notes diarrhea has improved  - notes now back to regular bowel movements  - notes PO intake has significantly improve  - states that dizziness has continued to be prominent  - states constant symptoms now  - notes about 1.5 weeks of symptoms with dizziness    Hypothyroidism  -   Patient notes no significant symptomatic changes after thyroid medication was decreased from 175 mcg to 150 mcg  -   Notes dry skin and heat/cold intolerance  -   States that diarrhea has improved after treatment of C. difficile infection  -   Patient denies specific side effects associated with current medication regimen    Osteoarthritis  -   Patient without worsening joint swelling, but notes that pain is limiting  -   Notes decreased mobility  -   Notes decreased strength  -   Interested in additional resources to maintain strength if possible         Subjective      Review of Systems:   Review of Systems   Constitutional: Positive for fatigue. Negative for activity change, appetite change and fever.   Eyes: Negative for blurred vision, photophobia and visual disturbance.   Respiratory: Negative for cough, chest tightness and shortness of breath.    Cardiovascular: Negative for chest pain and palpitations.   Gastrointestinal: Negative for abdominal distention, abdominal pain, blood in stool, constipation, diarrhea (Resolved), nausea and vomiting.   Genitourinary: Negative for dysuria and hematuria.   Musculoskeletal: Negative for arthralgias, back pain and joint swelling.   Skin: Positive for dry skin. Negative for rash and wound.   Neurological: Negative for weakness, headache and  confusion.       I have reviewed the patients family history, social history, past medical history, past surgical history and have updated it as appropriate.     Medications:     Current Outpatient Medications:   •  alendronate (FOSAMAX) 70 MG tablet, Take 1 tablet by mouth 1 (One) Time Per Week., Disp: , Rfl:   •  amLODIPine (NORVASC) 10 MG tablet, Take 1 tablet by mouth Daily., Disp: , Rfl:   •  atorvastatin (LIPITOR) 40 MG tablet, Take 1 tablet by mouth Daily., Disp: , Rfl:   •  buPROPion SR (WELLBUTRIN SR) 150 MG 12 hr tablet, TAKE 1 TABLET BY MOUTH EVERY DAY FOR DEPRESSION, Disp: , Rfl:   •  calcium carbonate-vitamin d 600-400 MG-UNIT per tablet, Take 1 tablet by mouth Daily., Disp: , Rfl:   •  cloNIDine (CATAPRES) 0.1 MG tablet, Take 0.1 mg by mouth Daily As Needed for High Blood Pressure., Disp: , Rfl:   •  [employer] Melatonin 5 MG tablet tablet, 1 TABLET(S) BY MOUTH EVERY EVENING TAKE 1-2 HRS PRIOR TO BEDTIME, Disp: 90 tablet, Rfl: 1  •  fluticasone (FLONASE) 50 MCG/ACT nasal spray, 2 sprays into the nostril(s) as directed by provider 2 (Two) Times a Day., Disp: 16 g, Rfl: 5  •  furosemide (LASIX) 20 MG tablet, Hold until follow up with your primary care provider (Patient taking differently: Take 20 mg by mouth Daily.), Disp: , Rfl:   •  icosapent ethyl (VASCEPA) 1 g capsule capsule, Take 2 capsules by mouth 2 (Two) Times a Day With Meals., Disp: , Rfl:   •  ipratropium-albuterol (DUO-NEB) 0.5-2.5 mg/3 ml nebulizer, Take 3 mL by nebulization Every 4 (Four) Hours As Needed., Disp: , Rfl:   •  isosorbide dinitrate (ISORDIL) 30 MG tablet, TAKE 1 TABLET BY MOUTH THREE TIMES A DAY (Patient taking differently: 30 mg 2 (Two) Times a Day.), Disp: 270 tablet, Rfl: 1  •  isosorbide mononitrate (IMDUR) 60 MG 24 hr tablet, Take 1 tablet by mouth Every Night., Disp: , Rfl:   •  levothyroxine (SYNTHROID, LEVOTHROID) 150 MCG tablet, Take 1 tablet by mouth Daily., Disp: 30 tablet, Rfl: 0  •  metoprolol succinate XL (TOPROL-XL)  100 MG 24 hr tablet, Take 100 mg by mouth Daily., Disp: , Rfl:   •  mirtazapine (REMERON) 30 MG tablet, Take 30 mg by mouth every night at bedtime., Disp: , Rfl:   •  nitroglycerin (NITROSTAT) 0.4 MG SL tablet, Place 0.4 mg under the tongue., Disp: , Rfl:   •  O2 (OXYGEN), Inhale 2 L/min Every Night., Disp: , Rfl:   •  pantoprazole (PROTONIX) 40 MG EC tablet, Take 40 mg by mouth Daily., Disp: , Rfl:   •  potassium chloride (MICRO-K) 10 MEQ CR capsule, Hold until Lasix (furosemide) restarted (Patient taking differently: Take 10 mEq by mouth Daily. Hold until Lasix (furosemide) restarted), Disp: , Rfl:   •  predniSONE (DELTASONE) 10 MG tablet, Hold while taking Dexamethasone. Once out of Dexamethasone, resume taking 1 tablet by mouth daily, Disp: , Rfl:   •  predniSONE (DELTASONE) 20 MG tablet, Take 1 tablet by mouth Daily., Disp: 10 tablet, Rfl: 0  •  rOPINIRole (REQUIP) 2 MG tablet, Take 1 - 2 tablet(s) by mouth every night at bedtime., Disp: 180 tablet, Rfl: 1  •  Semaglutide,0.25 or 0.5MG/DOS, (Ozempic, 0.25 or 0.5 MG/DOSE,) 2 MG/1.5ML solution pen-injector, Inject 0.5 mg under the skin into the appropriate area as directed 1 (One) Time Per Week., Disp: 1.5 mL, Rfl: 2  •  sertraline (ZOLOFT) 100 MG tablet, Take 1 tablet by mouth Daily., Disp: 90 tablet, Rfl: 1  •  spironolactone (ALDACTONE) 50 MG tablet, Take 1 tablet by mouth Daily., Disp: , Rfl:   •  tacrolimus (PROGRAF) 0.5 MG capsule, Take 2 capsules by mouth 2 (Two) Times a Day., Disp: , Rfl:   •  traMADol (ULTRAM) 50 MG tablet, Take 1 tablet by mouth Every 6 (Six) Hours As Needed for Moderate Pain ., Disp: 18 tablet, Rfl: 0  •  traZODone (DESYREL) 100 MG tablet, Take 200 mg by mouth every night at bedtime., Disp: , Rfl:   •  vancomycin (VANCOCIN) 125 MG capsule, Take 1 capsule by mouth 4 (Four) Times a Day., Disp: 40 capsule, Rfl: 0  •  vitamin D (ERGOCALCIFEROL) 1.25 MG (91227 UT) capsule capsule, Take 50,000 Units by mouth Every 7 (Seven) Days.  , Disp: ,  Rfl:   •  zolpidem (AMBIEN) 5 MG tablet, Take 5 mg by mouth Every Night., Disp: , Rfl:     Allergies:   Allergies   Allergen Reactions   • Penicillins Shortness Of Breath   • Cyclosporine Swelling       Objective     Physical Exam: Please see above  Vital Signs:   Vitals:    09/28/22 1003   BP: 128/88   BP Location: Right arm   Patient Position: Sitting   Cuff Size: Adult   Pulse: 81   Resp: 18   Temp: 97.1 °F (36.2 °C)   TempSrc: Temporal   SpO2: 93%   Weight: 71.7 kg (158 lb)   PainSc:   2     Body mass index is 29.85 kg/m².  BMI is >= 25 and <30. (Overweight) The following options were offered after discussion;: exercise counseling/recommendations       Physical Exam  Vitals and nursing note reviewed.   Constitutional:       General: She is not in acute distress.     Appearance: Normal appearance. She is normal weight. She is not ill-appearing or toxic-appearing.   HENT:      Nose: No congestion or rhinorrhea.   Eyes:      General:         Right eye: No discharge.         Left eye: No discharge.      Conjunctiva/sclera: Conjunctivae normal.   Pulmonary:      Effort: Pulmonary effort is normal. No respiratory distress.   Abdominal:      General: Abdomen is flat. There is no distension.   Musculoskeletal:      Cervical back: Normal range of motion.   Skin:     Coloration: Skin is not jaundiced.      Findings: No rash.   Neurological:      General: No focal deficit present.      Mental Status: She is alert. Mental status is at baseline.      Coordination: Coordination normal.      Gait: Gait abnormal (Patient walks slowly secondary to balance concerns).   Psychiatric:         Mood and Affect: Mood normal.         Behavior: Behavior normal.         Thought Content: Thought content normal.         Judgment: Judgment normal.         Procedures    Results:   Labs:   Hemoglobin A1C   Date Value Ref Range Status   07/28/2022 5.50 4.80 - 5.60 % Final     TSH   Date Value Ref Range Status   09/19/2022 0.053 (L) 0.270 -  4.200 uIU/mL Final   09/04/2019 5.685 (H) 0.300 - 5.000 uIU/mL Final        Imaging:   No valid procedures specified.     Assessment / Plan      Assessment/Plan:   Problem List Items Addressed This Visit        Endocrine and Metabolic    Postoperative hypothyroidism    Current Assessment & Plan     -   Patient with chronic symptoms prior to clinical exam on 9/20/2022 related to hypothyroidism post thyroidectomy  -   Patient with decreased TSH level below normal limits and subsequent modulation of thyroid medication  -   Patient without symptomatic changes after changing thyroid dose  Plan:  -   Continue Synthroid 150 mcg daily  -   Plan to retest TSH during follow-up exam approximately 6 weeks after medication change            Musculoskeletal and Injuries    Osteoarthritis    Current Assessment & Plan     -   Patient at risk for falling chronically prior to clinical exam on 9/28/2022  -   Patient without worsening joint swelling, but notes decreased mobility and strength  -   Deconditioning likely associated with chronic osteoarthritis process along with multiple other comorbidities  Plan:  -   Ambulatory referral for home health ordered on 9/28/2022 for additional evaluation and management            Other    C. difficile diarrhea - Primary    Current Assessment & Plan     -   Patient with alleviated diarrhea following p.o. vancomycin prior to clinical exam on 9/28/2022  -   Patient with symptomatic improvement after 2 L bolus in the emergency department following transfer from outpatient clinic  -   Patient with continued dizziness likely secondary to hypovolemia, although cannot rule out benign positional vertigo, or other intra ear pathology  Plan:  -   Patient counseled regarding appropriate p.o. intake with fluids to maintain volume status  -   Epley maneuvers provided to patient prior to discharge  -   Home health physical therapy ordered on 9/28/2022 for balance and strength training exercises with patient  being high fall risk           Other Visit Diagnoses     At high risk for injury related to fall                Follow Up:   Return in about 4 weeks (around 10/26/2022) for Recheck.      Lars Fermin MD  [facility] [place] [facility]

## 2022-09-28 NOTE — ASSESSMENT & PLAN NOTE
-   Patient with alleviated diarrhea following p.o. vancomycin prior to clinical exam on 9/28/2022  -   Patient with symptomatic improvement after 2 L bolus in the emergency department following transfer from outpatient clinic  -   Patient with continued dizziness likely secondary to hypovolemia, although cannot rule out benign positional vertigo, or other intra ear pathology  Plan:  -   Patient counseled regarding appropriate p.o. intake with fluids to maintain volume status  -   Epley maneuvers provided to patient prior to discharge  -   Home health physical therapy ordered on 9/28/2022 for balance and strength training exercises with patient being high fall risk

## 2022-09-29 ENCOUNTER — TELEPHONE (OUTPATIENT)
Dept: INTERNAL MEDICINE | Facility: CLINIC | Age: 57
End: 2022-09-29

## 2022-10-03 ENCOUNTER — APPOINTMENT (OUTPATIENT)
Dept: OTHER | Facility: HOSPITAL | Age: 57
End: 2022-10-03

## 2022-10-03 ENCOUNTER — HOSPITAL ENCOUNTER (OUTPATIENT)
Dept: MAMMOGRAPHY | Facility: HOSPITAL | Age: 57
Discharge: HOME | End: 2022-10-03
Admitting: STUDENT IN AN ORGANIZED HEALTH CARE EDUCATION/TRAINING PROGRAM

## 2022-10-03 DIAGNOSIS — Z13.9 ENCOUNTER FOR SCREENING: ICD-10-CM

## 2022-10-03 PROCEDURE — 77063 BREAST TOMOSYNTHESIS BI: CPT | Performed by: RADIOLOGY

## 2022-10-03 PROCEDURE — 77067 SCR MAMMO BI INCL CAD: CPT | Performed by: RADIOLOGY

## 2022-10-03 PROCEDURE — 77067 SCR MAMMO BI INCL CAD: CPT

## 2022-10-03 PROCEDURE — 77063 BREAST TOMOSYNTHESIS BI: CPT

## 2022-10-04 ENCOUNTER — TELEPHONE (OUTPATIENT)
Dept: INTERNAL MEDICINE | Facility: CLINIC | Age: 57
End: 2022-10-04

## 2022-10-04 DIAGNOSIS — Z86.73 HISTORY OF STROKE: ICD-10-CM

## 2022-10-04 DIAGNOSIS — Z94.4 LIVER TRANSPLANT RECIPIENT: ICD-10-CM

## 2022-10-04 DIAGNOSIS — M15.9 PRIMARY OSTEOARTHRITIS INVOLVING MULTIPLE JOINTS: ICD-10-CM

## 2022-10-04 DIAGNOSIS — M62.81 MUSCLE WEAKNESS (GENERALIZED): ICD-10-CM

## 2022-10-04 DIAGNOSIS — E43 SEVERE MALNUTRITION: Primary | ICD-10-CM

## 2022-10-04 NOTE — TELEPHONE ENCOUNTER
Called [facility] at [phone]  Talked to Yanira-she called patient and was told she has family there and she does not need personal care, she just needs PT and OT and [facility] does not do PT or OT    Do you want to place new referral?

## 2022-10-05 ENCOUNTER — TELEPHONE (OUTPATIENT)
Dept: INTERNAL MEDICINE | Facility: CLINIC | Age: 57
End: 2022-10-05

## 2022-10-05 NOTE — TELEPHONE ENCOUNTER
Yovana Tyler from home health called and states the osteoarthritis will work for a diagnosis but it's not documented in the note. She states under the symptoms list there is listed no joint swelling pain etc. She states osteoarthritis would need to be addended to the note and then they could see if the referral will be accepted. Please update note and resend referral.

## 2022-10-05 NOTE — TELEPHONE ENCOUNTER
Per [facility]-Patient will be a non admit to home health services due to staffing volume at this time per director. Em 10/5/2022    Please find another agency and send message back to referral pool so we can fax

## 2022-10-12 NOTE — TELEPHONE ENCOUNTER
Reached patient at [phone].  She stated that her cousins came and took her on a trip. She stated that the Home Health referral was placed as she was not driving for a while as she was so dizzy. She stated that the dizziness is better but she still has spells and that she does drive but only when she has to and she does not have anyone who can drive her to appointments. She stated that another reason for Home Health is that she has no immunity-she is on immunosuppressants.

## 2022-10-12 NOTE — TELEPHONE ENCOUNTER
Called [facility] at [phone]   Talked to Clotilde-they got everything and were going to admit her on 10/10/2022 and called her but she is on vacation in [place] and they question her home bound status so maybe she needs to go to outpatient therapy-we need to confirm this    Please advise

## 2022-10-12 NOTE — TELEPHONE ENCOUNTER
Called [facility] at [phone]   I left a voice mail to please get a call back to discuss this patient as I have an update on her home bound status

## 2022-10-13 NOTE — TELEPHONE ENCOUNTER
Kandice Mabry at [facility] at [phone]  Read her explanation from patient and she said they can't afford to not get paid-the not driving part does not make her home bound-if she was older she would be more inclined to say ok for home health and also that even with immunosuppressant status, the patient is on vacation.  They can not accept patient    Please advise on cancelling referral

## 2022-10-13 NOTE — TELEPHONE ENCOUNTER
I received a voice mail from Cloitlde at [facility] calling me back today at 11:10 am  Call back phone number is [phone]

## 2022-10-14 NOTE — TELEPHONE ENCOUNTER
Reached pt at [phone]  Told her [facility] cant accept as she is not homebound per insurance guidelines    Cancelled referral

## 2022-10-20 ENCOUNTER — TELEPHONE (OUTPATIENT)
Dept: INTERNAL MEDICINE | Facility: CLINIC | Age: 57
End: 2022-10-20

## 2022-10-20 NOTE — TELEPHONE ENCOUNTER
Nely Avila MA   10/19/2022  2:25 PM EDT Back to Top      Left message for patient to return call    Ad Oquendo MD   10/17/2022  7:44 AM EDT       Please let her know that her labs are essentially normal except for her thyroid level is somewhat elevated.  (Indicated by a low TSH).  Please decrease her levothyroxine dosage to 125 mcg daily.  Please call in 6 month supply (Either 1 month with RF 5 or 3 months with RF 1).  Schedule a follow-up with me in 6 weeks.

## 2022-10-21 ENCOUNTER — LAB (OUTPATIENT)
Dept: INTERNAL MEDICINE | Facility: CLINIC | Age: 57
End: 2022-10-21

## 2022-10-21 ENCOUNTER — LAB (OUTPATIENT)
Dept: LAB | Facility: HOSPITAL | Age: 57
End: 2022-10-21

## 2022-10-21 DIAGNOSIS — Z94.4 LIVER TRANSPLANT RECIPIENT: ICD-10-CM

## 2022-10-21 PROCEDURE — 85025 COMPLETE CBC W/AUTO DIFF WBC: CPT | Performed by: INTERNAL MEDICINE

## 2022-10-21 PROCEDURE — 80053 COMPREHEN METABOLIC PANEL: CPT | Performed by: INTERNAL MEDICINE

## 2022-10-21 PROCEDURE — 80197 ASSAY OF TACROLIMUS: CPT | Performed by: INTERNAL MEDICINE

## 2022-10-21 PROCEDURE — 36415 COLL VENOUS BLD VENIPUNCTURE: CPT

## 2022-10-22 LAB
ALBUMIN SERPL-MCNC: 3.7 G/DL (ref 3.5–5.2)
ALBUMIN/GLOB SERPL: 1.8 G/DL
ALP SERPL-CCNC: 76 U/L (ref 39–117)
ALT SERPL W P-5'-P-CCNC: 31 U/L (ref 1–33)
ANION GAP SERPL CALCULATED.3IONS-SCNC: 10 MMOL/L (ref 5–15)
AST SERPL-CCNC: 20 U/L (ref 1–32)
BASOPHILS # BLD AUTO: 0.04 10*3/MM3 (ref 0–0.2)
BASOPHILS NFR BLD AUTO: 1.1 % (ref 0–1.5)
BILIRUB SERPL-MCNC: 0.6 MG/DL (ref 0–1.2)
BUN SERPL-MCNC: 15 MG/DL (ref 6–20)
BUN/CREAT SERPL: 14.4 (ref 7–25)
CALCIUM SPEC-SCNC: 9.4 MG/DL (ref 8.6–10.5)
CHLORIDE SERPL-SCNC: 106 MMOL/L (ref 98–107)
CO2 SERPL-SCNC: 25 MMOL/L (ref 22–29)
CREAT SERPL-MCNC: 1.04 MG/DL (ref 0.57–1)
DEPRECATED RDW RBC AUTO: 43.1 FL (ref 37–54)
EGFRCR SERPLBLD CKD-EPI 2021: 62.4 ML/MIN/1.73
EOSINOPHIL # BLD AUTO: 0.04 10*3/MM3 (ref 0–0.4)
EOSINOPHIL NFR BLD AUTO: 1.1 % (ref 0.3–6.2)
ERYTHROCYTE [DISTWIDTH] IN BLOOD BY AUTOMATED COUNT: 14.2 % (ref 12.3–15.4)
GLOBULIN UR ELPH-MCNC: 2.1 GM/DL
GLUCOSE SERPL-MCNC: 91 MG/DL (ref 65–99)
HCT VFR BLD AUTO: 43.2 % (ref 34–46.6)
HGB BLD-MCNC: 14.2 G/DL (ref 12–15.9)
IMM GRANULOCYTES # BLD AUTO: 0.03 10*3/MM3 (ref 0–0.05)
IMM GRANULOCYTES NFR BLD AUTO: 0.8 % (ref 0–0.5)
LYMPHOCYTES # BLD AUTO: 1.27 10*3/MM3 (ref 0.7–3.1)
LYMPHOCYTES NFR BLD AUTO: 35.5 % (ref 19.6–45.3)
MCH RBC QN AUTO: 27.7 PG (ref 26.6–33)
MCHC RBC AUTO-ENTMCNC: 32.9 G/DL (ref 31.5–35.7)
MCV RBC AUTO: 84.2 FL (ref 79–97)
MONOCYTES # BLD AUTO: 0.25 10*3/MM3 (ref 0.1–0.9)
MONOCYTES NFR BLD AUTO: 7 % (ref 5–12)
NEUTROPHILS NFR BLD AUTO: 1.95 10*3/MM3 (ref 1.7–7)
NEUTROPHILS NFR BLD AUTO: 54.5 % (ref 42.7–76)
NRBC BLD AUTO-RTO: 0 /100 WBC (ref 0–0.2)
PLATELET # BLD AUTO: 162 10*3/MM3 (ref 140–450)
PMV BLD AUTO: 11.1 FL (ref 6–12)
POTASSIUM SERPL-SCNC: 3.8 MMOL/L (ref 3.5–5.2)
PROT SERPL-MCNC: 5.8 G/DL (ref 6–8.5)
RBC # BLD AUTO: 5.13 10*6/MM3 (ref 3.77–5.28)
SODIUM SERPL-SCNC: 141 MMOL/L (ref 136–145)
WBC NRBC COR # BLD: 3.58 10*3/MM3 (ref 3.4–10.8)

## 2022-10-24 LAB — TACROLIMUS BLD LC/MS/MS-MCNC: 3.2 NG/ML (ref 2–20)

## 2022-10-26 RX ORDER — LEVOTHYROXINE SODIUM 0.12 MG/1
150 TABLET ORAL DAILY
Qty: 90 TABLET | Refills: 1 | OUTPATIENT
Start: 2022-10-26 | End: 2022-12-24

## 2022-10-31 NOTE — PROGRESS NOTES
Chief Complaint   Patient presents with   • pain in multiple joint, diarrhea x 5 days, wanting to sleep       History of Present Illness      The patient presents for a follow-up related to Type 2 Diabetes Mellitus. She checks her blood sugars at home. Her sugars are checked daily. The average sugars are in the 100-150 range. She denies hypoglycemic symptoms. The patient denies polyuria, polydypsia or polyphagia. She reports no symptoms of neuropathy. The patient takes her medication as prescribed. She is not taking insulin. The patient does check her feet daily at home. She denies chest pain, shortness of breath, orthopnea, paroxysmal nocturnal dyspnea, dyspnea on exertion, edema, palpitations or syncope.    The patient presents for a follow-up related to diarrhea. She states that her symptoms are stable. The diarrhea is semi-solid. The patient's symptoms are not associated with abdominal pain. The symptoms are not associated with fever. The patient does not have other symptoms including a dry cough, a wet cough, wheezing, facial pain, a headache, eye drainage, ear pain, ear drainage, myalgias, sore throat, nasal discharge, decreased appetite, chills, lightheadedness, dizziness or dry mouth. The patient has found no alleviating factors. She continues oral vancocin.    The patient presents with pain in her joints of a chronic duration. There is no history of trauma. The patient has no joint swelling. There is no stiffness. The patient denies a history of overuse or repetitive motion with the affected joints.    The patient denies dry eyes, cough, dry mouth or hematuria. There are no associated insect bites. There is no family history of rheumatoid arthritis, juvenile rheumatoid arthritis, systemic lupus erythematosis or gout. The patient denies a personal history of malignancy.    The joint pain is aggravated by motion. The pain has no alleviating factors noted.    She believes that she has a retained suture in her  abdominal incision. It is irritated and painful.    Medications      Current Outpatient Medications:   •  alendronate (FOSAMAX) 70 MG tablet, Take 1 tablet by mouth 1 (One) Time Per Week., Disp: , Rfl:   •  amLODIPine (NORVASC) 10 MG tablet, Take 1 tablet by mouth Daily., Disp: , Rfl:   •  atorvastatin (LIPITOR) 40 MG tablet, Take 1 tablet by mouth Daily., Disp: , Rfl:   •  buPROPion SR (WELLBUTRIN SR) 150 MG 12 hr tablet, TAKE 1 TABLET BY MOUTH EVERY DAY FOR DEPRESSION, Disp: , Rfl:   •  calcium carbonate-vitamin d 600-400 MG-UNIT per tablet, Take 1 tablet by mouth Daily., Disp: , Rfl:   •  cloNIDine (CATAPRES) 0.1 MG tablet, Take 0.1 mg by mouth Daily As Needed for High Blood Pressure., Disp: , Rfl:   •  [employer] Melatonin 5 MG tablet tablet, 1 TABLET(S) BY MOUTH EVERY EVENING TAKE 1-2 HRS PRIOR TO BEDTIME, Disp: 90 tablet, Rfl: 1  •  fluticasone (FLONASE) 50 MCG/ACT nasal spray, 2 sprays into the nostril(s) as directed by provider 2 (Two) Times a Day., Disp: 16 g, Rfl: 5  •  furosemide (LASIX) 20 MG tablet, Hold until follow up with your primary care provider (Patient taking differently: Take 20 mg by mouth Daily.), Disp: , Rfl:   •  icosapent ethyl (VASCEPA) 1 g capsule capsule, Take 2 capsules by mouth 2 (Two) Times a Day With Meals., Disp: , Rfl:   •  isosorbide dinitrate (ISORDIL) 30 MG tablet, TAKE 1 TABLET BY MOUTH THREE TIMES A DAY (Patient taking differently: 30 mg 2 (Two) Times a Day.), Disp: 270 tablet, Rfl: 1  •  isosorbide mononitrate (IMDUR) 60 MG 24 hr tablet, Take 1 tablet by mouth Every Night., Disp: , Rfl:   •  metoprolol succinate XL (TOPROL-XL) 100 MG 24 hr tablet, Take 100 mg by mouth Daily., Disp: , Rfl:   •  mirtazapine (REMERON) 30 MG tablet, Take 30 mg by mouth every night at bedtime., Disp: , Rfl:   •  pantoprazole (PROTONIX) 40 MG EC tablet, Take 40 mg by mouth Daily., Disp: , Rfl:   •  potassium chloride (MICRO-K) 10 MEQ CR capsule, Hold until Lasix (furosemide) restarted (Patient taking  differently: Take 10 mEq by mouth Daily. Hold until Lasix (furosemide) restarted), Disp: , Rfl:   •  predniSONE (DELTASONE) 10 MG tablet, Hold while taking Dexamethasone. Once out of Dexamethasone, resume taking 1 tablet by mouth daily, Disp: , Rfl:   •  rOPINIRole (REQUIP) 2 MG tablet, Take 1 - 2 tablet(s) by mouth every night at bedtime., Disp: 180 tablet, Rfl: 1  •  Semaglutide,0.25 or 0.5MG/DOS, (Ozempic, 0.25 or 0.5 MG/DOSE,) 2 MG/1.5ML solution pen-injector, Inject 0.5 mg under the skin into the appropriate area as directed 1 (One) Time Per Week., Disp: 1.5 mL, Rfl: 2  •  sertraline (ZOLOFT) 100 MG tablet, Take 1 tablet by mouth Daily., Disp: 90 tablet, Rfl: 1  •  spironolactone (ALDACTONE) 50 MG tablet, Take 1 tablet by mouth Daily., Disp: , Rfl:   •  tacrolimus (PROGRAF) 0.5 MG capsule, Take 2 capsules by mouth 2 (Two) Times a Day., Disp: , Rfl:   •  traMADol (ULTRAM) 50 MG tablet, Take 1 tablet by mouth Every 6 (Six) Hours As Needed for Moderate Pain ., Disp: 18 tablet, Rfl: 0  •  traZODone (DESYREL) 100 MG tablet, Take 200 mg by mouth every night at bedtime., Disp: , Rfl:   •  vitamin D (ERGOCALCIFEROL) 1.25 MG (34785 UT) capsule capsule, Take 50,000 Units by mouth Every 7 (Seven) Days.  , Disp: , Rfl:   •  zolpidem (AMBIEN) 5 MG tablet, Take 5 mg by mouth Every Night., Disp: , Rfl:   •  ipratropium-albuterol (DUO-NEB) 0.5-2.5 mg/3 ml nebulizer, Take 3 mL by nebulization Every 4 (Four) Hours As Needed., Disp: , Rfl:   •  levothyroxine (SYNTHROID, LEVOTHROID) 125 MCG tablet, Take 1 tablet by mouth Daily., Disp: 90 tablet, Rfl: 1  •  nitroglycerin (NITROSTAT) 0.4 MG SL tablet, Place 0.4 mg under the tongue., Disp: , Rfl:   •  O2 (OXYGEN), Inhale 2 L/min Every Night., Disp: , Rfl:   •  predniSONE (DELTASONE) 20 MG tablet, Take 1 tablet by mouth Daily., Disp: 10 tablet, Rfl: 0  •  vancomycin (VANCOCIN) 125 MG capsule, Take 1 capsule by mouth 4 (Four) Times a Day., Disp: 40 capsule, Rfl: 0      Allergies    Allergies   Allergen Reactions   • Penicillins Shortness Of Breath   • Cyclosporine Swelling       Problem List    Patient Active Problem List   Diagnosis   • Menopause   • Personal history of malignant neoplasm of thyroid   • History of stroke   • Renal impairment   • Vision impairment   • Osteoporosis   • Osteoarthritis   • Meningioma (HCC)   • Hypothyroid   • Hypercholesteremia   • Depression   • Cirrhosis of liver (HCC)   • Anxiety   • Acute hypoxemic respiratory failure due to COVID-19 (HCC)   • Liver transplant recipient (HCC)   • Severe malnutrition (CMS/HCC)   • Leukopenia   • Immunosuppression due to drug therapy (HCC)   • Insomnia   • Sleep apnea   • Gout   • Cytokine release syndrome, grade 2   • Precordial pain   • Viral gastroenteritis   • Postoperative hypothyroidism   • C. difficile diarrhea       Medications, Allergies, Problems List and Past History were reviewed and updated.    Physical Examination    /80 (BP Location: Left arm, Patient Position: Sitting, Cuff Size: Adult)   Pulse 92   Temp 98.7 °F (37.1 °C) (Infrared)   Resp 18   Wt 71.7 kg (158 lb)   LMP  (LMP Unknown) Comment: Last pap 3/3/2022 by [facility]  Breastfeeding No   BMI 29.85 kg/m²     HEENT: Head- Normocephalic Atraumatic. Facies- Within normal limits. Pinnas- Normal texture and shape bilaterally. Canals- Normal bilaterally. TMs- Normal bilaterally. Nares- Patent bilaterally. Nasal Septum- is normal. There is no tenderness to palpation over the frontal or maxillary sinuses. Lids- Normal bilaterally. Conjunctiva- Clear bilaterally. Sclera- Anicteric bilaterally. Oropharynx- Moist with no lesions. Tonsils- No enlargement, erythema or exudate.    Neck: Thyroid- non enlarged, symmetric and has no nodules. No bruits are detected. ROM- Normal Range of Motion with no rigidity.    Lungs: Auscultation- Clear to auscultation bilaterally. There are no retractions, clubbing or cyanosis. The Expiratory to  Inspiratory ratio is equal.    Cardiovascular: There are no carotid bruits. Heart- Normal Rate with Regular rhythm and no murmurs. There are no gallops. There are no rubs. In the lower extremities there is no edema. The upper extremities do not have edema.    Abdomen: Noted to have scarring but is not noted to have rigidity, a mass, a hernia, an enlarged liver, an enlarged spleen, an enlarged right kidney, an enlarged left kidney, tenderness or pulsatile mass. The scarring is located in the right upper quadrant.    Wrists: The wrists are symmetric with normal sharif landmarks and no noted atrophy. There is no evidence of tenosynovitis. There is no restriction of flexion. There is no restriction of extension. There are no nodules noted. Phalen's Test is normal bilaterally. Tinel's Test is normal bilaterally.    Hands: The hands are symmetric with normal sharif landmarks and no noted atrophy. There are no Sundeep nodules noted on the PIP joints. There are no Heberden's nodes noted on the DIP joints. There is a normal range of motion bilaterally. The thumb abductors (abductor pollicis longus) are normal bilaterally with no tenderness or swelling. There is no evidence of tenosynovitis. There are no neurovascular deficits. There is no tenderness noted in the hands. There are no contractures noted. There are no ingrown nails. The nail beds are normal.    Knees: The knees are symmetric with normal sharif landmarks. There is no tenderness to palpation bilaterally. The patella tracks midline bilaterally. There is no patellar pain with quadriceps contraction (Grind Test)on either side. There is no pain over the prepatellar bursae bilaterally. The Abduction Stress Test for medial collateral ligaments is normal bilaterally. The Adduction Stress Test for lateral collateral ligaments is normal bilaterally. The Anterior Drawer Test is normal bilaterally. The Posterior Drawer Test is normal bilaterally. Lachman Test is normal  bilaterally. Kristina Test of the medial and lateral meniscus is normal bilaterally.    Impression and Assessment    Retained Suture.    Type 2 Diabetes Mellitus.    Diarrhea.    Arthralgias.    Plan    Diarrhea Plan: The patient was instructed to continue the current medications.    Type 2 Diabetes Mellitus Plan: The patient was instructed to continue the current medications.    Arthralgias Plan: Further plans will be made after the test results are received and reviewed.    Diagnoses and all orders for this visit:    1. Type 2 diabetes mellitus without complication, without long-term current use of insulin ([facility]) (Primary)  -     Ambulatory Referral to Ophthalmology  -     Semaglutide,0.25 or 0.5MG/DOS, (Ozempic, 0.25 or 0.5 MG/DOSE,) 2 MG/1.5ML solution pen-injector; Inject 0.5 mg under the skin into the appropriate area as directed 1 (One) Time Per Week.  Dispense: 1.5 mL; Refill: 2    2. Arthralgia, unspecified joint  -     Parvovirus B19 Antibody, IgG & IgM; Future  -     Parvovirus B19 Antibody, IgG & IgM    3. Fatigue, unspecified type  -     Parvovirus B19 Antibody, IgG & IgM; Future  -     CBC & Differential; Future  -     Comprehensive Metabolic Panel; Future  -     TSH; Future  -     predniSONE (DELTASONE) 20 MG tablet; Take 1 tablet by mouth Daily.  Dispense: 10 tablet; Refill: 0  -     Parvovirus B19 Antibody, IgG & IgM  -     Cancel: CBC & Differential  -     Cancel: Comprehensive Metabolic Panel  -     TSH    4. Diarrhea, unspecified type    5. Retained suture, initial encounter  -     Ambulatory Referral to General Surgery    6. Liver transplant recipient ([facility])  -     Tacrolimus Level  -     CBC & Differential  -     Comprehensive Metabolic Panel        Return to Office    The patient was instructed to return for follow-up in 1 month. The patient was instructed to return sooner if the condition changes, worsens, or does not resolve.

## 2022-11-15 RX ORDER — ISOSORBIDE DINITRATE 30 MG/1
30 TABLET ORAL 2 TIMES DAILY
Qty: 180 TABLET | Refills: 0 | Status: SHIPPED | OUTPATIENT
Start: 2022-11-15 | End: 2023-02-16

## 2022-11-21 ENCOUNTER — TELEPHONE (OUTPATIENT)
Dept: INTERNAL MEDICINE | Facility: CLINIC | Age: 57
End: 2022-11-21

## 2022-11-21 DIAGNOSIS — E11.9 TYPE 2 DIABETES MELLITUS WITHOUT COMPLICATION, WITHOUT LONG-TERM CURRENT USE OF INSULIN: ICD-10-CM

## 2022-11-21 NOTE — TELEPHONE ENCOUNTER
Caller: Lizette Frias    Relationship: Self    Best call back number: [phone]    What medications are you currently taking:   Current Outpatient Medications on File Prior to Visit   Medication Sig Dispense Refill   • alendronate (FOSAMAX) 70 MG tablet Take 1 tablet by mouth 1 (One) Time Per Week.     • amLODIPine (NORVASC) 10 MG tablet Take 1 tablet by mouth Daily.     • atorvastatin (LIPITOR) 40 MG tablet Take 1 tablet by mouth Daily.     • buPROPion SR (WELLBUTRIN SR) 150 MG 12 hr tablet TAKE 1 TABLET BY MOUTH EVERY DAY FOR DEPRESSION     • calcium carbonate-vitamin d 600-400 MG-UNIT per tablet Take 1 tablet by mouth Daily.     • cloNIDine (CATAPRES) 0.1 MG tablet Take 0.1 mg by mouth Daily As Needed for High Blood Pressure.     • [employer] Melatonin 5 MG tablet tablet 1 TABLET(S) BY MOUTH EVERY EVENING TAKE 1-2 HRS PRIOR TO BEDTIME 90 tablet 1   • fluticasone (FLONASE) 50 MCG/ACT nasal spray 2 sprays into the nostril(s) as directed by provider 2 (Two) Times a Day. 16 g 5   • furosemide (LASIX) 20 MG tablet Hold until follow up with your primary care provider (Patient taking differently: Take 20 mg by mouth Daily.)     • icosapent ethyl (VASCEPA) 1 g capsule capsule Take 2 capsules by mouth 2 (Two) Times a Day With Meals.     • ipratropium-albuterol (DUO-NEB) 0.5-2.5 mg/3 ml nebulizer Take 3 mL by nebulization Every 4 (Four) Hours As Needed.     • isosorbide dinitrate (ISORDIL) 30 MG tablet Take 1 tablet by mouth 2 (Two) Times a Day. 180 tablet 0   • isosorbide mononitrate (IMDUR) 60 MG 24 hr tablet Take 1 tablet by mouth Every Night.     • levothyroxine (SYNTHROID, LEVOTHROID) 125 MCG tablet Take 1 tablet by mouth Daily. 90 tablet 1   • metoprolol succinate XL (TOPROL-XL) 100 MG 24 hr tablet Take 100 mg by mouth Daily.     • mirtazapine (REMERON) 30 MG tablet Take 30 mg by mouth every night at bedtime.     • nitroglycerin (NITROSTAT) 0.4 MG SL tablet Place 0.4 mg under the tongue.     • O2 (OXYGEN) Inhale 2  L/min Every Night.     • pantoprazole (PROTONIX) 40 MG EC tablet Take 40 mg by mouth Daily.     • potassium chloride (MICRO-K) 10 MEQ CR capsule Hold until Lasix (furosemide) restarted (Patient taking differently: Take 10 mEq by mouth Daily. Hold until Lasix (furosemide) restarted)     • predniSONE (DELTASONE) 10 MG tablet Hold while taking Dexamethasone. Once out of Dexamethasone, resume taking 1 tablet by mouth daily     • predniSONE (DELTASONE) 20 MG tablet Take 1 tablet by mouth Daily. 10 tablet 0   • rOPINIRole (REQUIP) 2 MG tablet Take 1 - 2 tablet(s) by mouth every night at bedtime. 180 tablet 1   • Semaglutide,0.25 or 0.5MG/DOS, (Ozempic, 0.25 or 0.5 MG/DOSE,) 2 MG/1.5ML solution pen-injector Inject 0.5 mg under the skin into the appropriate area as directed 1 (One) Time Per Week. 1.5 mL 2   • sertraline (ZOLOFT) 100 MG tablet Take 1 tablet by mouth Daily. 90 tablet 1   • spironolactone (ALDACTONE) 50 MG tablet Take 1 tablet by mouth Daily.     • tacrolimus (PROGRAF) 0.5 MG capsule Take 2 capsules by mouth 2 (Two) Times a Day.     • traMADol (ULTRAM) 50 MG tablet Take 1 tablet by mouth Every 6 (Six) Hours As Needed for Moderate Pain . 18 tablet 0   • traZODone (DESYREL) 100 MG tablet Take 200 mg by mouth every night at bedtime.     • vancomycin (VANCOCIN) 125 MG capsule Take 1 capsule by mouth 4 (Four) Times a Day. 40 capsule 0   • vitamin D (ERGOCALCIFEROL) 1.25 MG (07338 UT) capsule capsule Take 50,000 Units by mouth Every 7 (Seven) Days.       • zolpidem (AMBIEN) 5 MG tablet Take 5 mg by mouth Every Night.       No current facility-administered medications on file prior to visit.          When did you start taking these medications:     Which medication are you concerned about: OZEMPIC     Who prescribed you this medication:     What are your concerns: PATIENT STATES PHARMACY DOES NOT HAVE THIS MEDICATION. SHE HAS BEEN WITHOUT THIS MEDICATION FOR 2 WEEKS NOW     How long have you had these concerns:

## 2022-11-28 RX ORDER — SEMAGLUTIDE 1.34 MG/ML
0.5 INJECTION, SOLUTION SUBCUTANEOUS WEEKLY
Qty: 1.5 ML | Refills: 2 | Status: SHIPPED | OUTPATIENT
Start: 2022-11-28 | End: 2022-12-27 | Stop reason: SDUPTHER

## 2022-11-28 NOTE — TELEPHONE ENCOUNTER
Please check with her pharmacy and see if they've been able to obtain.  I have sent in a new rx.  Ad Oquendo MD  07:22 EST  11/28/22

## 2022-11-29 ENCOUNTER — TELEPHONE (OUTPATIENT)
Dept: INTERNAL MEDICINE | Facility: CLINIC | Age: 57
End: 2022-11-29

## 2022-11-29 DIAGNOSIS — G89.29 CHRONIC LOW BACK PAIN, UNSPECIFIED BACK PAIN LATERALITY, UNSPECIFIED WHETHER SCIATICA PRESENT: ICD-10-CM

## 2022-11-29 DIAGNOSIS — M54.50 CHRONIC LOW BACK PAIN, UNSPECIFIED BACK PAIN LATERALITY, UNSPECIFIED WHETHER SCIATICA PRESENT: ICD-10-CM

## 2022-11-29 NOTE — TELEPHONE ENCOUNTER
Spoke with Yara at pharmacy.  States they did get mediation in and that the patient picked up the prescription this morning.

## 2022-11-29 NOTE — TELEPHONE ENCOUNTER
This patient is having severe joint pain. He appointment was cancelled today with Dr. Oquendo. She was wanting to know if tramadol or something could be sent in for her pain.

## 2022-11-30 RX ORDER — TRAMADOL HYDROCHLORIDE 50 MG/1
50 TABLET ORAL EVERY 6 HOURS PRN
Qty: 40 TABLET | Refills: 1 | Status: SHIPPED | OUTPATIENT
Start: 2022-11-30 | End: 2023-03-07

## 2022-11-30 NOTE — TELEPHONE ENCOUNTER
Spoke with patient, informed that Dr Oquendo has sent in rx for Tramadol.  Verbalized understanding and great appreciation.  Encouraged to call if worsens or doesn't improve.  Agreed.

## 2022-12-06 ENCOUNTER — OFFICE VISIT (OUTPATIENT)
Dept: INTERNAL MEDICINE | Facility: CLINIC | Age: 57
End: 2022-12-06

## 2022-12-06 VITALS
RESPIRATION RATE: 20 BRPM | BODY MASS INDEX: 30.42 KG/M2 | TEMPERATURE: 97.7 F | WEIGHT: 161 LBS | HEART RATE: 84 BPM | DIASTOLIC BLOOD PRESSURE: 70 MMHG | OXYGEN SATURATION: 99 % | SYSTOLIC BLOOD PRESSURE: 110 MMHG

## 2022-12-06 DIAGNOSIS — Z23 ENCOUNTER FOR VACCINATION: Primary | ICD-10-CM

## 2022-12-06 DIAGNOSIS — M15.9 PRIMARY OSTEOARTHRITIS INVOLVING MULTIPLE JOINTS: ICD-10-CM

## 2022-12-06 PROCEDURE — 99214 OFFICE O/P EST MOD 30 MIN: CPT | Performed by: STUDENT IN AN ORGANIZED HEALTH CARE EDUCATION/TRAINING PROGRAM

## 2022-12-06 PROCEDURE — 90471 IMMUNIZATION ADMIN: CPT | Performed by: STUDENT IN AN ORGANIZED HEALTH CARE EDUCATION/TRAINING PROGRAM

## 2022-12-06 PROCEDURE — 90686 IIV4 VACC NO PRSV 0.5 ML IM: CPT | Performed by: STUDENT IN AN ORGANIZED HEALTH CARE EDUCATION/TRAINING PROGRAM

## 2022-12-06 RX ORDER — METHOCARBAMOL 500 MG/1
500 TABLET, FILM COATED ORAL 4 TIMES DAILY
Qty: 60 TABLET | Refills: 0 | Status: SHIPPED | OUTPATIENT
Start: 2022-12-06 | End: 2022-12-19

## 2022-12-06 RX ORDER — ZOSTER VACCINE RECOMBINANT, ADJUVANTED 50 MCG/0.5
0.5 KIT INTRAMUSCULAR ONCE
Qty: 1 EACH | Refills: 1 | Status: SHIPPED | OUTPATIENT
Start: 2022-12-06 | End: 2022-12-06

## 2022-12-06 NOTE — PROGRESS NOTES
Follow Up Office Visit      Date: 2022   Patient Name: Lizette Frias  : 1965   MRN: [id number]     Chief Complaint:    Chief Complaint   Patient presents with   • Hip Pain       History of Present Illness: Lizette Frias is a 57 y.o. female with history of hypothyroidism, hypercholesterolemia, cirrhosis status post liver transplant on tacrolimus, renal impairment, meningioma, anxiety, depression, osteoporosis, prior CVA, insomnia, sleep apnea, and twp 2 diabetes who is here today for joint pain.    HPI  Type 2 diabetes: was prescribed semaglutide at last visit in September. Unable to obtain from pharmacy. Requesting samples. Unfortunately none available today    Left hip pain  Patient reports chronic left hip pain which has worsened in the past week.  Describes it as anterior and radiating down to her left groin.  No leg weakness, numbness, tingling, loss of bowel or bladder control.  Continues to have stable chronic low back pain.  She was prescribed tramadol which she has been taking as needed.  Reports she had leftover Robaxin which she has taken which has helped her some with sleep.  Pain is worse with ambulation.  Denies known injury.  No popping or snapping.  Describes pain as aching.  Has stiffness in the morning, but last less than 30 minutes.  No fevers, chills, redness or swelling.    Mom with history of RA        Subjective      Review of Systems:   Review of Systems    I have reviewed the patients family history, social history, past medical history, past surgical history and have updated it as appropriate.     Medications:     Current Outpatient Medications:   •  alendronate (FOSAMAX) 70 MG tablet, Take 1 tablet by mouth 1 (One) Time Per Week., Disp: , Rfl:   •  amLODIPine (NORVASC) 10 MG tablet, Take 1 tablet by mouth Daily., Disp: , Rfl:   •  atorvastatin (LIPITOR) 40 MG tablet, Take 1 tablet by mouth Daily., Disp: , Rfl:   •  buPROPion SR (WELLBUTRIN SR) 150 MG 12 hr tablet, TAKE  1 TABLET BY MOUTH EVERY DAY FOR DEPRESSION, Disp: , Rfl:   •  calcium carbonate-vitamin d 600-400 MG-UNIT per tablet, Take 1 tablet by mouth Daily., Disp: , Rfl:   •  cloNIDine (CATAPRES) 0.1 MG tablet, Take 0.1 mg by mouth Daily As Needed for High Blood Pressure., Disp: , Rfl:   •  [employer] Melatonin 5 MG tablet tablet, 1 TABLET(S) BY MOUTH EVERY EVENING TAKE 1-2 HRS PRIOR TO BEDTIME, Disp: 90 tablet, Rfl: 1  •  fluticasone (FLONASE) 50 MCG/ACT nasal spray, 2 sprays into the nostril(s) as directed by provider 2 (Two) Times a Day., Disp: 16 g, Rfl: 5  •  furosemide (LASIX) 20 MG tablet, Hold until follow up with your primary care provider (Patient taking differently: Take 20 mg by mouth Daily.), Disp: , Rfl:   •  icosapent ethyl (VASCEPA) 1 g capsule capsule, Take 2 capsules by mouth 2 (Two) Times a Day With Meals., Disp: , Rfl:   •  ipratropium-albuterol (DUO-NEB) 0.5-2.5 mg/3 ml nebulizer, Take 3 mL by nebulization Every 4 (Four) Hours As Needed., Disp: , Rfl:   •  isosorbide dinitrate (ISORDIL) 30 MG tablet, Take 1 tablet by mouth 2 (Two) Times a Day., Disp: 180 tablet, Rfl: 0  •  isosorbide mononitrate (IMDUR) 60 MG 24 hr tablet, Take 1 tablet by mouth Every Night., Disp: , Rfl:   •  levothyroxine (SYNTHROID, LEVOTHROID) 125 MCG tablet, Take 1 tablet by mouth Daily., Disp: 90 tablet, Rfl: 1  •  metoprolol succinate XL (TOPROL-XL) 100 MG 24 hr tablet, Take 100 mg by mouth Daily., Disp: , Rfl:   •  mirtazapine (REMERON) 30 MG tablet, Take 30 mg by mouth every night at bedtime., Disp: , Rfl:   •  nitroglycerin (NITROSTAT) 0.4 MG SL tablet, Place 0.4 mg under the tongue., Disp: , Rfl:   •  O2 (OXYGEN), Inhale 2 L/min Every Night., Disp: , Rfl:   •  pantoprazole (PROTONIX) 40 MG EC tablet, Take 40 mg by mouth Daily., Disp: , Rfl:   •  potassium chloride (MICRO-K) 10 MEQ CR capsule, Hold until Lasix (furosemide) restarted (Patient taking differently: Take 10 mEq by mouth Daily. Hold until Lasix (furosemide) restarted),  Disp: , Rfl:   •  predniSONE (DELTASONE) 10 MG tablet, Hold while taking Dexamethasone. Once out of Dexamethasone, resume taking 1 tablet by mouth daily, Disp: , Rfl:   •  predniSONE (DELTASONE) 20 MG tablet, Take 1 tablet by mouth Daily., Disp: 10 tablet, Rfl: 0  •  rOPINIRole (REQUIP) 2 MG tablet, Take 1 - 2 tablet(s) by mouth every night at bedtime., Disp: 180 tablet, Rfl: 1  •  Semaglutide,0.25 or 0.5MG/DOS, (Ozempic, 0.25 or 0.5 MG/DOSE,) 2 MG/1.5ML solution pen-injector, Inject 0.5 mg under the skin into the appropriate area as directed 1 (One) Time Per Week., Disp: 1.5 mL, Rfl: 2  •  sertraline (ZOLOFT) 100 MG tablet, Take 1 tablet by mouth Daily., Disp: 90 tablet, Rfl: 1  •  spironolactone (ALDACTONE) 50 MG tablet, Take 1 tablet by mouth Daily., Disp: , Rfl:   •  tacrolimus (PROGRAF) 0.5 MG capsule, Take 2 capsules by mouth 2 (Two) Times a Day., Disp: , Rfl:   •  traMADol (ULTRAM) 50 MG tablet, Take 1 tablet by mouth Every 6 (Six) Hours As Needed for Moderate Pain., Disp: 40 tablet, Rfl: 1  •  traZODone (DESYREL) 100 MG tablet, Take 200 mg by mouth every night at bedtime., Disp: , Rfl:   •  vancomycin (VANCOCIN) 125 MG capsule, Take 1 capsule by mouth 4 (Four) Times a Day., Disp: 40 capsule, Rfl: 0  •  vitamin D (ERGOCALCIFEROL) 1.25 MG (15911 UT) capsule capsule, Take 50,000 Units by mouth Every 7 (Seven) Days.  , Disp: , Rfl:   •  zolpidem (AMBIEN) 5 MG tablet, Take 5 mg by mouth Every Night., Disp: , Rfl:   •  methocarbamol (Robaxin) 500 MG tablet, Take 1 tablet by mouth 4 (Four) Times a Day., Disp: 60 tablet, Rfl: 0  •  Zoster Vac Recomb Adjuvanted (Shingrix) 50 MCG/0.5ML reconstituted suspension, Inject 0.5 mL into the appropriate muscle as directed by prescriber 1 (One) Time for 1 dose. Second dose at least 2 months after first dose., Disp: 1 each, Rfl: 1    Allergies:   Allergies   Allergen Reactions   • Penicillins Shortness Of Breath   • Cyclosporine Swelling       Objective     Physical Exam:  Please see above  Vital Signs:   Vitals:    12/06/22 1121   BP: 110/70   BP Location: Left arm   Patient Position: Sitting   Cuff Size: Adult   Pulse: 84   Resp: 20   Temp: 97.7 °F (36.5 °C)   TempSrc: Temporal   SpO2: 99%   Weight: 73 kg (161 lb)   PainSc: 10-Worst pain ever   PainLoc: Hip     Body mass index is 30.42 kg/m².    Physical Exam  Vitals reviewed.   Constitutional:       General: She is not in acute distress.     Appearance: Normal appearance. She is obese. She is not ill-appearing or toxic-appearing.   HENT:      Head: Normocephalic and atraumatic.      Right Ear: External ear normal.      Left Ear: External ear normal.   Eyes:      General: No scleral icterus.     Extraocular Movements: Extraocular movements intact.      Pupils: Pupils are equal, round, and reactive to light.   Cardiovascular:      Rate and Rhythm: Normal rate and regular rhythm.      Pulses: Normal pulses.      Heart sounds: Normal heart sounds.   Pulmonary:      Effort: Pulmonary effort is normal.      Breath sounds: Normal breath sounds.   Abdominal:      General: Abdomen is flat. There is no distension.      Palpations: Abdomen is soft.   Musculoskeletal:         General: Tenderness (over left anterior hip. no pain over greater trochanter) present. No swelling. Normal range of motion.      Cervical back: Normal range of motion. No rigidity.      Comments: Antalgic gait. Strength 5/5 in bilateral lower extremities both proximally and distally. Sensation intact throughout. Pain worse with external rotation of left hip.   Skin:     General: Skin is warm and dry.      Capillary Refill: Capillary refill takes less than 2 seconds.      Findings: No erythema or rash.   Neurological:      General: No focal deficit present.      Mental Status: She is alert and oriented to person, place, and time.   Psychiatric:         Mood and Affect: Mood normal.         Behavior: Behavior normal.         Judgment: Judgment normal.          Procedures    Results:   Labs:   Hemoglobin A1C   Date Value Ref Range Status   07/28/2022 5.50 4.80 - 5.60 % Final     TSH   Date Value Ref Range Status   09/19/2022 0.053 (L) 0.270 - 4.200 uIU/mL Final   09/04/2019 5.685 (H) 0.300 - 5.000 uIU/mL Final        Imaging:   No valid procedures specified.     Assessment / Plan      Assessment/Plan:   57-year-old female with very complex past medical history including osteoporosis who presents with acute on chronic left hip pain.  Pain with external rotation and palpation of anterior left hip.  No palpable swelling or warmth.  No fevers or chills.  Vital signs stable.  Likely progression of osteoarthritis versus fracture in setting of osteoporosis.  Will obtain plain films of the left hip.  Referred to physical therapy.  Continue as needed tramadol, will provide prescription for as needed Robaxin.  Problem List Items Addressed This Visit        Musculoskeletal and Injuries    Osteoarthritis    Current Assessment & Plan     This problem is chronic, this problem is worsening.  Increasing pain in left hip as above.         Relevant Medications    methocarbamol (Robaxin) 500 MG tablet    Other Relevant Orders    XR Hip With or Without Pelvis 2 - 3 View Left    Ambulatory Referral to Physical Therapy Evaluate and treat   Other Visit Diagnoses     Encounter for vaccination    -  Primary    Relevant Medications    Zoster Vac Recomb Adjuvanted (Shingrix) 50 MCG/0.5ML reconstituted suspension            Follow Up:   Return if symptoms worsen or fail to improve.        Nura Talbot MD  [facility] [place] [facility]

## 2022-12-09 ENCOUNTER — LAB (OUTPATIENT)
Dept: LAB | Facility: HOSPITAL | Age: 57
End: 2022-12-09

## 2022-12-09 DIAGNOSIS — Z94.4 LIVER TRANSPLANT RECIPIENT: ICD-10-CM

## 2022-12-09 LAB
ALBUMIN SERPL-MCNC: 4.4 G/DL (ref 3.5–5.2)
ALBUMIN/GLOB SERPL: 1.8 G/DL
ALP SERPL-CCNC: 92 U/L (ref 39–117)
ALT SERPL W P-5'-P-CCNC: 64 U/L (ref 1–33)
ANION GAP SERPL CALCULATED.3IONS-SCNC: 9 MMOL/L (ref 5–15)
AST SERPL-CCNC: 39 U/L (ref 1–32)
BASOPHILS # BLD AUTO: 0.05 10*3/MM3 (ref 0–0.2)
BASOPHILS NFR BLD AUTO: 1.5 % (ref 0–1.5)
BILIRUB SERPL-MCNC: 0.6 MG/DL (ref 0–1.2)
BUN SERPL-MCNC: 18 MG/DL (ref 6–20)
BUN/CREAT SERPL: 18 (ref 7–25)
CALCIUM SPEC-SCNC: 9.4 MG/DL (ref 8.6–10.5)
CHLORIDE SERPL-SCNC: 105 MMOL/L (ref 98–107)
CO2 SERPL-SCNC: 29 MMOL/L (ref 22–29)
CREAT SERPL-MCNC: 1 MG/DL (ref 0.57–1)
DEPRECATED RDW RBC AUTO: 44.6 FL (ref 37–54)
EGFRCR SERPLBLD CKD-EPI 2021: 65.4 ML/MIN/1.73
EOSINOPHIL # BLD AUTO: 0.06 10*3/MM3 (ref 0–0.4)
EOSINOPHIL NFR BLD AUTO: 1.8 % (ref 0.3–6.2)
ERYTHROCYTE [DISTWIDTH] IN BLOOD BY AUTOMATED COUNT: 14.7 % (ref 12.3–15.4)
GLOBULIN UR ELPH-MCNC: 2.4 GM/DL
GLUCOSE SERPL-MCNC: 83 MG/DL (ref 65–99)
HCT VFR BLD AUTO: 42.6 % (ref 34–46.6)
HGB BLD-MCNC: 13.9 G/DL (ref 12–15.9)
IMM GRANULOCYTES # BLD AUTO: 0.03 10*3/MM3 (ref 0–0.05)
IMM GRANULOCYTES NFR BLD AUTO: 0.9 % (ref 0–0.5)
LYMPHOCYTES # BLD AUTO: 1.45 10*3/MM3 (ref 0.7–3.1)
LYMPHOCYTES NFR BLD AUTO: 43.4 % (ref 19.6–45.3)
MCH RBC QN AUTO: 27.5 PG (ref 26.6–33)
MCHC RBC AUTO-ENTMCNC: 32.6 G/DL (ref 31.5–35.7)
MCV RBC AUTO: 84.2 FL (ref 79–97)
MONOCYTES # BLD AUTO: 0.32 10*3/MM3 (ref 0.1–0.9)
MONOCYTES NFR BLD AUTO: 9.6 % (ref 5–12)
NEUTROPHILS NFR BLD AUTO: 1.43 10*3/MM3 (ref 1.7–7)
NEUTROPHILS NFR BLD AUTO: 42.8 % (ref 42.7–76)
NRBC BLD AUTO-RTO: 0 /100 WBC (ref 0–0.2)
PLATELET # BLD AUTO: 153 10*3/MM3 (ref 140–450)
PMV BLD AUTO: 11.8 FL (ref 6–12)
POTASSIUM SERPL-SCNC: 3.8 MMOL/L (ref 3.5–5.2)
PROT SERPL-MCNC: 6.8 G/DL (ref 6–8.5)
RBC # BLD AUTO: 5.06 10*6/MM3 (ref 3.77–5.28)
SODIUM SERPL-SCNC: 143 MMOL/L (ref 136–145)
WBC NRBC COR # BLD: 3.34 10*3/MM3 (ref 3.4–10.8)

## 2022-12-09 PROCEDURE — 85025 COMPLETE CBC W/AUTO DIFF WBC: CPT

## 2022-12-09 PROCEDURE — 80053 COMPREHEN METABOLIC PANEL: CPT

## 2022-12-09 PROCEDURE — 36415 COLL VENOUS BLD VENIPUNCTURE: CPT

## 2022-12-09 PROCEDURE — 80197 ASSAY OF TACROLIMUS: CPT

## 2022-12-12 LAB — TACROLIMUS BLD LC/MS/MS-MCNC: 3.6 NG/ML (ref 2–20)

## 2022-12-12 RX ORDER — TRAZODONE HYDROCHLORIDE 100 MG/1
200 TABLET ORAL
Qty: 30 TABLET | Refills: 5 | Status: SHIPPED | OUTPATIENT
Start: 2022-12-12 | End: 2023-01-06

## 2022-12-12 RX ORDER — CHOLECALCIFEROL (VITAMIN D3) 125 MCG
5 CAPSULE ORAL NIGHTLY
Qty: 90 TABLET | Refills: 1 | Status: SHIPPED | OUTPATIENT
Start: 2022-12-12

## 2022-12-18 DIAGNOSIS — M15.9 PRIMARY OSTEOARTHRITIS INVOLVING MULTIPLE JOINTS: ICD-10-CM

## 2022-12-19 RX ORDER — METHOCARBAMOL 500 MG/1
TABLET, FILM COATED ORAL
Qty: 60 TABLET | Refills: 0 | Status: SHIPPED | OUTPATIENT
Start: 2022-12-19 | End: 2023-01-23

## 2022-12-21 ENCOUNTER — TELEPHONE (OUTPATIENT)
Dept: INTERNAL MEDICINE | Facility: CLINIC | Age: 57
End: 2022-12-21

## 2022-12-21 DIAGNOSIS — E11.9 TYPE 2 DIABETES MELLITUS WITHOUT COMPLICATION, WITHOUT LONG-TERM CURRENT USE OF INSULIN: ICD-10-CM

## 2022-12-21 NOTE — TELEPHONE ENCOUNTER
Patient message 12/15/2022  Hi! I saw my docs in [place] this week and Dr. Behari recommended that my Ozempic be increased from .5 to 1.     Also, the X-ray didn’t show what’s wrong with my hip and the pain is severe. Could I get an MRI? This has really affected my activity level.     Thanks  Lizette

## 2022-12-22 NOTE — TELEPHONE ENCOUNTER
Patient was calling to check the status of the request in this message.  Patient stated she had yet to receive a call back despite trying to contact 2 different providers and she would like to be called as soon as possible to get this straightened out.  Thank you

## 2022-12-27 RX ORDER — SEMAGLUTIDE 1.34 MG/ML
1 INJECTION, SOLUTION SUBCUTANEOUS WEEKLY
Qty: 6 ML | Refills: 3 | Status: SHIPPED | OUTPATIENT
Start: 2022-12-27 | End: 2023-01-05 | Stop reason: ALTCHOICE

## 2023-01-03 RX ORDER — SERTRALINE HYDROCHLORIDE 100 MG/1
TABLET, FILM COATED ORAL
Qty: 90 TABLET | Refills: 1 | Status: SHIPPED | OUTPATIENT
Start: 2023-01-03 | End: 2023-01-06

## 2023-01-04 ENCOUNTER — TELEPHONE (OUTPATIENT)
Dept: INTERNAL MEDICINE | Facility: CLINIC | Age: 58
End: 2023-01-04
Payer: COMMERCIAL

## 2023-01-04 ENCOUNTER — LAB (OUTPATIENT)
Dept: LAB | Facility: HOSPITAL | Age: 58
End: 2023-01-04
Payer: COMMERCIAL

## 2023-01-04 DIAGNOSIS — Z94.4 LIVER TRANSPLANT RECIPIENT: ICD-10-CM

## 2023-01-04 LAB
BASOPHILS # BLD AUTO: 0.04 10*3/MM3 (ref 0–0.2)
BASOPHILS NFR BLD AUTO: 0.7 % (ref 0–1.5)
DEPRECATED RDW RBC AUTO: 42.7 FL (ref 37–54)
EOSINOPHIL # BLD AUTO: 0.04 10*3/MM3 (ref 0–0.4)
EOSINOPHIL NFR BLD AUTO: 0.7 % (ref 0.3–6.2)
ERYTHROCYTE [DISTWIDTH] IN BLOOD BY AUTOMATED COUNT: 14.3 % (ref 12.3–15.4)
HCT VFR BLD AUTO: 44.8 % (ref 34–46.6)
HGB BLD-MCNC: 15.1 G/DL (ref 12–15.9)
IMM GRANULOCYTES # BLD AUTO: 0.09 10*3/MM3 (ref 0–0.05)
IMM GRANULOCYTES NFR BLD AUTO: 1.5 % (ref 0–0.5)
LYMPHOCYTES # BLD AUTO: 1.92 10*3/MM3 (ref 0.7–3.1)
LYMPHOCYTES NFR BLD AUTO: 32.9 % (ref 19.6–45.3)
MCH RBC QN AUTO: 28 PG (ref 26.6–33)
MCHC RBC AUTO-ENTMCNC: 33.7 G/DL (ref 31.5–35.7)
MCV RBC AUTO: 83.1 FL (ref 79–97)
MONOCYTES # BLD AUTO: 0.37 10*3/MM3 (ref 0.1–0.9)
MONOCYTES NFR BLD AUTO: 6.3 % (ref 5–12)
NEUTROPHILS NFR BLD AUTO: 3.38 10*3/MM3 (ref 1.7–7)
NEUTROPHILS NFR BLD AUTO: 57.9 % (ref 42.7–76)
NRBC BLD AUTO-RTO: 0.2 /100 WBC (ref 0–0.2)
PLATELET # BLD AUTO: 190 10*3/MM3 (ref 140–450)
PMV BLD AUTO: 11.8 FL (ref 6–12)
RBC # BLD AUTO: 5.39 10*6/MM3 (ref 3.77–5.28)
WBC NRBC COR # BLD: 5.84 10*3/MM3 (ref 3.4–10.8)

## 2023-01-04 PROCEDURE — 80050 GENERAL HEALTH PANEL: CPT

## 2023-01-04 PROCEDURE — 80197 ASSAY OF TACROLIMUS: CPT

## 2023-01-04 PROCEDURE — 36415 COLL VENOUS BLD VENIPUNCTURE: CPT

## 2023-01-04 NOTE — TELEPHONE ENCOUNTER
Caller: Lizette Frias    Relationship: Self    Best call back number: [phone]    What was the call regarding: PATIENT WAS TOLD BY DR SWANSON TO SCHEDULE A DIABETIC FOLLOW UP APPOINTMENT SOON, THERE ARE NO AVAILABLE APPOINTMENTS UNTIL 2/28. PATIENT BELIEVES HER THYROID IS OFF AND SHE HAS BEEN LOOSING WEIGHT. PATIENT ALSO CAN NOT EAT AND IS SLEEPING MORE THAN SHE IS AWAKE. PLEASE SCHEDULE SOONER OR WITH A DIFFERENT DOCTOR.     Do you require a callback: YES

## 2023-01-05 ENCOUNTER — TELEPHONE (OUTPATIENT)
Dept: INTERNAL MEDICINE | Facility: CLINIC | Age: 58
End: 2023-01-05

## 2023-01-05 ENCOUNTER — OFFICE VISIT (OUTPATIENT)
Dept: INTERNAL MEDICINE | Facility: CLINIC | Age: 58
End: 2023-01-05
Payer: COMMERCIAL

## 2023-01-05 VITALS
TEMPERATURE: 98.4 F | BODY MASS INDEX: 27.59 KG/M2 | OXYGEN SATURATION: 95 % | WEIGHT: 146 LBS | DIASTOLIC BLOOD PRESSURE: 74 MMHG | HEART RATE: 73 BPM | RESPIRATION RATE: 16 BRPM | SYSTOLIC BLOOD PRESSURE: 110 MMHG

## 2023-01-05 DIAGNOSIS — N18.31 STAGE 3A CHRONIC KIDNEY DISEASE: ICD-10-CM

## 2023-01-05 DIAGNOSIS — E89.0 POSTOPERATIVE HYPOTHYROIDISM: ICD-10-CM

## 2023-01-05 DIAGNOSIS — M15.9 PRIMARY OSTEOARTHRITIS INVOLVING MULTIPLE JOINTS: ICD-10-CM

## 2023-01-05 DIAGNOSIS — R73.9 HYPERGLYCEMIA: Primary | ICD-10-CM

## 2023-01-05 DIAGNOSIS — K21.00 GASTROESOPHAGEAL REFLUX DISEASE WITH ESOPHAGITIS WITHOUT HEMORRHAGE: ICD-10-CM

## 2023-01-05 DIAGNOSIS — E89.0 POSTOPERATIVE HYPOTHYROIDISM: Primary | ICD-10-CM

## 2023-01-05 LAB
ALBUMIN SERPL-MCNC: 4 G/DL (ref 3.5–5.2)
ALBUMIN/GLOB SERPL: 1.5 G/DL
ALP SERPL-CCNC: 101 U/L (ref 39–117)
ALT SERPL W P-5'-P-CCNC: 35 U/L (ref 1–33)
ANION GAP SERPL CALCULATED.3IONS-SCNC: 10.8 MMOL/L (ref 5–15)
AST SERPL-CCNC: 25 U/L (ref 1–32)
BILIRUB SERPL-MCNC: 0.4 MG/DL (ref 0–1.2)
BUN SERPL-MCNC: 20 MG/DL (ref 6–20)
BUN/CREAT SERPL: 13 (ref 7–25)
CALCIUM SPEC-SCNC: 9.8 MG/DL (ref 8.6–10.5)
CHLORIDE SERPL-SCNC: 99 MMOL/L (ref 98–107)
CO2 SERPL-SCNC: 29.2 MMOL/L (ref 22–29)
CREAT SERPL-MCNC: 1.54 MG/DL (ref 0.57–1)
EGFRCR SERPLBLD CKD-EPI 2021: 39 ML/MIN/1.73
EXPIRATION DATE: NORMAL
GLOBULIN UR ELPH-MCNC: 2.7 GM/DL
GLUCOSE SERPL-MCNC: 77 MG/DL (ref 65–99)
HBA1C MFR BLD: 5.1 %
Lab: NORMAL
POTASSIUM SERPL-SCNC: 3.8 MMOL/L (ref 3.5–5.2)
PROT SERPL-MCNC: 6.7 G/DL (ref 6–8.5)
SODIUM SERPL-SCNC: 139 MMOL/L (ref 136–145)
TSH SERPL DL<=0.05 MIU/L-ACNC: 6.05 UIU/ML (ref 0.27–4.2)

## 2023-01-05 PROCEDURE — 99214 OFFICE O/P EST MOD 30 MIN: CPT | Performed by: STUDENT IN AN ORGANIZED HEALTH CARE EDUCATION/TRAINING PROGRAM

## 2023-01-05 PROCEDURE — 80048 BASIC METABOLIC PNL TOTAL CA: CPT | Performed by: STUDENT IN AN ORGANIZED HEALTH CARE EDUCATION/TRAINING PROGRAM

## 2023-01-05 PROCEDURE — 82610 CYSTATIN C: CPT | Performed by: STUDENT IN AN ORGANIZED HEALTH CARE EDUCATION/TRAINING PROGRAM

## 2023-01-05 PROCEDURE — 83036 HEMOGLOBIN GLYCOSYLATED A1C: CPT | Performed by: STUDENT IN AN ORGANIZED HEALTH CARE EDUCATION/TRAINING PROGRAM

## 2023-01-05 RX ORDER — LEVOTHYROXINE SODIUM 175 UG/1
175 TABLET ORAL
Qty: 90 TABLET | Refills: 0 | Status: SHIPPED | OUTPATIENT
Start: 2023-01-05 | End: 2023-04-03

## 2023-01-05 RX ORDER — DICLOFENAC SODIUM 30 MG/G
GEL TOPICAL 2 TIMES DAILY
Qty: 100 G | Refills: 0 | Status: SHIPPED | OUTPATIENT
Start: 2023-01-05 | End: 2023-03-07

## 2023-01-05 RX ORDER — SUCRALFATE 1 G/1
1 TABLET ORAL 4 TIMES DAILY
Qty: 60 TABLET | Refills: 1 | Status: SHIPPED | OUTPATIENT
Start: 2023-01-05 | End: 2023-01-23 | Stop reason: SDUPTHER

## 2023-01-05 RX ORDER — SEMAGLUTIDE 1.34 MG/ML
1 INJECTION, SOLUTION SUBCUTANEOUS
COMMUNITY
Start: 2022-12-27 | End: 2023-01-06

## 2023-01-05 NOTE — ASSESSMENT & PLAN NOTE
- Patient prescribed [id number] tablet. Take 1 tablet by mouth 4 times a day.   -Advised patient to getting a referral to GI for a upper scope procedure.   - Advised to call if she has not heard from the referrals.

## 2023-01-05 NOTE — TELEPHONE ENCOUNTER
Synthroid increased to 175 mcg due to elevated TSH.  Plan on recheck in approximately 6 to 8 weeks.

## 2023-01-05 NOTE — PROGRESS NOTES
Follow Up Office Visit      Date: 2023   Patient Name: Lizette rFias  : 1965   MRN: [id number]     Chief Complaint:    Chief Complaint   Patient presents with   • Diabetes     fu   • Arthritis   • GI Problem       History of Present Illness: Lizette Frias is a 57 y.o. female who is here today to follow up with diabetes and arthritis.    Hyperglycemia  Mrs. Frias states that her primary care physician Dr. Oquendo advised her to come in and have her blood glucose due to him being out for 3 weeks. The patient notes the she had blood work on 2023. She notes that her medication Ozempic, 4 MG/3ML solution pen-injector was increased.     Arthritis  The patient states that she is still having joint pain and that her primary care physician had not called in her prescription. She states that she has weaned herself off the pain medication for a couple of months and now her joint pain is getting worse. The patient reports that she was on gabapentin and colchicine. She states that she has pain in bilateral hips and ankles and in her left shoulder and left knee. She also notes that her fingers are also getting bad. The patient reports that she has been going to the [facility] working on her balance in the water. The patient states that she is open to aqua based physical therapy. She also states that she has been diagnosed with degenerative spine disease.    Chronic kidney disease   The patient states that she is not seeing the kidney specialist and was recently on prednisone for a respiratory infection. She was on the medication for 5 days. The patient reports that she is worried about her thyroid due to her having fatigue, not having a appetite and losing 10 pounds in 2 weeks. She states that she does have dry skin. She also notes that she is fasting today.    Gastroesophageal reflux disease   The patient states that she has had bad indigestion for about a week. She states that she feels like she has an  ulcer in her esophagus due to it feeling raw when she eats or drinks. The patient states that it hurts to swallow. She states that she had to stop drinking sparkling water because the carbonation. The patient reports that she is nauseous and does not have an appetite. The patient states that she has taken so much of Maalox, that she is getting diarrhea from the medication. She states that Maalox is helping with the heartburn but not with the nausea. The patient denies pain in her abdomen and blood or black stool. She states that she is taking pantoprazole 40mg EC tablet, and is taking it twice a day.      Subjective      Review of Systems:   Review of Systems   Constitutional: Positive for fatigue. Negative for activity change, appetite change and fever.   Eyes: Negative for blurred vision, photophobia and visual disturbance.   Respiratory: Negative for cough, chest tightness and shortness of breath.    Cardiovascular: Negative for chest pain and palpitations.   Gastrointestinal: Positive for GERD and indigestion. Negative for abdominal distention, abdominal pain, blood in stool, constipation, diarrhea, nausea and vomiting.   Genitourinary: Negative for dysuria and hematuria.   Musculoskeletal: Positive for arthralgias. Negative for back pain and joint swelling.   Skin: Positive for dry skin. Negative for rash and wound.   Neurological: Negative for weakness, headache and confusion.       I have reviewed the patients family history, social history, past medical history, past surgical history and have updated it as appropriate.     Medications:     Current Outpatient Medications:   •  alendronate (FOSAMAX) 70 MG tablet, Take 1 tablet by mouth 1 (One) Time Per Week., Disp: , Rfl:   •  amLODIPine (NORVASC) 10 MG tablet, Take 1 tablet by mouth Daily., Disp: , Rfl:   •  buPROPion SR (WELLBUTRIN SR) 150 MG 12 hr tablet, TAKE 1 TABLET BY MOUTH EVERY DAY FOR DEPRESSION, Disp: , Rfl:   •  calcium carbonate-vitamin d 600-400  MG-UNIT per tablet, Take 1 tablet by mouth Daily., Disp: , Rfl:   •  cloNIDine (CATAPRES) 0.1 MG tablet, Take 0.1 mg by mouth Daily As Needed for High Blood Pressure., Disp: , Rfl:   •  fluticasone (FLONASE) 50 MCG/ACT nasal spray, 2 sprays into the nostril(s) as directed by provider 2 (Two) Times a Day., Disp: 16 g, Rfl: 5  •  furosemide (LASIX) 20 MG tablet, Hold until follow up with your primary care provider (Patient taking differently: Take 20 mg by mouth Daily.), Disp: , Rfl:   •  icosapent ethyl (VASCEPA) 1 g capsule capsule, Take 2 capsules by mouth 2 (Two) Times a Day With Meals., Disp: , Rfl:   •  ipratropium-albuterol (DUO-NEB) 0.5-2.5 mg/3 ml nebulizer, Take 3 mL by nebulization Every 4 (Four) Hours As Needed., Disp: , Rfl:   •  isosorbide dinitrate (ISORDIL) 30 MG tablet, Take 1 tablet by mouth 2 (Two) Times a Day., Disp: 180 tablet, Rfl: 0  •  isosorbide mononitrate (IMDUR) 60 MG 24 hr tablet, Take 1 tablet by mouth Every Night., Disp: , Rfl:   •  melatonin ([employer] Melatonin) 5 MG tablet tablet, Take 1 tablet by mouth Every Night., Disp: 90 tablet, Rfl: 1  •  methocarbamol (ROBAXIN) 500 MG tablet, TAKE 1 TABLET BY MOUTH 4 TIMES A DAY., Disp: 60 tablet, Rfl: 0  •  metoprolol succinate XL (TOPROL-XL) 100 MG 24 hr tablet, Take 100 mg by mouth Daily., Disp: , Rfl:   •  mirtazapine (REMERON) 30 MG tablet, Take 30 mg by mouth every night at bedtime., Disp: , Rfl:   •  nitroglycerin (NITROSTAT) 0.4 MG SL tablet, Place 0.4 mg under the tongue., Disp: , Rfl:   •  Ozempic, 1 MG/DOSE, 4 MG/3ML solution pen-injector, Inject 1 mg under the skin into the appropriate area as directed Every 7 (Seven) Days., Disp: , Rfl:   •  pantoprazole (PROTONIX) 40 MG EC tablet, Take 40 mg by mouth Daily., Disp: , Rfl:   •  potassium chloride (MICRO-K) 10 MEQ CR capsule, Hold until Lasix (furosemide) restarted (Patient taking differently: Take 10 mEq by mouth Daily. Hold until Lasix (furosemide) restarted), Disp: , Rfl:   •   rOPINIRole (REQUIP) 2 MG tablet, Take 1 - 2 tablet(s) by mouth every night at bedtime., Disp: 180 tablet, Rfl: 1  •  sertraline (ZOLOFT) 100 MG tablet, TAKE 1 TABLET BY MOUTH EVERY DAY, Disp: 90 tablet, Rfl: 1  •  spironolactone (ALDACTONE) 50 MG tablet, Take 1 tablet by mouth Daily., Disp: , Rfl:   •  tacrolimus (PROGRAF) 0.5 MG capsule, Take 2 capsules by mouth 2 (Two) Times a Day., Disp: , Rfl:   •  traMADol (ULTRAM) 50 MG tablet, Take 1 tablet by mouth Every 6 (Six) Hours As Needed for Moderate Pain., Disp: 40 tablet, Rfl: 1  •  traZODone (DESYREL) 100 MG tablet, Take 2 tablets by mouth every night at bedtime., Disp: 30 tablet, Rfl: 5  •  vitamin D (ERGOCALCIFEROL) 1.25 MG (28124 UT) capsule capsule, Take 50,000 Units by mouth Every 7 (Seven) Days.  , Disp: , Rfl:   •  zolpidem (AMBIEN) 5 MG tablet, Take 5 mg by mouth Every Night., Disp: , Rfl:   •  Diclofenac Sodium 3 % gel gel, Apply  topically to the appropriate area as directed 2 (Two) Times a Day. for 60 days., Disp: 100 g, Rfl: 0  •  levothyroxine (SYNTHROID, LEVOTHROID) 175 MCG tablet, Take 1 tablet by mouth Every Morning., Disp: 90 tablet, Rfl: 0  •  sucralfate (Carafate) 1 g tablet, Take 1 tablet by mouth 4 (Four) Times a Day., Disp: 60 tablet, Rfl: 1    Allergies:   Allergies   Allergen Reactions   • Penicillins Shortness Of Breath   • Cyclosporine Swelling       Objective     Physical Exam: Please see above  Vital Signs:   Vitals:    01/05/23 1153   BP: 110/74   BP Location: Right arm   Patient Position: Sitting   Cuff Size: Adult   Pulse: 73   Resp: 16   Temp: 98.4 °F (36.9 °C)   TempSrc: Temporal   SpO2: 95%   Weight: 66.2 kg (146 lb)   PainSc:   4     Body mass index is 27.59 kg/m².       Physical Exam  Vitals and nursing note reviewed.   Constitutional:       General: She is not in acute distress.     Appearance: Normal appearance. She is normal weight. She is not ill-appearing or toxic-appearing.   HENT:      Nose: No congestion or rhinorrhea.    Eyes:      General:         Right eye: No discharge.         Left eye: No discharge.      Conjunctiva/sclera: Conjunctivae normal.   Pulmonary:      Effort: Pulmonary effort is normal. No respiratory distress.   Abdominal:      General: Abdomen is flat. There is no distension.   Musculoskeletal:      Cervical back: Normal range of motion.   Skin:     Coloration: Skin is not jaundiced.      Findings: No rash.   Neurological:      General: No focal deficit present.      Mental Status: She is alert. Mental status is at baseline.      Coordination: Coordination normal.      Gait: Gait normal.   Psychiatric:         Mood and Affect: Mood normal.         Behavior: Behavior normal.         Thought Content: Thought content normal.         Judgment: Judgment normal.         Procedures    Results:   Labs:   Hemoglobin A1C   Date Value Ref Range Status   01/05/2023 5.1 % Final   07/28/2022 5.50 4.80 - 5.60 % Final     TSH   Date Value Ref Range Status   01/04/2023 6.050 (H) 0.270 - 4.200 uIU/mL Final   09/04/2019 5.685 (H) 0.300 - 5.000 uIU/mL Final        Imaging:   No valid procedures specified.     Assessment / Plan      Assessment/Plan:   Problem List Items Addressed This Visit        Endocrine and Metabolic    Hypothyroid    Overview     Due to remote thyroidectomy for cancer         Relevant Orders    TSH (Completed)    Hyperglycemia - Primary    Relevant Orders    POC Glycosylated Hemoglobin (Hb A1C) (Completed)       Genitourinary and Reproductive     Stage 3a chronic kidney disease (HCC)    Relevant Orders    Cystatin C    Basic Metabolic Panel (Completed)       Musculoskeletal and Injuries    Osteoarthritis    Current Assessment & Plan     - Patient prescribed Diclofenac Sodium 3% gel. Apply topically to the appropriate area as directed 2 times a day for 60 days.  - Advised patient to use Diclofenac Sodium 3% gel on her hands.  -Advised patient to ask for alternate option if medication costs to much.  - Advised  patient on different options for treating arthritis.  - Advised patient on getting physical therapy and aqua based physical therapy.  - Advised patient on getting referral to a interventional pain clinic.         Relevant Medications    Diclofenac Sodium 3 % gel gel    Other Relevant Orders    Ambulatory Referral to Physical Therapy [facility]    Ambulatory Referral to Pain Management (Completed)       Other    Gastroesophageal reflux disease with esophagitis without hemorrhage    Current Assessment & Plan     - Patient prescribed [id number] tablet. Take 1 tablet by mouth 4 times a day.   -Advised patient to getting a referral to GI for a upper scope procedure.   - Advised to call if she has not heard from the referrals.         Relevant Medications    sucralfate (Carafate) 1 g tablet    Other Relevant Orders    Ambulatory referral for Screening EGD    Ambulatory Referral to Gastroenterology         Follow Up:   Return in about 4 weeks (around 2/2/2023) for Medicare Wellness, Recheck.          Lars Fermin MD  [facility]    Transcribed from ambient dictation for Lars Fermin MD by Zoraida Husain.  01/05/23   16:08 EST    Patient or patient representative verbalized consent to the visit recording.  I have personally performed the services described in this document as transcribed by the above individual, and it is both accurate and complete.

## 2023-01-05 NOTE — ASSESSMENT & PLAN NOTE
- Patient prescribed Diclofenac Sodium 3% gel. Apply topically to the appropriate area as directed 2 times a day for 60 days.  - Advised patient to use Diclofenac Sodium 3% gel on her hands.  -Advised patient to ask for alternate option if medication costs to much.  - Advised patient on different options for treating arthritis.  - Advised patient on getting physical therapy and aqua based physical therapy.  - Advised patient on getting referral to a interventional pain clinic.

## 2023-01-06 ENCOUNTER — OUTSIDE FACILITY SERVICE (OUTPATIENT)
Dept: GASTROENTEROLOGY | Facility: CLINIC | Age: 58
End: 2023-01-06
Payer: COMMERCIAL

## 2023-01-06 ENCOUNTER — TELEPHONE (OUTPATIENT)
Dept: INTERNAL MEDICINE | Facility: CLINIC | Age: 58
End: 2023-01-06
Payer: COMMERCIAL

## 2023-01-06 DIAGNOSIS — F51.01 PRIMARY INSOMNIA: ICD-10-CM

## 2023-01-06 DIAGNOSIS — K74.60 HEPATIC CIRRHOSIS, UNSPECIFIED HEPATIC CIRRHOSIS TYPE, UNSPECIFIED WHETHER ASCITES PRESENT: ICD-10-CM

## 2023-01-06 DIAGNOSIS — F32.A DEPRESSION, UNSPECIFIED DEPRESSION TYPE: ICD-10-CM

## 2023-01-06 DIAGNOSIS — Z94.4 LIVER TRANSPLANT RECIPIENT: Primary | ICD-10-CM

## 2023-01-06 DIAGNOSIS — F41.9 ANXIETY: ICD-10-CM

## 2023-01-06 LAB
ANION GAP SERPL CALCULATED.3IONS-SCNC: 11 MMOL/L (ref 5–15)
BUN SERPL-MCNC: 19 MG/DL (ref 6–20)
BUN/CREAT SERPL: 14.6 (ref 7–25)
CALCIUM SPEC-SCNC: 9.6 MG/DL (ref 8.6–10.5)
CHLORIDE SERPL-SCNC: 100 MMOL/L (ref 98–107)
CO2 SERPL-SCNC: 29 MMOL/L (ref 22–29)
CREAT SERPL-MCNC: 1.3 MG/DL (ref 0.57–1)
EGFRCR SERPLBLD CKD-EPI 2021: 47.8 ML/MIN/1.73
GLUCOSE SERPL-MCNC: 81 MG/DL (ref 65–99)
POTASSIUM SERPL-SCNC: 4.4 MMOL/L (ref 3.5–5.2)
SODIUM SERPL-SCNC: 140 MMOL/L (ref 136–145)

## 2023-01-06 PROCEDURE — 99204 OFFICE O/P NEW MOD 45 MIN: CPT | Performed by: INTERNAL MEDICINE

## 2023-01-06 PROCEDURE — 43235 EGD DIAGNOSTIC BRUSH WASH: CPT | Performed by: INTERNAL MEDICINE

## 2023-01-06 RX ORDER — SERTRALINE HYDROCHLORIDE 20 MG/ML
100 SOLUTION ORAL DAILY
Qty: 200 ML | Refills: 1 | Status: SHIPPED | OUTPATIENT
Start: 2023-01-06

## 2023-01-06 NOTE — TELEPHONE ENCOUNTER
Caller: Lizette Frias     Relationship: [unfilled]     Best call back number: [phone]    What is your medical concern? PATIENT WOULD LIKE TO KNOW IF THE LEVOTHYROXINE COMES IN LIQUID FORM.  THIS MEDICATION STAY IN THE SYSTEM FOR DAYS CAUSING THE FOOD TO NOT DIGEST.     PATIENT NEED TO SPEAK WITH HER PCP ABOUT OTHER HEALTH CONCERNS.    THE GASTRO PROVIDER SUGGESTED THAT THE OZEMPIC STRENGTH SHOULD BE DECREASED OR NEED TO STOP TAKING THIS MEDICATION ALL TOGEATHER    PLEASE CALL PATIENT ASAP TO DISCUSS THE MATTER IF NO ANSWER OK TO LEAVE MESSAGE     How long has this issue been going on? AROUND 12/19/22    Is your provider already aware of this issue? YES     Have you been treated for this issue? YES

## 2023-01-06 NOTE — TELEPHONE ENCOUNTER
I have transition several of these medications to liquid form.  However, since they are in such high volume with the liquid about that is required.  I am concerned about switching all of the medications over at once that are able to switch to a liquid form.  We will continue to work with her to decrease the amount of medications that she is taking to decrease side effects and GI effects throughout her next few visits.    With regards to her Ozempic.  She can continue to take the current dose until it is available at her pharmacy and then transition to the updated dose.  Let me know with any other concerns and I am happy to help.

## 2023-01-06 NOTE — TELEPHONE ENCOUNTER
Levothyroxine does not come in a liquid form.  I think it would be reasonable to decrease the Ozempic dose as requested.  I have sent a refill at this new dose.  Please let me know if anything else is needed and thanks for your help.

## 2023-01-06 NOTE — TELEPHONE ENCOUNTER
Spoke to patient and notified of message. Patient wants to know if she still has to wait a month to be seen, and that the pharmacy does not have ozempic in stock. Patient also stated that her GI doctor suggested all possible medication be switched to liquid form until her stomach issues resolve.

## 2023-01-08 LAB — TACROLIMUS BLD LC/MS/MS-MCNC: 6.7 NG/ML (ref 2–20)

## 2023-01-09 ENCOUNTER — TELEPHONE (OUTPATIENT)
Dept: INTERNAL MEDICINE | Facility: CLINIC | Age: 58
End: 2023-01-09
Payer: COMMERCIAL

## 2023-01-09 NOTE — TELEPHONE ENCOUNTER
No worries.  Please have the pharmacy fill what ever prescriptions they are able to do in the liquid form and the patient can continue with the other medications in a pill form with eventual transition as the medications become available or her symptoms improve.  Please let the patient know about this change from the pharmacy.  Thanks for your help.

## 2023-01-09 NOTE — TELEPHONE ENCOUNTER
Pharmacy Name: [id number] - [place] - [address] AT [facility] - [phone]  - [phone]      Pharmacy representative name: GABRIELE    Pharmacy representative phone number: [phone]    What medication are you calling in regards to: SERTRALINE, TRAZADONE, SPIRONOLACTONE    What question does the pharmacy have: PHARMACY STATES THAT THEY RECEIVED PRESCRIPTIONS FOR THESE MEDICATIONS IN LIQUID FORM. THEY STATE THAT THEY MAY BE ABLE TO GET SERTRALINE IN THE LIQUID FORM, BUT ARE UNABLE TO GET TRAZADONE AND SPIRONOLACTONE IN LIQUID FORM.     Who is the provider that prescribed the medication:     Additional notes: N/A

## 2023-01-10 LAB — CYSTATIN C SERPL-MCNC: 1.19 MG/L (ref 0.67–1.14)

## 2023-01-10 NOTE — TELEPHONE ENCOUNTER
Called the pharmacy and notified of the change. Pharmacy stated they do not have those 3 available inn liquid form and stated they can be sent to a compounding pharmacy to be turned into liquid form.

## 2023-01-13 ENCOUNTER — TELEPHONE (OUTPATIENT)
Dept: INTERNAL MEDICINE | Facility: CLINIC | Age: 58
End: 2023-01-13
Payer: COMMERCIAL

## 2023-01-16 ENCOUNTER — TELEPHONE (OUTPATIENT)
Dept: INTERNAL MEDICINE | Facility: CLINIC | Age: 58
End: 2023-01-16
Payer: COMMERCIAL

## 2023-01-16 NOTE — TELEPHONE ENCOUNTER
LITO RHODES Key: [id number] - PA Case ID: [id number] - Rx #: [id number]    Diclofenac Sodium 3% gel  Outcome  Deniedon January 13  Your PA request has been denied

## 2023-01-16 NOTE — TELEPHONE ENCOUNTER
----- Message from Lars Fermin MD sent at 1/15/2023  3:51 PM EST -----  Please relay the following message to the patient:    Your attached results indicates that your chronic kidney disease stage IIIa is stable and does not require referral to a kidney doctor (nephrologist) at this time.  No additional interventions are needed based on these results other than routine surveillance of your kidney function during follow-up exams.  I will continue to send you results as I receive them.  If you have any additional questions or concerns, please let us know.  We look forward to seeing you soon!

## 2023-01-16 NOTE — TELEPHONE ENCOUNTER
Thank you for letting me know about the denial.  The patient can simply switch this for over-the-counter Voltaren gel as an affordable option.

## 2023-01-16 NOTE — TELEPHONE ENCOUNTER
Tried to reach patient no answer left voicemail to return call.     HUB OK TO READ: Your attached results indicates that your chronic kidney disease stage IIIa is stable and does not require referral to a kidney doctor (nephrologist) at this time.  No additional interventions are needed based on these results other than routine surveillance of your kidney function during follow-up exams.  I will continue to send you results as I receive them.  If you have any additional questions or concerns, please let us know.  We look forward to seeing you soon!

## 2023-01-19 DIAGNOSIS — M15.9 PRIMARY OSTEOARTHRITIS INVOLVING MULTIPLE JOINTS: ICD-10-CM

## 2023-01-23 ENCOUNTER — TELEPHONE (OUTPATIENT)
Dept: INTERNAL MEDICINE | Facility: CLINIC | Age: 58
End: 2023-01-23
Payer: COMMERCIAL

## 2023-01-23 DIAGNOSIS — K21.00 GASTROESOPHAGEAL REFLUX DISEASE WITH ESOPHAGITIS WITHOUT HEMORRHAGE: ICD-10-CM

## 2023-01-23 RX ORDER — METHOCARBAMOL 500 MG/1
TABLET, FILM COATED ORAL
Qty: 60 TABLET | Refills: 0 | Status: SHIPPED | OUTPATIENT
Start: 2023-01-23 | End: 2023-02-09

## 2023-01-23 RX ORDER — SUCRALFATE 1 G/1
1 TABLET ORAL 4 TIMES DAILY
Qty: 60 TABLET | Refills: 1 | Status: SHIPPED | OUTPATIENT
Start: 2023-01-23 | End: 2023-01-25 | Stop reason: SDUPTHER

## 2023-01-23 NOTE — TELEPHONE ENCOUNTER
Rx Refill Note  Requested Prescriptions     Pending Prescriptions Disp Refills   • methocarbamol (ROBAXIN) 500 MG tablet [Pharmacy Med Name: METHOCARBAMOL 500 MG TABLET] 60 tablet 0     Sig: TAKE 1 TABLET BY MOUTH FOUR TIMES A DAY      Last office visit with prescribing clinician: 12/6/2022   Last telemedicine visit with prescribing clinician: 1/23/2023   Next office visit with prescribing clinician: Visit date not found                         Would you like a call back once the refill request has been completed: [] Yes [] No    If the office needs to give you a call back, can they leave a voicemail: [] Yes [] No    Babak Yanes MA  01/23/23, 16:42 EST

## 2023-01-24 ENCOUNTER — TELEPHONE (OUTPATIENT)
Dept: INTERNAL MEDICINE | Facility: CLINIC | Age: 58
End: 2023-01-24
Payer: COMMERCIAL

## 2023-01-24 DIAGNOSIS — K21.00 GASTROESOPHAGEAL REFLUX DISEASE WITH ESOPHAGITIS WITHOUT HEMORRHAGE: ICD-10-CM

## 2023-01-24 NOTE — TELEPHONE ENCOUNTER
Alternative requested for Sucralfate 1 gm tablet prescribed to take 1 tablet by mouth 4 times a day.

## 2023-01-24 NOTE — TELEPHONE ENCOUNTER
His insurance not able to cover it or is the patient not able to tolerate it and would like an alternative?  Thanks.

## 2023-01-25 RX ORDER — SUCRALFATE 1 G/1
1 TABLET ORAL 4 TIMES DAILY
Qty: 360 TABLET | Refills: 1 | Status: SHIPPED | OUTPATIENT
Start: 2023-01-25

## 2023-01-25 NOTE — TELEPHONE ENCOUNTER
Spoke with RPmarla at [facility], states patient insurance will only cover a 90 day prescription and the prescription that was sent was a 15 day supply.  Explained we will resend for 90 days.  Verbalized appreciation.

## 2023-02-02 RX ORDER — FLUTICASONE PROPIONATE 50 MCG
SPRAY, SUSPENSION (ML) NASAL
Qty: 48 ML | Refills: 1 | Status: SHIPPED | OUTPATIENT
Start: 2023-02-02

## 2023-02-09 DIAGNOSIS — M15.9 PRIMARY OSTEOARTHRITIS INVOLVING MULTIPLE JOINTS: ICD-10-CM

## 2023-02-09 RX ORDER — METHOCARBAMOL 500 MG/1
TABLET, FILM COATED ORAL
Qty: 60 TABLET | Refills: 0 | Status: SHIPPED | OUTPATIENT
Start: 2023-02-09 | End: 2023-03-07

## 2023-02-16 RX ORDER — ISOSORBIDE DINITRATE 30 MG/1
30 TABLET ORAL 2 TIMES DAILY
Qty: 60 TABLET | Refills: 0 | Status: SHIPPED | OUTPATIENT
Start: 2023-02-16 | End: 2023-03-23

## 2023-02-16 RX ORDER — ROPINIROLE 2 MG/1
TABLET, FILM COATED ORAL
Qty: 180 TABLET | Refills: 1 | Status: SHIPPED | OUTPATIENT
Start: 2023-02-16

## 2023-02-17 ENCOUNTER — OFFICE VISIT (OUTPATIENT)
Dept: GASTROENTEROLOGY | Facility: CLINIC | Age: 58
End: 2023-02-17
Payer: COMMERCIAL

## 2023-02-17 VITALS
HEART RATE: 88 BPM | HEIGHT: 61 IN | SYSTOLIC BLOOD PRESSURE: 126 MMHG | TEMPERATURE: 96.8 F | BODY MASS INDEX: 28.13 KG/M2 | WEIGHT: 149 LBS | OXYGEN SATURATION: 98 % | DIASTOLIC BLOOD PRESSURE: 72 MMHG

## 2023-02-17 DIAGNOSIS — Z98.890 HISTORY OF COLONOSCOPY: ICD-10-CM

## 2023-02-17 DIAGNOSIS — K31.84 GASTROPARESIS: ICD-10-CM

## 2023-02-17 DIAGNOSIS — K21.00 GASTROESOPHAGEAL REFLUX DISEASE WITH ESOPHAGITIS WITHOUT HEMORRHAGE: Primary | ICD-10-CM

## 2023-02-17 PROCEDURE — 99213 OFFICE O/P EST LOW 20 MIN: CPT | Performed by: NURSE PRACTITIONER

## 2023-02-17 RX ORDER — PREGABALIN 75 MG/1
CAPSULE ORAL
COMMUNITY
End: 2023-03-07

## 2023-02-17 RX ORDER — CYCLOSPORINE 0.5 MG/ML
EMULSION OPHTHALMIC
COMMUNITY

## 2023-02-17 RX ORDER — ATORVASTATIN CALCIUM 40 MG/1
TABLET, FILM COATED ORAL EVERY 24 HOURS
COMMUNITY

## 2023-02-17 NOTE — PROGRESS NOTES
GASTROENTEROLOGY OFFICE NOTE    Lizette Frias  [id number]  1965    CARE TEAM  Patient Care Team:  Ad Oquendo MD as PCP - General (Internal Medicine)  Ming Stroud MD as Consulting Physician (Endocrinology)  Bill Ventura III, MD as Cardiologist (Cardiology)    Referring Provider: Lars Fermin MD    Chief Complaint   Patient presents with   • Follow-up     EGD with Dr. Wood on 1/6        HISTORY OF PRESENT ILLNESS:   Lizette Frias is a 57 y.o. female who presents to the clinic today following EGD per Dr. Wood.     1/6/2023 EGD per Dr. Wood due to heartburn and weight loss revealed LA grade a esophagitis without bleeding in the distal esophagus, large amount of food residue, gastroparesis caused or exacerbated by Ozempic with recommendation to decrease or discontinue Ozempic, consider prokinetic agent, dietary modifications for gastroparesis.    Documentation on history and physical at time of EGD that revealed past medical history of nonalcoholic steatohepatitis, cirrhosis with ascites, history of variceal bleeding, underwent live donor transplant in [place] (her son was a donor in 2020) and unfortunately redeveloped nonalcoholic steatohepatitis.  She continues to follow with provider in [place] following transplant.      Ozempic prescribed for weight loss.  She has decreased the dose of Ozempic and has made dietary changes that seem helpful for nausea, indigestion, heartburn.  She continues to have some symptoms but overall feels better.  She also takes pantoprazole 40 mg daily.    She believes most recent colonoscopy was in the past few years, possibly in 2021 at [facility].  She feels as though she is possibly due for repeat colonoscopy soon.  She would like to consider a prep in the form of pills.  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, mastoid damage   • Hypercholesteremia    • Hypertension    • Hyperthyroidism    • Hypothyroid    • Low back pain    • Meningioma (HCC)    • Osteoarthritis    • Osteopenia    • Osteoporosis    • Pancreatitis     Pretransplant   • Renal impairment    • Stroke (HCC)    • Thyroid cancer (HCC)    • Vision impairment     left eye        Past Surgical History:   Procedure Laterality Date   • APPENDECTOMY     • BREAST BIOPSY Left     BENIGN   •  SECTION      x3   • CHOLECYSTECTOMY     • D & C HYSTEROSCOPY LAPAROSCOPY      x2 for endometriosis   • FRACTURE SURGERY      Right ankle has screws   • LIVER SURGERY      removed cysts    • LIVER TRANSPLANTATION     • ORIF ANKLE FRACTURE     • THYROIDECTOMY     • TONSILLECTOMY     • TOTAL ABDOMINAL HYSTERECTOMY WITH SALPINGO OOPHORECTOMY Bilateral    • UPPER GASTROINTESTINAL ENDOSCOPY          Current Outpatient Medications on File Prior to Visit   Medication Sig   • alendronate (FOSAMAX) 70 MG tablet Take 1 tablet by mouth 1 (One) Time Per Week.   • amLODIPine (NORVASC) 10 MG tablet Take 1 tablet by mouth Daily.   • atorvastatin (LIPITOR) 40 MG tablet Daily.   • buPROPion SR (WELLBUTRIN SR) 150 MG 12 hr tablet TAKE 1 TABLET BY MOUTH EVERY DAY FOR DEPRESSION   • calcium carbonate-vitamin d 600-400 MG-UNIT per tablet Take 1 tablet by mouth Daily.   • Cholecalciferol 50 MCG (2000 UT) capsule cholecalciferol (vitamin D3) 50 mcg (2,000 unit) capsule   TAKE 1 CAPSULE BY MOUTH EVERY DAY   • cloNIDine (CATAPRES) 0.1 MG tablet Take 0.1 mg by mouth Daily As Needed for High Blood Pressure.   • cycloSPORINE (RESTASIS) 0.05 % ophthalmic emulsion Restasis MultiDose 0.05  % eye drops   INSTILL 1 DROP (1GTT) INTO BOTH EYES TWICE DAILY   • Diclofenac Sodium 3 % gel gel Apply  topically to the appropriate area as directed 2 (Two) Times a Day. for 60 days.   • fluticasone (FLONASE) 50 MCG/ACT nasal spray USE 2 SPRAYS IN EACH NOSTRIL TWICE DAILY   • furosemide (LASIX) 20 MG tablet Hold until follow up with your primary care provider (Patient taking differently: Take 20 mg by mouth Daily.)   • icosapent ethyl (VASCEPA) 1 g capsule capsule Take 2 capsules by mouth 2 (Two) Times a Day With Meals.   • isosorbide dinitrate (ISORDIL) 30 MG tablet Take 1 tablet by mouth 2 (Two) Times a Day. Need f/u appt for further refills.   • isosorbide mononitrate (IMDUR) 60 MG 24 hr tablet Take 1 tablet by mouth Every Night.   • levothyroxine (SYNTHROID, LEVOTHROID) 175 MCG tablet Take 1 tablet by mouth Every Morning.   • melatonin ([employer] Melatonin) 5 MG tablet tablet Take 1 tablet by mouth Every Night.   • methocarbamol (ROBAXIN) 500 MG tablet TAKE 1 TABLET BY MOUTH FOUR TIMES A DAY   • metoprolol succinate XL (TOPROL-XL) 100 MG 24 hr tablet Take 100 mg by mouth Daily.   • mirtazapine (REMERON) 30 MG tablet Take 30 mg by mouth every night at bedtime.   • nitroglycerin (NITROSTAT) 0.4 MG SL tablet Place 0.4 mg under the tongue.   • pantoprazole (PROTONIX) 40 MG EC tablet Take 40 mg by mouth Daily.   • potassium chloride (MICRO-K) 10 MEQ CR capsule Hold until Lasix (furosemide) restarted (Patient taking differently: Take 10 mEq by mouth Daily. Hold until Lasix (furosemide) restarted)   • pregabalin (LYRICA) 75 MG capsule pregabalin 75 mg capsule   TAKE 1 CAPSULE BY MOUTH TWICE A DAY FOR 30 DAYS   • rOPINIRole (REQUIP) 2 MG tablet TAKE 1 - 2 TABLET(S) BY MOUTH EVERY NIGHT AT BEDTIME.   • Semaglutide,0.25 or 0.5MG/DOS, (OZEMPIC) 2 MG/1.5ML solution pen-injector Inject 0.5 mg under the skin into the appropriate area as directed 1 (One) Time Per Week.   • sertraline (ZOLOFT) 20 MG/ML concentrated solution Take 5  mL by mouth Daily.   • SPIRONOLACTONE 100MG/5ML SUSP Take 2.5 mL by mouth Daily.   • sucralfate (Carafate) 1 g tablet Take 1 tablet by mouth 4 (Four) Times a Day.   • tacrolimus (PROGRAF) 0.5 MG capsule Take 2 capsules by mouth 2 (Two) Times a Day.   • traMADol (ULTRAM) 50 MG tablet Take 1 tablet by mouth Every 6 (Six) Hours As Needed for Moderate Pain.   • TRAZODONE 100MG/5ML LIQUID Take 10 mL by mouth every night at bedtime.   • vitamin D (ERGOCALCIFEROL) 1.25 MG (83652 UT) capsule capsule Take 50,000 Units by mouth Every 7 (Seven) Days.     • zolpidem (AMBIEN) 5 MG tablet Take 5 mg by mouth Every Night.   • [DISCONTINUED] ipratropium-albuterol (DUO-NEB) 0.5-2.5 mg/3 ml nebulizer Take 3 mL by nebulization Every 4 (Four) Hours As Needed.     No current facility-administered medications on file prior to visit.       Allergies   Allergen Reactions   • Penicillins Shortness Of Breath   • Cyclosporine Swelling       Family History   Problem Relation Age of Onset   • Arthritis Mother    • Osteoporosis Mother    • Rheum arthritis Mother    • Anemia Mother    • Alcohol abuse Father    • Mental illness Father    • Hyperlipidemia Father    • Hypertension Father    • Colon cancer Father    • Cancer Father    • Hearing loss Father    • ADD / ADHD Son    • ADD / ADHD Son    • Breast cancer Maternal Grandmother    • Stroke Maternal Grandmother    • Mental illness Paternal Grandmother    • Ovarian cancer Paternal Grandmother        Social History     Socioeconomic History   • Marital status:    Tobacco Use   • Smoking status: Never   • Smokeless tobacco: Never   Vaping Use   • Vaping Use: Never used   Substance and Sexual Activity   • Alcohol use: No   • Drug use: Never     Comment: Tried an edible last week.    • Sexual activity: Not Currently     Partners: Male     Birth control/protection: Surgical, Post-menopausal       PHYSICAL EXAM   /72 (BP Location: Left arm, Patient Position: Sitting, Cuff Size: Adult)    "Pulse 88   Temp 96.8 °F (36 °C) (Infrared)   Ht 154.9 cm (61\")   Wt 67.6 kg (149 lb)   LMP  (LMP Unknown) Comment: Last pap 3/3/2022 by [facility]  SpO2 98%   BMI 28.15 kg/m²   Physical Exam  Constitutional:       General: She is not in acute distress.     Appearance: She is not toxic-appearing.   HENT:      Head: Normocephalic and atraumatic. No contusion.      Right Ear: External ear normal.      Left Ear: External ear normal.   Eyes:      General: Lids are normal. No scleral icterus.     Extraocular Movements: Extraocular movements intact.   Neck:      Trachea: Trachea normal.      Comments: No visible mass  No visible adenopathy  Cardiovascular:      Rate and Rhythm: Normal rate.   Pulmonary:      Effort: No respiratory distress.      Comments: Symmetrical expansion    Musculoskeletal:      Comments: Symmetrical movement of upper extremities  Symmetrical movement of lower extremities  No visible deformities   Skin:     General: Skin is warm and dry.   Neurological:      General: No focal deficit present.      Mental Status: She is alert and oriented to person, place, and time.   Psychiatric:         Mood and Affect: Mood normal.         Behavior: Behavior normal.         Thought Content: Thought content normal.     Results Review:  1/6/2023 EGD per Dr. Wood due to heartburn and weight loss revealed LA grade a esophagitis without bleeding in the distal esophagus, large amount of food residue, gastroparesis caused or exacerbated by Ozempic with recommendation to decrease or discontinue Ozempic, consider prokinetic agent, dietary modifications for gastroparesis.    CMP    CMP 12/9/22 1/4/23 1/5/23   Glucose 83 77 81   BUN 18 20 19   Creatinine 1.00 1.54 (A) 1.30 (A)   eGFR 65.4 39.0 (A) 47.8 (A)   Sodium 143 139 140   Potassium 3.8 3.8 4.4   Chloride 105 99 100   Calcium 9.4 9.8 9.6   Total Protein 6.8 6.7    Albumin 4.40 4.0    Globulin 2.4 2.7    Total Bilirubin 0.6 0.4    Alkaline " Phosphatase 92 101    AST (SGOT) 39 (A) 25    ALT (SGPT) 64 (A) 35 (A)    Albumin/Globulin Ratio 1.8 1.5    BUN/Creatinine Ratio 18.0 13.0 14.6   Anion Gap 9.0 10.8 11.0   (A) Abnormal value       Comments are available for some flowsheets but are not being displayed.           CBC    CBC 10/21/22 12/9/22 1/4/23   WBC 3.58 3.34 (A) 5.84   RBC 5.13 5.06 5.39 (A)   Hemoglobin 14.2 13.9 15.1   Hematocrit 43.2 42.6 44.8   MCV 84.2 84.2 83.1   MCH 27.7 27.5 28.0   MCHC 32.9 32.6 33.7   RDW 14.2 14.7 14.3   Platelets 162 153 190   (A) Abnormal value              ASSESSMENT / PLAN  1. Gastroesophageal reflux disease with esophagitis without hemorrhage  2. Gastroparesis  -Ozempic likely contributing to nausea, indigestion, heartburn due to causing gastroparesis  -Printed information regarding gastroparesis provided in the office today  -She has had symptom improvement with dietary changes at home prior to today's visit as well as taking pantoprazole 40 mg daily which I recommend she continue    3. History of colonoscopy  - she may be due for repeat in 2024 or possibly sooner but I would like to review recommendation from past endoscopist. I have requested the office contact [facility] for records. I have requested the patient return in 2 months to review records (if not done before the follow up visit) to determine when next colonoscopy is due.   -I would rather her take liquid prep, small volume due to concern that Ozempic is affecting digestion and she may have decreased effect from taking pills then liquid form of a prep    In regarding to history of decompensated cirrhosis, live liver donor and recurrent MORRISON, she continues to follow with provider in [place].   Return in about 2 months (around 4/17/2023).    Phyllis Tao, AISLINN  02/17/2023

## 2023-02-20 ENCOUNTER — LAB (OUTPATIENT)
Dept: INTERNAL MEDICINE | Facility: CLINIC | Age: 58
End: 2023-02-20
Payer: COMMERCIAL

## 2023-02-20 DIAGNOSIS — E89.0 POSTOPERATIVE HYPOTHYROIDISM: ICD-10-CM

## 2023-02-20 DIAGNOSIS — E11.9 TYPE 2 DIABETES MELLITUS WITHOUT COMPLICATION, WITHOUT LONG-TERM CURRENT USE OF INSULIN: ICD-10-CM

## 2023-02-20 DIAGNOSIS — Z94.4 LIVER TRANSPLANT RECIPIENT: Primary | ICD-10-CM

## 2023-02-20 DIAGNOSIS — Z51.81 ENCOUNTER FOR MEDICATION MONITORING: ICD-10-CM

## 2023-02-20 PROCEDURE — 80050 GENERAL HEALTH PANEL: CPT | Performed by: INTERNAL MEDICINE

## 2023-02-20 PROCEDURE — 83036 HEMOGLOBIN GLYCOSYLATED A1C: CPT | Performed by: INTERNAL MEDICINE

## 2023-02-20 PROCEDURE — 80197 ASSAY OF TACROLIMUS: CPT | Performed by: INTERNAL MEDICINE

## 2023-02-20 PROCEDURE — 84439 ASSAY OF FREE THYROXINE: CPT | Performed by: INTERNAL MEDICINE

## 2023-02-21 LAB
ALBUMIN SERPL-MCNC: 4.1 G/DL (ref 3.5–5.2)
ALBUMIN/GLOB SERPL: 1.7 G/DL
ALP SERPL-CCNC: 95 U/L (ref 39–117)
ALT SERPL W P-5'-P-CCNC: 64 U/L (ref 1–33)
ANION GAP SERPL CALCULATED.3IONS-SCNC: 8 MMOL/L (ref 5–15)
AST SERPL-CCNC: 39 U/L (ref 1–32)
BASOPHILS # BLD AUTO: 0.05 10*3/MM3 (ref 0–0.2)
BASOPHILS NFR BLD AUTO: 1.2 % (ref 0–1.5)
BILIRUB SERPL-MCNC: 0.5 MG/DL (ref 0–1.2)
BUN SERPL-MCNC: 19 MG/DL (ref 6–20)
BUN/CREAT SERPL: 22.4 (ref 7–25)
CALCIUM SPEC-SCNC: 9.2 MG/DL (ref 8.6–10.5)
CHLORIDE SERPL-SCNC: 108 MMOL/L (ref 98–107)
CO2 SERPL-SCNC: 25 MMOL/L (ref 22–29)
CREAT SERPL-MCNC: 0.85 MG/DL (ref 0.57–1)
DEPRECATED RDW RBC AUTO: 45 FL (ref 37–54)
EGFRCR SERPLBLD CKD-EPI 2021: 79 ML/MIN/1.73
EOSINOPHIL # BLD AUTO: 0.06 10*3/MM3 (ref 0–0.4)
EOSINOPHIL NFR BLD AUTO: 1.4 % (ref 0.3–6.2)
ERYTHROCYTE [DISTWIDTH] IN BLOOD BY AUTOMATED COUNT: 14.5 % (ref 12.3–15.4)
GLOBULIN UR ELPH-MCNC: 2.4 GM/DL
GLUCOSE SERPL-MCNC: 93 MG/DL (ref 65–99)
HBA1C MFR BLD: 5.1 % (ref 4.8–5.6)
HCT VFR BLD AUTO: 43 % (ref 34–46.6)
HGB BLD-MCNC: 14.2 G/DL (ref 12–15.9)
IMM GRANULOCYTES # BLD AUTO: 0.04 10*3/MM3 (ref 0–0.05)
IMM GRANULOCYTES NFR BLD AUTO: 0.9 % (ref 0–0.5)
LYMPHOCYTES # BLD AUTO: 1.55 10*3/MM3 (ref 0.7–3.1)
LYMPHOCYTES NFR BLD AUTO: 36.2 % (ref 19.6–45.3)
MCH RBC QN AUTO: 28.3 PG (ref 26.6–33)
MCHC RBC AUTO-ENTMCNC: 33 G/DL (ref 31.5–35.7)
MCV RBC AUTO: 85.7 FL (ref 79–97)
MONOCYTES # BLD AUTO: 0.27 10*3/MM3 (ref 0.1–0.9)
MONOCYTES NFR BLD AUTO: 6.3 % (ref 5–12)
NEUTROPHILS NFR BLD AUTO: 2.31 10*3/MM3 (ref 1.7–7)
NEUTROPHILS NFR BLD AUTO: 54 % (ref 42.7–76)
NRBC BLD AUTO-RTO: 0.2 /100 WBC (ref 0–0.2)
PLATELET # BLD AUTO: 168 10*3/MM3 (ref 140–450)
PMV BLD AUTO: 11.6 FL (ref 6–12)
POTASSIUM SERPL-SCNC: 3.8 MMOL/L (ref 3.5–5.2)
PROT SERPL-MCNC: 6.5 G/DL (ref 6–8.5)
RBC # BLD AUTO: 5.02 10*6/MM3 (ref 3.77–5.28)
SODIUM SERPL-SCNC: 141 MMOL/L (ref 136–145)
T4 FREE SERPL-MCNC: 1.34 NG/DL (ref 0.93–1.7)
TSH SERPL DL<=0.05 MIU/L-ACNC: 0.32 UIU/ML (ref 0.27–4.2)
WBC NRBC COR # BLD: 4.28 10*3/MM3 (ref 3.4–10.8)

## 2023-02-24 ENCOUNTER — OFFICE VISIT (OUTPATIENT)
Dept: INTERNAL MEDICINE | Facility: CLINIC | Age: 58
End: 2023-02-24
Payer: COMMERCIAL

## 2023-02-24 VITALS
BODY MASS INDEX: 28.47 KG/M2 | DIASTOLIC BLOOD PRESSURE: 72 MMHG | HEIGHT: 61 IN | TEMPERATURE: 98 F | WEIGHT: 150.8 LBS | SYSTOLIC BLOOD PRESSURE: 118 MMHG | RESPIRATION RATE: 18 BRPM

## 2023-02-24 DIAGNOSIS — F41.9 ANXIETY AND DEPRESSION: ICD-10-CM

## 2023-02-24 DIAGNOSIS — Z94.4 LIVER TRANSPLANT RECIPIENT: ICD-10-CM

## 2023-02-24 DIAGNOSIS — Z00.00 MEDICARE ANNUAL WELLNESS VISIT, SUBSEQUENT: Primary | ICD-10-CM

## 2023-02-24 DIAGNOSIS — J38.3 VOCAL CORD DYSFUNCTION: ICD-10-CM

## 2023-02-24 DIAGNOSIS — E78.00 PURE HYPERCHOLESTEROLEMIA: ICD-10-CM

## 2023-02-24 DIAGNOSIS — K31.84 GASTROPARESIS: ICD-10-CM

## 2023-02-24 DIAGNOSIS — Z12.31 ENCOUNTER FOR SCREENING MAMMOGRAM FOR MALIGNANT NEOPLASM OF BREAST: ICD-10-CM

## 2023-02-24 DIAGNOSIS — N18.31 STAGE 3A CHRONIC KIDNEY DISEASE: ICD-10-CM

## 2023-02-24 DIAGNOSIS — F32.A ANXIETY AND DEPRESSION: ICD-10-CM

## 2023-02-24 DIAGNOSIS — R73.03 PREDIABETES: ICD-10-CM

## 2023-02-24 DIAGNOSIS — Z00.00 HEALTHCARE MAINTENANCE: ICD-10-CM

## 2023-02-24 PROBLEM — M16.12 OSTEOARTHRITIS OF LEFT HIP: Status: ACTIVE | Noted: 2023-01-18

## 2023-02-24 PROBLEM — I63.9 CEREBROVASCULAR ACCIDENT (CVA): Status: ACTIVE | Noted: 2023-02-24

## 2023-02-24 PROBLEM — Z90.49 S/P CHOLECYSTECTOMY: Status: ACTIVE | Noted: 2022-02-01

## 2023-02-24 PROBLEM — E11.9 TYPE 2 DIABETES MELLITUS WITHOUT COMPLICATION: Status: ACTIVE | Noted: 2022-11-23

## 2023-02-24 PROBLEM — R73.9 HYPERGLYCEMIA: Status: RESOLVED | Noted: 2023-01-05 | Resolved: 2023-02-24

## 2023-02-24 LAB — TACROLIMUS BLD LC/MS/MS-MCNC: 5.6 NG/ML (ref 2–20)

## 2023-02-24 PROCEDURE — G0438 PPPS, INITIAL VISIT: HCPCS | Performed by: STUDENT IN AN ORGANIZED HEALTH CARE EDUCATION/TRAINING PROGRAM

## 2023-02-24 PROCEDURE — 99214 OFFICE O/P EST MOD 30 MIN: CPT | Performed by: STUDENT IN AN ORGANIZED HEALTH CARE EDUCATION/TRAINING PROGRAM

## 2023-02-24 PROCEDURE — 99396 PREV VISIT EST AGE 40-64: CPT | Performed by: STUDENT IN AN ORGANIZED HEALTH CARE EDUCATION/TRAINING PROGRAM

## 2023-02-24 RX ORDER — BUPROPION HYDROCHLORIDE 300 MG/1
300 TABLET ORAL EVERY MORNING
Qty: 45 TABLET | Refills: 1 | Status: SHIPPED | OUTPATIENT
Start: 2023-02-24

## 2023-02-24 RX ORDER — ICOSAPENT ETHYL 1000 MG/1
2 CAPSULE ORAL
COMMUNITY
End: 2023-03-07

## 2023-02-24 RX ORDER — SPIRONOLACTONE 25 MG/1
25 TABLET ORAL DAILY
Qty: 45 TABLET | Refills: 1 | Status: SHIPPED | OUTPATIENT
Start: 2023-02-24 | End: 2023-03-15

## 2023-02-24 RX ORDER — PREGABALIN 100 MG/1
CAPSULE ORAL EVERY 8 HOURS SCHEDULED
COMMUNITY

## 2023-02-24 NOTE — PROGRESS NOTES
The ABCs of the Annual Wellness Visit  Subsequent Medicare Wellness Visit    Chief Complaint   Patient presents with   • Medicare Wellness-Initial Visit      Subjective    History of Present Illness:  Lizette Frias is a 58 y.o. female who presents for a Subsequent Medicare Wellness Visit.    The patient states she recently saw a gastroenterologist. She states she has changed some of her eating and now makes a 32 oz fruit and vegetable smoothie with flaxseed everyday. She feels that has helped her GI symptoms. She says she had tried the liquid diet for a few weeks but is having normal bowel movements. She states she tried to limit her red meats and eat more fish. She states she will follow up with them in 3 months and schedule a colonoscopy at that time considering her history of previous colon cancer. They were considering that she has gastroparesis associated with the Ozempic so they reduced her dosage. She states she is hesitant to discontinue it completely because it has helped with her weight loss. She states her breathing has also improved since being on it.     The patient also states that she had a hearing test done today and it showed diminished hearing in her right ear. They told her she would now be considered for a cochlear implant. She has a follow up appointment on 03/09/2023.     The patient is prescribed clonidine but has been doing very well with her blood pressure. She states she has not taken in in the past 6 months. She states she has not been able to refill her spironolactone in several months due to the pharmacy not being able to fill it.     The patients last LDL was 119 which was done 7 months ago. She had a CBC done on 02/20/2023.     The patient states that she had bronchitis on 12/23/2023 and states that he voice has not recovered from it. She says her voice is still raspy and quiet. She has an appointment with ENT. She notes when she had a liver transplant done in the past, one of her  vocal cords was damaged from the surgery and it had to be reinflated.     The patient has pain in both of her hips as well as her hands and feet. She is currently being seen at [facility] pain [facility] clinic. She had a recent increase of Lyrica. She is to see if that helps her symptoms before taking Lortab. She states she also had an injection in her hip which has helped with some pain management. She is inquiring about the use of medical marijuana because she does not want to be taking pain pills for an extended period of time. She does have some difficulty moving around but it is due to her chronic pain.     The patient states she is doing better overall than last year. She states her last hospitalization was in 02/2022 with COVID-19 for 1 week. Her next Pap is scheduled for 03/2023. Her last mammogram was done in 10/2022. She has a regular ophthalmologist and dermatologist. She does not have a dentist. She states she has a living will. She also does not want long term ventilation with a poor prognosis or if full-time care will be required and she cannot be independent.     The patient states she has trouble with depression and has an appointment with a therapist tonight. She states she has some issues at home that exacerbate it. She denies feeling unsafe or a victim of abuse. She takes bupropion 150 mg in the morning. She also takes trazadone and mirtazapine at night.     The patient would like to hold off on any vaccinations because of her transplant. She says she will follow up with her specialist for clarification on future vaccines.     The following portions of the patient's history were reviewed and   updated as appropriate: allergies, current medications, past family history, past medical history, past social history, past surgical history and problem list.    Compared to one year ago, the patient feels her physical   health is better.    Compared to one year ago, the patient feels her mental   health  is better.    Recent Hospitalizations:  She was admitted within the past 365 days at [facility].       Current Medical Providers:  Patient Care Team:  Ad Oquendo MD as PCP - General (Internal Medicine)  Ming Stroud MD as Consulting Physician (Endocrinology)  Bill Ventura III, MD as Cardiologist (Cardiology)    Outpatient Medications Prior to Visit   Medication Sig Dispense Refill   • alendronate (FOSAMAX) 70 MG tablet Take 1 tablet by mouth 1 (One) Time Per Week.     • amLODIPine (NORVASC) 10 MG tablet Take 1 tablet by mouth Daily.     • atorvastatin (LIPITOR) 40 MG tablet Daily.     • calcium carbonate-vitamin d 600-400 MG-UNIT per tablet Take 1 tablet by mouth Daily.     • Cholecalciferol 50 MCG (2000 UT) capsule cholecalciferol (vitamin D3) 50 mcg (2,000 unit) capsule   TAKE 1 CAPSULE BY MOUTH EVERY DAY     • cycloSPORINE (RESTASIS) 0.05 % ophthalmic emulsion Restasis MultiDose 0.05 % eye drops   INSTILL 1 DROP (1GTT) INTO BOTH EYES TWICE DAILY     • Diclofenac Sodium 3 % gel gel Apply  topically to the appropriate area as directed 2 (Two) Times a Day. for 60 days. 100 g 0   • fluticasone (FLONASE) 50 MCG/ACT nasal spray USE 2 SPRAYS IN EACH NOSTRIL TWICE DAILY 48 mL 1   • furosemide (LASIX) 20 MG tablet Hold until follow up with your primary care provider (Patient taking differently: Take 20 mg by mouth Daily.)     • icosapent ethyl (VASCEPA) 1 g capsule capsule Take 2 capsules by mouth 2 (Two) Times a Day With Meals.     • icosapent ethyl (VASCEPA) 1 g capsule capsule Take 2 capsules by mouth Every Other Day.     • isosorbide dinitrate (ISORDIL) 30 MG tablet Take 1 tablet by mouth 2 (Two) Times a Day. Need f/u appt for further refills. 60 tablet 0   • isosorbide mononitrate (IMDUR) 60 MG 24 hr tablet Take 1 tablet by mouth Every Night.     • levothyroxine (SYNTHROID, LEVOTHROID) 175 MCG tablet Take 1 tablet by mouth Every Morning. 90 tablet 0   • melatonin ([employer] Melatonin) 5 MG  tablet tablet Take 1 tablet by mouth Every Night. 90 tablet 1   • methocarbamol (ROBAXIN) 500 MG tablet TAKE 1 TABLET BY MOUTH FOUR TIMES A DAY 60 tablet 0   • metoprolol succinate XL (TOPROL-XL) 100 MG 24 hr tablet Take 100 mg by mouth Daily.     • mirtazapine (REMERON) 30 MG tablet Take 30 mg by mouth every night at bedtime.     • nitroglycerin (NITROSTAT) 0.4 MG SL tablet Place 0.4 mg under the tongue.     • pantoprazole (PROTONIX) 40 MG EC tablet Take 40 mg by mouth Daily.     • potassium chloride (MICRO-K) 10 MEQ CR capsule Hold until Lasix (furosemide) restarted (Patient taking differently: Take 10 mEq by mouth Daily. Hold until Lasix (furosemide) restarted)     • pregabalin (LYRICA) 100 MG capsule Every 8 (Eight) Hours.     • pregabalin (LYRICA) 75 MG capsule pregabalin 75 mg capsule   TAKE 1 CAPSULE BY MOUTH TWICE A DAY FOR 30 DAYS     • rOPINIRole (REQUIP) 2 MG tablet TAKE 1 - 2 TABLET(S) BY MOUTH EVERY NIGHT AT BEDTIME. 180 tablet 1   • Semaglutide,0.25 or 0.5MG/DOS, (OZEMPIC) 2 MG/1.5ML solution pen-injector Inject 0.5 mg under the skin into the appropriate area as directed 1 (One) Time Per Week. 1.5 mL 3   • sertraline (ZOLOFT) 20 MG/ML concentrated solution Take 5 mL by mouth Daily. 200 mL 1   • sucralfate (Carafate) 1 g tablet Take 1 tablet by mouth 4 (Four) Times a Day. 360 tablet 1   • tacrolimus (PROGRAF) 0.5 MG capsule Take 2 capsules by mouth 2 (Two) Times a Day.     • traMADol (ULTRAM) 50 MG tablet Take 1 tablet by mouth Every 6 (Six) Hours As Needed for Moderate Pain. 40 tablet 1   • TRAZODONE 100MG/5ML LIQUID Take 10 mL by mouth every night at bedtime. 300 mL 3   • vitamin D (ERGOCALCIFEROL) 1.25 MG (36224 UT) capsule capsule Take 50,000 Units by mouth Every 7 (Seven) Days.       • zolpidem (AMBIEN) 5 MG tablet Take 5 mg by mouth Every Night.     • buPROPion SR (WELLBUTRIN SR) 150 MG 12 hr tablet TAKE 1 TABLET BY MOUTH EVERY DAY FOR DEPRESSION     • cloNIDine (CATAPRES) 0.1 MG tablet Take 0.1  mg by mouth Daily As Needed for High Blood Pressure.     • SPIRONOLACTONE 100MG/5ML SUSP Take 2.5 mL by mouth Daily. 60 mL 1     No facility-administered medications prior to visit.       Opioid medication/s are on active medication list.  and I have evaluated her active treatment plan and pain score trends (see table).  Vitals:    02/24/23 1538   PainSc: 0-No pain     I have reviewed the chart for potential of high risk medication and harmful drug interactions in the elderly.            Immunizations:  Immunization History   Administered Date(s) Administered   • COVID-19 (MODERNA) BOOSTER 02/21/2022   • COVID-19 (PFIZER) PURPLE CAP 03/04/2021, 03/30/2021, 09/15/2021   • FluLaval/Fluzone >6mos 08/29/2017, 12/06/2022, 12/06/2022   • Hep A, Unspecified 01/21/2019   • Hep B, Unspecified 12/05/2018   • Hepatitis B 12/05/2018   • Pneumococcal Polysaccharide (PPSV23) 03/21/2015   • Tdap 08/29/2017       Colorectal Screening:   Up-To-Date   Last Completed Colonoscopy          COLORECTAL CANCER SCREENING (COLONOSCOPY - Every 10 Years) Next due on 9/2/2024 09/02/2019  COLONOSCOPY (Done)              Pap:  Up-To-Date   Last Completed Pap Smear          Discontinued - PAP SMEAR  Discontinued    09/11/2019  SCANNED - PAP SMEAR               Mammogram:  Up-To-Date   Last Completed Mammogram          Ordered - MAMMOGRAM (Every 2 Years) Ordered on 2/24/2023    10/03/2022  Mammo Screening Digital Tomosynthesis Bilateral With CAD                 CT for Smoker (Age 50-80, 20 pk yr):  N/A  Bone Density/DEXA (Age 65 or high risk): DEXA scan to be completed at age 65  Hep C (Age 18-79 once):  previously negative  HIV (Age 15-65 once): previously negative  A1c: previously completed  Lipid panel: ordered      Dermatology: established  Ophthalmologist: established  Dentist: established    Tobacco Use: Low Risk    • Smoking Tobacco Use: Never   • Smokeless Tobacco Use: Never   • Passive Exposure: Not on file       Social History      Substance and Sexual Activity   Alcohol Use No        Social History     Substance and Sexual Activity   Drug Use Never    Comment: Tried an edible last week.         Diet/Physical activity: counseled on 02/27/23    PHQ-2 Depression Screening  Little interest or pleasure in doing things? 0-->not at all   Feeling down, depressed, or hopeless? 2-->more than half the days   PHQ-2 Total Score 7     PHQ-9 Total Score: 7     Intimate partner violence: (Screen on initial visit, pregnant women, women with injuries, older adult with injury or evidence of neglect): no concerns  • Violence can be a problem in many people's lives, so I now ask every patient about trauma or abuse they may have experienced in a relationship.  • Stress/Safety - Do you feel safe in your relationship?  • Afraid/Abused - Have you ever been in a relationship where you were threatened, hurt, or afraid?  • Friend/Family - Are your friends aware you have been hurt?  • Emergency Plan - Do you have a safe place to go and the resources you need in an emergency?    Osteoporosis: no concerns  • Ost menopausal women < 65 with RF (advancing age, previous fracture, glucocorticoid therapy, parental hip fracture, low body weight, current cigarette smoking, excessive alcohol consumption, rheumatoid arthritis, secondary osteoporosis [hypogonadism/premature menopause, malabsorption, chronic liver disease, IBD]).  • All women 65 or older      Aspirin is not on active medication list.  Aspirin use is not indicated based on review of current medical condition/s. Risk of harm outweighs potential benefits.  .    Patient Active Problem List   Diagnosis   • Menopause   • Personal history of malignant neoplasm of thyroid   • History of stroke   • Renal impairment   • Vision impairment   • Osteoporosis   • Osteoarthritis   • Meningioma (HCC)   • Hypothyroid   • Pure hypercholesterolemia   • Hepatic cirrhosis (HCC)   • Anxiety and depression   • Acute hypoxemic respiratory  "failure due to COVID-19 (HCC)   • S/P cholecystectomy   • Severe malnutrition (CMS/HCC)   • Leukopenia   • Immunosuppression due to drug therapy (HCC)   • Insomnia   • Sleep apnea   • Gout   • Cytokine release syndrome, grade 2   • Precordial pain   • Viral gastroenteritis   • Postoperative hypothyroidism   • C. difficile diarrhea   • Stage 3a chronic kidney disease (HCC)   • Gastroesophageal reflux disease with esophagitis without hemorrhage   • Cerebrovascular accident (CVA) (HCC)   • Osteoarthritis of left hip   • Prediabetes   • Vocal cord dysfunction   • Gastroparesis   • Liver transplant recipient (HCC)     Advance Care Planning  Advance Directive is on file.  ACP discussion was held with the patient during this visit. Patient has an advance directive in EMR which is still valid.           Objective    Vitals:    02/24/23 1538   BP: 118/72   BP Location: Left arm   Patient Position: Sitting   Cuff Size: Adult   Resp: 18   Temp: 98 °F (36.7 °C)   TempSrc: Temporal   Weight: 68.4 kg (150 lb 12.8 oz)   Height: 156 cm (61.42\")   PainSc: 0-No pain     Estimated body mass index is 28.11 kg/m² as calculated from the following:    Height as of this encounter: 156 cm (61.42\").    Weight as of this encounter: 68.4 kg (150 lb 12.8 oz).    BMI is >= 25 and <30. (Overweight) The following options were offered after discussion;: weight loss educational material (shared in after visit summary), exercise counseling/recommendations and nutrition counseling/recommendations      Does the patient have evidence of cognitive impairment? No    Physical Exam  Lab Results   Component Value Date    HGBA1C 5.10 02/20/2023            HEALTH RISK ASSESSMENT    Smoking Status:  Social History     Tobacco Use   Smoking Status Never   Smokeless Tobacco Never     Alcohol Consumption:  Social History     Substance and Sexual Activity   Alcohol Use No     Fall Risk Screen:    STEADI Fall Risk Assessment was completed, and patient is at HIGH " risk for falls. Assessment completed on:2/24/2023    Depression Screening:  PHQ-2/PHQ-9 Depression Screening 2/24/2023   Little Interest or Pleasure in Doing Things 0-->not at all   Feeling Down, Depressed or Hopeless 2-->more than half the days   Trouble Falling or Staying Asleep, or Sleeping Too Much 1-->several days   Feeling Tired or Having Little Energy 3-->nearly every day   Poor Appetite or Overeating 0-->not at all   Feeling Bad about Yourself - or that You are a Failure or Have Let Yourself or Your Family Down 1-->several days   Trouble Concentrating on Things, Such as Reading the Newspaper or Watching Television 0-->not at all   Moving or Speaking So Slowly that Other People Could Have Noticed? Or the Opposite - Being So Fidgety 0-->not at all   Thoughts that You Would be Better Off Dead or of Hurting Yourself in Some Way 0-->not at all   PHQ-9: Brief Depression Severity Measure Score 7   If You Checked Off Any Problems, How Difficult Have These Problems Made It For You to Do Your Work, Take Care of Things at Home, or Get Along with Other People? somewhat difficult       Health Habits and Functional and Cognitive Screening:  Functional & Cognitive Status 2/24/2023   Do you have difficulty preparing food and eating? No   Do you have difficulty bathing yourself, getting dressed or grooming yourself? No   Do you have difficulty using the toilet? No   Do you have difficulty moving around from place to place? Yes   Do you have trouble with steps or getting out of a bed or a chair? Yes   Current Diet Low Carb Diet   Exercise (times per week) 2 times per week   Current Exercises Include Bicycling Outdoors;Swimming   Do you need help using the phone?  No   Are you deaf or do you have serious difficulty hearing?  Yes   Do you need help with transportation? No   Do you need help shopping? No   Do you need help preparing meals?  No   Do you need help with housework?  Yes   Do you need help with laundry? No   Do you  need help taking your medications? No   Do you need help managing money? No   Do you ever drive or ride in a car without wearing a seat belt? No   Have you felt unusual stress, anger or loneliness in the last month? No   Who do you live with? Spouse   If you need help, do you have trouble finding someone available to you? No   Have you been bothered in the last four weeks by sexual problems? No   Do you have difficulty concentrating, remembering or making decisions? Yes       Age-appropriate Screening Schedule:  Refer to the list below for future screening recommendations based on patient's age, sex and/or medical conditions. Orders for these recommended tests are listed in the plan section. The patient has been provided with a written plan.    Health Maintenance   Topic Date Due   • ZOSTER VACCINE (1 of 2) Never done   • Hepatitis B (2 of 3 - 3-dose series) 01/02/2019   • DIABETIC FOOT EXAM  Never done   • COVID-19 Vaccine (5 - Booster for Pfizer series) 04/18/2022   • URINE MICROALBUMIN  03/08/2023   • LIPID PANEL  07/28/2023   • HEMOGLOBIN A1C  08/20/2023   • DIABETIC EYE EXAM  11/23/2023   • DXA SCAN  12/13/2023   • ANNUAL WELLNESS VISIT  02/24/2024   • COLORECTAL CANCER SCREENING  09/02/2024   • MAMMOGRAM  10/03/2024   • TDAP/TD VACCINES (2 - Td or Tdap) 08/29/2027   • HEPATITIS C SCREENING  Completed   • INFLUENZA VACCINE  Completed   • Pneumococcal Vaccine 0-64  Discontinued   • PAP SMEAR  Discontinued              Assessment & Plan   CMS Preventative Services Quick Reference  Risk Factors Identified During Encounter  Polypharmacy: Medication List reviewed  The above risks/problems have been discussed with the patient.  Follow up actions/plans if indicated are seen below in the Assessment/Plan Section.  Pertinent information has been shared with the patient in the After Visit Summary.    Diagnoses and all orders for this visit:    1. Medicare annual wellness visit, subsequent (Primary)  -     Cancel: Code  Status and Medical Interventions:  -     Code Status and Medical Interventions:    2. Healthcare maintenance    3. Prediabetes    4. Stage 3a chronic kidney disease (HCC)  -     Basic Metabolic Panel; Future  -     Cystatin C; Future    5. Pure hypercholesterolemia  -     Lipid Panel; Future    6. Vocal cord dysfunction    7. Encounter for screening mammogram for malignant neoplasm of breast  -     Mammo Screening Digital Tomosynthesis Bilateral With CAD; Future    8. Anxiety and depression  -     buPROPion XL (Wellbutrin XL) 300 MG 24 hr tablet; Take 1 tablet by mouth Every Morning.  Dispense: 45 tablet; Refill: 1    9. Liver transplant recipient (HCC)  -     spironolactone (Aldactone) 25 MG tablet; Take 1 tablet by mouth Daily.  Dispense: 45 tablet; Refill: 1    10. Gastroparesis        Healthcare Maintenance:  Counseling provided based on age appropriate USPSTF guidelines.  BMI is >= 25 and <30. (Overweight) The following options were offered after discussion;: weight loss educational material (shared in after visit summary), exercise counseling/recommendations and nutrition counseling/recommendations    Lizette Frias voices understanding and acceptance of this advice and will call back with any further questions or concerns. AVS with preventive healthcare tips printed for patient.     “Discussed risks/benefits to vaccination, reviewed components of the vaccine, discussed VIS, discussed informed consent, informed consent obtained. Patient/Parent was allowed to accept or refuse vaccine. Questions answered to satisfactory state of patient/Parent. We reviewed typical age appropriate and seasonally appropriate vaccinations. Reviewed immunization history and updated state vaccination form as needed. Patient was counseled on COVID-19 Bivalent  Influenza      Follow Up:   Return in about 3 months (around 5/24/2023) for Recheck.      Transcribed from ambient dictation for Lars Fermin MD by Michelle  Macey.  02/24/23   17:17 EST    Patient or patient representative verbalized consent to the visit recording.  I have personally performed the services described in this document as transcribed by the above individual, and it is both accurate and complete.      Lars Fermin MD  [facility]

## 2023-03-06 RX ORDER — TRAZODONE HYDROCHLORIDE 100 MG/1
200 TABLET ORAL
Qty: 180 TABLET | OUTPATIENT
Start: 2023-03-06

## 2023-03-07 ENCOUNTER — OFFICE VISIT (OUTPATIENT)
Dept: OBSTETRICS AND GYNECOLOGY | Facility: CLINIC | Age: 58
End: 2023-03-07
Payer: COMMERCIAL

## 2023-03-07 VITALS
SYSTOLIC BLOOD PRESSURE: 110 MMHG | WEIGHT: 148.8 LBS | HEIGHT: 61 IN | DIASTOLIC BLOOD PRESSURE: 72 MMHG | BODY MASS INDEX: 28.09 KG/M2

## 2023-03-07 DIAGNOSIS — N94.9 VAGINAL BURNING: ICD-10-CM

## 2023-03-07 DIAGNOSIS — M15.9 PRIMARY OSTEOARTHRITIS INVOLVING MULTIPLE JOINTS: ICD-10-CM

## 2023-03-07 DIAGNOSIS — Z01.411 ENCOUNTER FOR GYNECOLOGICAL EXAMINATION WITH ABNORMAL FINDING: ICD-10-CM

## 2023-03-07 DIAGNOSIS — N94.10 FEMALE DYSPAREUNIA: ICD-10-CM

## 2023-03-07 PROCEDURE — 99396 PREV VISIT EST AGE 40-64: CPT | Performed by: NURSE PRACTITIONER

## 2023-03-07 RX ORDER — ESTRADIOL 0.1 MG/G
CREAM VAGINAL
Qty: 42.5 G | Refills: 4 | Status: SHIPPED | OUTPATIENT
Start: 2023-03-07

## 2023-03-07 RX ORDER — METHOCARBAMOL 500 MG/1
TABLET, FILM COATED ORAL
Qty: 60 TABLET | Refills: 0 | Status: SHIPPED | OUTPATIENT
Start: 2023-03-07 | End: 2023-03-27

## 2023-03-07 NOTE — PROGRESS NOTES
Chief Complaint  Lizette Frias is a 58 y.o.  female presenting for Annual Exam (Difficulty emptying bladder.  Concerned she may have cystocele.)    History of Present Illness  Lizette is a 59yo woman, , here for annual gyn exam.  She is a complex medical patient, who had hepatic failure, and a transplant.  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  At this point, she does not have cirrhosis yet, but she does have a fatty liver.  She has really been dieting (wt loss of 35#) and exercising.  She now also has been dx with CKD (Stage 1) and gastroparesis.  Gyn c/o of persistent vaginal burning when she tries to be SA.  She stopped the last med, though the vagina appeared to be better upon exam, because it didn't help the dyspareunia.  Insurance wouldn't pay for some meds.  We discussed OTC med, RepHresh for helping to keep pH balance.      The following portions of the patient's history were reviewed and updated as appropriate: allergies, current medications, past family history, past medical history, past social history, past surgical history and problem list.    Allergies   Allergen Reactions   • Penicillins Shortness Of Breath, Itching, Other (See Comments) and Rash   • Cyclosporine Swelling         Current Outpatient Medications:   •  alendronate (FOSAMAX) 70 MG tablet, Take 1 tablet by mouth 1 (One) Time Per Week., Disp: , Rfl:   •  amLODIPine (NORVASC) 10 MG tablet, Take 1 tablet by mouth Daily., Disp: , Rfl:   •  atorvastatin (LIPITOR) 40 MG tablet, Daily., Disp: , Rfl:   •  buPROPion XL (Wellbutrin XL) 300 MG 24 hr tablet, Take 1 tablet by mouth Every Morning., Disp: 45 tablet, Rfl: 1  •  calcium carbonate-vitamin d 600-400 MG-UNIT per tablet, Take 1 tablet by mouth Daily., Disp: , Rfl:   •  cycloSPORINE (RESTASIS) 0.05 % ophthalmic emulsion, Restasis MultiDose 0.05 % eye drops  INSTILL 1 DROP (1GTT) INTO BOTH  EYES TWICE DAILY, Disp: , Rfl:   •  estradiol (ESTRACE VAGINAL) 0.1 MG/GM vaginal cream, Insert 0.5 g intravaginally at HS 2-3x/ week., Disp: 42.5 g, Rfl: 4  •  fluticasone (FLONASE) 50 MCG/ACT nasal spray, USE 2 SPRAYS IN EACH NOSTRIL TWICE DAILY, Disp: 48 mL, Rfl: 1  •  furosemide (LASIX) 20 MG tablet, Hold until follow up with your primary care provider (Patient taking differently: Take 20 mg by mouth Daily.), Disp: , Rfl:   •  icosapent ethyl (VASCEPA) 1 g capsule capsule, Take 2 capsules by mouth 2 (Two) Times a Day With Meals., Disp: , Rfl:   •  isosorbide dinitrate (ISORDIL) 30 MG tablet, Take 1 tablet by mouth 2 (Two) Times a Day. Need f/u appt for further refills., Disp: 60 tablet, Rfl: 0  •  isosorbide mononitrate (IMDUR) 60 MG 24 hr tablet, Take 1 tablet by mouth Every Night., Disp: , Rfl:   •  levothyroxine (SYNTHROID, LEVOTHROID) 175 MCG tablet, Take 1 tablet by mouth Every Morning., Disp: 90 tablet, Rfl: 0  •  melatonin ([employer] Melatonin) 5 MG tablet tablet, Take 1 tablet by mouth Every Night., Disp: 90 tablet, Rfl: 1  •  methocarbamol (ROBAXIN) 500 MG tablet, TAKE 1 TABLET BY MOUTH FOUR TIMES A DAY, Disp: 60 tablet, Rfl: 0  •  metoprolol succinate XL (TOPROL-XL) 100 MG 24 hr tablet, Take 100 mg by mouth Daily., Disp: , Rfl:   •  mirtazapine (REMERON) 30 MG tablet, Take 30 mg by mouth every night at bedtime., Disp: , Rfl:   •  nitroglycerin (NITROSTAT) 0.4 MG SL tablet, Place 0.4 mg under the tongue., Disp: , Rfl:   •  pantoprazole (PROTONIX) 40 MG EC tablet, Take 40 mg by mouth Daily., Disp: , Rfl:   •  potassium chloride (MICRO-K) 10 MEQ CR capsule, Hold until Lasix (furosemide) restarted (Patient taking differently: Take 10 mEq by mouth Daily. Hold until Lasix (furosemide) restarted), Disp: , Rfl:   •  pregabalin (LYRICA) 100 MG capsule, Every 8 (Eight) Hours., Disp: , Rfl:   •  rOPINIRole (REQUIP) 2 MG tablet, TAKE 1 - 2 TABLET(S) BY MOUTH EVERY NIGHT AT BEDTIME., Disp: 180 tablet, Rfl: 1  •   Semaglutide,0.25 or 0.5MG/DOS, (OZEMPIC) 2 MG/1.5ML solution pen-injector, Inject 0.5 mg under the skin into the appropriate area as directed 1 (One) Time Per Week., Disp: 1.5 mL, Rfl: 3  •  sertraline (ZOLOFT) 20 MG/ML concentrated solution, Take 5 mL by mouth Daily., Disp: 200 mL, Rfl: 1  •  spironolactone (Aldactone) 25 MG tablet, Take 1 tablet by mouth Daily., Disp: 45 tablet, Rfl: 1  •  sucralfate (Carafate) 1 g tablet, Take 1 tablet by mouth 4 (Four) Times a Day., Disp: 360 tablet, Rfl: 1  •  tacrolimus (PROGRAF) 0.5 MG capsule, Take 2 capsules by mouth 2 (Two) Times a Day., Disp: , Rfl:   •  TRAZODONE 100MG/5ML LIQUID, Take 10 mL by mouth every night at bedtime., Disp: 300 mL, Rfl: 3  •  vitamin D (ERGOCALCIFEROL) 1.25 MG (05446 UT) capsule capsule, Take 50,000 Units by mouth Every 7 (Seven) Days.  , Disp: , Rfl:   •  zolpidem (AMBIEN) 5 MG tablet, Take 5 mg by mouth Every Night., Disp: , Rfl:     Past Medical History:   Diagnosis Date   • Allergic     Environmental, pcn, cyclosporine   • Anxiety    • Cholelithiasis    • Cirrhosis of liver (HCC)    • Clotting disorder (HCC)     Pre-transplant   • Colon polyp    • COVID    • Deep vein thrombosis (HCC) Pretransplant    Blood clot in liver   • Depression    • Diabetes (HCC)    • GERD (gastroesophageal reflux disease)    • Headache    • Heart murmur    • HL (hearing loss)     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, mastoid damage   • Hypercholesteremia    • Hypertension    • Hyperthyroidism    • Hypothyroid    • Low back pain    • Meningioma (HCC)    • Osteoarthritis    • Osteopenia    • Osteoporosis    • Pancreatitis     Pretransplant   • Renal impairment    • Stroke (HCC)    • Thyroid cancer (HCC)    • Vision impairment     left eye        Past Surgical History:   Procedure Laterality Date   • APPENDECTOMY     • BREAST BIOPSY Left     BENIGN   •  SECTION      x3   • CHOLECYSTECTOMY     • D & C HYSTEROSCOPY LAPAROSCOPY      x2 for endometriosis   •  "ENDOSCOPY     • FRACTURE SURGERY  1983    Right ankle has screws   • LIVER SURGERY      removed cysts    • LIVER TRANSPLANTATION     • ORIF ANKLE FRACTURE     • THYROIDECTOMY     • TONSILLECTOMY     • TOTAL ABDOMINAL HYSTERECTOMY WITH SALPINGO OOPHORECTOMY Bilateral    • UPPER GASTROINTESTINAL ENDOSCOPY         Objective  /72   Ht 156 cm (61.42\")   Wt 67.5 kg (148 lb 12.8 oz)   LMP  (LMP Unknown) Comment: Last pap 3/3/2022 by [facility]  Breastfeeding No   BMI 27.73 kg/m²     Physical Exam  Vitals and nursing note reviewed. Exam conducted with a chaperone present.   Constitutional:       General: She is not in acute distress.     Appearance: Normal appearance. She is not ill-appearing.   HENT:      Head: Normocephalic.   Neck:      Thyroid: No thyroid mass or thyromegaly.   Cardiovascular:      Rate and Rhythm: Normal rate and regular rhythm.      Heart sounds: Normal heart sounds. No murmur heard.  Pulmonary:      Effort: Pulmonary effort is normal. No respiratory distress.      Breath sounds: Normal breath sounds.   Chest:   Breasts:     Right: No inverted nipple, mass or nipple discharge.      Left: No inverted nipple, mass or nipple discharge.   Abdominal:      Palpations: Abdomen is soft. There is no mass.      Tenderness: There is no abdominal tenderness.   Genitourinary:     General: Normal vulva.      Labia:         Right: No rash, tenderness or lesion.         Left: No rash, tenderness or lesion.       Vagina: No vaginal discharge or erythema.      Uterus: Absent.       Adnexa:         Right: No mass or tenderness.          Left: No mass or tenderness.        Comments: Only mild atrophic erythema of the vaginal mucosa.  No abnormal discharge.  Anus appears wnl.  No rectal exam performed.  Lymphadenopathy:      Upper Body:      Right upper body: No supraclavicular or axillary adenopathy.      Left upper body: No supraclavicular or axillary adenopathy.   Skin:     General: Skin is warm " and dry.   Neurological:      Mental Status: She is alert and oriented to person, place, and time.   Psychiatric:         Mood and Affect: Mood normal.         Behavior: Behavior normal.         Assessment/Plan   Diagnoses and all orders for this visit:    1. Encounter for gynecological examination with abnormal finding    2. Vaginal burning  -     estradiol (ESTRACE VAGINAL) 0.1 MG/GM vaginal cream; Insert 0.5 g intravaginally at HS 2-3x/ week.  Dispense: 42.5 g; Refill: 4    3. Female dyspareunia  -     estradiol (ESTRACE VAGINAL) 0.1 MG/GM vaginal cream; Insert 0.5 g intravaginally at HS 2-3x/ week.  Dispense: 42.5 g; Refill: 4        Procedures    40 to 64: Counseling/Anticipatory Guidance Discussed: nutrition, physical activity, screenings and self-breast exam    Return in about 3 months (around 6/7/2023) for Recheck follow up of atrophy, vaginitis sx, dyspareunia..    Carmita Nelson, AISLINN  03/07/2023

## 2023-03-10 DIAGNOSIS — F51.01 PRIMARY INSOMNIA: Primary | ICD-10-CM

## 2023-03-10 DIAGNOSIS — F51.01 PRIMARY INSOMNIA: ICD-10-CM

## 2023-03-10 RX ORDER — TRAZODONE HYDROCHLORIDE 100 MG/1
200 TABLET ORAL NIGHTLY
Qty: 90 TABLET | Refills: 3 | Status: SHIPPED | OUTPATIENT
Start: 2023-03-10 | End: 2023-03-16

## 2023-03-15 DIAGNOSIS — Z94.4 LIVER TRANSPLANT RECIPIENT: ICD-10-CM

## 2023-03-15 RX ORDER — SPIRONOLACTONE 25 MG/1
TABLET ORAL
Qty: 90 TABLET | Refills: 0 | Status: SHIPPED | OUTPATIENT
Start: 2023-03-15

## 2023-03-16 ENCOUNTER — OFFICE VISIT (OUTPATIENT)
Dept: INTERNAL MEDICINE | Facility: CLINIC | Age: 58
End: 2023-03-16
Payer: COMMERCIAL

## 2023-03-16 VITALS
WEIGHT: 149.4 LBS | OXYGEN SATURATION: 97 % | HEART RATE: 64 BPM | SYSTOLIC BLOOD PRESSURE: 114 MMHG | DIASTOLIC BLOOD PRESSURE: 72 MMHG | BODY MASS INDEX: 28.21 KG/M2 | HEIGHT: 61 IN | TEMPERATURE: 97.4 F | RESPIRATION RATE: 14 BRPM

## 2023-03-16 DIAGNOSIS — M19.071 PRIMARY OSTEOARTHRITIS OF BOTH FEET: ICD-10-CM

## 2023-03-16 DIAGNOSIS — R80.9 ALBUMINURIA: ICD-10-CM

## 2023-03-16 DIAGNOSIS — J18.9 COMMUNITY ACQUIRED PNEUMONIA, UNSPECIFIED LATERALITY: Primary | ICD-10-CM

## 2023-03-16 DIAGNOSIS — N18.31 STAGE 3A CHRONIC KIDNEY DISEASE: ICD-10-CM

## 2023-03-16 DIAGNOSIS — Z23 ENCOUNTER FOR VACCINATION: ICD-10-CM

## 2023-03-16 DIAGNOSIS — F51.01 PRIMARY INSOMNIA: ICD-10-CM

## 2023-03-16 DIAGNOSIS — M19.072 PRIMARY OSTEOARTHRITIS OF BOTH FEET: ICD-10-CM

## 2023-03-16 PROBLEM — E43 SEVERE MALNUTRITION: Status: RESOLVED | Noted: 2022-02-03 | Resolved: 2023-03-16

## 2023-03-16 LAB
POC CREATININE URINE: 300
POC MICROALBUMIN URINE: 30

## 2023-03-16 PROCEDURE — 90746 HEPB VACCINE 3 DOSE ADULT IM: CPT | Performed by: STUDENT IN AN ORGANIZED HEALTH CARE EDUCATION/TRAINING PROGRAM

## 2023-03-16 PROCEDURE — 82044 UR ALBUMIN SEMIQUANTITATIVE: CPT | Performed by: STUDENT IN AN ORGANIZED HEALTH CARE EDUCATION/TRAINING PROGRAM

## 2023-03-16 PROCEDURE — 90471 IMMUNIZATION ADMIN: CPT | Performed by: STUDENT IN AN ORGANIZED HEALTH CARE EDUCATION/TRAINING PROGRAM

## 2023-03-16 PROCEDURE — 99215 OFFICE O/P EST HI 40 MIN: CPT | Performed by: STUDENT IN AN ORGANIZED HEALTH CARE EDUCATION/TRAINING PROGRAM

## 2023-03-16 RX ORDER — CHOLECALCIFEROL (VITAMIN D3) 125 MCG
5 CAPSULE ORAL DAILY
COMMUNITY
Start: 2023-02-20 | End: 2023-03-16

## 2023-03-16 RX ORDER — TACROLIMUS 0.5 MG/1
1 CAPSULE ORAL
COMMUNITY
Start: 2023-02-27

## 2023-03-16 RX ORDER — BENZONATATE 100 MG/1
CAPSULE ORAL
COMMUNITY
Start: 2023-03-13

## 2023-03-16 RX ORDER — TRAZODONE HYDROCHLORIDE 100 MG/1
200 TABLET ORAL NIGHTLY
Qty: 90 TABLET | Refills: 1 | Status: SHIPPED | OUTPATIENT
Start: 2023-03-16

## 2023-03-16 RX ORDER — DOXYCYCLINE HYCLATE 100 MG
TABLET ORAL
COMMUNITY
Start: 2023-03-13 | End: 2023-03-16

## 2023-03-16 RX ORDER — ATORVASTATIN CALCIUM 20 MG/1
40 TABLET, FILM COATED ORAL
COMMUNITY

## 2023-03-16 RX ORDER — BUPROPION HYDROCHLORIDE 300 MG/1
1 TABLET ORAL DAILY
COMMUNITY
Start: 2023-02-24

## 2023-03-16 RX ORDER — FLUTICASONE PROPIONATE 50 MCG
2 SPRAY, SUSPENSION (ML) NASAL
COMMUNITY
Start: 2023-02-02 | End: 2023-03-16

## 2023-03-16 RX ORDER — SERTRALINE HYDROCHLORIDE 20 MG/ML
5 SOLUTION ORAL DAILY
COMMUNITY
Start: 2023-01-06

## 2023-03-16 RX ORDER — LEVOTHYROXINE SODIUM 0.12 MG/1
125 TABLET ORAL DAILY
COMMUNITY
Start: 2023-02-23 | End: 2023-03-16

## 2023-03-16 RX ORDER — DOXYCYCLINE HYCLATE 100 MG
100 TABLET ORAL
COMMUNITY
Start: 2023-03-13 | End: 2023-03-24

## 2023-03-16 NOTE — ASSESSMENT & PLAN NOTE
- Patient referred to orthopedic surgeon, Dr. Ramirez.  - Patient will return to clinic on 04/13/2023.

## 2023-03-16 NOTE — ASSESSMENT & PLAN NOTE
- Patient prescribed Tessalon 100 mg tablets and fluticasone 27.5 mcg nasal spray.  - Patient will return to clinic on 04/13/2023.

## 2023-03-16 NOTE — ASSESSMENT & PLAN NOTE
- Urine microalbumin collected today.   - Patient was started on Farxiga 5 mg.  - Patient will return to clinic on 04/13/2023.

## 2023-03-16 NOTE — PROGRESS NOTES
"    Follow Up Office Visit      Date: 2023   Patient Name: Lizette Frias  : 1965   MRN: [id number]     Chief Complaint:    Chief Complaint   Patient presents with   • Hospital Follow Up Visit     DC: 3-13-23       History of Present Illness: Lizette Frias is a 58 y.o. female who presents to the clinic today for a hospital follow-up visit.     Community acquired pneumonia, unspecified laterality  Ms. Frias reports feeling \"out of it.\" She has been having difficulty falling asleep. The patient began feeling foggy and fatigued on 2023. She initially believed that she may have had bronchitis. She then suspected that she may have pneumonia, as she had difficulty expelling sputum because it was so thick. She subsequently began experiencing tightness in her chest. The patient was prescribed doxycycline on 2023 and has noticed some improvement since starting the antibiotic. She does continue to cough productively a bit, and she describes her sputum as thick and green in nature. She recalls that her blood pressure was approximately 96/69 while she was in the emergency department. The patient has inquired about whether her hypertension medication dosage should be adjusted. She reports that her blood pressure typically runs 110/70 to 120/80 at home. She did have some concerns as her electrocardiogram stated that it was abnormal.    Stage 3a chronic kidney disease  The patient is currently taking Ozempic and states that she was prescribed this to reduce the fat in her liver. She has concerns about discontinuing Ozempic. She consumes a low-fat, low carbohydrate diet.     Primary osteoarthritis of both feet  Ms. Frias adds that her bilateral foot pain is worsening. She reports that if she sits for greater than 10 to 15 minutes, she experiences a pain score of 8 out of 10 when trying to walk. The patient has previously spent $1300 on medical grade insoles, but this did not help much. She reports " that her grandmother had gotten to a point where she could barely walk, and the patient states she experiences similar pain. She is currently under the care of a pain clinic for osteoarthritis in her hip.     Encounter for vaccination  The patient is agreeable to receiving her hepatitis B vaccination today. She would like to check with her infectious disease doctor prior to receiving her COVID-19 booster.     Subjective      Review of Systems:   Review of Systems   Constitutional: Positive for fatigue. Negative for activity change, appetite change and fever.   Eyes: Negative for blurred vision, photophobia and visual disturbance.   Respiratory: Positive for cough. Negative for chest tightness and shortness of breath.    Cardiovascular: Negative for chest pain and palpitations.   Gastrointestinal: Negative for abdominal distention, abdominal pain, blood in stool, constipation, diarrhea, nausea and vomiting.   Genitourinary: Negative for dysuria and hematuria.   Musculoskeletal: Positive for arthralgias. Negative for back pain and joint swelling.   Skin: Negative for rash and wound.   Neurological: Negative for weakness, headache and confusion.       I have reviewed the patients family history, social history, past medical history, past surgical history and have updated it as appropriate.     Medications:     Current Outpatient Medications:   •  alendronate (FOSAMAX) 70 MG tablet, Take 1 tablet by mouth 1 (One) Time Per Week., Disp: , Rfl:   •  amLODIPine (NORVASC) 10 MG tablet, Take 1 tablet by mouth Daily., Disp: , Rfl:   •  benzonatate (TESSALON) 100 MG capsule, , Disp: , Rfl:   •  buPROPion XL (Wellbutrin XL) 300 MG 24 hr tablet, Take 1 tablet by mouth Every Morning., Disp: 45 tablet, Rfl: 1  •  buPROPion XL (WELLBUTRIN XL) 300 MG 24 hr tablet, Take 1 tablet by mouth Daily., Disp: , Rfl:   •  calcium carbonate-vitamin d 600-400 MG-UNIT per tablet, Take 1 tablet by mouth Daily., Disp: , Rfl:   •  cyanocobalamin  (VITAMIN B-12) 1000 MCG tablet, Take 1 tablet by mouth., Disp: , Rfl:   •  cycloSPORINE (RESTASIS) 0.05 % ophthalmic emulsion, Restasis MultiDose 0.05 % eye drops  INSTILL 1 DROP (1GTT) INTO BOTH EYES TWICE DAILY, Disp: , Rfl:   •  doxycycline (VIBRAMYICN) 100 MG tablet, Take 1 tablet by mouth., Disp: , Rfl:   •  estradiol (ESTRACE VAGINAL) 0.1 MG/GM vaginal cream, Insert 0.5 g intravaginally at HS 2-3x/ week., Disp: 42.5 g, Rfl: 4  •  fluticasone (FLONASE) 50 MCG/ACT nasal spray, USE 2 SPRAYS IN EACH NOSTRIL TWICE DAILY, Disp: 48 mL, Rfl: 1  •  fluticasone (VERAMYST) 27.5 MCG/SPRAY nasal spray, 2 sprays into the nostril(s) as directed by provider., Disp: , Rfl:   •  icosapent ethyl (VASCEPA) 1 g capsule capsule, Take 2 g by mouth 2 (Two) Times a Day With Meals., Disp: , Rfl:   •  isosorbide dinitrate (ISORDIL) 30 MG tablet, Take 1 tablet by mouth 2 (Two) Times a Day. Need f/u appt for further refills., Disp: 60 tablet, Rfl: 0  •  isosorbide mononitrate (IMDUR) 60 MG 24 hr tablet, Take 1 tablet by mouth Every Night., Disp: , Rfl:   •  levothyroxine (SYNTHROID, LEVOTHROID) 175 MCG tablet, Take 1 tablet by mouth Every Morning., Disp: 90 tablet, Rfl: 0  •  melatonin ([employer] Melatonin) 5 MG tablet tablet, Take 1 tablet by mouth Every Night., Disp: 90 tablet, Rfl: 1  •  methocarbamol (ROBAXIN) 500 MG tablet, TAKE 1 TABLET BY MOUTH FOUR TIMES A DAY, Disp: 60 tablet, Rfl: 0  •  metoprolol succinate XL (TOPROL-XL) 100 MG 24 hr tablet, Take 1 tablet by mouth Daily., Disp: , Rfl:   •  mirtazapine (REMERON) 30 MG tablet, Take 1 tablet by mouth every night at bedtime., Disp: , Rfl:   •  nitroglycerin (NITROSTAT) 0.4 MG SL tablet, Place 1 tablet under the tongue., Disp: , Rfl:   •  pantoprazole (PROTONIX) 40 MG EC tablet, Take 1 tablet by mouth Daily., Disp: , Rfl:   •  potassium chloride (MICRO-K) 10 MEQ CR capsule, Hold until Lasix (furosemide) restarted (Patient taking differently: Take 1 capsule by mouth Daily. Hold until Lasix  (furosemide) restarted), Disp: , Rfl:   •  pregabalin (LYRICA) 100 MG capsule, Every 8 (Eight) Hours., Disp: , Rfl:   •  rOPINIRole (REQUIP) 2 MG tablet, TAKE 1 - 2 TABLET(S) BY MOUTH EVERY NIGHT AT BEDTIME., Disp: 180 tablet, Rfl: 1  •  Semaglutide,0.25 or 0.5MG/DOS, (OZEMPIC) 2 MG/1.5ML solution pen-injector, Inject 0.5 mg under the skin into the appropriate area as directed 1 (One) Time Per Week., Disp: 1.5 mL, Rfl: 3  •  sertraline (ZOLOFT) 20 MG/ML concentrated solution, Take 5 mL by mouth Daily., Disp: 200 mL, Rfl: 1  •  sertraline (ZOLOFT) 20 MG/ML concentrated solution, Take 5 mL by mouth Daily., Disp: , Rfl:   •  spironolactone (ALDACTONE) 25 MG tablet, TAKE 1 TABLET BY MOUTH EVERY DAY, Disp: 90 tablet, Rfl: 0  •  sucralfate (Carafate) 1 g tablet, Take 1 tablet by mouth 4 (Four) Times a Day., Disp: 360 tablet, Rfl: 1  •  tacrolimus (PROGRAF) 0.5 MG capsule, Take 2 capsules by mouth 2 (Two) Times a Day., Disp: , Rfl:   •  tacrolimus (PROGRAF) 0.5 MG capsule, Take 2 capsules by mouth., Disp: , Rfl:   •  vitamin D (ERGOCALCIFEROL) 1.25 MG (24938 UT) capsule capsule, Take 1 capsule by mouth Every 7 (Seven) Days.  , Disp: , Rfl:   •  zolpidem (AMBIEN) 5 MG tablet, Take 1 tablet by mouth Every Night., Disp: , Rfl:   •  atorvastatin (LIPITOR) 20 MG tablet, Take 2 tablets by mouth., Disp: , Rfl:   •  atorvastatin (LIPITOR) 40 MG tablet, Daily., Disp: , Rfl:   •  dapagliflozin (FARXIGA) 5 MG tablet tablet, Take 1 tablet by mouth Daily., Disp: 60 tablet, Rfl: 1  •  traZODone (DESYREL) 100 MG tablet, Take 2 tablets by mouth Every Night., Disp: 90 tablet, Rfl: 1    Allergies:   Allergies   Allergen Reactions   • Penicillins Shortness Of Breath, Itching, Other (See Comments) and Rash   • Cephalosporins Diarrhea   • Cyclosporine Swelling       Objective     Physical Exam: Please see above  Vital Signs:   Vitals:    03/16/23 1310   BP: 114/72   BP Location: Left arm   Patient Position: Sitting   Cuff Size: Adult   Pulse:  "64   Resp: 14   Temp: 97.4 °F (36.3 °C)   TempSrc: Infrared   SpO2: 97%   Weight: 67.8 kg (149 lb 6.4 oz)   Height: 156 cm (61.42\")     Body mass index is 27.85 kg/m².      Physical Exam  Vitals and nursing note reviewed.   Constitutional:       General: She is not in acute distress.     Appearance: Normal appearance. She is normal weight. She is not ill-appearing or toxic-appearing.   HENT:      Nose: No congestion or rhinorrhea.   Eyes:      General:         Right eye: No discharge.         Left eye: No discharge.      Conjunctiva/sclera: Conjunctivae normal.   Pulmonary:      Effort: Pulmonary effort is normal. No respiratory distress.   Abdominal:      General: Abdomen is flat. There is no distension.   Musculoskeletal:      Cervical back: Normal range of motion.   Skin:     Coloration: Skin is not jaundiced.      Findings: No rash.   Neurological:      General: No focal deficit present.      Mental Status: She is alert. Mental status is at baseline.      Coordination: Coordination normal.      Gait: Gait normal.   Psychiatric:         Mood and Affect: Mood normal.         Behavior: Behavior normal.         Thought Content: Thought content normal.         Judgment: Judgment normal.         Procedures    Results: The patient's most recent hemoglobin level was 14.4. Her troponin level was low. Her renal function was slightly elevated, at 0.01 above normal. She had COVID-19 testing obtained which was negative. Her chest x-ray demonstrated infiltrates in her lungs.     Labs:   Hemoglobin A1C   Date Value Ref Range Status   02/20/2023 5.10 4.80 - 5.60 % Final     TSH   Date Value Ref Range Status   02/20/2023 0.316 0.270 - 4.200 uIU/mL Final   09/04/2019 5.685 (H) 0.300 - 5.000 uIU/mL Final        Imaging:   No valid procedures specified.     Assessment / Plan      Assessment/Plan:   Problem List Items Addressed This Visit        Genitourinary and Reproductive     Stage 3a chronic kidney disease (HCC)    Current " Assessment & Plan     - Urine microalbumin was collected today.  - Patient was started on Farxiga 5 mg.  - Filtration is stable at this time, will continue additional surveillance.   - Patient will return to clinic on 04/13/2023.         Relevant Medications    dapagliflozin (FARXIGA) 5 MG tablet tablet    Other Relevant Orders    POC Microalbumin (Completed)    Albuminuria    Current Assessment & Plan     - Urine microalbumin collected today.   - Patient was started on Farxiga 5 mg.  - Patient will return to clinic on 04/13/2023.         Relevant Medications    dapagliflozin (FARXIGA) 5 MG tablet tablet    Other Relevant Orders    POC Microalbumin (Completed)       Musculoskeletal and Injuries    Primary osteoarthritis of both feet    Current Assessment & Plan     - Patient referred to orthopedic surgeon, Dr. Ramirez.  - Patient will return to clinic on 04/13/2023.           Relevant Orders    Ambulatory Referral to Orthopedic Surgery (Completed)       Pulmonary and Pneumonias    Community acquired pneumonia - Primary    Current Assessment & Plan     - Patient prescribed Tessalon 100 mg tablets and fluticasone 27.5 mcg nasal spray.  - Patient will return to clinic on 04/13/2023.           Relevant Medications    fluticasone (VERAMYST) 27.5 MCG/SPRAY nasal spray    benzonatate (TESSALON) 100 MG capsule       Sleep    Insomnia    Relevant Medications    traZODone (DESYREL) 100 MG tablet   Other Visit Diagnoses     Encounter for vaccination        - Hepatitis B vaccination administered today.     Relevant Orders    Hepatitis B Vaccine Adult IM (Completed)            Follow Up:   Return in about 4 weeks (around 4/13/2023) for Recheck.    I spent 45 minutes caring for Lizette on this date of service. This time includes time spent by me in the following activities: preparing for the visit, reviewing tests, obtaining and/or reviewing a separately obtained history, performing a medically appropriate examination and/or  evaluation, counseling and educating the patient/family/caregiver, ordering medications, tests, or procedures, referring and communicating with other health care professionals, documenting information in the medical record, independently interpreting results and communicating that information with the patient/family/caregiver and care coordination.       Lars Fermin MD  [facility]    Transcribed from ambient dictation for Lars Fermin MD by Chanel Lara.  03/16/23   14:35 EDT    Patient or patient representative verbalized consent to the visit recording.  I have personally performed the services described in this document as transcribed by the above individual, and it is both accurate and complete.

## 2023-03-16 NOTE — ASSESSMENT & PLAN NOTE
- Urine microalbumin was collected today.  - Patient was started on Farxiga 5 mg.  - Filtration is stable at this time, will continue additional surveillance.   - Patient will return to clinic on 04/13/2023.

## 2023-03-16 NOTE — PROGRESS NOTES
Follow Up Office Visit      Date: 2023   Patient Name: Lizette Frias  : 1965   MRN: [id number]     Chief Complaint:    Chief Complaint   Patient presents with   • Hospital Follow Up Visit     DC: 3-13-23       History of Present Illness: Lizette Frias is a 58 y.o. female who is here today to follow up with ***.    ***DAXHPI      Subjective      Review of Systems:   Review of Systems    I have reviewed the patients family history, social history, past medical history, past surgical history and have updated it as appropriate.     Medications:     Current Outpatient Medications:   •  alendronate (FOSAMAX) 70 MG tablet, Take 1 tablet by mouth 1 (One) Time Per Week., Disp: , Rfl:   •  amLODIPine (NORVASC) 10 MG tablet, Take 1 tablet by mouth Daily., Disp: , Rfl:   •  benzonatate (TESSALON) 100 MG capsule, , Disp: , Rfl:   •  buPROPion XL (Wellbutrin XL) 300 MG 24 hr tablet, Take 1 tablet by mouth Every Morning., Disp: 45 tablet, Rfl: 1  •  buPROPion XL (WELLBUTRIN XL) 300 MG 24 hr tablet, Take 1 tablet by mouth Daily., Disp: , Rfl:   •  calcium carbonate-vitamin d 600-400 MG-UNIT per tablet, Take 1 tablet by mouth Daily., Disp: , Rfl:   •  cyanocobalamin (VITAMIN B-12) 1000 MCG tablet, Take 1 tablet by mouth., Disp: , Rfl:   •  cycloSPORINE (RESTASIS) 0.05 % ophthalmic emulsion, Restasis MultiDose 0.05 % eye drops  INSTILL 1 DROP (1GTT) INTO BOTH EYES TWICE DAILY, Disp: , Rfl:   •  doxycycline (VIBRAMYICN) 100 MG tablet, Take 1 tablet by mouth., Disp: , Rfl:   •  doxycycline (VIBRAMYICN) 100 MG tablet, , Disp: , Rfl:   •  estradiol (ESTRACE VAGINAL) 0.1 MG/GM vaginal cream, Insert 0.5 g intravaginally at HS 2-3x/ week., Disp: 42.5 g, Rfl: 4  •  fluticasone (FLONASE) 50 MCG/ACT nasal spray, USE 2 SPRAYS IN EACH NOSTRIL TWICE DAILY, Disp: 48 mL, Rfl: 1  •  fluticasone (FLONASE) 50 MCG/ACT nasal spray, 2 sprays., Disp: , Rfl:   •  fluticasone (VERAMYST) 27.5 MCG/SPRAY nasal spray, 2 sprays into the  nostril(s) as directed by provider., Disp: , Rfl:   •  furosemide (LASIX) 20 MG tablet, Hold until follow up with your primary care provider (Patient taking differently: Take 1 tablet by mouth Daily.), Disp: , Rfl:   •  icosapent ethyl (VASCEPA) 1 g capsule capsule, Take 2 g by mouth 2 (Two) Times a Day With Meals., Disp: , Rfl:   •  isosorbide dinitrate (ISORDIL) 30 MG tablet, Take 1 tablet by mouth 2 (Two) Times a Day. Need f/u appt for further refills., Disp: 60 tablet, Rfl: 0  •  isosorbide mononitrate (IMDUR) 60 MG 24 hr tablet, Take 1 tablet by mouth Every Night., Disp: , Rfl:   •  levothyroxine (SYNTHROID, LEVOTHROID) 125 MCG tablet, Take 1 tablet by mouth Daily., Disp: , Rfl:   •  levothyroxine (SYNTHROID, LEVOTHROID) 175 MCG tablet, Take 1 tablet by mouth Every Morning., Disp: 90 tablet, Rfl: 0  •  melatonin ([employer] Melatonin) 5 MG tablet tablet, Take 1 tablet by mouth Every Night., Disp: 90 tablet, Rfl: 1  •  melatonin 5 MG tablet tablet, Take 1 tablet by mouth Daily., Disp: , Rfl:   •  methocarbamol (ROBAXIN) 500 MG tablet, TAKE 1 TABLET BY MOUTH FOUR TIMES A DAY, Disp: 60 tablet, Rfl: 0  •  metoprolol succinate XL (TOPROL-XL) 100 MG 24 hr tablet, Take 1 tablet by mouth Daily., Disp: , Rfl:   •  mirtazapine (REMERON) 30 MG tablet, Take 1 tablet by mouth every night at bedtime., Disp: , Rfl:   •  nitroglycerin (NITROSTAT) 0.4 MG SL tablet, Place 1 tablet under the tongue., Disp: , Rfl:   •  pantoprazole (PROTONIX) 40 MG EC tablet, Take 1 tablet by mouth Daily., Disp: , Rfl:   •  potassium chloride (MICRO-K) 10 MEQ CR capsule, Hold until Lasix (furosemide) restarted (Patient taking differently: Take 1 capsule by mouth Daily. Hold until Lasix (furosemide) restarted), Disp: , Rfl:   •  pregabalin (LYRICA) 100 MG capsule, Every 8 (Eight) Hours., Disp: , Rfl:   •  rOPINIRole (REQUIP) 2 MG tablet, TAKE 1 - 2 TABLET(S) BY MOUTH EVERY NIGHT AT BEDTIME., Disp: 180 tablet, Rfl: 1  •  Semaglutide,0.25 or 0.5MG/DOS,  "(OZEMPIC) 2 MG/1.5ML solution pen-injector, Inject 0.5 mg under the skin into the appropriate area as directed 1 (One) Time Per Week., Disp: 1.5 mL, Rfl: 3  •  sertraline (ZOLOFT) 20 MG/ML concentrated solution, Take 5 mL by mouth Daily., Disp: 200 mL, Rfl: 1  •  sertraline (ZOLOFT) 20 MG/ML concentrated solution, Take 5 mL by mouth Daily., Disp: , Rfl:   •  spironolactone (ALDACTONE) 25 MG tablet, TAKE 1 TABLET BY MOUTH EVERY DAY, Disp: 90 tablet, Rfl: 0  •  sucralfate (Carafate) 1 g tablet, Take 1 tablet by mouth 4 (Four) Times a Day., Disp: 360 tablet, Rfl: 1  •  tacrolimus (PROGRAF) 0.5 MG capsule, Take 2 capsules by mouth 2 (Two) Times a Day., Disp: , Rfl:   •  tacrolimus (PROGRAF) 0.5 MG capsule, Take 2 capsules by mouth., Disp: , Rfl:   •  traZODone (DESYREL) 100 MG tablet, Take 2 tablets by mouth Every Night., Disp: 90 tablet, Rfl: 3  •  vitamin D (ERGOCALCIFEROL) 1.25 MG (70081 UT) capsule capsule, Take 1 capsule by mouth Every 7 (Seven) Days.  , Disp: , Rfl:   •  zolpidem (AMBIEN) 5 MG tablet, Take 1 tablet by mouth Every Night., Disp: , Rfl:   •  atorvastatin (LIPITOR) 20 MG tablet, Take 2 tablets by mouth., Disp: , Rfl:   •  atorvastatin (LIPITOR) 40 MG tablet, Daily., Disp: , Rfl:     Allergies:   Allergies   Allergen Reactions   • Penicillins Shortness Of Breath, Itching, Other (See Comments) and Rash   • Cephalosporins Diarrhea   • Cyclosporine Swelling       Objective     Physical Exam: Please see above  Vital Signs:   Vitals:    03/16/23 1310   Temp: 97.4 °F (36.3 °C)   TempSrc: Infrared   Weight: 67.8 kg (149 lb 6.4 oz)   Height: 156 cm (61.42\")     Body mass index is 27.85 kg/m².  {BMI is >= 25 and <30. (Overweight) The following options were offered after discussion;:[id number]}       Physical Exam    Procedures    Results:   Labs:   Hemoglobin A1C   Date Value Ref Range Status   02/20/2023 5.10 4.80 - 5.60 % Final     TSH   Date Value Ref Range Status   02/20/2023 0.316 0.270 - 4.200 uIU/mL " Final   09/04/2019 5.685 (H) 0.300 - 5.000 uIU/mL Final        Imaging:   No valid procedures specified.     Assessment / Plan      Assessment/Plan:   Problem List Items Addressed This Visit        Pulmonary and Pneumonias    Community acquired pneumonia - Primary    Relevant Medications    fluticasone (FLONASE) 50 MCG/ACT nasal spray    fluticasone (VERAMYST) 27.5 MCG/SPRAY nasal spray    benzonatate (TESSALON) 100 MG capsule         Follow Up:   Return in about 4 weeks (around 4/13/2023) for Recheck.    {2021TIMESPENTOPTIONAL (Optional):55157}      Lars Fermin MD  [facility]    Transcribed from ambient dictation for Lars Fermin MD by Jocelyn Decker.  03/16/23   14:06 EDT    Patient or patient representative verbalized consent to the visit recording.  I have personally performed the services described in this document as transcribed by the above individual, and it is both accurate and complete.

## 2023-03-23 RX ORDER — ISOSORBIDE DINITRATE 30 MG/1
30 TABLET ORAL 2 TIMES DAILY
Qty: 180 TABLET | Refills: 0 | Status: SHIPPED | OUTPATIENT
Start: 2023-03-23

## 2023-03-25 DIAGNOSIS — M15.9 PRIMARY OSTEOARTHRITIS INVOLVING MULTIPLE JOINTS: ICD-10-CM

## 2023-03-27 RX ORDER — METHOCARBAMOL 500 MG/1
TABLET, FILM COATED ORAL
Qty: 60 TABLET | Refills: 0 | Status: SHIPPED | OUTPATIENT
Start: 2023-03-27

## 2023-04-03 DIAGNOSIS — E89.0 POSTOPERATIVE HYPOTHYROIDISM: ICD-10-CM

## 2023-04-03 RX ORDER — LEVOTHYROXINE SODIUM 175 UG/1
TABLET ORAL
Qty: 90 TABLET | Refills: 0 | Status: SHIPPED | OUTPATIENT
Start: 2023-04-03

## 2023-04-13 ENCOUNTER — OFFICE VISIT (OUTPATIENT)
Dept: INTERNAL MEDICINE | Facility: CLINIC | Age: 58
End: 2023-04-13
Payer: COMMERCIAL

## 2023-04-13 VITALS — RESPIRATION RATE: 18 BRPM | BODY MASS INDEX: 25.91 KG/M2 | TEMPERATURE: 98 F | WEIGHT: 139 LBS

## 2023-04-13 DIAGNOSIS — E78.00 PURE HYPERCHOLESTEROLEMIA: ICD-10-CM

## 2023-04-13 DIAGNOSIS — R42 DIZZINESS: Primary | ICD-10-CM

## 2023-04-13 DIAGNOSIS — I95.1 ORTHOSTATIC HYPOTENSION: ICD-10-CM

## 2023-04-13 DIAGNOSIS — N30.00 ACUTE CYSTITIS WITHOUT HEMATURIA: ICD-10-CM

## 2023-04-13 DIAGNOSIS — R11.0 NAUSEA: ICD-10-CM

## 2023-04-13 LAB
BILIRUB BLD-MCNC: ABNORMAL MG/DL
CLARITY, POC: ABNORMAL
COLOR UR: ABNORMAL
EXPIRATION DATE: ABNORMAL
GLUCOSE UR STRIP-MCNC: NEGATIVE MG/DL
KETONES UR QL: ABNORMAL
LEUKOCYTE EST, POC: ABNORMAL
Lab: ABNORMAL
NITRITE UR-MCNC: POSITIVE MG/ML
PH UR: 5 [PH] (ref 5–8)
PROT UR STRIP-MCNC: ABNORMAL MG/DL
RBC # UR STRIP: NEGATIVE /UL
SP GR UR: 1.02 (ref 1–1.03)
UROBILINOGEN UR QL: ABNORMAL

## 2023-04-13 PROCEDURE — 80061 LIPID PANEL: CPT | Performed by: STUDENT IN AN ORGANIZED HEALTH CARE EDUCATION/TRAINING PROGRAM

## 2023-04-13 PROCEDURE — 80053 COMPREHEN METABOLIC PANEL: CPT | Performed by: STUDENT IN AN ORGANIZED HEALTH CARE EDUCATION/TRAINING PROGRAM

## 2023-04-13 PROCEDURE — 82150 ASSAY OF AMYLASE: CPT | Performed by: STUDENT IN AN ORGANIZED HEALTH CARE EDUCATION/TRAINING PROGRAM

## 2023-04-13 PROCEDURE — 81015 MICROSCOPIC EXAM OF URINE: CPT | Performed by: INTERNAL MEDICINE

## 2023-04-13 PROCEDURE — 83690 ASSAY OF LIPASE: CPT | Performed by: STUDENT IN AN ORGANIZED HEALTH CARE EDUCATION/TRAINING PROGRAM

## 2023-04-13 PROCEDURE — 87086 URINE CULTURE/COLONY COUNT: CPT | Performed by: INTERNAL MEDICINE

## 2023-04-13 PROCEDURE — 85025 COMPLETE CBC W/AUTO DIFF WBC: CPT | Performed by: INTERNAL MEDICINE

## 2023-04-13 RX ORDER — ICOSAPENT ETHYL 1000 MG/1
2 CAPSULE ORAL 2 TIMES DAILY WITH MEALS
Qty: 120 CAPSULE | Refills: 5 | Status: SHIPPED | OUTPATIENT
Start: 2023-04-13

## 2023-04-13 RX ORDER — SERTRALINE HYDROCHLORIDE 100 MG/1
1 TABLET, FILM COATED ORAL DAILY
COMMUNITY
Start: 2023-04-04

## 2023-04-13 RX ORDER — OXYCODONE HYDROCHLORIDE 5 MG/1
1 TABLET ORAL NIGHTLY
COMMUNITY
Start: 2023-03-21

## 2023-04-13 RX ORDER — SULFAMETHOXAZOLE AND TRIMETHOPRIM 800; 160 MG/1; MG/1
1 TABLET ORAL 2 TIMES DAILY
Qty: 20 TABLET | Refills: 0 | Status: SHIPPED | OUTPATIENT
Start: 2023-04-13

## 2023-04-14 LAB
ALBUMIN SERPL-MCNC: 4.3 G/DL (ref 3.5–5.2)
ALBUMIN/GLOB SERPL: 1.6 G/DL
ALP SERPL-CCNC: 107 U/L (ref 39–117)
ALT SERPL W P-5'-P-CCNC: 46 U/L (ref 1–33)
AMYLASE SERPL-CCNC: 32 U/L (ref 28–100)
ANION GAP SERPL CALCULATED.3IONS-SCNC: 13 MMOL/L (ref 5–15)
AST SERPL-CCNC: 34 U/L (ref 1–32)
BACTERIA SPEC AEROBE CULT: NORMAL
BACTERIA UR QL AUTO: ABNORMAL /HPF
BASOPHILS # BLD AUTO: 0.05 10*3/MM3 (ref 0–0.2)
BASOPHILS NFR BLD AUTO: 0.8 % (ref 0–1.5)
BILIRUB SERPL-MCNC: 1.2 MG/DL (ref 0–1.2)
BUN SERPL-MCNC: 20 MG/DL (ref 6–20)
BUN/CREAT SERPL: 15.5 (ref 7–25)
CALCIUM SPEC-SCNC: 9.4 MG/DL (ref 8.6–10.5)
CHLORIDE SERPL-SCNC: 102 MMOL/L (ref 98–107)
CHOLEST SERPL-MCNC: 174 MG/DL (ref 0–200)
CO2 SERPL-SCNC: 25 MMOL/L (ref 22–29)
COD CRY URNS QL: ABNORMAL /HPF
CREAT SERPL-MCNC: 1.29 MG/DL (ref 0.57–1)
DEPRECATED RDW RBC AUTO: 47.2 FL (ref 37–54)
EGFRCR SERPLBLD CKD-EPI 2021: 47.9 ML/MIN/1.73
EOSINOPHIL # BLD AUTO: 0.05 10*3/MM3 (ref 0–0.4)
EOSINOPHIL NFR BLD AUTO: 0.8 % (ref 0.3–6.2)
ERYTHROCYTE [DISTWIDTH] IN BLOOD BY AUTOMATED COUNT: 14.8 % (ref 12.3–15.4)
GLOBULIN UR ELPH-MCNC: 2.7 GM/DL
GLUCOSE SERPL-MCNC: 81 MG/DL (ref 65–99)
HCT VFR BLD AUTO: 48.7 % (ref 34–46.6)
HDLC SERPL-MCNC: 27 MG/DL (ref 40–60)
HGB BLD-MCNC: 15.7 G/DL (ref 12–15.9)
HYALINE CASTS UR QL AUTO: ABNORMAL /LPF
IMM GRANULOCYTES # BLD AUTO: 0.04 10*3/MM3 (ref 0–0.05)
IMM GRANULOCYTES NFR BLD AUTO: 0.6 % (ref 0–0.5)
LDLC SERPL CALC-MCNC: 106 MG/DL (ref 0–100)
LDLC/HDLC SERPL: 3.69 {RATIO}
LIPASE SERPL-CCNC: 16 U/L (ref 13–60)
LYMPHOCYTES # BLD AUTO: 2.34 10*3/MM3 (ref 0.7–3.1)
LYMPHOCYTES NFR BLD AUTO: 37.3 % (ref 19.6–45.3)
MCH RBC QN AUTO: 27.9 PG (ref 26.6–33)
MCHC RBC AUTO-ENTMCNC: 32.2 G/DL (ref 31.5–35.7)
MCV RBC AUTO: 86.7 FL (ref 79–97)
MONOCYTES # BLD AUTO: 0.47 10*3/MM3 (ref 0.1–0.9)
MONOCYTES NFR BLD AUTO: 7.5 % (ref 5–12)
NEUTROPHILS NFR BLD AUTO: 3.33 10*3/MM3 (ref 1.7–7)
NEUTROPHILS NFR BLD AUTO: 53 % (ref 42.7–76)
NRBC BLD AUTO-RTO: 0.2 /100 WBC (ref 0–0.2)
PLATELET # BLD AUTO: 155 10*3/MM3 (ref 140–450)
PMV BLD AUTO: 12.7 FL (ref 6–12)
POTASSIUM SERPL-SCNC: 5.1 MMOL/L (ref 3.5–5.2)
PROT SERPL-MCNC: 7 G/DL (ref 6–8.5)
RBC # BLD AUTO: 5.62 10*6/MM3 (ref 3.77–5.28)
RBC # UR STRIP: ABNORMAL /HPF
REF LAB TEST METHOD: ABNORMAL
SODIUM SERPL-SCNC: 140 MMOL/L (ref 136–145)
SQUAMOUS #/AREA URNS HPF: ABNORMAL /HPF
TRIGL SERPL-MCNC: 237 MG/DL (ref 0–150)
VLDLC SERPL-MCNC: 41 MG/DL (ref 5–40)
WBC # UR STRIP: ABNORMAL /HPF
WBC NRBC COR # BLD: 6.28 10*3/MM3 (ref 3.4–10.8)

## 2023-04-17 ENCOUNTER — OFFICE VISIT (OUTPATIENT)
Dept: INTERNAL MEDICINE | Facility: CLINIC | Age: 58
End: 2023-04-17
Payer: COMMERCIAL

## 2023-04-17 ENCOUNTER — APPOINTMENT (OUTPATIENT)
Dept: CT IMAGING | Facility: HOSPITAL | Age: 58
End: 2023-04-17
Payer: COMMERCIAL

## 2023-04-17 ENCOUNTER — HOSPITAL ENCOUNTER (EMERGENCY)
Facility: HOSPITAL | Age: 58
Discharge: HOME | End: 2023-04-17
Attending: EMERGENCY MEDICINE | Admitting: EMERGENCY MEDICINE
Payer: COMMERCIAL

## 2023-04-17 VITALS
SYSTOLIC BLOOD PRESSURE: 106 MMHG | TEMPERATURE: 98.5 F | DIASTOLIC BLOOD PRESSURE: 68 MMHG | BODY MASS INDEX: 26.43 KG/M2 | WEIGHT: 140 LBS | HEIGHT: 61 IN | HEART RATE: 68 BPM | RESPIRATION RATE: 18 BRPM | OXYGEN SATURATION: 99 %

## 2023-04-17 VITALS
DIASTOLIC BLOOD PRESSURE: 60 MMHG | HEART RATE: 80 BPM | BODY MASS INDEX: 26.09 KG/M2 | RESPIRATION RATE: 16 BRPM | TEMPERATURE: 98.2 F | SYSTOLIC BLOOD PRESSURE: 76 MMHG | WEIGHT: 140 LBS

## 2023-04-17 DIAGNOSIS — Z94.4 LIVER TRANSPLANTED: ICD-10-CM

## 2023-04-17 DIAGNOSIS — N39.0 ACUTE UTI: ICD-10-CM

## 2023-04-17 DIAGNOSIS — I95.9 HYPOTENSION, UNSPECIFIED HYPOTENSION TYPE: Primary | ICD-10-CM

## 2023-04-17 DIAGNOSIS — N30.00 ACUTE CYSTITIS WITHOUT HEMATURIA: Primary | ICD-10-CM

## 2023-04-17 DIAGNOSIS — R10.9 RIGHT FLANK PAIN: ICD-10-CM

## 2023-04-17 DIAGNOSIS — E86.0 DEHYDRATION: ICD-10-CM

## 2023-04-17 DIAGNOSIS — A08.4 VIRAL GASTROENTERITIS: ICD-10-CM

## 2023-04-17 LAB
ALBUMIN SERPL-MCNC: 4.5 G/DL (ref 3.5–5.2)
ALBUMIN/GLOB SERPL: 2 G/DL
ALP SERPL-CCNC: 102 U/L (ref 39–117)
ALT SERPL W P-5'-P-CCNC: 34 U/L (ref 1–33)
ANION GAP SERPL CALCULATED.3IONS-SCNC: 10 MMOL/L (ref 5–15)
AST SERPL-CCNC: 25 U/L (ref 1–32)
BACTERIA UR QL AUTO: ABNORMAL /HPF
BASOPHILS # BLD AUTO: 0.05 10*3/MM3 (ref 0–0.2)
BASOPHILS NFR BLD AUTO: 1 % (ref 0–1.5)
BILIRUB SERPL-MCNC: 0.6 MG/DL (ref 0–1.2)
BILIRUB UR QL STRIP: ABNORMAL
BUN BLDA-MCNC: 34 MG/DL (ref 8–26)
BUN SERPL-MCNC: 22 MG/DL (ref 6–20)
BUN/CREAT SERPL: 18.3 (ref 7–25)
CA-I BLDA-SCNC: 1.2 MMOL/L (ref 1.2–1.32)
CALCIUM SPEC-SCNC: 8.8 MG/DL (ref 8.6–10.5)
CHLORIDE BLDA-SCNC: 105 MMOL/L (ref 98–109)
CHLORIDE BLDA-SCNC: 105 MMOL/L (ref 98–109)
CHLORIDE SERPL-SCNC: 107 MMOL/L (ref 98–107)
CLARITY UR: CLEAR
CO2 BLDA-SCNC: 26 MMOL/L (ref 24–29)
CO2 SERPL-SCNC: 23 MMOL/L (ref 22–29)
COLOR UR: ABNORMAL
CREAT BLDA-MCNC: 1.2 MG/DL
CREAT BLDA-MCNC: 1.2 MG/DL (ref 0.6–1.3)
CREAT SERPL-MCNC: 1.2 MG/DL (ref 0.57–1)
D-LACTATE SERPL-SCNC: 0.8 MMOL/L (ref 0.5–2)
DEPRECATED RDW RBC AUTO: 43.8 FL (ref 37–54)
EGFRCR SERPLBLD CKD-EPI 2021: 52.3 ML/MIN/1.73
EGFRCR SERPLBLD CKD-EPI 2021: 52.3 ML/MIN/1.73
EOSINOPHIL # BLD AUTO: 0.04 10*3/MM3 (ref 0–0.4)
EOSINOPHIL NFR BLD AUTO: 0.8 % (ref 0.3–6.2)
ERYTHROCYTE [DISTWIDTH] IN BLOOD BY AUTOMATED COUNT: 13.9 % (ref 12.3–15.4)
GLOBULIN UR ELPH-MCNC: 2.2 GM/DL
GLUCOSE BLDC GLUCOMTR-MCNC: 82 MG/DL (ref 70–130)
GLUCOSE BLDC GLUCOMTR-MCNC: 82 MG/DL (ref 70–130)
GLUCOSE SERPL-MCNC: 78 MG/DL (ref 65–99)
GLUCOSE UR STRIP-MCNC: NEGATIVE MG/DL
HCT VFR BLD AUTO: 43.5 % (ref 34–46.6)
HCT VFR BLDA CALC: 42 % (ref 38–51)
HCT VFR BLDA CALC: 42 % (ref 38–51)
HGB BLD-MCNC: 14.6 G/DL (ref 12–15.9)
HGB BLDA-MCNC: 14.3 G/DL (ref 12–17)
HGB BLDA-MCNC: 14.3 G/DL (ref 12–17)
HGB UR QL STRIP.AUTO: NEGATIVE
HYALINE CASTS UR QL AUTO: ABNORMAL /LPF
IMM GRANULOCYTES # BLD AUTO: 0.03 10*3/MM3 (ref 0–0.05)
IMM GRANULOCYTES NFR BLD AUTO: 0.6 % (ref 0–0.5)
KETONES UR QL STRIP: ABNORMAL
LEUKOCYTE ESTERASE UR QL STRIP.AUTO: ABNORMAL
LYMPHOCYTES # BLD AUTO: 2.17 10*3/MM3 (ref 0.7–3.1)
LYMPHOCYTES NFR BLD AUTO: 43.7 % (ref 19.6–45.3)
MAGNESIUM SERPL-MCNC: 2.1 MG/DL (ref 1.6–2.6)
MCH RBC QN AUTO: 28.9 PG (ref 26.6–33)
MCHC RBC AUTO-ENTMCNC: 33.6 G/DL (ref 31.5–35.7)
MCV RBC AUTO: 86.1 FL (ref 79–97)
MONOCYTES # BLD AUTO: 0.34 10*3/MM3 (ref 0.1–0.9)
MONOCYTES NFR BLD AUTO: 6.8 % (ref 5–12)
MUCOUS THREADS URNS QL MICRO: ABNORMAL /HPF
NEUTROPHILS NFR BLD AUTO: 2.34 10*3/MM3 (ref 1.7–7)
NEUTROPHILS NFR BLD AUTO: 47.1 % (ref 42.7–76)
NITRITE UR QL STRIP: NEGATIVE
NRBC BLD AUTO-RTO: 0 /100 WBC (ref 0–0.2)
PH UR STRIP.AUTO: 5.5 [PH] (ref 5–8)
PLATELET # BLD AUTO: 142 10*3/MM3 (ref 140–450)
PMV BLD AUTO: 11.9 FL (ref 6–12)
POTASSIUM BLDA-SCNC: 5.4 MMOL/L (ref 3.5–4.9)
POTASSIUM BLDA-SCNC: 5.4 MMOL/L (ref 3.5–4.9)
POTASSIUM SERPL-SCNC: 5.1 MMOL/L (ref 3.5–5.2)
PROCALCITONIN SERPL-MCNC: 0.04 NG/ML (ref 0–0.25)
PROT SERPL-MCNC: 6.7 G/DL (ref 6–8.5)
PROT UR QL STRIP: ABNORMAL
RBC # BLD AUTO: 5.05 10*6/MM3 (ref 3.77–5.28)
RBC # UR STRIP: ABNORMAL /HPF
REF LAB TEST METHOD: ABNORMAL
SODIUM BLD-SCNC: 140 MMOL/L (ref 138–146)
SODIUM BLD-SCNC: 140 MMOL/L (ref 138–146)
SODIUM SERPL-SCNC: 140 MMOL/L (ref 136–145)
SP GR UR STRIP: 1.03 (ref 1–1.03)
SQUAMOUS #/AREA URNS HPF: ABNORMAL /HPF
UROBILINOGEN UR QL STRIP: ABNORMAL
WBC # UR STRIP: ABNORMAL /HPF
WBC NRBC COR # BLD: 4.97 10*3/MM3 (ref 3.4–10.8)

## 2023-04-17 PROCEDURE — 84145 PROCALCITONIN (PCT): CPT | Performed by: EMERGENCY MEDICINE

## 2023-04-17 PROCEDURE — 83735 ASSAY OF MAGNESIUM: CPT | Performed by: EMERGENCY MEDICINE

## 2023-04-17 PROCEDURE — 83605 ASSAY OF LACTIC ACID: CPT | Performed by: EMERGENCY MEDICINE

## 2023-04-17 PROCEDURE — 80053 COMPREHEN METABOLIC PANEL: CPT | Performed by: EMERGENCY MEDICINE

## 2023-04-17 PROCEDURE — 87086 URINE CULTURE/COLONY COUNT: CPT | Performed by: EMERGENCY MEDICINE

## 2023-04-17 PROCEDURE — 74177 CT ABD & PELVIS W/CONTRAST: CPT

## 2023-04-17 PROCEDURE — 99284 EMERGENCY DEPT VISIT MOD MDM: CPT

## 2023-04-17 PROCEDURE — 81001 URINALYSIS AUTO W/SCOPE: CPT | Performed by: EMERGENCY MEDICINE

## 2023-04-17 PROCEDURE — 99214 OFFICE O/P EST MOD 30 MIN: CPT | Performed by: INTERNAL MEDICINE

## 2023-04-17 PROCEDURE — 25510000001 IOPAMIDOL 61 % SOLUTION: Performed by: EMERGENCY MEDICINE

## 2023-04-17 PROCEDURE — 85014 HEMATOCRIT: CPT

## 2023-04-17 PROCEDURE — 85025 COMPLETE CBC W/AUTO DIFF WBC: CPT | Performed by: EMERGENCY MEDICINE

## 2023-04-17 PROCEDURE — 87324 CLOSTRIDIUM AG IA: CPT | Performed by: STUDENT IN AN ORGANIZED HEALTH CARE EDUCATION/TRAINING PROGRAM

## 2023-04-17 PROCEDURE — 80047 BASIC METABLC PNL IONIZED CA: CPT

## 2023-04-17 RX ORDER — GRANULES FOR ORAL 3 G/1
3 POWDER ORAL ONCE
Status: COMPLETED | OUTPATIENT
Start: 2023-04-17 | End: 2023-04-17

## 2023-04-17 RX ORDER — SODIUM CHLORIDE 0.9 % (FLUSH) 0.9 %
10 SYRINGE (ML) INJECTION AS NEEDED
Status: DISCONTINUED | OUTPATIENT
Start: 2023-04-17 | End: 2023-04-17 | Stop reason: HOSPADM

## 2023-04-17 RX ADMIN — GRANULES FOR ORAL SOLUTION 3 G: 3 POWDER ORAL at 16:58

## 2023-04-17 RX ADMIN — IOPAMIDOL 80 ML: 612 INJECTION, SOLUTION INTRAVENOUS at 16:28

## 2023-04-17 RX ADMIN — SODIUM CHLORIDE 1000 ML: 9 INJECTION, SOLUTION INTRAVENOUS at 14:41

## 2023-04-17 NOTE — PROGRESS NOTES
Chief Complaint   Patient presents with   • Follow-up     4 week follow up chronic medical problems       History of Present Illness    The patient presents for a follow-up related to hypotension. She reports dizziness, lightheadedness, fatigue, weakness and nausea but denies vision changes or confusion. The symptoms began one week ago and are constant but have remained the same. The patient states that the symptoms are not associated with anything in particular. She mentions no relieving factors. The patient is in a stable condition.    The patient presents for a follow-up related to hyperlipidemia. She is following a low fat diet. She reports that she is not exercising. She is not taking medication for hyperlipidemia. She denies chest pain, shortness of breath, orthopnea, paroxysmal nocturnal dyspnea, dyspnea on exertion, edema, palpitations or syncope.    The patient reports that she has symptoms consisting of painful urination but there are no reports of frequency of urination, blood in the urine, fever, pelvic pain, abdominal pain, flank pain, back pain, a decreased urine stream, chills, nausea, vomiting, diarrhea, burning with urination or urinary urgency. The symptoms have been present for less than one day. The urine stream is normal. She does not have increased nocturia. The patient is sexually active. The patient denies having new sexual partners. The patient does urinate after intercourse. The patient has not tried anything to alleviate the symptoms.    The patient presents for an acute visit for a one week history of symptoms of nausea but she has not had vomiting or diarrhea. Bowel Movements are normal in color and consistency. There is no blood in the bowel movement. She denies recent travel, the possibility of eating undercooked poultry, the possibility of eating undercooked hamburger, the possibility of eating spoiled seafood or recently eating at a restaurant. The symptoms are not associated with  abdominal pain. The symptoms are not associated with fever. She does not have other symptoms, including a dry cough, a wet cough, wheezing, facial pain, a headache, eye drainage, ear pain, ear drainage, myalgias, a sore throat, nasal discharge, decreased appetite, a dry mouth or a rash. The patient has not tried anything for treatment of this illness.    The patient presents with a one week history of intermittent dizziness. The sensation is described as light headedness. The patient denies tinnitus, double vision, paresthesias or hearing loss. The symptoms tend to come on gradually. The symptoms are not aggravated by a change in position, turning the head, standing up, or coughing. There has not been a recent change in eyeglasses. There has not been a recent upper respiratory infection.    Medications      Current Outpatient Medications:   •  alendronate (FOSAMAX) 70 MG tablet, Take 1 tablet by mouth 1 (One) Time Per Week., Disp: , Rfl:   •  amLODIPine (NORVASC) 10 MG tablet, Take 1 tablet by mouth Daily., Disp: , Rfl:   •  atorvastatin (LIPITOR) 40 MG tablet, Daily., Disp: , Rfl:   •  buPROPion XL (Wellbutrin XL) 300 MG 24 hr tablet, Take 1 tablet by mouth Every Morning., Disp: 45 tablet, Rfl: 1  •  calcium carbonate-vitamin d 600-400 MG-UNIT per tablet, Take 1 tablet by mouth Daily., Disp: , Rfl:   •  estradiol (ESTRACE VAGINAL) 0.1 MG/GM vaginal cream, Insert 0.5 g intravaginally at HS 2-3x/ week., Disp: 42.5 g, Rfl: 4  •  fluticasone (FLONASE) 50 MCG/ACT nasal spray, USE 2 SPRAYS IN EACH NOSTRIL TWICE DAILY, Disp: 48 mL, Rfl: 1  •  icosapent ethyl (VASCEPA) 1 g capsule capsule, Take 2 g by mouth 2 (Two) Times a Day With Meals., Disp: 120 capsule, Rfl: 5  •  isosorbide dinitrate (ISORDIL) 30 MG tablet, Take 1 tablet by mouth 2 (Two) Times a Day., Disp: 180 tablet, Rfl: 0  •  levothyroxine (SYNTHROID, LEVOTHROID) 175 MCG tablet, TAKE 1 TABLET BY MOUTH EVERY DAY IN THE MORNING, Disp: 90 tablet, Rfl: 0  •   melatonin ([employer] Melatonin) 5 MG tablet tablet, Take 1 tablet by mouth Every Night., Disp: 90 tablet, Rfl: 1  •  methocarbamol (ROBAXIN) 500 MG tablet, TAKE 1 TABLET BY MOUTH FOUR TIMES A DAY (Patient taking differently: 4 (Four) Times a Day As Needed.), Disp: 60 tablet, Rfl: 0  •  metoprolol succinate XL (TOPROL-XL) 100 MG 24 hr tablet, Take 1 tablet by mouth Daily., Disp: , Rfl:   •  mirtazapine (REMERON) 30 MG tablet, Take 1 tablet by mouth every night at bedtime., Disp: , Rfl:   •  oxyCODONE (ROXICODONE) 5 MG immediate release tablet, Take 1 tablet by mouth Every Night., Disp: , Rfl:   •  pantoprazole (PROTONIX) 40 MG EC tablet, Take 1 tablet by mouth Daily., Disp: , Rfl:   •  pregabalin (LYRICA) 100 MG capsule, Every 8 (Eight) Hours., Disp: , Rfl:   •  rOPINIRole (REQUIP) 2 MG tablet, TAKE 1 - 2 TABLET(S) BY MOUTH EVERY NIGHT AT BEDTIME., Disp: 180 tablet, Rfl: 1  •  Semaglutide,0.25 or 0.5MG/DOS, (OZEMPIC) 2 MG/1.5ML solution pen-injector, Inject 0.5 mg under the skin into the appropriate area as directed 1 (One) Time Per Week., Disp: 1.5 mL, Rfl: 3  •  sertraline (ZOLOFT) 100 MG tablet, Take 1 tablet by mouth Daily., Disp: , Rfl:   •  spironolactone (ALDACTONE) 25 MG tablet, TAKE 1 TABLET BY MOUTH EVERY DAY, Disp: 90 tablet, Rfl: 0  •  sucralfate (Carafate) 1 g tablet, Take 1 tablet by mouth 4 (Four) Times a Day., Disp: 360 tablet, Rfl: 1  •  tacrolimus (PROGRAF) 0.5 MG capsule, Take 2 capsules by mouth 2 (Two) Times a Day., Disp: , Rfl:   •  traZODone (DESYREL) 100 MG tablet, Take 2 tablets by mouth Every Night., Disp: 90 tablet, Rfl: 1  •  zolpidem (AMBIEN) 5 MG tablet, Take 1 tablet by mouth Every Night., Disp: , Rfl:   •  cyanocobalamin (VITAMIN B-12) 1000 MCG tablet, Take 1 tablet by mouth. (Patient not taking: Reported on 4/13/2023), Disp: , Rfl:   •  isosorbide mononitrate (IMDUR) 60 MG 24 hr tablet, Take 1 tablet by mouth Every Night. (Patient not taking: Reported on 4/13/2023), Disp: , Rfl:   •   nitroglycerin (NITROSTAT) 0.4 MG SL tablet, Place 1 tablet under the tongue. (Patient not taking: Reported on 4/13/2023), Disp: , Rfl:   •  potassium chloride (MICRO-K) 10 MEQ CR capsule, Hold until Lasix (furosemide) restarted (Patient not taking: Reported on 4/13/2023), Disp: , Rfl:   •  sulfamethoxazole-trimethoprim (Bactrim DS) 800-160 MG per tablet, Take 1 tablet by mouth 2 (Two) Times a Day., Disp: 20 tablet, Rfl: 0     Allergies    Allergies   Allergen Reactions   • Penicillins Shortness Of Breath, Itching, Other (See Comments) and Rash   • Cephalosporins Diarrhea   • Cyclosporine Swelling       Problem List    Patient Active Problem List   Diagnosis   • Menopause   • Personal history of malignant neoplasm of thyroid   • History of stroke   • Renal impairment   • Vision impairment   • Osteoporosis   • Osteoarthritis   • Meningioma   • Hypothyroid   • Pure hypercholesterolemia   • Hepatic cirrhosis (HCC)   • Anxiety and depression   • Acute hypoxemic respiratory failure due to COVID-19   • S/P cholecystectomy   • Leukopenia   • Immunosuppression due to drug therapy   • Insomnia   • Sleep apnea   • Gout   • Cytokine release syndrome, grade 2   • Precordial pain   • Viral gastroenteritis   • Postoperative hypothyroidism   • C. difficile diarrhea   • Stage 3a chronic kidney disease   • Gastroesophageal reflux disease with esophagitis without hemorrhage   • Cerebrovascular accident (CVA)   • Osteoarthritis of left hip   • Prediabetes   • Vocal cord dysfunction   • Gastroparesis   • Liver transplant recipient   • Community acquired pneumonia   • Albuminuria   • Primary osteoarthritis of both feet       Medications, Allergies, Problems List and Past History were reviewed and updated.    Physical Examination    Temp 98 °F (36.7 °C) (Infrared)   Resp 18   Wt 63 kg (139 lb)   LMP  (LMP Unknown) Comment: Last pap 3/3/2022 by [facility]  BMI 25.91 kg/m²     Orthostatic Vitals: SUPINE- Reviewed per Nurses  Notes. SITTING- Reviewed per Nurses Notes. STANDING- Reviewed per Nurses Notes.    HEENT: Head- Normocephalic Atraumatic. Facies- Within normal limits. Pinnas- Normal texture and shape bilaterally. Canals- Normal bilaterally. TMs- Normal bilaterally. Nares- Patent bilaterally. Nasal Septum- is normal. There is no tenderness to palpation over the frontal or maxillary sinuses. Lids- Normal bilaterally. Conjunctiva- Clear bilaterally. Sclera- Anicteric bilaterally. Oropharynx- Moist with no lesions. Tonsils- No enlargement, erythema or exudate.    Neck: Thyroid- non enlarged, symmetric and has no nodules. No bruits are detected. ROM- Normal Range of Motion with no rigidity.    Lungs: Auscultation- Clear to auscultation bilaterally. There are no retractions, clubbing or cyanosis. The Expiratory to Inspiratory ratio is equal.    Cardiovascular: There are no carotid bruits. Heart- Normal Rate with Regular rhythm and no murmurs. There are no gallops. There are no rubs. In the lower extremities there is no edema. The upper extremities do not have edema.    Abdomen: Soft, benign, non-tender with no masses, hernias, organomegaly or scars.    Back: The patient has a normal spinal curvature with no evidence of scoliosis. There are no skin lesions noted on the back. Palpation reveals no tenderness and normal muscle tone. The straight leg raising test is negative. The back has normal flexion, extension, rotation, and lateral bending.    Neurologic Exam:    Cranial Nerves II-XII: Intact    Gait: Normal.    Impression and Assessment    Hypotension.    Hyperlipidemia.    Urinary Tract Infection.    Nausea.    Dizziness.    Plan    Nausea Plan: Medication will be added as noted below.    Hyperlipidemia Plan: The patient was instructed to exercise daily and eat a low fat diet.    Hypotension Plan: A medication was added as noted. Further plans will be made after testing.    Urinary Tract Infection Plan: Medication will be added as  noted.    Dizziness Plan: Medication will be added as noted. Further plans will be made after testing.    Diagnoses and all orders for this visit:    1. Dizziness (Primary)  -     CBC & Differential; Future  -     Comprehensive Metabolic Panel; Future  -     Amylase; Future  -     Lipase; Future  -     sulfamethoxazole-trimethoprim (Bactrim DS) 800-160 MG per tablet; Take 1 tablet by mouth 2 (Two) Times a Day.  Dispense: 20 tablet; Refill: 0  -     CBC & Differential  -     Comprehensive Metabolic Panel  -     Amylase  -     Lipase    2. Acute cystitis without hematuria  -     Urine Culture - Urine, Urine, Clean Catch; Future  -     POC Urinalysis Dipstick, Automated; Future  -     Urinalysis, Microscopic Only - Urine, Clean Catch; Future  -     Urine Culture - Urine, Urine, Clean Catch  -     Urinalysis, Microscopic Only - Urine, Clean Catch  -     POC Urinalysis Dipstick, Automated  -     CBC & Differential; Future  -     Comprehensive Metabolic Panel; Future  -     Amylase; Future  -     Lipase; Future  -     sulfamethoxazole-trimethoprim (Bactrim DS) 800-160 MG per tablet; Take 1 tablet by mouth 2 (Two) Times a Day.  Dispense: 20 tablet; Refill: 0  -     CBC & Differential  -     Comprehensive Metabolic Panel  -     Amylase  -     Lipase    3. Orthostatic hypotension  -     CBC & Differential; Future  -     Comprehensive Metabolic Panel; Future  -     Amylase; Future  -     Lipase; Future  -     sulfamethoxazole-trimethoprim (Bactrim DS) 800-160 MG per tablet; Take 1 tablet by mouth 2 (Two) Times a Day.  Dispense: 20 tablet; Refill: 0  -     CBC & Differential  -     Comprehensive Metabolic Panel  -     Amylase  -     Lipase    4. Nausea  -     CBC & Differential; Future  -     Comprehensive Metabolic Panel; Future  -     Amylase; Future  -     Lipase; Future  -     sulfamethoxazole-trimethoprim (Bactrim DS) 800-160 MG per tablet; Take 1 tablet by mouth 2 (Two) Times a Day.  Dispense: 20 tablet; Refill: 0  -      CBC & Differential  -     Comprehensive Metabolic Panel  -     Amylase  -     Lipase    5. Pure hypercholesterolemia  -     Lipid Panel    Other orders  -     icosapent ethyl (VASCEPA) 1 g capsule capsule; Take 2 g by mouth 2 (Two) Times a Day With Meals.  Dispense: 120 capsule; Refill: 5        Return to Office    The patient was instructed to return for follow-up in 5 days. The patient was instructed to return sooner if the condition changes, worsens, or does not resolve.

## 2023-04-17 NOTE — ED PROVIDER NOTES
Subjective   History of Present Illness    Pt presents for hypotension.  She was sent by PCP.  About three days ago she began having dizziness, nausea and malaise. She saw PCP that day and had UA, labs and was told she had a UTI.  She was prescribed Bactrim.  Since then the nausea and dizziness are better but the malaise persists and now she has some right flank pain.  She was seen again today and had BP 76/56 in the office so they sent her here for eval.  No fever, vomiting, diarrhea, abd pain or dysuria.  Medically complex with history of liver transplant on Prograf.    History provided by:  Patient      Review of Systems   Constitutional: Negative for fever.   Cardiovascular: Negative for chest pain.   Gastrointestinal: Negative for abdominal pain and vomiting.   Genitourinary: Positive for flank pain.   Neurological: Positive for dizziness and weakness.   All other systems reviewed and are negative.      Past Medical History:   Diagnosis Date   • Allergic     Environmental, pcn, cyclosporine   • Anemia    • Anxiety    • Cholelithiasis    • Cirrhosis of liver    • Clotting disorder     Pre-transplant   • Colon polyp    • COVID    • Deep vein thrombosis Pretransplant    Blood clot in liver   • Depression    • Diabetes    • GERD (gastroesophageal reflux disease)    • Headache    • Heart murmur 1969   • HL (hearing loss)     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, mastoid damage   • Hypercholesteremia    • Hypertension    • Hyperthyroidism 2001   • Hypothyroid    • Low back pain 1982   • Meningioma    • Osteoarthritis    • Osteopenia    • Osteoporosis    • Pancreatitis     Pretransplant   • Renal impairment    • Severe malnutrition (CMS/HCC) 2/3/2022   • Stroke    • Thyroid cancer    • Vision impairment     left eye       Allergies   Allergen Reactions   • Penicillins Shortness Of Breath, Itching, Other (See Comments) and Rash   • Cephalosporins Diarrhea   • Cyclosporine Swelling       Past Surgical History:   Procedure Laterality  Date   • APPENDECTOMY     • BREAST BIOPSY Left     BENIGN   •  SECTION      x3   • CHOLECYSTECTOMY     • D & C HYSTEROSCOPY LAPAROSCOPY      x2 for endometriosis   • ENDOSCOPY     • FRACTURE SURGERY  1983    Right ankle has screws   • LIVER SURGERY      removed cysts    • LIVER TRANSPLANTATION     • ORIF ANKLE FRACTURE     • THYROIDECTOMY     • TONSILLECTOMY     • TOTAL ABDOMINAL HYSTERECTOMY WITH SALPINGO OOPHORECTOMY Bilateral    • UPPER GASTROINTESTINAL ENDOSCOPY         Family History   Problem Relation Age of Onset   • Arthritis Mother    • Osteoporosis Mother    • Rheum arthritis Mother    • Anemia Mother    • Alcohol abuse Father    • Mental illness Father    • Hyperlipidemia Father    • Hypertension Father    • Colon cancer Father    • Cancer Father    • Hearing loss Father    • ADD / ADHD Son    • ADD / ADHD Son    • Breast cancer Maternal Grandmother    • Stroke Maternal Grandmother    • Mental illness Paternal Grandmother    • Ovarian cancer Paternal Grandmother        Social History     Socioeconomic History   • Marital status:    Tobacco Use   • Smoking status: Never   • Smokeless tobacco: Never   Vaping Use   • Vaping Use: Never used   Substance and Sexual Activity   • Alcohol use: No   • Drug use: Never     Comment: Tried an edible last week.    • Sexual activity: Not Currently     Partners: Male     Birth control/protection: Surgical, Post-menopausal           Objective   Physical Exam  Vitals and nursing note reviewed.   Constitutional:       General: She is not in acute distress.     Appearance: Normal appearance. She is not ill-appearing.   HENT:      Head: Normocephalic and atraumatic.   Eyes:      General: No scleral icterus.        Right eye: No discharge.         Left eye: No discharge.      Conjunctiva/sclera: Conjunctivae normal.   Cardiovascular:      Rate and Rhythm: Normal rate.   Pulmonary:      Effort: Pulmonary effort is normal. No respiratory distress.    Musculoskeletal:         General: No swelling or deformity.      Cervical back: Normal range of motion and neck supple.   Skin:     General: Skin is dry.      Findings: No rash.   Neurological:      General: No focal deficit present.      Mental Status: She is alert and oriented to person, place, and time. Mental status is at baseline.   Psychiatric:         Mood and Affect: Mood normal.         Behavior: Behavior normal.         Thought Content: Thought content normal.         Procedures           ED Course         UA c/w partially treated infection when compared to prior.  Labs are benign. CT negative.  She was hypotensive but it resolved with fluids and stayed up, and she was ambulatory in ED without difficulty.  PO fosfomycin given to augment UTI tx and culture ordered.  Discussed with pt and , pt feels well enough to go home.  Fluids and rest and finish Bactrim.                                  Medical Decision Making  Acute UTI: acute illness or injury  Hypotension, unspecified hypotension type: complicated acute illness or injury  Liver transplanted: chronic illness or injury  Amount and/or Complexity of Data Reviewed  Labs: ordered. Decision-making details documented in ED Course.  Radiology: ordered. Decision-making details documented in ED Course.      Risk  Prescription drug management.  Decision regarding hospitalization.          Final diagnoses:   Hypotension, unspecified hypotension type   Acute UTI   Liver transplanted       ED Disposition  ED Disposition     ED Disposition   Discharge    Condition   Stable    Comment   --             Ad Oquendo MD  [address]  [place] [address]  [phone]    In 1 week      [facility] Emergency Department  [address] [address] [phone]    If symptoms worsen         Medication List      Changed    methocarbamol 500 MG tablet  Commonly known as: ROBAXIN  TAKE 1 TABLET BY  MOUTH FOUR TIMES A DAY  What changed:   · how much to take  · how to take this  · when to take this  · reasons to take this             Nael Pham MD  04/17/23 1575

## 2023-04-17 NOTE — PROGRESS NOTES
Chief Complaint   Patient presents with   • Follow-up     4 day follow up        History of Present Illness    The patient presents for follow-up of a urinary tract infection. The patient denies dysuria, gross hematuria, frequency, urgency, hesitancy, fever, vaginal discharge, pelvic pain or flank pain. She is still taking her medication. She has not had a prior history of frequent, recurrent urinary tract infections.    The patient presents for an acute visit for right flank pain. There is a two day history of abdominal pain. The pain is constant. The pain is in the right flank and it does not radiate. The pain is characterized as sharp. The patient has not had a fever. The patient reports no aggravating factors.  The patient has noted no alleviating factors. The patient also denies nausea, vomiting, diarrhea, constipation, a rash, rectal bleeding, urinary hesitancy or urinary frequency.    Medications      Current Outpatient Medications:   •  alendronate (FOSAMAX) 70 MG tablet, Take 1 tablet by mouth 1 (One) Time Per Week., Disp: , Rfl:   •  atorvastatin (LIPITOR) 40 MG tablet, Daily., Disp: , Rfl:   •  buPROPion XL (Wellbutrin XL) 300 MG 24 hr tablet, Take 1 tablet by mouth Every Morning., Disp: 45 tablet, Rfl: 1  •  calcium carbonate-vitamin d 600-400 MG-UNIT per tablet, Take 1 tablet by mouth Daily., Disp: , Rfl:   •  estradiol (ESTRACE VAGINAL) 0.1 MG/GM vaginal cream, Insert 0.5 g intravaginally at HS 2-3x/ week., Disp: 42.5 g, Rfl: 4  •  fluticasone (FLONASE) 50 MCG/ACT nasal spray, USE 2 SPRAYS IN EACH NOSTRIL TWICE DAILY, Disp: 48 mL, Rfl: 1  •  icosapent ethyl (VASCEPA) 1 g capsule capsule, Take 2 g by mouth 2 (Two) Times a Day With Meals., Disp: 120 capsule, Rfl: 5  •  isosorbide dinitrate (ISORDIL) 30 MG tablet, Take 1 tablet by mouth 2 (Two) Times a Day., Disp: 180 tablet, Rfl: 0  •  levothyroxine (SYNTHROID, LEVOTHROID) 175 MCG tablet, TAKE 1 TABLET BY MOUTH EVERY DAY IN THE MORNING, Disp: 90 tablet,  Rfl: 0  •  melatonin ([employer] Melatonin) 5 MG tablet tablet, Take 1 tablet by mouth Every Night., Disp: 90 tablet, Rfl: 1  •  methocarbamol (ROBAXIN) 500 MG tablet, TAKE 1 TABLET BY MOUTH FOUR TIMES A DAY (Patient taking differently: 4 (Four) Times a Day As Needed.), Disp: 60 tablet, Rfl: 0  •  metoprolol succinate XL (TOPROL-XL) 100 MG 24 hr tablet, Take 1 tablet by mouth Daily., Disp: , Rfl:   •  mirtazapine (REMERON) 30 MG tablet, Take 1 tablet by mouth every night at bedtime., Disp: , Rfl:   •  oxyCODONE (ROXICODONE) 5 MG immediate release tablet, Take 1 tablet by mouth Every Night., Disp: , Rfl:   •  pantoprazole (PROTONIX) 40 MG EC tablet, Take 1 tablet by mouth Daily., Disp: , Rfl:   •  pregabalin (LYRICA) 100 MG capsule, Every 8 (Eight) Hours., Disp: , Rfl:   •  rOPINIRole (REQUIP) 2 MG tablet, TAKE 1 - 2 TABLET(S) BY MOUTH EVERY NIGHT AT BEDTIME., Disp: 180 tablet, Rfl: 1  •  sertraline (ZOLOFT) 100 MG tablet, Take 1 tablet by mouth Daily., Disp: , Rfl:   •  spironolactone (ALDACTONE) 25 MG tablet, TAKE 1 TABLET BY MOUTH EVERY DAY, Disp: 90 tablet, Rfl: 0  •  sucralfate (Carafate) 1 g tablet, Take 1 tablet by mouth 4 (Four) Times a Day., Disp: 360 tablet, Rfl: 1  •  sulfamethoxazole-trimethoprim (Bactrim DS) 800-160 MG per tablet, Take 1 tablet by mouth 2 (Two) Times a Day., Disp: 20 tablet, Rfl: 0  •  tacrolimus (PROGRAF) 0.5 MG capsule, Take 2 capsules by mouth 2 (Two) Times a Day., Disp: , Rfl:   •  traZODone (DESYREL) 100 MG tablet, Take 2 tablets by mouth Every Night., Disp: 90 tablet, Rfl: 1  •  zolpidem (AMBIEN) 5 MG tablet, Take 1 tablet by mouth Every Night., Disp: , Rfl:   •  nitroglycerin (NITROSTAT) 0.4 MG SL tablet, Place 1 tablet under the tongue. (Patient not taking: Reported on 4/13/2023), Disp: , Rfl:   •  potassium chloride (MICRO-K) 10 MEQ CR capsule, Hold until Lasix (furosemide) restarted (Patient not taking: Reported on 4/13/2023), Disp: , Rfl:   •  Semaglutide,0.25 or 0.5MG/DOS, (OZEMPIC)  2 MG/1.5ML solution pen-injector, Inject 0.5 mg under the skin into the appropriate area as directed 1 (One) Time Per Week. (Patient not taking: Reported on 4/17/2023), Disp: 1.5 mL, Rfl: 3     Allergies    Allergies   Allergen Reactions   • Penicillins Shortness Of Breath, Itching, Other (See Comments) and Rash   • Cephalosporins Diarrhea   • Cyclosporine Swelling       Problem List    Patient Active Problem List   Diagnosis   • Menopause   • Personal history of malignant neoplasm of thyroid   • History of stroke   • Renal impairment   • Vision impairment   • Osteoporosis   • Osteoarthritis   • Meningioma   • Hypothyroid   • Pure hypercholesterolemia   • Hepatic cirrhosis (HCC)   • Anxiety and depression   • Acute hypoxemic respiratory failure due to COVID-19   • S/P cholecystectomy   • Leukopenia   • Immunosuppression due to drug therapy   • Insomnia   • Sleep apnea   • Gout   • Cytokine release syndrome, grade 2   • Precordial pain   • Viral gastroenteritis   • Postoperative hypothyroidism   • C. difficile diarrhea   • Stage 3a chronic kidney disease   • Gastroesophageal reflux disease with esophagitis without hemorrhage   • Cerebrovascular accident (CVA)   • Osteoarthritis of left hip   • Prediabetes   • Vocal cord dysfunction   • Gastroparesis   • Liver transplant recipient   • Community acquired pneumonia   • Albuminuria   • Primary osteoarthritis of both feet       Medications, Allergies, Problems List and Past History were reviewed and updated.    Physical Examination    BP (!) 76/60 (BP Location: Left arm, Patient Position: Sitting, Cuff Size: Adult)   Pulse 80   Temp 98.2 °F (36.8 °C) (Infrared)   Resp 16   Wt 63.5 kg (140 lb)   LMP  (LMP Unknown) Comment: Last pap 3/3/2022 [facility]  BMI 26.09 kg/m²     HEENT: Head- Normocephalic Atraumatic. Facies- Within normal limits. Pinnas- Normal texture and shape bilaterally. Canals- Normal bilaterally. TMs- Normal bilaterally. Nares- Patent  bilaterally. Nasal Septum- is normal. There is no tenderness to palpation over the frontal or maxillary sinuses. Lids- Normal bilaterally. Conjunctiva- Clear bilaterally. Sclera- Anicteric bilaterally. Oropharynx- Moist with no lesions. Tonsils- No enlargement, erythema or exudate.    Lungs: Auscultation- Clear to auscultation bilaterally. There are no retractions, clubbing or cyanosis. The Expiratory to Inspiratory ratio is equal.    Cardiovascular: There are no carotid bruits. Heart- Normal Rate with Regular rhythm and no murmurs. There are no gallops. There are no rubs. In the lower extremities there is no edema. The upper extremities do not have edema.    Abdomen: Soft, benign, non-tender with no masses, hernias, organomegaly or scars.    Rectal: reveals normal external sphincter tone with no external lesions.    Genitourinary: The labia are within normal limits with no lesions. The vaginal introitus is within normal limits. The vagina has a healthy mucosa with no lesions or discharge. The cervix is multiparous with no lesions. The bimanual examination reveals no adnexal masses or tenderness.    Impression and Assessment    Urinary Tract Infection.    Right Flank Pain    Dehydration.    Plan    Right Flank Pain Plan: She was referred to the ER for further evaluation.    Urinary Tract Infection Plan: The patient was instructed to continue the current medications. She was referred to the ER for further evaluation.    Dehydration Plan: She was referred to the ER for further evaluation.    Diagnoses and all orders for this visit:    1. Acute cystitis without hematuria (Primary)    2. Right flank pain    3. Dehydration        Return to Office    The patient was instructed to return for follow-up as needed. The patient was instructed to return sooner if the condition changes, worsens, or does not resolve.

## 2023-04-18 DIAGNOSIS — M15.9 PRIMARY OSTEOARTHRITIS INVOLVING MULTIPLE JOINTS: ICD-10-CM

## 2023-04-18 LAB — BACTERIA SPEC AEROBE CULT: NO GROWTH

## 2023-04-18 RX ORDER — METHOCARBAMOL 500 MG/1
TABLET, FILM COATED ORAL
Qty: 60 TABLET | Refills: 0 | Status: SHIPPED | OUTPATIENT
Start: 2023-04-18

## 2023-04-19 LAB — C DIFF TOX A+B STL QL IA: NEGATIVE

## 2023-04-19 RX ORDER — ALENDRONATE SODIUM 70 MG/1
TABLET ORAL
Qty: 12 TABLET | Refills: 2 | Status: SHIPPED | OUTPATIENT
Start: 2023-04-19

## 2023-04-21 ENCOUNTER — PATIENT OUTREACH (OUTPATIENT)
Dept: CASE MANAGEMENT | Facility: OTHER | Age: 58
End: 2023-04-21
Payer: COMMERCIAL

## 2023-04-21 ENCOUNTER — TELEPHONE (OUTPATIENT)
Dept: INTERNAL MEDICINE | Facility: CLINIC | Age: 58
End: 2023-04-21
Payer: COMMERCIAL

## 2023-04-21 DIAGNOSIS — R41.3 MEMORY CHANGES: Primary | ICD-10-CM

## 2023-04-21 NOTE — OUTREACH NOTE
"AMBULATORY CASE MANAGEMENT NOTE    Name and Relationship of Patient/Support Person: Lizette Frias A - Self    Patient Outreach    Pt returned call from message left earlier today regarding [facility] ED visit 4/17/23 with chief c/o listed as flank pain.   Role of Ambulatory Nurse  explained.  States she is better,  \"my bp is coming up closer to normal and not having sharp pain now.\"  She is still having some more generalized back pain.  She is still on antibiotic and knows to complete entire course as directed.  Symptoms that would warrant seeking medical attention reviewed and v/u.  Explained [facility] 24/7 Nurse Call Center and referred to AVS pg 2 as she was driving at time of call.  Offered to assist in scheduling f/u with PCP, which she accepted, however has some limited time, due to other appts. She does want to also discuss her memory decline with PCP.  She reports she is self sufficient and is compliant with her medicines.  She ambulates without assistive device.     Care Coordination    Scheduling contacted.  Had appt availability for 4/24, however pt states she has 2 appt this day and one being CT scan for upcoming Cochlear implant. Appt made for 5/2/23 with Dr. Fermin at 10:45.  Pt would like to continue phone call with scheduling. Denies any other needs by RN-ACM.     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   Concentrating, Remembering or Making Decisions Difficulty yes   Difficulty Communicating no   Walking or Climbing Stairs Difficulty no   Dressing/Bathing Difficulty no   Doing Errands Independently Difficulty (such as shopping) no   Equipment Currently Used at Home bp cuff   People in Home spouse   Current Living Arrangements home          Social Work Assessment  Questions/Answers    Flowsheet Row Most Recent Value "   People in Home spouse   Current Living Arrangements home   Primary Care Provided by [facility]   Quality of Family Relationships supportive   Equipment Currently Used at Home bp cuff          Send Education  Questions/Answers    Flowsheet Row Most Recent Value   Annual Wellness Visit:  Patient Has Completed   Other Patient Education/Resources  24/7 [facility] Nurse Call Line   24/7 Nurse Call Line Education Method Verbal   Advanced Directives: Patient Has        SDOH updated and reviewed with the patient during this program:  Financial Resource Strain: Low Risk    • Difficulty of Paying Living Expenses: Not very hard      Food Insecurity: No Food Insecurity   • Worried About Running Out of Food in the Last Year: Never true   • Ran Out of Food in the Last Year: Never true      Transportation Needs: No Transportation Needs   • Lack of Transportation (Medical): No   • Lack of Transportation (Non-Medical): No      Housing Stability: Low Risk    • Unable to Pay for Housing in the Last Year: No   • Number of Places Lived in the Last Year: 1   • Unstable Housing in the Last Year: No     Carmita CANNON  Ambulatory Case Management    4/21/2023, 11:23 EDT

## 2023-04-21 NOTE — TELEPHONE ENCOUNTER
Patient needs a referral to speech therapy to help with memory issues. [facility]  is where she goes for water PT. Call: [phone].

## 2023-04-28 ENCOUNTER — OFFICE VISIT (OUTPATIENT)
Dept: GASTROENTEROLOGY | Facility: CLINIC | Age: 58
End: 2023-04-28
Payer: COMMERCIAL

## 2023-04-28 VITALS
OXYGEN SATURATION: 98 % | SYSTOLIC BLOOD PRESSURE: 102 MMHG | HEART RATE: 82 BPM | DIASTOLIC BLOOD PRESSURE: 60 MMHG | HEIGHT: 61 IN | WEIGHT: 136.6 LBS | BODY MASS INDEX: 25.79 KG/M2 | TEMPERATURE: 96.9 F

## 2023-04-28 DIAGNOSIS — T40.2X5A THERAPEUTIC OPIOID INDUCED CONSTIPATION: ICD-10-CM

## 2023-04-28 DIAGNOSIS — R19.8 ABNORMAL ABDOMINAL EXAM: ICD-10-CM

## 2023-04-28 DIAGNOSIS — K59.03 THERAPEUTIC OPIOID INDUCED CONSTIPATION: ICD-10-CM

## 2023-04-28 DIAGNOSIS — R63.0 DECREASED APPETITE: ICD-10-CM

## 2023-04-28 DIAGNOSIS — Z98.890 HISTORY OF COLONOSCOPY: ICD-10-CM

## 2023-04-28 DIAGNOSIS — K21.00 GASTROESOPHAGEAL REFLUX DISEASE WITH ESOPHAGITIS WITHOUT HEMORRHAGE: Primary | ICD-10-CM

## 2023-04-28 RX ORDER — POLYETHYLENE GLYCOL 3350 17 G/17G
17 POWDER, FOR SOLUTION ORAL DAILY
Qty: 1530 G | Refills: 3 | Status: SHIPPED | OUTPATIENT
Start: 2023-04-28

## 2023-04-28 RX ORDER — DAPAGLIFLOZIN 5 MG/1
TABLET, FILM COATED ORAL
COMMUNITY
Start: 2023-04-17

## 2023-04-28 RX ORDER — PANTOPRAZOLE SODIUM 40 MG/1
40 TABLET, DELAYED RELEASE ORAL 2 TIMES DAILY
Qty: 180 TABLET | Refills: 3 | Status: SHIPPED | OUTPATIENT
Start: 2023-04-28

## 2023-04-28 NOTE — Clinical Note
Please request colonoscopy report from [facility] with pathology and follow up letter if not already done. Thank you!

## 2023-04-28 NOTE — PROGRESS NOTES
"GASTROENTEROLOGY OFFICE NOTE    Lizette Frias  [id number]  1965    CARE TEAM  Patient Care Team:  Ad Oquendo MD as PCP - General (Internal Medicine)  Ming Stroud MD as Consulting Physician (Endocrinology)  Bill Ventura III, MD as Cardiologist (Cardiology)  Carmita Nelson APRN as Nurse Practitioner (Obstetrics and Gynecology)  Isaiah Law MD as Consulting Physician (Pain Medicine)  Carmita Garcia RN as Ambulatory  ([facility])    Referring Provider: No ref. provider found    Chief Complaint   Patient presents with   • Heartburn   • GI Problem     Gastroparesis        HISTORY OF PRESENT ILLNESS:   Lizette Frias is a 58 y.o. female Who returns for 2-month follow-up regarding reflux, gastroparesis. She discontinued Ozempic 2 weeks ago. She reports decreased appetite, continued weight loss despite stopping Ozempic and description of a \"knot\" in the epigastric area near xiphoid process that began a few days ago.     Review of record reveals she had CT scan abdomen and pelvis 11 days ago due to right flank pain, abdominal pain, urinary tract infection with history of liver transplant that revealed surgical changes of the liver, moderate to large stool burden. The knot she has felt in the epigastric area was not present at that time.     She reports 1 bowel movement per day.  She recalls previously taking lactulose and she did not like the taste.  I do not believe she has previously tried MiraLAX.    She also expresses concern that hoarseness of voice and discussion with vocal therapist could be due to reflux.  She would like to know if she should take pantoprazole twice daily.    The office is awaiting prior colonoscopy report. Prior documentation revealed colonoscopy was likely at [facility]. She is unable to recall where colonoscopy was performed.   Past Medical History:   Diagnosis Date   • Allergic     Environmental, pcn, cyclosporine   • " Anemia    • Anxiety    • Cholelithiasis    • Cirrhosis of liver    • Clotting disorder     Pre-transplant   • Colon polyp    • COVID    • Deep vein thrombosis Pretransplant    Blood clot in liver   • Depression    • Diabetes    • GERD (gastroesophageal reflux disease)    • Headache    • Heart murmur    • HL (hearing loss)     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, mastoid damage   • Hypercholesteremia    • Hypertension    • Hyperthyroidism    • Hypothyroid    • Low back pain    • Meningioma    • Osteoarthritis    • Osteopenia    • Osteoporosis    • Pancreatitis     Pretransplant   • Renal impairment    • Severe malnutrition (CMS/HCC) 2/3/2022   • Stroke    • Thyroid cancer    • Vision impairment     left eye        Past Surgical History:   Procedure Laterality Date   • APPENDECTOMY     • BREAST BIOPSY Left     BENIGN   •  SECTION      x3   • CHOLECYSTECTOMY     • D & C HYSTEROSCOPY LAPAROSCOPY      x2 for endometriosis   • ENDOSCOPY     • FRACTURE SURGERY      Right ankle has screws   • LIVER SURGERY      removed cysts    • LIVER TRANSPLANTATION     • ORIF ANKLE FRACTURE     • THYROIDECTOMY     • TONSILLECTOMY     • TOTAL ABDOMINAL HYSTERECTOMY WITH SALPINGO OOPHORECTOMY Bilateral    • UPPER GASTROINTESTINAL ENDOSCOPY          Current Outpatient Medications on File Prior to Visit   Medication Sig   • alendronate (FOSAMAX) 70 MG tablet TAKE 1 TABLET BY ORAL ROUTE EVERY WEEK IN THE MORNING, AT LEAST 30 MIN BEFORE FIRST FOOD, BEVERAGE, OR MEDICATION OF DAY FOR OSTEOPOROSIS   • atorvastatin (LIPITOR) 40 MG tablet Daily.   • buPROPion XL (Wellbutrin XL) 300 MG 24 hr tablet Take 1 tablet by mouth Every Morning.   • calcium carbonate-vitamin d 600-400 MG-UNIT per tablet Take 1 tablet by mouth Daily.   • estradiol (ESTRACE VAGINAL) 0.1 MG/GM vaginal cream Insert 0.5 g intravaginally at HS 2-3x/ week.   • Farxiga 5 MG tablet tablet    • fluticasone (FLONASE) 50 MCG/ACT nasal spray USE 2 SPRAYS IN EACH NOSTRIL TWICE  DAILY   • icosapent ethyl (VASCEPA) 1 g capsule capsule Take 2 g by mouth 2 (Two) Times a Day With Meals.   • isosorbide dinitrate (ISORDIL) 30 MG tablet Take 1 tablet by mouth 2 (Two) Times a Day.   • levothyroxine (SYNTHROID, LEVOTHROID) 175 MCG tablet TAKE 1 TABLET BY MOUTH EVERY DAY IN THE MORNING   • melatonin ([employer] Melatonin) 5 MG tablet tablet Take 1 tablet by mouth Every Night.   • methocarbamol (ROBAXIN) 500 MG tablet TAKE 1 TABLET BY MOUTH FOUR TIMES A DAY   • metoprolol succinate XL (TOPROL-XL) 100 MG 24 hr tablet Take 1 tablet by mouth Daily.   • mirtazapine (REMERON) 30 MG tablet Take 1 tablet by mouth every night at bedtime.   • oxyCODONE (ROXICODONE) 5 MG immediate release tablet Take 1 tablet by mouth Every Night.   • potassium chloride (MICRO-K) 10 MEQ CR capsule Hold until Lasix (furosemide) restarted   • pregabalin (LYRICA) 100 MG capsule Every 8 (Eight) Hours.   • rOPINIRole (REQUIP) 2 MG tablet TAKE 1 - 2 TABLET(S) BY MOUTH EVERY NIGHT AT BEDTIME.   • sertraline (ZOLOFT) 100 MG tablet Take 1 tablet by mouth Daily.   • spironolactone (ALDACTONE) 25 MG tablet TAKE 1 TABLET BY MOUTH EVERY DAY   • sucralfate (Carafate) 1 g tablet Take 1 tablet by mouth 4 (Four) Times a Day.   • tacrolimus (PROGRAF) 0.5 MG capsule Take 2 capsules by mouth 2 (Two) Times a Day.   • traZODone (DESYREL) 100 MG tablet Take 2 tablets by mouth Every Night.   • zolpidem (AMBIEN) 5 MG tablet Take 1 tablet by mouth Every Night.   • [DISCONTINUED] pantoprazole (PROTONIX) 40 MG EC tablet Take 1 tablet by mouth Daily.   • [DISCONTINUED] nitroglycerin (NITROSTAT) 0.4 MG SL tablet Place 1 tablet under the tongue. (Patient not taking: Reported on 4/13/2023)   • [DISCONTINUED] Semaglutide,0.25 or 0.5MG/DOS, (OZEMPIC) 2 MG/1.5ML solution pen-injector Inject 0.5 mg under the skin into the appropriate area as directed 1 (One) Time Per Week. (Patient not taking: Reported on 4/17/2023)   • [DISCONTINUED] sulfamethoxazole-trimethoprim  "(Bactrim DS) 800-160 MG per tablet Take 1 tablet by mouth 2 (Two) Times a Day.     No current facility-administered medications on file prior to visit.       Allergies   Allergen Reactions   • Penicillins Shortness Of Breath, Itching, Other (See Comments) and Rash   • Cephalosporins Diarrhea   • Cyclosporine Swelling       Family History   Problem Relation Age of Onset   • Arthritis Mother    • Osteoporosis Mother    • Rheum arthritis Mother    • Anemia Mother    • Alcohol abuse Father    • Mental illness Father    • Hyperlipidemia Father    • Hypertension Father    • Colon cancer Father    • Cancer Father    • Hearing loss Father    • ADD / ADHD Son    • ADD / ADHD Son    • Breast cancer Maternal Grandmother    • Stroke Maternal Grandmother    • Mental illness Paternal Grandmother    • Ovarian cancer Paternal Grandmother        Social History     Socioeconomic History   • Marital status:    Tobacco Use   • Smoking status: Never   • Smokeless tobacco: Never   Vaping Use   • Vaping Use: Never used   Substance and Sexual Activity   • Alcohol use: No   • Drug use: Never     Comment: Tried an edible last week.    • Sexual activity: Not Currently     Partners: Male     Birth control/protection: Surgical, Post-menopausal       PHYSICAL EXAM   /60 (BP Location: Left arm, Patient Position: Sitting, Cuff Size: Adult)   Pulse 82   Temp 96.9 °F (36.1 °C) (Infrared)   Ht 154.9 cm (61\")   Wt 62 kg (136 lb 9.6 oz)   LMP  (LMP Unknown) Comment: Last pap 3/3/2022 by [facility]  SpO2 98%   BMI 25.81 kg/m²   Physical Exam  Constitutional:       General: She is not in acute distress.     Appearance: She is not toxic-appearing.   HENT:      Head: Normocephalic and atraumatic. No contusion.      Right Ear: External ear normal.      Left Ear: External ear normal.   Eyes:      General: Lids are normal. No scleral icterus.        Right eye: No discharge.         Left eye: No discharge.      Extraocular " Movements: Extraocular movements intact.   Neck:      Trachea: Trachea normal.      Comments: No visible mass  No visible adenopathy  Cardiovascular:      Rate and Rhythm: Normal rate.      Heart sounds: Normal heart sounds.   Pulmonary:      Effort: No respiratory distress.      Breath sounds: Normal breath sounds.      Comments: Symmetrical expansion    Abdominal:      Palpations: Abdomen is soft.      Tenderness: There is no abdominal tenderness.      Comments: Well-healed abdominal surgical scars noted during exam. Approximately 5 cm or so below the xiphoid process, small irregularity noted (patient felt as though it could be a retained suture in the area)    Musculoskeletal:      Right lower leg: No edema.      Left lower leg: No edema.      Comments: Symmetrical movement of upper extremities  Symmetrical movement of lower extremities  No visible deformities   Skin:     General: Skin is warm and dry.      Coloration: Skin is not jaundiced.   Neurological:      General: No focal deficit present.      Mental Status: She is alert and oriented to person, place, and time.   Psychiatric:         Mood and Affect: Mood normal.         Behavior: Behavior normal.         Thought Content: Thought content normal.     Results Review:    1/6/2023 EGD per Dr. Wood due to heartburn and weight loss revealed LA grade a esophagitis without bleeding in the distal esophagus, large amount of food residue, gastroparesis caused or exacerbated by Ozempic with recommendation to decrease or discontinue Ozempic, consider prokinetic agent, dietary modifications for gastroparesis.    CT scan abdomen and pelvis 4/17/2023 due to right flank pain, abdominal pain, urinary tract infection with history of liver transplant that revealed surgical changes of the liver, moderate to large stool burden. The knot she has felt in the epigastric area was not present at that time.     CMP        2/20/2023    13:25 4/13/2023    16:27 4/17/2023     14:39 4/17/2023    14:49   CMP   Glucose 93   81   78      BUN 19   20   22      Creatinine 0.85   1.29   1.20   1.20      1.20     EGFR 79.0   47.9   52.3   52.3     Sodium 141   140   140      Potassium 3.8   5.1   5.1      Chloride 108   102   107      Calcium 9.2   9.4   8.8      Total Protein 6.5   7.0   6.7      Albumin 4.1   4.3   4.5      Globulin 2.4   2.7   2.2      Total Bilirubin 0.5   1.2   0.6      Alkaline Phosphatase 95   107   102      AST (SGOT) 39   34   25      ALT (SGPT) 64   46   34      Albumin/Globulin Ratio 1.7   1.6   2.0      BUN/Creatinine Ratio 22.4   15.5   18.3      Anion Gap 8.0   13.0   10.0        CBC        2/20/2023    13:25 4/13/2023    16:27 4/17/2023    14:39 4/17/2023    14:49   CBC   WBC 4.28   6.28   4.97      RBC 5.02   5.62   5.05      Hemoglobin 14.2   15.7   14.6   14.3      14.3     Hematocrit 43.0   48.7   43.5   42      42     MCV 85.7   86.7   86.1      MCH 28.3   27.9   28.9      MCHC 33.0   32.2   33.6      RDW 14.5   14.8   13.9      Platelets 168   155   142              ASSESSMENT / PLAN  1. Gastroesophageal reflux disease with esophagitis without hemorrhage  2. Decreased appetite  - EGD 1/2023 revealed LA grade a esophagitis without bleeding in the distal esophagus, large amount of food residue, gastroparesis caused or exacerbated by Ozempic with recommendation to decrease or discontinue Ozempic, consider prokinetic agent, dietary modifications for gastroparesis. She stopped Ozempic a few weeks ago but recent use of Ozempic may be contributing to decreased appetite. She also takes oxycodone at bedtime which is likely contributing to constipation and delayed emptying of stomach which is also likely contributing to decreased appetite. She seemed to be considering stopping oxycodone  - will increase pantoprazole to twice daily dosing. It seems as though her vocal therapist expressed concern about reflux contributing to hoarseness. Reflux should improve off Ozempic  but it may take weeks for side effects of Ozempic to improve  -Printed information regarding gastroparesis previously provided in the office.  Conversation with the patient today reveals dietary changes for suspected gastroparesis    - pantoprazole (PROTONIX) 40 MG EC tablet; Take 1 tablet by mouth 2 (Two) Times a Day. 30 minutes prior to first meal and 30 minutes prior to last meal of the day  Dispense: 180 tablet; Refill: 3    3. Therapeutic opioid induced constipation  -CT scan this month with retained stool despite daily bowel movement, she takes oxycodone most nights which likely contributes to constipation for which I recommend she start MiraLAX daily  -She did not previously like the taste of lactulose  -She mentioned possibly discontinuing oxycodone as she did not feel as though it was helpful, consider Linzess in the future  - polyethylene glycol (MiraLax) 17 GM/SCOOP powder; Take 17 g by mouth Daily. Mix in 8 oz of any liquid once daily  Dispense: 1530 g; Refill: 3    4. History of colonoscopy  - she reports she is likely due for repeat colonoscopy this fall. I have again requested the office contact [facility] for prior record but the patient reported today she is unsure where prior colonoscopy was performed. If we are unable to obtain prior colonoscopy record, plan for repeat colonoscopy this fall, discuss at 3 month follow up visit.  -I previously documented that I would rather her take a liquid prep, small volume due to concern that Ozempic is affecting digestion and she may have decreased effect from taking pills when compared to liquid form of prep, however, she is no longer on Ozempic.  Our office typically uses liquid prep.    5. Abnormal abdominal exam  - she has recently noticed a knot in epigastric area and there was a palpable irregularity inferiorly to that area for which I recommend US abdomen for additional evaluation   - also recommend she start Miralax 1/2 to 1 dose daily to every  other day for treatment of constipation in the event constipation is contributing to abnormal sensation in the abdomen  - US Abdomen Complete; Future    In regarding to history of decompensated cirrhosis, live liver donor and recurrent MORRISON, she continues to follow with provider in [place].     Return in about 3 months (around 7/28/2023).    Phyllis Tao, APRN  04/28/2023

## 2023-05-01 DIAGNOSIS — M15.9 PRIMARY OSTEOARTHRITIS INVOLVING MULTIPLE JOINTS: ICD-10-CM

## 2023-05-01 RX ORDER — METHOCARBAMOL 500 MG/1
TABLET, FILM COATED ORAL
Qty: 60 TABLET | Refills: 0 | OUTPATIENT
Start: 2023-05-01

## 2023-05-02 ENCOUNTER — OFFICE VISIT (OUTPATIENT)
Dept: INTERNAL MEDICINE | Facility: CLINIC | Age: 58
End: 2023-05-02
Payer: COMMERCIAL

## 2023-05-02 VITALS
TEMPERATURE: 98 F | RESPIRATION RATE: 20 BRPM | WEIGHT: 137.13 LBS | SYSTOLIC BLOOD PRESSURE: 84 MMHG | BODY MASS INDEX: 25.91 KG/M2 | DIASTOLIC BLOOD PRESSURE: 60 MMHG | HEART RATE: 72 BPM

## 2023-05-02 DIAGNOSIS — Z94.4 LIVER TRANSPLANT STATUS: ICD-10-CM

## 2023-05-02 DIAGNOSIS — Z60.9 HIGH RISK SOCIAL SITUATION: ICD-10-CM

## 2023-05-02 DIAGNOSIS — I25.10 ATHEROSCLEROSIS OF CORONARY ARTERY OF NATIVE HEART WITHOUT ANGINA PECTORIS, UNSPECIFIED VESSEL OR LESION TYPE: ICD-10-CM

## 2023-05-02 DIAGNOSIS — K74.60 HEPATIC CIRRHOSIS, UNSPECIFIED HEPATIC CIRRHOSIS TYPE, UNSPECIFIED WHETHER ASCITES PRESENT: ICD-10-CM

## 2023-05-02 DIAGNOSIS — F41.9 ANXIETY AND DEPRESSION: ICD-10-CM

## 2023-05-02 DIAGNOSIS — I95.0 IDIOPATHIC HYPOTENSION: Primary | ICD-10-CM

## 2023-05-02 DIAGNOSIS — F32.A ANXIETY AND DEPRESSION: ICD-10-CM

## 2023-05-02 DIAGNOSIS — N18.31 STAGE 3A CHRONIC KIDNEY DISEASE: ICD-10-CM

## 2023-05-02 PROBLEM — Z92.25 PERSONAL HISTORY OF IMMUNOSUPPRESSION THERAPY: Status: ACTIVE | Noted: 2022-02-16

## 2023-05-02 PROBLEM — I95.2 HYPOTENSION DUE TO DRUGS: Status: ACTIVE | Noted: 2022-02-16

## 2023-05-02 PROBLEM — R73.03 PREDIABETES: Status: ACTIVE | Noted: 2022-02-16

## 2023-05-02 PROBLEM — K75.81 NONALCOHOLIC STEATOHEPATITIS (NASH): Status: ACTIVE | Noted: 2022-02-16

## 2023-05-02 PROBLEM — R07.2 PRECORDIAL PAIN: Status: RESOLVED | Noted: 2022-03-02 | Resolved: 2023-05-02

## 2023-05-02 PROBLEM — I10 BENIGN ESSENTIAL HYPERTENSION: Status: ACTIVE | Noted: 2022-02-16

## 2023-05-02 RX ORDER — MIRTAZAPINE 15 MG/1
15 TABLET, FILM COATED ORAL
Qty: 90 TABLET | Refills: 0 | Status: SHIPPED | OUTPATIENT
Start: 2023-05-02

## 2023-05-02 RX ORDER — MIRTAZAPINE 15 MG/1
15 TABLET, FILM COATED ORAL
Qty: 30 TABLET | Refills: 1 | Status: SHIPPED | OUTPATIENT
Start: 2023-05-02 | End: 2023-05-02

## 2023-05-02 RX ORDER — METOPROLOL SUCCINATE 50 MG/1
50 TABLET, EXTENDED RELEASE ORAL DAILY
Qty: 30 TABLET | Refills: 1 | Status: SHIPPED | OUTPATIENT
Start: 2023-05-02

## 2023-05-02 SDOH — SOCIAL STABILITY - SOCIAL INSECURITY: PROBLEM RELATED TO SOCIAL ENVIRONMENT, UNSPECIFIED: Z60.9

## 2023-05-02 NOTE — PROGRESS NOTES
Follow Up Office Visit      Date: 2023   Patient Name: Lizette Frias  : 1965   MRN: [id number]     Chief Complaint:    Chief Complaint   Patient presents with   • Hypothyroidism     ED [facility] 2023    • Anxiety       History of Present Illness: Lizette Frias is a 58 y.o. female who presents to the clinic today for a follow-up visit after being evaluated in the emergency department.    Idiopathic hypotension  Ms. Frias states she had been experiencing dizziness and elevated blood pressure. She is unsure if stress may be causing her blood pressure to fluctuate. She notes that her blood pressure was 144/91 the other day. The patient has been working on consuming more water, but she denies consuming any water today. She has noticed improvement in her symptoms since her hospital discharge. She recalls previously being prescribed isosorbide from her visiting physician. She had subsequently been instructed to decrease her isosorbide, but she has discontinued this. The patient had been prescribed metoprolol 100 mg daily for her blood pressure exclusively.     Anxiety and depression  The patient has been going to therapy surrounding trauma in her childhood and marriage. She feels that revisiting these circumstances has been heightening her anxiety. She reports that on 2023, her  told her that he wished to end their marriage. She has been unable to focus or sit still and typically gets fidgety if she has to sit and concentrate. There have been instances where she has forgotten where she is going while driving. She has significant concerns regarding finances as her income solely consists of disability. She does wish to obtain legal help, but she is unsure of how she will afford it. The patient has concerns on losing her health insurance or being unable to make her car payments. She states her anxiety is higher than it has ever been. She adds that her ex- has previously said  that she was mentally incapacitated. She does have short term memory issues where she does forget things, but she denies having any issues with her reasoning abilities. She has inquired about potentially increasing the dosage of her Zoloft or bupropion. Previously, she had hoped to be able to decrease or discontinue some of her medication. She has been taking bupropion at night and Zoloft during the morning. She does not sleep well, but she does continue to take trazodone at night. She has been taking mirtazapine following her transplant surgery as she was unable to sleep for more than 2 hours nightly. The patient is quite interested in having GeneSight testing performed.    Hepatic cirrhosis, unspecified type, unspecified whether ascites present  Ms. Frias was examined by gastroenterology approximately 1 week ago and had a CT scan obtained, which demonstrated constipation. She has continued to have bowel movements, so she was not aware that she had been constipated. She was subsequently prescribed MiraLAX. At the time of her next visit, she will be having a colonoscopy obtained. Her gastroenterologist ordered an ultrasound for her, but she has not received a call yet regarding scheduling. She had previously been prescribed Farxiga for diabetes, but Dr. Oquendo discontinued this as she was experiencing frequent urinary tract infections.      Subjective      Review of Systems:   Review of Systems   Constitutional: Negative for activity change, appetite change, fatigue and fever.   Eyes: Negative for blurred vision, photophobia and visual disturbance.   Respiratory: Negative for cough, chest tightness and shortness of breath.    Cardiovascular: Negative for chest pain and palpitations.   Gastrointestinal: Negative for abdominal distention, abdominal pain, blood in stool, constipation, diarrhea, nausea and vomiting.   Genitourinary: Negative for dysuria and hematuria.   Musculoskeletal: Negative for arthralgias,  back pain and joint swelling.   Skin: Negative for rash and wound.   Neurological: Positive for dizziness and light-headedness. Negative for weakness, headache and confusion.   Psychiatric/Behavioral: Positive for depressed mood and stress. Negative for self-injury and suicidal ideas. The patient is nervous/anxious.        I have reviewed the patients family history, social history, past medical history, past surgical history and have updated it as appropriate.     Medications:     Current Outpatient Medications:   •  alendronate (FOSAMAX) 70 MG tablet, TAKE 1 TABLET BY ORAL ROUTE EVERY WEEK IN THE MORNING, AT LEAST 30 MIN BEFORE FIRST FOOD, BEVERAGE, OR MEDICATION OF DAY FOR OSTEOPOROSIS, Disp: 12 tablet, Rfl: 2  •  atorvastatin (LIPITOR) 40 MG tablet, 1 tablet Daily., Disp: , Rfl:   •  buPROPion XL (Wellbutrin XL) 300 MG 24 hr tablet, Take 1 tablet by mouth Every Morning., Disp: 45 tablet, Rfl: 1  •  calcium carbonate-vitamin d 600-400 MG-UNIT per tablet, Take 1 tablet by mouth Daily., Disp: , Rfl:   •  estradiol (ESTRACE VAGINAL) 0.1 MG/GM vaginal cream, Insert 0.5 g intravaginally at HS 2-3x/ week., Disp: 42.5 g, Rfl: 4  •  fluticasone (FLONASE) 50 MCG/ACT nasal spray, USE 2 SPRAYS IN EACH NOSTRIL TWICE DAILY, Disp: 48 mL, Rfl: 1  •  icosapent ethyl (VASCEPA) 1 g capsule capsule, Take 2 g by mouth 2 (Two) Times a Day With Meals., Disp: 120 capsule, Rfl: 5  •  isosorbide dinitrate (ISORDIL) 30 MG tablet, Take 1 tablet by mouth 2 (Two) Times a Day., Disp: 180 tablet, Rfl: 0  •  levothyroxine (SYNTHROID, LEVOTHROID) 175 MCG tablet, TAKE 1 TABLET BY MOUTH EVERY DAY IN THE MORNING, Disp: 90 tablet, Rfl: 0  •  melatonin ([employer] Melatonin) 5 MG tablet tablet, Take 1 tablet by mouth Every Night., Disp: 90 tablet, Rfl: 1  •  methocarbamol (ROBAXIN) 500 MG tablet, TAKE 1 TABLET BY MOUTH FOUR TIMES A DAY, Disp: 60 tablet, Rfl: 0  •  metoprolol succinate XL (TOPROL-XL) 50 MG 24 hr tablet, Take 1 tablet by mouth Daily., Disp:  30 tablet, Rfl: 1  •  mirtazapine (REMERON) 15 MG tablet, Take 1 tablet by mouth every night at bedtime., Disp: 90 tablet, Rfl: 0  •  oxyCODONE (ROXICODONE) 5 MG immediate release tablet, Take 1 tablet by mouth Every Night., Disp: , Rfl:   •  pantoprazole (PROTONIX) 40 MG EC tablet, Take 1 tablet by mouth 2 (Two) Times a Day. 30 minutes prior to first meal and 30 minutes prior to last meal of the day, Disp: 180 tablet, Rfl: 3  •  potassium chloride (MICRO-K) 10 MEQ CR capsule, Hold until Lasix (furosemide) restarted, Disp: , Rfl:   •  pregabalin (LYRICA) 100 MG capsule, Every 8 (Eight) Hours., Disp: , Rfl:   •  rOPINIRole (REQUIP) 2 MG tablet, TAKE 1 - 2 TABLET(S) BY MOUTH EVERY NIGHT AT BEDTIME., Disp: 180 tablet, Rfl: 1  •  sertraline (ZOLOFT) 100 MG tablet, Take 1 tablet by mouth Daily., Disp: , Rfl:   •  spironolactone (ALDACTONE) 25 MG tablet, TAKE 1 TABLET BY MOUTH EVERY DAY, Disp: 90 tablet, Rfl: 0  •  sucralfate (Carafate) 1 g tablet, Take 1 tablet by mouth 4 (Four) Times a Day., Disp: 360 tablet, Rfl: 1  •  tacrolimus (PROGRAF) 0.5 MG capsule, Take 2 capsules by mouth 2 (Two) Times a Day., Disp: , Rfl:   •  traZODone (DESYREL) 100 MG tablet, Take 2 tablets by mouth Every Night., Disp: 90 tablet, Rfl: 1  •  zolpidem (AMBIEN) 5 MG tablet, Take 1 tablet by mouth Every Night., Disp: , Rfl:   •  polyethylene glycol (MiraLax) 17 GM/SCOOP powder, Take 17 g by mouth Daily. Mix in 8 oz of any liquid once daily (Patient not taking: Reported on 5/2/2023), Disp: 1530 g, Rfl: 3  •  sertraline (Zoloft) 50 MG tablet, Take 1 tablet by mouth Daily., Disp: 30 tablet, Rfl: 1    Allergies:   Allergies   Allergen Reactions   • Penicillins Shortness Of Breath, Itching, Other (See Comments) and Rash   • Cephalosporins Diarrhea   • Cyclosporine Swelling       Objective     Physical Exam: Please see above  Vital Signs:   Vitals:    05/02/23 1111   BP: (!) 84/60   BP Location: Left arm   Pulse: 72   Resp: 20   Temp: 98 °F (36.7 °C)    TempSrc: Temporal   Weight: 62.2 kg (137 lb 2 oz)     Body mass index is 25.91 kg/m².       Physical Exam  Vitals and nursing note reviewed.   Constitutional:       General: She is not in acute distress.     Appearance: Normal appearance. She is normal weight. She is not ill-appearing or toxic-appearing.   HENT:      Nose: No congestion or rhinorrhea.   Eyes:      General:         Right eye: No discharge.         Left eye: No discharge.      Conjunctiva/sclera: Conjunctivae normal.   Pulmonary:      Effort: Pulmonary effort is normal. No respiratory distress.   Abdominal:      General: Abdomen is flat. There is no distension.   Musculoskeletal:      Cervical back: Normal range of motion.   Skin:     Coloration: Skin is not jaundiced.      Findings: No rash.   Neurological:      General: No focal deficit present.      Mental Status: She is alert. Mental status is at baseline.      Coordination: Coordination normal.      Gait: Gait normal.   Psychiatric:         Mood and Affect: Mood normal.         Behavior: Behavior normal.         Thought Content: Thought content normal.         Judgment: Judgment normal.         Procedures    Results:   Labs:   Hemoglobin A1C   Date Value Ref Range Status   02/20/2023 5.10 4.80 - 5.60 % Final     TSH   Date Value Ref Range Status   02/20/2023 0.316 0.270 - 4.200 uIU/mL Final   09/04/2019 5.685 (H) 0.300 - 5.000 uIU/mL Final        Imaging:   No valid procedures specified.     Assessment / Plan      Assessment/Plan:   Problem List Items Addressed This Visit        Cardiac and Vasculature    Idiopathic hypotension - Primary    Current Assessment & Plan     - Patient's metoprolol dosage has been decreased to 50 mg.  - Advised patient to monitor her blood pressure.         Relevant Medications    metoprolol succinate XL (TOPROL-XL) 50 MG 24 hr tablet    Coronary atherosclerosis    Current Assessment & Plan     - Patient's metoprolol dosage has been decreased to 50 mg.  - Advised  patient to monitor her blood pressure.         Relevant Medications    metoprolol succinate XL (TOPROL-XL) 50 MG 24 hr tablet       Gastrointestinal Abdominal     Hepatic cirrhosis (HCC)    Liver transplant status       Genitourinary and Reproductive     Stage 3a chronic kidney disease       Mental Health    Anxiety and depression    Current Assessment & Plan     - Patient's Zoloft has been increased to 150 mg.  - Patient's mirtazapine has been decreased to 15 mg.  - GeneSight testing has been discussed with the patient.         Relevant Medications    sertraline (Zoloft) 50 MG tablet    mirtazapine (REMERON) 15 MG tablet    High risk social situation    Relevant Orders    Ambulatory Referral to Social Care Services (Amb Case Mgmt)         Follow Up:   Return in about 1 year (around 5/2/2024) for Recheck.          Lars Fermin MD  [facility]    Transcribed from ambient dictation for Lars Fermin MD by Chanel Lara.  05/02/23   15:24 EDT    Patient or patient representative verbalized consent to the visit recording.  I have personally performed the services described in this document as transcribed by the above individual, and it is both accurate and complete.

## 2023-05-02 NOTE — ASSESSMENT & PLAN NOTE
- Patient's metoprolol dosage has been decreased to 50 mg.  - Advised patient to monitor her blood pressure.   Curettage Text: The wound bed was treated with curettage after the biopsy was performed. Biopsy Method: Dermablade Hemostasis: Drysol Destruction After The Procedure: No Additional Anesthesia Volume In Cc (Will Not Render If 0): 0 Billing Type: Third-Party Bill Post-Care Instructions: I reviewed with the patient in detail post-care instructions. Patient is to keep the biopsy site dry overnight, and then apply bacitracin twice daily until healed. Patient may apply hydrogen peroxide soaks to remove any crusting. Consent: Written consent was obtained and risks were reviewed including but not limited to scarring, infection, bleeding, scabbing, incomplete removal, nerve damage and allergy to anesthesia. Wound Care: Bacitracin Anesthesia Type: 1% lidocaine with epinephrine Notification Instructions: Patient will be notified of biopsy results. However, patient instructed to call the office if not contacted within 2 weeks. Electrodesiccation And Curettage Text: The wound bed was treated with electrodesiccation and curettage after the biopsy was performed. Biopsy Type: H and E Type Of Destruction Used: Curettage Detail Level: Detailed Electrodesiccation Text: The wound bed was treated with electrodesiccation after the biopsy was performed. Cryotherapy Text: The wound bed was treated with cryotherapy after the biopsy was performed. Silver Nitrate Text: The wound bed was treated with silver nitrate after the biopsy was performed. Anesthesia Volume In Cc: 0.5 Dressing: bandage

## 2023-05-02 NOTE — ASSESSMENT & PLAN NOTE
- Patient's Zoloft has been increased to 150 mg.  - Patient's mirtazapine has been decreased to 15 mg.  - GeneSight testing has been discussed with the patient.

## 2023-05-02 NOTE — ASSESSMENT & PLAN NOTE
- Patient's metoprolol dosage has been decreased to 50 mg.  - Advised patient to monitor her blood pressure.

## 2023-05-03 ENCOUNTER — REFERRAL TRIAGE (OUTPATIENT)
Dept: CASE MANAGEMENT | Facility: OTHER | Age: 58
End: 2023-05-03
Payer: COMMERCIAL

## 2023-05-04 ENCOUNTER — PATIENT OUTREACH (OUTPATIENT)
Dept: CASE MANAGEMENT | Facility: OTHER | Age: 58
End: 2023-05-04
Payer: COMMERCIAL

## 2023-05-04 NOTE — OUTREACH NOTE
Social Work Assessment  Questions/Answers    Flowsheet Row Most Recent Value   Referral Source physician   Reason for Consult community resources   Preferred Language English   Advance Care Planning Reviewed present on chart   People in Home spouse   Current Living Arrangements home   Primary Care Provided by self   Provides Primary Care For no one   Family Caregiver if Needed none   Employment Status disabled   Source of Income disability   Medications independent   Meal Preparation independent   Housekeeping independent   Laundry independent   Shopping independent      SDOH updated and reviewed with the patient during this program:  Financial Resource Strain: Low Risk    • Difficulty of Paying Living Expenses: Not very hard      Food Insecurity: No Food Insecurity   • Worried About Running Out of Food in the Last Year: Never true   • Ran Out of Food in the Last Year: Never true      Transportation Needs: No Transportation Needs   • Lack of Transportation (Medical): No   • Lack of Transportation (Non-Medical): No      Housing Stability: Low Risk    • Unable to Pay for Housing in the Last Year: No   • Number of Places Lived in the Last Year: 1   • Unstable Housing in the Last Year: No      Continuing Care   Community & DME    OF THE [place] [address] [address]    Phone: [phone]   [facility] [address] [address]    Phone: [phone]   Patient Outreach    SW contacted pt to discuss resources. Pt shares that she is going through a divorce and is worried about surviving on her disability. Discussed legal resources for navigating the divorce. SW provided education on income based housing, medicaid, SNAP, and other community resources in the event pt needs additional help after the divorce is settled. Pt is interested in getting a part-time job. KAUSHAL suggested the office of avocational rehab and pt states that she has already  left a message. Pt denied any other questions or concerns at this time. SW encouraged her to return call if any new concerns arise.    Michelle NICHOLE -   Ambulatory Case Management    5/4/2023, 13:38 EDT

## 2023-05-15 ENCOUNTER — OFFICE VISIT (OUTPATIENT)
Dept: INTERNAL MEDICINE | Facility: CLINIC | Age: 58
End: 2023-05-15
Payer: COMMERCIAL

## 2023-05-15 VITALS
BODY MASS INDEX: 25.77 KG/M2 | WEIGHT: 136.38 LBS | RESPIRATION RATE: 20 BRPM | SYSTOLIC BLOOD PRESSURE: 110 MMHG | DIASTOLIC BLOOD PRESSURE: 60 MMHG | TEMPERATURE: 97.8 F | HEART RATE: 85 BPM

## 2023-05-15 DIAGNOSIS — J30.1 NON-SEASONAL ALLERGIC RHINITIS DUE TO POLLEN: ICD-10-CM

## 2023-05-15 DIAGNOSIS — F32.A ANXIETY AND DEPRESSION: ICD-10-CM

## 2023-05-15 DIAGNOSIS — K75.81 NONALCOHOLIC STEATOHEPATITIS (NASH): ICD-10-CM

## 2023-05-15 DIAGNOSIS — E78.5 HYPERLIPIDEMIA LDL GOAL <70: ICD-10-CM

## 2023-05-15 DIAGNOSIS — Z94.4 LIVER TRANSPLANT STATUS: Primary | ICD-10-CM

## 2023-05-15 DIAGNOSIS — H18.892 CORNEAL IRRITATION OF LEFT EYE: ICD-10-CM

## 2023-05-15 DIAGNOSIS — I95.0 IDIOPATHIC HYPOTENSION: Primary | ICD-10-CM

## 2023-05-15 DIAGNOSIS — N18.31 STAGE 3A CHRONIC KIDNEY DISEASE: ICD-10-CM

## 2023-05-15 DIAGNOSIS — Z94.4 LIVER TRANSPLANT STATUS: ICD-10-CM

## 2023-05-15 DIAGNOSIS — R80.9 ALBUMINURIA: ICD-10-CM

## 2023-05-15 DIAGNOSIS — Z94.4 LIVER TRANSPLANT RECIPIENT: ICD-10-CM

## 2023-05-15 DIAGNOSIS — F41.9 ANXIETY AND DEPRESSION: ICD-10-CM

## 2023-05-15 DIAGNOSIS — Z23 ENCOUNTER FOR VACCINATION: ICD-10-CM

## 2023-05-15 DIAGNOSIS — I63.9 CEREBROVASCULAR ACCIDENT (CVA), UNSPECIFIED MECHANISM: ICD-10-CM

## 2023-05-15 LAB
ALBUMIN SERPL-MCNC: 4.5 G/DL (ref 3.5–5.2)
ALBUMIN/GLOB SERPL: 2 G/DL
ALP SERPL-CCNC: 116 U/L (ref 39–117)
ALT SERPL W P-5'-P-CCNC: 36 U/L (ref 1–33)
ANION GAP SERPL CALCULATED.3IONS-SCNC: 11.3 MMOL/L (ref 5–15)
AST SERPL-CCNC: 17 U/L (ref 1–32)
BILIRUB SERPL-MCNC: 0.4 MG/DL (ref 0–1.2)
BUN SERPL-MCNC: 20 MG/DL (ref 6–20)
BUN/CREAT SERPL: 18.7 (ref 7–25)
CALCIUM SPEC-SCNC: 9.6 MG/DL (ref 8.6–10.5)
CHLORIDE SERPL-SCNC: 105 MMOL/L (ref 98–107)
CO2 SERPL-SCNC: 25.7 MMOL/L (ref 22–29)
CREAT SERPL-MCNC: 1.07 MG/DL (ref 0.57–1)
EGFRCR SERPLBLD CKD-EPI 2021: 60 ML/MIN/1.73
GLOBULIN UR ELPH-MCNC: 2.3 GM/DL
GLUCOSE SERPL-MCNC: 97 MG/DL (ref 65–99)
POTASSIUM SERPL-SCNC: 5.2 MMOL/L (ref 3.5–5.2)
PROT SERPL-MCNC: 6.8 G/DL (ref 6–8.5)
SODIUM SERPL-SCNC: 142 MMOL/L (ref 136–145)

## 2023-05-15 PROCEDURE — 82610 CYSTATIN C: CPT | Performed by: STUDENT IN AN ORGANIZED HEALTH CARE EDUCATION/TRAINING PROGRAM

## 2023-05-15 PROCEDURE — 80053 COMPREHEN METABOLIC PANEL: CPT | Performed by: INTERNAL MEDICINE

## 2023-05-15 PROCEDURE — 80197 ASSAY OF TACROLIMUS: CPT | Performed by: INTERNAL MEDICINE

## 2023-05-15 RX ORDER — ATORVASTATIN CALCIUM 80 MG/1
80 TABLET, FILM COATED ORAL NIGHTLY
Qty: 90 TABLET | Refills: 3 | Status: SHIPPED | OUTPATIENT
Start: 2023-05-15

## 2023-05-15 RX ORDER — BENZONATATE 100 MG/1
100 CAPSULE ORAL 3 TIMES DAILY PRN
Qty: 30 CAPSULE | Refills: 1 | Status: SHIPPED | OUTPATIENT
Start: 2023-05-15

## 2023-05-15 RX ORDER — ZOSTER VACCINE RECOMBINANT, ADJUVANTED 50 MCG/0.5
0.5 KIT INTRAMUSCULAR ONCE
Qty: 1 EACH | Refills: 0 | Status: SHIPPED | OUTPATIENT
Start: 2023-05-15 | End: 2023-05-15

## 2023-05-15 RX ORDER — ERYTHROMYCIN 5 MG/G
OINTMENT OPHTHALMIC EVERY 6 HOURS
Qty: 3.5 G | Refills: 3 | Status: SHIPPED | OUTPATIENT
Start: 2023-05-15

## 2023-05-15 NOTE — PROGRESS NOTES
Follow Up Office Visit      Date: 05/15/2023   Patient Name: Lizette Frias  : 1965   MRN: [id number]     Chief Complaint:    Chief Complaint   Patient presents with   • Follow-up     1 week        History of Present Illness: Lizette Frias is a 58 y.o. female who is here today to follow up with her blood pressure.    The patient states she has been doing well since her last visit. She denies feeling dizzy like she did last week. She states that she has been monitoring her blood pressure. She states her systolic pressure has been around 100. Her blood pressure was 130/80 mmHg this morning.     The patient states she needs to find a gastroenterologist closer to home because she is unable to travel the distance needed to the previously referred doctor. She was supposed to have an appointment in 2022 but never went. She also states she was told to discontinue taking aspirin in the past due to past GI bleeding. She says she was never told to take anything such as clopidogrel.     The patient believes she scratched her eye somehow in the past 1-2 days. She denies any trauma to the eye. She says she likely rubbed it because she has dry eyes. She does not wear contacts. She used Restasis in the past for an extended period of time. She is established with an ophthalmologist.     She states Esha Amyaa has reached out to her from case management.     Dr. Castellanos had discontinued her from Farxiga. She had developed a UTI and back pain while taking it. She has blood work that needs to be updated.     The patient is due for her last hepatitis B vaccine, shingles vaccine and COVID-19 vaccine. She says she forgot to ask her doctor from her liver transplant if she is able to receive them.     She would like a refill of Tessalon Perles for her persistent cough. She states she had an evaluation with ENT and was declined for treatment due to prior damage in her ears. She is taking Fluticasone.     The patient  states she has not been able to see a difference in her anxiety and depression symptoms yet since starting sertraline. The patient inquires if it is possible to take a lower dose of sertraline since she is experiencing difficulties sleeping. She is also taking trazodone and Ambien.       Subjective      Review of Systems:   Review of Systems   Constitutional: Negative for activity change, appetite change, fatigue and fever.   Eyes: Negative for blurred vision, photophobia and visual disturbance.   Respiratory: Negative for cough, chest tightness and shortness of breath.    Cardiovascular: Negative for chest pain and palpitations.   Gastrointestinal: Negative for abdominal distention, abdominal pain, blood in stool, constipation, diarrhea, nausea and vomiting.   Genitourinary: Negative for dysuria and hematuria.   Musculoskeletal: Negative for arthralgias, back pain and joint swelling.   Skin: Negative for rash and wound.   Neurological: Negative for weakness, headache and confusion.       I have reviewed the patients family history, social history, past medical history, past surgical history and have updated it as appropriate.     Medications:     Current Outpatient Medications:   •  alendronate (FOSAMAX) 70 MG tablet, TAKE 1 TABLET BY ORAL ROUTE EVERY WEEK IN THE MORNING, AT LEAST 30 MIN BEFORE FIRST FOOD, BEVERAGE, OR MEDICATION OF DAY FOR OSTEOPOROSIS, Disp: 12 tablet, Rfl: 2  •  atorvastatin (LIPITOR) 80 MG tablet, Take 1 tablet by mouth Every Night., Disp: 90 tablet, Rfl: 3  •  buPROPion XL (Wellbutrin XL) 300 MG 24 hr tablet, Take 1 tablet by mouth Every Morning., Disp: 45 tablet, Rfl: 1  •  calcium carbonate-vitamin d 600-400 MG-UNIT per tablet, Take 1 tablet by mouth Daily., Disp: , Rfl:   •  estradiol (ESTRACE VAGINAL) 0.1 MG/GM vaginal cream, Insert 0.5 g intravaginally at HS 2-3x/ week., Disp: 42.5 g, Rfl: 4  •  fluticasone (FLONASE) 50 MCG/ACT nasal spray, USE 2 SPRAYS IN EACH NOSTRIL TWICE DAILY, Disp:  48 mL, Rfl: 1  •  icosapent ethyl (VASCEPA) 1 g capsule capsule, Take 2 g by mouth 2 (Two) Times a Day With Meals., Disp: 120 capsule, Rfl: 5  •  isosorbide dinitrate (ISORDIL) 30 MG tablet, Take 1 tablet by mouth 2 (Two) Times a Day., Disp: 180 tablet, Rfl: 0  •  levothyroxine (SYNTHROID, LEVOTHROID) 175 MCG tablet, TAKE 1 TABLET BY MOUTH EVERY DAY IN THE MORNING, Disp: 90 tablet, Rfl: 0  •  melatonin ([employer] Melatonin) 5 MG tablet tablet, Take 1 tablet by mouth Every Night., Disp: 90 tablet, Rfl: 1  •  methocarbamol (ROBAXIN) 500 MG tablet, TAKE 1 TABLET BY MOUTH FOUR TIMES A DAY, Disp: 60 tablet, Rfl: 0  •  metoprolol succinate XL (TOPROL-XL) 50 MG 24 hr tablet, Take 1 tablet by mouth Daily., Disp: 30 tablet, Rfl: 1  •  mirtazapine (REMERON) 15 MG tablet, Take 1 tablet by mouth every night at bedtime., Disp: 90 tablet, Rfl: 0  •  oxyCODONE (ROXICODONE) 5 MG immediate release tablet, Take 1 tablet by mouth Every Night., Disp: , Rfl:   •  pantoprazole (PROTONIX) 40 MG EC tablet, Take 1 tablet by mouth 2 (Two) Times a Day. 30 minutes prior to first meal and 30 minutes prior to last meal of the day, Disp: 180 tablet, Rfl: 3  •  polyethylene glycol (MiraLax) 17 GM/SCOOP powder, Take 17 g by mouth Daily. Mix in 8 oz of any liquid once daily, Disp: 1530 g, Rfl: 3  •  potassium chloride (MICRO-K) 10 MEQ CR capsule, Hold until Lasix (furosemide) restarted, Disp: , Rfl:   •  pregabalin (LYRICA) 100 MG capsule, Every 8 (Eight) Hours., Disp: , Rfl:   •  rOPINIRole (REQUIP) 2 MG tablet, TAKE 1 - 2 TABLET(S) BY MOUTH EVERY NIGHT AT BEDTIME., Disp: 180 tablet, Rfl: 1  •  sertraline (ZOLOFT) 100 MG tablet, Take 1 tablet by mouth Daily., Disp: , Rfl:   •  sertraline (Zoloft) 50 MG tablet, Take 1 tablet by mouth Daily., Disp: 30 tablet, Rfl: 1  •  spironolactone (ALDACTONE) 25 MG tablet, TAKE 1 TABLET BY MOUTH EVERY DAY, Disp: 90 tablet, Rfl: 0  •  sucralfate (Carafate) 1 g tablet, Take 1 tablet by mouth 4 (Four) Times a Day., Disp:  360 tablet, Rfl: 1  •  tacrolimus (PROGRAF) 0.5 MG capsule, Take 2 capsules by mouth 2 (Two) Times a Day., Disp: , Rfl:   •  traZODone (DESYREL) 100 MG tablet, Take 2 tablets by mouth Every Night., Disp: 90 tablet, Rfl: 1  •  zolpidem (AMBIEN) 5 MG tablet, Take 1 tablet by mouth Every Night., Disp: , Rfl:   •  benzonatate (Tessalon Perles) 100 MG capsule, Take 1 capsule by mouth 3 (Three) Times a Day As Needed for Cough., Disp: 30 capsule, Rfl: 1  •  erythromycin (ROMYCIN) 5 MG/GM ophthalmic ointment, Administer  into the left eye Every 6 (Six) Hours., Disp: 3.5 g, Rfl: 3    Allergies:   Allergies   Allergen Reactions   • Penicillins Shortness Of Breath, Itching, Other (See Comments) and Rash   • Cephalosporins Diarrhea   • Cyclosporine Swelling       Objective     Physical Exam: Please see above  Vital Signs:   Vitals:    05/15/23 1430   BP: 110/60   BP Location: Right arm   Patient Position: Sitting   Cuff Size: Adult   Pulse: 85   Resp: 20   Temp: 97.8 °F (36.6 °C)   TempSrc: Temporal   Weight: 61.9 kg (136 lb 6 oz)   PainSc:   4   PainLoc: Eye     Body mass index is 25.77 kg/m².       Physical Exam  Vitals and nursing note reviewed.   Constitutional:       General: She is not in acute distress.     Appearance: Normal appearance. She is normal weight. She is not ill-appearing or toxic-appearing.   HENT:      Nose: No congestion or rhinorrhea.   Eyes:      General:         Right eye: No discharge.         Left eye: No discharge.      Extraocular Movements: Extraocular movements intact.      Conjunctiva/sclera: Conjunctivae normal.      Right eye: Right conjunctiva is not injected.      Left eye: Left conjunctiva is not injected.      Comments: Patient with discomfort in the left eye, no abrasions or damage present with fluorescein stain   Pulmonary:      Effort: Pulmonary effort is normal. No respiratory distress.   Abdominal:      General: Abdomen is flat. There is no distension.   Musculoskeletal:      Cervical  back: Normal range of motion.   Skin:     Coloration: Skin is not jaundiced.      Findings: No rash.   Neurological:      General: No focal deficit present.      Mental Status: She is alert. Mental status is at baseline.      Coordination: Coordination normal.      Gait: Gait normal.   Psychiatric:         Mood and Affect: Mood normal.         Behavior: Behavior normal.         Thought Content: Thought content normal.         Judgment: Judgment normal.         Procedures    Results:   Labs:   Hemoglobin A1C   Date Value Ref Range Status   02/20/2023 5.10 4.80 - 5.60 % Final     TSH   Date Value Ref Range Status   02/20/2023 0.316 0.270 - 4.200 uIU/mL Final   09/04/2019 5.685 (H) 0.300 - 5.000 uIU/mL Final        Imaging:   No valid procedures specified.     Assessment / Plan      Assessment/Plan:   Problem List Items Addressed This Visit        Allergies and Adverse Reactions    Non-seasonal allergic rhinitis due to pollen    Relevant Medications    benzonatate (Tessalon Perles) 100 MG capsule       Cardiac and Vasculature    Hyperlipidemia LDL goal <70    Relevant Medications    atorvastatin (LIPITOR) 80 MG tablet    Idiopathic hypotension - Primary       Eye    Corneal irritation of left eye    Current Assessment & Plan     - eye exam performed. No abrasions or corneal ulcers seen  - follow up with eye clinic if symptoms are persistent          Relevant Medications    erythromycin (ROMYCIN) 5 MG/GM ophthalmic ointment       Gastrointestinal Abdominal     Liver transplant status    Relevant Orders    Ambulatory Referral to Gastroenterology    Nonalcoholic steatohepatitis (MORRISON)    Relevant Orders    Ambulatory Referral to Gastroenterology       Genitourinary and Reproductive     Stage 3a chronic kidney disease    Albuminuria    Overview     Patient experienced significant UTIs with SGLT2            Mental Health    Anxiety and depression       Neuro    Cerebrovascular accident (CVA)   Other Visit Diagnoses      Encounter for vaccination        Relevant Orders    Hepatitis B Vaccine Adult IM (Completed)    Liver transplant recipient                Follow Up:   Return in about 4 weeks (around 6/12/2023) for Recheck.        Transcribed from ambient dictation for Lars Fermin MD by Michelle Choudhury.  05/15/23   16:07 EDT    Patient or patient representative verbalized consent to the visit recording.  I have personally performed the services described in this document as transcribed by the above individual, and it is both accurate and complete.    Lars Fermin MD  [facility]

## 2023-05-15 NOTE — ASSESSMENT & PLAN NOTE
- eye exam performed. No abrasions or corneal ulcers seen  - follow up with eye clinic if symptoms are persistent

## 2023-05-15 NOTE — LETTER
"VACCINE CONSENT FORM      Patient Name:  Lizette Frias    Patient :  1965      I/We have read, or have been explained, the information about the diseases and the vaccines listed below.  There was an opportunity to ask questions and any questions were answered satisfactorily.  I/We believe that I/we understand the benefits and risks of the vaccines(s) cited, and ask the vaccine(s) listed below be given to me/us or the person named above (for which i have authorized to make the request).      Vaccine(s) give:    Orders Placed This Encounter   Procedures   • Hepatitis B Vaccine Adult IM                      Patient or Parent/Guardian Signature                    Date        A copy of the appropriate Centers for Disease Control and Prevention Vaccine Information Statements has been provided.        Eligible    Patient: Lizette Frias \"Lizette\"    As of: 2021    Payer: Medicare    Action: Coverage Created     "

## 2023-05-17 ENCOUNTER — TELEPHONE (OUTPATIENT)
Dept: INTERNAL MEDICINE | Facility: CLINIC | Age: 58
End: 2023-05-17
Payer: COMMERCIAL

## 2023-05-17 NOTE — TELEPHONE ENCOUNTER
JIN IS CALLING FROM THE [facility] WITH ONEYDA RAGSDALE. SHE STATES THAT THERE SHOULD BE AN ORDER FOR COGNITIVE THERAPY. PLEASE FAX ORDER TO FAX: [phone]    PHONE: [phone]

## 2023-05-18 ENCOUNTER — TELEPHONE (OUTPATIENT)
Dept: INTERNAL MEDICINE | Facility: CLINIC | Age: 58
End: 2023-05-18
Payer: COMMERCIAL

## 2023-05-18 PROBLEM — N18.2 STAGE 2 CHRONIC KIDNEY DISEASE: Status: ACTIVE | Noted: 2023-01-05

## 2023-05-18 LAB — CYSTATIN C SERPL-MCNC: 1.14 MG/L (ref 0.67–1.14)

## 2023-05-18 NOTE — TELEPHONE ENCOUNTER
Caller: JOHN    Relationship: Provider    Best call back number: [phone]    What form or medical record are you requesting: MEDICATION LIST AND CONSENT FROM PCP FOR THE PATIENT TO BE SEDATED FOR TOOTH EXTRACCTION    Who is requesting this form or medical record from you: DENTAL OFFICE    How would you like to receive the form or medical records (pick-up, mail, fax): FAX  If fax, what is the fax number: [phone]    Timeframe paperwork needed: AS SOON AS POSSIBLE

## 2023-05-18 NOTE — TELEPHONE ENCOUNTER
[facility]  States there is not a form or pre dictated order.  States she just needs a written order from the physician stating for Cognitive therapy.  Requests be fax to # below and then they will be able to evaluate.

## 2023-05-19 LAB — TACROLIMUS BLD LC/MS/MS-MCNC: 13.8 NG/ML (ref 2–20)

## 2023-05-19 RX ORDER — MULTIVITAMIN/IRON/FOLIC ACID 18MG-0.4MG
TABLET ORAL
Qty: 90 TABLET | Refills: 1 | Status: SHIPPED | OUTPATIENT
Start: 2023-05-19

## 2023-05-22 ENCOUNTER — HOSPITAL ENCOUNTER (OUTPATIENT)
Dept: ULTRASOUND IMAGING | Facility: HOSPITAL | Age: 58
Discharge: HOME | End: 2023-05-22
Admitting: NURSE PRACTITIONER
Payer: COMMERCIAL

## 2023-05-22 DIAGNOSIS — R19.8 ABNORMAL ABDOMINAL EXAM: ICD-10-CM

## 2023-05-22 PROCEDURE — 76700 US EXAM ABDOM COMPLETE: CPT

## 2023-05-22 RX ORDER — LEVOTHYROXINE SODIUM 0.12 MG/1
TABLET ORAL
Qty: 90 TABLET | Refills: 1 | Status: SHIPPED | OUTPATIENT
Start: 2023-05-22

## 2023-05-22 NOTE — TELEPHONE ENCOUNTER
Note and medlist faxed to [facility] Attn Sherry at [phone] with confirmation of receipt received.

## 2023-05-22 NOTE — TELEPHONE ENCOUNTER
Order faxed to [facility], Attcami Peters at [phone]   with confirmation of receipt received.

## 2023-05-30 DIAGNOSIS — F32.A ANXIETY AND DEPRESSION: ICD-10-CM

## 2023-05-30 DIAGNOSIS — F41.9 ANXIETY AND DEPRESSION: ICD-10-CM

## 2023-05-30 DIAGNOSIS — I95.0 IDIOPATHIC HYPOTENSION: ICD-10-CM

## 2023-05-30 RX ORDER — METOPROLOL SUCCINATE 50 MG/1
TABLET, EXTENDED RELEASE ORAL
Qty: 30 TABLET | Refills: 1 | Status: SHIPPED | OUTPATIENT
Start: 2023-05-30

## 2023-06-02 DIAGNOSIS — F41.9 ANXIETY AND DEPRESSION: ICD-10-CM

## 2023-06-02 DIAGNOSIS — F32.A ANXIETY AND DEPRESSION: ICD-10-CM

## 2023-06-05 RX ORDER — ISOSORBIDE MONONITRATE 60 MG/1
TABLET, EXTENDED RELEASE ORAL
Qty: 90 TABLET | Refills: 1 | Status: SHIPPED | OUTPATIENT
Start: 2023-06-05 | End: 2023-06-12

## 2023-06-12 ENCOUNTER — OFFICE VISIT (OUTPATIENT)
Dept: INTERNAL MEDICINE | Facility: CLINIC | Age: 58
End: 2023-06-12
Payer: COMMERCIAL

## 2023-06-12 VITALS
BODY MASS INDEX: 25.81 KG/M2 | WEIGHT: 136.6 LBS | RESPIRATION RATE: 16 BRPM | HEART RATE: 73 BPM | SYSTOLIC BLOOD PRESSURE: 98 MMHG | DIASTOLIC BLOOD PRESSURE: 64 MMHG | TEMPERATURE: 98.7 F

## 2023-06-12 DIAGNOSIS — I25.10 CORONARY ARTERY DISEASE INVOLVING NATIVE CORONARY ARTERY OF NATIVE HEART WITHOUT ANGINA PECTORIS: ICD-10-CM

## 2023-06-12 DIAGNOSIS — N18.2 STAGE 2 CHRONIC KIDNEY DISEASE: ICD-10-CM

## 2023-06-12 DIAGNOSIS — E78.5 HYPERLIPIDEMIA LDL GOAL <70: ICD-10-CM

## 2023-06-12 DIAGNOSIS — F32.A ANXIETY AND DEPRESSION: ICD-10-CM

## 2023-06-12 DIAGNOSIS — M81.8 OTHER OSTEOPOROSIS WITHOUT CURRENT PATHOLOGICAL FRACTURE: ICD-10-CM

## 2023-06-12 DIAGNOSIS — F41.9 ANXIETY AND DEPRESSION: ICD-10-CM

## 2023-06-12 DIAGNOSIS — K08.89 TOOTH PAIN: ICD-10-CM

## 2023-06-12 DIAGNOSIS — Z94.4 LIVER TRANSPLANT STATUS: ICD-10-CM

## 2023-06-12 DIAGNOSIS — E89.0 POSTOPERATIVE HYPOTHYROIDISM: ICD-10-CM

## 2023-06-12 DIAGNOSIS — I95.2 HYPOTENSION DUE TO DRUGS: Primary | ICD-10-CM

## 2023-06-12 LAB
ALBUMIN SERPL-MCNC: 4.5 G/DL (ref 3.5–5.2)
ALBUMIN/GLOB SERPL: 1.8 G/DL
ALP SERPL-CCNC: 111 U/L (ref 39–117)
ALT SERPL W P-5'-P-CCNC: 31 U/L (ref 1–33)
ANION GAP SERPL CALCULATED.3IONS-SCNC: 9 MMOL/L (ref 5–15)
AST SERPL-CCNC: 17 U/L (ref 1–32)
BASOPHILS # BLD AUTO: 0.05 10*3/MM3 (ref 0–0.2)
BASOPHILS NFR BLD AUTO: 0.8 % (ref 0–1.5)
BILIRUB SERPL-MCNC: 0.6 MG/DL (ref 0–1.2)
BUN SERPL-MCNC: 23 MG/DL (ref 6–20)
BUN/CREAT SERPL: 23.2 (ref 7–25)
CALCIUM SPEC-SCNC: 9 MG/DL (ref 8.6–10.5)
CHLORIDE SERPL-SCNC: 105 MMOL/L (ref 98–107)
CHOLEST SERPL-MCNC: 104 MG/DL (ref 0–200)
CO2 SERPL-SCNC: 26 MMOL/L (ref 22–29)
CREAT SERPL-MCNC: 0.99 MG/DL (ref 0.57–1)
DEPRECATED RDW RBC AUTO: 43 FL (ref 37–54)
EGFRCR SERPLBLD CKD-EPI 2021: 65.8 ML/MIN/1.73
EOSINOPHIL # BLD AUTO: 0.06 10*3/MM3 (ref 0–0.4)
EOSINOPHIL NFR BLD AUTO: 1 % (ref 0.3–6.2)
ERYTHROCYTE [DISTWIDTH] IN BLOOD BY AUTOMATED COUNT: 14 % (ref 12.3–15.4)
GLOBULIN UR ELPH-MCNC: 2.5 GM/DL
GLUCOSE SERPL-MCNC: 84 MG/DL (ref 65–99)
HCT VFR BLD AUTO: 41.5 % (ref 34–46.6)
HDLC SERPL-MCNC: 26 MG/DL (ref 40–60)
HGB BLD-MCNC: 14.2 G/DL (ref 12–15.9)
IMM GRANULOCYTES # BLD AUTO: 0.03 10*3/MM3 (ref 0–0.05)
IMM GRANULOCYTES NFR BLD AUTO: 0.5 % (ref 0–0.5)
LDLC SERPL CALC-MCNC: 51 MG/DL (ref 0–100)
LDLC/HDLC SERPL: 1.81 {RATIO}
LYMPHOCYTES # BLD AUTO: 3.15 10*3/MM3 (ref 0.7–3.1)
LYMPHOCYTES NFR BLD AUTO: 53.4 % (ref 19.6–45.3)
MCH RBC QN AUTO: 28.9 PG (ref 26.6–33)
MCHC RBC AUTO-ENTMCNC: 34.2 G/DL (ref 31.5–35.7)
MCV RBC AUTO: 84.5 FL (ref 79–97)
MONOCYTES # BLD AUTO: 0.37 10*3/MM3 (ref 0.1–0.9)
MONOCYTES NFR BLD AUTO: 6.3 % (ref 5–12)
NEUTROPHILS NFR BLD AUTO: 2.24 10*3/MM3 (ref 1.7–7)
NEUTROPHILS NFR BLD AUTO: 38 % (ref 42.7–76)
NRBC BLD AUTO-RTO: 0 /100 WBC (ref 0–0.2)
PLATELET # BLD AUTO: 163 10*3/MM3 (ref 140–450)
PMV BLD AUTO: 11.9 FL (ref 6–12)
POTASSIUM SERPL-SCNC: 5.3 MMOL/L (ref 3.5–5.2)
PROT SERPL-MCNC: 7 G/DL (ref 6–8.5)
RBC # BLD AUTO: 4.91 10*6/MM3 (ref 3.77–5.28)
SODIUM SERPL-SCNC: 140 MMOL/L (ref 136–145)
TRIGL SERPL-MCNC: 155 MG/DL (ref 0–150)
TSH SERPL DL<=0.05 MIU/L-ACNC: 1.84 UIU/ML (ref 0.27–4.2)
VLDLC SERPL-MCNC: 27 MG/DL (ref 5–40)
WBC NRBC COR # BLD: 5.9 10*3/MM3 (ref 3.4–10.8)

## 2023-06-12 PROCEDURE — 80061 LIPID PANEL: CPT | Performed by: STUDENT IN AN ORGANIZED HEALTH CARE EDUCATION/TRAINING PROGRAM

## 2023-06-12 PROCEDURE — 80050 GENERAL HEALTH PANEL: CPT | Performed by: STUDENT IN AN ORGANIZED HEALTH CARE EDUCATION/TRAINING PROGRAM

## 2023-06-12 RX ORDER — CLONIDINE HYDROCHLORIDE 0.1 MG/1
TABLET ORAL DAILY
COMMUNITY
End: 2023-06-12

## 2023-06-12 RX ORDER — ALLOPURINOL 300 MG/1
TABLET ORAL DAILY
COMMUNITY
End: 2023-06-12

## 2023-06-12 RX ORDER — LEVALBUTEROL TARTRATE 45 UG/1
AEROSOL, METERED ORAL
COMMUNITY

## 2023-06-12 RX ORDER — COLCHICINE 0.6 MG/1
TABLET ORAL DAILY
COMMUNITY
End: 2023-06-12

## 2023-06-12 RX ORDER — AMLODIPINE BESYLATE 5 MG/1
5 TABLET ORAL
COMMUNITY
End: 2023-06-12

## 2023-06-12 RX ORDER — CHLORHEXIDINE GLUCONATE 0.12 MG/ML
15 RINSE ORAL 2 TIMES DAILY
Qty: 118 ML | Refills: 3 | Status: SHIPPED | OUTPATIENT
Start: 2023-06-12

## 2023-06-12 RX ORDER — TELMISARTAN 20 MG/1
20 TABLET ORAL DAILY
Qty: 30 TABLET | Refills: 1 | Status: SHIPPED | OUTPATIENT
Start: 2023-06-12

## 2023-06-12 RX ORDER — DENOSUMAB 60 MG/ML
60 INJECTION SUBCUTANEOUS
Qty: 1 ML | Refills: 1 | Status: SHIPPED | OUTPATIENT
Start: 2023-06-12 | End: 2023-12-10

## 2023-06-12 RX ORDER — ERGOCALCIFEROL 1.25 MG/1
CAPSULE ORAL WEEKLY
COMMUNITY

## 2023-06-12 NOTE — PROGRESS NOTES
"    Follow Up Office Visit      Date: 2023   Patient Name: Lizette Frias  : 1965   MRN: [id number]     Chief Complaint:    Chief Complaint   Patient presents with    Hypotension     fu       History of Present Illness: Lizette Frias is a 58 y.o. female who is here today for a follow-up appointment.     The patient is wondering if she should discontinue the remaining blood pressure medication she is taking. She has already discontinued taking her metoprolol as well as her isosorbide dinitrate 60 mg. She states she has not taken clonidine in 6 months. She is still taking amlodipine and spironolactone. She is also taking sucralfate 4 times per day. She has a history of fluid retention. She may have taken lisinopril in the past but is not sure. Her last echocardiogram was around 3 months ago. She also states she has had a lot of breathing problems. She has seen cardiology and pulmonary several times because of this.     The patient also states she has had some episodes of feeling lightheaded and that she will \"stagger over\".     The patient is also taking pantoprazole for her reflux but she is not sure if she can tell a difference because she has always delt with reflux. She is having voice issues because of this but is doing voice therapy currently.     Her last DEXA scan was in . She states she is not taking her Fosamax because she is having trouble remembering to take it once a week. It also has to be taken apart from food and other medication and she says that is difficult because she already takes so many during the day.     The patient states she is unable to take aspirin due to high risk of bleeding. She had tried to take injectable blood thinners for her liver transplant but was needed transfusions several times per month.     The patient states that she is taking Synthroid 175 mg. She had to increase the amount since she lost weight.     The patient states that her anxiety and depression " has been well controlled.     The patient believes that she has an abscess on her tooth that began a couple of weeks ago. She states she is unable to pay for a dentist appointment but thinks she needs an antibiotic. She says she is having difficulty eating food because it is painful to chew.       Subjective      Review of Systems:   Review of Systems   All other systems reviewed and are negative.     Otherwise negative ROS unless previously stated above in HPI.    I have reviewed the patients family history, social history, past medical history, past surgical history and have updated it as appropriate.     Medications:     Current Outpatient Medications:     atorvastatin (LIPITOR) 80 MG tablet, Take 1 tablet by mouth Every Night., Disp: 90 tablet, Rfl: 3    buPROPion XL (Wellbutrin XL) 300 MG 24 hr tablet, Take 1 tablet by mouth Every Morning., Disp: 45 tablet, Rfl: 1    calcium carbonate-vitamin d 600-400 MG-UNIT per tablet, Take 1 tablet by mouth Daily., Disp: , Rfl:     fluticasone (FLONASE) 50 MCG/ACT nasal spray, USE 2 SPRAYS IN EACH NOSTRIL TWICE DAILY, Disp: 48 mL, Rfl: 1    icosapent ethyl (VASCEPA) 1 g capsule capsule, Take 2 g by mouth 2 (Two) Times a Day With Meals., Disp: 120 capsule, Rfl: 5    levothyroxine (SYNTHROID, LEVOTHROID) 175 MCG tablet, TAKE 1 TABLET BY MOUTH EVERY DAY IN THE MORNING, Disp: 90 tablet, Rfl: 0    mirtazapine (REMERON) 15 MG tablet, Take 1 tablet by mouth every night at bedtime., Disp: 90 tablet, Rfl: 0    oxyCODONE (ROXICODONE) 5 MG immediate release tablet, Take 1 tablet by mouth Every Night., Disp: , Rfl:     pantoprazole (PROTONIX) 40 MG EC tablet, Take 1 tablet by mouth 2 (Two) Times a Day. 30 minutes prior to first meal and 30 minutes prior to last meal of the day, Disp: 180 tablet, Rfl: 3    polyethylene glycol (MiraLax) 17 GM/SCOOP powder, Take 17 g by mouth Daily. Mix in 8 oz of any liquid once daily, Disp: 1530 g, Rfl: 3    potassium chloride (MICRO-K) 10 MEQ CR  capsule, Hold until Lasix (furosemide) restarted, Disp: , Rfl:     pregabalin (LYRICA) 100 MG capsule, Every 8 (Eight) Hours., Disp: , Rfl:     rOPINIRole (REQUIP) 2 MG tablet, TAKE 1 - 2 TABLET(S) BY MOUTH EVERY NIGHT AT BEDTIME., Disp: 180 tablet, Rfl: 1    sertraline (ZOLOFT) 100 MG tablet, Take 1 tablet by mouth Daily., Disp: , Rfl:     sertraline (Zoloft) 50 MG tablet, Take 1 tablet by mouth Daily., Disp: 60 tablet, Rfl: 1    tacrolimus (PROGRAF) 0.5 MG capsule, Take 2 capsules by mouth 2 (Two) Times a Day., Disp: , Rfl:     traZODone (DESYREL) 100 MG tablet, Take 2 tablets by mouth Every Night., Disp: 90 tablet, Rfl: 1    zolpidem (AMBIEN) 5 MG tablet, Take 1 tablet by mouth Every Night., Disp: , Rfl:     chlorhexidine (PERIDEX) 0.12 % solution, Apply 15 mL to the mouth or throat 2 (Two) Times a Day., Disp: 118 mL, Rfl: 3    denosumab (Prolia) 60 MG/ML solution prefilled syringe syringe, Inject 1 mL under the skin into the appropriate area as directed Every 6 (Six) Months for 2 doses., Disp: 1 mL, Rfl: 1    ergocalciferol (ERGOCALCIFEROL) 1.25 MG (65782 UT) capsule, 1 (One) Time Per Week., Disp: , Rfl:     levalbuterol (XOPENEX HFA) 45 MCG/ACT inhaler, Every 4 To 6 Hours As Needed, Disp: , Rfl:     methocarbamol (ROBAXIN) 500 MG tablet, TAKE 1 TABLET BY MOUTH FOUR TIMES A DAY, Disp: 60 tablet, Rfl: 0    telmisartan (MICARDIS) 20 MG tablet, Take 1 tablet by mouth Daily., Disp: 30 tablet, Rfl: 1    Allergies:   Allergies   Allergen Reactions    Penicillins Shortness Of Breath, Itching, Other (See Comments) and Rash    Cephalosporins Diarrhea    Cyclosporine Swelling       Objective     Physical Exam: Please see above  Vital Signs:   Vitals:    06/12/23 1509   BP: 98/64   BP Location: Right arm   Patient Position: Sitting   Cuff Size: Adult   Pulse: 73   Resp: 16   Temp: 98.7 °F (37.1 °C)   TempSrc: Temporal   Weight: 62 kg (136 lb 9.6 oz)   PainSc: 0-No pain     Body mass index is 25.81 kg/m².          Physical  Exam  Vitals and nursing note reviewed.   Constitutional:       General: She is not in acute distress.     Appearance: Normal appearance. She is normal weight. She is not ill-appearing or toxic-appearing.   HENT:      Nose: No congestion or rhinorrhea.   Eyes:      General:         Right eye: No discharge.         Left eye: No discharge.      Conjunctiva/sclera: Conjunctivae normal.   Pulmonary:      Effort: Pulmonary effort is normal. No respiratory distress.   Abdominal:      General: Abdomen is flat. There is no distension.   Musculoskeletal:      Cervical back: Normal range of motion.   Skin:     Coloration: Skin is not jaundiced.      Findings: No rash.   Neurological:      General: No focal deficit present.      Mental Status: She is alert. Mental status is at baseline.      Coordination: Coordination normal.      Gait: Gait normal.   Psychiatric:         Mood and Affect: Mood normal.         Behavior: Behavior normal.         Thought Content: Thought content normal.         Judgment: Judgment normal.       Procedures    Results:   Labs:   Hemoglobin A1C   Date Value Ref Range Status   02/20/2023 5.10 4.80 - 5.60 % Final     TSH   Date Value Ref Range Status   06/12/2023 1.840 0.270 - 4.200 uIU/mL Final   09/04/2019 5.685 (H) 0.300 - 5.000 uIU/mL Final        Imaging:   No valid procedures specified.     Assessment / Plan      Assessment/Plan:   Problem List Items Addressed This Visit       Other osteoporosis without current pathological fracture    Current Assessment & Plan     - Discussed long acting injection medications   - DEXA scan         Relevant Medications    denosumab (Prolia) 60 MG/ML solution prefilled syringe syringe    Other Relevant Orders    DEXA Bone Density Axial    Hyperlipidemia LDL goal <70    Relevant Orders    Lipid Panel (Completed)    Anxiety and depression    Postoperative hypothyroidism    Relevant Orders    TSH (Completed)    Stage 2 chronic kidney disease    Overview      5/18/23:           Relevant Medications    telmisartan (MICARDIS) 20 MG tablet    Liver transplant status    Relevant Orders    Comprehensive Metabolic Panel (Completed)    CBC Auto Differential (Completed)    Hypotension due to drugs - Primary    Current Assessment & Plan     - Patient will discontinue spironolactone and amlodipine  - Discussed possible use of carvedilol in the future if needed.   - Discussed heart preventative medications         Relevant Medications    telmisartan (MICARDIS) 20 MG tablet    Coronary artery disease involving native coronary artery of native heart without angina pectoris    Overview     Failed ASA due to bleeding episodes         Relevant Medications    telmisartan (MICARDIS) 20 MG tablet    Tooth pain    Current Assessment & Plan     - Gave information on dental facility that will be able to see her that aligns with her insurance         Relevant Medications    chlorhexidine (PERIDEX) 0.12 % solution         Follow Up:   Return in about 4 weeks (around 7/10/2023) for Recheck.    I spent 45 minutes caring for Lizette on this date of service. This time includes time spent by me in the following activities: preparing for the visit, reviewing tests, obtaining and/or reviewing a separately obtained history, performing a medically appropriate examination and/or evaluation, counseling and educating the patient/family/caregiver, ordering medications, tests, or procedures, documenting information in the medical record, independently interpreting results and communicating that information with the patient/family/caregiver, and care coordination.     Transcribed from ambient dictation for Lars Fermin MD by Michelle Choudhury.  06/12/23   17:16 EDT    Patient or patient representative verbalized consent to the visit recording.  I have personally performed the services described in this document as transcribed by the above individual, and it is both accurate and complete.    Lars Fermin  MD [facility]

## 2023-06-12 NOTE — ASSESSMENT & PLAN NOTE
- Patient will discontinue spironolactone and amlodipine  - Discussed possible use of carvedilol in the future if needed.   - Discussed heart preventative medications

## 2023-06-19 ENCOUNTER — TELEPHONE (OUTPATIENT)
Dept: INTERNAL MEDICINE | Facility: CLINIC | Age: 58
End: 2023-06-19
Payer: COMMERCIAL

## 2023-06-19 DIAGNOSIS — F41.9 ANXIETY AND DEPRESSION: ICD-10-CM

## 2023-06-19 DIAGNOSIS — F32.A ANXIETY AND DEPRESSION: ICD-10-CM

## 2023-06-19 RX ORDER — BUPROPION HYDROCHLORIDE 300 MG/1
300 TABLET ORAL EVERY MORNING
Qty: 45 TABLET | Refills: 1 | Status: SHIPPED | OUTPATIENT
Start: 2023-06-19

## 2023-06-26 ENCOUNTER — TELEPHONE (OUTPATIENT)
Dept: GASTROENTEROLOGY | Facility: CLINIC | Age: 58
End: 2023-06-26

## 2023-06-26 NOTE — TELEPHONE ENCOUNTER
Caller: Lizette Frias    Relationship: Self    Best call back number: [phone]    What is the best time to reach you: ANYTIME    Who are you requesting to speak with (clinical staff, provider,  specific staff member): CLINICAL        What was the call regarding: PATIENT STATED THAT SHE RECEIVED A COUPLE OF CALLS, NOT SURE WHAT ABOUT OR WHO FROM. UNABLE TO WARM TRANSFER. PLEASE CALL BACK. OKAY TO LEAVE VM    Is it okay if the provider responds through MyChart: YES

## 2023-07-13 PROBLEM — A08.4 VIRAL GASTROENTERITIS: Status: RESOLVED | Noted: 2022-09-22 | Resolved: 2023-07-13

## 2023-07-13 PROBLEM — A04.72 C. DIFFICILE DIARRHEA: Status: RESOLVED | Noted: 2022-09-28 | Resolved: 2023-07-13

## 2023-07-28 DIAGNOSIS — I25.10 CORONARY ARTERY DISEASE INVOLVING NATIVE CORONARY ARTERY OF NATIVE HEART WITHOUT ANGINA PECTORIS: ICD-10-CM

## 2023-07-28 DIAGNOSIS — I95.2 HYPOTENSION DUE TO DRUGS: ICD-10-CM

## 2023-07-28 DIAGNOSIS — N18.2 STAGE 2 CHRONIC KIDNEY DISEASE: ICD-10-CM

## 2023-07-28 RX ORDER — TELMISARTAN 20 MG/1
TABLET ORAL
Qty: 30 TABLET | Refills: 1 | Status: SHIPPED | OUTPATIENT
Start: 2023-07-28 | End: 2023-07-31 | Stop reason: SDUPTHER

## 2023-07-31 DIAGNOSIS — I25.10 CORONARY ARTERY DISEASE INVOLVING NATIVE CORONARY ARTERY OF NATIVE HEART WITHOUT ANGINA PECTORIS: ICD-10-CM

## 2023-07-31 DIAGNOSIS — N18.2 STAGE 2 CHRONIC KIDNEY DISEASE: ICD-10-CM

## 2023-07-31 DIAGNOSIS — I95.2 HYPOTENSION DUE TO DRUGS: ICD-10-CM

## 2023-07-31 RX ORDER — TELMISARTAN 20 MG/1
20 TABLET ORAL DAILY
Qty: 90 TABLET | Refills: 1 | Status: SHIPPED | OUTPATIENT
Start: 2023-07-31

## 2023-08-03 DIAGNOSIS — F32.A ANXIETY AND DEPRESSION: ICD-10-CM

## 2023-08-03 DIAGNOSIS — F41.9 ANXIETY AND DEPRESSION: ICD-10-CM

## 2023-08-03 RX ORDER — MIRTAZAPINE 15 MG/1
TABLET, FILM COATED ORAL
Qty: 90 TABLET | Refills: 0 | Status: SHIPPED | OUTPATIENT
Start: 2023-08-03

## 2023-08-09 ENCOUNTER — OFFICE VISIT (OUTPATIENT)
Dept: GASTROENTEROLOGY | Facility: CLINIC | Age: 58
End: 2023-08-09
Payer: COMMERCIAL

## 2023-08-09 VITALS
BODY MASS INDEX: 26.24 KG/M2 | HEIGHT: 61 IN | HEART RATE: 68 BPM | WEIGHT: 139 LBS | SYSTOLIC BLOOD PRESSURE: 136 MMHG | DIASTOLIC BLOOD PRESSURE: 70 MMHG

## 2023-08-09 DIAGNOSIS — T40.2X5A THERAPEUTIC OPIOID INDUCED CONSTIPATION: ICD-10-CM

## 2023-08-09 DIAGNOSIS — K59.03 THERAPEUTIC OPIOID INDUCED CONSTIPATION: ICD-10-CM

## 2023-08-09 DIAGNOSIS — R49.0 HOARSENESS OF VOICE: ICD-10-CM

## 2023-08-09 DIAGNOSIS — K31.84 GASTROPARESIS: ICD-10-CM

## 2023-08-09 DIAGNOSIS — K21.00 GASTROESOPHAGEAL REFLUX DISEASE WITH ESOPHAGITIS WITHOUT HEMORRHAGE: Primary | ICD-10-CM

## 2023-08-09 DIAGNOSIS — Z98.890 HISTORY OF COLONOSCOPY: ICD-10-CM

## 2023-08-09 NOTE — PROGRESS NOTES
GASTROENTEROLOGY OFFICE NOTE    Lizette Frias  [id number]  1965    CARE TEAM  Patient Care Team:  Lars Fermin MD as PCP - General (Family Medicine)  Ming Stroud MD as Consulting Physician (Endocrinology)  Bill Ventura III, MD as Cardiologist (Cardiology)  Carmita Nelson APRN as Nurse Practitioner (Obstetrics and Gynecology)  Isaiah Law MD as Consulting Physician (Pain Medicine)    Referring Provider: No ref. provider found    Chief Complaint   Patient presents with    Follow-up     Patient presents for follow up of her gatroparesis and GERD.         HISTORY OF PRESENT ILLNESS:   Lizette Frias is a 58 y.o. female who returns for 3 to 4 month follow up regarding reflux, gastroparesis.  She has a history of taking Ozempic but at last office visit on 4/28/2023 she reported she has stopped taking Ozempic.  She continued to have decreased appetite, continued weight loss despite stopping Ozempic as well as a knot in the epigastric area near xiphoid process that began a few days prior. She has history of liver transplant, decompensated cirrhosis, live liver donor and recurrent MORRISON, follows with provider in [place]. She is getting . She reports she is no longer able to continue care in [place] due to not having someone to go with. She would like to transfer post transplant care here.     CT scan 4/2023 revealed moderate to large stool burden.  She has a history of taking lactulose but did not like the taste of lactulose.  At last office visit she reported 1 bowel movement per day. MiraLAX daily recommended for treatment of constipation.  She is taking 2 capfuls of MiraLAX per day and has 3-4 bowel movements per day.    At last office visit, she expressed concern that hoarseness of voice was due to reflux and wanted to know if she could take pantoprazole twice daily.  Pantoprazole 40 mg twice daily was prescribed.   She reports improvement in hoarseness of voice taking  pantoprazole twice daily.  She continues to have intermittent hoarseness of voice but it does seem better.    Ultrasound abdomen ordered for additional evaluation due to description of knot in the epigastric area.  2023 ultrasound of the abdomen revealed knot in the epigastric area likely representing xiphoid process, US without abnormality.  She reports the knot seems smaller.    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, mastoid damage    Hypercholesteremia     Hypertension     Hyperthyroidism     Hypothyroid     Low back pain     Meningioma     Osteoarthritis     Osteopenia     Osteoporosis     Pancreatitis     Pretransplant    Renal impairment     Severe malnutrition (CMS/HCC) 2/3/2022    Stroke     Thyroid cancer     Vision impairment     left eye        Past Surgical History:   Procedure Laterality Date    APPENDECTOMY      BREAST BIOPSY Left     BENIGN     SECTION      x3    CHOLECYSTECTOMY      D & C HYSTEROSCOPY LAPAROSCOPY      x2 for endometriosis    ENDOSCOPY      FRACTURE SURGERY      Right ankle has screws    LIVER SURGERY      removed cysts     LIVER TRANSPLANTATION      ORIF ANKLE FRACTURE      THYROIDECTOMY      TONSILLECTOMY      TOTAL ABDOMINAL HYSTERECTOMY WITH SALPINGO OOPHORECTOMY Bilateral     UPPER GASTROINTESTINAL ENDOSCOPY          Current Outpatient Medications on File Prior to Visit   Medication Sig    atorvastatin (LIPITOR) 80 MG tablet Take 1 tablet by mouth Every Night.    buPROPion XL (Wellbutrin XL) 300 MG 24 hr tablet Take 1 tablet by mouth Every Morning.    calcium  carbonate-vitamin d 600-400 MG-UNIT per tablet Take 1 tablet by mouth Daily.    denosumab (Prolia) 60 MG/ML solution prefilled syringe syringe Inject 1 mL under the skin into the appropriate area as directed Every 6 (Six) Months for 2 doses.    ergocalciferol (ERGOCALCIFEROL) 1.25 MG (08942 UT) capsule 1 (One) Time Per Week.    fluticasone (FLONASE) 50 MCG/ACT nasal spray USE 2 SPRAYS IN EACH NOSTRIL TWICE DAILY    icosapent ethyl (VASCEPA) 1 g capsule capsule Take 2 g by mouth 2 (Two) Times a Day With Meals.    levothyroxine (SYNTHROID, LEVOTHROID) 175 MCG tablet TAKE 1 TABLET BY MOUTH EVERY DAY IN THE MORNING    methocarbamol (ROBAXIN) 500 MG tablet TAKE 1 TABLET BY MOUTH FOUR TIMES A DAY    mirtazapine (REMERON) 15 MG tablet TAKE 1 TABLET BY MOUTH EVERYDAY AT BEDTIME    oxyCODONE (ROXICODONE) 5 MG immediate release tablet Take 1 tablet by mouth Every Night.    pantoprazole (PROTONIX) 40 MG EC tablet Take 1 tablet by mouth 2 (Two) Times a Day. 30 minutes prior to first meal and 30 minutes prior to last meal of the day    polyethylene glycol (MiraLax) 17 GM/SCOOP powder Take 17 g by mouth Daily. Mix in 8 oz of any liquid once daily    potassium chloride (MICRO-K) 10 MEQ CR capsule Hold until Lasix (furosemide) restarted    pregabalin (LYRICA) 100 MG capsule Every 8 (Eight) Hours.    rOPINIRole (REQUIP) 2 MG tablet TAKE 1 - 2 TABLET(S) BY MOUTH EVERY NIGHT AT BEDTIME.    sertraline (ZOLOFT) 100 MG tablet TAKE 1 TABLET BY MOUTH EVERY DAY    sertraline (Zoloft) 50 MG tablet Take 1 tablet by mouth Daily.    tacrolimus (PROGRAF) 0.5 MG capsule Take 2 capsules by mouth 2 (Two) Times a Day.    telmisartan (MICARDIS) 20 MG tablet Take 1 tablet by mouth Daily.    traZODone (DESYREL) 100 MG tablet TAKE 2 TABLETS BY MOUTH EVERY NIGHT    zolpidem (AMBIEN) 5 MG tablet Take 1 tablet by mouth Every Night.    [DISCONTINUED] chlorhexidine (PERIDEX) 0.12 % solution Apply 15 mL to the mouth or throat 2 (Two) Times a Day.     "[DISCONTINUED] levalbuterol (XOPENEX HFA) 45 MCG/ACT inhaler Every 4 To 6 Hours As Needed    [DISCONTINUED] pregabalin (LYRICA) 100 MG capsule Every 8 (Eight) Hours.     No current facility-administered medications on file prior to visit.       Allergies   Allergen Reactions    Penicillins Shortness Of Breath, Itching, Other (See Comments) and Rash    Cephalosporins Diarrhea    Cyclosporine Swelling       Family History   Problem Relation Age of Onset    Arthritis Mother     Osteoporosis Mother     Rheum arthritis Mother     Anemia Mother     Alcohol abuse Father     Mental illness Father     Hyperlipidemia Father     Hypertension Father     Colon cancer Father     Cancer Father     Hearing loss Father     ADD / ADHD Son     ADD / ADHD Son     Breast cancer Maternal Grandmother     Stroke Maternal Grandmother     Mental illness Paternal Grandmother     Ovarian cancer Paternal Grandmother        Social History     Socioeconomic History    Marital status:    Tobacco Use    Smoking status: Never    Smokeless tobacco: Never   Vaping Use    Vaping Use: Never used   Substance and Sexual Activity    Alcohol use: No    Drug use: Never     Comment: Tried an edible last week.     Sexual activity: Not Currently     Partners: Male     Birth control/protection: Surgical, Post-menopausal       PHYSICAL EXAM   /70 (BP Location: Right arm)   Pulse 68   Ht 154.9 cm (61\")   Wt 63 kg (139 lb)   LMP  (LMP Unknown) Comment: Last pap 3/3/2022 by [facility] [place]  BMI 26.26 kg/mý   Physical Exam  Constitutional:       General: She is not in acute distress.     Appearance: She is not toxic-appearing.   HENT:      Head: Normocephalic and atraumatic. No contusion.      Right Ear: External ear normal.      Left Ear: External ear normal.   Eyes:      General: Lids are normal. No scleral icterus.        Right eye: No discharge.         Left eye: No discharge.      Extraocular Movements: Extraocular movements intact. "   Neck:      Trachea: Trachea normal.      Comments: No visible mass  No visible adenopathy  Cardiovascular:      Rate and Rhythm: Normal rate.   Pulmonary:      Effort: No respiratory distress.      Comments: Symmetrical expansion    Abdominal:      Palpations: Abdomen is soft. There is no mass.   Musculoskeletal:      Right lower leg: No edema.      Left lower leg: No edema.      Comments: Symmetrical movement of upper extremities  Symmetrical movement of lower extremities  No visible deformities   Skin:     General: Skin is warm and dry.      Coloration: Skin is not jaundiced.   Neurological:      General: No focal deficit present.      Mental Status: She is alert and oriented to person, place, and time.   Psychiatric:         Mood and Affect: Mood normal.         Behavior: Behavior normal.         Thought Content: Thought content normal.       Results Review:  1/6/2023 EGD per Dr. Wood due to heartburn and weight loss revealed LA grade a esophagitis without bleeding in the distal esophagus, large amount of food residue, gastroparesis caused or exacerbated by Ozempic with recommendation to decrease or discontinue Ozempic, consider prokinetic agent, dietary modifications for gastroparesis.     CT scan abdomen and pelvis 4/17/2023 due to right flank pain, abdominal pain, urinary tract infection with history of liver transplant that revealed surgical changes of the liver, moderate to large stool burden. The knot she has felt in the epigastric area was not present at that time.    5/22/2023 ultrasound of the abdomen revealed knot in the epigastric area likely representing xiphoid process, US without abnormality.   CMP          4/17/2023    14:39 4/17/2023    14:49 5/15/2023    15:53 6/12/2023    16:14   CMP   Glucose 78   97  84    BUN 22   20  23    Creatinine 1.20  1.20     1.20  1.07  0.99    EGFR 52.3  52.3  60.0  65.8    Sodium 140   142  140    Potassium 5.1   5.2  5.3    Chloride 107   105  105     Calcium 8.8   9.6  9.0    Total Protein 6.7   6.8  7.0    Albumin 4.5   4.5  4.5    Globulin 2.2   2.3  2.5    Total Bilirubin 0.6   0.4  0.6    Alkaline Phosphatase 102   116  111    AST (SGOT) 25   17  17    ALT (SGPT) 34   36  31    Albumin/Globulin Ratio 2.0   2.0  1.8    BUN/Creatinine Ratio 18.3   18.7  23.2    Anion Gap 10.0   11.3  9.0      CBC          4/13/2023    16:27 4/17/2023    14:39 4/17/2023    14:49 6/12/2023    16:14   CBC   WBC 6.28  4.97   5.90    RBC 5.62  5.05   4.91    Hemoglobin 15.7  14.6  14.3     14.3  14.2    Hematocrit 48.7  43.5  42     42  41.5    MCV 86.7  86.1   84.5    MCH 27.9  28.9   28.9    MCHC 32.2  33.6   34.2    RDW 14.8  13.9   14.0    Platelets 155  142   163        ASSESSMENT / PLAN  1. Gastroesophageal reflux disease with esophagitis without hemorrhage  2. Hoarseness of voice  3. Gastroparesis  - EGD 1/2023 revealed LA grade a esophagitis without bleeding in the distal esophagus, large amount of food residue, gastroparesis caused or exacerbated by Ozempic with recommendation to decrease or discontinue Ozempic, consider prokinetic agent, dietary modifications for gastroparesis. She stopped Ozempic 4/2023. She takes oxycodone at bedtime which is likely contributing to constipation and delayed emptying of stomach which is also likely contributing to decreased appetite. She previously seemed to be considering stopping oxycodone but oxycodone remains on medication list  - continue pantoprazole twice daily dosing. Her vocal therapist previously expressed concern about reflux contributing to hoarseness.   -Printed information regarding gastroparesis previously provided in the office.   - recommend treatment of allergies as well due to report of allergies, sinus drainage taking fluticasone nasal spray twice daily. Consider Allegra, Zyrtec or Claritin daily but discuss with transplant provider prior to starting new medication. Recommend she communicate with [facility] provider  about transferring transplant care to another transplant center as this office does not provide post liver transplant care.She has recurrent MORRISON following live liver donor, history of decompensated cirrhosis for which she has followed in [place].   4. Therapeutic opioid induced constipation  - CT scan 4/2023 with retained stool despite daily bowel movement, she previously reporting taking oxycodone most nights which likely contributes to constipation for which I recommend she continue Miralax. Consider decreased dose of Miralax from 2 capfuls daily to 1 capful daily. If she has any worsening symptoms with decreased dose of Miralax, return to taking 2 capfuls of Miralax daily.   -She did not previously like the taste of lactulose    5. History of colonoscopy  - she previously reported  she is likely due for repeat colonoscopy this fall. I have again requested the office contact [facility] for prior record but the patient also previously reported  she is unsure where prior colonoscopy was performed. If we are unable to obtain prior colonoscopy record, plan for repeat colonoscopy this fall, I will plan to send to a patient a message in 1 week if I do not receive prior colonoscopy record  -I previously documented that I would rather her take a liquid prep, small volume due to concern that Ozempic is affecting digestion and she may have decreased effect from taking pills when compared to liquid form of prep, however, she is no longer on Ozempic.  Our office typically uses liquid prep.    Return in about 6 months (around 2/9/2024).    Phyllis Tao, AISLINN  08/09/2023

## 2023-08-10 ENCOUNTER — TELEPHONE (OUTPATIENT)
Dept: GASTROENTEROLOGY | Facility: CLINIC | Age: 58
End: 2023-08-10
Payer: COMMERCIAL

## 2023-08-10 NOTE — TELEPHONE ENCOUNTER
Requested colonoscopy records from [facility] and they do not have any procedures for her.

## 2023-08-11 DIAGNOSIS — Z94.4 LIVER TRANSPLANT RECIPIENT: ICD-10-CM

## 2023-08-11 RX ORDER — SPIRONOLACTONE 25 MG/1
TABLET ORAL
Qty: 90 TABLET | Refills: 0 | Status: SHIPPED | OUTPATIENT
Start: 2023-08-11

## 2023-08-21 RX ORDER — FLUTICASONE PROPIONATE 50 MCG
SPRAY, SUSPENSION (ML) NASAL
Qty: 48 ML | Refills: 1 | Status: SHIPPED | OUTPATIENT
Start: 2023-08-21 | End: 2023-08-23

## 2023-08-21 RX ORDER — ROPINIROLE 2 MG/1
TABLET, FILM COATED ORAL
Qty: 180 TABLET | Refills: 1 | Status: SHIPPED | OUTPATIENT
Start: 2023-08-21

## 2023-08-22 ENCOUNTER — TELEPHONE (OUTPATIENT)
Dept: INTERNAL MEDICINE | Facility: CLINIC | Age: 58
End: 2023-08-22
Payer: COMMERCIAL

## 2023-08-22 DIAGNOSIS — J30.1 NON-SEASONAL ALLERGIC RHINITIS DUE TO POLLEN: Primary | ICD-10-CM

## 2023-08-22 NOTE — TELEPHONE ENCOUNTER
[facility] received a fax that requires your attention. The document has been indexed to the patient's chart for your review.      Reason for sending: EXTERNAL MEDICAL RECORD NOTIFICATION    Documents Description: RX ALTERNATIVE REQUESTED     Name of Sender: [facility]    Date Indexed: 8/22/23    Notes (if needed): SEE FAX IN CHART UNDER MEDICATIONS.    PT WAS SEEN BY [facility] ON 8/17/23 BY NAA SAENZ AND THIS WAS PRESCRIBED AT THAT TIME.  MESSAGE SENT TO PCP FOR THE ALTERNATIVE

## 2023-08-23 RX ORDER — TRIAMCINOLONE ACETONIDE 55 UG/1
2 SPRAY, METERED NASAL DAILY
Qty: 6.8 ML | Refills: 1 | Status: SHIPPED | OUTPATIENT
Start: 2023-08-23 | End: 2024-08-22

## 2023-09-06 DIAGNOSIS — E89.0 POSTOPERATIVE HYPOTHYROIDISM: ICD-10-CM

## 2023-09-06 RX ORDER — LEVOTHYROXINE SODIUM 175 UG/1
TABLET ORAL
Qty: 90 TABLET | Refills: 0 | Status: SHIPPED | OUTPATIENT
Start: 2023-09-06

## 2023-09-14 ENCOUNTER — TELEPHONE (OUTPATIENT)
Dept: INTERNAL MEDICINE | Facility: CLINIC | Age: 58
End: 2023-09-14
Payer: COMMERCIAL

## 2023-09-14 NOTE — TELEPHONE ENCOUNTER
Lesley from [facility] called to say that Prolia will be sent to our office to be delivered by 9/22/23. Medication needs to be refrigerated. Phone: [phone]

## 2023-09-19 DIAGNOSIS — F41.9 ANXIETY AND DEPRESSION: ICD-10-CM

## 2023-09-19 DIAGNOSIS — F32.A ANXIETY AND DEPRESSION: ICD-10-CM

## 2023-10-03 RX ORDER — MULTIVITAMIN/IRON/FOLIC ACID 18MG-0.4MG
TABLET ORAL
Qty: 90 TABLET | Refills: 1 | Status: SHIPPED | OUTPATIENT
Start: 2023-10-03

## 2023-10-04 ENCOUNTER — HOSPITAL ENCOUNTER (OUTPATIENT)
Dept: MAMMOGRAPHY | Facility: HOSPITAL | Age: 58
Discharge: HOME | End: 2023-10-04
Admitting: STUDENT IN AN ORGANIZED HEALTH CARE EDUCATION/TRAINING PROGRAM
Payer: COMMERCIAL

## 2023-10-04 DIAGNOSIS — Z12.31 ENCOUNTER FOR SCREENING MAMMOGRAM FOR MALIGNANT NEOPLASM OF BREAST: ICD-10-CM

## 2023-10-04 PROCEDURE — 77067 SCR MAMMO BI INCL CAD: CPT

## 2023-10-04 PROCEDURE — 77063 BREAST TOMOSYNTHESIS BI: CPT

## 2023-10-11 ENCOUNTER — TELEPHONE (OUTPATIENT)
Dept: INTERNAL MEDICINE | Facility: CLINIC | Age: 58
End: 2023-10-11
Payer: COMMERCIAL

## 2023-10-11 NOTE — TELEPHONE ENCOUNTER
Tried to reach patient no answer left voicemail to return call    RELAY:  Please check with the patient to see if they have scheduled the additional imaging that is required for breast cancer screening or if she needs me to place it based on their recommendations with the current attachment.  Thanks.

## 2023-10-11 NOTE — TELEPHONE ENCOUNTER
----- Message from Lars Fermin MD sent at 10/11/2023 12:34 PM EDT -----  Please check with the patient to see if they have scheduled the additional imaging that is required for breast cancer screening or if she needs me to place it based on their recommendations with the current attachment.  Thanks.

## 2023-10-12 RX ORDER — ICOSAPENT ETHYL 1000 MG/1
CAPSULE ORAL
Qty: 90 CAPSULE | Refills: 3 | Status: SHIPPED | OUTPATIENT
Start: 2023-10-12 | End: 2023-10-13 | Stop reason: SDUPTHER

## 2023-10-12 NOTE — TELEPHONE ENCOUNTER
Pt reached and advised of clinical message. Pt stated she plan on calling today to schedule. Advised if she has any trouble let office know. Pt verbalized good understanding.

## 2023-10-13 ENCOUNTER — OFFICE VISIT (OUTPATIENT)
Dept: INTERNAL MEDICINE | Facility: CLINIC | Age: 58
End: 2023-10-13
Payer: COMMERCIAL

## 2023-10-13 ENCOUNTER — TELEPHONE (OUTPATIENT)
Dept: INTERNAL MEDICINE | Facility: CLINIC | Age: 58
End: 2023-10-13

## 2023-10-13 VITALS
RESPIRATION RATE: 14 BRPM | DIASTOLIC BLOOD PRESSURE: 68 MMHG | SYSTOLIC BLOOD PRESSURE: 122 MMHG | BODY MASS INDEX: 26.06 KG/M2 | OXYGEN SATURATION: 98 % | HEART RATE: 78 BPM | HEIGHT: 61 IN | WEIGHT: 138 LBS

## 2023-10-13 DIAGNOSIS — I25.10 CORONARY ARTERY DISEASE INVOLVING NATIVE CORONARY ARTERY OF NATIVE HEART WITHOUT ANGINA PECTORIS: ICD-10-CM

## 2023-10-13 DIAGNOSIS — F32.A ANXIETY AND DEPRESSION: ICD-10-CM

## 2023-10-13 DIAGNOSIS — E89.0 POSTOPERATIVE HYPOTHYROIDISM: ICD-10-CM

## 2023-10-13 DIAGNOSIS — F41.9 ANXIETY AND DEPRESSION: ICD-10-CM

## 2023-10-13 DIAGNOSIS — R73.03 PREDIABETES: ICD-10-CM

## 2023-10-13 DIAGNOSIS — M16.12 PRIMARY OSTEOARTHRITIS OF LEFT HIP: ICD-10-CM

## 2023-10-13 DIAGNOSIS — M72.2 PLANTAR FASCIITIS: ICD-10-CM

## 2023-10-13 DIAGNOSIS — K75.81 NONALCOHOLIC STEATOHEPATITIS (NASH): ICD-10-CM

## 2023-10-13 DIAGNOSIS — Z23 ENCOUNTER FOR VACCINATION: ICD-10-CM

## 2023-10-13 DIAGNOSIS — M81.8 OTHER OSTEOPOROSIS WITHOUT CURRENT PATHOLOGICAL FRACTURE: Primary | ICD-10-CM

## 2023-10-13 DIAGNOSIS — Z80.9 FAMILY HISTORY OF CANCER: ICD-10-CM

## 2023-10-13 DIAGNOSIS — K74.60 HEPATIC CIRRHOSIS, UNSPECIFIED HEPATIC CIRRHOSIS TYPE, UNSPECIFIED WHETHER ASCITES PRESENT: ICD-10-CM

## 2023-10-13 LAB
ALBUMIN SERPL-MCNC: 4.4 G/DL (ref 3.5–5.2)
ALBUMIN/GLOB SERPL: 1.8 G/DL
ALP SERPL-CCNC: 117 U/L (ref 39–117)
ALT SERPL W P-5'-P-CCNC: 36 U/L (ref 1–33)
ANION GAP SERPL CALCULATED.3IONS-SCNC: 11 MMOL/L (ref 5–15)
AST SERPL-CCNC: 18 U/L (ref 1–32)
BILIRUB SERPL-MCNC: 0.8 MG/DL (ref 0–1.2)
BUN SERPL-MCNC: 23 MG/DL (ref 6–20)
BUN/CREAT SERPL: 20.9 (ref 7–25)
CALCIUM SPEC-SCNC: 9.5 MG/DL (ref 8.6–10.5)
CHLORIDE SERPL-SCNC: 105 MMOL/L (ref 98–107)
CO2 SERPL-SCNC: 25 MMOL/L (ref 22–29)
CREAT SERPL-MCNC: 1.1 MG/DL (ref 0.57–1)
EGFRCR SERPLBLD CKD-EPI 2021: 58 ML/MIN/1.73
EXPIRATION DATE: NORMAL
EXPIRATION DATE: NORMAL
GLOBULIN UR ELPH-MCNC: 2.4 GM/DL
GLUCOSE SERPL-MCNC: 77 MG/DL (ref 65–99)
HBA1C MFR BLD: 5.1 % (ref 4.5–5.7)
INR PPP: 1 (ref 0.9–1.1)
Lab: NORMAL
Lab: NORMAL
POTASSIUM SERPL-SCNC: 4.8 MMOL/L (ref 3.5–5.2)
PROT SERPL-MCNC: 6.8 G/DL (ref 6–8.5)
SODIUM SERPL-SCNC: 141 MMOL/L (ref 136–145)
TSH SERPL DL<=0.05 MIU/L-ACNC: 0.05 UIU/ML (ref 0.27–4.2)

## 2023-10-13 PROCEDURE — 80053 COMPREHEN METABOLIC PANEL: CPT | Performed by: STUDENT IN AN ORGANIZED HEALTH CARE EDUCATION/TRAINING PROGRAM

## 2023-10-13 PROCEDURE — 84443 ASSAY THYROID STIM HORMONE: CPT | Performed by: STUDENT IN AN ORGANIZED HEALTH CARE EDUCATION/TRAINING PROGRAM

## 2023-10-13 RX ORDER — ICOSAPENT ETHYL 1000 MG/1
2 CAPSULE ORAL 2 TIMES DAILY WITH MEALS
Qty: 360 CAPSULE | Refills: 3 | Status: SHIPPED | OUTPATIENT
Start: 2023-10-13

## 2023-10-13 RX ORDER — SUCRALFATE 1 G/1
1 TABLET ORAL 4 TIMES DAILY
COMMUNITY

## 2023-10-13 RX ORDER — MELOXICAM 15 MG/1
15 TABLET ORAL DAILY
COMMUNITY
Start: 2023-10-02

## 2023-10-13 NOTE — TELEPHONE ENCOUNTER
Request came thru fax from [facility] the Icosapent ethyl 1 gram cap. Insurance requires 90 day supply.

## 2023-10-13 NOTE — PROGRESS NOTES
Follow Up Office Visit      Date: 10/13/2023   Patient Name: Lizette Rhoeds  : 1965   MRN: [id number]     Chief Complaint:    Chief Complaint   Patient presents with    Osteoporosis     3 month follow up    Thyroid Problem     Pt states possible thyroid issue, wants labs to check    Herpes Zoster     Pt states she has had Shingles before and is interested in vaccine       History of Present Illness: Lizette Rhodes is a 58 y.o. female who is here today to follow up with chronic care management.    LIZETTE RHODES is a 58-year-old female who is here today for follow up.    Plantar Fasciitis  The patient reports that she realized that she had plantar fasciitis for over a year. She states that she thought that it was just arthritis in her feet. She was seen by [facility] and was told they would have to treat it very aggressively. She was given an injection for both heels, which relieved the pain. She was placed on a topical compound for the numbness. She was also placed on Meloxicam.     Osteoarthritis of left hip  The patient reports that she has stress reaction in both hips. She states that it is being treated by [facility].     Postoperative hypothyroidism  The patient reports that her thyroid levels may be off because she has been sleeping significantly. She then adds that she slept until noon today. She notes that she slept until 2:00 pm the other day and ended up taking another nap in the evening. She states that she is unable to stay awake. Her last labs were done 4 months ago. The patient reports that she had thyroid cancer. She is wondering if the increased sertraline might be contributing to her feeling fatigued. She states that she is doing well on the medication.    Healthcare Maintenance  The patient reports that she had a negative mammogram. She is scheduled for her next diagnostic testing in 2023. She would like to discuss her results. She will undergo surgery on  11/10/2023.    Coronary Artery Disease  The patient has tried aspirin before, which caused a significant amount of bleeding. Her last LDL levels were 106 mg/dL.    Anxiety and Depression symptoms  The patient reports she did not have GeneSight testing done. She states that she do not want to make any major changes until after the holidays. She then adds that the holidays are going to be rough. She states that her divorce is not final. The patient states that she has been experiencing abdominal pain that could be related to it. She does have a good support system. She does see a therapist.    Subjective      Review of Systems:   Review of Systems   All other systems reviewed and are negative.      I have reviewed the patients family history, social history, past medical history, past surgical history and have updated it as appropriate.     Medications:     Current Outpatient Medications:     atorvastatin (LIPITOR) 80 MG tablet, Take 1 tablet by mouth Every Night., Disp: 90 tablet, Rfl: 3    buPROPion XL (Wellbutrin XL) 300 MG 24 hr tablet, Take 1 tablet by mouth Every Morning., Disp: 45 tablet, Rfl: 1    calcium carbonate-vitamin d 600-400 MG-UNIT per tablet, Take 1 tablet by mouth Daily., Disp: , Rfl:     [facility] Melatonin 5 MG tablet tablet, TAKE 1 TABLET BY MOUTH EVERY DAY AT NIGHT, Disp: 90 tablet, Rfl: 1    denosumab (Prolia) 60 MG/ML solution prefilled syringe syringe, Inject 1 mL under the skin into the appropriate area as directed Every 6 (Six) Months for 2 doses., Disp: 1 mL, Rfl: 1    ergocalciferol (ERGOCALCIFEROL) 1.25 MG (16029 UT) capsule, 1 (One) Time Per Week., Disp: , Rfl:     icosapent ethyl (Vascepa) 1 g capsule capsule, Take 2 g by mouth 2 (Two) Times a Day With Meals., Disp: 360 capsule, Rfl: 3    levothyroxine (SYNTHROID, LEVOTHROID) 175 MCG tablet, TAKE 1 TABLET BY MOUTH EVERY DAY IN THE MORNING, Disp: 90 tablet, Rfl: 0    meloxicam (MOBIC) 15 MG tablet, Take 1 tablet by mouth Daily., Disp: , Rfl:      methocarbamol (ROBAXIN) 500 MG tablet, TAKE 1 TABLET BY MOUTH FOUR TIMES A DAY, Disp: 60 tablet, Rfl: 0    mirtazapine (REMERON) 15 MG tablet, TAKE 1 TABLET BY MOUTH EVERYDAY AT BEDTIME, Disp: 90 tablet, Rfl: 0    pantoprazole (PROTONIX) 40 MG EC tablet, Take 1 tablet by mouth 2 (Two) Times a Day. 30 minutes prior to first meal and 30 minutes prior to last meal of the day, Disp: 180 tablet, Rfl: 3    potassium chloride (MICRO-K) 10 MEQ CR capsule, Hold until Lasix (furosemide) restarted, Disp: , Rfl:     pregabalin (LYRICA) 100 MG capsule, Every 8 (Eight) Hours., Disp: , Rfl:     rOPINIRole (REQUIP) 2 MG tablet, TAKE 1 - 2 TABLET(S) BY MOUTH EVERY NIGHT AT BEDTIME., Disp: 180 tablet, Rfl: 1    sertraline (ZOLOFT) 100 MG tablet, TAKE 1 TABLET BY MOUTH EVERY DAY, Disp: 90 tablet, Rfl: 1    spironolactone (ALDACTONE) 25 MG tablet, TAKE 1 TABLET BY MOUTH EVERY DAY, Disp: 90 tablet, Rfl: 0    sucralfate (CARAFATE) 1 g tablet, Take 1 tablet by mouth 4 (Four) Times a Day., Disp: , Rfl:     tacrolimus (PROGRAF) 0.5 MG capsule, Take 2 capsules by mouth 2 (Two) Times a Day., Disp: , Rfl:     telmisartan (MICARDIS) 20 MG tablet, Take 1 tablet by mouth Daily., Disp: 90 tablet, Rfl: 1    traZODone (DESYREL) 100 MG tablet, TAKE 2 TABLETS BY MOUTH EVERY NIGHT, Disp: 180 tablet, Rfl: 1    Triamcinolone Acetonide (NASACORT) 55 MCG/ACT nasal inhaler, 2 sprays into the nostril(s) as directed by provider Daily., Disp: 6.8 mL, Rfl: 1    zolpidem (AMBIEN) 5 MG tablet, Take 1 tablet by mouth Every Night., Disp: , Rfl:     Allergies:   Allergies   Allergen Reactions    Penicillins Shortness Of Breath, Itching, Other (See Comments) and Rash    Cephalosporins Diarrhea    Cyclosporine Swelling       Objective     Physical Exam: Please see above  Vital Signs:   Vitals:    10/13/23 1437   BP: 122/68   BP Location: Left arm   Patient Position: Sitting   Cuff Size: Adult   Pulse: 78   Resp: 14   SpO2: 98%   Weight: 62.6 kg (138 lb)   Height:  "154.9 cm (61\")     Body mass index is 26.07 kg/m².          Physical Exam  Vitals and nursing note reviewed.   Constitutional:       General: She is not in acute distress.     Appearance: Normal appearance. She is normal weight. She is not ill-appearing or toxic-appearing.   HENT:      Nose: No congestion or rhinorrhea.   Eyes:      General:         Right eye: No discharge.         Left eye: No discharge.      Conjunctiva/sclera: Conjunctivae normal.   Pulmonary:      Effort: Pulmonary effort is normal. No respiratory distress.   Abdominal:      General: Abdomen is flat. There is no distension.   Musculoskeletal:      Cervical back: Normal range of motion.   Skin:     Coloration: Skin is not jaundiced.      Findings: No rash.   Neurological:      General: No focal deficit present.      Mental Status: She is alert. Mental status is at baseline.      Coordination: Coordination normal.      Gait: Gait normal.   Psychiatric:         Mood and Affect: Mood normal.         Behavior: Behavior normal.         Thought Content: Thought content normal.         Judgment: Judgment normal.         Procedures    Results:   Labs:   Hemoglobin A1C   Date Value Ref Range Status   10/13/2023 5.1 4.5 - 5.7 % Final   02/20/2023 5.10 4.80 - 5.60 % Final     TSH   Date Value Ref Range Status   10/13/2023 0.048 (L) 0.270 - 4.200 uIU/mL Final   09/04/2019 5.685 (H) 0.300 - 5.000 uIU/mL Final        Imaging:   No valid procedures specified.     Assessment / Plan      Assessment/Plan:   Problem List Items Addressed This Visit       Other osteoporosis without current pathological fracture - Primary    Hepatic cirrhosis    Relevant Orders    POC INR (Completed)    Comprehensive Metabolic Panel (Completed)    Anxiety and depression    Overview     Failed multiple medications.  Great relationship with current counselor.         Relevant Orders    GeneSight - Swab,    Postoperative hypothyroidism    Overview     >>OVERVIEW FOR HYPOTHYROID WRITTEN " ON 1/11/2022  4:53 PM BY JB AL MD    Due to remote thyroidectomy for cancer         Relevant Orders    TSH (Completed)    Osteoarthritis of left hip    Prediabetes    Coronary artery disease involving native coronary artery of native heart without angina pectoris    Overview     Failed ASA due to bleeding episodes         Nonalcoholic steatohepatitis (MORRISON)    Plantar fasciitis    Family history of cancer    Relevant Orders    Genetic Testing - Blood,     Other Visit Diagnoses       Encounter for vaccination        Relevant Orders    POC Glycosylated Hemoglobin (Hb A1C) (Completed)    Fluzone (or Fluarix & Flulaval for VFC) >6mos (Completed)            1. Other osteoporosis without current pathological fracture - Primary  - No orders were placed at today's visit.     2. Hepatic cirrhosis  - POC INR labs ordered  - Comprehensive Metabolic Panel labs ordered     3. Postoperative hypothyroidism  - Overview:  >>OVERVIEW FOR HYPOTHYROID WRITTEN ON 1/11/2022  4:53 PM BY JB AL MD  - Discussed her last labs    4. Due to remote thyroidectomy for cancer  - TSH labs ordered     5. Osteoarthritis of left hip  - Patient will continue to follow up with [facility]     6. Prediabetes  - No orders were placed at today's visit     7. Coronary artery disease involving native coronary artery of native heart without angina pectoris  - Overview: Failed ASA due to bleeding episodes  - Advised to let me know if she is having more shortness of breath or chest pain and then we would go ahead and do some additional stress testing and follow up with cardiology    8. Plantar fasciitis  - Patient will continue to follow up with [facility]    9. Encounter for vaccination      - COVID-19 mRNA Vaccine, Moderna, (Spikevax COVID-19 Vaccine) 100 MCG/0.5ML suspension sent over to patient's preferred pharmacy  - Zoster Vac Recomb Adjuvanted 50 MCG/0.5ML reconstituted suspension sent over to  patient's preferred pharmacy  - Influenza injection administered in office  - POC Glycosylated Hemoglobin (Hb A1C) labs ordered    10. Healthcare Maintenance  - Discussed the patient's lab results    11. Family history of cancer  - Discussed genetic testing        Follow Up:   Return in about 2 months (around 12/13/2023) for Recheck.          Lars Fermin MD  [facility]    Transcribed from ambient dictation for Lars Fermin MD by Wolf Rodriguez.  10/13/23   19:07 EDT    Patient or patient representative verbalized consent to the visit recording.  I have personally performed the services described in this document as transcribed by the above individual, and it is both accurate and complete.

## 2023-10-23 ENCOUNTER — TELEPHONE (OUTPATIENT)
Dept: INTERNAL MEDICINE | Facility: CLINIC | Age: 58
End: 2023-10-23
Payer: COMMERCIAL

## 2023-10-23 NOTE — TELEPHONE ENCOUNTER
----- Message from Lars Fermin MD sent at 10/23/2023  9:43 AM EDT -----  The TSH being low is actually an indicator that her thyroid levels are high, so I don't think this would be playing into her fatigue.  If her symptoms persist, we can always pursue this further with additional lab testing.  Thanks.  ----- Message -----  From: Arlyn Huynh  Sent: 10/23/2023   8:26 AM EDT  To: Lars Fermin MD    Pt reached and advised of clinical message. Pt is concerned that because levels are low she is falling asleep often and is asking what can she do.

## 2023-10-23 NOTE — TELEPHONE ENCOUNTER
Tried to reach patient no answer left voicemail to return call    RELAY:  The TSH being low is actually an indicator that her thyroid levels are high, so I don't think this would be playing into her fatigue.  If her symptoms persist, we can always pursue this further with additional lab testing.  Thanks.

## 2023-10-24 ENCOUNTER — TELEPHONE (OUTPATIENT)
Dept: INTERNAL MEDICINE | Facility: CLINIC | Age: 58
End: 2023-10-24
Payer: COMMERCIAL

## 2023-10-24 DIAGNOSIS — R53.82 CHRONIC FATIGUE: Primary | ICD-10-CM

## 2023-10-24 NOTE — TELEPHONE ENCOUNTER
Patient is scheduled for telehealth visit on November 3. She demonstrates appreciation and understanding.

## 2023-10-24 NOTE — TELEPHONE ENCOUNTER
Patient has been notified of providers message and recommendations. Patient stated that she is still having the fatigue and is struggling. Patient would like to do the additional testing please.

## 2023-10-27 ENCOUNTER — LAB (OUTPATIENT)
Dept: INTERNAL MEDICINE | Facility: CLINIC | Age: 58
End: 2023-10-27
Payer: COMMERCIAL

## 2023-10-27 DIAGNOSIS — R53.82 CHRONIC FATIGUE: ICD-10-CM

## 2023-10-27 LAB
ALBUMIN SERPL-MCNC: 4.7 G/DL (ref 3.5–5.2)
ALBUMIN/GLOB SERPL: 2 G/DL
ALP SERPL-CCNC: 132 U/L (ref 39–117)
ALT SERPL W P-5'-P-CCNC: 65 U/L (ref 1–33)
ANION GAP SERPL CALCULATED.3IONS-SCNC: 11 MMOL/L (ref 5–15)
AST SERPL-CCNC: 30 U/L (ref 1–32)
BASOPHILS # BLD AUTO: 0.04 10*3/MM3 (ref 0–0.2)
BASOPHILS NFR BLD AUTO: 0.7 % (ref 0–1.5)
BILIRUB SERPL-MCNC: 0.8 MG/DL (ref 0–1.2)
BUN SERPL-MCNC: 22 MG/DL (ref 6–20)
BUN/CREAT SERPL: 23.4 (ref 7–25)
CALCIUM SPEC-SCNC: 9.2 MG/DL (ref 8.6–10.5)
CHLORIDE SERPL-SCNC: 108 MMOL/L (ref 98–107)
CK SERPL-CCNC: 110 U/L (ref 20–180)
CO2 SERPL-SCNC: 25 MMOL/L (ref 22–29)
CREAT SERPL-MCNC: 0.94 MG/DL (ref 0.57–1)
CRP SERPL-MCNC: 0.3 MG/DL (ref 0–0.5)
DEPRECATED RDW RBC AUTO: 43.4 FL (ref 37–54)
EGFRCR SERPLBLD CKD-EPI 2021: 70 ML/MIN/1.73
EOSINOPHIL # BLD AUTO: 0.09 10*3/MM3 (ref 0–0.4)
EOSINOPHIL NFR BLD AUTO: 1.5 % (ref 0.3–6.2)
ERYTHROCYTE [DISTWIDTH] IN BLOOD BY AUTOMATED COUNT: 13.6 % (ref 12.3–15.4)
ERYTHROCYTE [SEDIMENTATION RATE] IN BLOOD: 5 MM/HR (ref 0–30)
GLOBULIN UR ELPH-MCNC: 2.3 GM/DL
GLUCOSE SERPL-MCNC: 99 MG/DL (ref 65–99)
HCT VFR BLD AUTO: 41.9 % (ref 34–46.6)
HCV AB SER DONR QL: NORMAL
HGB BLD-MCNC: 14.3 G/DL (ref 12–15.9)
IMM GRANULOCYTES # BLD AUTO: 0.05 10*3/MM3 (ref 0–0.05)
IMM GRANULOCYTES NFR BLD AUTO: 0.8 % (ref 0–0.5)
LYMPHOCYTES # BLD AUTO: 2.39 10*3/MM3 (ref 0.7–3.1)
LYMPHOCYTES NFR BLD AUTO: 39.4 % (ref 19.6–45.3)
MCH RBC QN AUTO: 29.6 PG (ref 26.6–33)
MCHC RBC AUTO-ENTMCNC: 34.1 G/DL (ref 31.5–35.7)
MCV RBC AUTO: 86.7 FL (ref 79–97)
MONOCYTES # BLD AUTO: 0.45 10*3/MM3 (ref 0.1–0.9)
MONOCYTES NFR BLD AUTO: 7.4 % (ref 5–12)
NEUTROPHILS NFR BLD AUTO: 3.04 10*3/MM3 (ref 1.7–7)
NEUTROPHILS NFR BLD AUTO: 50.2 % (ref 42.7–76)
NRBC BLD AUTO-RTO: 0 /100 WBC (ref 0–0.2)
PLATELET # BLD AUTO: 178 10*3/MM3 (ref 140–450)
PMV BLD AUTO: 11.5 FL (ref 6–12)
POTASSIUM SERPL-SCNC: 4.4 MMOL/L (ref 3.5–5.2)
PROT SERPL-MCNC: 7 G/DL (ref 6–8.5)
RBC # BLD AUTO: 4.83 10*6/MM3 (ref 3.77–5.28)
SODIUM SERPL-SCNC: 144 MMOL/L (ref 136–145)
T4 SERPL-MCNC: 5.68 MCG/DL (ref 4.5–11.7)
TSH SERPL DL<=0.05 MIU/L-ACNC: 1.53 UIU/ML (ref 0.27–4.2)
WBC NRBC COR # BLD: 6.06 10*3/MM3 (ref 3.4–10.8)

## 2023-10-27 PROCEDURE — 36415 COLL VENOUS BLD VENIPUNCTURE: CPT | Performed by: STUDENT IN AN ORGANIZED HEALTH CARE EDUCATION/TRAINING PROGRAM

## 2023-10-27 PROCEDURE — 86038 ANTINUCLEAR ANTIBODIES: CPT | Performed by: STUDENT IN AN ORGANIZED HEALTH CARE EDUCATION/TRAINING PROGRAM

## 2023-10-27 PROCEDURE — 85652 RBC SED RATE AUTOMATED: CPT | Performed by: STUDENT IN AN ORGANIZED HEALTH CARE EDUCATION/TRAINING PROGRAM

## 2023-10-27 PROCEDURE — 81001 URINALYSIS AUTO W/SCOPE: CPT | Performed by: STUDENT IN AN ORGANIZED HEALTH CARE EDUCATION/TRAINING PROGRAM

## 2023-10-27 PROCEDURE — 80050 GENERAL HEALTH PANEL: CPT | Performed by: STUDENT IN AN ORGANIZED HEALTH CARE EDUCATION/TRAINING PROGRAM

## 2023-10-27 PROCEDURE — 86140 C-REACTIVE PROTEIN: CPT | Performed by: STUDENT IN AN ORGANIZED HEALTH CARE EDUCATION/TRAINING PROGRAM

## 2023-10-27 PROCEDURE — 84436 ASSAY OF TOTAL THYROXINE: CPT | Performed by: STUDENT IN AN ORGANIZED HEALTH CARE EDUCATION/TRAINING PROGRAM

## 2023-10-27 PROCEDURE — 86803 HEPATITIS C AB TEST: CPT | Performed by: STUDENT IN AN ORGANIZED HEALTH CARE EDUCATION/TRAINING PROGRAM

## 2023-10-27 PROCEDURE — 87086 URINE CULTURE/COLONY COUNT: CPT | Performed by: STUDENT IN AN ORGANIZED HEALTH CARE EDUCATION/TRAINING PROGRAM

## 2023-10-27 PROCEDURE — 82550 ASSAY OF CK (CPK): CPT | Performed by: STUDENT IN AN ORGANIZED HEALTH CARE EDUCATION/TRAINING PROGRAM

## 2023-10-28 LAB
BACTERIA UR QL AUTO: ABNORMAL /HPF
BILIRUB UR QL STRIP: NEGATIVE
CLARITY UR: CLEAR
COD CRY URNS QL: ABNORMAL /HPF
COLOR UR: ABNORMAL
GLUCOSE UR STRIP-MCNC: NEGATIVE MG/DL
HGB UR QL STRIP.AUTO: NEGATIVE
HYALINE CASTS UR QL AUTO: ABNORMAL /LPF
KETONES UR QL STRIP: ABNORMAL
LEUKOCYTE ESTERASE UR QL STRIP.AUTO: ABNORMAL
NITRITE UR QL STRIP: NEGATIVE
PH UR STRIP.AUTO: 5.5 [PH] (ref 5–8)
PROT UR QL STRIP: ABNORMAL
RBC # UR STRIP: ABNORMAL /HPF
REF LAB TEST METHOD: ABNORMAL
SP GR UR STRIP: >=1.03 (ref 1–1.03)
SQUAMOUS #/AREA URNS HPF: ABNORMAL /HPF
UROBILINOGEN UR QL STRIP: ABNORMAL
WBC # UR STRIP: ABNORMAL /HPF

## 2023-10-29 LAB — BACTERIA SPEC AEROBE CULT: NORMAL

## 2023-10-31 LAB
1OH-MIDAZOLAM UR QL SCN: NOT DETECTED NG/MG CREAT
6MAM UR QL SCN: NEGATIVE NG/ML
7AMINOCLONAZEPAM/CREAT UR: NOT DETECTED NG/MG CREAT
A-OH ALPRAZ/CREAT UR: NOT DETECTED NG/MG CREAT
A-OH-TRIAZOLAM/CREAT UR CFM: NOT DETECTED NG/MG CREAT
ACP UR QL CFM: NOT DETECTED
ALPRAZ/CREAT UR CFM: NOT DETECTED NG/MG CREAT
AMPHET UR CFM-MCNC: NOT DETECTED NG/MG CREAT
AMPHETAMINES UR QL SCN: NORMAL NG/ML
AMPHETAMINES UR QL: NEGATIVE
APAP UR QL SCN: NEGATIVE UG/ML
BARBITURATES UR QL SCN: NEGATIVE NG/ML
BENZODIAZ SCN METH UR: NEGATIVE
BUPRENORPHINE UR QL SCN: NEGATIVE
BUPRENORPHINE/CREAT UR: NOT DETECTED NG/MG CREAT
CANNABINOIDS UR QL CFM: NORMAL
CANNABINOIDS UR QL SCN: NORMAL NG/ML
CARBOXYTHC UR CFM-MCNC: 8 NG/MG CREAT
CARISOPRODOL UR QL: NEGATIVE NG/ML
CLONAZEPAM/CREAT UR CFM: NOT DETECTED NG/MG CREAT
COCAINE+BZE UR QL SCN: NEGATIVE NG/ML
CREAT UR-MCNC: 254 MG/DL
D-METHORPHAN UR-MCNC: NOT DETECTED NG/ML
D-METHORPHAN+LEVORPHANOL UR QL: NOT DETECTED
DESALKYLFLURAZ/CREAT UR: NOT DETECTED NG/MG CREAT
DIAZEPAM/CREAT UR: NOT DETECTED NG/MG CREAT
ETHANOL UR QL SCN: NEGATIVE G/DL
ETHANOL UR QL SCN: NEGATIVE NG/ML
FENTANYL CTO UR SCN-MCNC: NEGATIVE NG/ML
FENTANYL/CREAT UR: NOT DETECTED NG/MG CREAT
FLUNITRAZEPAM UR QL SCN: NOT DETECTED NG/MG CREAT
GABAPENTIN UR-MCNC: NEGATIVE UG/ML
HYPNOTICS UR QL SCN: NEGATIVE
KETAMINE UR QL: NOT DETECTED
LORAZEPAM/CREAT UR: NOT DETECTED NG/MG CREAT
MDA UR CFM-MCNC: NOT DETECTED NG/MG CREAT
MDMA UR CFM-MCNC: NOT DETECTED NG/MG CREAT
MEPERIDINE UR QL SCN: NEGATIVE NG/ML
METHADONE UR QL SCN: NEGATIVE NG/ML
METHADONE+METAB UR QL SCN: NEGATIVE NG/ML
METHAMPHET UR CFM-MCNC: NOT DETECTED NG/MG CREAT
MIDAZOLAM/CREAT UR CFM: NOT DETECTED NG/MG CREAT
MISCELLANEOUS, UR: NEGATIVE
NORBUPRENORPHINE/CREAT UR: NOT DETECTED NG/MG CREAT
NORDIAZEPAM/CREAT UR: NOT DETECTED NG/MG CREAT
NORFENTANYL/CREAT UR: NOT DETECTED NG/MG CREAT
NORFLUNITRAZEPAM UR-MCNC: NOT DETECTED NG/MG CREAT
NORKETAMINE UR-MCNC: NOT DETECTED UG/ML
OPIATES UR SCN-MCNC: NEGATIVE NG/ML
OTHER HALLUCINOGENS UR: NEGATIVE
OXAZEPAM/CREAT UR: NOT DETECTED NG/MG CREAT
OXYCODONE CTO UR SCN-MCNC: NEGATIVE NG/ML
PCP UR QL SCN: NEGATIVE NG/ML
PRESCRIBED MEDICATIONS: NORMAL
PRESCRIBED MEDICATIONS: NORMAL
PROPOXYPH UR QL SCN: NEGATIVE NG/ML
TAPENTADOL CTO UR SCN-MCNC: NEGATIVE NG/ML
TEMAZEPAM/CREAT UR: NOT DETECTED NG/MG CREAT
TRAMADOL UR QL SCN: NEGATIVE NG/ML
ZALEPLON UR-MCNC: NOT DETECTED NG/ML
ZOLPIDEM PHENYL-4-CARB UR QL SCN: NOT DETECTED
ZOLPIDEM UR QL SCN: NOT DETECTED
ZOPICLONE-N-OXIDE UR-MCNC: NOT DETECTED NG/ML

## 2023-11-03 ENCOUNTER — TELEMEDICINE (OUTPATIENT)
Dept: INTERNAL MEDICINE | Facility: CLINIC | Age: 58
End: 2023-11-03
Payer: COMMERCIAL

## 2023-11-03 DIAGNOSIS — R53.82 CHRONIC FATIGUE: Primary | ICD-10-CM

## 2023-11-03 DIAGNOSIS — F41.9 ANXIETY AND DEPRESSION: ICD-10-CM

## 2023-11-03 DIAGNOSIS — F51.01 PRIMARY INSOMNIA: ICD-10-CM

## 2023-11-03 DIAGNOSIS — F32.A ANXIETY AND DEPRESSION: ICD-10-CM

## 2023-11-03 PROCEDURE — 99214 OFFICE O/P EST MOD 30 MIN: CPT | Performed by: STUDENT IN AN ORGANIZED HEALTH CARE EDUCATION/TRAINING PROGRAM

## 2023-11-03 RX ORDER — BUPROPION HYDROCHLORIDE 150 MG/1
150 TABLET ORAL EVERY MORNING
Qty: 30 TABLET | Refills: 1 | Status: SHIPPED | OUTPATIENT
Start: 2023-11-03

## 2023-11-03 RX ORDER — TRAZODONE HYDROCHLORIDE 100 MG/1
150 TABLET ORAL NIGHTLY
Qty: 30 TABLET | Refills: 1 | Status: SHIPPED | OUTPATIENT
Start: 2023-11-03 | End: 2023-11-07 | Stop reason: SDUPTHER

## 2023-11-03 NOTE — PROGRESS NOTES
Telehealth E-Visit      Patient Name: Lizette Frias  : 1965   MRN: [id number]   13:11 EDT    Chief Complaint:  No chief complaint on file.      I have reviewed the E-Visit questionnaire and the patient's answers, my assessment and plan are listed below.     This provider is located at the [facility] ([facility] [address], [address] [place] [address] using a secure [employer] Video Visit through [employer]. Patient is being seen remotely via telehealth at their home address in [place], and stated they are in a secure environment for this session. The patient's condition being diagnosed/treated is appropriate for telemedicine. The provider identified herself as well as her credentials. The patient, and/or patients guardian, consent to be seen remotely, and when consent is given they understand that the consent allows for patient identifiable information to be sent to a third party as needed. They may refuse to be seen remotely at any time. The electronic data is encrypted and password protected, and the patient and/or guardian has been advised of the potential risks to privacy not withstanding such measures.    You have chosen to receive care through a telehealth visit. Do you consent to use a video/audio connection for your medical care today? Yes    History of Present Illness: Lizette Frias is a 58 y.o. female who is here today to follow up with chronic care management.    The patient presents today for follow-up via MyChart video visit.    Urine  She states her urine is lighter in color than it was previously. She states 1 week ago she went 24 hours without being able to urinate. At that time she left a urine specimen at our office for testing. Her kidney function has improved over the last 5 months, specifically yin the last 3 weeks. Her inflammatory markers are negative where they had previously been abnormal. her autoimmune panel  has been submitted, the results are pending.    Middle Back Pain  She reports continued middle back pain she describes as a dull ache. She states the pain goes all the way across the middle of her back. She states her back pain is historically in her lower back so middle back pain is new for her. She states her back pain started 1 week ago.    Fatigue  Her thyroid levels have normalized. She reports cutting her thyroid pills in half for 1 week due to sleeping over 16 hours daily. She has resumed her full dose since her sleep cycle and fatigue have slightly improved. She is unsure if bupropion 300 mg helps her fatigue at all. She states she does not suspect her fatigue is due to depression as she has not lost interest in things.    Insomnia  She reports sleeping more than she is awake. She states her insomnia has slightly improved, however, she is still unable to fall asleep before midnight. She states this is an issue for her as she has to work at 7 AM. She reports experiencing insomnia prior to her implant. She states antirejection medications worsened her insomnia.    Medications  She reports consuming an edible containing THC. She denies this being a regular occurrence. She reports taking Ambien and trazadone in combination as prescribed to her by a post transplant psychiatrist for insomnia. She is also taking ropinirole for restless leg syndrome, Robaxin, pregabalin, and sertraline. She reports decreasing sertraline to 100 mg and expresses desire to reduce it to 50 mg. She expresses desire to decrease trazadone from 200 mg to 150 mg.    Joint Pain  She expresses interest in decreasing her pregabalin dose. She expresses hesitation due to significant join pain in the mornings causing her difficulty using her hands. She is currently in physical therapy.    ENT  She is scheduled to receive a cochlear implant on 11/10/2023.    Pharmacy  She uses [facility].    Social History  Patient had not worked in 6 years prior  to starting a part time job 3 weeks ago.      Subjective      Review of Systems:   Review of Systems   Musculoskeletal:  Positive for back pain.   Psychiatric/Behavioral:  Positive for sleep disturbance. Negative for depressed mood.        I have reviewed and the following portions of the patient's history were updated as appropriate: past family history, past medical history, past social history, past surgical history and problem list.    Medications:     Current Outpatient Medications:   •  sertraline (ZOLOFT) 50 MG tablet, Take 1 tablet by mouth Daily., Disp: 30 tablet, Rfl: 1  •  traZODone (DESYREL) 100 MG tablet, Take 1.5 tablets by mouth Every Night., Disp: 30 tablet, Rfl: 1  •  atorvastatin (LIPITOR) 80 MG tablet, Take 1 tablet by mouth Every Night., Disp: 90 tablet, Rfl: 3  •  buPROPion XL (Wellbutrin XL) 150 MG 24 hr tablet, Take 1 tablet by mouth Every Morning., Disp: 30 tablet, Rfl: 1  •  buPROPion XL (Wellbutrin XL) 300 MG 24 hr tablet, Take 1 tablet by mouth Every Morning., Disp: 45 tablet, Rfl: 1  •  calcium carbonate-vitamin d 600-400 MG-UNIT per tablet, Take 1 tablet by mouth Daily., Disp: , Rfl:   •  CVS Melatonin 5 MG tablet tablet, TAKE 1 TABLET BY MOUTH EVERY DAY AT NIGHT, Disp: 90 tablet, Rfl: 1  •  denosumab (Prolia) 60 MG/ML solution prefilled syringe syringe, Inject 1 mL under the skin into the appropriate area as directed Every 6 (Six) Months for 2 doses., Disp: 1 mL, Rfl: 1  •  ergocalciferol (ERGOCALCIFEROL) 1.25 MG (78307 UT) capsule, 1 (One) Time Per Week., Disp: , Rfl:   •  icosapent ethyl (Vascepa) 1 g capsule capsule, Take 2 g by mouth 2 (Two) Times a Day With Meals., Disp: 360 capsule, Rfl: 3  •  levothyroxine (SYNTHROID, LEVOTHROID) 175 MCG tablet, TAKE 1 TABLET BY MOUTH EVERY DAY IN THE MORNING, Disp: 90 tablet, Rfl: 0  •  meloxicam (MOBIC) 15 MG tablet, Take 1 tablet by mouth Daily., Disp: , Rfl:   •  methocarbamol (ROBAXIN) 500 MG tablet, TAKE 1 TABLET BY MOUTH FOUR TIMES A DAY,  Disp: 60 tablet, Rfl: 0  •  mirtazapine (REMERON) 15 MG tablet, TAKE 1 TABLET BY MOUTH EVERYDAY AT BEDTIME, Disp: 90 tablet, Rfl: 0  •  pantoprazole (PROTONIX) 40 MG EC tablet, Take 1 tablet by mouth 2 (Two) Times a Day. 30 minutes prior to first meal and 30 minutes prior to last meal of the day, Disp: 180 tablet, Rfl: 3  •  potassium chloride (MICRO-K) 10 MEQ CR capsule, Hold until Lasix (furosemide) restarted, Disp: , Rfl:   •  pregabalin (LYRICA) 100 MG capsule, Every 8 (Eight) Hours., Disp: , Rfl:   •  rOPINIRole (REQUIP) 2 MG tablet, TAKE 1 - 2 TABLET(S) BY MOUTH EVERY NIGHT AT BEDTIME., Disp: 180 tablet, Rfl: 1  •  spironolactone (ALDACTONE) 25 MG tablet, TAKE 1 TABLET BY MOUTH EVERY DAY, Disp: 90 tablet, Rfl: 0  •  sucralfate (CARAFATE) 1 g tablet, Take 1 tablet by mouth 4 (Four) Times a Day., Disp: , Rfl:   •  tacrolimus (PROGRAF) 0.5 MG capsule, Take 2 capsules by mouth 2 (Two) Times a Day., Disp: , Rfl:   •  telmisartan (MICARDIS) 20 MG tablet, Take 1 tablet by mouth Daily., Disp: 90 tablet, Rfl: 1  •  Triamcinolone Acetonide (NASACORT) 55 MCG/ACT nasal inhaler, 2 sprays into the nostril(s) as directed by provider Daily., Disp: 6.8 mL, Rfl: 1  •  zolpidem (AMBIEN) 5 MG tablet, Take 1 tablet by mouth Every Night., Disp: , Rfl:     Allergies:   Allergies   Allergen Reactions   • Penicillins Shortness Of Breath, Itching, Other (See Comments) and Rash   • Cephalosporins Diarrhea   • Cyclosporine Swelling       Objective     Results: Her last urinalysis was negative for bacteria and positive for a small amount of white blood cells. Her culture grew skin bacteria only.  Her urine drug screen was positive for THC.    Physical Exam:  Vital Signs: There were no vitals filed for this visit.  There is no height or weight on file to calculate BMI.    Physical Exam  Vitals and nursing note reviewed.   Constitutional:       General: She is not in acute distress.     Appearance: Normal appearance. She is not  ill-appearing or toxic-appearing.   HENT:      Nose: No congestion or rhinorrhea.   Pulmonary:      Effort: Pulmonary effort is normal. No respiratory distress.   Musculoskeletal:      Cervical back: Normal range of motion.   Skin:     Coloration: Skin is not jaundiced.      Findings: No rash.   Neurological:      Mental Status: She is alert and oriented to person, place, and time. Mental status is at baseline.   Psychiatric:         Mood and Affect: Mood normal.         Behavior: Behavior normal.         Thought Content: Thought content normal.         Judgment: Judgment normal.           Assessment / Plan      Assessment/Plan:   Diagnoses and all orders for this visit:    1. Chronic fatigue (Primary)  Assessment & Plan:  We will increase bupropion to 450 mg daily.      Orders:  -     buPROPion XL (Wellbutrin XL) 150 MG 24 hr tablet; Take 1 tablet by mouth Every Morning.  Dispense: 30 tablet; Refill: 1    2. Anxiety and depression  Assessment & Plan:  We will increase bupropion to 450 mg daily.  We will refill sertraline 50 mg.  She will trial sertraline at 50 mg for 1 week and possibly discontinue use afterwards if her symptoms do not worsen.    Orders:  -     buPROPion XL (Wellbutrin XL) 150 MG 24 hr tablet; Take 1 tablet by mouth Every Morning.  Dispense: 30 tablet; Refill: 1  -     sertraline (ZOLOFT) 50 MG tablet; Take 1 tablet by mouth Daily.  Dispense: 30 tablet; Refill: 1    3. Primary insomnia  Assessment & Plan:  We will refill trazadone at 100 mg.  She will reduce trazadone from 200 mg to 100 mg.    Orders:  -     traZODone (DESYREL) 100 MG tablet; Take 1.5 tablets by mouth Every Night.  Dispense: 30 tablet; Refill: 1         Follow Up:   Return in about 5 weeks (around 12/8/2023) for Next scheduled follow up.    Any medications prescribed have been sent electronically to   [id number] - [place], [place] - [address] AT [facility] - [phone]  - [phone]   [address] [address]  Phone: [phone] Fax: [phone]          Transcribed from ambient dictation for Lars Fermin MD by Tina Manley.  11/03/23   14:34 EDT    Patient or patient representative verbalized consent to the visit recording.  I have personally performed the services described in this document as transcribed by the above individual, and it is both accurate and complete.    Lars Fermin MD  [facility]   11/06/23  13:11 EDT

## 2023-11-03 NOTE — ASSESSMENT & PLAN NOTE
We will increase bupropion to 450 mg daily.  We will refill sertraline 50 mg.  She will trial sertraline at 50 mg for 1 week and possibly discontinue use afterwards if her symptoms do not worsen.

## 2023-11-07 DIAGNOSIS — F51.01 PRIMARY INSOMNIA: ICD-10-CM

## 2023-11-07 RX ORDER — TRAZODONE HYDROCHLORIDE 100 MG/1
150 TABLET ORAL NIGHTLY
Qty: 135 TABLET | Refills: 3 | Status: SHIPPED | OUTPATIENT
Start: 2023-11-07

## 2023-11-08 ENCOUNTER — TELEPHONE (OUTPATIENT)
Dept: INTERNAL MEDICINE | Facility: CLINIC | Age: 58
End: 2023-11-08
Payer: COMMERCIAL

## 2023-11-08 DIAGNOSIS — R76.8 POSITIVE ANA (ANTINUCLEAR ANTIBODY): Primary | ICD-10-CM

## 2023-11-08 DIAGNOSIS — R53.82 CHRONIC FATIGUE: ICD-10-CM

## 2023-11-08 LAB
ANA NUCLEOLAR TITR SER: ABNORMAL {TITER}
ANA PLUS 12 INTERPRETATION: ABNORMAL
ANA SER QL IF: POSITIVE
ANA SPECKLED TITR SER: ABNORMAL {TITER}
C3 SERPL-MCNC: ABNORMAL MG/DL
C4 SERPL-MCNC: ABNORMAL MG/DL
CARDIOLIPIN IGA SER IA-ACNC: <12 APL U/ML
CARDIOLIPIN IGG SER IA-ACNC: <15 GPL U/ML
CARDIOLIPIN IGM SER IA-ACNC: <13 MPL U/ML
CCP IGA+IGG SERPL IA-ACNC: ABNORMAL UNITS
CENTROMERE AB TITR SER IF: ABNORMAL {TITER}
CHROMATIN IGG SERPL-ACNC: <20 UNITS
DSDNA AB SER FARR-ACNC: ABNORMAL IU/ML
ENA SCL70 AB SER IA-ACNC: <20 UNITS
ENA SM AB SER-ACNC: <20 UNITS
ENA SS-A AB SER IA-ACNC: <20 UNITS
ENA SS-B AB SER IA-ACNC: <20 UNITS
LABORATORY COMMENT REPORT: ABNORMAL
RHEUMATOID FACT SERPL-ACNC: ABNORMAL IU/ML
THYROPEROXIDASE AB SERPL-ACNC: <9 IU/ML
U1 SNRNP AB SER IA-ACNC: <20 UNITS

## 2023-11-08 NOTE — TELEPHONE ENCOUNTER
Patient experiencing symptoms including chronic fatigue.  Part of her work-up included an KATIE which was borderline positive for a nucleolar pattern and speckled pattern.  Patient will be referred to rheumatology for follow-up to determine if autoimmune treatment is indicated.

## 2023-11-09 ENCOUNTER — TELEPHONE (OUTPATIENT)
Dept: INTERNAL MEDICINE | Facility: CLINIC | Age: 58
End: 2023-11-09
Payer: COMMERCIAL

## 2023-11-09 NOTE — TELEPHONE ENCOUNTER
----- Message from Lars Fermin MD sent at 11/8/2023  4:48 PM EST -----  Please let the patient know that her autoimmune panel was borderline positive and therefore would be reasonable to follow-up with rheumatology to discuss the possibility of treatment and finalized diagnosis to hopefully alleviate her chronic fatigue symptoms.  This order has been sent and she will be called with the appointment.  Thanks.

## 2023-11-09 NOTE — TELEPHONE ENCOUNTER
Tried to reach patient no answer left voicemail to return call    RELAY:  Please let the patient know that her autoimmune panel was borderline positive and therefore would be reasonable to follow-up with rheumatology to discuss the possibility of treatment and finalized diagnosis to hopefully alleviate her chronic fatigue symptoms.  This order has been sent and she will be called with the appointment.  Thanks.

## 2023-11-09 NOTE — TELEPHONE ENCOUNTER
Name: Lizette Frias      Relationship: Self      Best Callback Number: [phone]      HUB PROVIDED THE RELAY MESSAGE FROM THE OFFICE      PATIENT: VOICED UNDERSTANDING AND HAS NO FURTHER QUESTIONS AT THIS TIME    ADDITIONAL INFORMATION:

## 2023-11-13 ENCOUNTER — HOSPITAL ENCOUNTER (OUTPATIENT)
Dept: MAMMOGRAPHY | Facility: HOSPITAL | Age: 58
Discharge: HOME | End: 2023-11-13
Admitting: RADIOLOGY
Payer: COMMERCIAL

## 2023-11-13 ENCOUNTER — TRANSCRIBE ORDERS (OUTPATIENT)
Dept: ADMINISTRATIVE | Facility: HOSPITAL | Age: 58
End: 2023-11-13
Payer: COMMERCIAL

## 2023-11-13 DIAGNOSIS — R92.8 ABNORMAL MAMMOGRAM: Primary | ICD-10-CM

## 2023-11-13 DIAGNOSIS — R92.8 ABNORMAL MAMMOGRAM: ICD-10-CM

## 2023-11-13 PROBLEM — R92.1 BREAST CALCIFICATIONS: Status: ACTIVE | Noted: 2023-11-13

## 2023-11-13 PROCEDURE — 77065 DX MAMMO INCL CAD UNI: CPT

## 2023-11-13 PROCEDURE — 77061 BREAST TOMOSYNTHESIS UNI: CPT | Performed by: RADIOLOGY

## 2023-11-13 PROCEDURE — G0279 TOMOSYNTHESIS, MAMMO: HCPCS

## 2023-11-13 PROCEDURE — 77065 DX MAMMO INCL CAD UNI: CPT | Performed by: RADIOLOGY

## 2023-11-15 DIAGNOSIS — F32.A ANXIETY AND DEPRESSION: ICD-10-CM

## 2023-11-15 DIAGNOSIS — Z94.4 LIVER TRANSPLANT RECIPIENT: ICD-10-CM

## 2023-11-15 DIAGNOSIS — F41.9 ANXIETY AND DEPRESSION: ICD-10-CM

## 2023-11-15 RX ORDER — MIRTAZAPINE 15 MG/1
TABLET, FILM COATED ORAL
Qty: 90 TABLET | Refills: 1 | Status: SHIPPED | OUTPATIENT
Start: 2023-11-15

## 2023-11-15 RX ORDER — SPIRONOLACTONE 25 MG/1
TABLET ORAL
Qty: 90 TABLET | Refills: 1 | Status: SHIPPED | OUTPATIENT
Start: 2023-11-15

## 2023-11-15 NOTE — TELEPHONE ENCOUNTER
Caller: Lizette Frias    Relationship: Self    Best call back number: [phone]    Requested Prescriptions:   Requested Prescriptions     Pending Prescriptions Disp Refills    spironolactone (ALDACTONE) 25 MG tablet [Pharmacy Med Name: SPIRONOLACTONE 25 MG TABLET] 90 tablet 0     Sig: TAKE 1 TABLET BY MOUTH EVERY DAY    mirtazapine (REMERON) 15 MG tablet [Pharmacy Med Name: MIRTAZAPINE 15 MG TABLET] 90 tablet 0     Sig: TAKE 1 TABLET BY MOUTH EVERYDAY AT BEDTIME        Pharmacy where request should be sent: [facility]ACY #[id number] - [place], [place] - [address] [place] [address] AT [facility] - [phone]  - [phone]      Last office visit with prescribing clinician: 10/13/2023   Last telemedicine visit with prescribing clinician: 11/3/2023   Next office visit with prescribing clinician: 12/8/2023     Additional details provided by patient: OUT OF MEDICATION AND WOULD APPRECIATE IF COULD GET TODAY.  PHARMACY ALSO REQUESTED.    Does the patient have less than a 3 day supply:  [x] Yes  [] No    Would you like a call back once the refill request has been completed: [x] Yes [] No    If the office needs to give you a call back, can they leave a voicemail: [x] Yes [] No    Thuy Zarate MA   11/15/23 10:01 EST

## 2023-11-27 DIAGNOSIS — F41.9 ANXIETY AND DEPRESSION: ICD-10-CM

## 2023-11-27 DIAGNOSIS — F32.A ANXIETY AND DEPRESSION: ICD-10-CM

## 2023-11-27 DIAGNOSIS — R53.82 CHRONIC FATIGUE: ICD-10-CM

## 2023-11-27 RX ORDER — BUPROPION HYDROCHLORIDE 150 MG/1
150 TABLET ORAL EVERY MORNING
Qty: 90 TABLET | Refills: 1 | Status: SHIPPED | OUTPATIENT
Start: 2023-11-27

## 2023-12-03 DIAGNOSIS — E89.0 POSTOPERATIVE HYPOTHYROIDISM: ICD-10-CM

## 2023-12-04 RX ORDER — LEVOTHYROXINE SODIUM 175 UG/1
TABLET ORAL
Qty: 90 TABLET | Refills: 0 | Status: SHIPPED | OUTPATIENT
Start: 2023-12-04

## 2023-12-08 ENCOUNTER — OFFICE VISIT (OUTPATIENT)
Dept: INTERNAL MEDICINE | Facility: CLINIC | Age: 58
End: 2023-12-08
Payer: COMMERCIAL

## 2023-12-08 VITALS
RESPIRATION RATE: 16 BRPM | HEART RATE: 78 BPM | OXYGEN SATURATION: 96 % | SYSTOLIC BLOOD PRESSURE: 112 MMHG | TEMPERATURE: 98.2 F | DIASTOLIC BLOOD PRESSURE: 84 MMHG

## 2023-12-08 DIAGNOSIS — G89.29 CHRONIC RIGHT HIP PAIN: ICD-10-CM

## 2023-12-08 DIAGNOSIS — J32.9 BACTERIAL SINUSITIS: ICD-10-CM

## 2023-12-08 DIAGNOSIS — F51.01 PRIMARY INSOMNIA: ICD-10-CM

## 2023-12-08 DIAGNOSIS — R92.1 BREAST CALCIFICATIONS: Primary | ICD-10-CM

## 2023-12-08 DIAGNOSIS — F12.10 TETRAHYDROCANNABINOL (THC) USE DISORDER, MILD, ABUSE: ICD-10-CM

## 2023-12-08 DIAGNOSIS — Z94.4 LIVER TRANSPLANTED: ICD-10-CM

## 2023-12-08 DIAGNOSIS — S76.211D INGUINAL STRAIN, RIGHT, SUBSEQUENT ENCOUNTER: ICD-10-CM

## 2023-12-08 DIAGNOSIS — R74.01 TRANSAMINITIS: ICD-10-CM

## 2023-12-08 DIAGNOSIS — E89.0 POSTOPERATIVE HYPOTHYROIDISM: ICD-10-CM

## 2023-12-08 DIAGNOSIS — R76.8 POSITIVE ANA (ANTINUCLEAR ANTIBODY): ICD-10-CM

## 2023-12-08 DIAGNOSIS — M25.551 CHRONIC RIGHT HIP PAIN: ICD-10-CM

## 2023-12-08 DIAGNOSIS — B96.89 BACTERIAL SINUSITIS: ICD-10-CM

## 2023-12-08 DIAGNOSIS — R53.82 CHRONIC FATIGUE: ICD-10-CM

## 2023-12-08 PROBLEM — S76.219A GROIN STRAIN: Status: ACTIVE | Noted: 2023-12-08

## 2023-12-08 LAB
ETHANOL BLD-MCNC: <10 MG/DL (ref 0–10)
EXPIRATION DATE: NORMAL
INR PPP: 1.1 (ref 0.9–1.1)
Lab: NORMAL

## 2023-12-08 PROCEDURE — 84436 ASSAY OF TOTAL THYROXINE: CPT | Performed by: STUDENT IN AN ORGANIZED HEALTH CARE EDUCATION/TRAINING PROGRAM

## 2023-12-08 PROCEDURE — 82077 ASSAY SPEC XCP UR&BREATH IA: CPT | Performed by: STUDENT IN AN ORGANIZED HEALTH CARE EDUCATION/TRAINING PROGRAM

## 2023-12-08 PROCEDURE — 83615 LACTATE (LD) (LDH) ENZYME: CPT | Performed by: STUDENT IN AN ORGANIZED HEALTH CARE EDUCATION/TRAINING PROGRAM

## 2023-12-08 PROCEDURE — 82728 ASSAY OF FERRITIN: CPT | Performed by: STUDENT IN AN ORGANIZED HEALTH CARE EDUCATION/TRAINING PROGRAM

## 2023-12-08 PROCEDURE — 80053 COMPREHEN METABOLIC PANEL: CPT | Performed by: STUDENT IN AN ORGANIZED HEALTH CARE EDUCATION/TRAINING PROGRAM

## 2023-12-08 PROCEDURE — 84443 ASSAY THYROID STIM HORMONE: CPT | Performed by: STUDENT IN AN ORGANIZED HEALTH CARE EDUCATION/TRAINING PROGRAM

## 2023-12-08 PROCEDURE — 83010 ASSAY OF HAPTOGLOBIN QUANT: CPT | Performed by: STUDENT IN AN ORGANIZED HEALTH CARE EDUCATION/TRAINING PROGRAM

## 2023-12-08 PROCEDURE — 80197 ASSAY OF TACROLIMUS: CPT | Performed by: STUDENT IN AN ORGANIZED HEALTH CARE EDUCATION/TRAINING PROGRAM

## 2023-12-08 RX ORDER — CEFDINIR 300 MG/1
300 CAPSULE ORAL 2 TIMES DAILY
Qty: 10 CAPSULE | Refills: 0 | Status: SHIPPED | OUTPATIENT
Start: 2023-12-08 | End: 2023-12-13

## 2023-12-08 RX ORDER — TRAZODONE HYDROCHLORIDE 100 MG/1
200 TABLET ORAL NIGHTLY
Qty: 180 TABLET | Refills: 1 | Status: SHIPPED | OUTPATIENT
Start: 2023-12-08

## 2023-12-08 NOTE — ASSESSMENT & PLAN NOTE
- CMP, ferritin, ethanol, INR, haptoglobin, lactate dehydrogenase, and tacrolimus level lab work has been ordered.

## 2023-12-08 NOTE — PROGRESS NOTES
Follow Up Office Visit      Date: 2023   Patient Name: Lizette Frias  : 1965   MRN: [id number]     Chief Complaint:    Chief Complaint   Patient presents with   • Fall     2023, pelvis and tail bone  Groin pain  MRI needed   • Fatigue     Chest tightness  Shortness of breath        History of Present Illness: Lizette Frias is a 58 y.o. female who is here today to follow up with  coughing, chest tightness, right hip pain, and groin pain.    Bacterial sinusitis  Ms. Frias has been feeling ill for approximately 2 weeks and believes that she is getting worse. She presented to urgent care twice last week. She was screened for streptococcus, influenza, and COVID-19, and her results were negative. She was diagnosed with bronchitis and has been taking over the counter medications, but she has not noticed improvement. The patient mentions that she has previously had several cases of pneumonia. She reports chest tightness and coughing. Previously, her cough was productive, but she now feels unable to expel sputum because of the thickness. She continues to cough throughout the night despite taking Robitussin and Tessalon Perles. She has also tried taking Mucinex DM, but this does not help. She has been experiencing headaches each morning, but these tend to dissipate throughout the day. The patient notes that her ears are congested, and this has negatively impacted her hearing. She has previously tolerated cefdinir and steroids without any issues.    Inguinal strain, right, subsequent encounter  On 2023, the patient suffered a fall in the parking lot and was evaluated at urgent care. She was advised that she did not have any fractures, but it appeared that she pulled something in her groin area. She had x-rays performed and there were concerns with her hip and thigh, and an MRI was recommended. The patient is established with physical therapy, but she has not had a visit in a couple of  weeks.    Chronic right hip pain  The patient has experienced right hip pain prior to her fall. She has been taking Robaxin each night. She recently purchased a compression device for her entire leg and foot. The patient has been able to notice some relief and improvement with sleep. She has been applying a topical cream that contains lidocaine on her hip.    Primary insomnia  The patient did try to cut back her trazodone dosage to 150 mg, but this was not effective, so she has resumed the 200 mg dosage. She has discontinued sertraline. The patient has introduced different supplements including magnesium, vitamin D3, and probiotics. She does not feel that she is able to decrease her Remeron dosage because she continues to take up 2 to 3 times at night due to her pain.    Postoperative hypothyroidism  The patient is currently alternating between taking 1 tablet and 0.5 tablet of levothyroxine 175 mcg every other day. This has been beneficial, but she has missed a few doses.    Positive KATIE  She has been unable to get a hold of her previous rheumatologist and has tried calling several times. She notes that the rheumatologist she was referred to did not have any openings until 03/2024. She was previously prescribed Lyrica, but she has not taken the medication in a few months.    Liver transplanted  Ms. Frias has a follow-up visit scheduled on 01/12/2024 with her transplant team.    Breast calcifications  The patient will be having a breast biopsy performed on 12/11/2023.    Chronic fatigue  Her fatigue has not improved.      Subjective      Review of Systems:   Review of Systems   Constitutional:  Positive for fatigue. Negative for activity change, appetite change and fever.   HENT:  Positive for congestion and ear pain.    Eyes:  Negative for blurred vision, photophobia and visual disturbance.   Respiratory:  Positive for cough and chest tightness. Negative for shortness of breath.    Cardiovascular:  Negative for  chest pain and palpitations.   Gastrointestinal:  Negative for abdominal distention, abdominal pain, blood in stool, constipation, diarrhea, nausea and vomiting.   Genitourinary:  Negative for dysuria and hematuria.   Musculoskeletal:  Positive for arthralgias and myalgias. Negative for back pain and joint swelling.   Skin:  Negative for rash and wound.   Neurological:  Positive for headache. Negative for weakness and confusion.   Psychiatric/Behavioral:  Positive for sleep disturbance.        I have reviewed the patients family history, social history, past medical history, past surgical history and have updated it as appropriate.     Medications:     Current Outpatient Medications:   •  atorvastatin (LIPITOR) 80 MG tablet, Take 1 tablet by mouth Every Night., Disp: 90 tablet, Rfl: 3  •  buPROPion XL (Wellbutrin XL) 150 MG 24 hr tablet, Take 1 tablet by mouth Every Morning., Disp: 90 tablet, Rfl: 1  •  buPROPion XL (Wellbutrin XL) 300 MG 24 hr tablet, Take 1 tablet by mouth Every Morning., Disp: 45 tablet, Rfl: 1  •  calcium carbonate-vitamin d 600-400 MG-UNIT per tablet, Take 1 tablet by mouth Daily., Disp: , Rfl:   •  [employer] Melatonin 5 MG tablet tablet, TAKE 1 TABLET BY MOUTH EVERY DAY AT NIGHT, Disp: 90 tablet, Rfl: 1  •  denosumab (Prolia) 60 MG/ML solution prefilled syringe syringe, Inject 1 mL under the skin into the appropriate area as directed Every 6 (Six) Months for 2 doses., Disp: 1 mL, Rfl: 1  •  ergocalciferol (ERGOCALCIFEROL) 1.25 MG (69622 UT) capsule, 1 (One) Time Per Week., Disp: , Rfl:   •  icosapent ethyl (Vascepa) 1 g capsule capsule, Take 2 g by mouth 2 (Two) Times a Day With Meals., Disp: 360 capsule, Rfl: 3  •  levothyroxine (SYNTHROID, LEVOTHROID) 175 MCG tablet, TAKE 1 TABLET BY MOUTH EVERY DAY IN THE MORNING, Disp: 90 tablet, Rfl: 0  •  meloxicam (MOBIC) 15 MG tablet, Take 1 tablet by mouth Daily., Disp: , Rfl:   •  methocarbamol (ROBAXIN) 500 MG tablet, TAKE 1 TABLET BY MOUTH FOUR TIMES A  DAY, Disp: 60 tablet, Rfl: 0  •  mirtazapine (REMERON) 15 MG tablet, TAKE 1 TABLET BY MOUTH EVERYDAY AT BEDTIME, Disp: 90 tablet, Rfl: 1  •  pantoprazole (PROTONIX) 40 MG EC tablet, Take 1 tablet by mouth 2 (Two) Times a Day. 30 minutes prior to first meal and 30 minutes prior to last meal of the day, Disp: 180 tablet, Rfl: 3  •  potassium chloride (MICRO-K) 10 MEQ CR capsule, Hold until Lasix (furosemide) restarted, Disp: , Rfl:   •  rOPINIRole (REQUIP) 2 MG tablet, TAKE 1 - 2 TABLET(S) BY MOUTH EVERY NIGHT AT BEDTIME., Disp: 180 tablet, Rfl: 1  •  spironolactone (ALDACTONE) 25 MG tablet, TAKE 1 TABLET BY MOUTH EVERY DAY, Disp: 90 tablet, Rfl: 1  •  sucralfate (CARAFATE) 1 g tablet, Take 1 tablet by mouth 4 (Four) Times a Day., Disp: , Rfl:   •  tacrolimus (PROGRAF) 0.5 MG capsule, Take 2 capsules by mouth 2 (Two) Times a Day., Disp: , Rfl:   •  telmisartan (MICARDIS) 20 MG tablet, Take 1 tablet by mouth Daily., Disp: 90 tablet, Rfl: 1  •  traZODone (DESYREL) 100 MG tablet, Take 2 tablets by mouth Every Night., Disp: 180 tablet, Rfl: 1  •  Triamcinolone Acetonide (NASACORT) 55 MCG/ACT nasal inhaler, 2 sprays into the nostril(s) as directed by provider Daily., Disp: 6.8 mL, Rfl: 1  •  zolpidem (AMBIEN) 5 MG tablet, Take 1 tablet by mouth Every Night., Disp: , Rfl:   •  cefdinir (OMNICEF) 300 MG capsule, Take 1 capsule by mouth 2 (Two) Times a Day for 5 days., Disp: 10 capsule, Rfl: 0    Allergies:   Allergies   Allergen Reactions   • Penicillins Shortness Of Breath, Itching, Other (See Comments) and Rash   • Cephalosporins Diarrhea   • Cyclosporine Swelling       Objective     Physical Exam: Please see above  Vital Signs:   Vitals:    12/08/23 1509   BP: 112/84   BP Location: Right arm   Patient Position: Sitting   Cuff Size: Adult   Pulse: 78   Resp: 16   Temp: 98.2 °F (36.8 °C)   TempSrc: Temporal   SpO2: 96%   PainSc:   4   PainLoc: Hip     There is no height or weight on file to calculate BMI.          Physical  Exam  Vitals and nursing note reviewed.   Constitutional:       General: She is not in acute distress.     Appearance: Normal appearance. She is normal weight. She is not ill-appearing or toxic-appearing.   HENT:      Nose: No congestion or rhinorrhea.   Eyes:      General:         Right eye: No discharge.         Left eye: No discharge.      Conjunctiva/sclera: Conjunctivae normal.   Pulmonary:      Effort: Pulmonary effort is normal. No respiratory distress.   Abdominal:      General: Abdomen is flat. There is no distension.   Musculoskeletal:      Cervical back: Normal range of motion.   Skin:     Coloration: Skin is not jaundiced.      Findings: No rash.   Neurological:      General: No focal deficit present.      Mental Status: She is alert. Mental status is at baseline.      Coordination: Coordination normal.      Gait: Gait normal.   Psychiatric:         Mood and Affect: Mood normal.         Behavior: Behavior normal.         Thought Content: Thought content normal.         Judgment: Judgment normal.         Procedures    Results:   Labs:   Hemoglobin A1C   Date Value Ref Range Status   10/13/2023 5.1 4.5 - 5.7 % Final   02/20/2023 5.10 4.80 - 5.60 % Final     CREATININE   Date Value Ref Range Status   10/27/2023 254 mg/dL Final     Comment:     REFERENCE RANGE: Ref Range>=20     TSH   Date Value Ref Range Status   12/08/2023 3.830 0.270 - 4.200 uIU/mL Final   09/04/2019 5.685 (H) 0.300 - 5.000 uIU/mL Final        Imaging:   No valid procedures specified.     Assessment / Plan      Assessment/Plan:   Problem List Items Addressed This Visit       Insomnia    Current Assessment & Plan     - Refill of trazodone has been sent to patient's preferred pharmacy.         Relevant Medications    traZODone (DESYREL) 100 MG tablet    Postoperative hypothyroidism    Overview     >>OVERVIEW FOR HYPOTHYROID WRITTEN ON 1/11/2022  4:53 PM BY JB AL MD    Due to remote thyroidectomy for cancer         Current  Assessment & Plan     - TSH and T4 have been ordered.         Relevant Orders    TSH (Completed)    T4 (Completed)    Chronic fatigue    Positive KTAIE (antinuclear antibody)    Current Assessment & Plan     - Rheumatology referral has been provided.         Relevant Orders    Ambulatory Referral to Rheumatology (Completed)    Breast calcifications - Primary    Overview     11/13/2023 diagnostic mammogram: Noted several suspicious lesions that require biopsy  -   Currently pending         Current Assessment & Plan     - Breast biopsy is scheduled on 12/11/2023.         Transaminitis    Current Assessment & Plan     - CMP, ferritin, ethanol, INR, haptoglobin, lactate dehydrogenase, and tacrolimus level lab work has been ordered.         Relevant Orders    Comprehensive Metabolic Panel (Completed)    POC INR (Completed)    Ferritin (Completed)    Ethanol (Completed)    Haptoglobin (Completed)    Lactate Dehydrogenase (Completed)    Tacrolimus Level    Tetrahydrocannabinol (THC) use disorder, mild, abuse    Current Assessment & Plan     - Urine drug screening has been ordered.         Relevant Orders    ToxAssure Flex 23, Ur - Urine, Clean Catch    Bacterial sinusitis    Current Assessment & Plan     - Cefdinir 300 mg has been prescribed.         Relevant Medications    cefdinir (OMNICEF) 300 MG capsule    Groin strain    Chronic right hip pain    Current Assessment & Plan     - MRI of right hip without contrast has been ordered.         Relevant Orders    MRI Hip Right Without Contrast     Other Visit Diagnoses       Liver transplanted        - Tacrolimus level has been ordered.  - Patient has transplant follow-up scheduled in 01/2024.    Relevant Orders    Tacrolimus Level              Follow Up:   Return in about 4 weeks (around 1/5/2024) for Recheck.          Lars Fermin MD  [facility]    Transcribed from ambient dictation for Lars Fermin MD by Chanel Lara.  12/08/23   16:15 EST    Patient  or patient representative verbalized consent to the visit recording.  I have personally performed the services described in this document as transcribed by the above individual, and it is both accurate and complete.

## 2023-12-09 LAB
ALBUMIN SERPL-MCNC: 4.4 G/DL (ref 3.5–5.2)
ALBUMIN/GLOB SERPL: 1.8 G/DL
ALP SERPL-CCNC: 137 U/L (ref 39–117)
ALT SERPL W P-5'-P-CCNC: 23 U/L (ref 1–33)
ANION GAP SERPL CALCULATED.3IONS-SCNC: 12.4 MMOL/L (ref 5–15)
AST SERPL-CCNC: 15 U/L (ref 1–32)
BILIRUB SERPL-MCNC: 0.7 MG/DL (ref 0–1.2)
BUN SERPL-MCNC: 17 MG/DL (ref 6–20)
BUN/CREAT SERPL: 19.5 (ref 7–25)
CALCIUM SPEC-SCNC: 9 MG/DL (ref 8.6–10.5)
CHLORIDE SERPL-SCNC: 105 MMOL/L (ref 98–107)
CO2 SERPL-SCNC: 24.6 MMOL/L (ref 22–29)
CREAT SERPL-MCNC: 0.87 MG/DL (ref 0.57–1)
EGFRCR SERPLBLD CKD-EPI 2021: 76.9 ML/MIN/1.73
FERRITIN SERPL-MCNC: 103 NG/ML (ref 13–150)
GLOBULIN UR ELPH-MCNC: 2.5 GM/DL
GLUCOSE SERPL-MCNC: 81 MG/DL (ref 65–99)
HAPTOGLOB SERPL-MCNC: 117 MG/DL (ref 30–200)
LDH SERPL-CCNC: 174 U/L (ref 135–214)
POTASSIUM SERPL-SCNC: 4.4 MMOL/L (ref 3.5–5.2)
PROT SERPL-MCNC: 6.9 G/DL (ref 6–8.5)
SODIUM SERPL-SCNC: 142 MMOL/L (ref 136–145)
T4 SERPL-MCNC: 5.54 MCG/DL (ref 4.5–11.7)
TSH SERPL DL<=0.05 MIU/L-ACNC: 3.83 UIU/ML (ref 0.27–4.2)

## 2023-12-11 ENCOUNTER — TELEPHONE (OUTPATIENT)
Dept: INTERNAL MEDICINE | Facility: CLINIC | Age: 58
End: 2023-12-11
Payer: COMMERCIAL

## 2023-12-11 ENCOUNTER — HOSPITAL ENCOUNTER (OUTPATIENT)
Dept: MAMMOGRAPHY | Facility: HOSPITAL | Age: 58
Discharge: HOME | End: 2023-12-11
Payer: COMMERCIAL

## 2023-12-11 DIAGNOSIS — Z94.4 STATUS POST LIVER TRANSPLANT: Primary | ICD-10-CM

## 2023-12-11 DIAGNOSIS — R92.8 ABNORMAL MAMMOGRAM: ICD-10-CM

## 2023-12-11 DIAGNOSIS — K74.60 HEPATIC CIRRHOSIS, UNSPECIFIED HEPATIC CIRRHOSIS TYPE, UNSPECIFIED WHETHER ASCITES PRESENT: ICD-10-CM

## 2023-12-11 PROCEDURE — C1819 TISSUE LOCALIZATION-EXCISION: HCPCS

## 2023-12-11 PROCEDURE — 25010000002 LIDOCAINE 1 % SOLUTION: Performed by: RADIOLOGY

## 2023-12-11 PROCEDURE — 88305 TISSUE EXAM BY PATHOLOGIST: CPT | Performed by: STUDENT IN AN ORGANIZED HEALTH CARE EDUCATION/TRAINING PROGRAM

## 2023-12-11 RX ORDER — LIDOCAINE HYDROCHLORIDE AND EPINEPHRINE 10; 10 MG/ML; UG/ML
10 INJECTION, SOLUTION INFILTRATION; PERINEURAL ONCE
Status: COMPLETED | OUTPATIENT
Start: 2023-12-11 | End: 2023-12-11

## 2023-12-11 RX ORDER — LIDOCAINE HYDROCHLORIDE 10 MG/ML
10 INJECTION, SOLUTION INFILTRATION; PERINEURAL ONCE
Status: COMPLETED | OUTPATIENT
Start: 2023-12-11 | End: 2023-12-11

## 2023-12-11 RX ADMIN — LIDOCAINE HYDROCHLORIDE 10 ML: 10 INJECTION, SOLUTION INFILTRATION; PERINEURAL at 13:20

## 2023-12-11 RX ADMIN — LIDOCAINE HYDROCHLORIDE,EPINEPHRINE BITARTRATE 10 ML: 10; .01 INJECTION, SOLUTION INFILTRATION; PERINEURAL at 13:21

## 2023-12-11 NOTE — PROGRESS NOTES
Alert and orientated. friend present for post procedure instructions. Denies discomfort, no active bleeding, steri-strips not visualized, gauze dressing intact. Cold packs given. Verbalizes and demonstrates understanding of post-care instructions, written copy given.

## 2023-12-11 NOTE — TELEPHONE ENCOUNTER
Left voice mail message for Pt to call back    Hub please relay message  ----- Message from Lars Fermin MD sent at 12/11/2023  7:42 AM EST -----  Please let the patient know that the majority of her labs are within normal limits other than her alkaline phosphatase.  She should still communicate these findings with her transplant clinic.  No emergent interventions are needed however.  Thanks.

## 2023-12-12 ENCOUNTER — TELEPHONE (OUTPATIENT)
Dept: MAMMOGRAPHY | Facility: HOSPITAL | Age: 58
End: 2023-12-12
Payer: COMMERCIAL

## 2023-12-12 LAB
CYTO UR: NORMAL
LAB AP CASE REPORT: NORMAL
LAB AP CLINICAL INFORMATION: NORMAL
PATH REPORT.FINAL DX SPEC: NORMAL
PATH REPORT.GROSS SPEC: NORMAL
TACROLIMUS BLD LC/MS/MS-MCNC: 0.9 NG/ML (ref 2–20)

## 2023-12-12 PROCEDURE — 77065 DX MAMMO INCL CAD UNI: CPT | Performed by: RADIOLOGY

## 2023-12-12 PROCEDURE — 19081 BX BREAST 1ST LESION STRTCTC: CPT | Performed by: RADIOLOGY

## 2023-12-12 NOTE — TELEPHONE ENCOUNTER
Pt reached, advised of message. Pt verbalized good understanding but added she will be losing insurance and new insurance will not cover them. Please advise if office can help her find new transplant clinic or what is next step based on result.

## 2023-12-13 LAB
1OH-MIDAZOLAM UR QL SCN: NOT DETECTED NG/MG CREAT
6MAM UR QL SCN: NEGATIVE NG/ML
7AMINOCLONAZEPAM/CREAT UR: NOT DETECTED NG/MG CREAT
A-OH ALPRAZ/CREAT UR: NOT DETECTED NG/MG CREAT
A-OH-TRIAZOLAM/CREAT UR CFM: NOT DETECTED NG/MG CREAT
ACP UR QL CFM: NOT DETECTED
ALPRAZ/CREAT UR CFM: NOT DETECTED NG/MG CREAT
AMPHETAMINES UR QL SCN: NEGATIVE NG/ML
APAP UR QL SCN: NEGATIVE UG/ML
BARBITURATES UR QL SCN: NEGATIVE NG/ML
BENZODIAZ SCN METH UR: NEGATIVE
BUPRENORPHINE UR QL SCN: NEGATIVE
BUPRENORPHINE/CREAT UR: NOT DETECTED NG/MG CREAT
CANNABINOIDS UR QL SCN: NEGATIVE NG/ML
CARISOPRODOL UR QL: NEGATIVE NG/ML
CLONAZEPAM/CREAT UR CFM: NOT DETECTED NG/MG CREAT
COCAINE+BZE UR QL SCN: NEGATIVE NG/ML
CREAT UR-MCNC: 28 MG/DL
D-METHORPHAN UR-MCNC: PRESENT NG/ML
D-METHORPHAN+LEVORPHANOL UR QL: PRESENT
DESALKYLFLURAZ/CREAT UR: NOT DETECTED NG/MG CREAT
DIAZEPAM/CREAT UR: NOT DETECTED NG/MG CREAT
ETHANOL UR QL SCN: NEGATIVE G/DL
ETHANOL UR QL SCN: NEGATIVE NG/ML
FENTANYL CTO UR SCN-MCNC: NEGATIVE NG/ML
FENTANYL/CREAT UR: NOT DETECTED NG/MG CREAT
FLUNITRAZEPAM UR QL SCN: NOT DETECTED NG/MG CREAT
GABAPENTIN UR-MCNC: NEGATIVE UG/ML
HYPNOTICS UR QL SCN: NEGATIVE
KETAMINE UR QL: NOT DETECTED
LORAZEPAM/CREAT UR: NOT DETECTED NG/MG CREAT
MEPERIDINE UR QL SCN: NEGATIVE NG/ML
METHADONE UR QL SCN: NEGATIVE NG/ML
METHADONE+METAB UR QL SCN: NEGATIVE NG/ML
MIDAZOLAM/CREAT UR CFM: NOT DETECTED NG/MG CREAT
MISCELLANEOUS, UR: NORMAL
NORBUPRENORPHINE/CREAT UR: NOT DETECTED NG/MG CREAT
NORDIAZEPAM/CREAT UR: NOT DETECTED NG/MG CREAT
NORFENTANYL/CREAT UR: NOT DETECTED NG/MG CREAT
NORFLUNITRAZEPAM UR-MCNC: NOT DETECTED NG/MG CREAT
NORKETAMINE UR-MCNC: NOT DETECTED UG/ML
OPIATES UR SCN-MCNC: NEGATIVE NG/ML
OTHER HALLUCINOGENS UR: NEGATIVE
OXAZEPAM/CREAT UR: NOT DETECTED NG/MG CREAT
OXYCODONE CTO UR SCN-MCNC: NEGATIVE NG/ML
PCP UR QL SCN: NEGATIVE NG/ML
PRESCRIBED MEDICATIONS: NORMAL
PRESCRIBED MEDICATIONS: NORMAL
PROPOXYPH UR QL SCN: NEGATIVE NG/ML
TAPENTADOL CTO UR SCN-MCNC: NEGATIVE NG/ML
TEMAZEPAM/CREAT UR: NOT DETECTED NG/MG CREAT
TRAMADOL UR QL SCN: NEGATIVE NG/ML
ZALEPLON UR-MCNC: NOT DETECTED NG/ML
ZOLPIDEM PHENYL-4-CARB UR QL SCN: NOT DETECTED
ZOLPIDEM UR QL SCN: NOT DETECTED
ZOPICLONE-N-OXIDE UR-MCNC: NOT DETECTED NG/ML

## 2023-12-14 ENCOUNTER — TELEPHONE (OUTPATIENT)
Dept: INTERNAL MEDICINE | Facility: CLINIC | Age: 58
End: 2023-12-14
Payer: COMMERCIAL

## 2023-12-15 ENCOUNTER — OFFICE VISIT (OUTPATIENT)
Dept: INTERNAL MEDICINE | Facility: CLINIC | Age: 58
End: 2023-12-15
Payer: COMMERCIAL

## 2023-12-15 VITALS
WEIGHT: 139.6 LBS | BODY MASS INDEX: 26.38 KG/M2 | RESPIRATION RATE: 16 BRPM | TEMPERATURE: 97.7 F | HEART RATE: 112 BPM | DIASTOLIC BLOOD PRESSURE: 78 MMHG | SYSTOLIC BLOOD PRESSURE: 112 MMHG

## 2023-12-15 DIAGNOSIS — G89.29 CHRONIC RIGHT HIP PAIN: ICD-10-CM

## 2023-12-15 DIAGNOSIS — M25.551 CHRONIC RIGHT HIP PAIN: ICD-10-CM

## 2023-12-15 DIAGNOSIS — M54.42 CHRONIC MIDLINE LOW BACK PAIN WITH BILATERAL SCIATICA: ICD-10-CM

## 2023-12-15 DIAGNOSIS — Z59.819 HOUSING INSECURITY: ICD-10-CM

## 2023-12-15 DIAGNOSIS — R76.8 POSITIVE ANA (ANTINUCLEAR ANTIBODY): ICD-10-CM

## 2023-12-15 DIAGNOSIS — M54.41 CHRONIC MIDLINE LOW BACK PAIN WITH BILATERAL SCIATICA: ICD-10-CM

## 2023-12-15 DIAGNOSIS — E89.0 POSTOPERATIVE HYPOTHYROIDISM: ICD-10-CM

## 2023-12-15 DIAGNOSIS — R74.8 ALKALINE PHOSPHATASE ELEVATION: ICD-10-CM

## 2023-12-15 DIAGNOSIS — M88.9 PAGET DISEASE OF BONE: ICD-10-CM

## 2023-12-15 DIAGNOSIS — G93.40 ACUTE ENCEPHALOPATHY: Primary | ICD-10-CM

## 2023-12-15 DIAGNOSIS — Z94.4 LIVER TRANSPLANT RECIPIENT: ICD-10-CM

## 2023-12-15 DIAGNOSIS — N64.1 BREAST, FAT NECROSIS: ICD-10-CM

## 2023-12-15 DIAGNOSIS — G89.29 CHRONIC MIDLINE LOW BACK PAIN WITH BILATERAL SCIATICA: ICD-10-CM

## 2023-12-15 LAB
BILIRUB CONJ SERPL-MCNC: <0.2 MG/DL (ref 0–0.3)
ETHANOL BLD-MCNC: <10 MG/DL (ref 0–10)
EXPIRATION DATE: NORMAL
INR PPP: 1 (ref 0.9–1.1)
Lab: NORMAL

## 2023-12-15 PROCEDURE — 83615 LACTATE (LD) (LDH) ENZYME: CPT | Performed by: STUDENT IN AN ORGANIZED HEALTH CARE EDUCATION/TRAINING PROGRAM

## 2023-12-15 PROCEDURE — 82248 BILIRUBIN DIRECT: CPT | Performed by: STUDENT IN AN ORGANIZED HEALTH CARE EDUCATION/TRAINING PROGRAM

## 2023-12-15 PROCEDURE — 82746 ASSAY OF FOLIC ACID SERUM: CPT | Performed by: STUDENT IN AN ORGANIZED HEALTH CARE EDUCATION/TRAINING PROGRAM

## 2023-12-15 PROCEDURE — 80050 GENERAL HEALTH PANEL: CPT | Performed by: STUDENT IN AN ORGANIZED HEALTH CARE EDUCATION/TRAINING PROGRAM

## 2023-12-15 PROCEDURE — 83010 ASSAY OF HAPTOGLOBIN QUANT: CPT | Performed by: STUDENT IN AN ORGANIZED HEALTH CARE EDUCATION/TRAINING PROGRAM

## 2023-12-15 PROCEDURE — 82077 ASSAY SPEC XCP UR&BREATH IA: CPT | Performed by: STUDENT IN AN ORGANIZED HEALTH CARE EDUCATION/TRAINING PROGRAM

## 2023-12-15 PROCEDURE — 82977 ASSAY OF GGT: CPT | Performed by: STUDENT IN AN ORGANIZED HEALTH CARE EDUCATION/TRAINING PROGRAM

## 2023-12-15 PROCEDURE — 82607 VITAMIN B-12: CPT | Performed by: STUDENT IN AN ORGANIZED HEALTH CARE EDUCATION/TRAINING PROGRAM

## 2023-12-15 PROCEDURE — 86140 C-REACTIVE PROTEIN: CPT | Performed by: STUDENT IN AN ORGANIZED HEALTH CARE EDUCATION/TRAINING PROGRAM

## 2023-12-15 SDOH — ECONOMIC STABILITY - HOUSING INSECURITY: HOUSING INSTABILITY UNSPECIFIED: Z59.819

## 2023-12-15 NOTE — PROGRESS NOTES
Follow Up Office Visit      Date: 12/15/2023   Patient Name: Lizette Frias  : 1965   MRN: [id number]     Chief Complaint:    Chief Complaint   Patient presents with    Fatigue     Nausea   Foggy head  Pain at Breast Biopsy site  confusion       History of Present Illness: Lizette Frias is a 58 y.o. female who is here today to follow up with chronic care management.    Lizette Frias is a 58-year-old female who presents today for fatigue.     Fatigue and confusion  Her ammonia levels had gotten too high before she had a transplant. She has been coming home and experiencing confusion and fatigue. A few nights ago she was experiencing confusion. She was not aware of how much time was passing and she thought that her son who is 26 years old was a teenager. Her friend came over immediately and turned lights on and gave her a muffin and something to drink. She was able to come around enough to start eating. Her friend felt like her sugar and blood pressure had bottomed out on her. She had a muffin on the way to work and a smoothie throughout the day. She was off by 3:00 PM and did not go until 9:00 PM. She has never had her blood sugar drop like that before with her liver enzymes being off. She is foggy headed. She is having trouble thinking. She is making mistakes trying to pack orders. She is not sure how much is physical or emotional. When she wakes up, her brain is telling her she is blind    Breast necrosis  Her breast looks horrendous. Her temperature is 99.7, but she typically runs around 97. It started hurting yesterday, but she did not have any pain Monday through Wednesday. She did not have to take Tylenol or anything for it.     Housing insecurity   She has a lot going on in her life this month. Her family is not helping because they are fussing at her about not getting things done she needs to do. She is overwhelmed and procrastinated. She is working too many hours because she can not pay her  bills. People are mad at her. She does not have the energy to do anything.     Health maintenance  She is scheduled for a DEXA scan on Monday. She tends to be a bleeder.    Restless legs  She has a horrible time getting to sleep. When she does crash, it is because of her legs. She has started 3 different supplements. She bought a compression that goes from hip to foot. It helps some, but when she is having a bad night, nothing does. She can not go to sleep when her legs are doing that. The ropinirole does not help. She massages the pain in her knees when it gets to the point of pain. She can be in the tub 2 to 3 times during the night taking a hot bath trying to get them to calm down enough where she can just lay down to sleep. She is off the sertraline. She is going to quit the Remeron. The mirtazapine works well for her.      She is off the spironolactone. She used to retain fluid, but she does not think she needs it anymore. Her weight is down now and she does not think it is an issue anymore. She used to take tramadol, but it was taken off of it. It did work, but she knows it is habit forming. She takes 2 ropinirole every night. She has taken as many as 4. She has back pain during the day. She rarely has restless legs.    Subjective      Review of Systems:   Review of Systems   All other systems reviewed and are negative.      I have reviewed the patients family history, social history, past medical history, past surgical history and have updated it as appropriate.     Medications:     Current Outpatient Medications:     atorvastatin (LIPITOR) 80 MG tablet, Take 1 tablet by mouth Every Night., Disp: 90 tablet, Rfl: 3    buPROPion XL (Wellbutrin XL) 300 MG 24 hr tablet, Take 1 tablet by mouth Every Morning., Disp: 45 tablet, Rfl: 1    calcium carbonate-vitamin d 600-400 MG-UNIT per tablet, Take 1 tablet by mouth Daily., Disp: , Rfl:     denosumab (Prolia) 60 MG/ML solution prefilled syringe syringe, Inject 1 mL  under the skin into the appropriate area as directed Every 6 (Six) Months for 2 doses., Disp: 1 mL, Rfl: 1    icosapent ethyl (Vascepa) 1 g capsule capsule, Take 2 g by mouth 2 (Two) Times a Day With Meals., Disp: 360 capsule, Rfl: 3    levothyroxine (SYNTHROID, LEVOTHROID) 175 MCG tablet, TAKE 1 TABLET BY MOUTH EVERY DAY IN THE MORNING, Disp: 90 tablet, Rfl: 0    meloxicam (MOBIC) 15 MG tablet, Take 1 tablet by mouth Daily., Disp: , Rfl:     mirtazapine (REMERON) 15 MG tablet, TAKE 1 TABLET BY MOUTH EVERYDAY AT BEDTIME, Disp: 90 tablet, Rfl: 1    pantoprazole (PROTONIX) 40 MG EC tablet, Take 1 tablet by mouth 2 (Two) Times a Day. 30 minutes prior to first meal and 30 minutes prior to last meal of the day, Disp: 180 tablet, Rfl: 3    sucralfate (CARAFATE) 1 g tablet, Take 1 tablet by mouth 4 (Four) Times a Day., Disp: , Rfl:     tacrolimus (PROGRAF) 0.5 MG capsule, Take 2 capsules by mouth 2 (Two) Times a Day., Disp: , Rfl:     telmisartan (MICARDIS) 20 MG tablet, Take 1 tablet by mouth Daily., Disp: 90 tablet, Rfl: 1    traZODone (DESYREL) 100 MG tablet, Take 2 tablets by mouth Every Night., Disp: 180 tablet, Rfl: 1    Triamcinolone Acetonide (NASACORT) 55 MCG/ACT nasal inhaler, 2 sprays into the nostril(s) as directed by provider Daily., Disp: 6.8 mL, Rfl: 1    zolpidem (AMBIEN) 5 MG tablet, Take 1 tablet by mouth Every Night., Disp: , Rfl:     Allergies:   Allergies   Allergen Reactions    Penicillins Shortness Of Breath, Itching, Other (See Comments) and Rash    Cephalosporins Diarrhea    Cyclosporine Swelling       Objective     Physical Exam: Please see above  Vital Signs:   Vitals:    12/15/23 1338   BP: 112/78   BP Location: Right arm   Patient Position: Sitting   Cuff Size: Adult   Pulse: 112   Resp: 16   Temp: 97.7 °F (36.5 °C)   TempSrc: Temporal   Weight: 63.3 kg (139 lb 9.6 oz)   PainSc:   8     Body mass index is 26.38 kg/m².          Physical Exam  Vitals and nursing note reviewed.   Constitutional:        General: She is not in acute distress.     Appearance: Normal appearance. She is normal weight. She is not ill-appearing or toxic-appearing.   HENT:      Nose: No congestion or rhinorrhea.   Eyes:      General:         Right eye: No discharge.         Left eye: No discharge.      Conjunctiva/sclera: Conjunctivae normal.   Pulmonary:      Effort: Pulmonary effort is normal. No respiratory distress.   Abdominal:      General: Abdomen is flat. There is no distension.   Musculoskeletal:      Cervical back: Normal range of motion.   Skin:     Coloration: Skin is not jaundiced.      Findings: No rash.   Neurological:      General: No focal deficit present.      Mental Status: She is alert. Mental status is at baseline.      Coordination: Coordination normal.      Gait: Gait normal.   Psychiatric:         Mood and Affect: Mood normal.         Behavior: Behavior normal.         Thought Content: Thought content normal.         Judgment: Judgment normal.         Procedures    Results:   Labs:   Hemoglobin A1C   Date Value Ref Range Status   10/13/2023 5.1 4.5 - 5.7 % Final   02/20/2023 5.10 4.80 - 5.60 % Final     CREATININE   Date Value Ref Range Status   12/08/2023 28 mg/dL Final     Comment:     REFERENCE RANGE: Ref Range>=20     TSH   Date Value Ref Range Status   12/15/2023 4.300 (H) 0.270 - 4.200 uIU/mL Final   09/04/2019 5.685 (H) 0.300 - 5.000 uIU/mL Final        Imaging:   No valid procedures specified.     Assessment / Plan      Assessment/Plan:   Problem List Items Addressed This Visit       Liver transplant recipient    Relevant Orders    Comprehensive Metabolic Panel (Completed)    Ammonia    POC INR (Completed)    Haptoglobin (Completed)    Lactate Dehydrogenase (Completed)    Bilirubin, Total & Direct    Bilirubin, Direct (Completed)    Positive KATIE (antinuclear antibody)    Breast, fat necrosis    Overview     11/13/2023 diagnostic mammogram: Noted several suspicious lesions that require biopsy  -    Currently pending         Chronic right hip pain    Alkaline phosphatase elevation    Relevant Orders    Comprehensive Metabolic Panel (Completed)    Gamma GT (Completed)    Housing insecurity    Relevant Orders    Ambulatory Referral to Social Care Services (Amb Case Mgmt)    Chronic midline low back pain with bilateral sciatica    Relevant Orders    Ambulatory Referral to Pain Management Clinic     Other Visit Diagnoses       Acute encephalopathy    -  Primary    Relevant Orders    CBC Auto Differential (Completed)    C-reactive Protein (Completed)    Comprehensive Metabolic Panel (Completed)    TSH (Completed)    Ammonia    Ethanol (Completed)    Folate (Completed)    Vitamin B12 (Completed)    ToxAssure Flex 23, Ur - Urine, Clean Catch              1. Acute encephalopathy  - I will get some additional testing today to see if there are any other organic causes of difficulty speaking and not thinking straight.   - I will get an ammonia level.   - I will also get an INR to make sure her liver is functioning at the rate that we wanted to.  - I will check for electrolyte deficiencies.     2. Elevated ammonia levels.  - Her liver enzyme levels are improving steadily.   - I will get ammonia level.    3. Positive KATIE.  - I will wait on the additional testing with these being borderline.   - I will refer her to rheumatology.    4. Breast necrosis.  - It was showing fat necrosis exclusively and it is not a malignancy.   - We can resume normal yearly screening and follow-ups.   - The patient will notify us if her temperature reaches above 100.4 degrees.   - She will reach out if she notices any discharge or drainage.     5. Coronary artery disease.  - We will run labs and follow-up with patient with results.     6. Chronic midline low back pain with bilateral sciatica  - We will refer her to pain management clinic.   - We are still waiting on her MRI results.   - We discussed different pain management options.     7.  Housing insecurity  - We will refer her to social care services.     8. Liver transplant recipient  - Lactate Dehydrogenase  - Haptoglobin  - Comprehensive metabolic panel    9. Alkaline phosphatase elevation  -  We will check Gamma GT.    Follow-up  The patient will follow up in 1 month.    Transcribed from ambient dictation for Lars Fermin MD by Kamla Oglesby.  12/15/23   16:25 EST    Patient or patient representative verbalized consent to the visit recording.  I have personally performed the services described in this document as transcribed by the above individual, and it is both accurate and complete.    Follow Up:   Return in about 4 weeks (around 1/12/2024) for Recheck.    I spent 35 minutes caring for Lizette on this date of service. This time includes time spent by me in the following activities: preparing for the visit, reviewing tests, obtaining and/or reviewing a separately obtained history, performing a medically appropriate examination and/or evaluation, counseling and educating the patient/family/caregiver, ordering medications, tests, or procedures, and documenting information in the medical record.       Lars Fermin MD  [facility]

## 2023-12-16 DIAGNOSIS — F32.A ANXIETY AND DEPRESSION: ICD-10-CM

## 2023-12-16 DIAGNOSIS — F41.9 ANXIETY AND DEPRESSION: ICD-10-CM

## 2023-12-16 LAB
ALBUMIN SERPL-MCNC: 4.6 G/DL (ref 3.5–5.2)
ALBUMIN/GLOB SERPL: 1.8 G/DL
ALP SERPL-CCNC: 160 U/L (ref 39–117)
ALT SERPL W P-5'-P-CCNC: 32 U/L (ref 1–33)
ANION GAP SERPL CALCULATED.3IONS-SCNC: 10.4 MMOL/L (ref 5–15)
AST SERPL-CCNC: 14 U/L (ref 1–32)
BASOPHILS # BLD AUTO: 0.07 10*3/MM3 (ref 0–0.2)
BASOPHILS NFR BLD AUTO: 1 % (ref 0–1.5)
BILIRUB SERPL-MCNC: 0.6 MG/DL (ref 0–1.2)
BUN SERPL-MCNC: 25 MG/DL (ref 6–20)
BUN/CREAT SERPL: 32.1 (ref 7–25)
CALCIUM SPEC-SCNC: 9.5 MG/DL (ref 8.6–10.5)
CHLORIDE SERPL-SCNC: 104 MMOL/L (ref 98–107)
CO2 SERPL-SCNC: 25.6 MMOL/L (ref 22–29)
CREAT SERPL-MCNC: 0.78 MG/DL (ref 0.57–1)
CRP SERPL-MCNC: 0.41 MG/DL (ref 0–0.5)
DEPRECATED RDW RBC AUTO: 41 FL (ref 37–54)
EGFRCR SERPLBLD CKD-EPI 2021: 87.6 ML/MIN/1.73
EOSINOPHIL # BLD AUTO: 0.08 10*3/MM3 (ref 0–0.4)
EOSINOPHIL NFR BLD AUTO: 1.1 % (ref 0.3–6.2)
ERYTHROCYTE [DISTWIDTH] IN BLOOD BY AUTOMATED COUNT: 13 % (ref 12.3–15.4)
FOLATE SERPL-MCNC: 3.08 NG/ML (ref 4.78–24.2)
GGT SERPL-CCNC: 19 U/L (ref 5–36)
GLOBULIN UR ELPH-MCNC: 2.5 GM/DL
GLUCOSE SERPL-MCNC: 75 MG/DL (ref 65–99)
HAPTOGLOB SERPL-MCNC: 120 MG/DL (ref 30–200)
HCT VFR BLD AUTO: 41.9 % (ref 34–46.6)
HGB BLD-MCNC: 13.6 G/DL (ref 12–15.9)
IMM GRANULOCYTES # BLD AUTO: 0.07 10*3/MM3 (ref 0–0.05)
IMM GRANULOCYTES NFR BLD AUTO: 1 % (ref 0–0.5)
LDH SERPL-CCNC: 207 U/L (ref 135–214)
LYMPHOCYTES # BLD AUTO: 2.8 10*3/MM3 (ref 0.7–3.1)
LYMPHOCYTES NFR BLD AUTO: 40.2 % (ref 19.6–45.3)
MCH RBC QN AUTO: 28.3 PG (ref 26.6–33)
MCHC RBC AUTO-ENTMCNC: 32.5 G/DL (ref 31.5–35.7)
MCV RBC AUTO: 87.3 FL (ref 79–97)
MONOCYTES # BLD AUTO: 0.44 10*3/MM3 (ref 0.1–0.9)
MONOCYTES NFR BLD AUTO: 6.3 % (ref 5–12)
NEUTROPHILS NFR BLD AUTO: 3.5 10*3/MM3 (ref 1.7–7)
NEUTROPHILS NFR BLD AUTO: 50.4 % (ref 42.7–76)
NRBC BLD AUTO-RTO: 0 /100 WBC (ref 0–0.2)
PLATELET # BLD AUTO: 212 10*3/MM3 (ref 140–450)
PMV BLD AUTO: 11.2 FL (ref 6–12)
POTASSIUM SERPL-SCNC: 4.3 MMOL/L (ref 3.5–5.2)
PROT SERPL-MCNC: 7.1 G/DL (ref 6–8.5)
RBC # BLD AUTO: 4.8 10*6/MM3 (ref 3.77–5.28)
SODIUM SERPL-SCNC: 140 MMOL/L (ref 136–145)
TSH SERPL DL<=0.05 MIU/L-ACNC: 4.3 UIU/ML (ref 0.27–4.2)
VIT B12 BLD-MCNC: 718 PG/ML (ref 211–946)
WBC NRBC COR # BLD AUTO: 6.96 10*3/MM3 (ref 3.4–10.8)

## 2023-12-18 ENCOUNTER — HOSPITAL ENCOUNTER (OUTPATIENT)
Dept: BONE DENSITY | Facility: HOSPITAL | Age: 58
Discharge: HOME | End: 2023-12-18
Admitting: STUDENT IN AN ORGANIZED HEALTH CARE EDUCATION/TRAINING PROGRAM
Payer: COMMERCIAL

## 2023-12-18 ENCOUNTER — REFERRAL TRIAGE (OUTPATIENT)
Dept: CASE MANAGEMENT | Facility: OTHER | Age: 58
End: 2023-12-18
Payer: COMMERCIAL

## 2023-12-18 DIAGNOSIS — M81.8 OTHER OSTEOPOROSIS WITHOUT CURRENT PATHOLOGICAL FRACTURE: ICD-10-CM

## 2023-12-18 PROBLEM — M88.9 PAGET DISEASE OF BONE: Status: ACTIVE | Noted: 2023-12-18

## 2023-12-18 PROCEDURE — 77080 DXA BONE DENSITY AXIAL: CPT

## 2023-12-18 RX ORDER — LEVOTHYROXINE SODIUM 0.2 MG/1
200 TABLET ORAL EVERY MORNING
Qty: 60 TABLET | Refills: 1 | Status: SHIPPED | OUTPATIENT
Start: 2023-12-18

## 2023-12-18 RX ORDER — BUPROPION HYDROCHLORIDE 300 MG/1
300 TABLET ORAL EVERY MORNING
Qty: 90 TABLET | Refills: 0 | Status: SHIPPED | OUTPATIENT
Start: 2023-12-18

## 2023-12-19 LAB
1OH-MIDAZOLAM UR QL SCN: NOT DETECTED NG/MG CREAT
6MAM UR QL SCN: NEGATIVE NG/ML
7AMINOCLONAZEPAM/CREAT UR: NOT DETECTED NG/MG CREAT
A-OH ALPRAZ/CREAT UR: NOT DETECTED NG/MG CREAT
A-OH-TRIAZOLAM/CREAT UR CFM: NOT DETECTED NG/MG CREAT
ACP UR QL CFM: NOT DETECTED
ALPRAZ/CREAT UR CFM: NOT DETECTED NG/MG CREAT
AMPHET UR CFM-MCNC: NOT DETECTED NG/MG CREAT
AMPHETAMINES UR QL SCN: NORMAL NG/ML
AMPHETAMINES UR QL: NEGATIVE
APAP UR QL SCN: NEGATIVE UG/ML
BARBITURATES UR QL SCN: NEGATIVE NG/ML
BENZODIAZ SCN METH UR: NEGATIVE
BUPRENORPHINE UR QL SCN: NEGATIVE
BUPRENORPHINE/CREAT UR: NOT DETECTED NG/MG CREAT
CANNABINOIDS UR QL SCN: NEGATIVE NG/ML
CARISOPRODOL UR QL: NEGATIVE NG/ML
CLONAZEPAM/CREAT UR CFM: NOT DETECTED NG/MG CREAT
COCAINE+BZE UR QL SCN: NEGATIVE NG/ML
CREAT UR-MCNC: 40 MG/DL
D-METHORPHAN UR-MCNC: PRESENT NG/ML
D-METHORPHAN+LEVORPHANOL UR QL: PRESENT
DESALKYLFLURAZ/CREAT UR: NOT DETECTED NG/MG CREAT
DIAZEPAM/CREAT UR: NOT DETECTED NG/MG CREAT
ETHANOL UR QL SCN: NEGATIVE G/DL
ETHANOL UR QL SCN: NEGATIVE NG/ML
FENTANYL CTO UR SCN-MCNC: NEGATIVE NG/ML
FENTANYL/CREAT UR: NOT DETECTED NG/MG CREAT
FLUNITRAZEPAM UR QL SCN: NOT DETECTED NG/MG CREAT
GABAPENTIN UR-MCNC: NEGATIVE UG/ML
HYPNOTICS UR QL SCN: NEGATIVE
KETAMINE UR QL: NOT DETECTED
LORAZEPAM/CREAT UR: NOT DETECTED NG/MG CREAT
MDA UR CFM-MCNC: NOT DETECTED NG/MG CREAT
MDMA UR CFM-MCNC: NOT DETECTED NG/MG CREAT
MEPERIDINE UR QL SCN: NEGATIVE NG/ML
METHADONE UR QL SCN: NEGATIVE NG/ML
METHADONE+METAB UR QL SCN: NEGATIVE NG/ML
METHAMPHET UR CFM-MCNC: NOT DETECTED NG/MG CREAT
MIDAZOLAM/CREAT UR CFM: NOT DETECTED NG/MG CREAT
MISCELLANEOUS, UR: NORMAL
NORBUPRENORPHINE/CREAT UR: NOT DETECTED NG/MG CREAT
NORDIAZEPAM/CREAT UR: NOT DETECTED NG/MG CREAT
NORFENTANYL/CREAT UR: NOT DETECTED NG/MG CREAT
NORFLUNITRAZEPAM UR-MCNC: NOT DETECTED NG/MG CREAT
NORKETAMINE UR-MCNC: NOT DETECTED UG/ML
OPIATES UR SCN-MCNC: NEGATIVE NG/ML
OTHER HALLUCINOGENS UR: NEGATIVE
OXAZEPAM/CREAT UR: NOT DETECTED NG/MG CREAT
OXYCODONE CTO UR SCN-MCNC: NEGATIVE NG/ML
PCP UR QL SCN: NEGATIVE NG/ML
PRESCRIBED MEDICATIONS: NORMAL
PRESCRIBED MEDICATIONS: NORMAL
PROPOXYPH UR QL SCN: NEGATIVE NG/ML
TAPENTADOL CTO UR SCN-MCNC: NEGATIVE NG/ML
TEMAZEPAM/CREAT UR: NOT DETECTED NG/MG CREAT
TRAMADOL UR QL SCN: NEGATIVE NG/ML
ZALEPLON UR-MCNC: NOT DETECTED NG/ML
ZOLPIDEM PHENYL-4-CARB UR QL SCN: NOT DETECTED
ZOLPIDEM UR QL SCN: NOT DETECTED
ZOPICLONE-N-OXIDE UR-MCNC: NOT DETECTED NG/ML

## 2023-12-20 ENCOUNTER — LAB (OUTPATIENT)
Dept: LAB | Facility: HOSPITAL | Age: 58
End: 2023-12-20
Payer: COMMERCIAL

## 2023-12-20 LAB — AMMONIA BLD-SCNC: 12 UMOL/L (ref 11–51)

## 2023-12-20 PROCEDURE — 82140 ASSAY OF AMMONIA: CPT | Performed by: STUDENT IN AN ORGANIZED HEALTH CARE EDUCATION/TRAINING PROGRAM

## 2023-12-26 ENCOUNTER — PATIENT OUTREACH (OUTPATIENT)
Dept: CASE MANAGEMENT | Facility: OTHER | Age: 58
End: 2023-12-26
Payer: COMMERCIAL

## 2023-12-26 NOTE — OUTREACH NOTE
Social Work Assessment  Questions/Answers      Flowsheet Row Most Recent Value   Referral Source physician   Reason for Consult community resources   Preferred Language English   Advance Care Planning Reviewed no concerns identified   People in Home spouse   Current Living Arrangements home   Potentially Unsafe Housing Conditions none   In the past 12 months has the electric, gas, oil, or water company threatened to shut off services in your home? No   Primary Care Provided by self   Provides Primary Care For no one   Family Caregiver if Needed child(jessica), adult   Employment Status employed part-time   Source of Income disability, salary/wages   Application for Public Assistance applied   Medications independent   Meal Preparation independent   Housekeeping independent   Laundry independent   Shopping independent        SDOH updated and reviewed with the patient during this program:  --     Disabilities: At Risk (4/21/2023)    Disabilities     Concentrating, Remembering, or Making Decisions Difficulty: yes     Doing Errands Independently Difficulty: no      --     Employment: Not At Risk (12/26/2023)    Employment     Do you want help finding or keeping work or a job?: I do not need or want help      Financial Resource Strain: High Risk (12/26/2023)    Overall Financial Resource Strain (CARDIA)     Difficulty of Paying Living Expenses: Hard      --     Food Insecurity: No Food Insecurity (4/21/2023)    Hunger Vital Sign     Worried About Running Out of Food in the Last Year: Never true     Ran Out of Food in the Last Year: Never true      --     Housing Stability: Low Risk  (12/26/2023)    Housing Stability Vital Sign     Unable to Pay for Housing in the Last Year: No     Number of Places Lived in the Last Year: 1     Unstable Housing in the Last Year: No        --     Transportation Needs: No Transportation Needs (4/21/2023)    PRAPARE - Transportation     Lack of Transportation (Medical): No     Lack of  Transportation (Non-Medical): No      --     Utilities: Not At Risk (12/26/2023)    [facility] Utilities     Threatened with loss of utilities: No     Patient Outreach    KAUSHAL contacted pt after receiving a provider referral. Pt recently went through a divorce and has been struggling financially. She is on disability but had to get a part-time job to make ends meet. Her current job is hard on her and she is looking for another one that will pay more. SW suggested going through the [employer] and pt states she is using them. She is hopeful that she will get a job for is applying for now. The divorce was also finalized a few days ago, so she will soon get some money as part of the settlement, which will allow her to pay down bills. She reports having all her basic needs covered. SW will close referral as she has no current needs, but encouraged her to call SW if any needs arise in the future.    Michelle NICHOLE -   Ambulatory Case Management    12/26/2023, 10:47 EST

## 2023-12-27 DIAGNOSIS — E89.0 POSTOPERATIVE HYPOTHYROIDISM: ICD-10-CM

## 2023-12-28 RX ORDER — LEVOTHYROXINE SODIUM 0.2 MG/1
200 TABLET ORAL EVERY MORNING
Qty: 90 TABLET | Refills: 1 | Status: SHIPPED | OUTPATIENT
Start: 2023-12-28

## 2024-01-11 ENCOUNTER — TELEPHONE (OUTPATIENT)
Dept: INTERNAL MEDICINE | Facility: CLINIC | Age: 59
End: 2024-01-11
Payer: MEDICARE

## 2024-01-11 ENCOUNTER — OFFICE VISIT (OUTPATIENT)
Dept: INTERNAL MEDICINE | Facility: CLINIC | Age: 59
End: 2024-01-11
Payer: MEDICARE

## 2024-01-11 VITALS
DIASTOLIC BLOOD PRESSURE: 56 MMHG | RESPIRATION RATE: 16 BRPM | SYSTOLIC BLOOD PRESSURE: 100 MMHG | TEMPERATURE: 97.7 F | HEART RATE: 68 BPM | OXYGEN SATURATION: 95 % | BODY MASS INDEX: 27.21 KG/M2 | WEIGHT: 144 LBS

## 2024-01-11 DIAGNOSIS — H10.31 ACUTE BACTERIAL CONJUNCTIVITIS OF RIGHT EYE: ICD-10-CM

## 2024-01-11 DIAGNOSIS — R05.1 ACUTE COUGH: Primary | ICD-10-CM

## 2024-01-11 DIAGNOSIS — J01.40 ACUTE PANSINUSITIS, RECURRENCE NOT SPECIFIED: Chronic | ICD-10-CM

## 2024-01-11 LAB
EXPIRATION DATE: NORMAL
FLUAV AG UPPER RESP QL IA.RAPID: NOT DETECTED
FLUBV AG UPPER RESP QL IA.RAPID: NOT DETECTED
INTERNAL CONTROL: NORMAL
Lab: NORMAL
SARS-COV-2 AG UPPER RESP QL IA.RAPID: NOT DETECTED

## 2024-01-11 PROCEDURE — 3078F DIAST BP <80 MM HG: CPT | Performed by: NURSE PRACTITIONER

## 2024-01-11 PROCEDURE — 1159F MED LIST DOCD IN RCRD: CPT | Performed by: NURSE PRACTITIONER

## 2024-01-11 PROCEDURE — 3074F SYST BP LT 130 MM HG: CPT | Performed by: NURSE PRACTITIONER

## 2024-01-11 PROCEDURE — 99213 OFFICE O/P EST LOW 20 MIN: CPT | Performed by: NURSE PRACTITIONER

## 2024-01-11 PROCEDURE — 87428 SARSCOV & INF VIR A&B AG IA: CPT | Performed by: NURSE PRACTITIONER

## 2024-01-11 PROCEDURE — 1160F RVW MEDS BY RX/DR IN RCRD: CPT | Performed by: NURSE PRACTITIONER

## 2024-01-11 RX ORDER — METHYLPREDNISOLONE 4 MG/1
TABLET ORAL
Qty: 21 TABLET | Refills: 0 | Status: SHIPPED | OUTPATIENT
Start: 2024-01-11

## 2024-01-11 RX ORDER — DOXYCYCLINE HYCLATE 100 MG/1
100 CAPSULE ORAL 2 TIMES DAILY
Qty: 20 CAPSULE | Refills: 0 | Status: SHIPPED | OUTPATIENT
Start: 2024-01-11

## 2024-01-11 RX ORDER — POLYMYXIN B SULFATE AND TRIMETHOPRIM 1; 10000 MG/ML; [USP'U]/ML
1 SOLUTION OPHTHALMIC EVERY 4 HOURS
Qty: 10 ML | Refills: 0 | Status: SHIPPED | OUTPATIENT
Start: 2024-01-11

## 2024-01-11 NOTE — TELEPHONE ENCOUNTER
Caller: Lizette Frias    Relationship: Self    Best call back number: [phone]     What medication are you requesting: EYE DROPS, SOMETHING TO HELP WITH SYMPTOMS     What are your current symptoms: REDNESS, SWELLING, GREEN DISCHARGE FROM THE RIGHT EYE    How long have you been experiencing symptoms: SINCE 1/10    If a prescription is needed, what is your preferred pharmacy and phone number: [facility] #[id number] - [place], [place] - [address] [place] [address] [phone] [facility] [phone]      Additional notes:  PLEASE CALL AND ADVISE IF THIS CAN BE SENT TO PHARMACY WITHOUT APPOINTMENT.

## 2024-01-11 NOTE — PROGRESS NOTES
Patient Name: Lizette Frias  : 1965   MRN: [id number]     Chief Complaint:    Chief Complaint   Patient presents with    Cough     Congestion  Green mucus  Headache in the mornings       History of Present Illness: Lizette Frias is a 58 y.o. female presents to clinic for cough, congestion, green nasal drainage and headache.  Symptoms started 6 days ago.  Yesterday patient developed right eye redness and matting.  She does not wear contacts.  She has been taking over-the-counter medications and Robitussin without relief.  Initially she tested negative for COVID.    Subjective     Review of System: Review of Systems     Medications:     Current Outpatient Medications:     atorvastatin (LIPITOR) 80 MG tablet, Take 1 tablet by mouth Every Night., Disp: 90 tablet, Rfl: 3    buPROPion XL (WELLBUTRIN XL) 300 MG 24 hr tablet, TAKE 1 TABLET BY MOUTH EVERY DAY IN THE MORNING, Disp: 90 tablet, Rfl: 0    calcium carbonate-vitamin d 600-400 MG-UNIT per tablet, Take 1 tablet by mouth Daily., Disp: , Rfl:     icosapent ethyl (Vascepa) 1 g capsule capsule, Take 2 g by mouth 2 (Two) Times a Day With Meals., Disp: 360 capsule, Rfl: 3    levothyroxine (SYNTHROID, LEVOTHROID) 200 MCG tablet, TAKE 1 TABLET BY MOUTH EVERY DAY IN THE MORNING, Disp: 90 tablet, Rfl: 1    meloxicam (MOBIC) 15 MG tablet, Take 1 tablet by mouth Daily., Disp: , Rfl:     mirtazapine (REMERON) 15 MG tablet, TAKE 1 TABLET BY MOUTH EVERYDAY AT BEDTIME, Disp: 90 tablet, Rfl: 1    pantoprazole (PROTONIX) 40 MG EC tablet, Take 1 tablet by mouth 2 (Two) Times a Day. 30 minutes prior to first meal and 30 minutes prior to last meal of the day, Disp: 180 tablet, Rfl: 3    sucralfate (CARAFATE) 1 g tablet, Take 1 tablet by mouth 4 (Four) Times a Day., Disp: , Rfl:     tacrolimus (PROGRAF) 0.5 MG capsule, Take 2 capsules by mouth 2 (Two) Times a Day., Disp: , Rfl:     telmisartan (MICARDIS) 20 MG tablet, Take 1 tablet by mouth Daily., Disp: 90 tablet, Rfl: 1     traZODone (DESYREL) 100 MG tablet, Take 2 tablets by mouth Every Night., Disp: 180 tablet, Rfl: 1    Triamcinolone Acetonide (NASACORT) 55 MCG/ACT nasal inhaler, 2 sprays into the nostril(s) as directed by provider Daily., Disp: 6.8 mL, Rfl: 1    zolpidem (AMBIEN) 5 MG tablet, Take 1 tablet by mouth Every Night., Disp: , Rfl:     denosumab (Prolia) 60 MG/ML solution prefilled syringe syringe, Inject 1 mL under the skin into the appropriate area as directed Every 6 (Six) Months for 2 doses., Disp: 1 mL, Rfl: 1    doxycycline (VIBRAMYCIN) 100 MG capsule, Take 1 capsule by mouth 2 (Two) Times a Day., Disp: 20 capsule, Rfl: 0    methylPREDNISolone (MEDROL) 4 MG dose pack, Take as directed on package instructions., Disp: 21 tablet, Rfl: 0    trimethoprim-polymyxin b (POLYTRIM) 03379-7.1 UNIT/ML-% ophthalmic solution, Administer 1 drop to the right eye Every 4 (Four) Hours. For 5-7 days, Disp: 10 mL, Rfl: 0    Allergies:   Allergies   Allergen Reactions    Penicillins Shortness Of Breath, Itching, Other (See Comments) and Rash    Cephalosporins Diarrhea    Cyclosporine Swelling       Objective     Physical Exam:   Vital Signs:   Vitals:    01/11/24 1041   BP: 100/56   BP Location: Right arm   Patient Position: Sitting   Cuff Size: Adult   Pulse: 68   Resp: 16   Temp: 97.7 °F (36.5 °C)   TempSrc: Temporal   SpO2: 95%   Weight: 65.3 kg (144 lb)   PainSc: 0-No pain         Physical Exam  Constitutional:       General: She is not in acute distress.     Appearance: She is not ill-appearing.   HENT:      Head: Normocephalic.      Nose:      Right Sinus: Maxillary sinus tenderness and frontal sinus tenderness present.      Left Sinus: Maxillary sinus tenderness and frontal sinus tenderness present.   Eyes:      Conjunctiva/sclera:      Right eye: Right conjunctiva is injected.      Comments: Dried drainage around lash line.    Cardiovascular:      Rate and Rhythm: Normal rate and regular rhythm.      Heart sounds: Normal heart  sounds. No murmur heard.  Pulmonary:      Breath sounds: Normal breath sounds.   Abdominal:      General: Bowel sounds are normal.      Tenderness: There is no abdominal tenderness.   Lymphadenopathy:      Cervical: No cervical adenopathy.   Neurological:      General: No focal deficit present.      Mental Status: She is oriented to person, place, and time.   Psychiatric:         Mood and Affect: Mood normal.         Assessment / Plan      Assessment/Plan:   Diagnoses and all orders for this visit:    1. Acute cough (Primary)  -     POCT SARS-CoV-2 + Flu Antigen VICKI; Future  -     POCT SARS-CoV-2 + Flu Antigen VICKI    2. Acute bacterial conjunctivitis of right eye  -     trimethoprim-polymyxin b (POLYTRIM) 20951-5.1 UNIT/ML-% ophthalmic solution; Administer 1 drop to the right eye Every 4 (Four) Hours. For 5-7 days  Dispense: 10 mL; Refill: 0    3. Acute pansinusitis, recurrence not specified  -     doxycycline (VIBRAMYCIN) 100 MG capsule; Take 1 capsule by mouth 2 (Two) Times a Day.  Dispense: 20 capsule; Refill: 0  -     methylPREDNISolone (MEDROL) 4 MG dose pack; Take as directed on package instructions.  Dispense: 21 tablet; Refill: 0      1. Conjunctivitis and sinusitis symptoms.  COVID-19 and flu were negative.  Antibiotic eyedrops prescribed for conjunctivitis. For sinusitis symptoms, will send in doxycycline and Medrol Dosepak. The patient is to take doxycycline with food and not to lie down for 30 minutes. Recommend a probiotic while taking antibiotic.  She is to take Medrol Dosepak with food. She can take Tylenol or Advil as needed for fever and pain.        Follow Up:     The patient will follow up if no improvement or in 4 to 5 days or sooner if worsening. If she has severe symptoms, she is to go to the ER.    AISLINN Hurd  [facility]    Transcribed from ambient dictation for AISLINN Hurd by Keyonna Lagos.  01/11/24   11:37 EST    Patient or  patient representative verbalized consent to the visit recording.  I have personally performed the services described in this document as transcribed by the above individual, and it is both accurate and complete.

## 2024-01-16 ENCOUNTER — HOSPITAL ENCOUNTER (OUTPATIENT)
Dept: MRI IMAGING | Facility: HOSPITAL | Age: 59
Discharge: HOME | End: 2024-01-16
Admitting: STUDENT IN AN ORGANIZED HEALTH CARE EDUCATION/TRAINING PROGRAM
Payer: MEDICARE

## 2024-01-16 DIAGNOSIS — M25.551 CHRONIC RIGHT HIP PAIN: ICD-10-CM

## 2024-01-16 DIAGNOSIS — G89.29 CHRONIC RIGHT HIP PAIN: ICD-10-CM

## 2024-01-16 PROCEDURE — 73721 MRI JNT OF LWR EXTRE W/O DYE: CPT

## 2024-01-17 ENCOUNTER — TELEPHONE (OUTPATIENT)
Dept: INTERNAL MEDICINE | Facility: CLINIC | Age: 59
End: 2024-01-17
Payer: MEDICARE

## 2024-01-17 DIAGNOSIS — S32.401D CLOSED NONDISPLACED FRACTURE OF RIGHT ACETABULUM WITH ROUTINE HEALING, UNSPECIFIED PORTION OF ACETABULUM, SUBSEQUENT ENCOUNTER: Primary | ICD-10-CM

## 2024-01-17 NOTE — TELEPHONE ENCOUNTER
Orthopedic surgery referral placed due to right acetabular fracture likely associated with osteoporosis which is currently being treated with Prolia.

## 2024-01-17 NOTE — TELEPHONE ENCOUNTER
PA submitted via phone to [facility] at [phone].     Reference Number: [id number]  Fax number for clinicals is [phone]

## 2024-01-18 ENCOUNTER — OFFICE VISIT (OUTPATIENT)
Dept: INTERNAL MEDICINE | Facility: CLINIC | Age: 59
End: 2024-01-18
Payer: MEDICARE

## 2024-01-18 VITALS
BODY MASS INDEX: 26.64 KG/M2 | WEIGHT: 141 LBS | SYSTOLIC BLOOD PRESSURE: 108 MMHG | TEMPERATURE: 97.3 F | DIASTOLIC BLOOD PRESSURE: 72 MMHG | HEART RATE: 76 BPM | RESPIRATION RATE: 16 BRPM

## 2024-01-18 DIAGNOSIS — M81.8 OTHER OSTEOPOROSIS WITHOUT CURRENT PATHOLOGICAL FRACTURE: ICD-10-CM

## 2024-01-18 DIAGNOSIS — F41.9 ANXIETY AND DEPRESSION: ICD-10-CM

## 2024-01-18 DIAGNOSIS — R53.82 CHRONIC FATIGUE: ICD-10-CM

## 2024-01-18 DIAGNOSIS — R74.8 ALKALINE PHOSPHATASE ELEVATION: ICD-10-CM

## 2024-01-18 DIAGNOSIS — R76.8 POSITIVE ANA (ANTINUCLEAR ANTIBODY): ICD-10-CM

## 2024-01-18 DIAGNOSIS — F32.A ANXIETY AND DEPRESSION: ICD-10-CM

## 2024-01-18 DIAGNOSIS — M88.9 PAGET DISEASE OF BONE: ICD-10-CM

## 2024-01-18 DIAGNOSIS — E89.0 POSTOPERATIVE HYPOTHYROIDISM: ICD-10-CM

## 2024-01-18 DIAGNOSIS — Z59.819 HOUSING INSECURITY: ICD-10-CM

## 2024-01-18 DIAGNOSIS — G25.81 RESTLESS LEG SYNDROME: ICD-10-CM

## 2024-01-18 DIAGNOSIS — S32.401D CLOSED NONDISPLACED FRACTURE OF RIGHT ACETABULUM WITH ROUTINE HEALING, UNSPECIFIED PORTION OF ACETABULUM, SUBSEQUENT ENCOUNTER: Primary | ICD-10-CM

## 2024-01-18 PROBLEM — J18.9 COMMUNITY ACQUIRED PNEUMONIA: Status: RESOLVED | Noted: 2023-03-16 | Resolved: 2024-01-18

## 2024-01-18 PROBLEM — J96.01 ACUTE HYPOXEMIC RESPIRATORY FAILURE DUE TO COVID-19: Status: RESOLVED | Noted: 2022-02-01 | Resolved: 2024-01-18

## 2024-01-18 PROBLEM — R73.03 PREDIABETES: Status: RESOLVED | Noted: 2022-02-16 | Resolved: 2024-01-18

## 2024-01-18 PROBLEM — U07.1 ACUTE HYPOXEMIC RESPIRATORY FAILURE DUE TO COVID-19: Status: RESOLVED | Noted: 2022-02-01 | Resolved: 2024-01-18

## 2024-01-18 PROCEDURE — 84443 ASSAY THYROID STIM HORMONE: CPT | Performed by: STUDENT IN AN ORGANIZED HEALTH CARE EDUCATION/TRAINING PROGRAM

## 2024-01-18 PROCEDURE — 80053 COMPREHEN METABOLIC PANEL: CPT | Performed by: STUDENT IN AN ORGANIZED HEALTH CARE EDUCATION/TRAINING PROGRAM

## 2024-01-18 PROCEDURE — 84436 ASSAY OF TOTAL THYROXINE: CPT | Performed by: STUDENT IN AN ORGANIZED HEALTH CARE EDUCATION/TRAINING PROGRAM

## 2024-01-18 RX ORDER — ROPINIROLE 2 MG/1
2 TABLET, FILM COATED ORAL NIGHTLY
Qty: 30 TABLET | Refills: 1 | Status: SHIPPED | OUTPATIENT
Start: 2024-01-18

## 2024-01-18 RX ORDER — SERTRALINE HYDROCHLORIDE 100 MG/1
100 TABLET, FILM COATED ORAL DAILY
Qty: 30 TABLET | Refills: 1 | Status: SHIPPED | OUTPATIENT
Start: 2024-01-18

## 2024-01-18 RX ORDER — SERTRALINE HYDROCHLORIDE 100 MG/1
100 TABLET, FILM COATED ORAL DAILY
Qty: 90 TABLET | Refills: 1 | OUTPATIENT
Start: 2024-01-18

## 2024-01-18 SDOH — ECONOMIC STABILITY - HOUSING INSECURITY: HOUSING INSTABILITY UNSPECIFIED: Z59.819

## 2024-01-18 NOTE — ASSESSMENT & PLAN NOTE
Ropinirole 2 mg discontinued 12/2023.  She continued use of ropinirole 2 mg taking 2 tablets nightly.  We will prescribe ropinirole 2 mg.  She will reduce ropinirole 2 mg from 2 tablets nightly to 1 tablet nightly.

## 2024-01-18 NOTE — ASSESSMENT & PLAN NOTE
We will order CMP.  Her GGT was normal.  We will recheck her alkaline phosphatase in 1 to 2 months.  We will proceed with bone scan if her alkaline phosphatase does continue improve.

## 2024-01-18 NOTE — PROGRESS NOTES
"    Follow Up Office Visit      Date: 2024   Patient Name: Lizette Frias  : 1965   MRN: [id number]     Chief Complaint:    Chief Complaint   Patient presents with    Follow-up       History of Present Illness: Lizette Frias is a 58 y.o. female who is here today for follow-up.    Orthopedics  She has an appointment with orthopedic surgery on 2024. She underwent an MRI  on 2024. She has had recent issues with her hip. She fell a month ago and suspects she fractured her hip at that time although her x-ray at that time did not show it. Her pain has improved. She is on her feet a lot at work. She had a DEXA scan of her hips. She reports taking a lot of steroids for a long period of time. She fractured her ankle in the past after falling on ice. She has a history of chronic back pain. She was informed her Prolia injections must be sent to our office.    Alkaline Phosphatase Elevation  Her alkaline phosphatase levels continue to increase slightly. She has a rheumatology appointment in 2024 or 2024. She reports fatigue in the form of sleeping often. She has been on 200 mg of a thyroid medication for a few weeks.    Anxiety and Depression  She reports having one episode where she was unable to wake up. She attributes that episode to anxiety. She then resumed taking sertraline 100 mg. She states her mood symptoms became the worst she had experienced in 2023. She expresses desire to attempt to wean off sertraline again in the spring when her divorce is final. She reports feeling better after resuming sertraline 100 mg.    Sleep Disturbance  She reports taking trazadone 200 mg and Remeron. She expresses desire to reduce her trazadone dosage. She was prescribed mirtazapine to replace Ambien 5 mg. Her mirtazapine was decreased at her last visit. She reports having no \"bad nights\" in the last week since beginning to take hot baths before bed. She reports some nights getting only 1 to 2 hours " "of sleep then going to work the next day. She states mirtazapine finally resolved her insomnia. She was previously prescribed ropinirole by another provider. At her last visit she expressed that ropinirole was not working well with continued \"bad nights\". We discontinued her ropinirole 12/2023. She continues to take two ropinirole 2 mg tablets at night for restless leg. She expresses desire to reduce her ropinirole 2 mg to 1 tablet nightly.    Transplant  She missed her appointment with the transplant clinic because she was sick. She is working on rescheduling it. She states their office needs her medical records from [place].    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  She confirms a  reached out to her for housing issues. Ultimately there is not much she can do for her housing wise until her divorce is final.    Subjective      Review of Systems:   Review of Systems   Constitutional:  Positive for fatigue.   Musculoskeletal:  Positive for back pain and joint swelling.   All other systems reviewed and are negative.      I have reviewed the patients family history, social history, past medical history, past surgical history and have updated it as appropriate.     Medications:     Current Outpatient Medications:     atorvastatin (LIPITOR) 80 MG tablet, Take 1 tablet by mouth Every Night., Disp: 90 tablet, Rfl: 3    buPROPion XL (WELLBUTRIN XL) 300 MG 24 hr tablet, TAKE 1 TABLET BY MOUTH EVERY DAY IN THE MORNING, Disp: 90 tablet, Rfl: 0    calcium carbonate-vitamin d 600-400 MG-UNIT per tablet, Take 1 tablet by mouth Daily., Disp: , Rfl:     doxycycline (VIBRAMYCIN) 100 MG capsule, Take 1 capsule by mouth 2 (Two) Times a Day., Disp: 20 capsule, Rfl: 0    icosapent ethyl (Vascepa) 1 g capsule capsule, Take 2 g by mouth 2 (Two) Times a Day With Meals., Disp: 360 capsule, Rfl: 3    levothyroxine (SYNTHROID, LEVOTHROID) 200 MCG tablet, TAKE 1 TABLET BY MOUTH EVERY " DAY IN THE MORNING, Disp: 90 tablet, Rfl: 1    meloxicam (MOBIC) 15 MG tablet, Take 1 tablet by mouth Daily., Disp: , Rfl:     pantoprazole (PROTONIX) 40 MG EC tablet, Take 1 tablet by mouth 2 (Two) Times a Day. 30 minutes prior to first meal and 30 minutes prior to last meal of the day, Disp: 180 tablet, Rfl: 3    sucralfate (CARAFATE) 1 g tablet, Take 1 tablet by mouth 4 (Four) Times a Day., Disp: , Rfl:     tacrolimus (PROGRAF) 0.5 MG capsule, Take 2 capsules by mouth 2 (Two) Times a Day., Disp: , Rfl:     telmisartan (MICARDIS) 20 MG tablet, Take 1 tablet by mouth Daily., Disp: 90 tablet, Rfl: 1    traZODone (DESYREL) 100 MG tablet, Take 2 tablets by mouth Every Night., Disp: 180 tablet, Rfl: 1    Triamcinolone Acetonide (NASACORT) 55 MCG/ACT nasal inhaler, 2 sprays into the nostril(s) as directed by provider Daily., Disp: 6.8 mL, Rfl: 1    rOPINIRole (REQUIP) 2 MG tablet, Take 1 tablet by mouth Every Night. Take 1 hour before bedtime., Disp: 30 tablet, Rfl: 1    sertraline (Zoloft) 100 MG tablet, Take 1 tablet by mouth Daily., Disp: 30 tablet, Rfl: 1  No current facility-administered medications for this visit.    Allergies:   Allergies   Allergen Reactions    Penicillins Shortness Of Breath, Itching, Other (See Comments) and Rash    Cephalosporins Diarrhea    Cyclosporine Swelling       Objective     Physical Exam: Please see above  Vital Signs:   Vitals:    01/18/24 1330   BP: 108/72   BP Location: Right arm   Patient Position: Sitting   Cuff Size: Adult   Pulse: 76   Resp: 16   Temp: 97.3 °F (36.3 °C)   TempSrc: Temporal   Weight: 64 kg (141 lb)   PainSc: 0-No pain     Body mass index is 26.64 kg/m².          Physical Exam  Vitals and nursing note reviewed.   Constitutional:       General: She is not in acute distress.     Appearance: Normal appearance. She is normal weight. She is not ill-appearing or toxic-appearing.   HENT:      Nose: No congestion or rhinorrhea.   Eyes:      General:         Right eye:  No discharge.         Left eye: No discharge.      Conjunctiva/sclera: Conjunctivae normal.   Pulmonary:      Effort: Pulmonary effort is normal. No respiratory distress.   Abdominal:      General: Abdomen is flat. There is no distension.   Musculoskeletal:      Cervical back: Normal range of motion.   Skin:     Coloration: Skin is not jaundiced.      Findings: No rash.   Neurological:      General: No focal deficit present.      Mental Status: She is alert. Mental status is at baseline.      Coordination: Coordination normal.      Gait: Gait normal.   Psychiatric:         Mood and Affect: Mood normal.         Behavior: Behavior normal.         Thought Content: Thought content normal.         Judgment: Judgment normal.         Procedures    Results: MRI was reviewed with the patient. Recent MRI revealed a nondisplaced fracture. The bone is not .    Labs: Labs were reviewed with the patient. Her alkaline phosphatase levels continue to increase. Her GGT is normal. Her KATIE is positive with a ratio of 1:40. Her TSH is elevated meaning her thyroid level was low.    Hemoglobin A1C   Date Value Ref Range Status   10/13/2023 5.1 4.5 - 5.7 % Final   02/20/2023 5.10 4.80 - 5.60 % Final     CREATININE   Date Value Ref Range Status   12/15/2023 40 mg/dL Final     Comment:     REFERENCE RANGE: Ref Range>=20     TSH   Date Value Ref Range Status   01/18/2024 0.190 (L) 0.270 - 4.200 uIU/mL Final   09/04/2019 5.685 (H) 0.300 - 5.000 uIU/mL Final        Imaging:   No valid procedures specified.     Assessment / Plan      Assessment/Plan:   Problem List Items Addressed This Visit       Other osteoporosis without current pathological fracture    Current Assessment & Plan     Her Prolia injections were sent to our office.  We will consult with the infusion center and administer Prolia or schedule her with their office accordingly.         Relevant Medications    denosumab (PROLIA) syringe 60 mg (Completed)    Anxiety and  depression    Overview     Failed multiple medications.  Great relationship with current counselor.         Current Assessment & Plan     We will update sertraline prescription to 100 mg and refill today.         Relevant Medications    sertraline (Zoloft) 100 MG tablet    Postoperative hypothyroidism    Overview     >>OVERVIEW FOR HYPOTHYROID WRITTEN ON 1/11/2022  4:53 PM BY JB AL MD    Due to remote thyroidectomy for cancer         Current Assessment & Plan     We will recheck TSH and T4.         Relevant Orders    TSH (Completed)    T4 (Completed)    Chronic fatigue    Current Assessment & Plan     Her last TSH was elevated.  We will order thyroid panel.  She will discontinue Remeron and monitor her symptoms afterwards.         Positive KATIE (antinuclear antibody)    Current Assessment & Plan     She will keep rheumatology appointment in 03/2024 or 04/2024.           Alkaline phosphatase elevation    Current Assessment & Plan     We will order CMP.  Her GGT was normal.  We will recheck her alkaline phosphatase in 1 to 2 months.  We will proceed with bone scan if her alkaline phosphatase does continue improve.         Relevant Orders    Comprehensive Metabolic Panel (Completed)    Housing insecurity    Current Assessment & Plan     She has consulted with case management.  She has a plan of action to acquire permanent housing.         Paget disease of bone    Current Assessment & Plan     Her Prolia injections were sent to our office.  We will consult with the infusion center and administer Prolia or schedule her with their office accordingly.         Closed nondisplaced fracture of right acetabulum with routine healing - Primary    Current Assessment & Plan     Her Prolia injections were sent to our office.  We will consult with the infusion center and administer Prolia or schedule her with their office accordingly.         Restless leg syndrome    Current Assessment & Plan     Ropinirole 2 mg  discontinued 12/2023.  She continued use of ropinirole 2 mg taking 2 tablets nightly.  We will prescribe ropinirole 2 mg.  She will reduce ropinirole 2 mg from 2 tablets nightly to 1 tablet nightly.         Relevant Medications    rOPINIRole (REQUIP) 2 MG tablet         Follow Up:   Return in about 4 weeks (around 2/15/2024) for Recheck.    I spent 45 minutes caring for Lizette on this date of service. This time includes time spent by me in the following activities: preparing for the visit, reviewing tests, obtaining and/or reviewing a separately obtained history, performing a medically appropriate examination and/or evaluation, counseling and educating the patient/family/caregiver, ordering medications, tests, or procedures, and documenting information in the medical record.     Transcribed from ambient dictation for Lars Fermin MD by Tina Manley.  01/18/24   16:49 EST    Patient or patient representative verbalized consent to the visit recording.  I have personally performed the services described in this document as transcribed by the above individual, and it is both accurate and complete.    Lars Fermin MD  [facility]

## 2024-01-18 NOTE — ASSESSMENT & PLAN NOTE
Her last TSH was elevated.  We will order thyroid panel.  She will discontinue Remeron and monitor her symptoms afterwards.

## 2024-01-18 NOTE — ASSESSMENT & PLAN NOTE
Her Prolia injections were sent to our office.  We will consult with the infusion center and administer Prolia or schedule her with their office accordingly.

## 2024-01-19 LAB
ALBUMIN SERPL-MCNC: 3.9 G/DL (ref 3.5–5.2)
ALBUMIN/GLOB SERPL: 1.6 G/DL
ALP SERPL-CCNC: 142 U/L (ref 39–117)
ALT SERPL W P-5'-P-CCNC: 36 U/L (ref 1–33)
ANION GAP SERPL CALCULATED.3IONS-SCNC: 9 MMOL/L (ref 5–15)
AST SERPL-CCNC: 20 U/L (ref 1–32)
BILIRUB SERPL-MCNC: 0.6 MG/DL (ref 0–1.2)
BUN SERPL-MCNC: 27 MG/DL (ref 6–20)
BUN/CREAT SERPL: 40.3 (ref 7–25)
CALCIUM SPEC-SCNC: 9.3 MG/DL (ref 8.6–10.5)
CHLORIDE SERPL-SCNC: 109 MMOL/L (ref 98–107)
CO2 SERPL-SCNC: 22 MMOL/L (ref 22–29)
CREAT SERPL-MCNC: 0.67 MG/DL (ref 0.57–1)
EGFRCR SERPLBLD CKD-EPI 2021: 100.8 ML/MIN/1.73
GLOBULIN UR ELPH-MCNC: 2.4 GM/DL
GLUCOSE SERPL-MCNC: 92 MG/DL (ref 65–99)
POTASSIUM SERPL-SCNC: 4.6 MMOL/L (ref 3.5–5.2)
PROT SERPL-MCNC: 6.3 G/DL (ref 6–8.5)
SODIUM SERPL-SCNC: 140 MMOL/L (ref 136–145)
T4 SERPL-MCNC: 9.13 MCG/DL (ref 4.5–11.7)
TSH SERPL DL<=0.05 MIU/L-ACNC: 0.19 UIU/ML (ref 0.27–4.2)

## 2024-01-23 ENCOUNTER — OFFICE VISIT (OUTPATIENT)
Dept: ORTHOPEDIC SURGERY | Facility: CLINIC | Age: 59
End: 2024-01-23
Payer: MEDICARE

## 2024-01-23 ENCOUNTER — OFFICE VISIT (OUTPATIENT)
Dept: PAIN MEDICINE | Facility: CLINIC | Age: 59
End: 2024-01-23
Payer: MEDICARE

## 2024-01-23 VITALS — BODY MASS INDEX: 27.19 KG/M2 | HEIGHT: 61 IN | WEIGHT: 144 LBS

## 2024-01-23 VITALS
SYSTOLIC BLOOD PRESSURE: 118 MMHG | BODY MASS INDEX: 26.64 KG/M2 | DIASTOLIC BLOOD PRESSURE: 78 MMHG | HEIGHT: 61 IN | WEIGHT: 141.09 LBS

## 2024-01-23 DIAGNOSIS — M47.816 SPONDYLOSIS OF LUMBAR REGION WITHOUT MYELOPATHY OR RADICULOPATHY: ICD-10-CM

## 2024-01-23 DIAGNOSIS — G62.9 PERIPHERAL POLYNEUROPATHY: Primary | ICD-10-CM

## 2024-01-23 DIAGNOSIS — S32.511A CLOSED FRACTURE OF SUPERIOR RAMUS OF RIGHT PUBIS, INITIAL ENCOUNTER: Primary | ICD-10-CM

## 2024-01-23 DIAGNOSIS — G89.29 CHRONIC LOW BACK PAIN, UNSPECIFIED BACK PAIN LATERALITY, UNSPECIFIED WHETHER SCIATICA PRESENT: Primary | ICD-10-CM

## 2024-01-23 DIAGNOSIS — Z51.81 ENCOUNTER FOR THERAPEUTIC DRUG LEVEL MONITORING: Primary | ICD-10-CM

## 2024-01-23 DIAGNOSIS — M54.50 CHRONIC LOW BACK PAIN, UNSPECIFIED BACK PAIN LATERALITY, UNSPECIFIED WHETHER SCIATICA PRESENT: Primary | ICD-10-CM

## 2024-01-23 PROCEDURE — 1159F MED LIST DOCD IN RCRD: CPT | Performed by: STUDENT IN AN ORGANIZED HEALTH CARE EDUCATION/TRAINING PROGRAM

## 2024-01-23 PROCEDURE — 99204 OFFICE O/P NEW MOD 45 MIN: CPT | Performed by: STUDENT IN AN ORGANIZED HEALTH CARE EDUCATION/TRAINING PROGRAM

## 2024-01-23 PROCEDURE — 1125F AMNT PAIN NOTED PAIN PRSNT: CPT | Performed by: STUDENT IN AN ORGANIZED HEALTH CARE EDUCATION/TRAINING PROGRAM

## 2024-01-23 PROCEDURE — 1160F RVW MEDS BY RX/DR IN RCRD: CPT | Performed by: STUDENT IN AN ORGANIZED HEALTH CARE EDUCATION/TRAINING PROGRAM

## 2024-01-23 RX ORDER — PREGABALIN 150 MG/1
150 CAPSULE ORAL 2 TIMES DAILY
Qty: 60 CAPSULE | Refills: 2 | Status: SHIPPED | OUTPATIENT
Start: 2024-01-23

## 2024-01-23 NOTE — PROGRESS NOTES
Referring Physician: Lars Fermin MD  [address]  [place] [address]    Primary Physician: Lars Fermin MD    CHIEF COMPLAINT or REASON FOR VISIT: Back Pain (New patient)      Initial history of present illness on 01/23/2024:  Ms. Lizette Frias is 58 y.o. female who presents as a new patient referral for evaluation treatment chronic axial back pain without radiation.  Ms. Frias does have a past medical history of CKD 2, GERD, CVA, gastroparesis, [place] cirrhosis status post liver transplant, Paget disease, meningioma, HLD, anxiety, depression, gout.  She describes a many year history of chronic axial back pain without radiation to lower extremities without any inciting or precipitating trauma.  She has previously been treated for bilateral hip osteoarthrosis most recently she had a fall in early January 2024 in which she sustained a increased pain/trauma to her right hip.  She did undergo MRI demonstrating a nondisplaced fracture of the right superior pubic roots for which she saw Dr. Bauer, orthopedic surgery, today who advised that this is a nonoperative condition which should resolve with time and conservative management.    She denies any past medical history of lumbar spine surgery or injection.  She has undergone a left hip injection with [facility] pain and spine in early 2024.  Additionally at that time she underwent Lyrica and oxycodone management.  I do not have the most recent office notes and is unclear to me why patient was discontinued but she did violate her opioid use agreement with taking unprescribed tramadol, being prescribed hydrocodone for a dental procedure, and having TGN her urinalysis.  Her left hip injection helped for about 50% for a week or 2.    Today she denies any significant hip pain, bursitis pain or sacroiliac type pain.  Patient denies any bowel or bladder dysfunction, lower extremity weakness, new onset saddle anesthesia or unexplained weight  loss.       Interval history:    Interventions:      Objective Pain Scoring:   BRIEF PAIN INVENTORY:  Total score:   Pain Score    24 1505   PainSc:   2   PainLoc: Back      PHQ-2: PHQ-2 Total Score: 2  PHQ-9: PHQ-9: Brief Depression Severity Measure Score: 12  Opioid Risk Tool:         Review of Systems:   ROS negative except as otherwise noted     Past Medical History:   Past Medical History:   Diagnosis Date    Acute hypoxemic respiratory failure due to COVID-19     Allergic     Environmental, pcn, cyclosporine    Anemia     Anxiety     Cervical disc disorder     Cholelithiasis     Chronic pain disorder     Cirrhosis of liver     Clotting disorder     Pre-transplant    Colon polyp     COVID     Deep vein thrombosis Pretransplant    Blood clot in liver    Depression     Diabetes     Endometriosis     Fractures     GERD (gastroesophageal reflux disease)     Headache     Headache, tension-type     Heart murmur     HL (hearing loss)     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, mastoid damage    Hypercholesteremia     Hypertension     Hyperthyroidism     Hypothyroid     Joint pain     Low back pain 1982    Meningioma     Migraine     Osteoarthritis     Osteopenia     Osteoporosis     Pancreatitis     Pretransplant    Prediabetes     Renal impairment     Severe malnutrition (CMS/HCC) 2022    Shingles     Stroke     Thyroid cancer     Vision impairment     left eye         Past Surgical History:   Past Surgical History:   Procedure Laterality Date    APPENDECTOMY      BREAST BIOPSY Left     BENIGN     SECTION      x3    CHOLECYSTECTOMY      COLONOSCOPY      D & C HYSTEROSCOPY LAPAROSCOPY      x2 for endometriosis    ENDOSCOPY      FRACTURE SURGERY  1983    Right ankle has screws    LIVER SURGERY      removed cysts     LIVER TRANSPLANTATION      ORIF ANKLE FRACTURE      ORTHOPEDIC SURGERY      THYROIDECTOMY      TONSILLECTOMY      TOTAL ABDOMINAL HYSTERECTOMY WITH SALPINGO OOPHORECTOMY Bilateral     TRIGGER  POINT INJECTION      UPPER GASTROINTESTINAL ENDOSCOPY           Family History   Family History   Problem Relation Age of Onset    Arthritis Mother     Osteoporosis Mother     Rheum arthritis Mother     Anemia Mother     Alcohol abuse Father     Mental illness Father     Hyperlipidemia Father     Hypertension Father     Colon cancer Father     Cancer Father     Hearing loss Father     ADD / ADHD Son     ADD / ADHD Son     Breast cancer Maternal Grandmother     Stroke Maternal Grandmother     Mental illness Paternal Grandmother     Ovarian cancer Paternal Grandmother          Social History   Social History     Socioeconomic History    Marital status: Legally    Tobacco Use    Smoking status: Never     Passive exposure: Never    Smokeless tobacco: Never   Vaping Use    Vaping Use: Never used   Substance and Sexual Activity    Alcohol use: No    Drug use: Never     Comment: Tried an edible last week.     Sexual activity: Not Currently     Partners: Male     Birth control/protection: Post-menopausal, Surgical        Medications:     Current Outpatient Medications:     atorvastatin (LIPITOR) 80 MG tablet, Take 1 tablet by mouth Every Night., Disp: 90 tablet, Rfl: 3    buPROPion XL (WELLBUTRIN XL) 300 MG 24 hr tablet, TAKE 1 TABLET BY MOUTH EVERY DAY IN THE MORNING, Disp: 90 tablet, Rfl: 0    calcium carbonate-vitamin d 600-400 MG-UNIT per tablet, Take 1 tablet by mouth Daily., Disp: , Rfl:     icosapent ethyl (Vascepa) 1 g capsule capsule, Take 2 g by mouth 2 (Two) Times a Day With Meals., Disp: 360 capsule, Rfl: 3    levothyroxine (SYNTHROID, LEVOTHROID) 200 MCG tablet, TAKE 1 TABLET BY MOUTH EVERY DAY IN THE MORNING, Disp: 90 tablet, Rfl: 1    meloxicam (MOBIC) 15 MG tablet, Take 1 tablet by mouth Daily., Disp: , Rfl:     pantoprazole (PROTONIX) 40 MG EC tablet, Take 1 tablet by mouth 2 (Two) Times a Day. 30 minutes prior to first meal and 30 minutes prior to last meal of the day, Disp: 180 tablet, Rfl: 3     "rOPINIRole (REQUIP) 2 MG tablet, Take 1 tablet by mouth Every Night. Take 1 hour before bedtime., Disp: 30 tablet, Rfl: 1    sucralfate (CARAFATE) 1 g tablet, Take 1 tablet by mouth 4 (Four) Times a Day., Disp: , Rfl:     tacrolimus (PROGRAF) 0.5 MG capsule, Take 2 capsules by mouth 2 (Two) Times a Day., Disp: , Rfl:     telmisartan (MICARDIS) 20 MG tablet, Take 1 tablet by mouth Daily., Disp: 90 tablet, Rfl: 1    traZODone (DESYREL) 100 MG tablet, Take 2 tablets by mouth Every Night., Disp: 180 tablet, Rfl: 1    Triamcinolone Acetonide (NASACORT) 55 MCG/ACT nasal inhaler, 2 sprays into the nostril(s) as directed by provider Daily., Disp: 6.8 mL, Rfl: 1    doxycycline (VIBRAMYCIN) 100 MG capsule, Take 1 capsule by mouth 2 (Two) Times a Day. (Patient not taking: Reported on 1/23/2024), Disp: 20 capsule, Rfl: 0    pregabalin (LYRICA) 150 MG capsule, Take 1 capsule by mouth 2 (Two) Times a Day., Disp: 60 capsule, Rfl: 2    sertraline (Zoloft) 100 MG tablet, Take 1 tablet by mouth Daily. (Patient not taking: Reported on 1/23/2024), Disp: 30 tablet, Rfl: 1        Physical Exam:     Vitals:    01/23/24 1505   Weight: 65.3 kg (144 lb)   Height: 154.9 cm (61\")   PainSc:   2   PainLoc: Back        General: Alert and oriented, No acute distress.   HEENT: Normocephalic, atraumatic.   Cardiovascular: No gross edema  Respiratory: Respirations are non-labored     Lumbar Spine:   No masses or atrophy  Range of motion - Flexion normal. Extension normal.    Facet Loading: Positive bilaterally  Facet Palpation -positive  PSIS tenderness - Negative bilaterally  Mckinley's/JOSHUA/Thigh thrust -   Straight leg raise/slump test: Negative bilaterally      Motor Exam:    Strength: Rate on 1-5 scale Right Left    C5-Elbow flexion, Deltoid 5/5  5/5    C6-Wrist extension 5/5  5/5    C7- Elbow / finger extension 5/5  5/5    C8- Finger flexion 5/5  5/5    T1- Intrinsics hand 5/5  5/5    Strength: Rate on 1-5 scale Right Left    L1/2- hip " flexion 5/5  5/5    L3- knee extension 5/5  5/5    L4- ankle dorsiflexion 5/5  5/5    L5- great toe extension 5/5  5/5    S1- ankle plantarflexion 5/5  5/5    Sensory Exam: Full and equal sensation to light touch throughout.     Neurologic: Cranial Nerves II-XII are grossly intact.      Psychiatric: Cooperative.   Gait: Normal   Assistive Devices: None    Imaging Studies:   No results found for this or any previous visit.      Impression & Plan:       01/23/2024: Lizette Frias is a 58 y.o. female with past medical history significant for CKD 2, GERD, CVA, gastroparesis, [place] cirrhosis status post liver transplant, Paget disease, meningioma, HLD, anxiety, depression, gout. who presents to the pain clinic for evaluation and treatment of chronic axial back pain.  Examination most consistent with lumbar facet pain.  I do not have any imaging for review today so I will order lumbar x-ray with flexion-extension as well as lumbar MRI.  Additionally I will give her physical therapy order and we will resume Lyrica.  I discussed with patient that we do not typically manage opioids and she may not be a candidate due to the prior history of violating opioid agreement.    1. Peripheral polyneuropathy    2. Spondylosis of lumbar region without myelopathy or radiculopathy        PLAN:  1. Medication Recommendations: Recommend Voltaren topical, NSAIDs, Tylenol.  Can trial turmeric 500 mg twice daily if NSAID contraindicated.  Start Lyrica 150 mg twice daily #1 refill.  As part of this patient's treatment plan, patient will be prescribed controlled substances. The patient has been made aware of appropriate use of such medications, including potential risk of somnolence, limited ability to drive and /or work safely, and potential for dependence or overdose. It has also been made clear that these medications are for use by this patient only, without concomitant use of alcohol or other substances unless prescribed.Controlled  substance status of medication discussed with patient, discussed risks of medication including abuse potential and diversion potential and need to follow up for reevaluation appointment in order to receive further refills.  John Paul was reviewed and compliant.    2. Physical Therapy: Referral given today    3. Psychological: defer.  Consider referral to [facility] for pain psychology    4. Complementary and alternative (CAM) Therapies:     5. Labs/Diagnostic studies: Obtain compliance urinalysis    6. Imaging: Order lumbar x-ray with flexion-extension.  Order lumbar MRI without contrast.    7. Interventions: Consider FRANCIS HEATON/CONSTANTINE.    8. Referrals: None indicated     9. Records:, Pain and spine notes reviewed, Dr. Rocha note reviewed    10. Lifestyle goals:    Follow-up 2 months for medication management      [facility]  Patrick Arellano MD

## 2024-01-23 NOTE — LETTER
January 23, 2024       No Recipients    Patient: Lizette Frias   YOB: 1965   Date of Visit: 1/23/2024       Dear Lars Fermin MD:    Thank you for referring Lizette Frias to me for evaluation. Below are the relevant portions of my assessment and plan of care.    Encounter Diagnosis and Orders:  Diagnoses and all orders for this visit:    1. Closed fracture of superior ramus of right pubis, initial encounter (Primary)        If you have questions, please do not hesitate to call me. I look forward to following Lizette along with you.         Sincerely,        Ramsey Bauer MD        CC:   No Recipients

## 2024-01-23 NOTE — PROGRESS NOTES
Orthopaedic Clinic Note: Hip New Patient    Chief Complaint   Patient presents with    Right Hip - Pain     Fall 2023        HPI  Consult from: Lars Fermin MD    Lizette Frias is a 58 y.o. female who presents with right hip pain for 2 month(s). Onset mechanical fall. Pain is localized to right groin and is a 1-2/10 on the pain scale.Pain is described as dull. Associated symptoms include pain.  The pain is worse with walking and standing; resting improve the pain. Previous treatments have included: nothing since symptom onset. Although some transient relief was reported with these interventions, these conservative measures have failed and symptoms have persisted. The patient is limited in daily activities and has had a significant decrease in quality of life as a result. She denies fevers, chills, or constitutional symptoms.    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, mastoid damage    Hypercholesteremia     Hypertension     Hyperthyroidism     Hypothyroid     Low back pain     Meningioma     Osteoarthritis     Osteopenia     Osteoporosis     Pancreatitis     Pretransplant    Prediabetes     Renal impairment     Severe malnutrition (CMS/HCC) 2022    Stroke     Thyroid cancer     Vision impairment     left eye      Past Surgical History:   Procedure Laterality Date    APPENDECTOMY      BREAST BIOPSY Left     BENIGN     SECTION      x3     CHOLECYSTECTOMY      COLONOSCOPY      D & C HYSTEROSCOPY LAPAROSCOPY      x2 for endometriosis    ENDOSCOPY      FRACTURE SURGERY  1983    Right ankle has screws    LIVER SURGERY      removed cysts     LIVER TRANSPLANTATION      ORIF ANKLE FRACTURE      THYROIDECTOMY      TONSILLECTOMY      TOTAL ABDOMINAL HYSTERECTOMY WITH SALPINGO OOPHORECTOMY Bilateral     UPPER GASTROINTESTINAL ENDOSCOPY        Family History   Problem Relation Age of Onset    Arthritis Mother     Osteoporosis Mother     Rheum arthritis Mother     Anemia Mother     Alcohol abuse Father     Mental illness Father     Hyperlipidemia Father     Hypertension Father     Colon cancer Father     Cancer Father     Hearing loss Father     ADD / ADHD Son     ADD / ADHD Son     Breast cancer Maternal Grandmother     Stroke Maternal Grandmother     Mental illness Paternal Grandmother     Ovarian cancer Paternal Grandmother      Social History     Socioeconomic History    Marital status: Legally    Tobacco Use    Smoking status: Never     Passive exposure: Never    Smokeless tobacco: Never   Vaping Use    Vaping Use: Never used   Substance and Sexual Activity    Alcohol use: No    Drug use: Never     Comment: Tried an edible last week.     Sexual activity: Not Currently     Partners: Male     Birth control/protection: Post-menopausal, Surgical      Current Outpatient Medications on File Prior to Visit   Medication Sig Dispense Refill    atorvastatin (LIPITOR) 80 MG tablet Take 1 tablet by mouth Every Night. 90 tablet 3    buPROPion XL (WELLBUTRIN XL) 300 MG 24 hr tablet TAKE 1 TABLET BY MOUTH EVERY DAY IN THE MORNING 90 tablet 0    calcium carbonate-vitamin d 600-400 MG-UNIT per tablet Take 1 tablet by mouth Daily.      doxycycline (VIBRAMYCIN) 100 MG capsule Take 1 capsule by mouth 2 (Two) Times a Day. 20 capsule 0    icosapent ethyl (Vascepa) 1 g capsule capsule Take 2 g by mouth 2 (Two) Times a Day With Meals. 360 capsule 3    levothyroxine  (SYNTHROID, LEVOTHROID) 200 MCG tablet TAKE 1 TABLET BY MOUTH EVERY DAY IN THE MORNING 90 tablet 1    meloxicam (MOBIC) 15 MG tablet Take 1 tablet by mouth Daily.      pantoprazole (PROTONIX) 40 MG EC tablet Take 1 tablet by mouth 2 (Two) Times a Day. 30 minutes prior to first meal and 30 minutes prior to last meal of the day 180 tablet 3    rOPINIRole (REQUIP) 2 MG tablet Take 1 tablet by mouth Every Night. Take 1 hour before bedtime. 30 tablet 1    sertraline (Zoloft) 100 MG tablet Take 1 tablet by mouth Daily. 30 tablet 1    sucralfate (CARAFATE) 1 g tablet Take 1 tablet by mouth 4 (Four) Times a Day.      tacrolimus (PROGRAF) 0.5 MG capsule Take 2 capsules by mouth 2 (Two) Times a Day.      telmisartan (MICARDIS) 20 MG tablet Take 1 tablet by mouth Daily. 90 tablet 1    traZODone (DESYREL) 100 MG tablet Take 2 tablets by mouth Every Night. 180 tablet 1    Triamcinolone Acetonide (NASACORT) 55 MCG/ACT nasal inhaler 2 sprays into the nostril(s) as directed by provider Daily. 6.8 mL 1     No current facility-administered medications on file prior to visit.      Allergies   Allergen Reactions    Penicillins Shortness Of Breath, Itching, Other (See Comments) and Rash    Cephalosporins Diarrhea    Cyclosporine Swelling        Review of Systems   Constitutional: Negative.    HENT: Negative.     Eyes: Negative.    Respiratory: Negative.     Cardiovascular: Negative.    Gastrointestinal: Negative.    Endocrine: Negative.    Genitourinary: Negative.    Musculoskeletal:  Positive for arthralgias.   Skin: Negative.    Allergic/Immunologic: Negative.    Neurological: Negative.    Hematological: Negative.    Psychiatric/Behavioral: Negative.          The patient's Review of Systems was personally reviewed and confirmed as accurate.    The following portions of the patient's history were reviewed and updated as appropriate: allergies, current medications, past family history, past medical history, past social history, past  "surgical history, and problem list.    Physical Exam  Blood pressure 118/78, height 154.9 cm (60.98\"), weight 64 kg (141 lb 1.5 oz), not currently breastfeeding.    Body mass index is 26.67 kg/m².    GENERAL APPEARANCE: awake, alert & oriented x 3, in no acute distress and well developed, well nourished  PSYCH: normal affect  LUNGS:  breathing nonlabored  EYES: sclera anicteric  CARDIOVASCULAR: palpable dorsalis pedis, palpable posterior tibial bilaterally. Capillary refill less than 2 seconds  EXTREMITIES: no clubbing, cyanosis  GAIT:  Normal           Right Hip Exam:  RANGE OF MOTION:   FLEXION CONTRACTURE: None   FLEXION: 110 degrees   INTERNAL ROTATION: 20 degrees at 90 degrees of flexion   EXTERNAL ROTATION: 40 degrees at 90 degrees of flexion    PAIN WITH HIP MOTION: no  PAIN WITH LOGROLL: no  STINCHFIELD TEST: negative    KNEE EXAM: full knee ROM (0-120 degrees), stable to varus and valgus stress at terminal extension and 30 degrees flexion     STRENGTH:  5/5 hip adduction, abduction, flexion. 5/5 strength knee flexion, extension. 5/5 strength ankle dorsiflexion and plantarflexion.     GREATER TROCHANTER BURSAL PAIN:  no     REFLEXES:   PATELLAR 2+/4   ACHILLES 2+/4    CLONUS: negative  STRAIGHT LEG TEST:   negative    SENSATION TO LIGHT TOUCH:  DEEP PERONEAL/SUPERFICIAL PERONEAL/SURAL/SAPHENOUS/TIBIAL:  intact    EDEMA:   no  ERYTHEMA:  no  WOUNDS/INCISIONS: no overlying skin problems.      Left Hip Exam:   RANGE OF MOTION:   FLEXION CONTRACTURE: None   FLEXION: 110 degrees   INTERNAL ROTATION: 20 degrees at 90 degrees of flexion   EXTERNAL ROTATION: 40 degrees at 90 degrees of flexion    PAIN WITH HIP MOTION: no  PAIN WITH LOGROLL: no  STINCHFIELD TEST: negative    KNEE EXAM: full knee ROM (0-120 degrees), stable to varus and valgus stress at terminal extension and 30 degrees flexion     STRENGTH:  5/5 hip adduction, abduction, flexion. 5/5 strength knee flexion, extension. 5/5 strength ankle dorsiflexion " and plantarflexion.     GREATER TROCHANTER BURSAL PAIN:  no     REFLEXES:   PATELLAR 2+/4   ACHILLES 2+/4    CLONUS: negative  STRAIGHT LEG TEST:   negative    SENSATION TO LIGHT TOUCH:  DEEP PERONEAL/SUPERFICIAL PERONEAL/SURAL/SAPHENOUS/TIBIAL:  intact    EDEMA:   no  ERYTHEMA:  no  WOUNDS/INCISIONS: no overlying skin problems.      ------------------------------------------------------------------    LEG LENGTHS:  equal  _____________________________________________________  _____________________________________________________    RADIOGRAPHIC FINDINGS:   MRI of the right hip from 1/16/2024 was personally interpreted.  MRI demonstrates mild arthritic changes of bilateral hips.  There is a stress reaction at the superior pubic root of the right acetabulum.  No significant joint effusion or bony edema within the femoral acetabular joints bilaterally.    Assessment/Plan:   Diagnosis Plan   1. Closed fracture of superior ramus of right pubis, initial encounter          Patient has a superior pubic root fracture.  This is treated nonoperatively with symptomatic treatment and weightbearing as tolerated.  She is already experiencing improvement since her fall on 1/29/2023.  I explained it takes 6 to 8 weeks for this to heal.  She may continue activity as tolerated with over-the-counter anti-inflammatories as needed.  She will follow-up as needed.    Ramsey Bauer MD  01/23/24  11:26 EST

## 2024-01-24 DIAGNOSIS — M47.816 SPONDYLOSIS OF LUMBAR REGION WITHOUT MYELOPATHY OR RADICULOPATHY: Primary | ICD-10-CM

## 2024-01-30 DIAGNOSIS — N18.2 STAGE 2 CHRONIC KIDNEY DISEASE: ICD-10-CM

## 2024-01-30 DIAGNOSIS — I95.2 HYPOTENSION DUE TO DRUGS: ICD-10-CM

## 2024-01-30 DIAGNOSIS — I25.10 CORONARY ARTERY DISEASE INVOLVING NATIVE CORONARY ARTERY OF NATIVE HEART WITHOUT ANGINA PECTORIS: ICD-10-CM

## 2024-01-30 RX ORDER — TELMISARTAN 20 MG/1
20 TABLET ORAL DAILY
Qty: 90 TABLET | Refills: 1 | Status: SHIPPED | OUTPATIENT
Start: 2024-01-30

## 2024-02-08 ENCOUNTER — TELEPHONE (OUTPATIENT)
Dept: INTERNAL MEDICINE | Facility: CLINIC | Age: 59
End: 2024-02-08
Payer: MEDICARE

## 2024-02-14 ENCOUNTER — OFFICE VISIT (OUTPATIENT)
Dept: GASTROENTEROLOGY | Facility: CLINIC | Age: 59
End: 2024-02-14
Payer: MEDICARE

## 2024-02-14 VITALS
WEIGHT: 143.2 LBS | HEIGHT: 61 IN | HEART RATE: 75 BPM | OXYGEN SATURATION: 98 % | DIASTOLIC BLOOD PRESSURE: 60 MMHG | BODY MASS INDEX: 27.03 KG/M2 | SYSTOLIC BLOOD PRESSURE: 112 MMHG

## 2024-02-14 DIAGNOSIS — J34.89 RHINORRHEA: ICD-10-CM

## 2024-02-14 DIAGNOSIS — K75.81 NONALCOHOLIC STEATOHEPATITIS (NASH): ICD-10-CM

## 2024-02-14 DIAGNOSIS — K21.00 GASTROESOPHAGEAL REFLUX DISEASE WITH ESOPHAGITIS WITHOUT HEMORRHAGE: Primary | ICD-10-CM

## 2024-02-14 DIAGNOSIS — Z87.19 HISTORY OF CONSTIPATION: ICD-10-CM

## 2024-02-14 DIAGNOSIS — R05.9 COUGH, UNSPECIFIED TYPE: ICD-10-CM

## 2024-02-14 DIAGNOSIS — Z98.890 HISTORY OF COLONOSCOPY: ICD-10-CM

## 2024-02-14 DIAGNOSIS — R49.0 HOARSENESS OF VOICE: ICD-10-CM

## 2024-02-14 DIAGNOSIS — K31.84 GASTROPARESIS: ICD-10-CM

## 2024-02-14 PROBLEM — D63.1 ANEMIA IN CHRONIC KIDNEY DISEASE: Status: ACTIVE | Noted: 2020-04-01

## 2024-02-14 PROBLEM — Z79.4 ENCOUNTER FOR LONG-TERM (CURRENT) USE OF INSULIN: Status: ACTIVE | Noted: 2020-12-21

## 2024-02-14 PROBLEM — N18.30 CHRONIC KIDNEY DISEASE, STAGE 3 UNSPECIFIED: Status: ACTIVE | Noted: 2020-03-20

## 2024-02-14 PROBLEM — I50.22 CHRONIC SYSTOLIC (CONGESTIVE) HEART FAILURE: Status: ACTIVE | Noted: 2021-12-03

## 2024-02-14 PROBLEM — N18.9 ANEMIA IN CHRONIC KIDNEY DISEASE: Status: ACTIVE | Noted: 2020-04-01

## 2024-02-14 PROBLEM — J44.9 CHRONIC OBSTRUCTIVE PULMONARY DISEASE, UNSPECIFIED: Status: ACTIVE | Noted: 2021-12-03

## 2024-02-14 PROBLEM — G47.30 SLEEP APNEA, UNSPECIFIED: Status: ACTIVE | Noted: 2020-04-01

## 2024-02-14 PROBLEM — G25.81 RESTLESS LEGS SYNDROME: Status: ACTIVE | Noted: 2020-04-01

## 2024-02-14 RX ORDER — MIRTAZAPINE 15 MG/1
15 TABLET, FILM COATED ORAL NIGHTLY
COMMUNITY
Start: 2024-02-10

## 2024-02-14 RX ORDER — IBUPROFEN 600 MG/1
600 TABLET ORAL EVERY 6 HOURS PRN
COMMUNITY
Start: 2023-11-30

## 2024-02-14 RX ORDER — DENOSUMAB 60 MG/ML
60 INJECTION SUBCUTANEOUS
COMMUNITY
Start: 2024-01-12

## 2024-02-14 RX ORDER — BENZONATATE 100 MG/1
100 CAPSULE ORAL
COMMUNITY

## 2024-02-16 ENCOUNTER — OFFICE VISIT (OUTPATIENT)
Dept: INTERNAL MEDICINE | Facility: CLINIC | Age: 59
End: 2024-02-16
Payer: MEDICARE

## 2024-02-16 VITALS
RESPIRATION RATE: 18 BRPM | BODY MASS INDEX: 26.87 KG/M2 | DIASTOLIC BLOOD PRESSURE: 76 MMHG | SYSTOLIC BLOOD PRESSURE: 124 MMHG | HEART RATE: 76 BPM | TEMPERATURE: 97.8 F | WEIGHT: 142.2 LBS

## 2024-02-16 DIAGNOSIS — J15.9 BACTERIAL PNEUMONIA: Primary | ICD-10-CM

## 2024-02-16 DIAGNOSIS — M81.8 OTHER OSTEOPOROSIS WITHOUT CURRENT PATHOLOGICAL FRACTURE: ICD-10-CM

## 2024-02-16 DIAGNOSIS — M72.2 PLANTAR FASCIITIS: ICD-10-CM

## 2024-02-16 RX ORDER — MELOXICAM 15 MG/1
15 TABLET ORAL DAILY
Qty: 14 TABLET | Refills: 0 | Status: SHIPPED | OUTPATIENT
Start: 2024-02-16 | End: 2024-03-01

## 2024-02-16 RX ORDER — AZITHROMYCIN 250 MG/1
TABLET, FILM COATED ORAL
Qty: 6 TABLET | Refills: 0 | Status: SHIPPED | OUTPATIENT
Start: 2024-02-16

## 2024-02-16 RX ORDER — CEFDINIR 300 MG/1
300 CAPSULE ORAL 2 TIMES DAILY
Qty: 10 CAPSULE | Refills: 0 | Status: SHIPPED | OUTPATIENT
Start: 2024-02-16 | End: 2024-02-21

## 2024-02-23 ENCOUNTER — HOSPITAL ENCOUNTER (OUTPATIENT)
Dept: MRI IMAGING | Facility: HOSPITAL | Age: 59
Discharge: HOME | End: 2024-02-23
Payer: MEDICARE

## 2024-02-23 ENCOUNTER — TELEPHONE (OUTPATIENT)
Dept: INTERNAL MEDICINE | Facility: CLINIC | Age: 59
End: 2024-02-23
Payer: MEDICARE

## 2024-02-23 DIAGNOSIS — M47.816 SPONDYLOSIS OF LUMBAR REGION WITHOUT MYELOPATHY OR RADICULOPATHY: ICD-10-CM

## 2024-02-23 DIAGNOSIS — B37.31 VULVOVAGINAL CANDIDIASIS: Primary | ICD-10-CM

## 2024-02-23 PROCEDURE — 72148 MRI LUMBAR SPINE W/O DYE: CPT

## 2024-02-23 RX ORDER — FLUCONAZOLE 150 MG/1
150 TABLET ORAL ONCE
Qty: 2 TABLET | Refills: 0 | Status: SHIPPED | OUTPATIENT
Start: 2024-02-23 | End: 2024-02-23

## 2024-02-23 NOTE — TELEPHONE ENCOUNTER
Caller: Lizette Frias    Relationship: Self    Best call back number: [phone]     What medication are you requesting: SOMETHING FOR YEAST INFECTION    What are your current symptoms: ITCHING IN GROIN-YEAST INFECTION    How long have you been experiencing symptoms: 2 DAYS    Have you had these symptoms before:    [] Yes  [x] No    Have you been treated for these symptoms before:   [] Yes  [x] No    If a prescription is needed, what is your preferred pharmacy and phone number: [id number] - [place], [place] - [address] [place] [address] - [phone] [facility] [phone]      Additional notes: PATIENT RECENTLY HAD AN ANTIBIOTIC THAT SHE BELIEVES HAS LEAD TO A YEAST INFECTION.

## 2024-02-26 NOTE — TELEPHONE ENCOUNTER
Pt notified and confirmed she received and is starting to feel better. Good understanding understanding.

## 2024-03-03 DIAGNOSIS — K21.00 GASTROESOPHAGEAL REFLUX DISEASE WITH ESOPHAGITIS WITHOUT HEMORRHAGE: ICD-10-CM

## 2024-03-03 DIAGNOSIS — E89.0 POSTOPERATIVE HYPOTHYROIDISM: ICD-10-CM

## 2024-03-11 DIAGNOSIS — K21.00 GASTROESOPHAGEAL REFLUX DISEASE WITH ESOPHAGITIS WITHOUT HEMORRHAGE: Primary | ICD-10-CM

## 2024-03-11 DIAGNOSIS — K31.84 GASTROPARESIS: ICD-10-CM

## 2024-03-11 RX ORDER — PANTOPRAZOLE SODIUM 40 MG/1
TABLET, DELAYED RELEASE ORAL
Qty: 180 TABLET | Refills: 3 | OUTPATIENT
Start: 2024-03-11

## 2024-03-11 RX ORDER — LEVOTHYROXINE SODIUM 175 UG/1
TABLET ORAL
Qty: 90 TABLET | Refills: 0 | OUTPATIENT
Start: 2024-03-11

## 2024-03-11 RX ORDER — PANTOPRAZOLE SODIUM 40 MG/1
40 TABLET, DELAYED RELEASE ORAL 2 TIMES DAILY
Qty: 180 TABLET | Refills: 3 | Status: SHIPPED | OUTPATIENT
Start: 2024-03-11

## 2024-03-11 NOTE — TELEPHONE ENCOUNTER
Rx Refill Note  Requested Prescriptions     Pending Prescriptions Disp Refills    pantoprazole (PROTONIX) 40 MG EC tablet [Pharmacy Med Name: PANTOPRAZOLE SOD DR 40 MG TAB] 180 tablet 3     Sig: TAKE 1 TABLET 2X A DAY. 30 MINUTES PRIOR TO FIRST MEAL&30 MINUTES PRIOR TO LAST MEAL OF THE DAY    levothyroxine (SYNTHROID, LEVOTHROID) 175 MCG tablet [Pharmacy Med Name: LEVOTHYROXINE 175 MCG TABLET] 90 tablet 0     Sig: TAKE 1 TABLET BY MOUTH EVERY DAY IN THE MORNING      Last office visit with prescribing clinician: 2/16/2024   Last telemedicine visit with prescribing clinician: Visit date not found   Next office visit with prescribing clinician: 4/11/2024                         Would you like a call back once the refill request has been completed: [] Yes [] No    If the office needs to give you a call back, can they leave a voicemail: [] Yes [] No    Chanel Mahajan MA  03/11/24, 09:02 EDT

## 2024-03-13 DIAGNOSIS — G25.81 RESTLESS LEG SYNDROME: ICD-10-CM

## 2024-03-13 RX ORDER — ROPINIROLE 2 MG/1
TABLET, FILM COATED ORAL
Qty: 30 TABLET | Refills: 1 | Status: SHIPPED | OUTPATIENT
Start: 2024-03-13

## 2024-03-15 ENCOUNTER — TELEPHONE (OUTPATIENT)
Dept: PAIN MEDICINE | Facility: CLINIC | Age: 59
End: 2024-03-15
Payer: MEDICARE

## 2024-03-15 NOTE — TELEPHONE ENCOUNTER
Left VM for patient to call the office regarding rescheduling her 3/22/2024 office visit with Pain management.

## 2024-03-17 DIAGNOSIS — F32.A ANXIETY AND DEPRESSION: ICD-10-CM

## 2024-03-17 DIAGNOSIS — F41.9 ANXIETY AND DEPRESSION: ICD-10-CM

## 2024-03-18 RX ORDER — BUPROPION HYDROCHLORIDE 300 MG/1
300 TABLET ORAL EVERY MORNING
Qty: 90 TABLET | Refills: 0 | Status: SHIPPED | OUTPATIENT
Start: 2024-03-18

## 2024-03-26 ENCOUNTER — OFFICE VISIT (OUTPATIENT)
Dept: PAIN MEDICINE | Facility: CLINIC | Age: 59
End: 2024-03-26
Payer: MEDICARE

## 2024-03-26 VITALS — WEIGHT: 144.4 LBS | HEIGHT: 61 IN | BODY MASS INDEX: 27.26 KG/M2

## 2024-03-26 DIAGNOSIS — G62.9 PERIPHERAL POLYNEUROPATHY: ICD-10-CM

## 2024-03-26 DIAGNOSIS — M47.816 SPONDYLOSIS OF LUMBAR REGION WITHOUT MYELOPATHY OR RADICULOPATHY: Primary | ICD-10-CM

## 2024-03-26 DIAGNOSIS — M54.50 CHRONIC LOW BACK PAIN, UNSPECIFIED BACK PAIN LATERALITY, UNSPECIFIED WHETHER SCIATICA PRESENT: ICD-10-CM

## 2024-03-26 DIAGNOSIS — G89.29 CHRONIC LOW BACK PAIN, UNSPECIFIED BACK PAIN LATERALITY, UNSPECIFIED WHETHER SCIATICA PRESENT: ICD-10-CM

## 2024-03-26 NOTE — PROGRESS NOTES
Referring Physician: No referring provider defined for this encounter.    Primary Physician: Lars Fermin MD    CHIEF COMPLAINT or REASON FOR VISIT: Follow-up (Discuss results of lumbar MRI) and Back Pain      Initial history of present illness on 01/23/2024:  Ms. Lizette Frias is 59 y.o. female who presents as a new patient referral for evaluation treatment chronic axial back pain without radiation.  Ms. Frias does have a past medical history of CKD 2, GERD, CVA, gastroparesis, [place] cirrhosis status post liver transplant, Paget disease, meningioma, HLD, anxiety, depression, gout.  She describes a many year history of chronic axial back pain without radiation to lower extremities without any inciting or precipitating trauma.  She has previously been treated for bilateral hip osteoarthrosis most recently she had a fall in early January 2024 in which she sustained a increased pain/trauma to her right hip.  She did undergo MRI demonstrating a nondisplaced fracture of the right superior pubic roots for which she saw Dr. Bauer, orthopedic surgery, today who advised that this is a nonoperative condition which should resolve with time and conservative management.    She denies any past medical history of lumbar spine surgery or injection.  She has undergone a left hip injection with [facility] in early 2024.  Additionally at that time she underwent Lyrica and oxycodone management.  I do not have the most recent office notes and is unclear to me why patient was discontinued but she did violate her opioid use agreement with taking unprescribed tramadol, being prescribed hydrocodone for a dental procedure, and having TGN her urinalysis.  Her left hip injection helped for about 50% for a week or 2.    Today she denies any significant hip pain, bursitis pain or sacroiliac type pain.  Patient denies any bowel or bladder dysfunction, lower extremity weakness, new onset saddle anesthesia or unexplained  weight loss.       Interval history: Patient returns to clinic today after undergoing a lumbar MRI.  Today she continues to complain of chronic low back pain without radiation into the bilateral lower extremity.  She does report an episode of acute exacerbation of low back pain that occurred on 3/23/2024 when she bent over to  an item.  She denies any bowel or bladder dysfunction, lower extremity weakness, new onset saddle anesthesia or unexplained weight loss.   Of note, patient received a liver transplant from her son in 2020 in [place].    Interventions:      Objective Pain Scoring:   BRIEF PAIN INVENTORY:  Total score:   Pain Score    03/26/24 1448   PainSc:   8   PainLoc: Back      PHQ-2: PHQ-2 Total Score: 5  PHQ-9: PHQ-9: Brief Depression Severity Measure Score: 16  Opioid Risk Tool:         Review of Systems:   ROS negative except as otherwise noted     Past Medical History:   Past Medical History:   Diagnosis Date    Acute hypoxemic respiratory failure due to COVID-19     Allergic     Environmental, pcn, cyclosporine    Anemia     Anxiety     Cervical disc disorder     Cholelithiasis     Chronic pain disorder     Cirrhosis of liver     Clotting disorder     Pre-transplant    Colon polyp     COVID     Deep vein thrombosis Pretransplant    Blood clot in liver    Depression     Diabetes     Endometriosis     Fractures     GERD (gastroesophageal reflux disease)     Headache     Headache, tension-type     Heart murmur 1969    HL (hearing loss)     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, mastoid damage    Hypercholesteremia     Hypertension     Hyperthyroidism 2001    Hypothyroid     Joint pain     Low back pain 1982    Meningioma     Migraine     Osteoarthritis     Osteopenia     Osteoporosis     Pancreatitis     Pretransplant    Prediabetes     Renal impairment     Severe malnutrition (CMS/HCC) 02/03/2022    Shingles     Stroke     Thyroid cancer     Vision impairment     left eye         Past Surgical History:    Past Surgical History:   Procedure Laterality Date    APPENDECTOMY      BREAST BIOPSY Left     BENIGN     SECTION      x3    CHOLECYSTECTOMY      COLONOSCOPY      D & C HYSTEROSCOPY LAPAROSCOPY      x2 for endometriosis    ENDOSCOPY      FRACTURE SURGERY      Right ankle has screws    LIVER SURGERY      removed cysts     LIVER TRANSPLANTATION      ORIF ANKLE FRACTURE      ORTHOPEDIC SURGERY      THYROIDECTOMY      TONSILLECTOMY      TOTAL ABDOMINAL HYSTERECTOMY WITH SALPINGO OOPHORECTOMY Bilateral     TRIGGER POINT INJECTION      UPPER GASTROINTESTINAL ENDOSCOPY           Family History   Family History   Problem Relation Age of Onset    Arthritis Mother     Osteoporosis Mother     Rheum arthritis Mother     Anemia Mother     Alcohol abuse Father     Mental illness Father     Hyperlipidemia Father     Hypertension Father     Colon cancer Father     Cancer Father     Hearing loss Father     ADD / ADHD Son     ADD / ADHD Son     Breast cancer Maternal Grandmother     Stroke Maternal Grandmother     Mental illness Paternal Grandmother     Ovarian cancer Paternal Grandmother          Social History   Social History     Socioeconomic History    Marital status:    Tobacco Use    Smoking status: Never     Passive exposure: Never    Smokeless tobacco: Never   Vaping Use    Vaping status: Never Used   Substance and Sexual Activity    Alcohol use: No    Drug use: Never     Comment: Tried an edible last week.     Sexual activity: Not Currently     Partners: Male     Birth control/protection: Post-menopausal, Surgical        Medications:     Current Outpatient Medications:     buPROPion XL (WELLBUTRIN XL) 300 MG 24 hr tablet, TAKE 1 TABLET BY MOUTH EVERY DAY IN THE MORNING, Disp: 90 tablet, Rfl: 0    calcium carbonate-vitamin d 600-400 MG-UNIT per tablet, Take 1 tablet by mouth Daily., Disp: , Rfl:     Cholecalciferol 50 MCG ( UT) capsule, Take 1 tablet by mouth Daily., Disp: , Rfl:     denosumab  "(Prolia) 60 MG/ML solution prefilled syringe syringe, Inject 1 mL under the skin into the appropriate area as directed., Disp: , Rfl:     ibuprofen (ADVIL,MOTRIN) 600 MG tablet, Take 1 tablet by mouth Every 6 (Six) Hours As Needed., Disp: , Rfl:     levothyroxine (SYNTHROID, LEVOTHROID) 200 MCG tablet, TAKE 1 TABLET BY MOUTH EVERY DAY IN THE MORNING, Disp: 90 tablet, Rfl: 1    Melatonin 3 MG capsule, Take 1 capsule by mouth every night at bedtime., Disp: , Rfl:     mirtazapine (REMERON) 15 MG tablet, Take 1 tablet by mouth Every Night., Disp: , Rfl:     pantoprazole (PROTONIX) 40 MG EC tablet, Take 1 tablet by mouth 2 (Two) Times a Day. 30 minutes prior to first meal and 30 minutes prior to last meal of the day, Disp: 180 tablet, Rfl: 3    pregabalin (LYRICA) 150 MG capsule, Take 1 capsule by mouth 2 (Two) Times a Day., Disp: 60 capsule, Rfl: 2    rOPINIRole (REQUIP) 2 MG tablet, TAKE 1 TABLET BY MOUTH DAILY AT NIGHT 1 HOUR BEFORE BEDTIME, Disp: 30 tablet, Rfl: 1    tacrolimus (PROGRAF) 0.5 MG capsule, Take 2 capsules by mouth 2 (Two) Times a Day., Disp: , Rfl:     telmisartan (MICARDIS) 20 MG tablet, TAKE 1 TABLET BY MOUTH EVERY DAY, Disp: 90 tablet, Rfl: 1    traZODone (DESYREL) 100 MG tablet, Take 2 tablets by mouth Every Night., Disp: 180 tablet, Rfl: 1        Physical Exam:     Vitals:    03/26/24 1448   Weight: 65.5 kg (144 lb 6.4 oz)   Height: 154.9 cm (60.98\")   PainSc:   8   PainLoc: Back        General: Alert and oriented, No acute distress.   HEENT: Normocephalic, atraumatic.   Cardiovascular: No gross edema  Respiratory: Respirations are non-labored     Lumbar Spine:   No masses or atrophy  Range of motion - Flexion normal. Extension normal.    Facet Loading: Positive bilaterally  Facet Palpation -positive  PSIS tenderness - Negative bilaterally  Mckinley's/JOSHUA/Thigh thrust -   Straight leg raise/slump test: Negative bilaterally      Motor Exam:    Strength: Rate on 1-5 scale Right Left    C5-Elbow " flexion, Deltoid 5/5  5/5    C6-Wrist extension 5/5  5/5    C7- Elbow / finger extension 5/5  5/5    C8- Finger flexion 5/5  5/5    T1- Intrinsics hand 5/5  5/5    Strength: Rate on 1-5 scale Right Left    L1/2- hip flexion 5/5  5/5    L3- knee extension 5/5  5/5    L4- ankle dorsiflexion 5/5  5/5    L5- great toe extension 5/5  5/5    S1- ankle plantarflexion 5/5  5/5    Sensory Exam: Full and equal sensation to light touch throughout.     Neurologic: Cranial Nerves II-XII are grossly intact.      Psychiatric: Cooperative.   Gait: Normal   Assistive Devices: None    Physical exam is consistent and accurate as of 3/26/2024    Imaging Studies:   MRI LUMBAR SPINE WO CONTRAST     Date of Exam: 2/23/2024 3:09 PM EST     Indication: Low back pain, no red flags, no prior management.     Comparison: None available.     Technique:  Routine multiplanar/multisequence sequence images of the lumbar spine were obtained without contrast administration.          Findings:   T1 marrow signal is preserved, without evidence of fracture or suspicious marrow replacing lesion. Alignment is anatomic, without evidence of significant listhesis or subluxation. The conus medullaris and cauda equina nerve roots are satisfactory in   appearance. The paraspinal soft tissues demonstrate no acute or suspicious findings. Multilevel spondylosis is present, with areas of involvement including     L1-2, no significant spinal canal or neuroforaminal impingement.     L2-3, no significant spinal canal or neuroforaminal impingement.     L3-4, small disc bulge and bilateral facet arthropathy. There is mild spinal canal and minimal bilateral neuroforaminal narrowing present.     L4-5, small circumferential disc bulge and bilateral facet arthropathy. There is no significant spinal canal narrowing or neuroforaminal stenosis..     L5-S1, circumferential disc bulge and mild facet arthropathy. The spinal canal is patent. There is some mild narrowing  of the left neural foramen.     IMPRESSION:  Impression:   Mild spondylosis changes are noted as above. There is no evidence of significant associated spinal canal or neuroforaminal impingement.     Electronically Signed: Jose J Bunch MD    2/25/2024 7:33 AM EST    Workstation ID: [id number]        Independent interpretation of radiographic imaging:  Lumbar MRI dated 2/23/2024 reveals DDD worse at L5/S1, no significant canal or neuroforaminal stenosis, facet arthropathy.         Impression & Plan:       01/23/2024: Lizette Frias is a 59 y.o. female with past medical history significant for CKD 2, GERD, CVA, gastroparesis, [place] cirrhosis status post liver transplant, Paget disease, meningioma, HLD, anxiety, depression, gout. who presents to the pain clinic for evaluation and treatment of chronic axial back pain.  Examination most consistent with lumbar facet pain.  I do not have any imaging for review today so I will order lumbar x-ray with flexion-extension as well as lumbar MRI.  Additionally I will give her physical therapy order and we will resume Lyrica.  I discussed with patient that we do not typically manage opioids and she may not be a candidate due to the prior history of violating opioid agreement.  3/26/2024: Patient returns to clinic after undergoing lumbar MRI.  Will refill pregabalin.  Will plan for bilateral L4/5 and L5/S1 LMBB/RFA    1. Spondylosis of lumbar region without myelopathy or radiculopathy    2. Chronic low back pain, unspecified back pain laterality, unspecified whether sciatica present    3. Peripheral polyneuropathy          PLAN:  1. Medication Recommendations: Recommend Voltaren topical, NSAIDs, Tylenol.  Can trial turmeric 500 mg twice daily if NSAID contraindicated.  Continue Lyrica 150 mg twice daily #2 refill.  As part of this patient's treatment plan, patient will be prescribed controlled substances. The patient has been made aware of appropriate use of such medications,  including potential risk of somnolence, limited ability to drive and /or work safely, and potential for dependence or overdose. It has also been made clear that these medications are for use by this patient only, without concomitant use of alcohol or other substances unless prescribed.Controlled substance status of medication discussed with patient, discussed risks of medication including abuse potential and diversion potential and need to follow up for reevaluation appointment in order to receive further refills.  John Paul was reviewed and compliant.    2. Physical Therapy:     3. Psychological: defer.     4. Complementary and alternative (CAM) Therapies:     5. Labs/Diagnostic studies: Obtain compliance urinalysis    6. Imagin. Interventions: Schedule bilateral L4/5 and L5/S1 medial branch blocks (01666, 13324). If the first block provides greater than 80% relief we will schedule a second set of medial branch blocks.  If second set of medial branch blocks provides at least 80% relief we will schedule rhizotomy.     8. Referrals: None indicated     9. Records:,    10. Lifestyle goals:    Follow-up 6 weeks for medication management, and 4 months from now.       [facility]  Barbara Cruz PA-C

## 2024-04-04 DIAGNOSIS — G25.81 RESTLESS LEG SYNDROME: ICD-10-CM

## 2024-04-04 NOTE — TELEPHONE ENCOUNTER
Caller: Lizette Frias    Relationship: Self    Best call back number: [phone]     Requested Prescriptions:   Requested Prescriptions     Pending Prescriptions Disp Refills    rOPINIRole (REQUIP) 2 MG tablet 30 tablet 1        Pharmacy where request should be sent: [facility]/PHARMACY #[id number] [place], [place] - [address] [place] [address] - [phone]  - [phone] FX     Last office visit with prescribing clinician: 2/16/2024   Last telemedicine visit with prescribing clinician: 11/3/2023   Next office visit with prescribing clinician: 4/12/2024     Additional details provided by patient: PATIENT HAS CALLED REQUESTING A NEW PRESCRIPTION ON ABOVE MEDICATION. PATIENT STATES NEW PRESCRIPTION NEEDS TO REFLECT TAKING TWO TABLETS AT BEDTIME SO THAT SHE WILL NOT RUN OUT EARLY.    Does the patient have less than a 3 day supply:  [x] Yes  [] No    Would you like a call back once the refill request has been completed: [] Yes [x] No    If the office needs to give you a call back, can they leave a voicemail: [] Yes [x] No    Garrett Bernal   04/04/24 16:00 EDT

## 2024-04-05 RX ORDER — ROPINIROLE 2 MG/1
2 TABLET, FILM COATED ORAL NIGHTLY
Qty: 30 TABLET | Refills: 1 | Status: SHIPPED | OUTPATIENT
Start: 2024-04-05

## 2024-04-08 RX ORDER — MULTIVITAMIN/IRON/FOLIC ACID 18MG-0.4MG
TABLET ORAL
Qty: 90 TABLET | Refills: 1 | Status: SHIPPED | OUTPATIENT
Start: 2024-04-08

## 2024-04-11 ENCOUNTER — OUTSIDE FACILITY SERVICE (OUTPATIENT)
Dept: PAIN MEDICINE | Facility: CLINIC | Age: 59
End: 2024-04-11
Payer: MEDICARE

## 2024-04-11 ENCOUNTER — DOCUMENTATION (OUTPATIENT)
Dept: PAIN MEDICINE | Facility: CLINIC | Age: 59
End: 2024-04-11

## 2024-04-11 NOTE — PROGRESS NOTES
[facility] [address] [address]        Patrick Arellano MD    PROCEDURE: Fluoroscopically-guided bilateral L3, L4, L5 lumbar Medial Branch Nerve Blocks targeting the bilateral L4/L5 and L5/S1 facet joints  PRE-OP DIAGNOSIS: Lumbar spondylosis  POST-OP DIAGNOSIS: Lumbar spondylosis    ANTIPLATELET/ANTICOAGULANT STOP DATE: Discussed with the patient held according to DENNIS guidelines    CONSENT: Risks, benefits and options were explained to the patient, all questions were answered and written informed consent was obtained.  ANESTHESIA:  Local only  PROCEDURE NOTE:  A pre-procedural time out was performed to confirm the correct patient, procedure, side, and site. A sterile field was prepped in standard fashion using Chlorhexidine and draped with sterile towels. The selected medial branch nerves were identified using an ipsilateral oblique fluoroscopic view. A 25 gauge 3.5 inch spinal needle was advanced using intermittent fluoroscopy toward the junction of the transverse process and superior articulating process targeting the above listed medial branch nerves.  The bilateral L5 primary dorsal ramus nerve was targeted at the junction of the sacral ala and the sacral articular process. Needle placement was confirmed with biplanar fluoroscopic imaging. Following negative aspiration, 1 mL of bupivacaine 0.5% was injected at each location. The needles were re-styletted and withdrawn. The patient's skin was cleaned with alcohol and the injection sites covered with bandages.   EBL: None  COMPLICATIONS: None      The patient was monitored until meeting established discharge criteria.  Vital signs remained stable throughout the procedure and in the recovery area. There were no immediate complications and the patient tolerated the procedure well. Sensory and motor exam was unchanged from baseline. The patient received written discharge instructions prior to discharge.  FOLLOW UP:  As scheduled  ADDITIONAL NOTES: Clinic staff will call to schedule #2 bilateral L4/L5 and L5/S1 medial branch blocks    [facility]    Patrick Arellano MD    Codes:  [id number]  [id number]

## 2024-04-12 ENCOUNTER — TELEPHONE (OUTPATIENT)
Dept: PAIN MEDICINE | Facility: CLINIC | Age: 59
End: 2024-04-12
Payer: MEDICARE

## 2024-04-12 ENCOUNTER — OFFICE VISIT (OUTPATIENT)
Dept: INTERNAL MEDICINE | Facility: CLINIC | Age: 59
End: 2024-04-12
Payer: MEDICARE

## 2024-04-12 VITALS
HEART RATE: 73 BPM | DIASTOLIC BLOOD PRESSURE: 72 MMHG | WEIGHT: 142.8 LBS | RESPIRATION RATE: 16 BRPM | BODY MASS INDEX: 27 KG/M2 | SYSTOLIC BLOOD PRESSURE: 108 MMHG | TEMPERATURE: 97.7 F

## 2024-04-12 DIAGNOSIS — Z12.11 SCREEN FOR COLON CANCER: ICD-10-CM

## 2024-04-12 DIAGNOSIS — E66.3 OVERWEIGHT (BMI 25.0-29.9): ICD-10-CM

## 2024-04-12 DIAGNOSIS — Z00.00 MEDICARE ANNUAL WELLNESS VISIT, SUBSEQUENT: Primary | ICD-10-CM

## 2024-04-12 DIAGNOSIS — I25.10 CORONARY ARTERY DISEASE INVOLVING NATIVE CORONARY ARTERY OF NATIVE HEART WITHOUT ANGINA PECTORIS: ICD-10-CM

## 2024-04-12 DIAGNOSIS — Z12.31 ENCOUNTER FOR SCREENING MAMMOGRAM FOR MALIGNANT NEOPLASM OF BREAST: ICD-10-CM

## 2024-04-12 DIAGNOSIS — M72.2 PLANTAR FASCIITIS: ICD-10-CM

## 2024-04-12 DIAGNOSIS — Z00.00 HEALTHCARE MAINTENANCE: ICD-10-CM

## 2024-04-12 DIAGNOSIS — Z23 ENCOUNTER FOR VACCINATION: ICD-10-CM

## 2024-04-12 DIAGNOSIS — M81.8 OTHER OSTEOPOROSIS WITHOUT CURRENT PATHOLOGICAL FRACTURE: ICD-10-CM

## 2024-04-12 DIAGNOSIS — R73.9 HYPERGLYCEMIA: ICD-10-CM

## 2024-04-12 DIAGNOSIS — M05.79 RHEUMATOID ARTHRITIS INVOLVING MULTIPLE SITES WITH POSITIVE RHEUMATOID FACTOR: ICD-10-CM

## 2024-04-12 DIAGNOSIS — M47.816 SPONDYLOSIS OF LUMBAR REGION WITHOUT MYELOPATHY OR RADICULOPATHY: Primary | ICD-10-CM

## 2024-04-12 DIAGNOSIS — Z94.4 LIVER TRANSPLANT RECIPIENT: ICD-10-CM

## 2024-04-12 DIAGNOSIS — E78.5 HYPERLIPIDEMIA LDL GOAL <70: ICD-10-CM

## 2024-04-12 DIAGNOSIS — Z13.9 ENCOUNTER FOR SCREENING: ICD-10-CM

## 2024-04-12 DIAGNOSIS — Z79.899 HIGH RISK MEDICATION USE: ICD-10-CM

## 2024-04-12 PROBLEM — Z79.4 ENCOUNTER FOR LONG-TERM (CURRENT) USE OF INSULIN: Status: RESOLVED | Noted: 2020-12-21 | Resolved: 2024-04-12

## 2024-04-12 PROBLEM — I50.22 CHRONIC SYSTOLIC (CONGESTIVE) HEART FAILURE: Status: RESOLVED | Noted: 2021-12-03 | Resolved: 2024-04-12

## 2024-04-12 PROBLEM — J44.9 CHRONIC OBSTRUCTIVE PULMONARY DISEASE, UNSPECIFIED: Status: RESOLVED | Noted: 2021-12-03 | Resolved: 2024-04-12

## 2024-04-12 PROBLEM — N18.30 CHRONIC KIDNEY DISEASE, STAGE 3 UNSPECIFIED: Status: RESOLVED | Noted: 2020-03-20 | Resolved: 2024-04-12

## 2024-04-12 LAB
25(OH)D3 SERPL-MCNC: 43.8 NG/ML (ref 30–100)
ALBUMIN SERPL-MCNC: 4.1 G/DL (ref 3.5–5.2)
ALBUMIN UR-MCNC: 3 MG/DL
ALBUMIN/GLOB SERPL: 1.5 G/DL
ALP SERPL-CCNC: 95 U/L (ref 39–117)
ALT SERPL W P-5'-P-CCNC: 27 U/L (ref 1–33)
ANION GAP SERPL CALCULATED.3IONS-SCNC: 9 MMOL/L (ref 5–15)
AST SERPL-CCNC: 17 U/L (ref 1–32)
BASOPHILS # BLD AUTO: 0.03 10*3/MM3 (ref 0–0.2)
BASOPHILS NFR BLD AUTO: 0.6 % (ref 0–1.5)
BILIRUB SERPL-MCNC: 0.5 MG/DL (ref 0–1.2)
BUN SERPL-MCNC: 20 MG/DL (ref 8–23)
BUN/CREAT SERPL: 29.9 (ref 7–25)
CALCIUM SPEC-SCNC: 9.2 MG/DL (ref 8.6–10.5)
CHLORIDE SERPL-SCNC: 107 MMOL/L (ref 98–107)
CHOLEST SERPL-MCNC: 187 MG/DL (ref 0–200)
CO2 SERPL-SCNC: 29 MMOL/L (ref 22–29)
CREAT SERPL-MCNC: 0.67 MG/DL (ref 0.57–1)
CREAT UR-MCNC: 163.7 MG/DL
DEPRECATED RDW RBC AUTO: 38.9 FL (ref 37–54)
EGFRCR SERPLBLD CKD-EPI 2021: 100.2 ML/MIN/1.73
EOSINOPHIL # BLD AUTO: 0.07 10*3/MM3 (ref 0–0.4)
EOSINOPHIL NFR BLD AUTO: 1.4 % (ref 0.3–6.2)
ERYTHROCYTE [DISTWIDTH] IN BLOOD BY AUTOMATED COUNT: 12.8 % (ref 12.3–15.4)
EXPIRATION DATE: NORMAL
EXPIRATION DATE: NORMAL
GLOBULIN UR ELPH-MCNC: 2.8 GM/DL
GLUCOSE SERPL-MCNC: 93 MG/DL (ref 65–99)
HBA1C MFR BLD: 4.8 % (ref 4.5–5.7)
HCT VFR BLD AUTO: 43 % (ref 34–46.6)
HDLC SERPL-MCNC: 27 MG/DL (ref 40–60)
HGB BLD-MCNC: 14.6 G/DL (ref 12–15.9)
IMM GRANULOCYTES # BLD AUTO: 0.03 10*3/MM3 (ref 0–0.05)
IMM GRANULOCYTES NFR BLD AUTO: 0.6 % (ref 0–0.5)
INR PPP: 1 (ref 0.9–1.1)
LDLC SERPL CALC-MCNC: 80 MG/DL (ref 0–100)
LDLC/HDLC SERPL: 2.21 {RATIO}
LYMPHOCYTES # BLD AUTO: 2.71 10*3/MM3 (ref 0.7–3.1)
LYMPHOCYTES NFR BLD AUTO: 52.4 % (ref 19.6–45.3)
Lab: NORMAL
Lab: NORMAL
MAGNESIUM SERPL-MCNC: 2.1 MG/DL (ref 1.6–2.4)
MCH RBC QN AUTO: 29 PG (ref 26.6–33)
MCHC RBC AUTO-ENTMCNC: 34 G/DL (ref 31.5–35.7)
MCV RBC AUTO: 85.5 FL (ref 79–97)
MICROALBUMIN/CREAT UR: 18.3 MG/G (ref 0–29)
MONOCYTES # BLD AUTO: 0.42 10*3/MM3 (ref 0.1–0.9)
MONOCYTES NFR BLD AUTO: 8.1 % (ref 5–12)
NEUTROPHILS NFR BLD AUTO: 1.91 10*3/MM3 (ref 1.7–7)
NEUTROPHILS NFR BLD AUTO: 36.9 % (ref 42.7–76)
NRBC BLD AUTO-RTO: 0 /100 WBC (ref 0–0.2)
PHOSPHATE SERPL-MCNC: 3.7 MG/DL (ref 2.5–4.5)
PLATELET # BLD AUTO: 186 10*3/MM3 (ref 140–450)
PMV BLD AUTO: 10.9 FL (ref 6–12)
POTASSIUM SERPL-SCNC: 4.5 MMOL/L (ref 3.5–5.2)
PROT SERPL-MCNC: 6.9 G/DL (ref 6–8.5)
RBC # BLD AUTO: 5.03 10*6/MM3 (ref 3.77–5.28)
SODIUM SERPL-SCNC: 145 MMOL/L (ref 136–145)
TRIGL SERPL-MCNC: 501 MG/DL (ref 0–150)
VLDLC SERPL-MCNC: 80 MG/DL (ref 5–40)
WBC NRBC COR # BLD AUTO: 5.17 10*3/MM3 (ref 3.4–10.8)

## 2024-04-12 PROCEDURE — 84100 ASSAY OF PHOSPHORUS: CPT | Performed by: STUDENT IN AN ORGANIZED HEALTH CARE EDUCATION/TRAINING PROGRAM

## 2024-04-12 PROCEDURE — 80053 COMPREHEN METABOLIC PANEL: CPT | Performed by: STUDENT IN AN ORGANIZED HEALTH CARE EDUCATION/TRAINING PROGRAM

## 2024-04-12 PROCEDURE — 80061 LIPID PANEL: CPT | Performed by: STUDENT IN AN ORGANIZED HEALTH CARE EDUCATION/TRAINING PROGRAM

## 2024-04-12 PROCEDURE — 82306 VITAMIN D 25 HYDROXY: CPT | Performed by: STUDENT IN AN ORGANIZED HEALTH CARE EDUCATION/TRAINING PROGRAM

## 2024-04-12 PROCEDURE — 85025 COMPLETE CBC W/AUTO DIFF WBC: CPT | Performed by: STUDENT IN AN ORGANIZED HEALTH CARE EDUCATION/TRAINING PROGRAM

## 2024-04-12 PROCEDURE — 82043 UR ALBUMIN QUANTITATIVE: CPT | Performed by: STUDENT IN AN ORGANIZED HEALTH CARE EDUCATION/TRAINING PROGRAM

## 2024-04-12 PROCEDURE — 82570 ASSAY OF URINE CREATININE: CPT | Performed by: STUDENT IN AN ORGANIZED HEALTH CARE EDUCATION/TRAINING PROGRAM

## 2024-04-12 PROCEDURE — 83735 ASSAY OF MAGNESIUM: CPT | Performed by: STUDENT IN AN ORGANIZED HEALTH CARE EDUCATION/TRAINING PROGRAM

## 2024-04-12 RX ORDER — ICOSAPENT ETHYL 1000 MG/1
2 CAPSULE ORAL 2 TIMES DAILY WITH MEALS
COMMUNITY

## 2024-04-12 NOTE — TELEPHONE ENCOUNTER
FOLLOW-UP CALL AFTER PROCEDURE    I spoke with the patient regarding how she is feeling after her procedure with Dr. Arellano. Patient reports she is doing well.      Lizette Frias  underwent a bilateral L4/5 and L5/S1 medial branch blocks on 4/11/2024.      Patient reported 100% pain relief that lasted for multiple hours.      Today, the patient reports pain has returned to baseline after 24 hours      Patient denies side effects or complications  Patient does not have any questions or concerns at this time    Disposition:      I provided information regarding date of next procedure, and that the surgery center will call the day before with his /her arrival time. Patient verbalized understanding.      Location of procedure:[address] [place] [address] ([facility]) . Patient instructed to check in at the registration desk. Patient verbalized understanding      I advised that patient must have a , and that the  must remain in the premises until after the procedure is completed and the patient is ready to be discharged. Patient verbalized understanding.      Reminded patient of instructions to stop blood thinners when indicated    Reminded NPO status: Nothing by mouth (eat or drink) for at least 8 hours prior to the procedure. Patient can take medications with a sip of water. Patient verbalized understanding

## 2024-04-15 LAB
1OH-MIDAZOLAM UR QL SCN: NOT DETECTED NG/MG CREAT
6MAM UR QL SCN: NEGATIVE NG/ML
7AMINOCLONAZEPAM/CREAT UR: NOT DETECTED NG/MG CREAT
A-OH ALPRAZ/CREAT UR: NOT DETECTED NG/MG CREAT
A-OH-TRIAZOLAM/CREAT UR CFM: NOT DETECTED NG/MG CREAT
ACP UR QL CFM: NOT DETECTED
ALPRAZ/CREAT UR CFM: NOT DETECTED NG/MG CREAT
AMPHET UR CFM-MCNC: NOT DETECTED NG/MG CREAT
AMPHETAMINES UR QL SCN: NORMAL NG/ML
AMPHETAMINES UR QL: NEGATIVE
APAP UR QL SCN: NORMAL UG/ML
APAP UR QL: NORMAL
APAP UR-MCNC: PRESENT UG/ML
BARBITURATES UR QL SCN: NEGATIVE NG/ML
BENZODIAZ SCN METH UR: NEGATIVE
BUPRENORPHINE UR QL SCN: NEGATIVE
BUPRENORPHINE/CREAT UR: NOT DETECTED NG/MG CREAT
CANNABINOIDS UR QL SCN: NEGATIVE NG/ML
CARISOPRODOL UR QL: NEGATIVE NG/ML
CLONAZEPAM/CREAT UR CFM: NOT DETECTED NG/MG CREAT
COCAINE+BZE UR QL SCN: NEGATIVE NG/ML
CREAT UR-MCNC: 156 MG/DL
D-METHORPHAN UR-MCNC: NOT DETECTED NG/ML
D-METHORPHAN+LEVORPHANOL UR QL: NOT DETECTED
DESALKYLFLURAZ/CREAT UR: NOT DETECTED NG/MG CREAT
DIAZEPAM/CREAT UR: NOT DETECTED NG/MG CREAT
ETHANOL UR QL SCN: NEGATIVE G/DL
ETHANOL UR QL SCN: NEGATIVE NG/ML
FENTANYL CTO UR SCN-MCNC: NEGATIVE NG/ML
FENTANYL/CREAT UR: NOT DETECTED NG/MG CREAT
FLUNITRAZEPAM UR QL SCN: NOT DETECTED NG/MG CREAT
GABAPENTIN UR-MCNC: NEGATIVE UG/ML
HYPNOTICS UR QL SCN: NEGATIVE
KETAMINE UR QL: NOT DETECTED
LORAZEPAM/CREAT UR: NOT DETECTED NG/MG CREAT
MDA UR CFM-MCNC: NOT DETECTED NG/MG CREAT
MDMA UR CFM-MCNC: NOT DETECTED NG/MG CREAT
MEPERIDINE UR QL SCN: NEGATIVE NG/ML
METHADONE UR QL SCN: NEGATIVE NG/ML
METHADONE+METAB UR QL SCN: NEGATIVE NG/ML
METHAMPHET UR CFM-MCNC: NOT DETECTED NG/MG CREAT
MIDAZOLAM/CREAT UR CFM: NOT DETECTED NG/MG CREAT
MISCELLANEOUS, UR: NEGATIVE
NORBUPRENORPHINE/CREAT UR: NOT DETECTED NG/MG CREAT
NORDIAZEPAM/CREAT UR: NOT DETECTED NG/MG CREAT
NORFENTANYL/CREAT UR: NOT DETECTED NG/MG CREAT
NORFLUNITRAZEPAM UR-MCNC: NOT DETECTED NG/MG CREAT
NORKETAMINE UR-MCNC: NOT DETECTED UG/ML
OPIATES UR SCN-MCNC: NEGATIVE NG/ML
OTHER HALLUCINOGENS UR: NEGATIVE
OXAZEPAM/CREAT UR: NOT DETECTED NG/MG CREAT
OXYCODONE CTO UR SCN-MCNC: NEGATIVE NG/ML
PCP UR QL SCN: NEGATIVE NG/ML
PRESCRIBED MEDICATIONS: NORMAL
PROPOXYPH UR QL SCN: NEGATIVE NG/ML
TAPENTADOL CTO UR SCN-MCNC: NEGATIVE NG/ML
TEMAZEPAM/CREAT UR: NOT DETECTED NG/MG CREAT
TRAMADOL UR QL SCN: NEGATIVE NG/ML
ZALEPLON UR-MCNC: NOT DETECTED NG/ML
ZOLPIDEM PHENYL-4-CARB UR QL SCN: NOT DETECTED
ZOLPIDEM UR QL SCN: NOT DETECTED
ZOPICLONE-N-OXIDE UR-MCNC: NOT DETECTED NG/ML

## 2024-04-17 ENCOUNTER — TELEPHONE (OUTPATIENT)
Dept: INTERNAL MEDICINE | Facility: CLINIC | Age: 59
End: 2024-04-17
Payer: MEDICARE

## 2024-04-17 NOTE — TELEPHONE ENCOUNTER
Left voice mail message for Pt to call back for message.    Relay  Lars Fermin MD [facility]  Please let the patient know that her cholesterol levels are elevated and indicate elevated cardiovascular risk in addition to her risk for pancreatitis.  I would recommend utilizing rosuvastatin to decrease this risk.  This was sent to the patient's pharmacy.  The remainder of the attached labs are within normal limits.  Please let me know if the patient needs additional clarification and we will send her additional results as we receive them.

## 2024-04-17 NOTE — TELEPHONE ENCOUNTER
----- Message from Lars Fermin MD sent at 4/16/2024  4:47 PM EDT -----  Please let the patient know that her cholesterol levels are elevated and indicate elevated cardiovascular risk in addition to her risk for pancreatitis.  I would recommend utilizing rosuvastatin to decrease this risk.  This was sent to the patient's pharmacy.  The remainder of the attached labs are within normal limits.  Please let me know if the patient needs additional clarification and we will send her additional results as we receive them.

## 2024-04-18 DIAGNOSIS — Z94.4 LIVER TRANSPLANT RECIPIENT: ICD-10-CM

## 2024-04-18 DIAGNOSIS — F41.9 ANXIETY DISORDER, UNSPECIFIED: ICD-10-CM

## 2024-04-18 RX ORDER — MIRTAZAPINE 15 MG/1
15 TABLET, FILM COATED ORAL
Qty: 90 TABLET | Refills: 1 | Status: SHIPPED | OUTPATIENT
Start: 2024-04-18

## 2024-04-18 RX ORDER — SPIRONOLACTONE 25 MG/1
TABLET ORAL
Qty: 90 TABLET | Refills: 1 | Status: SHIPPED | OUTPATIENT
Start: 2024-04-18

## 2024-04-19 DIAGNOSIS — J30.1 NON-SEASONAL ALLERGIC RHINITIS DUE TO POLLEN: ICD-10-CM

## 2024-04-19 DIAGNOSIS — E89.0 POSTOPERATIVE HYPOTHYROIDISM: ICD-10-CM

## 2024-04-22 RX ORDER — LEVOTHYROXINE SODIUM 175 UG/1
TABLET ORAL
Qty: 90 TABLET | Refills: 0 | OUTPATIENT
Start: 2024-04-22

## 2024-04-22 RX ORDER — TRIAMCINOLONE ACETONIDE 55 UG/1
2 SPRAY, METERED NASAL DAILY
Qty: 16.9 EACH | Refills: 1 | OUTPATIENT
Start: 2024-04-22

## 2024-04-25 ENCOUNTER — TELEPHONE (OUTPATIENT)
Dept: INTERNAL MEDICINE | Facility: CLINIC | Age: 59
End: 2024-04-25
Payer: MEDICARE

## 2024-04-25 DIAGNOSIS — G25.81 RESTLESS LEG SYNDROME: ICD-10-CM

## 2024-04-25 RX ORDER — ROPINIROLE 2 MG/1
2 TABLET, FILM COATED ORAL NIGHTLY
Qty: 90 TABLET | Refills: 0 | Status: SHIPPED | OUTPATIENT
Start: 2024-04-25

## 2024-04-25 NOTE — TELEPHONE ENCOUNTER
Spoke to patient she stated that the sig says to take 1 or 2 as needed for sleep so the #30 is not enough for a 30 day supply she stated that she tried to use only 1 but is not getting any sleep.

## 2024-04-25 NOTE — TELEPHONE ENCOUNTER
Refill has been sent. Recommend against increasing dose from prescribed amount without discussion with Dr. Fermin.    Dr. Talbot

## 2024-04-25 NOTE — TELEPHONE ENCOUNTER
Caller: Lizette Frias    Relationship: Self    Best call back number:      Requested Prescriptions:   Requested Prescriptions     Pending Prescriptions Disp Refills    rOPINIRole (REQUIP) 2 MG tablet 30 tablet 1     Sig: Take 1 tablet by mouth Every Night.        Pharmacy where request should be sent: [facility]/PHARMACY #[id number] - [address] AT [facility] - [phone]  - [phone]     Last office visit with prescribing clinician: 4/12/2024   Last telemedicine visit with prescribing clinician: 11/3/2023   Next office visit with prescribing clinician: 7/16/2024     Additional details provided by patient: PATIENT IS OUT OF MEDICATION; SHE ADVISED THAT SHE NEEDS TO TAKE THIS TWICE AT NIGHT DUE TO THE PAIN IN HER LEGS AND ALSO SHE IS GETTING READY TO GO OUT OF TOWN TOMORROW; SHE SAID THAT ONE PILL DOESN'T WORK FOR HER    Does the patient have less than a 3 day supply:  [x] Yes  [] No    Would you like a call back once the refill request has been completed: [x] Yes [] No    If the office needs to give you a call back, can they leave a voicemail: [x] Yes [] No    Frantz Zamorano Rep   04/25/24 11:40 EDT

## 2024-04-29 NOTE — TELEPHONE ENCOUNTER
We can discuss filling earlier, but if her patient continues to increase, we will likely need to discuss additional diagnostic images to ensure there are not other causes of her pain.  Thanks.

## 2024-04-29 NOTE — TELEPHONE ENCOUNTER
Pt notified of message. Pt stated she paid only for a week worth of requip because she was heading out of town, so the prescription for twice at night should go through insurance.

## 2024-04-30 NOTE — TELEPHONE ENCOUNTER
Office Note - Neurosurgery   Sonia Human 60 y o  female MRN: [id number]      Assessment:  Patient is a 60years old woman with history of diabetes mellitus, left bundle branch block, carotid disease, cardiomyopathy, congestive heart failure, hypertension, and right paraclinoid meningioma here for 3 months follow-up  Had MRI of brain with and without contrast which demonstrates mild increment in size of tumor without significant mass effect  No surrounding vasogenic edema  Patient is clinically doing fine,  reports no headache, seizures, blurry vision, diplopia, dizziness, lightheadedness, nausea, vomiting, or weakness in the extremity  No gait issues or bowel bladder dysfunction  But  reports occasional word-finding issues  Exam-patient alert and oriented times, PERRL, EOMI, 2 mm   conjugate bilateral   Finger-to-nose tests normal and without drift laterally  Strength is 5/5 and sensation to light touch intact throughout  DTR +without clonus bilaterally  Gross bilateral lower extremity edema noted  Hx, PEx, and images reviewed with the patient  Management plan discussed  Images results shows very minimal increment otherwise patient remains clinically stable without any complaints  I don't think her word-finding issues is related to her meningioma  Advised to follow-up in 6 months with brain MRI with and without contrast   It was also advised to go to emergency room or call office with new neurological symptoms   ( neurological symptoms explained to the patient)  Questions and concerns were answered  Patient expressed their understandings and agreed with the plan  Plan:  1  MRI of brain with and without contrast in 6 months  2  BUN and creatinine order placed  3  F/U in 6 months, SNPx with Dr Fady Menchaca  4   Call Office or go to emergency room with new neurological symptoms    C/C: " Right paraclinoid meningioma follow-up"/3 months    Subjective/Objective     HPI  All  patient's medical Okay thanks.  When she is returning home, please have her send a refill request.  Thanks.   history is were reviewed and updated as appropriate: Allergies, current medications, past medical history, family history, social history, and current medical lists  Patient here for 3 months follow-up of right paraclinoid meningioma, reports occasional word-finding issues otherwise neurological symptoms-headache, nausea, vomiting, blurry vision or diplopia  No swallowing or speech problem; denies seizure, dizziness, lightheadedness, gait issues or bowel bladder dysfunction  Follow-up image findings as described in the assessment section above  ROS  Review of system personally reviewed and updated  Review of Systems   Constitutional: Negative  HENT: Negative  Eyes: Positive for visual disturbance (diabetic retnopathy )  VF done by Dr Nicole Adjutant in [facility]  P# [phone]   Respiratory: Negative  Cardiovascular: Negative  H/o HTN/ Hyperlipidemia   Gastrointestinal: Negative  H/o GERD   Endocrine: Negative  Diabetes Type 2   Genitourinary: Negative  Musculoskeletal: Positive for gait problem (occasinaly issues with balance )  Skin: Negative  Allergic/Immunologic: Negative  Neurological: Negative for dizziness and headaches  Hematological: Bruises/bleeds easily  On Plavix/ Aspirin   Psychiatric/Behavioral: Negative for decreased concentration  All other systems reviewed and are negative        Family History    Family History   Problem Relation Age of Onset    Diabetes Mother     Hypertension Mother    Rosanna Le Sudden death Mother         SCD    Hyperlipidemia Mother     Heart attack Mother    Rosanna Le Breast cancer Mother 79    Diabetes Father     Arrhythmia Father         pacemaker/ defib    Clotting disorder Father         eliquis    Heart failure Father     Heart disease Father     Melanoma Father     Cancer Father 61        bladder cancer    Breast cancer Sister 44    BRCA1 Negative Sister     No Known Problems Maternal Grandmother     No Known Problems Maternal Grandfather     No Known Problems Paternal Grandmother     No Known Problems Paternal Grandfather     No Known Problems Sister     No Known Problems Sister     Breast cancer Maternal Aunt         unknown age   Yan Kearns Esophageal cancer Maternal Aunt 61    Cancer Maternal Aunt         unknown age or type    No Known Problems Maternal Aunt     Lung cancer Paternal Aunt         unknonw age- in her [de-identified]    Colon cancer Maternal Uncle         unknwon age   Yan Kearns Pancreatic cancer Maternal Uncle 61    Anuerysm Neg Hx        Social History    Social History     Socioeconomic History    Marital status: Single     Spouse name: Not on file    Number of children: Not on file    Years of education: Not on file    Highest education level: Not on file   Occupational History    Occupation: deed recorder   Tobacco Use    Smoking status: Never Smoker    Smokeless tobacco: Never Used   Vaping Use    Vaping Use: Never used   Substance and Sexual Activity    Alcohol use: No    Drug use: No    Sexual activity: Not Currently   Other Topics Concern    Not on file   Social History Narrative    Do you currently or have you served in the [employer]: No    Were you activated, into active duty, as a member of the [employer] or as a Reservist: No    Occupation:     Marital status: Single    Exercise level: None    Diet: Regular    General stress level: Medium    Alcohol intake: None    Caffeine intake:  Moderate    1 cup of coffee in the morning    Chewing tobacco: none    Illicit drugs: none    Guns present in home: No    Seat belts used routinely: Yes    Sunscreen used routinely: Yes    Smoke alarm in home: Yes    Advance directive: Yes     Social Determinants of Health     Financial Resource Strain: Not on file   Food Insecurity: Not on file   Transportation Needs: Not on file   Physical Activity: Not on file   Stress: Not on file   Social Connections: Not on file   Intimate Partner Violence: Not on file   Housing Stability: Not on file       Past Medical History    Past Medical History:   Diagnosis Date    Colon polyp     Dizziness     Edema     Hyperlipidemia     Hypertension     Type 2 diabetes mellitus ([address] )     Vitamin D deficiency     Wears glasses        Surgical History    Past Surgical History:   Procedure Laterality Date    BREAST EXCISIONAL BIOPSY Right 2009    COLONOSCOPY  04/11/2018    Colonoscpoy due in 3 years    EGD  04/11/2018    MAMMO (HISTORICAL) Bilateral 05/04/2020 2018    MAMMO NEEDLE LOCALIZATION RIGHT (ALL INC) Right 7/8/2009    WISDOM TOOTH EXTRACTION         Medications      Current Outpatient Medications:     aspirin 81 MG tablet, Take 1 tablet by mouth daily, Disp: , Rfl:     atorvastatin (LIPITOR) 80 mg tablet, Take 1 tablet (80 mg total) by mouth daily with dinner, Disp: 90 tablet, Rfl: 1    Blood Pressure Monitor KIT, Use 2 (two) times a day, Disp: 1 kit, Rfl: 0    carvedilol (COREG) 6 25 mg tablet, Take 1 tablet (6 25 mg total) by mouth 2 (two) times a day with meals, Disp: 60 tablet, Rfl: 2    clopidogrel (PLAVIX) 75 mg tablet, Take 1 tablet (75 mg total) by mouth daily, Disp: 90 tablet, Rfl: 3    Continuous Blood Gluc  (FreeStyle Feng 14 Day Larue) CORBY, USE AS DIRECTED, Disp: 2 each, Rfl: 5    Continuous Blood Gluc  (FreeStyle Feng 14 Day Larue) CORBY, FreeStyle Feng 14 Day Larue, Disp: , Rfl:     Continuous Blood Gluc Sensor (FreeStyle Feng 14 Day Sensor) MISC, USE AS DIRECTED, Disp: 2 each, Rfl: 5    Continuous Blood Gluc Sensor (FreeStyle Feng 14 Day Sensor) MISC, FreeStyle Feng 14 Day Sensor kit, Disp: , Rfl:     Dulaglutide 0 75 MG/0 5ML SOPN, Inject 0 5 mL (0 75 mg total) under the skin once a week, Disp: 3 mL, Rfl: 1    Empagliflozin 10 MG TABS, Take 1 tablet (10 mg total) by mouth every morning, Disp: 90 tablet, Rfl: 3    esomeprazole (NexIUM) 20 mg capsule, Take 20 mg by mouth every morning before breakfast, Disp: , Rfl:     furosemide (LASIX) 20 mg tablet, Take 1 tablet (20 mg total) by mouth 2 (two) times a day (Patient taking differently: Take 20 mg by mouth daily  ), Disp: 60 tablet, Rfl: 3    metFORMIN (GLUCOPHAGE) 1000 MG tablet, Take 1 tablet (1,000 mg total) by mouth 2 (two) times a day with meals, Disp: 180 tablet, Rfl: 3    nitroglycerin (NITROSTAT) 0 4 mg SL tablet, Place 1 tablet (0 4 mg total) under the tongue every 5 (five) minutes as needed for chest pain, Disp: 24 tablet, Rfl: 1    NovoFine 32G X 6 MM MISC, USE AS DIRECTED ONCE A DAY WITH INSULIN, Disp: 30 each, Rfl: 5    sacubitril-valsartan (Entresto) 49-51 MG TABS, Take 1 tablet by mouth 2 (two) times a day, Disp: 180 tablet, Rfl: 4    silver sulfadiazine (Silvadene) 1 % cream, Apply topically daily (Patient not taking: Reported on 12/13/2021 ), Disp: 50 g, Rfl: 1    TURMERIC PO, Take 1 capsule by mouth daily, Disp: , Rfl:     Allergies    No Known Allergies    The following portions of the patient's history were reviewed and updated as appropriate: allergies, current medications, past family history, past medical history, past social history, past surgical history and problem list     Investigations    I personally reviewed the MRI results with the patient:    Findings as described in the assessment section above    Physical Exam    There were no vitals filed for this visit  Physical Exam  Constitutional:       Appearance: Normal appearance  HENT:      Head: Normocephalic and atraumatic  Eyes:      Extraocular Movements: Extraocular movements intact  Pupils: Pupils are equal, round, and reactive to light  Cardiovascular:      Rate and Rhythm: Normal rate  Pulses: Normal pulses  Pulmonary:      Effort: Pulmonary effort is normal    Musculoskeletal:         General: Normal range of motion  Cervical back: Normal range of motion  Right lower leg: Edema present  Left lower leg: Edema present  Neurological:      General: No focal deficit present  Mental Status: She is alert and oriented to person, place, and time  GCS: GCS eye subscore is 4  GCS verbal subscore is 5  GCS motor subscore is 6  Cranial Nerves: Cranial nerves are intact  Sensory: No sensory deficit  Motor: No weakness  Coordination: Finger-Nose-Finger Test normal       Deep Tendon Reflexes: Reflexes are normal and symmetric  Reflex Scores:       Tricep reflexes are 2+ on the right side and 2+ on the left side  Bicep reflexes are 2+ on the right side and 2+ on the left side  Brachioradialis reflexes are 2+ on the right side and 2+ on the left side  Patellar reflexes are 2+ on the right side and 2+ on the left side  Achilles reflexes are 2+ on the right side and 2+ on the left side  Psychiatric:         Speech: Speech normal        Neurologic Exam     Mental Status   Oriented to person, place, and time  Speech: speech is normal   Level of consciousness: alert    Cranial Nerves     CN III, IV, VI   Pupils are equal, round, and reactive to light  Right pupil: Size: 2 mm  Shape: regular  Reactivity: brisk  Left pupil: Size: 2 mm  Shape: regular  Reactivity: brisk     Nystagmus: none     CN XI   CN XI normal      Motor Exam   Muscle bulk: normal  Overall muscle tone: normal  Right arm tone: normal  Left arm tone: normal  Right arm pronator drift: absent  Left arm pronator drift: absent  Right leg tone: normal  Left leg tone: normal    Sensory Exam   Light touch normal      Gait, Coordination, and Reflexes     Coordination   Finger to nose coordination: normal    Reflexes   Right brachioradialis: 2+  Left brachioradialis: 2+  Right biceps: 2+  Left biceps: 2+  Right triceps: 2+  Left triceps: 2+  Right patellar: 2+  Left patellar: 2+  Right achilles: 2+  Left achilles: 2+  Right : 2+  Left : 2+  Right Marte: absent  Left Marte: absent  Right ankle clonus: absent  Left pendular knee jerk: absent

## 2024-05-01 ENCOUNTER — OFFICE VISIT (OUTPATIENT)
Dept: ENDOCRINOLOGY | Facility: CLINIC | Age: 59
End: 2024-05-01
Payer: MEDICARE

## 2024-05-01 VITALS
BODY MASS INDEX: 27.19 KG/M2 | SYSTOLIC BLOOD PRESSURE: 118 MMHG | HEIGHT: 61 IN | OXYGEN SATURATION: 97 % | DIASTOLIC BLOOD PRESSURE: 70 MMHG | WEIGHT: 144 LBS | HEART RATE: 75 BPM

## 2024-05-01 DIAGNOSIS — E89.0 POSTOPERATIVE HYPOTHYROIDISM: Primary | ICD-10-CM

## 2024-05-01 DIAGNOSIS — Z85.850 PERSONAL HISTORY OF MALIGNANT NEOPLASM OF THYROID: ICD-10-CM

## 2024-05-01 PROCEDURE — 1160F RVW MEDS BY RX/DR IN RCRD: CPT | Performed by: INTERNAL MEDICINE

## 2024-05-01 PROCEDURE — 3074F SYST BP LT 130 MM HG: CPT | Performed by: INTERNAL MEDICINE

## 2024-05-01 PROCEDURE — 99213 OFFICE O/P EST LOW 20 MIN: CPT | Performed by: INTERNAL MEDICINE

## 2024-05-01 PROCEDURE — 3078F DIAST BP <80 MM HG: CPT | Performed by: INTERNAL MEDICINE

## 2024-05-01 PROCEDURE — 1159F MED LIST DOCD IN RCRD: CPT | Performed by: INTERNAL MEDICINE

## 2024-05-01 PROCEDURE — 3044F HG A1C LEVEL LT 7.0%: CPT | Performed by: INTERNAL MEDICINE

## 2024-05-01 RX ORDER — MELOXICAM 15 MG/1
15 TABLET ORAL DAILY
COMMUNITY
Start: 2024-04-15

## 2024-05-01 NOTE — PROGRESS NOTES
"     Office Note      Date: 2024  Patient Name: Lizette Frias  MRN: [id number]  : 1965    Cc: thyroid follow up   History of Present Illness:   Lizette Frias is a 59 y.o. female who presents for thyroid follow up .   Current rx: t4- 200     Changes in history: has had a number of medication changes   Questions /problems: tsh went from f 4.5 to 0.22 without any change in thyroid dose     Subjective          Review of Systems:   Review of Systems   Constitutional:  Positive for fatigue.   Neurological:  Positive for tremors.   Psychiatric/Behavioral:  Positive for sleep disturbance.        The following portions of the patient's history were reviewed and updated as appropriate: allergies, current medications, past family history, past medical history, past social history, past surgical history, and problem list.    Objective     Visit Vitals  /70   Pulse 75   Ht 154.9 cm (60.98\")   Wt 65.3 kg (144 lb)   LMP  (LMP Unknown) Comment: Last pap 3/3/2022 by [place] [id number] 97%   BMI 27.22 kg/m²           Physical Exam:  Physical Exam  Vitals reviewed.   Constitutional:       Appearance: Normal appearance.   Neck:      Comments: No palpable thyroid abnormality   Lymphadenopathy:      Cervical: No cervical adenopathy.   Skin:     General: Skin is warm.   Neurological:      Mental Status: She is alert.      Comments: tremor         Assessment / Plan      Assessment & Plan:  Problem List Items Addressed This Visit       Personal history of malignant neoplasm of thyroid    Overview     Surgery in about  for papillary thyroid cancer. I 131 therapy with clean scan afterwards.          Current Assessment & Plan     No clinical evidence of recurrence   A periodic check of thyroglobulin level every 5 years at this point should suffice for follow up          Postoperative hypothyroidism - Primary    Overview     >>OVERVIEW FOR HYPOTHYROID WRITTEN ON 2022  4:53 PM BY JB AL, " MD    Due to remote thyroidectomy for cancer         Current Assessment & Plan     Tsh went from slight high to slightly low in December and January without any change in dose. This points out a couple of things- firstly- the tsh test is not precise/ secondly - we should not be making changes in her dose when there are just minor abnormalities in her levels.    I recommend no changes-  her dose should be increased if her tsh gets over 7 and reduced if it gets under 0.1           Relevant Medications    levothyroxine (SYNTHROID, LEVOTHROID) 200 MCG tablet        Electronically signed by : Ming Stroud MD   05/01/2024

## 2024-05-01 NOTE — ASSESSMENT & PLAN NOTE
Tsh went from slight high to slightly low in December and January without any change in dose. This points out a couple of things- firstly- the tsh test is not precise/ secondly - we should not be making changes in her dose when there are just minor abnormalities in her levels.    I recommend no changes-  her dose should be increased if her tsh gets over 7 and reduced if it gets under 0.1

## 2024-05-01 NOTE — ASSESSMENT & PLAN NOTE
No clinical evidence of recurrence   A periodic check of thyroglobulin level every 5 years at this point should suffice for follow up

## 2024-05-03 ENCOUNTER — OUTSIDE FACILITY SERVICE (OUTPATIENT)
Dept: PAIN MEDICINE | Facility: CLINIC | Age: 59
End: 2024-05-03
Payer: MEDICARE

## 2024-05-07 ENCOUNTER — TELEPHONE (OUTPATIENT)
Dept: PAIN MEDICINE | Facility: CLINIC | Age: 59
End: 2024-05-07
Payer: MEDICARE

## 2024-05-14 ENCOUNTER — OUTSIDE FACILITY SERVICE (OUTPATIENT)
Dept: PAIN MEDICINE | Facility: CLINIC | Age: 59
End: 2024-05-14
Payer: MEDICARE

## 2024-05-14 ENCOUNTER — DOCUMENTATION (OUTPATIENT)
Dept: PAIN MEDICINE | Facility: CLINIC | Age: 59
End: 2024-05-14

## 2024-05-14 NOTE — PROGRESS NOTES
[facility]  [address] [place] [address]    PROCEDURE: Second set of fluoroscopically-guided bilateral L3, L4, L5 lumbar Medial Branch Nerve Blocks targeting the bilateral L4/L5 and L5/S1 facet joints  PRE-OP DIAGNOSIS: Lumbar spondylosis  POST-OP DIAGNOSIS: Lumbar spondylosis    ANTIPLATELET/ANTICOAGULANT STOP DATE: Discussed with the patient held according to DENNIS guidelines    CONSENT: Risks, benefits and options were explained to the patient, all questions were answered and written informed consent was obtained.  ANESTHESIA: Moderate sedation was required to maintain comfort, safety, and cooperation during the procedure.  The duration of sedation service was over 10 minutes.  Patient received 4 mg IV Versed and 0 mcg IV fentanyl.  Independent observation and monitoring was performed by Karma Arriola.  The patient's level of consciousness and physiologic status was continually monitored with pulse oximetry, EKG from 1043 to 1100.  There were no complications or adverse events during sedation.  After the sedation patient was taken to the recovery area.  PROCEDURE NOTE:  A pre-procedural time out was performed to confirm the correct patient, procedure, side, and site. A sterile field was prepped in standard fashion using Chlorhexidine and draped with sterile towels. The selected medial branch nerves were identified using an ipsilateral oblique fluoroscopic view. A 25 gauge 3.5 inch spinal needle was advanced using intermittent fluoroscopy toward the junction of the transverse process and superior articulating process targeting the above listed medial branch nerves.  The bilateral L5 primary dorsal ramus nerve was targeted at the junction of the sacral ala and the sacral articular process. Needle placement was confirmed with biplanar fluoroscopic imaging. Following negative aspiration, 1 mL of bupivacaine 0.5% was injected at each location. The needles were  re-styletted and withdrawn. The patient's skin was cleaned with alcohol and the injection sites covered with bandages.     EBL: None    COMPLICATIONS: None      The patient was monitored until meeting established discharge criteria.  Vital signs remained stable throughout the procedure and in the recovery area. There were no immediate complications and the patient tolerated the procedure well. Sensory and motor exam was unchanged from baseline. The patient received written discharge instructions prior to discharge.    FOLLOW UP: As scheduled    ADDITIONAL NOTES: Clinic staff will call to schedule bilateral L4/L5 and L5/S1 RFA    [facility]    Patrick Arellano MD    Codes:  75090  [id number]  [id number]

## 2024-05-16 ENCOUNTER — OFFICE VISIT (OUTPATIENT)
Dept: PAIN MEDICINE | Facility: CLINIC | Age: 59
End: 2024-05-16
Payer: MEDICARE

## 2024-05-16 ENCOUNTER — TELEPHONE (OUTPATIENT)
Dept: PAIN MEDICINE | Facility: CLINIC | Age: 59
End: 2024-05-16
Payer: MEDICARE

## 2024-05-16 VITALS — BODY MASS INDEX: 28.13 KG/M2 | WEIGHT: 149 LBS | HEIGHT: 61 IN

## 2024-05-16 DIAGNOSIS — G89.29 CHRONIC BILATERAL THORACIC BACK PAIN: ICD-10-CM

## 2024-05-16 DIAGNOSIS — M54.50 CHRONIC LOW BACK PAIN, UNSPECIFIED BACK PAIN LATERALITY, UNSPECIFIED WHETHER SCIATICA PRESENT: ICD-10-CM

## 2024-05-16 DIAGNOSIS — M47.816 SPONDYLOSIS OF LUMBAR REGION WITHOUT MYELOPATHY OR RADICULOPATHY: ICD-10-CM

## 2024-05-16 DIAGNOSIS — M47.816 SPONDYLOSIS OF LUMBAR REGION WITHOUT MYELOPATHY OR RADICULOPATHY: Primary | ICD-10-CM

## 2024-05-16 DIAGNOSIS — G89.29 CHRONIC BILATERAL THORACIC BACK PAIN: Primary | ICD-10-CM

## 2024-05-16 DIAGNOSIS — G89.29 CHRONIC LOW BACK PAIN, UNSPECIFIED BACK PAIN LATERALITY, UNSPECIFIED WHETHER SCIATICA PRESENT: ICD-10-CM

## 2024-05-16 DIAGNOSIS — M54.6 CHRONIC BILATERAL THORACIC BACK PAIN: ICD-10-CM

## 2024-05-16 DIAGNOSIS — G62.9 PERIPHERAL POLYNEUROPATHY: ICD-10-CM

## 2024-05-16 DIAGNOSIS — M54.6 CHRONIC BILATERAL THORACIC BACK PAIN: Primary | ICD-10-CM

## 2024-05-16 NOTE — TELEPHONE ENCOUNTER
Refill pregabalin  Pregabalin 150 mg twice daily, 60 total, #2 refills  John Paul reviewed and compliant  Controlled substance agreement previously signed  Appropriate follow-up visit scheduled  Compliance UDS ordered

## 2024-05-16 NOTE — TELEPHONE ENCOUNTER
FOLLOW-UP CALL AFTER PROCEDURE    I spoke with the patient regarding how she is feeling after her procedure with Dr. Arellano. Patient reports she is doing well.      Lizette Frias  underwent a #2 bilateral L4/5 and L5/S1 medial branch blocks on 5/14/2024.      Patient reported 90% pain relief that lasted for multiple hours.      Today, the patient reports pain has returned to baseline after 24 hours      Patient denies side effects or complications  Patient does not have any questions or concerns at this time    Disposition:      I provided information regarding date of next procedure, and that the surgery center will call the day before with his /her arrival time. Patient verbalized understanding.      Location of procedure:[address] [place] [address] ([facility]) . Patient instructed to check in at the registration desk. Patient verbalized understanding      I advised that patient must have a , and that the  must remain in the premises until after the procedure is completed and the patient is ready to be discharged. Patient verbalized understanding.      Reminded patient of instructions to stop blood thinners when indicated    Reminded NPO status: Nothing by mouth (eat or drink) for at least 8 hours prior to the procedure. Patient can take medications with a sip of water. Patient verbalized understanding

## 2024-05-16 NOTE — PROGRESS NOTES
Referring Physician: No referring provider defined for this encounter.    Primary Physician: Lars Fermin MD    CHIEF COMPLAINT or REASON FOR VISIT: Follow-up, Med Management, and Back Pain      Initial history of present illness on 01/23/2024:  Ms. Lizette Frias is 59 y.o. female who presents as a new patient referral for evaluation treatment chronic axial back pain without radiation.  Ms. Frias does have a past medical history of CKD 2, GERD, CVA, gastroparesis, [place] cirrhosis status post liver transplant, Paget disease, meningioma, HLD, anxiety, depression, gout.  She describes a many year history of chronic axial back pain without radiation to lower extremities without any inciting or precipitating trauma.  She has previously been treated for bilateral hip osteoarthrosis most recently she had a fall in early January 2024 in which she sustained a increased pain/trauma to her right hip.  She did undergo MRI demonstrating a nondisplaced fracture of the right superior pubic roots for which she saw Dr. Bauer, orthopedic surgery, today who advised that this is a nonoperative condition which should resolve with time and conservative management.    She denies any past medical history of lumbar spine surgery or injection.  She has undergone a left hip injection with [facility] in early 2024.  Additionally at that time she underwent Lyrica and oxycodone management.  I do not have the most recent office notes and is unclear to me why patient was discontinued but she did violate her opioid use agreement with taking unprescribed tramadol, being prescribed hydrocodone for a dental procedure, and having TGN her urinalysis.  Her left hip injection helped for about 50% for a week or 2.    Today she denies any significant hip pain, bursitis pain or sacroiliac type pain.  Patient denies any bowel or bladder dysfunction, lower extremity weakness, new onset saddle anesthesia or unexplained weight loss.        Interval history: Patient returns to clinic today primarily for medication management.  She has undergone 2 successful lumbar medial branch blocks.  She is set to have an ablation on 5/28/2024.  Today she continues to complain of chronic axial back pain without any radiation to the bilateral lower extremities.  She is happy with the relief she is received in the blocks and is eager to have an ablation.  She does complain of some mid/low thoracic back pain at today's appointment.  This pain seems to be exacerbated by standing.  Pain is located on both sides of spine.  She denies any new injury or events that proceeded this pain.  She has been dealing with this for quite some time.  This appears to be myofascial in nature.  She has a prescription for methocarbamol as prescribed by her PCP.  I recommended that she try this medication as it helps alleviate her pain.  We I will also send a new PT referral for this issue.  Lastly, she has a past medical history for restless leg syndrome.  She currently takes ropinirole for this.      Interventions:  4/11/2024: L4/5 and L5/S1 LMBB with 100% pain relief for multiple hours  5/14/2024: L4/5 and L5/S1 LMBB with 90% pain relief for multiple hours    Objective Pain Scoring:   BRIEF PAIN INVENTORY:  Total score:   Pain Score    05/16/24 1435   PainSc:   3   PainLoc: Back      PHQ-2: PHQ-2 Total Score: 3  PHQ-9: PHQ-9: Brief Depression Severity Measure Score: 10  Opioid Risk Tool:         Review of Systems:   ROS negative except as otherwise noted     Past Medical History:   Past Medical History:   Diagnosis Date    Acute hypoxemic respiratory failure due to COVID-19     Allergic     Environmental, pcn, cyclosporine    Anemia     Anxiety     Cervical disc disorder     Cholelithiasis     Chronic obstructive pulmonary disease, unspecified 12/03/2021    Chronic pain disorder     Chronic systolic (congestive) heart failure 12/03/2021    Cirrhosis of liver     Clotting disorder      Pre-transplant    Colon polyp     COVID     Deep vein thrombosis Pretransplant    Blood clot in liver    Depression     Diabetes     Endometriosis     Fractures     GERD (gastroesophageal reflux disease)     Headache     Headache, tension-type     Heart murmur     HL (hearing loss)     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, mastoid damage    Hypercholesteremia     Hypertension     Hyperthyroidism     Hypothyroid     Joint pain     Low back pain 1982    Meningioma     Migraine     Osteoarthritis     Osteopenia     Osteoporosis     Pancreatitis     Pretransplant    Prediabetes     Renal impairment     Rheumatoid arthritis involving multiple sites with positive rheumatoid factor 2024    Severe malnutrition (CMS/HCC) 2022    Shingles     Stroke     Thyroid cancer     Vision impairment     left eye         Past Surgical History:   Past Surgical History:   Procedure Laterality Date    APPENDECTOMY      BREAST BIOPSY Left     BENIGN     SECTION      x3    CHOLECYSTECTOMY      COLONOSCOPY      D & C HYSTEROSCOPY LAPAROSCOPY      x2 for endometriosis    ENDOSCOPY      FRACTURE SURGERY      Right ankle has screws    LIVER SURGERY      removed cysts     LIVER TRANSPLANTATION      ORIF ANKLE FRACTURE      ORTHOPEDIC SURGERY      THYROIDECTOMY      TONSILLECTOMY      TOTAL ABDOMINAL HYSTERECTOMY WITH SALPINGO OOPHORECTOMY Bilateral     TRIGGER POINT INJECTION      UPPER GASTROINTESTINAL ENDOSCOPY           Family History   Family History   Problem Relation Age of Onset    Arthritis Mother     Osteoporosis Mother     Rheum arthritis Mother     Anemia Mother     Alcohol abuse Father     Mental illness Father     Hyperlipidemia Father     Hypertension Father     Colon cancer Father     Cancer Father     Hearing loss Father     ADD / ADHD Son     ADD / ADHD Son     Breast cancer Maternal Grandmother     Stroke Maternal Grandmother     Mental illness Paternal Grandmother     Ovarian cancer Paternal Grandmother           Social History   Social History     Socioeconomic History    Marital status:    Tobacco Use    Smoking status: Never     Passive exposure: Never    Smokeless tobacco: Never   Vaping Use    Vaping status: Never Used   Substance and Sexual Activity    Alcohol use: No    Drug use: Never     Comment: Tried an edible last week.     Sexual activity: Not Currently     Partners: Male     Birth control/protection: Post-menopausal, Surgical        Medications:     Current Outpatient Medications:     buPROPion XL (WELLBUTRIN XL) 300 MG 24 hr tablet, TAKE 1 TABLET BY MOUTH EVERY DAY IN THE MORNING, Disp: 90 tablet, Rfl: 0    calcium carbonate-vitamin d 600-400 MG-UNIT per tablet, Take 1 tablet by mouth Daily., Disp: , Rfl:     Cholecalciferol 50 MCG (2000 UT) capsule, Take 1 tablet by mouth Daily., Disp: , Rfl:     [employer] Melatonin 5 MG tablet tablet, TAKE 1 TABLET BY MOUTH EVERY DAY AT NIGHT, Disp: 90 tablet, Rfl: 1    denosumab (Prolia) 60 MG/ML solution prefilled syringe syringe, Inject 1 mL under the skin into the appropriate area as directed., Disp: , Rfl:     icosapent ethyl (Vascepa) 1 g capsule capsule, Take 2 g by mouth 2 (Two) Times a Day With Meals., Disp: , Rfl:     levothyroxine (SYNTHROID, LEVOTHROID) 200 MCG tablet, TAKE 1 TABLET BY MOUTH EVERY DAY IN THE MORNING, Disp: 90 tablet, Rfl: 1    meloxicam (MOBIC) 15 MG tablet, Take 1 tablet by mouth Daily. with food, Disp: , Rfl:     mirtazapine (REMERON) 15 MG tablet, TAKE 1 TABLET BY MOUTH EVERYDAY AT BEDTIME, Disp: 90 tablet, Rfl: 1    pantoprazole (PROTONIX) 40 MG EC tablet, Take 1 tablet by mouth 2 (Two) Times a Day. 30 minutes prior to first meal and 30 minutes prior to last meal of the day, Disp: 180 tablet, Rfl: 3    pregabalin (LYRICA) 150 MG capsule, Take 1 capsule by mouth 2 (Two) Times a Day., Disp: 60 capsule, Rfl: 2    rOPINIRole (REQUIP) 2 MG tablet, Take 1 tablet by mouth Every Night., Disp: 90 tablet, Rfl: 0    rosuvastatin (Crestor) 20  "MG tablet, Take 1 tablet by mouth Every Night., Disp: 90 tablet, Rfl: 3    tacrolimus (PROGRAF) 0.5 MG capsule, Take 2 capsules by mouth 2 (Two) Times a Day., Disp: , Rfl:     telmisartan (MICARDIS) 20 MG tablet, TAKE 1 TABLET BY MOUTH EVERY DAY, Disp: 90 tablet, Rfl: 1    traZODone (DESYREL) 100 MG tablet, Take 2 tablets by mouth Every Night., Disp: 180 tablet, Rfl: 1        Physical Exam:     Vitals:    05/16/24 1435   Weight: 67.6 kg (149 lb)   Height: 154.9 cm (60.98\")   PainSc:   3   PainLoc: Back        General: Alert and oriented, No acute distress.   HEENT: Normocephalic, atraumatic.   Cardiovascular: No gross edema  Respiratory: Respirations are non-labored     Lumbar Spine:   No masses or atrophy  Range of motion - Flexion normal. Extension normal.    Facet Loading: Positive bilaterally  Facet Palpation -positive  PSIS tenderness - Negative bilaterally  Mckinley's/JOSHUA/Thigh thrust -   Straight leg raise/slump test: Negative bilaterally      Motor Exam:    Strength: Rate on 1-5 scale Right Left    C5-Elbow flexion, Deltoid 5/5  5/5    C6-Wrist extension 5/5  5/5    C7- Elbow / finger extension 5/5  5/5    C8- Finger flexion 5/5  5/5    T1- Intrinsics hand 5/5  5/5    Strength: Rate on 1-5 scale Right Left    L1/2- hip flexion 5/5  5/5    L3- knee extension 5/5  5/5    L4- ankle dorsiflexion 5/5  5/5    L5- great toe extension 5/5  5/5    S1- ankle plantarflexion 5/5  5/5    Sensory Exam: Full and equal sensation to light touch throughout.     Neurologic: Cranial Nerves II-XII are grossly intact.      Psychiatric: Cooperative.   Gait: Normal   Assistive Devices: None    Physical exam is consistent and accurate as of 5/16/2024    Imaging Studies:   MRI LUMBAR SPINE WO CONTRAST     Date of Exam: 2/23/2024 3:09 PM EST     Indication: Low back pain, no red flags, no prior management.     Comparison: None available.     Technique:  Routine multiplanar/multisequence sequence images of the lumbar spine " were obtained without contrast administration.          Findings:   T1 marrow signal is preserved, without evidence of fracture or suspicious marrow replacing lesion. Alignment is anatomic, without evidence of significant listhesis or subluxation. The conus medullaris and cauda equina nerve roots are satisfactory in   appearance. The paraspinal soft tissues demonstrate no acute or suspicious findings. Multilevel spondylosis is present, with areas of involvement including     L1-2, no significant spinal canal or neuroforaminal impingement.     L2-3, no significant spinal canal or neuroforaminal impingement.     L3-4, small disc bulge and bilateral facet arthropathy. There is mild spinal canal and minimal bilateral neuroforaminal narrowing present.     L4-5, small circumferential disc bulge and bilateral facet arthropathy. There is no significant spinal canal narrowing or neuroforaminal stenosis..     L5-S1, circumferential disc bulge and mild facet arthropathy. The spinal canal is patent. There is some mild narrowing of the left neural foramen.     IMPRESSION:  Impression:   Mild spondylosis changes are noted as above. There is no evidence of significant associated spinal canal or neuroforaminal impingement.     Electronically Signed: Jose J Bunch MD    2/25/2024 7:33 AM EST    Workstation ID: [id number]        Independent interpretation of radiographic imaging:  Lumbar MRI dated 2/23/2024 reveals DDD worse at L5/S1, no significant canal or neuroforaminal stenosis, facet arthropathy.         Impression & Plan:       01/23/2024: Lizette Frias is a 59 y.o. female with past medical history significant for CKD 2, GERD, CVA, gastroparesis, [place] cirrhosis status post liver transplant, Paget disease, meningioma, HLD, anxiety, depression, gout. who presents to the pain clinic for evaluation and treatment of chronic axial back pain.  Examination most consistent with lumbar facet pain.  I do not have any imaging for review  today so I will order lumbar x-ray with flexion-extension as well as lumbar MRI.  Additionally I will give her physical therapy order and we will resume Lyrica.  I discussed with patient that we do not typically manage opioids and she may not be a candidate due to the prior history of violating opioid agreement.  3/26/2024: Patient returns to clinic after undergoing lumbar MRI.  Will refill pregabalin.  Will plan for bilateral L4/5 and L5/S1 LMBB/RFA  5/16/2024: Will plan for L4/5 and L5/S1 RFA as already scheduled.  Recommended muscle relaxer for midthoracic back pain.  New PT referral for midthoracic back pain.  Refill Lyrica    1. Spondylosis of lumbar region without myelopathy or radiculopathy    2. Peripheral polyneuropathy    3. Chronic low back pain, unspecified back pain laterality, unspecified whether sciatica present    4. Chronic bilateral thoracic back pain            PLAN:  1. Medication Recommendations: Recommend Voltaren topical, NSAIDs, Tylenol.  Can trial turmeric 500 mg twice daily if NSAID contraindicated.    -Continue Lyrica 150 mg twice daily #2 refill.  Will refill today  As part of this patient's treatment plan, patient will be prescribed controlled substances. The patient has been made aware of appropriate use of such medications, including potential risk of somnolence, limited ability to drive and /or work safely, and potential for dependence or overdose. It has also been made clear that these medications are for use by this patient only, without concomitant use of alcohol or other substances unless prescribed.Controlled substance status of medication discussed with patient, discussed risks of medication including abuse potential and diversion potential and need to follow up for reevaluation appointment in order to receive further refills.  John Paul was reviewed and compliant.    2. Physical Therapy: New PT referral with consideration of dry needling    3. Psychological: defer.     4.  Complementary and alternative (CAM) Therapies:     5. Labs/Diagnostic studies: Pending compliance UDS    6. Imagin. Interventions: Will plan for bilateral L4/5 and L5/S1 radiofrequency ablation as scheduled on 2024.    8. Referrals: New PT    9. Records:,    10. Lifestyle goals:    Follow-up as scheduled on 2024.      [facility]  Barbara Cruz PA-C

## 2024-05-17 RX ORDER — PREGABALIN 150 MG/1
150 CAPSULE ORAL 2 TIMES DAILY
Qty: 60 CAPSULE | Refills: 2 | Status: SHIPPED | OUTPATIENT
Start: 2024-05-17

## 2024-05-21 ENCOUNTER — TELEPHONE (OUTPATIENT)
Dept: PAIN MEDICINE | Facility: CLINIC | Age: 59
End: 2024-05-21
Payer: MEDICARE

## 2024-05-21 NOTE — TELEPHONE ENCOUNTER
S/w patient to notify her that referral for PT was faxed to [facility]  Outpatient Rehabilitation  [facility] [place] [address]  P. [phone]  F. [phone]    Patient verbalized understanding and was thankful for the call.

## 2024-05-28 ENCOUNTER — DOCUMENTATION (OUTPATIENT)
Dept: PAIN MEDICINE | Facility: CLINIC | Age: 59
End: 2024-05-28

## 2024-05-28 ENCOUNTER — OUTSIDE FACILITY SERVICE (OUTPATIENT)
Dept: PAIN MEDICINE | Facility: CLINIC | Age: 59
End: 2024-05-28
Payer: MEDICARE

## 2024-05-28 NOTE — PROGRESS NOTES
[facility]  [address] [place] [address]       PROCEDURE: Fluoroscopically-guided bilateral L3, L4, L5 lumbar Medial Branch Nerve Radiofrequency Ablation (RFA) targeting the bilateral L4/L5 and L5/S1 facet joints     PRE-OP DIAGNOSIS: Lumbar spondylosis  POST-OP DIAGNOSIS: Same    BLOOD THINNERS (ANTIPLATELETS/ANTICOAGULANTS): Were discussed with the patient and DENNIS Guidelines were followed.     CONSENT: Risks, benefits and options were explained to the patient, all questions were answered and written informed consent was obtained.     ANESTHESIA: Moderate sedation was required to maintain comfort, safety, and cooperation during the procedure.  The duration of sedation service was over 10 minutes.  Patient received 3 mg IV Versed and 50 mcg IV fentanyl.  Independent observation and monitoring was performed by Daily Anaya.  The patient's level of consciousness and physiologic status was continually monitored with pulse oximetry, EKG from 1123 to 1154.  There were no complications or adverse events during sedation.  After the sedation patient was taken to the recovery area.      PROCEDURE NOTE: A pre-procedural time out was performed to confirm the correct patient, procedure, side, and site. A sterile field was prepped in standard fashion using Chlorhexidine and draped with sterile towels. The selected medial branch nerves were identified using an ipsilateral oblique fluoroscopic view. The overlying skin and subcutaneous tissue was anesthetized with 1% lidocaine. A 18 gauge curved 10 cm RFA needle with 10 mm active tip was advanced using intermittent fluoroscopy toward the junction of the transverse process and superior articulating process at the target medial branch nerves. The bilateral L5 dorsal ramus nerve was targeted at the junction of the sacral ala and the sacral articular process. Testing was performed at each level with motor 2 Hz stimulation to ensure  absence of nerve  root stimulation. Then 2 ml of 0.5% bupivacaine was injected to anesthetize each medial branch nerve. Continuous lesions were then performed at 80?C for 90 seconds at each location. One lesion(s) was performed at each medial branch nerve location. The needles were withdrawn. The patient’s skin was cleaned with alcohol and the injection sites covered with bandages.     EBL: None     COMPLICATIONS: None       The patient was monitored in the recovery area until appropriate discharge criteria were met. Vital signs remained stable throughout the procedure and in the recovery area. There were no immediate complications and the patient tolerated the procedure well. Sensory and motor exam was unchanged from baseline. The patient received written discharge instructions prior to discharge.     FOLLOW UP: As scheduled     ADDITIONAL NOTES:       [facility]  Patrick Arellano MD       Codes:  [id number]  [id number]  [id number]

## 2024-06-05 ENCOUNTER — TRANSCRIBE ORDERS (OUTPATIENT)
Dept: LAB | Facility: HOSPITAL | Age: 59
End: 2024-06-05
Payer: MEDICARE

## 2024-06-05 ENCOUNTER — LAB (OUTPATIENT)
Dept: LAB | Facility: HOSPITAL | Age: 59
End: 2024-06-05
Payer: MEDICARE

## 2024-06-05 ENCOUNTER — TELEPHONE (OUTPATIENT)
Dept: INTERNAL MEDICINE | Facility: CLINIC | Age: 59
End: 2024-06-05
Payer: MEDICARE

## 2024-06-05 DIAGNOSIS — Z94.4 LIVER TRANSPLANTED: Primary | ICD-10-CM

## 2024-06-05 DIAGNOSIS — Z94.4 LIVER TRANSPLANTED: ICD-10-CM

## 2024-06-05 LAB
ALBUMIN SERPL-MCNC: 4.1 G/DL (ref 3.5–5.2)
ALBUMIN/GLOB SERPL: 1.5 G/DL
ALP SERPL-CCNC: 67 U/L (ref 39–117)
ALT SERPL W P-5'-P-CCNC: 21 U/L (ref 1–33)
ANION GAP SERPL CALCULATED.3IONS-SCNC: 7 MMOL/L (ref 5–15)
AST SERPL-CCNC: 14 U/L (ref 1–32)
BILIRUB SERPL-MCNC: 1 MG/DL (ref 0–1.2)
BUN SERPL-MCNC: 31 MG/DL (ref 8–23)
BUN/CREAT SERPL: 25 (ref 7–25)
CALCIUM SPEC-SCNC: 8.9 MG/DL (ref 8.6–10.5)
CHLORIDE SERPL-SCNC: 107 MMOL/L (ref 98–107)
CO2 SERPL-SCNC: 28 MMOL/L (ref 22–29)
CREAT SERPL-MCNC: 1.24 MG/DL (ref 0.57–1)
DEPRECATED RDW RBC AUTO: 44.4 FL (ref 37–54)
EGFRCR SERPLBLD CKD-EPI 2021: 49.9 ML/MIN/1.73
ERYTHROCYTE [DISTWIDTH] IN BLOOD BY AUTOMATED COUNT: 13.7 % (ref 12.3–15.4)
GLOBULIN UR ELPH-MCNC: 2.7 GM/DL
GLUCOSE SERPL-MCNC: 97 MG/DL (ref 65–99)
HCT VFR BLD AUTO: 39.9 % (ref 34–46.6)
HGB BLD-MCNC: 12.7 G/DL (ref 12–15.9)
MCH RBC QN AUTO: 28 PG (ref 26.6–33)
MCHC RBC AUTO-ENTMCNC: 31.8 G/DL (ref 31.5–35.7)
MCV RBC AUTO: 88.1 FL (ref 79–97)
PLATELET # BLD AUTO: 133 10*3/MM3 (ref 140–450)
PMV BLD AUTO: 11.9 FL (ref 6–12)
POTASSIUM SERPL-SCNC: 5 MMOL/L (ref 3.5–5.2)
PROT SERPL-MCNC: 6.8 G/DL (ref 6–8.5)
RBC # BLD AUTO: 4.53 10*6/MM3 (ref 3.77–5.28)
SODIUM SERPL-SCNC: 142 MMOL/L (ref 136–145)
WBC NRBC COR # BLD AUTO: 7.06 10*3/MM3 (ref 3.4–10.8)

## 2024-06-05 PROCEDURE — 80197 ASSAY OF TACROLIMUS: CPT

## 2024-06-05 PROCEDURE — 85027 COMPLETE CBC AUTOMATED: CPT | Performed by: INTERNAL MEDICINE

## 2024-06-05 PROCEDURE — 36415 COLL VENOUS BLD VENIPUNCTURE: CPT | Performed by: INTERNAL MEDICINE

## 2024-06-05 PROCEDURE — 82977 ASSAY OF GGT: CPT

## 2024-06-05 PROCEDURE — 87252 VIRUS INOCULATION TISSUE: CPT | Performed by: NURSE PRACTITIONER

## 2024-06-05 PROCEDURE — 83735 ASSAY OF MAGNESIUM: CPT

## 2024-06-05 PROCEDURE — 80053 COMPREHEN METABOLIC PANEL: CPT | Performed by: INTERNAL MEDICINE

## 2024-06-06 ENCOUNTER — TELEPHONE (OUTPATIENT)
Dept: INTERNAL MEDICINE | Facility: CLINIC | Age: 59
End: 2024-06-06
Payer: MEDICARE

## 2024-06-06 DIAGNOSIS — B02.9 HERPES ZOSTER WITHOUT COMPLICATION: Primary | ICD-10-CM

## 2024-06-06 DIAGNOSIS — L98.9 LESION OF SKIN OF FACE: ICD-10-CM

## 2024-06-06 LAB
GGT SERPL-CCNC: 18 U/L (ref 5–36)
MAGNESIUM SERPL-MCNC: 2.2 MG/DL (ref 1.6–2.4)

## 2024-06-06 NOTE — TELEPHONE ENCOUNTER
It looks like acyclovir oral and topical were both sent.  She can simply just take the tablet which should be appropriate.  Let me know if additional clarification is needed.  Thanks.

## 2024-06-06 NOTE — TELEPHONE ENCOUNTER
Caller: Rodriguez Lizette KASSANDRA    Relationship: Self    Best call back number: [phone]     Which medication are you concerned about: ANTI-VIRAL     What are your concerns: LIZETTE HAS SOMETHING UNDER HER NOSE, URGENT CARE SENT AN RX WHICH COSTS $75.  IS THERE SOMETHING LESS EXPENSIVE?    ALSO SHE HAD THEW SHINGLES VAX AND NOW SHE HAS SHINGLES.

## 2024-06-07 RX ORDER — ACYCLOVIR 400 MG/1
800 TABLET ORAL 3 TIMES DAILY
Qty: 10 TABLET | Refills: 0 | Status: SHIPPED | OUTPATIENT
Start: 2024-06-07

## 2024-06-07 NOTE — TELEPHONE ENCOUNTER
Reached pharmacy. Acyclovir 400 mg tablet prescription was not sent to pharmacy. Reference epic prescription was printed. Please send.

## 2024-06-09 LAB — TACROLIMUS BLD LC/MS/MS-MCNC: 3.7 NG/ML (ref 2–20)

## 2024-06-10 ENCOUNTER — TELEPHONE (OUTPATIENT)
Dept: INTERNAL MEDICINE | Facility: CLINIC | Age: 59
End: 2024-06-10
Payer: MEDICARE

## 2024-06-10 DIAGNOSIS — G25.81 RESTLESS LEG SYNDROME: ICD-10-CM

## 2024-06-10 RX ORDER — ROPINIROLE 4 MG/1
4 TABLET, FILM COATED ORAL NIGHTLY
Qty: 90 TABLET | Refills: 1 | Status: SHIPPED | OUTPATIENT
Start: 2024-06-10

## 2024-06-10 NOTE — TELEPHONE ENCOUNTER
Caller: Lizette Frias    Relationship: Self    Best call back number: [phone]     What medication are you requesting: rOPINIRole (REQUIP) 2 MG tablet     What are your current symptoms:     How long have you been experiencing symptoms:     Have you had these symptoms before:    [x] Yes  [] No    Have you been treated for these symptoms before:   [x] Yes  [] No    If a prescription is needed, what is your preferred pharmacy and phone number: [id number] - [address] - [phone]  - [phone]      Additional notes: PATIENT STATES SHE HAS BEEN TAKING 2 TABLETS NIGHTLY AND WILL NEED PCP TO SEND A NEW PRESCRIPTION TO THE PHARMACY WITH NEW DIRECTIONS STATING TO TAKE 2 TABLETS NIGHTLY. PATIENT STATES SHE HAS RAN OUT OF MEDICATION.

## 2024-06-10 NOTE — TELEPHONE ENCOUNTER
Rather than her taking two 2 mg tablets, I will send a 4 mg tablet that she can take every night.

## 2024-06-12 DIAGNOSIS — F32.A ANXIETY AND DEPRESSION: ICD-10-CM

## 2024-06-12 DIAGNOSIS — F41.9 ANXIETY AND DEPRESSION: ICD-10-CM

## 2024-06-12 RX ORDER — BUPROPION HYDROCHLORIDE 300 MG/1
300 TABLET ORAL EVERY MORNING
Qty: 90 TABLET | Refills: 0 | Status: SHIPPED | OUTPATIENT
Start: 2024-06-12

## 2024-06-19 DIAGNOSIS — F51.01 PRIMARY INSOMNIA: ICD-10-CM

## 2024-06-19 RX ORDER — TRAZODONE HYDROCHLORIDE 100 MG/1
200 TABLET ORAL NIGHTLY
Qty: 180 TABLET | Refills: 1 | Status: SHIPPED | OUTPATIENT
Start: 2024-06-19

## 2024-06-24 RX ORDER — DENOSUMAB 60 MG/ML
INJECTION SUBCUTANEOUS
Qty: 1 EACH | Refills: 1 | Status: SHIPPED | OUTPATIENT
Start: 2024-06-24

## 2024-07-18 ENCOUNTER — OFFICE VISIT (OUTPATIENT)
Dept: INTERNAL MEDICINE | Facility: CLINIC | Age: 59
End: 2024-07-18
Payer: MEDICARE

## 2024-07-18 ENCOUNTER — HOSPITAL ENCOUNTER (OUTPATIENT)
Dept: GENERAL RADIOLOGY | Facility: HOSPITAL | Age: 59
Discharge: HOME | End: 2024-07-18
Admitting: NURSE PRACTITIONER
Payer: MEDICARE

## 2024-07-18 VITALS
SYSTOLIC BLOOD PRESSURE: 122 MMHG | BODY MASS INDEX: 26.62 KG/M2 | RESPIRATION RATE: 18 BRPM | TEMPERATURE: 97.5 F | DIASTOLIC BLOOD PRESSURE: 80 MMHG | HEART RATE: 70 BPM | HEIGHT: 61 IN | WEIGHT: 141 LBS

## 2024-07-18 DIAGNOSIS — R74.8 ALKALINE PHOSPHATASE ELEVATION: ICD-10-CM

## 2024-07-18 DIAGNOSIS — M81.8 OTHER OSTEOPOROSIS WITHOUT CURRENT PATHOLOGICAL FRACTURE: ICD-10-CM

## 2024-07-18 DIAGNOSIS — M54.50 MIDLINE LOW BACK PAIN WITHOUT SCIATICA, UNSPECIFIED CHRONICITY: ICD-10-CM

## 2024-07-18 DIAGNOSIS — F41.9 ANXIETY AND DEPRESSION: ICD-10-CM

## 2024-07-18 DIAGNOSIS — G25.81 RESTLESS LEGS SYNDROME: ICD-10-CM

## 2024-07-18 DIAGNOSIS — J30.1 NON-SEASONAL ALLERGIC RHINITIS DUE TO POLLEN: ICD-10-CM

## 2024-07-18 DIAGNOSIS — N18.2 STAGE 2 CHRONIC KIDNEY DISEASE: ICD-10-CM

## 2024-07-18 DIAGNOSIS — I10 PRIMARY HYPERTENSION: ICD-10-CM

## 2024-07-18 DIAGNOSIS — E78.5 HYPERLIPIDEMIA LDL GOAL <70: ICD-10-CM

## 2024-07-18 DIAGNOSIS — R41.3 MEMORY CHANGES: Primary | ICD-10-CM

## 2024-07-18 DIAGNOSIS — F32.A ANXIETY AND DEPRESSION: ICD-10-CM

## 2024-07-18 DIAGNOSIS — H90.6 MIXED CONDUCTIVE AND SENSORINEURAL HEARING LOSS, BILATERAL: ICD-10-CM

## 2024-07-18 DIAGNOSIS — R73.03 PREDIABETES: ICD-10-CM

## 2024-07-18 DIAGNOSIS — E53.8 FOLIC ACID DEFICIENCY: ICD-10-CM

## 2024-07-18 DIAGNOSIS — F51.01 PRIMARY INSOMNIA: ICD-10-CM

## 2024-07-18 DIAGNOSIS — E89.0 POSTOPERATIVE HYPOTHYROIDISM: ICD-10-CM

## 2024-07-18 DIAGNOSIS — R31.9 HEMATURIA, UNSPECIFIED TYPE: ICD-10-CM

## 2024-07-18 DIAGNOSIS — E66.3 OVERWEIGHT (BMI 25.0-29.9): ICD-10-CM

## 2024-07-18 DIAGNOSIS — Z94.4 LIVER TRANSPLANT RECIPIENT: ICD-10-CM

## 2024-07-18 DIAGNOSIS — R82.998 LEUKOCYTES IN URINE: ICD-10-CM

## 2024-07-18 LAB
ALBUMIN SERPL-MCNC: 4.5 G/DL (ref 3.5–5.2)
ALBUMIN/GLOB SERPL: 1.6 G/DL
ALP SERPL-CCNC: 72 U/L (ref 39–117)
ALT SERPL W P-5'-P-CCNC: 23 U/L (ref 1–33)
ANION GAP SERPL CALCULATED.3IONS-SCNC: 12.3 MMOL/L (ref 5–15)
AST SERPL-CCNC: 16 U/L (ref 1–32)
BACTERIA UR QL AUTO: ABNORMAL /HPF
BASOPHILS # BLD AUTO: 0.02 10*3/MM3 (ref 0–0.2)
BASOPHILS NFR BLD AUTO: 0.4 % (ref 0–1.5)
BILIRUB BLD-MCNC: NEGATIVE MG/DL
BILIRUB SERPL-MCNC: 0.4 MG/DL (ref 0–1.2)
BUN SERPL-MCNC: 23 MG/DL (ref 8–23)
BUN/CREAT SERPL: 26.1 (ref 7–25)
CALCIUM SPEC-SCNC: 9.2 MG/DL (ref 8.6–10.5)
CHLORIDE SERPL-SCNC: 104 MMOL/L (ref 98–107)
CHOLEST SERPL-MCNC: 177 MG/DL (ref 0–200)
CLARITY, POC: ABNORMAL
CO2 SERPL-SCNC: 22.7 MMOL/L (ref 22–29)
COLOR UR: ABNORMAL
CREAT SERPL-MCNC: 0.88 MG/DL (ref 0.57–1)
DEPRECATED RDW RBC AUTO: 44.6 FL (ref 37–54)
EGFRCR SERPLBLD CKD-EPI 2021: 75.3 ML/MIN/1.73
EOSINOPHIL # BLD AUTO: 0.01 10*3/MM3 (ref 0–0.4)
EOSINOPHIL NFR BLD AUTO: 0.2 % (ref 0.3–6.2)
ERYTHROCYTE [DISTWIDTH] IN BLOOD BY AUTOMATED COUNT: 14.4 % (ref 12.3–15.4)
EXPIRATION DATE: ABNORMAL
FOLATE SERPL-MCNC: >20 NG/ML (ref 4.78–24.2)
GLOBULIN UR ELPH-MCNC: 2.8 GM/DL
GLUCOSE SERPL-MCNC: 111 MG/DL (ref 65–99)
GLUCOSE UR STRIP-MCNC: NEGATIVE MG/DL
HCT VFR BLD AUTO: 41.9 % (ref 34–46.6)
HDLC SERPL-MCNC: 36 MG/DL (ref 40–60)
HGB BLD-MCNC: 14.2 G/DL (ref 12–15.9)
HYALINE CASTS UR QL AUTO: ABNORMAL /LPF
IMM GRANULOCYTES # BLD AUTO: 0.05 10*3/MM3 (ref 0–0.05)
IMM GRANULOCYTES NFR BLD AUTO: 1.1 % (ref 0–0.5)
KETONES UR QL: NEGATIVE
LDLC SERPL CALC-MCNC: 101 MG/DL (ref 0–100)
LDLC/HDLC SERPL: 2.62 {RATIO}
LEUKOCYTE EST, POC: ABNORMAL
LYMPHOCYTES # BLD AUTO: 1.67 10*3/MM3 (ref 0.7–3.1)
LYMPHOCYTES NFR BLD AUTO: 35.6 % (ref 19.6–45.3)
Lab: ABNORMAL
MCH RBC QN AUTO: 29.1 PG (ref 26.6–33)
MCHC RBC AUTO-ENTMCNC: 33.9 G/DL (ref 31.5–35.7)
MCV RBC AUTO: 85.9 FL (ref 79–97)
MONOCYTES # BLD AUTO: 0.24 10*3/MM3 (ref 0.1–0.9)
MONOCYTES NFR BLD AUTO: 5.1 % (ref 5–12)
NEUTROPHILS NFR BLD AUTO: 2.7 10*3/MM3 (ref 1.7–7)
NEUTROPHILS NFR BLD AUTO: 57.6 % (ref 42.7–76)
NITRITE UR-MCNC: NEGATIVE MG/ML
NRBC BLD AUTO-RTO: 0 /100 WBC (ref 0–0.2)
PH UR: 5 [PH] (ref 5–8)
PLATELET # BLD AUTO: 179 10*3/MM3 (ref 140–450)
PMV BLD AUTO: 10.9 FL (ref 6–12)
POTASSIUM SERPL-SCNC: 4.7 MMOL/L (ref 3.5–5.2)
PROT SERPL-MCNC: 7.3 G/DL (ref 6–8.5)
PROT UR STRIP-MCNC: NEGATIVE MG/DL
RBC # BLD AUTO: 4.88 10*6/MM3 (ref 3.77–5.28)
RBC # UR STRIP: ABNORMAL /HPF
RBC # UR STRIP: ABNORMAL /UL
REF LAB TEST METHOD: ABNORMAL
SODIUM SERPL-SCNC: 139 MMOL/L (ref 136–145)
SP GR UR: 1.02 (ref 1–1.03)
SQUAMOUS #/AREA URNS HPF: ABNORMAL /HPF
TRIGL SERPL-MCNC: 233 MG/DL (ref 0–150)
TSH SERPL DL<=0.05 MIU/L-ACNC: 0.01 UIU/ML (ref 0.27–4.2)
UROBILINOGEN UR QL: NORMAL
VLDLC SERPL-MCNC: 40 MG/DL (ref 5–40)
WBC # UR STRIP: ABNORMAL /HPF
WBC NRBC COR # BLD AUTO: 4.69 10*3/MM3 (ref 3.4–10.8)

## 2024-07-18 PROCEDURE — 84443 ASSAY THYROID STIM HORMONE: CPT | Performed by: NURSE PRACTITIONER

## 2024-07-18 PROCEDURE — 3044F HG A1C LEVEL LT 7.0%: CPT | Performed by: NURSE PRACTITIONER

## 2024-07-18 PROCEDURE — 3074F SYST BP LT 130 MM HG: CPT | Performed by: NURSE PRACTITIONER

## 2024-07-18 PROCEDURE — 82746 ASSAY OF FOLIC ACID SERUM: CPT | Performed by: NURSE PRACTITIONER

## 2024-07-18 PROCEDURE — 3079F DIAST BP 80-89 MM HG: CPT | Performed by: NURSE PRACTITIONER

## 2024-07-18 PROCEDURE — 85025 COMPLETE CBC W/AUTO DIFF WBC: CPT | Performed by: NURSE PRACTITIONER

## 2024-07-18 PROCEDURE — 1160F RVW MEDS BY RX/DR IN RCRD: CPT | Performed by: NURSE PRACTITIONER

## 2024-07-18 PROCEDURE — 81015 MICROSCOPIC EXAM OF URINE: CPT | Performed by: NURSE PRACTITIONER

## 2024-07-18 PROCEDURE — 99214 OFFICE O/P EST MOD 30 MIN: CPT | Performed by: NURSE PRACTITIONER

## 2024-07-18 PROCEDURE — 1125F AMNT PAIN NOTED PAIN PRSNT: CPT | Performed by: NURSE PRACTITIONER

## 2024-07-18 PROCEDURE — 72100 X-RAY EXAM L-S SPINE 2/3 VWS: CPT

## 2024-07-18 PROCEDURE — 80053 COMPREHEN METABOLIC PANEL: CPT | Performed by: NURSE PRACTITIONER

## 2024-07-18 PROCEDURE — 1159F MED LIST DOCD IN RCRD: CPT | Performed by: NURSE PRACTITIONER

## 2024-07-18 PROCEDURE — 80061 LIPID PANEL: CPT | Performed by: NURSE PRACTITIONER

## 2024-07-18 PROCEDURE — 87086 URINE CULTURE/COLONY COUNT: CPT | Performed by: NURSE PRACTITIONER

## 2024-07-18 PROCEDURE — 81003 URINALYSIS AUTO W/O SCOPE: CPT | Performed by: NURSE PRACTITIONER

## 2024-07-18 RX ORDER — METHYLPREDNISOLONE 4 MG/1
TABLET ORAL
COMMUNITY
Start: 2024-07-15

## 2024-07-18 NOTE — PATIENT INSTRUCTIONS
MyPlate from USDA    MyPlate is an outline of a general healthy diet based on the Dietary Guidelines for Americans, 3108-1674, from the U.S. Department of Agriculture (USDA). It sets guidelines for how much food you should eat from each food group based on your age, sex, and level of physical activity.  What are tips for following MyPlate?  To follow MyPlate recommendations:  Eat a wide variety of fruits and vegetables, grains, and protein foods.  Serve smaller portions and eat less food throughout the day.  Limit portion sizes to avoid overeating.  Enjoy your food.  Get at least 150 minutes of exercise every week. This is about 30 minutes each day, 5 or more days per week.  It can be difficult to have every meal look like MyPlate. Think about MyPlate as eating guidelines for an entire day, rather than each individual meal.  Fruits and vegetables  Make one half of your plate fruits and vegetables.  Eat many different colors of fruits and vegetables each day.  For a 2,000-calorie daily food plan, eat:  2½ cups of vegetables every day.  2 cups of fruit every day.  1 cup is equal to:  1 cup raw or cooked vegetables.  1 cup raw fruit.  1 medium-sized orange, apple, or banana.  1 cup 100% fruit or vegetable juice.  2 cups raw leafy greens, such as lettuce, spinach, or kale.  ½ cup dried fruit.  Grains  One fourth of your plate should be grains.  Make at least half of the grains you eat each day whole grains.  For a 2,000-calorie daily food plan, eat 6 oz of grains every day.  1 oz is equal to:  1 slice bread.  1 cup cereal.  ½ cup cooked rice, cereal, or pasta.  Protein  One fourth of your plate should be protein.  Eat a wide variety of protein foods, including meat, poultry, fish, eggs, beans, nuts, and tofu.  For a 2,000-calorie daily food plan, eat 5½ oz of protein every day.  1 oz is equal to:  1 oz meat, poultry, or fish.  ¼ cup cooked beans.  1 egg.  ½ oz nuts or seeds.  1 Tbsp peanut butter.  Dairy  Drink fat-free  or low-fat (1%) milk.  Eat or drink dairy as a side to meals.  For a 2,000-calorie daily food plan, eat or drink 3 cups of dairy every day.  1 cup is equal to:  1 cup milk, yogurt, cottage cheese, or soy milk (soy beverage).  2 oz processed cheese.  1½ oz natural cheese.  Fats, oils, salt, and sugars  Only small amounts of oils are recommended.  Avoid foods that are high in calories and low in nutritional value (empty calories), like foods high in fat or added sugars.  Choose foods that are low in salt (sodium). Choose foods that have less than 140 milligrams (mg) of sodium per serving.  Drink water instead of sugary drinks. Drink enough fluid to keep your urine pale yellow.  Where to find support  Work with your health care provider or a dietitian to develop a customized eating plan that is right for you.  Download an panchito (mobile application) to help you track your daily food intake.  Where to find more information  USDA: ChooseMyPlate.gov  Summary  MyPlate is a general guideline for healthy eating from the USDA. It is based on the Dietary Guidelines for Americans, 3458-3105.  In general, fruits and vegetables should take up one half of your plate, grains should take up one fourth of your plate, and protein should take up one fourth of your plate.  This information is not intended to replace advice given to you by your health care provider. Make sure you discuss any questions you have with your health care provider.  Document Revised: 11/08/2021 Document Reviewed: 11/08/2021  Elsevier Patient Education © 2024 Elsevier Inc.  ACP information pamphlet given to the patient.  ACP information provided on the AVS.

## 2024-07-18 NOTE — PROGRESS NOTES
Chief Complaint  Stage 2 chronic kidney disease (3 month follow up. )    Subjective          Lizette Frias presents to [facility]  History of Present Illness  History of Present Illness    Patient is here today with concern about memory.  She reports last year that she  from her  and they been having difficulty in regards to the separation which involves her children.  She feels this is making her more stressed.  She also started Lyrica which was new in the last year.  She reports her memory loss has been going on for approximately 1 year she has not discussed this prior with her PCP.    In review of the patient's chart she does take medication for anxiety, mirtazapine at bedtime.  She also has trazodone 200 mg at bedtime for sleep although she states she does not sleep well.    She has had a mild abnormality in her TSH level however in review of the note endocrinology would like patient to keep her TSH between 0.1 and 7.  She is agreeable to have checked again today.    The patient has prediabetes and attempted Ozempic however was intolerant to the medication.    She follows with [facility] liver transplant team next visit 1/25.    She has had folic acid deficiency and is currently taking a prenatal vitamin daily.    She states she has difficulty remembering and difficulty with getting confused in regards to specific household items that she is used frequently in the past.  Her family would like her evaluated.  Her family is requesting per her report that she have a CT of her head.    She is agreeable to complete an MMSE today.    Patient has a second concern today.  She reports she was lifting something that was slightly heavy over the last couple weeks and felt pain in her back.  She has had a history of osteoporosis.  She took a Medrol Dosepak but the pain continues which is across her low back without pain that is radicular.  She denies numbness  "weakness in her extremities and no changes in her bowel bladder habits.  She is agreeable to complete physical therapy if it is ordered.  She would like to have an x-ray today which I will order for her.    She has hyperlipidemia takes rosuvastatin 20 mg at bedtime denies abdominal pain or myalgias.  She knows to limit Tylenol alcohol and avoid grapefruit products.  In regards to her weight her weight is down 8 pounds since her last visit and her BMI is 26.7.  She is also taking the Vascepa.    Patient has restless leg syndrome and takes ropinirole at bedtime which is helpful.    The patient has history of liver transplant and takes tacrolimus as directed per [facility] liver team.  Her transplant was in [place].  She is unable to continue to be seen in [place] due to transport issues.    The patient has osteoporosis and takes Prolia.  She is up-to-date on her DEXA bone scan.  She takes Caltrate D as directed.    The patient has seasonal allergies and takes Flonase as needed.    The patient has hypertension.  Her blood pressure is in normal range today.  She takes telmisartan 20 mg daily.  She denies chest pain shortness of breath headache dizziness syncope or lower extremity edema.    Patient has anxiety and depression as noted above she is taking Wellbutrin 300 mg once daily and mirtazapine at bedtime.    She denies any blood in the urine of the stool no new lumps bumps fever rashes bruises or bleeding.  No skin changes.    She has hypothyroidism takes levothyroxine as directed 200 mcg in the morning and 100 mcg at bedtime.      She has stage II kidney disease.  Her kidney function will be checked today.            Objective   Vital Signs:   /80 (BP Location: Left arm, Patient Position: Sitting, Cuff Size: Adult)   Pulse 70   Temp 97.5 °F (36.4 °C) (Infrared)   Resp 18   Ht 154.9 cm (61\")   Wt 64 kg (141 lb)   BMI 26.64 kg/m²     Physical Exam  Vitals and nursing note reviewed.   Constitutional:      "  General: She is not in acute distress.     Appearance: Normal appearance. She is well-developed. She is not ill-appearing.   HENT:      Head: Normocephalic and atraumatic.      Right Ear: Tympanic membrane normal.      Left Ear: Tympanic membrane normal.      Nose: Nose normal.      Mouth/Throat:      Pharynx: Oropharynx is clear.   Eyes:      General: No scleral icterus.        Right eye: No discharge.         Left eye: No discharge.      Conjunctiva/sclera: Conjunctivae normal.      Pupils: Pupils are equal, round, and reactive to light.   Neck:      Thyroid: No thyromegaly.   Cardiovascular:      Rate and Rhythm: Normal rate and regular rhythm.   Pulmonary:      Effort: Pulmonary effort is normal.      Breath sounds: Normal breath sounds.   Abdominal:      General: Bowel sounds are normal. There is no distension.      Palpations: Abdomen is soft.      Tenderness: There is no abdominal tenderness.   Lymphadenopathy:      Cervical: No cervical adenopathy.   Skin:     Capillary Refill: Capillary refill takes 2 to 3 seconds.      Coloration: Skin is not pale.   Neurological:      Mental Status: She is alert and oriented to person, place, and time.      Cranial Nerves: No cranial nerve deficit.      Motor: No weakness.      Gait: Gait normal.      Deep Tendon Reflexes: Reflexes normal.   Psychiatric:         Mood and Affect: Mood normal.         Behavior: Behavior normal.         Thought Content: Thought content normal.        Result Review :                 Assessment and Plan    Diagnoses and all orders for this visit:    1. Memory changes (Primary)  -     Ambulatory Referral to Neurology  -     CT Head Without Contrast; Future    2. Midline low back pain without sciatica, unspecified chronicity  -     CBC & Differential; Future  -     XR Spine Lumbar 2 or 3 View (In Office)  -     Ambulatory Referral to Physical Therapy  -     CBC & Differential    3. Non-seasonal allergic rhinitis due to pollen    4.  Hyperlipidemia LDL goal <70  -     CBC & Differential; Future  -     Comprehensive Metabolic Panel; Future  -     Lipid Panel; Future  -     CBC & Differential  -     Comprehensive Metabolic Panel  -     Lipid Panel    5. Primary hypertension  -     CBC & Differential; Future  -     Comprehensive Metabolic Panel; Future  -     POC Urinalysis Dipstick, Automated; Future  -     CBC & Differential  -     Comprehensive Metabolic Panel  -     POC Urinalysis Dipstick, Automated    6. Mixed conductive and sensorineural hearing loss, bilateral  -     CBC & Differential; Future  -     CBC & Differential    7. Postoperative hypothyroidism  -     CBC & Differential; Future  -     TSH; Future  -     CBC & Differential  -     TSH    8. Liver transplant recipient  -     CBC & Differential; Future  -     Comprehensive Metabolic Panel; Future  -     Ammonia; Future  -     CBC & Differential  -     Comprehensive Metabolic Panel  -     Cancel: Ammonia    9. Restless legs syndrome  -     CBC & Differential; Future  -     CBC & Differential    10. Other osteoporosis without current pathological fracture  -     CBC & Differential; Future  -     CBC & Differential    11. Anxiety and depression  -     CBC & Differential; Future  -     CBC & Differential    12. Primary insomnia  -     CBC & Differential; Future  -     CBC & Differential    13. Overweight (BMI 25.0-29.9)  -     CBC & Differential; Future  -     CBC & Differential    14. Alkaline phosphatase elevation  -     CBC & Differential; Future  -     Comprehensive Metabolic Panel; Future  -     CBC & Differential  -     Comprehensive Metabolic Panel    15. Stage 2 chronic kidney disease  -     CBC & Differential; Future  -     Comprehensive Metabolic Panel; Future  -     CBC & Differential  -     Comprehensive Metabolic Panel    16. Folic acid deficiency  -     CBC & Differential; Future  -     Folate; Future  -     CBC & Differential  -     Folate    17. Prediabetes  -     Cancel:  Hemoglobin A1c; Future    18. Hematuria, unspecified type  -     Urinalysis, Microscopic Only - Urine, Clean Catch    19. Leukocytes in urine  -     Urine Culture - Urine, Urine, Clean Catch; Future  -     Urine Culture - Urine, Urine, Clean Catch      Assessment & Plan                        AWV: utd  A1C:   Lab Results   Component Value Date    HGBA1C 4.8 04/12/2024      ACP: Advance Care Planning   ACP discussion was held with the patient during this visit. Patient has an advance directive in EMR which is still valid.    Mammogram: schedule for 12/24  Colonoscopy: per pt Dr Ferimn said could do Cologard but did not receive kit.  Will hold to order due to patient having pmh colon polyps  Dexa done 2023   Follow Up   Return in about 3 months (around 10/18/2024), or if symptoms worsen or fail to improve, for Next scheduled follow up, fasting.  Patient was given instructions and counseling regarding her condition or for health maintenance advice. Please see specific information pulled into the AVS if appropriate.     RTC/call  If symptoms worsen  Meds MOA and SE's reviewed and pt v/u    07/18/24   10:13 EDT

## 2024-07-19 LAB — BACTERIA SPEC AEROBE CULT: NORMAL

## 2024-08-02 DIAGNOSIS — I25.10 CORONARY ARTERY DISEASE INVOLVING NATIVE CORONARY ARTERY OF NATIVE HEART WITHOUT ANGINA PECTORIS: ICD-10-CM

## 2024-08-02 DIAGNOSIS — N18.2 STAGE 2 CHRONIC KIDNEY DISEASE: ICD-10-CM

## 2024-08-02 DIAGNOSIS — I95.2 HYPOTENSION DUE TO DRUGS: ICD-10-CM

## 2024-08-02 RX ORDER — TELMISARTAN 20 MG/1
20 TABLET ORAL DAILY
Qty: 90 TABLET | Refills: 1 | Status: SHIPPED | OUTPATIENT
Start: 2024-08-02

## 2024-08-03 ENCOUNTER — HOSPITAL ENCOUNTER (OUTPATIENT)
Dept: CT IMAGING | Facility: HOSPITAL | Age: 59
Discharge: HOME | End: 2024-08-03
Payer: MEDICARE

## 2024-08-03 DIAGNOSIS — R41.3 MEMORY CHANGES: ICD-10-CM

## 2024-08-03 PROCEDURE — 70450 CT HEAD/BRAIN W/O DYE: CPT

## 2024-08-07 ENCOUNTER — TELEPHONE (OUTPATIENT)
Dept: INTERNAL MEDICINE | Facility: CLINIC | Age: 59
End: 2024-08-07
Payer: MEDICARE

## 2024-08-07 DIAGNOSIS — Z94.4 LIVER TRANSPLANT RECIPIENT: Primary | ICD-10-CM

## 2024-08-12 ENCOUNTER — OFFICE VISIT (OUTPATIENT)
Dept: PAIN MEDICINE | Facility: CLINIC | Age: 59
End: 2024-08-12
Payer: MEDICARE

## 2024-08-12 VITALS — WEIGHT: 142.5 LBS | HEIGHT: 61 IN | BODY MASS INDEX: 26.91 KG/M2

## 2024-08-12 DIAGNOSIS — G89.29 CHRONIC BILATERAL THORACIC BACK PAIN: ICD-10-CM

## 2024-08-12 DIAGNOSIS — M54.50 CHRONIC LOW BACK PAIN, UNSPECIFIED BACK PAIN LATERALITY, UNSPECIFIED WHETHER SCIATICA PRESENT: ICD-10-CM

## 2024-08-12 DIAGNOSIS — M54.6 CHRONIC BILATERAL THORACIC BACK PAIN: ICD-10-CM

## 2024-08-12 DIAGNOSIS — G89.29 CHRONIC LOW BACK PAIN, UNSPECIFIED BACK PAIN LATERALITY, UNSPECIFIED WHETHER SCIATICA PRESENT: ICD-10-CM

## 2024-08-12 DIAGNOSIS — G62.9 PERIPHERAL POLYNEUROPATHY: ICD-10-CM

## 2024-08-12 DIAGNOSIS — M47.816 SPONDYLOSIS OF LUMBAR REGION WITHOUT MYELOPATHY OR RADICULOPATHY: Primary | ICD-10-CM

## 2024-08-12 DIAGNOSIS — Z51.81 THERAPEUTIC DRUG MONITORING: Primary | ICD-10-CM

## 2024-08-12 PROCEDURE — 99213 OFFICE O/P EST LOW 20 MIN: CPT

## 2024-08-12 PROCEDURE — G2211 COMPLEX E/M VISIT ADD ON: HCPCS

## 2024-08-12 PROCEDURE — 1160F RVW MEDS BY RX/DR IN RCRD: CPT

## 2024-08-12 PROCEDURE — 1159F MED LIST DOCD IN RCRD: CPT

## 2024-08-12 PROCEDURE — 1125F AMNT PAIN NOTED PAIN PRSNT: CPT

## 2024-08-12 RX ORDER — MELOXICAM 15 MG/1
15 TABLET ORAL AS NEEDED
COMMUNITY
Start: 2024-08-11

## 2024-08-12 NOTE — PROGRESS NOTES
Referring Physician: No referring provider defined for this encounter.    Primary Physician: Lars Fermin MD    CHIEF COMPLAINT or REASON FOR VISIT: Follow-up (Post RFA/Patient reports minium relief./Patient's back went out about 6 weeks ago-back to baseline pain. ) and Back Pain      Initial history of present illness on 01/23/2024:  Ms. Lizette Frias is 59 y.o. female who presents as a new patient referral for evaluation treatment chronic axial back pain without radiation.  Ms. Frias does have a past medical history of CKD 2, GERD, CVA, gastroparesis, [place] cirrhosis status post liver transplant, Paget disease, meningioma, HLD, anxiety, depression, gout.  She describes a many year history of chronic axial back pain without radiation to lower extremities without any inciting or precipitating trauma.  She has previously been treated for bilateral hip osteoarthrosis most recently she had a fall in early January 2024 in which she sustained a increased pain/trauma to her right hip.  She did undergo MRI demonstrating a nondisplaced fracture of the right superior pubic roots for which she saw Dr. Bauer, orthopedic surgery, today who advised that this is a nonoperative condition which should resolve with time and conservative management.    She denies any past medical history of lumbar spine surgery or injection.  She has undergone a left hip injection with [facility] in early 2024.  Additionally at that time she underwent Lyrica and oxycodone management.  I do not have the most recent office notes and is unclear to me why patient was discontinued but she did violate her opioid use agreement with taking unprescribed tramadol, being prescribed hydrocodone for a dental procedure, and having TGN her urinalysis.  Her left hip injection helped for about 50% for a week or 2.    Today she denies any significant hip pain, bursitis pain or sacroiliac type pain.  Patient denies any bowel or bladder  dysfunction, lower extremity weakness, new onset saddle anesthesia or unexplained weight loss.       Interval history:   Patient returns to clinic after undergoing bilateral lumbar medial branch blocks and subsequent ablation.  Unfortunately the ablation did not provide her with much relief.  She continues to complain of chronic bilateral low back pain without radiation to the bilateral lower extremities.  She is set to start aqua therapy this week.  She denies any new injuries or events since her previous appointment.  She continues to take Lyrica 150 mg twice daily with good relief.   She denies active suicidal ideation at today's appointment.    Interventions:  4/11/2024: L4/5 and L5/S1 LMBB with 100% pain relief for multiple hours  5/14/2024: L4/5 and L5/S1 LMBB with 90% pain relief for multiple hours  5/28/2024: Bilateral L4/5 & L5/S1 RFA with minimal relief      Objective Pain Scoring:   BRIEF PAIN INVENTORY:  Total score:   Pain Score    08/12/24 1454   PainSc:   3   PainLoc: Back      PHQ-2: PHQ-2 Total Score: 2  PHQ-9: PHQ-9: Brief Depression Severity Measure Score: 13  Opioid Risk Tool:         Review of Systems:   ROS negative except as otherwise noted     Past Medical History:   Past Medical History:   Diagnosis Date    Acute hypoxemic respiratory failure due to COVID-19     Allergic     Environmental, pcn, cyclosporine    Anemia     Anxiety     Cervical disc disorder     Cholelithiasis     Chronic obstructive pulmonary disease, unspecified 12/03/2021    Chronic pain disorder     Chronic systolic (congestive) heart failure 12/03/2021    Cirrhosis of liver     Clotting disorder     Pre-transplant    Colon polyp     COVID     Deep vein thrombosis Pretransplant    Blood clot in liver    Depression     Diabetes     Endometriosis     Fractures     GERD (gastroesophageal reflux disease)     Headache     Headache, tension-type     Heart murmur 1969    HL (hearing loss)     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, mastoid  damage    Hypercholesteremia     Hypertension     Hyperthyroidism     Hypothyroid     Joint pain     Low back pain 1982    Meningioma     Migraine     Osteoarthritis     Osteopenia     Osteoporosis     Pancreatitis     Pretransplant    Prediabetes     Renal impairment     Rheumatoid arthritis involving multiple sites with positive rheumatoid factor 2024    Severe malnutrition (CMS/HCC) 2022    Shingles     Stroke     Thyroid cancer     Vision impairment     left eye         Past Surgical History:   Past Surgical History:   Procedure Laterality Date    APPENDECTOMY      BREAST BIOPSY Left     BENIGN     SECTION      x3    CHOLECYSTECTOMY      COLONOSCOPY      D & C HYSTEROSCOPY LAPAROSCOPY      x2 for endometriosis    ENDOSCOPY      FRACTURE SURGERY  1983    Right ankle has screws    LIVER SURGERY      removed cysts     LIVER TRANSPLANTATION      ORIF ANKLE FRACTURE      ORTHOPEDIC SURGERY      THYROIDECTOMY      TONSILLECTOMY      TOTAL ABDOMINAL HYSTERECTOMY WITH SALPINGO OOPHORECTOMY Bilateral     TRIGGER POINT INJECTION      UPPER GASTROINTESTINAL ENDOSCOPY           Family History   Family History   Problem Relation Age of Onset    Arthritis Mother     Osteoporosis Mother     Rheum arthritis Mother     Anemia Mother     Alcohol abuse Father     Mental illness Father     Hyperlipidemia Father     Hypertension Father     Colon cancer Father     Cancer Father     Hearing loss Father     ADD / ADHD Son     ADD / ADHD Son     Breast cancer Maternal Grandmother     Stroke Maternal Grandmother     Mental illness Paternal Grandmother     Ovarian cancer Paternal Grandmother          Social History   Social History     Socioeconomic History    Marital status:    Tobacco Use    Smoking status: Never     Passive exposure: Never    Smokeless tobacco: Never   Vaping Use    Vaping status: Never Used   Substance and Sexual Activity    Alcohol use: No    Drug use: Never     Comment: Tried an  edible last week.     Sexual activity: Not Currently     Partners: Male     Birth control/protection: Post-menopausal, Surgical        Medications:     Current Outpatient Medications:     buPROPion XL (WELLBUTRIN XL) 300 MG 24 hr tablet, TAKE 1 TABLET BY MOUTH EVERY DAY IN THE MORNING, Disp: 90 tablet, Rfl: 0    calcium carbonate-vitamin d 600-400 MG-UNIT per tablet, Take 1 tablet by mouth Daily., Disp: , Rfl:     Cholecalciferol 50 MCG (2000 UT) capsule, Take 1 tablet by mouth Daily., Disp: , Rfl:     [facility] Melatonin 5 MG tablet tablet, TAKE 1 TABLET BY MOUTH EVERY DAY AT NIGHT, Disp: 90 tablet, Rfl: 1    fluticasone (VERAMYST) 27.5 MCG/SPRAY nasal spray, 2 sprays into the nostril(s) as directed by provider., Disp: , Rfl:     icosapent ethyl (Vascepa) 1 g capsule capsule, Take 2 g by mouth 2 (Two) Times a Day With Meals., Disp: , Rfl:     levothyroxine (SYNTHROID, LEVOTHROID) 200 MCG tablet, TAKE 1 TABLET BY MOUTH EVERY DAY IN THE MORNING, Disp: 90 tablet, Rfl: 1    meloxicam (MOBIC) 15 MG tablet, Take 1 tablet by mouth As Needed., Disp: , Rfl:     mirtazapine (REMERON) 15 MG tablet, TAKE 1 TABLET BY MOUTH EVERYDAY AT BEDTIME, Disp: 90 tablet, Rfl: 1    pantoprazole (PROTONIX) 40 MG EC tablet, Take 1 tablet by mouth 2 (Two) Times a Day. 30 minutes prior to first meal and 30 minutes prior to last meal of the day, Disp: 180 tablet, Rfl: 3    pregabalin (LYRICA) 150 MG capsule, Take 1 capsule by mouth 2 (Two) Times a Day., Disp: 60 capsule, Rfl: 2    Prolia 60 MG/ML solution prefilled syringe syringe, TO BE ADMINISTERED IN PHYSICIAN'S OFFICE. INJECT ONE SYRINGE SUBCUTANEOUSLY ONCE EVERY 6 MONTHS. REFRIGERATE. USE WITHIN 14 DAYS ONCE AT ROOM TEMPERATURE., Disp: 1 each, Rfl: 1    rOPINIRole (REQUIP) 4 MG tablet, Take 1 tablet by mouth Every Night., Disp: 90 tablet, Rfl: 1    rosuvastatin (Crestor) 20 MG tablet, Take 1 tablet by mouth Every Night., Disp: 90 tablet, Rfl: 3    tacrolimus (PROGRAF) 0.5 MG capsule, Take 2  "capsules by mouth 2 (Two) Times a Day., Disp: , Rfl:     telmisartan (MICARDIS) 20 MG tablet, TAKE 1 TABLET BY MOUTH EVERY DAY, Disp: 90 tablet, Rfl: 1    traZODone (DESYREL) 100 MG tablet, TAKE 2 TABLETS BY MOUTH EVERY NIGHT, Disp: 180 tablet, Rfl: 1        Physical Exam:     Vitals:    08/12/24 1454   Weight: 64.6 kg (142 lb 8 oz)   Height: 154.9 cm (60.98\")   PainSc:   3   PainLoc: Back        General: Alert and oriented, No acute distress.   HEENT: Normocephalic, atraumatic.   Cardiovascular: No gross edema  Respiratory: Respirations are non-labored     Lumbar Spine:   No masses or atrophy  Range of motion - Flexion normal. Extension normal.    Facet Loading: Positive bilaterally  Facet Palpation -positive  PSIS tenderness - Negative bilaterally  Mckinley's/JOSHUA/Thigh thrust -   Straight leg raise/slump test: Negative bilaterally      Motor Exam:    Strength: Rate on 1-5 scale Right Left    C5-Elbow flexion, Deltoid 5/5  5/5    C6-Wrist extension 5/5  5/5    C7- Elbow / finger extension 5/5  5/5    C8- Finger flexion 5/5  5/5    T1- [employer]ins[employer] hand 5/5  5/5    Strength: Rate on 1-5 scale Right Left    L1/2- hip flexion 5/5  5/5    L3- knee extension 5/5  5/5    L4- ankle dorsiflexion 5/5  5/5    L5- great toe extension 5/5  5/5    S1- ankle plantarflexion 5/5  5/5    Sensory Exam: Full and equal sensation to light touch throughout.     Neurologic: Cranial Nerves II-XII are grossly intact.      Psychiatric: Cooperative.   Gait: Normal   Assistive Devices: None    Physical exam is consistent and accurate as of 5/16/2024    Imaging Studies:   MRI LUMBAR SPINE WO CONTRAST     Date of Exam: 2/23/2024 3:09 PM EST     Indication: Low back pain, no red flags, no prior management.     Comparison: None available.     Technique:  Routine multiplanar/multisequence sequence images of the lumbar spine were obtained without contrast administration.          Findings:   T1 marrow signal is preserved, without evidence " of fracture or suspicious marrow replacing lesion. Alignment is anatomic, without evidence of significant listhesis or subluxation. The conus medullaris and cauda equina nerve roots are satisfactory in   appearance. The paraspinal soft tissues demonstrate no acute or suspicious findings. Multilevel spondylosis is present, with areas of involvement including     L1-2, no significant spinal canal or neuroforaminal impingement.     L2-3, no significant spinal canal or neuroforaminal impingement.     L3-4, small disc bulge and bilateral facet arthropathy. There is mild spinal canal and minimal bilateral neuroforaminal narrowing present.     L4-5, small circumferential disc bulge and bilateral facet arthropathy. There is no significant spinal canal narrowing or neuroforaminal stenosis..     L5-S1, circumferential disc bulge and mild facet arthropathy. The spinal canal is patent. There is some mild narrowing of the left neural foramen.     IMPRESSION:  Impression:   Mild spondylosis changes are noted as above. There is no evidence of significant associated spinal canal or neuroforaminal impingement.     Electronically Signed: Jose J Bunch MD    2/25/2024 7:33 AM EST    Workstation ID: [id number]        Independent interpretation of radiographic imaging:  Lumbar MRI dated 2/23/2024 reveals DDD worse at L5/S1, no significant canal or neuroforaminal stenosis, facet arthropathy.         Impression & Plan:       01/23/2024: Lizette Frias is a 59 y.o. female with past medical history significant for CKD 2, GERD, CVA, gastroparesis, [place] cirrhosis status post liver transplant, Paget disease, meningioma, HLD, anxiety, depression, gout. who presents to the pain clinic for evaluation and treatment of chronic axial back pain.  Examination most consistent with lumbar facet pain.  I do not have any imaging for review today so I will order lumbar x-ray with flexion-extension as well as lumbar MRI.  Additionally I will give her physical  therapy order and we will resume Lyrica.  I discussed with patient that we do not typically manage opioids and she may not be a candidate due to the prior history of violating opioid agreement.  3/26/2024: Patient returns to clinic after undergoing lumbar MRI.  Will refill pregabalin.  Will plan for bilateral L4/5 and L5/S1 LMBB/RFA  5/16/2024: Will plan for L4/5 and L5/S1 RFA as already scheduled.  Recommended muscle relaxer for midthoracic back pain.  New PT referral for midthoracic back pain.  Refill Lyrica  8/12/2024: Minimal relief from bilateral lumbar RFA.  Can consider Sprint PNS 6 months after ablation.  Will send new PT referral.  Recommend TENS unit.  Should have pending prescription of Lyrica at pharmacy    1. Spondylosis of lumbar region without myelopathy or radiculopathy    2. Chronic bilateral thoracic back pain    3. Peripheral polyneuropathy    4. Chronic low back pain, unspecified back pain laterality, unspecified whether sciatica present              PLAN:  1. Medication Recommendations: Recommend Voltaren topical, NSAIDs, Tylenol.  Can trial turmeric 500 mg twice daily if NSAID contraindicated.    -Should have approximately 3 months worth of pregabalin 150 mg twice daily at pharmacy.  I encouraged patient to reach out to us if she does not have this medication.  John Paul reveals medication has not been picked up since 4/22/2024.  On 5/17/2024 recent pregabalin 150 mg twice daily, 60 total, #2 refills  -Continue Lyrica 150 mg twice daily   As part of this patient's treatment plan, patient will be prescribed controlled substances. The patient has been made aware of appropriate use of such medications, including potential risk of somnolence, limited ability to drive and /or work safely, and potential for dependence or overdose. It has also been made clear that these medications are for use by this patient only, without concomitant use of alcohol or other substances unless prescribed.Controlled  substance status of medication discussed with patient, discussed risks of medication including abuse potential and diversion potential and need to follow up for reevaluation appointment in order to receive further refills.  John Paul was reviewed and compliant.    2. Physical Therapy: New PT referral    3. Psychological: defer.     4. Complementary and alternative (CAM) Therapies: Recommend aqua therapy.  Recommend TENS unit    5. Labs/Diagnostic studies: Obtain compliance UDS    6. Imagin. Interventions: Can consider bilateral lumbar Sprint PNS    8. Referrals: New PT    9. Records:, John Paul reviewed    10. Lifestyle goals:    Follow-up 3.5 months      [facility]  Barbara Cruz PA-C

## 2024-08-13 ENCOUNTER — LAB (OUTPATIENT)
Dept: LAB | Facility: HOSPITAL | Age: 59
End: 2024-08-13
Payer: MEDICARE

## 2024-08-13 ENCOUNTER — LAB (OUTPATIENT)
Dept: INTERNAL MEDICINE | Facility: CLINIC | Age: 59
End: 2024-08-13
Payer: MEDICARE

## 2024-08-13 DIAGNOSIS — Z94.4 LIVER TRANSPLANT RECIPIENT: ICD-10-CM

## 2024-08-13 LAB — AMMONIA BLD-SCNC: 18 UMOL/L (ref 11–51)

## 2024-08-13 PROCEDURE — 82140 ASSAY OF AMMONIA: CPT | Performed by: INTERNAL MEDICINE

## 2024-08-14 ENCOUNTER — LAB (OUTPATIENT)
Dept: LAB | Facility: HOSPITAL | Age: 59
End: 2024-08-14
Payer: MEDICARE

## 2024-08-14 DIAGNOSIS — Z51.81 THERAPEUTIC DRUG MONITORING: ICD-10-CM

## 2024-08-14 LAB
AMPHET+METHAMPHET UR QL: NEGATIVE
AMPHETAMINES UR QL: NEGATIVE
BARBITURATES UR QL SCN: NEGATIVE
BENZODIAZ UR QL SCN: NEGATIVE
BUPRENORPHINE SERPL-MCNC: NEGATIVE NG/ML
CANNABINOIDS SERPL QL: NEGATIVE
COCAINE UR QL: NEGATIVE
FENTANYL UR-MCNC: NEGATIVE NG/ML
METHADONE UR QL SCN: NEGATIVE
OPIATES UR QL: NEGATIVE
OXYCODONE UR QL SCN: NEGATIVE
PCP UR QL SCN: NEGATIVE
TRICYCLICS UR QL SCN: NEGATIVE

## 2024-08-14 PROCEDURE — 80307 DRUG TEST PRSMV CHEM ANLYZR: CPT

## 2024-09-03 ENCOUNTER — TELEPHONE (OUTPATIENT)
Dept: GASTROENTEROLOGY | Facility: CLINIC | Age: 59
End: 2024-09-03
Payer: MEDICARE

## 2024-09-08 DIAGNOSIS — F32.A ANXIETY AND DEPRESSION: ICD-10-CM

## 2024-09-08 DIAGNOSIS — E89.0 POSTOPERATIVE HYPOTHYROIDISM: ICD-10-CM

## 2024-09-08 DIAGNOSIS — F41.9 ANXIETY AND DEPRESSION: ICD-10-CM

## 2024-09-09 RX ORDER — LEVOTHYROXINE SODIUM 200 UG/1
200 TABLET ORAL EVERY MORNING
Qty: 90 TABLET | Refills: 1 | Status: SHIPPED | OUTPATIENT
Start: 2024-09-09

## 2024-09-09 RX ORDER — BUPROPION HYDROCHLORIDE 300 MG/1
300 TABLET ORAL EVERY MORNING
Qty: 90 TABLET | Refills: 0 | Status: SHIPPED | OUTPATIENT
Start: 2024-09-09

## 2024-09-28 PROCEDURE — 87088 URINE BACTERIA CULTURE: CPT | Performed by: PHYSICIAN ASSISTANT

## 2024-09-28 PROCEDURE — 87086 URINE CULTURE/COLONY COUNT: CPT | Performed by: PHYSICIAN ASSISTANT

## 2024-09-28 PROCEDURE — 87186 SC STD MICRODIL/AGAR DIL: CPT | Performed by: PHYSICIAN ASSISTANT

## 2024-10-09 ENCOUNTER — OFFICE VISIT (OUTPATIENT)
Dept: INTERNAL MEDICINE | Facility: CLINIC | Age: 59
End: 2024-10-09
Payer: MEDICARE

## 2024-10-09 ENCOUNTER — TELEPHONE (OUTPATIENT)
Dept: INTERNAL MEDICINE | Facility: CLINIC | Age: 59
End: 2024-10-09

## 2024-10-09 ENCOUNTER — HOSPITAL ENCOUNTER (OUTPATIENT)
Dept: GENERAL RADIOLOGY | Facility: HOSPITAL | Age: 59
Discharge: HOME | End: 2024-10-09
Admitting: NURSE PRACTITIONER
Payer: MEDICARE

## 2024-10-09 VITALS
WEIGHT: 150 LBS | HEIGHT: 61 IN | HEART RATE: 74 BPM | DIASTOLIC BLOOD PRESSURE: 70 MMHG | RESPIRATION RATE: 18 BRPM | BODY MASS INDEX: 28.32 KG/M2 | SYSTOLIC BLOOD PRESSURE: 122 MMHG | TEMPERATURE: 97.7 F

## 2024-10-09 DIAGNOSIS — J30.1 NON-SEASONAL ALLERGIC RHINITIS DUE TO POLLEN: ICD-10-CM

## 2024-10-09 DIAGNOSIS — Z23 FLU VACCINE NEED: ICD-10-CM

## 2024-10-09 DIAGNOSIS — M25.531 RIGHT WRIST PAIN: ICD-10-CM

## 2024-10-09 DIAGNOSIS — F41.9 ANXIETY DISORDER, UNSPECIFIED: ICD-10-CM

## 2024-10-09 DIAGNOSIS — M25.531 RIGHT WRIST PAIN: Primary | ICD-10-CM

## 2024-10-09 LAB
BASOPHILS # BLD AUTO: 0.03 10*3/MM3 (ref 0–0.2)
BASOPHILS NFR BLD AUTO: 0.6 % (ref 0–1.5)
DEPRECATED RDW RBC AUTO: 41.9 FL (ref 37–54)
EOSINOPHIL # BLD AUTO: 0.09 10*3/MM3 (ref 0–0.4)
EOSINOPHIL NFR BLD AUTO: 1.9 % (ref 0.3–6.2)
ERYTHROCYTE [DISTWIDTH] IN BLOOD BY AUTOMATED COUNT: 13 % (ref 12.3–15.4)
HCT VFR BLD AUTO: 38.7 % (ref 34–46.6)
HGB BLD-MCNC: 12.9 G/DL (ref 12–15.9)
IMM GRANULOCYTES # BLD AUTO: 0.02 10*3/MM3 (ref 0–0.05)
IMM GRANULOCYTES NFR BLD AUTO: 0.4 % (ref 0–0.5)
LYMPHOCYTES # BLD AUTO: 2.43 10*3/MM3 (ref 0.7–3.1)
LYMPHOCYTES NFR BLD AUTO: 51.3 % (ref 19.6–45.3)
MCH RBC QN AUTO: 29.5 PG (ref 26.6–33)
MCHC RBC AUTO-ENTMCNC: 33.3 G/DL (ref 31.5–35.7)
MCV RBC AUTO: 88.6 FL (ref 79–97)
MONOCYTES # BLD AUTO: 0.41 10*3/MM3 (ref 0.1–0.9)
MONOCYTES NFR BLD AUTO: 8.6 % (ref 5–12)
NEUTROPHILS NFR BLD AUTO: 1.76 10*3/MM3 (ref 1.7–7)
NEUTROPHILS NFR BLD AUTO: 37.2 % (ref 42.7–76)
NRBC BLD AUTO-RTO: 0 /100 WBC (ref 0–0.2)
PLATELET # BLD AUTO: 150 10*3/MM3 (ref 140–450)
PMV BLD AUTO: 11.4 FL (ref 6–12)
RBC # BLD AUTO: 4.37 10*6/MM3 (ref 3.77–5.28)
WBC NRBC COR # BLD AUTO: 4.74 10*3/MM3 (ref 3.4–10.8)

## 2024-10-09 PROCEDURE — 73110 X-RAY EXAM OF WRIST: CPT

## 2024-10-09 PROCEDURE — 3078F DIAST BP <80 MM HG: CPT | Performed by: NURSE PRACTITIONER

## 2024-10-09 PROCEDURE — G0008 ADMIN INFLUENZA VIRUS VAC: HCPCS | Performed by: NURSE PRACTITIONER

## 2024-10-09 PROCEDURE — 3044F HG A1C LEVEL LT 7.0%: CPT | Performed by: NURSE PRACTITIONER

## 2024-10-09 PROCEDURE — 90656 IIV3 VACC NO PRSV 0.5 ML IM: CPT | Performed by: NURSE PRACTITIONER

## 2024-10-09 PROCEDURE — 86038 ANTINUCLEAR ANTIBODIES: CPT | Performed by: NURSE PRACTITIONER

## 2024-10-09 PROCEDURE — 85025 COMPLETE CBC W/AUTO DIFF WBC: CPT | Performed by: NURSE PRACTITIONER

## 2024-10-09 PROCEDURE — 99214 OFFICE O/P EST MOD 30 MIN: CPT | Performed by: NURSE PRACTITIONER

## 2024-10-09 PROCEDURE — 3074F SYST BP LT 130 MM HG: CPT | Performed by: NURSE PRACTITIONER

## 2024-10-09 PROCEDURE — 1125F AMNT PAIN NOTED PAIN PRSNT: CPT | Performed by: NURSE PRACTITIONER

## 2024-10-09 RX ORDER — MIRTAZAPINE 15 MG/1
15 TABLET, FILM COATED ORAL
Qty: 90 TABLET | Refills: 1 | Status: SHIPPED | OUTPATIENT
Start: 2024-10-09

## 2024-10-09 NOTE — TELEPHONE ENCOUNTER
Attempted to call patient, left voice message for patient to return call.    RELAY: please give patient phone number [phone] this is to schedule her Ortho appointment.

## 2024-10-09 NOTE — TELEPHONE ENCOUNTER
Name: Lizette Frias    Relationship: Self        HUB PROVIDED THE RELAY MESSAGE FROM THE OFFICE   PATIENT VOICED UNDERSTANDING AND HAS NO FURTHER QUESTIONS AT THIS TIME

## 2024-10-09 NOTE — PROGRESS NOTES
Chief Complaint  Wrist Pain (Right wrist. X2 months. )    Subjective          Lizette Frias presents to [facility]  Wrist Pain       History of Present Illness  The patient is a 59-year-old female who presents for evaluation of multiple medical concerns.    She reports experiencing allergies, which are particularly severe during the fall season. She is considering resuming allergy injections, a treatment she has previously undergone.    She has been experiencing pain and redness in her right wrist for several months, despite no prior trauma. Initially, she hoped the symptoms would resolve on their own, but after a month with no improvement, she sought urgent care. There, she was prescribed steroids and given a brace. Despite completing the steroid course and regularly icing her wrist, her symptoms persisted. Two weeks later, she returned to the clinic and was advised to use Voltaren. Despite this additional treatment, she continues to experience significant discomfort, which is affecting her ability to write and perform crafts. She is right-hand dominant. She reports no fevers. She has an upcoming appointment with Dr. Fermin on 10/18/2024. She is currently caring for her mother, who was recently diagnosed with aggressive stage IV cancer, which has increased her use of her wrist. She has a history of prolonged steroid use before and after a transplant, which has resulted in bone damage. A previous steroid pack did not alleviate her symptoms.    She has been borderline on occasion for inflammatory arthritis. She has osteoarthritis and osteoporosis. She has had a couple of fractures, including a stress fracture in her foot and numerous stress reactions in her hips. She also had a fracture from a fall, but none of these showed on x-rays initially. It was not until later, when the pain continued, that a repeat x-ray of the hip and an MRI found the fractures.    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 Attempts to reconstruct the ear and widen the canal were unsuccessful. She was a candidate for a cochlear implant, but the scar tissue was too extensive.    FAMILY HISTORY  Her mother has severe rheumatoid arthritis.    Objective   Vital Signs:   /70 (BP Location: Right arm, Patient Position: Sitting, Cuff Size: Adult)   Pulse 74   Temp 97.7 °F (36.5 °C) (Infrared)   Resp 18   Ht 154.9 cm (61\")   Wt 68 kg (150 lb)   BMI 28.34 kg/m²     Physical Exam  Vitals and nursing note reviewed.   Constitutional:       General: She is not in acute distress.     Appearance: Normal appearance. She is well-developed. She is not ill-appearing.   HENT:      Head: Normocephalic and atraumatic.      Left Ear: Tympanic membrane normal.      Ears:      Comments: RTM anatomically difficult to place landmarks       Mouth/Throat:      Comments: Clear pnd   Eyes:      General: Scleral icterus (erythema) present.   Neck:      Thyroid: No thyromegaly.   Cardiovascular:      Rate and Rhythm: Normal rate and regular rhythm.   Pulmonary:      Effort: Pulmonary effort is normal.      Breath sounds: Normal breath sounds.   Lymphadenopathy:      Cervical: No cervical adenopathy.   Skin:     Capillary Refill: Capillary refill takes 2 to 3 seconds.      Coloration: Skin is not pale.      Comments: R wrist medially ttp and mild erythema  difficulty with ROM with out pain with passive movement in all directions   Neurological:      Mental Status: She is alert and oriented to person, place, and time.   Psychiatric:         Mood and Affect: Mood normal.         Behavior: Behavior normal.       Result Review :                 Assessment and Plan    Diagnoses and all orders for this visit:    1. Right wrist pain (Primary)  -     KATIE Direct Reflex to 11 Biomarker; Future  -     XR Wrist 3+ View Right; Future    2. Flu vaccine need  -     Fluzone " >6mos    3. Non-seasonal allergic rhinitis due to pollen  -     Ambulatory Referral to Allergy    The patient already had an orthopedic consultation placed.  She had not yet been called to set up an appointment.  Has not yet been scheduled.  I with the nurse call the patient with the phone number to get scheduled.  Assessment & Plan  1. Allergies.  A referral to an allergist has been made. She should expect a call within 5 to 10 business days.    2. Right wrist pain.  She has been experiencing right wrist pain for a couple of months with no prior trauma. Initial treatment with steroids, a brace, and Voltaren has not alleviated the symptoms. Given her history of osteoporosis and fractures, a repeat x-ray of the right wrist will be conducted to check for any fractures that may not have been visible initially. Blood work, including KATIE and CBC, will be performed to rule out infection and assess for any underlying inflammatory conditions. If the results do not indicate improvement, a referral to a new hand orthopedic specialist will be considered. She is advised to continue using ice and the brace for symptom management.             Follow Up   Return if symptoms worsen or fail to improve.  Patient was given instructions and counseling regarding her condition or for health maintenance advice. Please see specific information pulled into the AVS if appropriate.     RTC/call  If symptoms worsen  Meds MOA and SE's reviewed and pt v/u    10/09/24   14:28 EDT    Patient or patient representative verbalized consent to the visit recording.  I have personally performed the services described in this document as transcribed by the above individual, and it is both accurate and complete.

## 2024-10-10 ENCOUNTER — OFFICE VISIT (OUTPATIENT)
Dept: ORTHOPEDIC SURGERY | Facility: CLINIC | Age: 59
End: 2024-10-10
Payer: MEDICARE

## 2024-10-10 VITALS
BODY MASS INDEX: 28.34 KG/M2 | SYSTOLIC BLOOD PRESSURE: 136 MMHG | DIASTOLIC BLOOD PRESSURE: 72 MMHG | HEIGHT: 61 IN | WEIGHT: 150.1 LBS

## 2024-10-10 DIAGNOSIS — M77.8 TENDINITIS OF EXTENSOR TENDON OF HAND: Primary | ICD-10-CM

## 2024-10-10 RX ORDER — LIDOCAINE HYDROCHLORIDE 10 MG/ML
0.5 INJECTION, SOLUTION EPIDURAL; INFILTRATION; INTRACAUDAL; PERINEURAL
Status: COMPLETED | OUTPATIENT
Start: 2024-10-10 | End: 2024-10-10

## 2024-10-10 RX ORDER — TRIAMCINOLONE ACETONIDE 40 MG/ML
20 INJECTION, SUSPENSION INTRA-ARTICULAR; INTRAMUSCULAR
Status: COMPLETED | OUTPATIENT
Start: 2024-10-10 | End: 2024-10-10

## 2024-10-10 RX ADMIN — LIDOCAINE HYDROCHLORIDE 0.5 ML: 10 INJECTION, SOLUTION EPIDURAL; INFILTRATION; INTRACAUDAL; PERINEURAL at 14:59

## 2024-10-10 RX ADMIN — TRIAMCINOLONE ACETONIDE 20 MG: 40 INJECTION, SUSPENSION INTRA-ARTICULAR; INTRAMUSCULAR at 14:59

## 2024-10-10 NOTE — PROGRESS NOTES
Procedure   - Hand/Upper Extremity Injection: R extensor compartment 1 for de Quervain's tenosynovitis on 10/10/2024 2:59 PM  Indications: pain  Details: 27 G needle, radial approach  Medications: 20 mg triamcinolone acetonide 40 MG/ML; 0.5 mL lidocaine PF 1% 1 %  Outcome: tolerated well, no immediate complications  Procedure, treatment alternatives, risks and benefits explained, specific risks discussed. Consent was given by the patient. Immediately prior to procedure a time out was called to verify the correct patient, procedure, equipment, support staff and site/side marked as required. Patient was prepped and draped in the usual sterile fashion.

## 2024-10-10 NOTE — PROGRESS NOTES
"                                                                 [facility] Orthopedic     Office Visit       Date: 10/10/2024   Patient Name: Lizette Frias  MRN: [id number]  YOB: 1965    Referring Physician: Referring, Self     Chief Complaint:   Chief Complaint   Patient presents with    Right Wrist - Pain     History of Present Illness:   Lizette Frias is a 59 y.o. female hand on a presenting clinic with complaints of right radial sided wrist pain.  Symptoms have been present for approximately 2 months.  These are worsening with time.  She has been evaluated at the emergency department for this pain and has been treated with a wrist brace and a Medrol Dosepak.  Her pain today is 4/10.  This has not improved with time.  No numbness or tingling.  No other complaints or concerns.    PMH: Hyperlipidemia, sleep apnea, CKD stage III, GERD, CVA, history of liver transplant, hypertension, CAD, MORRISON, rheumatoid arthritis with positive rheumatoid factor.    Subjective   Review of Systems:   Review of Systems   Pertinent review of systems per HPI    I reviewed the patient's chief complaint, history of present illness, review of systems, past medical history, surgical history, family history, social history, medications and allergy list in the EMR on 10/10/2024 and agree with the findings above.    Objective    Vital Signs:   Vitals:    10/10/24 1429   BP: 136/72   BP Location: Left arm   Patient Position: Sitting   Cuff Size: Adult   Weight: 68.1 kg (150 lb 1.6 oz)   Height: 154.9 cm (60.98\")     BMI:      General: No acute distress. Alert and oriented.   Cardiovascular: Palpable radial pulse.   Respiratory: Breathing is nonlabored.   Ortho Exam:  Examination of the right upper extremity:  No skin lesions or ecchymosis. No edema.  No thenar, hypothenar, or interosseous wasting appreciated           Mildly tender to palpation over the thumb CMC joint           Nontender to palpation over the " scaphoid  Full and painless active flexion/extension/pronosupination of the wrist  Able to make a composite fist  negative Thumb CMC joint grind   negative Thumb MCP joint hyper extension  Firm endpoint with radial/ulnar deviation of the thumb MCP at 0 and 30 degrees of flexion  positive Finkelstein's test, tender to the first extensor compartment  Median/radial/ulnar sensory intact to light touch  Digits are warm and well-perfused      Imaging / Studies:    Imaging Results (Last 24 Hours)       ** No results found for the last 24 hours. **        Right wrist x-rays obtained on 10/9/2024 personally viewed inter by myself.  These demonstrate no evidence of acute fracture or dislocation.  Small area of calcification noted just distal to the ulnar styloid which may represent a prior injury.  No malalignment.  Normal soft tissue window.  Mild diffuse osteopenia.     Procedure Note:  After reviewing the risks, benefits and alternatives to a steroid injection, which include but are not limited to; hypopigmentation, fat necrosis/atrophy, pain, swelling, bleeding, bruising, damage to nearby nerves/vessels, allergic reaction , transient elevation in blood glucose levels and infection a verbal consent was obtained. A time-out was then performed and the affected hand was prepped with chlorhexadine soap and ethyl chloride was used to numb the skin. The right first extensor compartment was injected with 0.5cc: 0.5cc mixture of Kenalog - 40 mg/ml and Lidocaine - 1% / 2 ml. The injection was well tolerated and a sterile dressing was applied. There were no complications. I advised the patient that they might experience some local discomfort for the next couple days and can apply ice to the site as needed.    Assessment / Plan    Assessment/Plan:   Lizette Frias is a 59 y.o. female with right de Quervain's tenosynovitis.    I discussed with the patient their clinical findings demonstrate de Quervain's tenosynovitis, which is also  known as first extensor compartment tendinitis.  We had a lengthy discussion regarding the pathophysiology which was explained to be stenosis of the first dorsal compartment tendons within the tendon sheath against the radial styloid, resulting in pain and inflammation of the tendons. The patient is tender over the radial styloid with a positive Finkelstein's maneuver. Both conservative and surgical options were discussed.  Conservative treatments in the form of: observation, activity modification, anti-inflammatories, hand therapy, thumb spica bracing, and injection were presented.  Operative treatment in the form of first extensor compartment release was presented.  After expressing understanding of all options, the patient elects to proceed with steroid injection today, thumb spica bracing, anti-inflammatories.  I would like her to use the brace full-time for 3 weeks.  She may then wean out and use anti-inflammatories as needed.  I will see her back in 3 months for reevaluation.  They were agreeable with the plan.  All questions and concerns were addressed.        ICD-10-CM ICD-9-CM   1. Tendinitis of extensor tendon of hand  M77.8 727.05     Follow Up:   Return in about 3 months (around 1/10/2025) for Follow Up.      Nimisha Mccray MD  [facility] Orthopedic & Hand Surgeon

## 2024-10-11 LAB — ANA SER QL: NEGATIVE

## 2024-10-21 ENCOUNTER — TELEPHONE (OUTPATIENT)
Dept: INTERNAL MEDICINE | Facility: CLINIC | Age: 59
End: 2024-10-21
Payer: MEDICARE

## 2024-10-21 DIAGNOSIS — M81.8 OTHER OSTEOPOROSIS WITHOUT CURRENT PATHOLOGICAL FRACTURE: Primary | ICD-10-CM

## 2024-10-21 NOTE — TELEPHONE ENCOUNTER
I attempted to call [facility] but they had too many calls in the que and it advised me to call back and hung up.

## 2024-10-21 NOTE — TELEPHONE ENCOUNTER
[facility] REGARDS TO PRIOR AUTHORIZATION ON PROLIA. KEY NUMBER FOR COVER MY MEDS PRIOR AUTH [id number].    CALL BACK NUMBER [phone] PRIOR AUTH DEPARTMENT

## 2024-10-22 ENCOUNTER — TELEPHONE (OUTPATIENT)
Dept: INTERNAL MEDICINE | Facility: CLINIC | Age: 59
End: 2024-10-22
Payer: MEDICARE

## 2024-10-22 NOTE — TELEPHONE ENCOUNTER
I verified the patient had her last injection on 1-18-24 so she is due for her next dose.  I scheduled the patient to have the injection tomorrow @ 3pm.  She will call before coming to her appointment to make sure we have received her medication.    Dr. Fermin I have pended the order for you to sign for the administration tomorrow.  Thanks

## 2024-10-22 NOTE — TELEPHONE ENCOUNTER
[facility] - [place], [place] - [address] - [phone] [facility] [phone] FX     Reference Number (if applicable):     Pharmacy representative name: DEBI    Pharmacy representative phone number: [phone]     What medication are you calling in regards to: PROLIA SYRINGE 60 MG    What question does the pharmacy have:     Who is the provider that prescribed the medication:     Additional notes: PHARMACY IS CALLING TO LET THE PROVIDER KNOW THAT THE SCRIPT WILL BE DELIVERED 10/23/24

## 2024-10-23 ENCOUNTER — CLINICAL SUPPORT (OUTPATIENT)
Dept: INTERNAL MEDICINE | Facility: CLINIC | Age: 59
End: 2024-10-23
Payer: MEDICARE

## 2024-10-23 NOTE — TELEPHONE ENCOUNTER
Sounds good and thanks so much for coordinating.  If she not getting this completed through the infusion center?

## 2024-10-24 ENCOUNTER — OFFICE VISIT (OUTPATIENT)
Dept: NEUROLOGY | Facility: CLINIC | Age: 59
End: 2024-10-24
Payer: MEDICARE

## 2024-10-24 VITALS
HEART RATE: 82 BPM | WEIGHT: 149 LBS | DIASTOLIC BLOOD PRESSURE: 78 MMHG | HEIGHT: 61 IN | OXYGEN SATURATION: 95 % | BODY MASS INDEX: 28.13 KG/M2 | SYSTOLIC BLOOD PRESSURE: 122 MMHG

## 2024-10-24 DIAGNOSIS — R41.3 MEMORY LOSS: Primary | ICD-10-CM

## 2024-10-24 PROCEDURE — 3078F DIAST BP <80 MM HG: CPT | Performed by: NURSE PRACTITIONER

## 2024-10-24 PROCEDURE — 1160F RVW MEDS BY RX/DR IN RCRD: CPT | Performed by: NURSE PRACTITIONER

## 2024-10-24 PROCEDURE — 1159F MED LIST DOCD IN RCRD: CPT | Performed by: NURSE PRACTITIONER

## 2024-10-24 PROCEDURE — 3074F SYST BP LT 130 MM HG: CPT | Performed by: NURSE PRACTITIONER

## 2024-10-24 PROCEDURE — 99214 OFFICE O/P EST MOD 30 MIN: CPT | Performed by: NURSE PRACTITIONER

## 2024-10-24 RX ORDER — DONEPEZIL HYDROCHLORIDE 5 MG/1
5 TABLET, FILM COATED ORAL NIGHTLY
Qty: 30 TABLET | Refills: 3 | Status: SHIPPED | OUTPATIENT
Start: 2024-10-24 | End: 2025-10-24

## 2024-10-24 NOTE — PROGRESS NOTES
Neuro Office Visit      Encounter Date: 10/24/2024   Patient Name: Lizette Frias  : 1965   MRN: [id number]   PCP:  Lars Fermin MD     Chief Complaint:    Chief Complaint   Patient presents with    Memory Loss       History of Present Illness: Lizette Frias is a 59 y.o. female who is here today in Neurology for memory loss.  History of Present Illness  She underwent a liver transplant on 2020 and had hepatic encephalopathy prior to the transplant.     She believes the transplant cleared the encephalopathy, but that she may have some residual damage.     Memory issues have worsened over the past year, with instances of forgetting her destination while driving and forgetting tasks she was performing.     She occasionally forgets names and has trouble with word retrieval and maintaining a train of thought, especially when stressed.     She also struggles with retaining information and has difficulty remembering appointments.     To manage these issues, she uses a pill organizer for her medications, has set up automatic bill payments, and keeps a calendar in her purse. She engages in word games and crafting activities like [employer] to help with memory.    She lives alone and occasionally forgets to lock her doors at night.     CT scan of her head showed no abnormalities.    She is currently dealing with stress, anxiety, and depression, for which she is receiving therapy and medication. She recently went through a divorce, which has exacerbated her mental health issues. She has had issues with obsessive thoughts, which she has managed to control.    SOCIAL HISTORY  She never smoked, no alcohol, no drug use, no vaping.    FAMILY HISTORY  Her mother had dementia. Her maternal grandmother had stroke and neuropathy. Her father was bipolar and severely depressed and drank himself to death. Her brother was seriously depressed and also drinking himself to death. Both grandmothers were  depressed.    ALLERGIES  She is allergic to PENICILLIN, CEPHALOSPORIN, CYCLOSPORINE.      Subjective      Past Medical History:   Past Medical History:   Diagnosis Date    Acromioclavicular separation     Acute hypoxemic respiratory failure due to COVID-19     Allergic     Environmental, pcn, cyclosporine    Anemia     Ankle sprain     Anxiety     Arthritis of back     Bursitis of hip     Cervical disc disorder     Cholelithiasis     Chronic obstructive pulmonary disease, unspecified 12/03/2021    Chronic pain disorder     Chronic systolic (congestive) heart failure 12/03/2021    Cirrhosis of liver     Clotting disorder     Pre-transplant    Colon polyp     COVID     CTS (carpal tunnel syndrome)     Deep vein thrombosis Pretransplant    Blood clot in liver    Depression     Diabetes     Dislocation, shoulder     Endometriosis     Fracture of ankle     Fracture of hip     Fracture of wrist     Fracture, foot     Fractures     Frozen shoulder     GERD (gastroesophageal reflux disease)     Headache     Headache, tension-type     Heart murmur 1969    Hip arthrosis     HL (hearing loss)     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, mastoid damage    Hypercholesteremia     Hypertension     Hyperthyroidism 2001    Hypothyroid     Joint pain     Knee swelling     Low back pain 1982    Low back strain     Lumbosacral disc disease     Memory loss     Meningioma     Migraine     Neck strain     Osteoarthritis     Osteopenia     Osteoporosis     Pancreatitis     Pretransplant    Periarthritis of shoulder     Prediabetes     Renal impairment     Rheumatoid arthritis involving multiple sites with positive rheumatoid factor 04/12/2024    Severe malnutrition (CMS/HCC) 02/03/2022    Shingles     Sleep apnea     Stress fracture     Stroke     Tennis elbow     Thyroid cancer     Vision impairment     left eye       Past Surgical History:   Past Surgical History:   Procedure Laterality Date    ANKLE OPEN REDUCTION INTERNAL FIXATION      APPENDECTOMY       BREAST BIOPSY Left     BENIGN     SECTION      x3    CHOLECYSTECTOMY      COLONOSCOPY      D & C HYSTEROSCOPY LAPAROSCOPY      x2 for endometriosis    ENDOSCOPY      FRACTURE SURGERY      Right ankle has screws    HAND SURGERY      LIVER SURGERY      removed cysts     LIVER TRANSPLANTATION      ORIF ANKLE FRACTURE      ORTHOPEDIC SURGERY      THYROIDECTOMY      TONSILLECTOMY      TOTAL ABDOMINAL HYSTERECTOMY WITH SALPINGO OOPHORECTOMY Bilateral     TRIGGER POINT INJECTION      UPPER GASTROINTESTINAL ENDOSCOPY         Family History:   Family History   Problem Relation Age of Onset    Arthritis Mother     Osteoporosis Mother         stage 4 cancer,    Rheum arthritis Mother     Anemia Mother     Broken bones Mother         Ankle    Rheumatologic disease Mother     Scoliosis Mother     Dementia Mother     Alcohol abuse Father     Mental illness Father     Hyperlipidemia Father     Hypertension Father     Colon cancer Father     Cancer Father     Hearing loss Father     Broken bones Father         Shoulder    ADD / ADHD Son     ADD / ADHD Son     Breast cancer Maternal Grandmother     Stroke Maternal Grandmother     Neuropathy Maternal Grandmother     Mental illness Paternal Grandmother     Ovarian cancer Paternal Grandmother     Broken bones Brother         Elbow       Social History:   Social History     Socioeconomic History    Marital status:    Tobacco Use    Smoking status: Never     Passive exposure: Never    Smokeless tobacco: Never   Vaping Use    Vaping status: Never Used   Substance and Sexual Activity    Alcohol use: No    Drug use: Never     Comment: Tried an edible last week.     Sexual activity: Not Currently     Partners: Male     Birth control/protection: Post-menopausal, Surgical       Medications:     Current Outpatient Medications:     buPROPion XL (WELLBUTRIN XL) 300 MG 24 hr tablet, TAKE 1 TABLET BY MOUTH EVERY DAY IN THE MORNING, Disp: 90 tablet, Rfl: 0    calcium  carbonate-vitamin d 600-400 MG-UNIT per tablet, Take 1 tablet by mouth Daily., Disp: , Rfl:     Cholecalciferol 50 MCG (2000 UT) capsule, Take 1 tablet by mouth Daily., Disp: , Rfl:     [facility] Melatonin 5 MG tablet tablet, TAKE 1 TABLET BY MOUTH EVERY DAY AT NIGHT, Disp: 90 tablet, Rfl: 1    fluticasone (VERAMYST) 27.5 MCG/SPRAY nasal spray, Administer 2 sprays into the nostril(s) as directed by provider., Disp: , Rfl:     levothyroxine (SYNTHROID, LEVOTHROID) 200 MCG tablet, TAKE 1 TABLET BY MOUTH EVERY DAY IN THE MORNING, Disp: 90 tablet, Rfl: 1    mirtazapine (REMERON) 15 MG tablet, TAKE 1 TABLET BY MOUTH EVERYDAY AT BEDTIME, Disp: 90 tablet, Rfl: 1    pantoprazole (PROTONIX) 40 MG EC tablet, Take 1 tablet by mouth 2 (Two) Times a Day. 30 minutes prior to first meal and 30 minutes prior to last meal of the day, Disp: 180 tablet, Rfl: 3    pregabalin (LYRICA) 150 MG capsule, Take 1 capsule by mouth 2 (Two) Times a Day., Disp: 60 capsule, Rfl: 2    Prolia 60 MG/ML solution prefilled syringe syringe, TO BE ADMINISTERED IN PHYSICIAN'S OFFICE. INJECT ONE SYRINGE SUBCUTANEOUSLY ONCE EVERY 6 MONTHS. REFRIGERATE. USE WITHIN 14 DAYS ONCE AT ROOM TEMPERATURE., Disp: 1 each, Rfl: 1    rOPINIRole (REQUIP) 4 MG tablet, Take 1 tablet by mouth Every Night., Disp: 90 tablet, Rfl: 1    rosuvastatin (Crestor) 20 MG tablet, Take 1 tablet by mouth Every Night., Disp: 90 tablet, Rfl: 3    tacrolimus (PROGRAF) 0.5 MG capsule, Take 2 capsules by mouth 2 (Two) Times a Day., Disp: , Rfl:     telmisartan (MICARDIS) 20 MG tablet, TAKE 1 TABLET BY MOUTH EVERY DAY, Disp: 90 tablet, Rfl: 1    traZODone (DESYREL) 100 MG tablet, TAKE 2 TABLETS BY MOUTH EVERY NIGHT, Disp: 180 tablet, Rfl: 1    donepezil (Aricept) 5 MG tablet, Take 1 tablet by mouth Every Night., Disp: 30 tablet, Rfl: 3  No current facility-administered medications for this visit.    Allergies:   Allergies   Allergen Reactions    Penicillins Shortness Of Breath, Itching, Other (See  Comments) and Rash    Cephalosporins Diarrhea    Cyclosporine Swelling       PHQ-9 Total Score:     STEADI Fall Risk Assessment has not been completed.    Objective     Physical Exam:     Neurological Exam  Mental Status  Awake, alert and oriented to person, place and time. Recent and remote memory are intact. Speech is normal. Language is fluent with no aphasia. Attention and concentration are normal. Fund of knowledge is appropriate for level of education. MMSE score: 30.    Cranial Nerves  CN II: Visual acuity is normal.  CN III, IV, VI: Extraocular movements intact bilaterally. Pupils equal round and reactive to light bilaterally.  CN V: Facial sensation is normal.  CN VII: Full and symmetric facial movement.  CN IX, X: Palate elevates symmetrically  CN XI: Shoulder shrug strength is normal.  CN XII: Tongue midline without atrophy or fasciculations.    Motor  Normal muscle bulk throughout. No fasciculations present. Normal muscle tone. Strength is 5/5 throughout all four extremities.    Sensory  Sensation is intact to light touch, pinprick, vibration and proprioception in all four extremities.    Reflexes                                            Right                      Left  Brachioradialis                    2+                         2+  Biceps                                 2+                         2+  Triceps                                2+                         2+  Finger flex                           2+                         2+  Hamstring                            2+                         2+  Patellar                                2+                         2+  Achilles                                2+                         2+    Coordination    Finger-to-nose, rapid alternating movements and heel-to-shin normal bilaterally without dysmetria.    Gait  Normal casual, toe, heel and tandem gait.        Vital Signs:   Vitals:    10/24/24 1302   BP: 122/78   Pulse: 82   SpO2: 95%   Weight:  "67.6 kg (149 lb)   Height: 154.9 cm (60.98\")     Body mass index is 28.17 kg/m².     Results:   Results  Imaging  CT scan of the head showed no significant findings related to Alzheimer's or dementia.    Testing  Memory test score was 30 out of 30.     Imaging:   XR Wrist 3+ View Right    Result Date: 10/10/2024  Impression: 1. Osteopenia with no fractures identified. Electronically Signed: Bill Mayorga MD  10/10/2024 4:14 PM EDT  Workstation ID: [id number]    XR Wrist 3+ View Right    Result Date: 9/17/2024  Impression: Negative right wrist Electronically Signed: Bill Mayorga MD  9/17/2024 5:07 PM EDT  Workstation ID: [id number]    CT Head Without Contrast    Result Date: 8/3/2024  No acute intracranial abnormality. Electronically Signed: Santos Rueda MD  8/3/2024 3:34 PM EDT  Workstation ID: [id number]    XR Spine Lumbar 2 or 3 View (In Office)    Result Date: 7/22/2024  Impression: 1. Moderate wedge compression deformity of lowest rib-bearing vertebral body appears similar to prior study of 2022, likely chronic. 2. No acute fracture or subluxation identified. Mild degenerative changes and facet arthrosis. Electronically Signed: Michael Cosme MD  7/22/2024 10:06 AM EDT  Workstation ID: [id number]       Labs:   No results found for: \"CMP\", \"PROTEIN\", \"ANTIMOGAB\", \"BZYITM7MMKV\", \"JCVRESULT\", \"QUANTTBGOLD\", \"CBCDIF\", \"IGGALBSER\"     Assessment / Plan      Assessment/Plan:   Diagnoses and all orders for this visit:    1. Memory loss (Primary)  Comments:  Start Aricept    Other orders  -     donepezil (Aricept) 5 MG tablet; Take 1 tablet by mouth Every Night.  Dispense: 30 tablet; Refill: 3         Assessment & Plan  1. Memory issues.  Her memory test score was a perfect 30 out of 30. The CT scan of her head did not reveal any significant findings indicative of Alzheimer's or dementia. Her lab results were within normal limits. It was explained that her memory loss could be attributed to various factors, including stress, " anxiety, and depression. However, her symptoms seem more problematic than what could be attributed solely to these factors. A trial of donepezil was recommended for a duration of 5 to 6 weeks. The potential side effects, including gastrointestinal upset and vivid dreams, were thoroughly discussed. If she experiences any adverse effects from the medication, she is advised to discontinue its use. The prescription for donepezil will be sent to [facility] on [facility] [place].    Follow-up  Return in 3 months for follow up.    Patient Education:     Reviewed medications, potential side effects and signs and symptoms to report. Discussed risk versus benefits of treatment plan with patient and/or family-including medications, labs and radiology that may be ordered. Addressed questions and concerns during visit. Patient and/or family verbalized understanding and agree with plan. Instructed to call the office with any questions and report to ER with any life-threatening symptoms.     Follow Up:   No follow-ups on file.    I spent 45 minutes caring for Lizette on this date of service. This time includes time spent by me in the following activities: preparing for the visit, reviewing tests, performing a medically appropriate examination and/or evaluation, counseling and educating the patient/family/caregiver, documenting information in the medical record, independently interpreting results and communicating that information with the patient/family/caregiver, and ordering medications.        During this visit the following were done:  Labs Reviewed [x]    Labs Ordered []    Radiology Reports Reviewed [x]    Radiology Ordered []    PCP Records Reviewed [x]    Referring Provider Records Reviewed [x]    ER Records Reviewed []    Hospital Records Reviewed []    History Obtained From Family []    Radiology Images Reviewed [x]    Other Reviewed []    Records Requested []      Patient or patient representative verbalized consent for  the use of Ambient Listening during the visit with  AISLINN Coker for chart documentation. 10/24/2024  14:40 EDT    AISLINN Coker   [facility]  [address] [place] [address] [address]  [facility] [id number] [phone]

## 2024-11-12 PROBLEM — R41.3 MEMORY LOSS: Status: ACTIVE | Noted: 2024-11-12

## 2024-11-15 DIAGNOSIS — G62.9 PERIPHERAL POLYNEUROPATHY: ICD-10-CM

## 2024-11-20 DIAGNOSIS — G25.81 RESTLESS LEG SYNDROME: ICD-10-CM

## 2024-11-20 RX ORDER — PREGABALIN 150 MG/1
150 CAPSULE ORAL 2 TIMES DAILY
Qty: 60 CAPSULE | OUTPATIENT
Start: 2024-11-20

## 2024-11-21 RX ORDER — ROPINIROLE 4 MG/1
4 TABLET, FILM COATED ORAL
Qty: 90 TABLET | Refills: 1 | Status: SHIPPED | OUTPATIENT
Start: 2024-11-21

## 2024-11-22 ENCOUNTER — OFFICE VISIT (OUTPATIENT)
Dept: INTERNAL MEDICINE | Facility: CLINIC | Age: 59
End: 2024-11-22
Payer: MEDICARE

## 2024-11-22 VITALS
TEMPERATURE: 97.7 F | SYSTOLIC BLOOD PRESSURE: 118 MMHG | WEIGHT: 146 LBS | OXYGEN SATURATION: 98 % | HEART RATE: 74 BPM | DIASTOLIC BLOOD PRESSURE: 78 MMHG | BODY MASS INDEX: 27.6 KG/M2 | RESPIRATION RATE: 16 BRPM

## 2024-11-22 DIAGNOSIS — Z79.899 HIGH RISK MEDICATION USE: Primary | ICD-10-CM

## 2024-11-22 DIAGNOSIS — Z94.4 LIVER TRANSPLANT STATUS: ICD-10-CM

## 2024-11-22 DIAGNOSIS — B07.9 VIRAL WARTS, UNSPECIFIED TYPE: ICD-10-CM

## 2024-11-22 DIAGNOSIS — R19.7 DIARRHEA OF PRESUMED INFECTIOUS ORIGIN: Primary | ICD-10-CM

## 2024-11-22 DIAGNOSIS — Z79.899 HIGH RISK MEDICATION USE: ICD-10-CM

## 2024-11-22 PROCEDURE — 1125F AMNT PAIN NOTED PAIN PRSNT: CPT | Performed by: INTERNAL MEDICINE

## 2024-11-22 PROCEDURE — 1159F MED LIST DOCD IN RCRD: CPT | Performed by: INTERNAL MEDICINE

## 2024-11-22 PROCEDURE — 99213 OFFICE O/P EST LOW 20 MIN: CPT | Performed by: INTERNAL MEDICINE

## 2024-11-22 PROCEDURE — 3044F HG A1C LEVEL LT 7.0%: CPT | Performed by: INTERNAL MEDICINE

## 2024-11-22 PROCEDURE — 3074F SYST BP LT 130 MM HG: CPT | Performed by: INTERNAL MEDICINE

## 2024-11-22 PROCEDURE — 3078F DIAST BP <80 MM HG: CPT | Performed by: INTERNAL MEDICINE

## 2024-11-22 PROCEDURE — 1160F RVW MEDS BY RX/DR IN RCRD: CPT | Performed by: INTERNAL MEDICINE

## 2024-11-22 RX ORDER — AZELASTINE HYDROCHLORIDE, FLUTICASONE PROPIONATE 137; 50 UG/1; UG/1
SPRAY, METERED NASAL
COMMUNITY
Start: 2024-11-01

## 2024-11-22 RX ORDER — MELOXICAM 15 MG/1
15 TABLET ORAL DAILY
COMMUNITY
Start: 2024-11-19 | End: 2024-11-26

## 2024-11-25 ENCOUNTER — LAB (OUTPATIENT)
Dept: INTERNAL MEDICINE | Facility: CLINIC | Age: 59
End: 2024-11-25
Payer: MEDICARE

## 2024-11-25 DIAGNOSIS — Z94.4 LIVER TRANSPLANT STATUS: ICD-10-CM

## 2024-11-25 DIAGNOSIS — R19.7 DIARRHEA OF PRESUMED INFECTIOUS ORIGIN: ICD-10-CM

## 2024-11-25 LAB

## 2024-11-25 PROCEDURE — 87507 IADNA-DNA/RNA PROBE TQ 12-25: CPT | Performed by: INTERNAL MEDICINE

## 2024-11-26 ENCOUNTER — OFFICE VISIT (OUTPATIENT)
Dept: INTERNAL MEDICINE | Facility: CLINIC | Age: 59
End: 2024-11-26
Payer: MEDICARE

## 2024-11-26 VITALS
BODY MASS INDEX: 27.53 KG/M2 | SYSTOLIC BLOOD PRESSURE: 118 MMHG | DIASTOLIC BLOOD PRESSURE: 78 MMHG | HEART RATE: 68 BPM | RESPIRATION RATE: 16 BRPM | TEMPERATURE: 98 F | WEIGHT: 145.6 LBS

## 2024-11-26 DIAGNOSIS — R19.7 DIARRHEA OF PRESUMED INFECTIOUS ORIGIN: Primary | ICD-10-CM

## 2024-11-26 LAB
1OH-MIDAZOLAM UR QL SCN: NOT DETECTED NG/MG CREAT
6MAM UR QL SCN: NEGATIVE NG/ML
7AMINOCLONAZEPAM/CREAT UR: NOT DETECTED NG/MG CREAT
A-OH ALPRAZ/CREAT UR: NOT DETECTED NG/MG CREAT
A-OH-TRIAZOLAM/CREAT UR CFM: NOT DETECTED NG/MG CREAT
ACP UR QL CFM: NOT DETECTED
ALPRAZ/CREAT UR CFM: NOT DETECTED NG/MG CREAT
AMPHET UR CFM-MCNC: NOT DETECTED NG/MG CREAT
AMPHETAMINES UR QL SCN: NORMAL NG/ML
AMPHETAMINES UR QL: NEGATIVE
APAP UR QL SCN: NEGATIVE UG/ML
BARBITURATES UR QL SCN: NEGATIVE NG/ML
BASOPHILS # BLD AUTO: 0.05 10*3/MM3 (ref 0–0.2)
BASOPHILS NFR BLD AUTO: 0.8 % (ref 0–1.5)
BENZODIAZ SCN METH UR: NEGATIVE
BUPRENORPHINE UR QL SCN: NEGATIVE
BUPRENORPHINE/CREAT UR: NOT DETECTED NG/MG CREAT
CANNABINOIDS UR QL SCN: NEGATIVE NG/ML
CARISOPRODOL UR QL: NEGATIVE NG/ML
CLONAZEPAM/CREAT UR CFM: NOT DETECTED NG/MG CREAT
COCAINE+BZE UR QL SCN: NEGATIVE NG/ML
CREAT UR-MCNC: 169 MG/DL
D-METHORPHAN UR-MCNC: NOT DETECTED NG/ML
D-METHORPHAN+LEVORPHANOL UR QL: NOT DETECTED
DEPRECATED RDW RBC AUTO: 37.9 FL (ref 37–54)
DESALKYLFLURAZ/CREAT UR: NOT DETECTED NG/MG CREAT
DIAZEPAM/CREAT UR: NOT DETECTED NG/MG CREAT
EOSINOPHIL # BLD AUTO: 0.1 10*3/MM3 (ref 0–0.4)
EOSINOPHIL NFR BLD AUTO: 1.6 % (ref 0.3–6.2)
ERYTHROCYTE [DISTWIDTH] IN BLOOD BY AUTOMATED COUNT: 12.7 % (ref 12.3–15.4)
ERYTHROCYTE [SEDIMENTATION RATE] IN BLOOD: 3 MM/HR (ref 0–30)
ETHANOL UR QL SCN: NEGATIVE G/DL
ETHANOL UR QL SCN: NEGATIVE NG/ML
FENTANYL CTO UR SCN-MCNC: NEGATIVE NG/ML
FENTANYL/CREAT UR: NOT DETECTED NG/MG CREAT
FLUNITRAZEPAM UR QL SCN: NOT DETECTED NG/MG CREAT
GABAPENTIN UR-MCNC: NEGATIVE UG/ML
HALLUCINOGENS UR: NEGATIVE
HCT VFR BLD AUTO: 42.5 % (ref 34–46.6)
HGB BLD-MCNC: 14.5 G/DL (ref 12–15.9)
HYPNOTICS UR QL SCN: NEGATIVE
IMM GRANULOCYTES # BLD AUTO: 0.05 10*3/MM3 (ref 0–0.05)
IMM GRANULOCYTES NFR BLD AUTO: 0.8 % (ref 0–0.5)
KETAMINE UR QL: NOT DETECTED
LORAZEPAM/CREAT UR: NOT DETECTED NG/MG CREAT
LYMPHOCYTES # BLD AUTO: 3.4 10*3/MM3 (ref 0.7–3.1)
LYMPHOCYTES NFR BLD AUTO: 53.2 % (ref 19.6–45.3)
MCH RBC QN AUTO: 28.9 PG (ref 26.6–33)
MCHC RBC AUTO-ENTMCNC: 34.1 G/DL (ref 31.5–35.7)
MCV RBC AUTO: 84.7 FL (ref 79–97)
MDA UR CFM-MCNC: NOT DETECTED NG/MG CREAT
MDMA UR CFM-MCNC: NOT DETECTED NG/MG CREAT
MEPERIDINE UR QL SCN: NEGATIVE NG/ML
METHADONE UR QL SCN: NEGATIVE NG/ML
METHADONE+METAB UR QL SCN: NEGATIVE NG/ML
METHAMPHET UR CFM-MCNC: NOT DETECTED NG/MG CREAT
MIDAZOLAM/CREAT UR CFM: NOT DETECTED NG/MG CREAT
MISCELLANEOUS, UR: NEGATIVE
MONOCYTES # BLD AUTO: 0.48 10*3/MM3 (ref 0.1–0.9)
MONOCYTES NFR BLD AUTO: 7.5 % (ref 5–12)
NEUTROPHILS NFR BLD AUTO: 2.31 10*3/MM3 (ref 1.7–7)
NEUTROPHILS NFR BLD AUTO: 36.1 % (ref 42.7–76)
NORBUPRENORPHINE/CREAT UR: NOT DETECTED NG/MG CREAT
NORDIAZEPAM/CREAT UR: NOT DETECTED NG/MG CREAT
NORFENTANYL/CREAT UR: NOT DETECTED NG/MG CREAT
NORFLUNITRAZEPAM UR-MCNC: NOT DETECTED NG/MG CREAT
NORKETAMINE UR-MCNC: NOT DETECTED UG/ML
NRBC BLD AUTO-RTO: 0 /100 WBC (ref 0–0.2)
OPIATES UR SCN-MCNC: NEGATIVE NG/ML
OXAZEPAM/CREAT UR: NOT DETECTED NG/MG CREAT
OXYCODONE CTO UR SCN-MCNC: NEGATIVE NG/ML
PCP UR QL SCN: NEGATIVE NG/ML
PLATELET # BLD AUTO: 220 10*3/MM3 (ref 140–450)
PMV BLD AUTO: 11 FL (ref 6–12)
PRESCRIBED MEDICATIONS: NORMAL
PROPOXYPH UR QL SCN: NEGATIVE NG/ML
RBC # BLD AUTO: 5.02 10*6/MM3 (ref 3.77–5.28)
TAPENTADOL CTO UR SCN-MCNC: NEGATIVE NG/ML
TEMAZEPAM/CREAT UR: NOT DETECTED NG/MG CREAT
TRAMADOL UR QL SCN: NEGATIVE NG/ML
WBC NRBC COR # BLD AUTO: 6.39 10*3/MM3 (ref 3.4–10.8)
ZALEPLON UR-MCNC: NOT DETECTED NG/ML
ZOLPIDEM PHENYL-4-CARB UR QL SCN: NOT DETECTED
ZOLPIDEM UR QL SCN: NOT DETECTED
ZOPICLONE-N-OXIDE UR-MCNC: NOT DETECTED NG/ML

## 2024-11-26 PROCEDURE — 85025 COMPLETE CBC W/AUTO DIFF WBC: CPT | Performed by: INTERNAL MEDICINE

## 2024-11-26 PROCEDURE — 84443 ASSAY THYROID STIM HORMONE: CPT | Performed by: INTERNAL MEDICINE

## 2024-11-26 PROCEDURE — 3078F DIAST BP <80 MM HG: CPT | Performed by: INTERNAL MEDICINE

## 2024-11-26 PROCEDURE — 84439 ASSAY OF FREE THYROXINE: CPT | Performed by: INTERNAL MEDICINE

## 2024-11-26 PROCEDURE — 85652 RBC SED RATE AUTOMATED: CPT | Performed by: INTERNAL MEDICINE

## 2024-11-26 PROCEDURE — 3044F HG A1C LEVEL LT 7.0%: CPT | Performed by: INTERNAL MEDICINE

## 2024-11-26 PROCEDURE — 1125F AMNT PAIN NOTED PAIN PRSNT: CPT | Performed by: INTERNAL MEDICINE

## 2024-11-26 PROCEDURE — 80053 COMPREHEN METABOLIC PANEL: CPT | Performed by: INTERNAL MEDICINE

## 2024-11-26 PROCEDURE — 99214 OFFICE O/P EST MOD 30 MIN: CPT | Performed by: INTERNAL MEDICINE

## 2024-11-26 PROCEDURE — 3074F SYST BP LT 130 MM HG: CPT | Performed by: INTERNAL MEDICINE

## 2024-11-26 RX ORDER — DIPHENOXYLATE HYDROCHLORIDE AND ATROPINE SULFATE 2.5; .025 MG/1; MG/1
1 TABLET ORAL 4 TIMES DAILY PRN
Qty: 40 TABLET | Refills: 0 | Status: SHIPPED | OUTPATIENT
Start: 2024-11-26

## 2024-11-26 NOTE — PROGRESS NOTES
Chief Complaint   Patient presents with    Diarrhea       History of Present Illness    The patient presents for an acute visit for a ten day history of symptoms of nausea and diarrhea but she has not had vomiting or anorexia. The diarrhea is watery. There is no blood in the bowel movement. She denies recent travel, the possibility of eating undercooked poultry, the possibility of eating undercooked hamburger, the possibility of eating spoiled seafood or recently eating at a restaurant. The symptoms are associated with abdominal pain. The pain is described as crampy. The pain is diffuse. The abdominal pain does not radiate. The symptoms are not associated with fever. She does not have other symptoms, including a dry cough, a wet cough, wheezing, facial pain, a headache, eye drainage, ear pain, ear drainage, myalgias, a sore throat, nasal discharge, decreased appetite, chills, lightheadedness, dizziness, a dry mouth or a rash. The patient has not tried anything for treatment of this illness.    Medications      Current Outpatient Medications:     Azelastine-Fluticasone 137-50 MCG/ACT suspension, spray 1 spray into each nostril twice a day, Disp: , Rfl:     buPROPion XL (WELLBUTRIN XL) 300 MG 24 hr tablet, TAKE 1 TABLET BY MOUTH EVERY DAY IN THE MORNING, Disp: 90 tablet, Rfl: 0    calcium carbonate-vitamin d 600-400 MG-UNIT per tablet, Take 1 tablet by mouth Daily., Disp: , Rfl:     Cholecalciferol 50 MCG (2000 UT) capsule, Take 1 tablet by mouth Daily., Disp: , Rfl:     [facility] Melatonin 5 MG tablet tablet, TAKE 1 TABLET BY MOUTH EVERY DAY AT NIGHT, Disp: 90 tablet, Rfl: 1    donepezil (Aricept) 5 MG tablet, Take 1 tablet by mouth Every Night., Disp: 30 tablet, Rfl: 3    levothyroxine (SYNTHROID, LEVOTHROID) 200 MCG tablet, TAKE 1 TABLET BY MOUTH EVERY DAY IN THE MORNING, Disp: 90 tablet, Rfl: 1    mirtazapine (REMERON) 15 MG tablet, TAKE 1 TABLET BY MOUTH EVERYDAY AT BEDTIME, Disp: 90 tablet, Rfl: 1    pantoprazole  (PROTONIX) 40 MG EC tablet, Take 1 tablet by mouth 2 (Two) Times a Day. 30 minutes prior to first meal and 30 minutes prior to last meal of the day, Disp: 180 tablet, Rfl: 3    pregabalin (LYRICA) 150 MG capsule, Take 1 capsule by mouth 2 (Two) Times a Day., Disp: 60 capsule, Rfl: 2    Prolia 60 MG/ML solution prefilled syringe syringe, TO BE ADMINISTERED IN PHYSICIAN'S OFFICE. INJECT ONE SYRINGE SUBCUTANEOUSLY ONCE EVERY 6 MONTHS. REFRIGERATE. USE WITHIN 14 DAYS ONCE AT ROOM TEMPERATURE., Disp: 1 each, Rfl: 1    rOPINIRole (REQUIP) 4 MG tablet, TAKE 1 TABLET BY MOUTH EVERY DAY AT NIGHT, Disp: 90 tablet, Rfl: 1    rosuvastatin (Crestor) 20 MG tablet, Take 1 tablet by mouth Every Night., Disp: 90 tablet, Rfl: 3    tacrolimus (PROGRAF) 0.5 MG capsule, Take 2 capsules by mouth 2 (Two) Times a Day., Disp: , Rfl:     telmisartan (MICARDIS) 20 MG tablet, TAKE 1 TABLET BY MOUTH EVERY DAY, Disp: 90 tablet, Rfl: 1    traZODone (DESYREL) 100 MG tablet, TAKE 2 TABLETS BY MOUTH EVERY NIGHT, Disp: 180 tablet, Rfl: 1    meloxicam (MOBIC) 15 MG tablet, Take 1 tablet by mouth Daily. with food (Patient not taking: Reported on 11/22/2024), Disp: , Rfl:      Allergies    Allergies   Allergen Reactions    Penicillins Shortness Of Breath, Itching, Other (See Comments) and Rash    Cephalosporins Diarrhea    Cyclosporine Swelling       Problem List    Patient Active Problem List   Diagnosis    Menopause    Personal history of malignant neoplasm of thyroid    Renal impairment    Vision impairment    Other osteoporosis without current pathological fracture    Osteoarthritis    Meningioma    Hyperlipidemia LDL goal <70    Anxiety and depression    S/P cholecystectomy    Leukopenia    Immunosuppression due to drug therapy    Insomnia    Sleep apnea    Gout    Cytokine release syndrome, grade 2    Postoperative hypothyroidism    Stage 2 chronic kidney disease    Gastroesophageal reflux disease with esophagitis without hemorrhage     Cerebrovascular accident (CVA)    Osteoarthritis of left hip    Vocal cord dysfunction    Gastroparesis    Liver transplant recipient    Albuminuria    Primary osteoarthritis of both feet    Primary hypertension    Coronary artery disease involving native coronary artery of native heart without angina pectoris    Nonalcoholic steatohepatitis (MORRISON)    Personal history of immunosuppression therapy    High risk social situation    Non-seasonal allergic rhinitis due to pollen    Corneal irritation of left eye    Tooth pain    Plantar fasciitis    Family history of cancer    Chronic fatigue    Positive KATIE (antinuclear antibody)    Breast, fat necrosis    Transaminitis    Tetrahydrocannabinol (THC) use disorder, mild, abuse    Bacterial sinusitis    Groin strain    Chronic right hip pain    Alkaline phosphatase elevation    Housing insecurity    Chronic midline low back pain with bilateral sciatica    Paget disease of bone    Closed nondisplaced fracture of right acetabulum with routine healing    Restless leg syndrome    Sleep apnea, unspecified    Anemia in chronic kidney disease    Chronic mastoiditis    Impacted cerumen    Mixed conductive and sensorineural hearing loss, bilateral    Otitis externa of right ear    Restless legs syndrome    Rheumatoid arthritis involving multiple sites with positive rheumatoid factor    Tendinitis of extensor tendon of hand    Memory loss       Medications, Allergies, Problems List and Past History were reviewed and updated.    Physical Examination    /78 (BP Location: Left arm, Patient Position: Sitting, Cuff Size: Adult)   Pulse 68   Temp 98 °F (36.7 °C) (Infrared)   Resp 16   Wt 66 kg (145 lb 9.6 oz)   LMP  (LMP Unknown) Comment: Last pap 3/3/2022 by [facility]  BMI 27.53 kg/m²       HEENT: Head- Normocephalic Atraumatic. Facies- Within normal limits. Pinnas- Normal texture and shape bilaterally. Canals- Normal bilaterally. TMs- Normal bilaterally. Nares-  Patent bilaterally. Nasal Septum- is normal. There is no tenderness to palpation over the frontal or maxillary sinuses. Lids- Normal bilaterally. Conjunctiva- Clear bilaterally. Sclera- Anicteric bilaterally. Oropharynx- Moist with no lesions. Tonsils- No enlargement, erythema or exudate.    Neck: Thyroid- non enlarged, symmetric and has no nodules. No bruits are detected. ROM- Normal Range of Motion with no rigidity.    Lungs: Auscultation- Clear to auscultation bilaterally. There are no retractions, clubbing or cyanosis. The Expiratory to Inspiratory ratio is equal.    Cardiovascular: There are no carotid bruits. Heart- Normal Rate with Regular rhythm and no murmurs. There are no gallops. There are no rubs. In the lower extremities there is no edema. The upper extremities do not have edema.    Abdomen: Soft, benign, non-tender with no masses, hernias, organomegaly or scars.    Impression and Assessment    Diarrhea.    Plan    Diarrhea Plan: Probiotic foods were encouraged. Medication will be added as noted below. Further plans will be made after testing.      Diagnoses and all orders for this visit:    1. Diarrhea of presumed infectious origin (Primary)  -     CBC & Differential; Future  -     Comprehensive Metabolic Panel; Future  -     TSH; Future  -     T4, Free; Future  -     Sedimentation Rate; Future  -     Fecal Leukocytes - Stool, Per Rectum; Future  -     Clostridioides difficile Toxin, PCR - Stool, Per Rectum; Future  -     CBC & Differential  -     Comprehensive Metabolic Panel  -     TSH  -     T4, Free  -     Sedimentation Rate  -     diphenoxylate-atropine (Lomotil) 2.5-0.025 MG per tablet; Take 1 tablet by mouth 4 (Four) Times a Day As Needed for Diarrhea.  Dispense: 40 tablet; Refill: 0          Return to Office    The patient was instructed to return for follow-up as needed. The patient was instructed to return sooner if the condition changes, worsens, or does not resolve.

## 2024-11-27 ENCOUNTER — TELEPHONE (OUTPATIENT)
Dept: INTERNAL MEDICINE | Facility: CLINIC | Age: 59
End: 2024-11-27
Payer: MEDICARE

## 2024-11-27 ENCOUNTER — LAB (OUTPATIENT)
Dept: INTERNAL MEDICINE | Facility: CLINIC | Age: 59
End: 2024-11-27
Payer: MEDICARE

## 2024-11-27 DIAGNOSIS — R19.7 DIARRHEA OF PRESUMED INFECTIOUS ORIGIN: ICD-10-CM

## 2024-11-27 LAB
ALBUMIN SERPL-MCNC: 4.4 G/DL (ref 3.5–5.2)
ALBUMIN/GLOB SERPL: 2 G/DL
ALP SERPL-CCNC: 86 U/L (ref 39–117)
ALT SERPL W P-5'-P-CCNC: 41 U/L (ref 1–33)
ANION GAP SERPL CALCULATED.3IONS-SCNC: 9.1 MMOL/L (ref 5–15)
AST SERPL-CCNC: 29 U/L (ref 1–32)
BILIRUB SERPL-MCNC: 0.8 MG/DL (ref 0–1.2)
BUN SERPL-MCNC: 12 MG/DL (ref 8–23)
BUN/CREAT SERPL: 15.8 (ref 7–25)
CALCIUM SPEC-SCNC: 8.6 MG/DL (ref 8.6–10.5)
CHLORIDE SERPL-SCNC: 106 MMOL/L (ref 98–107)
CO2 SERPL-SCNC: 23.9 MMOL/L (ref 22–29)
CREAT SERPL-MCNC: 0.76 MG/DL (ref 0.57–1)
EGFRCR SERPLBLD CKD-EPI 2021: 89.8 ML/MIN/1.73
GLOBULIN UR ELPH-MCNC: 2.2 GM/DL
GLUCOSE SERPL-MCNC: 86 MG/DL (ref 65–99)
POTASSIUM SERPL-SCNC: 4.3 MMOL/L (ref 3.5–5.2)
PROT SERPL-MCNC: 6.6 G/DL (ref 6–8.5)
SODIUM SERPL-SCNC: 139 MMOL/L (ref 136–145)
T4 FREE SERPL-MCNC: 1.59 NG/DL (ref 0.92–1.68)
TSH SERPL DL<=0.05 MIU/L-ACNC: 0.01 UIU/ML (ref 0.27–4.2)
WBC STL QL MICRO: NORMAL

## 2024-11-27 PROCEDURE — 87205 SMEAR GRAM STAIN: CPT | Performed by: INTERNAL MEDICINE

## 2024-11-27 RX ORDER — LEVOTHYROXINE SODIUM 175 UG/1
175 TABLET ORAL DAILY
Qty: 30 TABLET | Refills: 5 | Status: SHIPPED | OUTPATIENT
Start: 2024-11-27

## 2024-11-27 NOTE — TELEPHONE ENCOUNTER
----- Message from Ad Oquendo sent at 11/27/2024  7:22 AM EST -----  Please call the patient regarding her abnormal result.  Her thyroid looks a little elevated, this could be contributing to her diarrhea.  Please decrease her dosage of levothyroxine to 175 mcg daily.  Please call in 6 month supply (Either 1 month with RF 5 or 3 months with RF 1)..  Ad Oquendo MD  07:22 EST  11/27/24

## 2024-11-27 NOTE — TELEPHONE ENCOUNTER
Patient has been given results and recommendations. Medication has been sent to pharmacy. Patient verb good understanding.

## 2024-11-27 NOTE — PROGRESS NOTES
Chief Complaint   Patient presents with   • Follow-up     1mo fu, chronic health condition   • Dizziness     Reports 3 falls in 2 weeks       History of Present Illness      The patient presents for a follow-up related to Type 2 Diabetes Mellitus. She checks her blood sugars at home. Her sugars are checked daily. The average sugars are in the 100-150 range. She denies hypoglycemic symptoms. The patient denies polyuria, polydypsia or polyphagia. She reports no symptoms of neuropathy. The patient takes her medication as prescribed. She is not taking insulin. The patient does check her feet daily at home. She denies chest pain, shortness of breath, orthopnea, paroxysmal nocturnal dyspnea, dyspnea on exertion, edema, palpitations or syncope.    Review of Systems    CONSTITUTIONAL- Denies Unexplained Weight Loss, Fever, Chills, Sweats, Fatigue, Weakness or Malaise.    PULMONARY- Denies Wheezing, Sputum Production, Cough, Hemoptysis or Pleuritic Chest Pain.    CARDIOVASCULAR- Denies Claudication or Irregular Heart Beat.    Medications      Current Outpatient Medications:   •  alendronate (FOSAMAX) 70 MG tablet, Take 1 tablet by mouth 1 (One) Time Per Week., Disp: , Rfl:   •  allopurinol (ZYLOPRIM) 300 MG tablet, Take 300 mg by mouth Daily., Disp: , Rfl:   •  amLODIPine (NORVASC) 10 MG tablet, Take 1 tablet by mouth Daily., Disp: , Rfl:   •  atorvastatin (LIPITOR) 40 MG tablet, Take 1 tablet by mouth Daily., Disp: , Rfl:   •  buPROPion SR (WELLBUTRIN SR) 150 MG 12 hr tablet, TAKE 1 TABLET BY MOUTH EVERY DAY FOR DEPRESSION, Disp: , Rfl:   •  calcium carbonate-vitamin d 600-400 MG-UNIT per tablet, Take 1 tablet by mouth Daily., Disp: , Rfl:   •  cloNIDine (CATAPRES) 0.1 MG tablet, Take 0.1 mg by mouth Daily As Needed for High Blood Pressure., Disp: , Rfl:   •  [employer] Melatonin 5 MG tablet tablet, 1 TABLET(S) BY MOUTH EVERY EVENING TAKE 1-2 HRS PRIOR TO BEDTIME, Disp: 90 tablet, Rfl: 1  •  fluticasone (FLONASE) 50 MCG/ACT nasal  Patient record reviewed, agree with findings and recommendations as documented above.   spray, 2 sprays into the nostril(s) as directed by provider 2 (Two) Times a Day., Disp: , Rfl:   •  furosemide (LASIX) 20 MG tablet, Hold until follow up with your primary care provider (Patient taking differently: Take 20 mg by mouth Daily.), Disp: , Rfl:   •  icosapent ethyl (VASCEPA) 1 g capsule capsule, Take 2 capsules by mouth 2 (Two) Times a Day With Meals., Disp: , Rfl:   •  isosorbide dinitrate (ISORDIL) 30 MG tablet, TAKE 1 TABLET BY MOUTH THREE TIMES A DAY, Disp: 270 tablet, Rfl: 1  •  isosorbide mononitrate (IMDUR) 60 MG 24 hr tablet, Take 1 tablet by mouth Every Night., Disp: , Rfl:   •  levothyroxine (SYNTHROID, LEVOTHROID) 175 MCG tablet, Take 175 mcg by mouth Daily., Disp: , Rfl:   •  metoprolol succinate XL (TOPROL-XL) 100 MG 24 hr tablet, Take 200 mg by mouth Daily., Disp: , Rfl:   •  mirtazapine (REMERON) 30 MG tablet, Take 30 mg by mouth every night at bedtime., Disp: , Rfl:   •  nitroglycerin (NITROSTAT) 0.4 MG SL tablet, Place 0.4 mg under the tongue., Disp: , Rfl:   •  O2 (OXYGEN), Inhale 2 L/min Every Night., Disp: , Rfl:   •  pantoprazole (PROTONIX) 40 MG EC tablet, Take 40 mg by mouth Daily., Disp: , Rfl:   •  predniSONE (DELTASONE) 10 MG tablet, Hold while taking Dexamethasone. Once out of Dexamethasone, resume taking 1 tablet by mouth daily, Disp: , Rfl:   •  rOPINIRole (REQUIP) 2 MG tablet, TAKE 1-2 TABLET(S) ORAL EVERY NIGHT AT BEDTIME, Disp: 180 tablet, Rfl: 1  •  sertraline (ZOLOFT) 100 MG tablet, Take 1 tablet by mouth Daily., Disp: 90 tablet, Rfl: 1  •  spironolactone (ALDACTONE) 50 MG tablet, Take 1 tablet by mouth Daily., Disp: , Rfl:   •  tacrolimus (PROGRAF) 0.5 MG capsule, Take 2 capsules by mouth 2 (Two) Times a Day., Disp: , Rfl:   •  traMADol (ULTRAM) 50 MG tablet, Take 1 tablet by mouth Every 6 (Six) Hours As Needed for Moderate Pain ., Disp: 18 tablet, Rfl: 0  •  traZODone (DESYREL) 100 MG tablet, Take 200 mg by mouth every night at bedtime., Disp: , Rfl:   •  vitamin D  (ERGOCALCIFEROL) 1.25 MG (26824 UT) capsule capsule, Take 50,000 Units by mouth Every 7 (Seven) Days.  , Disp: , Rfl:   •  zolpidem (AMBIEN) 5 MG tablet, Take 5 mg by mouth Every Night., Disp: , Rfl:   •  ipratropium-albuterol (DUO-NEB) 0.5-2.5 mg/3 ml nebulizer, Take 3 mL by nebulization Every 4 (Four) Hours As Needed., Disp: , Rfl:   •  mirtazapine (REMERON) 7.5 MG tablet, Take 1 tablet by mouth Every Night., Disp: 30 tablet, Rfl: 2  •  potassium chloride (MICRO-K) 10 MEQ CR capsule, Hold until Lasix (furosemide) restarted (Patient taking differently: Take 10 mEq by mouth Daily. Hold until Lasix (furosemide) restarted), Disp: , Rfl:   •  Prasterone (Intrarosa) 6.5 MG insert, Insert 1 suppository into the vagina Daily., Disp: 28 each, Rfl: 11  •  Semaglutide,0.25 or 0.5MG/DOS, (Ozempic, 0.25 or 0.5 MG/DOSE,) 2 MG/1.5ML solution pen-injector, Inject 0.5 mg under the skin into the appropriate area as directed 1 (One) Time Per Week., Disp: 1.5 mL, Rfl: 2     Allergies    Allergies   Allergen Reactions   • Penicillins Shortness Of Breath   • Cyclosporine Swelling       Problem List    Patient Active Problem List   Diagnosis   • Menopause   • Personal history of malignant neoplasm of thyroid   • History of stroke   • Renal impairment   • Vision impairment   • Osteoporosis   • Osteoarthritis   • Meningioma (HCC)   • Hypothyroid   • Hypercholesteremia   • Depression   • Cirrhosis of liver (HCC)   • Anxiety   • Acute hypoxemic respiratory failure due to COVID-19 (HCC)   • Liver transplant recipient (HCC)   • Severe malnutrition (CMS/HCC)   • Leukopenia   • Immunosuppression due to drug therapy (HCC)   • Insomnia   • Sleep apnea   • Gout   • Cytokine release syndrome, grade 2   • Precordial pain       Medications, Allergies, Problems List and Past History were reviewed and updated.    Physical Examination    /90 (BP Location: Left arm, Patient Position: Sitting, Cuff Size: Adult)   Pulse 82   Temp 98.4 °F (36.9 °C)  (Infrared)   Resp 22   Wt 78 kg (172 lb)   LMP  (LMP Unknown) Comment: Last pap 3/3/2022 by [facility]  BMI 32.52 kg/m²     HEENT: Head- Normocephalic Atraumatic. Facies- Within normal limits. Pinnas- Normal texture and shape bilaterally. Canals- Normal bilaterally. TMs- Normal bilaterally. Nares- Patent bilaterally. Nasal Septum- is normal. There is no tenderness to palpation over the frontal or maxillary sinuses. Lids- Normal bilaterally. Conjunctiva- Clear bilaterally. Sclera- Anicteric bilaterally. Oropharynx- Moist with no lesions. Tonsils- No enlargement, erythema or exudate.    Lungs: Auscultation- Clear to auscultation bilaterally. There are no retractions, clubbing or cyanosis. The Expiratory to Inspiratory ratio is equal.    Cardiovascular: There are no carotid bruits. Heart- Normal Rate with Regular rhythm and no murmurs. There are no gallops. There are no rubs. In the lower extremities there is no edema. The upper extremities do not have edema.    Abdomen: Soft, benign, non-tender with no masses, hernias, organomegaly or scars.    Impression and Assessment    Type 2 Diabetes Mellitus without complication without insulin usage.    Plan    Type 2 Diabetes Mellitus without complication without insulin usage Plan: The patient was instructed to continue the current medications. Increase ozempic.    Diagnoses and all orders for this visit:    1. Type 2 diabetes mellitus without complication, without long-term current use of insulin (HCC) (Primary)  -     Semaglutide,0.25 or 0.5MG/DOS, (Ozempic, 0.25 or 0.5 MG/DOSE,) 2 MG/1.5ML solution pen-injector; Inject 0.5 mg under the skin into the appropriate area as directed 1 (One) Time Per Week.  Dispense: 1.5 mL; Refill: 2    2. Liver transplant recipient (HCC)  -     CBC & Differential; Standing  -     Comprehensive Metabolic Panel; Standing  -     Tacrolimus Level; Standing  -     CBC & Differential  -     Comprehensive Metabolic Panel  -      Tacrolimus Level          Return to Office    The patient was instructed to return for follow-up in 6 weeks. The patient was instructed to return sooner if the condition changes, worsens, or does not resolve.

## 2024-12-02 ENCOUNTER — OFFICE VISIT (OUTPATIENT)
Dept: PAIN MEDICINE | Facility: CLINIC | Age: 59
End: 2024-12-02
Payer: MEDICARE

## 2024-12-02 VITALS — WEIGHT: 143.7 LBS | HEIGHT: 61 IN | BODY MASS INDEX: 27.13 KG/M2

## 2024-12-02 DIAGNOSIS — G62.9 PERIPHERAL POLYNEUROPATHY: ICD-10-CM

## 2024-12-02 DIAGNOSIS — M54.50 CHRONIC LOW BACK PAIN, UNSPECIFIED BACK PAIN LATERALITY, UNSPECIFIED WHETHER SCIATICA PRESENT: Primary | ICD-10-CM

## 2024-12-02 DIAGNOSIS — G89.29 CHRONIC LOW BACK PAIN, UNSPECIFIED BACK PAIN LATERALITY, UNSPECIFIED WHETHER SCIATICA PRESENT: Primary | ICD-10-CM

## 2024-12-02 DIAGNOSIS — M47.816 SPONDYLOSIS OF LUMBAR REGION WITHOUT MYELOPATHY OR RADICULOPATHY: ICD-10-CM

## 2024-12-02 PROCEDURE — 1160F RVW MEDS BY RX/DR IN RCRD: CPT

## 2024-12-02 PROCEDURE — 1125F AMNT PAIN NOTED PAIN PRSNT: CPT

## 2024-12-02 PROCEDURE — 99213 OFFICE O/P EST LOW 20 MIN: CPT

## 2024-12-02 PROCEDURE — G2211 COMPLEX E/M VISIT ADD ON: HCPCS

## 2024-12-02 PROCEDURE — 1159F MED LIST DOCD IN RCRD: CPT

## 2024-12-02 RX ORDER — PREGABALIN 150 MG/1
150 CAPSULE ORAL 2 TIMES DAILY
Qty: 60 CAPSULE | Refills: 2 | Status: SHIPPED | OUTPATIENT
Start: 2024-12-02

## 2024-12-02 NOTE — TELEPHONE ENCOUNTER
Refill pregabalin  Pregabalin 150 mg twice daily, 60 pills, #2 refills  John Paul reviewed and compliant  Compliant UDS  Appropriate follow-up visit scheduled  Controlled substance agreement signed in office

## 2024-12-02 NOTE — PROGRESS NOTES
Referring Physician: No referring provider defined for this encounter.    Primary Physician: Lars Fermin MD    CHIEF COMPLAINT or REASON FOR VISIT: Follow-up and Back Pain      Initial history of present illness on 01/23/2024:  Ms. Lizette Frias is 59 y.o. female who presents as a new patient referral for evaluation treatment chronic axial back pain without radiation.  Ms. Frias does have a past medical history of CKD 2, GERD, CVA, gastroparesis, [place] cirrhosis status post liver transplant, Paget disease, meningioma, HLD, anxiety, depression, gout.  She describes a many year history of chronic axial back pain without radiation to lower extremities without any inciting or precipitating trauma.  She has previously been treated for bilateral hip osteoarthrosis most recently she had a fall in early January 2024 in which she sustained a increased pain/trauma to her right hip.  She did undergo MRI demonstrating a nondisplaced fracture of the right superior pubic roots for which she saw Dr. Bauer, orthopedic surgery, today who advised that this is a nonoperative condition which should resolve with time and conservative management.    She denies any past medical history of lumbar spine surgery or injection.  She has undergone a left hip injection with [facility] in early 2024.  Additionally at that time she underwent Lyrica and oxycodone management.  I do not have the most recent office notes and is unclear to me why patient was discontinued but she did violate her opioid use agreement with taking unprescribed tramadol, being prescribed hydrocodone for a dental procedure, and having TGN her urinalysis.  Her left hip injection helped for about 50% for a week or 2.    Today she denies any significant hip pain, bursitis pain or sacroiliac type pain.  Patient denies any bowel or bladder dysfunction, lower extremity weakness, new onset saddle anesthesia or unexplained weight loss.       Interval  history:   Patient returns to clinic today.  She continues to complain of chronic axial back pain without radiation or extremities.  She has been out of pregabalin for approximately 1 week.  She has had 2 flares of back pain within this time.  She denies any new injuries or events since her previous appointment.  She is interested in additional strategies to help alleviate her pain.  We did discuss potential lumbar medial branch peripheral nerve stimulation with Sprint PNS.  Patient lives alone and believes she would have a difficult time changing bandages.  Additionally, she likes to take baths at night which helps soothe her back and leg pain.  She states forward flexion helps alleviate her pain    Interventions:  4/11/2024: L4/5 and L5/S1 LMBB with 100% pain relief for multiple hours  5/14/2024: L4/5 and L5/S1 LMBB with 90% pain relief for multiple hours  5/28/2024: Bilateral L4/5 & L5/S1 RFA with minimal relief      Objective Pain Scoring:   BRIEF PAIN INVENTORY:  Total score:   Pain Score    12/02/24 1437   PainSc:   7   PainLoc: Back        PHQ-2:    PHQ-9:    Opioid Risk Tool:         Review of Systems:   ROS negative except as otherwise noted     Past Medical History:   Past Medical History:   Diagnosis Date    Acromioclavicular separation     Acute hypoxemic respiratory failure due to COVID-19     Allergic     Environmental, pcn, cyclosporine    Anemia     Ankle sprain     Anxiety     Arthritis of back     Bursitis of hip     Cervical disc disorder     Cholelithiasis     Chronic obstructive pulmonary disease, unspecified 12/03/2021    Chronic pain disorder     Chronic systolic (congestive) heart failure 12/03/2021    Cirrhosis of liver     Clotting disorder     Pre-transplant    Colon polyp     COVID     CTS (carpal tunnel syndrome)     Deep vein thrombosis Pretransplant    Blood clot in liver    Depression     Diabetes     Dislocation, shoulder     Endometriosis     Fracture of ankle     Fracture of hip      Fracture of wrist     Fracture, foot     Fractures     Frozen shoulder     GERD (gastroesophageal reflux disease)     Headache     Headache, tension-type     Heart murmur     Hip arthrosis     HL (hearing loss)     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, mastoid damage    Hypercholesteremia     Hypertension     Hyperthyroidism     Hypothyroid     Joint pain     Knee swelling     Low back pain 1982    Low back strain     Lumbosacral disc disease     Memory loss     Meningioma     Migraine     Neck strain     Osteoarthritis     Osteopenia     Osteoporosis     Pancreatitis     Pretransplant    Periarthritis of shoulder     Peripheral neuropathy     Prediabetes     Renal impairment     Rheumatoid arthritis involving multiple sites with positive rheumatoid factor 2024    Severe malnutrition (CMS/HCC) 2022    Shingles     Sleep apnea     Stress fracture     Stroke     Tennis elbow     Thyroid cancer     Vision impairment     left eye         Past Surgical History:   Past Surgical History:   Procedure Laterality Date    ANKLE OPEN REDUCTION INTERNAL FIXATION      APPENDECTOMY      BREAST BIOPSY Left     BENIGN     SECTION      x3    CHOLECYSTECTOMY      COLONOSCOPY      D & C HYSTEROSCOPY LAPAROSCOPY      x2 for endometriosis    ENDOSCOPY      FRACTURE SURGERY  1983    Right ankle has screws    HAND SURGERY      LIVER SURGERY      removed cysts     LIVER TRANSPLANTATION      ORIF ANKLE FRACTURE      ORTHOPEDIC SURGERY      THYROIDECTOMY      TONSILLECTOMY      TOTAL ABDOMINAL HYSTERECTOMY WITH SALPINGO OOPHORECTOMY Bilateral     TRIGGER POINT INJECTION      UPPER GASTROINTESTINAL ENDOSCOPY           Family History   Family History   Problem Relation Age of Onset    Arthritis Mother     Osteoporosis Mother         stage 4 cancer,    Rheum arthritis Mother     Anemia Mother     Broken bones Mother         Ankle    Rheumatologic disease Mother     Scoliosis Mother     Dementia Mother     Alcohol abuse Father      Mental illness Father     Hyperlipidemia Father     Hypertension Father     Colon cancer Father     Cancer Father     Hearing loss Father     Broken bones Father         Shoulder    ADD / ADHD Son     ADD / ADHD Son     Breast cancer Maternal Grandmother     Stroke Maternal Grandmother     Neuropathy Maternal Grandmother     Mental illness Paternal Grandmother     Ovarian cancer Paternal Grandmother     Broken bones Brother         Elbow         Social History   Social History     Socioeconomic History    Marital status:    Tobacco Use    Smoking status: Never     Passive exposure: Never    Smokeless tobacco: Never   Vaping Use    Vaping status: Never Used   Substance and Sexual Activity    Alcohol use: No    Drug use: Never     Comment: Tried an edible last week.     Sexual activity: Not Currently     Partners: Male     Birth control/protection: Post-menopausal, Surgical        Medications:     Current Outpatient Medications:     Azelastine-Fluticasone 137-50 MCG/ACT suspension, spray 1 spray into each nostril twice a day, Disp: , Rfl:     buPROPion XL (WELLBUTRIN XL) 300 MG 24 hr tablet, TAKE 1 TABLET BY MOUTH EVERY DAY IN THE MORNING, Disp: 90 tablet, Rfl: 0    calcium carbonate-vitamin d 600-400 MG-UNIT per tablet, Take 1 tablet by mouth Daily., Disp: , Rfl:     Cholecalciferol 50 MCG (2000 UT) capsule, Take 1 tablet by mouth Daily., Disp: , Rfl:     [employer]S Melatonin 5 MG tablet tablet, TAKE 1 TABLET BY MOUTH EVERY DAY AT NIGHT, Disp: 90 tablet, Rfl: 1    diphenoxylate-atropine (Lomotil) 2.5-0.025 MG per tablet, Take 1 tablet by mouth 4 (Four) Times a Day As Needed for Diarrhea., Disp: 40 tablet, Rfl: 0    donepezil (Aricept) 5 MG tablet, Take 1 tablet by mouth Every Night., Disp: 30 tablet, Rfl: 3    levothyroxine (SYNTHROID, LEVOTHROID) 175 MCG tablet, Take 1 tablet by mouth Daily., Disp: 30 tablet, Rfl: 5    mirtazapine (REMERON) 15 MG tablet, TAKE 1 TABLET BY MOUTH EVERYDAY AT BEDTIME, Disp: 90  "tablet, Rfl: 1    pantoprazole (PROTONIX) 40 MG EC tablet, Take 1 tablet by mouth 2 (Two) Times a Day. 30 minutes prior to first meal and 30 minutes prior to last meal of the day, Disp: 180 tablet, Rfl: 3    pregabalin (LYRICA) 150 MG capsule, Take 1 capsule by mouth 2 (Two) Times a Day., Disp: 60 capsule, Rfl: 2    Prolia 60 MG/ML solution prefilled syringe syringe, TO BE ADMINISTERED IN PHYSICIAN'S OFFICE. INJECT ONE SYRINGE SUBCUTANEOUSLY ONCE EVERY 6 MONTHS. REFRIGERATE. USE WITHIN 14 DAYS ONCE AT ROOM TEMPERATURE., Disp: 1 each, Rfl: 1    rOPINIRole (REQUIP) 4 MG tablet, TAKE 1 TABLET BY MOUTH EVERY DAY AT NIGHT, Disp: 90 tablet, Rfl: 1    rosuvastatin (Crestor) 20 MG tablet, Take 1 tablet by mouth Every Night., Disp: 90 tablet, Rfl: 3    tacrolimus (PROGRAF) 0.5 MG capsule, Take 2 capsules by mouth 2 (Two) Times a Day., Disp: , Rfl:     telmisartan (MICARDIS) 20 MG tablet, TAKE 1 TABLET BY MOUTH EVERY DAY, Disp: 90 tablet, Rfl: 1    traZODone (DESYREL) 100 MG tablet, TAKE 2 TABLETS BY MOUTH EVERY NIGHT, Disp: 180 tablet, Rfl: 1        Physical Exam:     Vitals:    12/02/24 1437   Weight: 65.2 kg (143 lb 11.2 oz)   Height: 154.9 cm (60.98\")   PainSc:   7   PainLoc: Back          General: Alert and oriented, No acute distress.   HEENT: Normocephalic, atraumatic.   Cardiovascular: No gross edema  Respiratory: Respirations are non-labored     Lumbar Spine:   No masses or atrophy  Range of motion - Flexion normal. Extension normal.    Facet Loading: Positive bilaterally  Facet Palpation -positive  PSIS tenderness - Negative bilaterally  Mckinley's/JOSHUA/Thigh thrust -   Straight leg raise/slump test: Negative bilaterally      Motor Exam:    Strength: Rate on 1-5 scale Right Left    C5-Elbow flexion, Deltoid 5/5  5/5    C6-Wrist extension 5/5  5/5    C7- Elbow / finger extension 5/5  5/5    C8- Finger flexion 5/5  5/5    T1- Intrinsics hand 5/5  5/5    Strength: Rate on 1-5 scale Right Left    L1/2- hip flexion " 5/5  5/5    L3- knee extension 5/5  5/5    L4- ankle dorsiflexion 5/5  5/5    L5- great toe extension 5/5  5/5    S1- ankle plantarflexion 5/5  5/5    Sensory Exam: Full and equal sensation to light touch throughout.     Neurologic: Cranial Nerves II-XII are grossly intact.      Psychiatric: Cooperative.   Gait: Normal   Assistive Devices: None      Imaging Studies:   MRI LUMBAR SPINE WO CONTRAST     Date of Exam: 2/23/2024 3:09 PM EST     Indication: Low back pain, no red flags, no prior management.     Comparison: None available.     Technique:  Routine multiplanar/multisequence sequence images of the lumbar spine were obtained without contrast administration.          Findings:   T1 marrow signal is preserved, without evidence of fracture or suspicious marrow replacing lesion. Alignment is anatomic, without evidence of significant listhesis or subluxation. The conus medullaris and cauda equina nerve roots are satisfactory in   appearance. The paraspinal soft tissues demonstrate no acute or suspicious findings. Multilevel spondylosis is present, with areas of involvement including     L1-2, no significant spinal canal or neuroforaminal impingement.     L2-3, no significant spinal canal or neuroforaminal impingement.     L3-4, small disc bulge and bilateral facet arthropathy. There is mild spinal canal and minimal bilateral neuroforaminal narrowing present.     L4-5, small circumferential disc bulge and bilateral facet arthropathy. There is no significant spinal canal narrowing or neuroforaminal stenosis..     L5-S1, circumferential disc bulge and mild facet arthropathy. The spinal canal is patent. There is some mild narrowing of the left neural foramen.     IMPRESSION:  Impression:   Mild spondylosis changes are noted as above. There is no evidence of significant associated spinal canal or neuroforaminal impingement.     Electronically Signed: Jose J Bunch MD    2/25/2024 7:33 AM EST    Workstation ID:  [id number]        Independent interpretation of radiographic imaging:  Lumbar MRI dated 2/23/2024 reveals DDD worse at L5/S1, no significant canal or neuroforaminal stenosis, facet arthropathy.         Impression & Plan:       01/23/2024: Lizette Frias is a 59 y.o. female with past medical history significant for CKD 2, GERD, CVA, gastroparesis, [place] cirrhosis status post liver transplant, Paget disease, meningioma, HLD, anxiety, depression, gout. who presents to the pain clinic for evaluation and treatment of chronic axial back pain.  Examination most consistent with lumbar facet pain.  I do not have any imaging for review today so I will order lumbar x-ray with flexion-extension as well as lumbar MRI.  Additionally I will give her physical therapy order and we will resume Lyrica.  I discussed with patient that we do not typically manage opioids and she may not be a candidate due to the prior history of violating opioid agreement.  3/26/2024: Patient returns to clinic after undergoing lumbar MRI.  Will refill pregabalin.  Will plan for bilateral L4/5 and L5/S1 LMBB/RFA  5/16/2024: Will plan for L4/5 and L5/S1 RFA as already scheduled.  Recommended muscle relaxer for midthoracic back pain.  New PT referral for midthoracic back pain.  Refill Lyrica  8/12/2024: Minimal relief from bilateral lumbar RFA.  Can consider Sprint PNS 6 months after ablation.  Will send new PT referral.  Recommend TENS unit.  Should have pending prescription of Lyrica at pharmacy  12/02/2024: Discussed lumbar medial branch PNS with Sprint PNS.  Patient believes she would have a difficult time changing bandages and performing appropriate wound care.  Will refill pregabalin and reevaluate for couple of months.    1. Chronic low back pain, unspecified back pain laterality, unspecified whether sciatica present    2. Spondylosis of lumbar region without myelopathy or radiculopathy    3. Peripheral polyneuropathy                PLAN:  1. Medication  Recommendations: Recommend Voltaren topical, NSAIDs, Tylenol.  Can trial turmeric 500 mg twice daily if NSAID contraindicated.    -Refill pregabalin  -Pregabalin 150 mg twice daily, 60 pills, #2 refills  As part of this patient's treatment plan, patient will be prescribed controlled substances. The patient has been made aware of appropriate use of such medications, including potential risk of somnolence, limited ability to drive and /or work safely, and potential for dependence or overdose. It has also been made clear that these medications are for use by this patient only, without concomitant use of alcohol or other substances unless prescribed.Controlled substance status of medication discussed with patient, discussed risks of medication including abuse potential and diversion potential and need to follow up for reevaluation appointment in order to receive further refills.  John Paul was reviewed and compliant.    2. Physical Therapy: Continue PT    3. Psychological: defer.     4. Complementary and alternative (CAM) Therapies: Recommend aqua therapy.  Recommend TENS unit    5. Labs/Diagnostic studies: UDS compliant as of 2024  6. Imagin. Interventions: Can consider bilateral lumbar Sprint PNS.  Discussed in detail.  Provided patient with pamphlet     8. Referrals:     9. Records:, John Paul reviewed    10. Lifestyle goals:    Follow-up 3 months for medication management      [facility]  LEVI Montejo reports a pain score of 7.  Given her pain assessment as noted, treatment options were discussed and the following options were decided upon as a follow-up plan to address the patient's pain: continuation of current treatment plan for pain.

## 2024-12-05 DIAGNOSIS — F32.A ANXIETY AND DEPRESSION: ICD-10-CM

## 2024-12-05 DIAGNOSIS — F41.9 ANXIETY AND DEPRESSION: ICD-10-CM

## 2024-12-05 LAB
NCCN CRITERIA FLAG: ABNORMAL
TYRER CUZICK SCORE: 7.7

## 2024-12-05 RX ORDER — BUPROPION HYDROCHLORIDE 300 MG/1
300 TABLET ORAL EVERY MORNING
Qty: 90 TABLET | Refills: 0 | Status: SHIPPED | OUTPATIENT
Start: 2024-12-05

## 2024-12-06 ENCOUNTER — LAB (OUTPATIENT)
Dept: INTERNAL MEDICINE | Facility: CLINIC | Age: 59
End: 2024-12-06
Payer: MEDICARE

## 2024-12-06 DIAGNOSIS — R19.7 DIARRHEA OF PRESUMED INFECTIOUS ORIGIN: ICD-10-CM

## 2024-12-06 LAB — C DIFF TOX GENS STL QL NAA+PROBE: NOT DETECTED

## 2024-12-06 PROCEDURE — 87493 C DIFF AMPLIFIED PROBE: CPT | Performed by: INTERNAL MEDICINE

## 2024-12-12 ENCOUNTER — TELEPHONE (OUTPATIENT)
Dept: INTERNAL MEDICINE | Facility: CLINIC | Age: 59
End: 2024-12-12

## 2024-12-12 ENCOUNTER — OFFICE VISIT (OUTPATIENT)
Dept: INTERNAL MEDICINE | Facility: CLINIC | Age: 59
End: 2024-12-12
Payer: MEDICARE

## 2024-12-12 ENCOUNTER — HOSPITAL ENCOUNTER (OUTPATIENT)
Dept: MAMMOGRAPHY | Facility: HOSPITAL | Age: 59
Discharge: HOME | End: 2024-12-12
Admitting: STUDENT IN AN ORGANIZED HEALTH CARE EDUCATION/TRAINING PROGRAM
Payer: MEDICARE

## 2024-12-12 VITALS
TEMPERATURE: 98 F | WEIGHT: 146 LBS | RESPIRATION RATE: 18 BRPM | SYSTOLIC BLOOD PRESSURE: 118 MMHG | HEART RATE: 74 BPM | BODY MASS INDEX: 27.6 KG/M2 | DIASTOLIC BLOOD PRESSURE: 78 MMHG

## 2024-12-12 DIAGNOSIS — Z23 ENCOUNTER FOR VACCINATION: ICD-10-CM

## 2024-12-12 DIAGNOSIS — K76.9 LIVER DAMAGE: ICD-10-CM

## 2024-12-12 DIAGNOSIS — E89.0 POSTOPERATIVE HYPOTHYROIDISM: ICD-10-CM

## 2024-12-12 DIAGNOSIS — E78.5 HYPERLIPIDEMIA LDL GOAL <70: Primary | ICD-10-CM

## 2024-12-12 DIAGNOSIS — Z12.31 ENCOUNTER FOR SCREENING MAMMOGRAM FOR MALIGNANT NEOPLASM OF BREAST: ICD-10-CM

## 2024-12-12 DIAGNOSIS — Z79.01 ANTICOAGULATED: ICD-10-CM

## 2024-12-12 DIAGNOSIS — R74.01 TRANSAMINITIS: ICD-10-CM

## 2024-12-12 DIAGNOSIS — Z80.9 FAMILY HISTORY OF CANCER: ICD-10-CM

## 2024-12-12 DIAGNOSIS — Z94.4 LIVER TRANSPLANT RECIPIENT: ICD-10-CM

## 2024-12-12 DIAGNOSIS — B96.89 BACTERIAL SINUSITIS: ICD-10-CM

## 2024-12-12 DIAGNOSIS — J32.9 BACTERIAL SINUSITIS: ICD-10-CM

## 2024-12-12 DIAGNOSIS — N18.2 STAGE 2 CHRONIC KIDNEY DISEASE: ICD-10-CM

## 2024-12-12 LAB
ALBUMIN SERPL-MCNC: 4 G/DL (ref 3.5–5.2)
ALBUMIN/GLOB SERPL: 1.5 G/DL
ALP SERPL-CCNC: 78 U/L (ref 39–117)
ALT SERPL W P-5'-P-CCNC: 39 U/L (ref 1–33)
ANION GAP SERPL CALCULATED.3IONS-SCNC: 12 MMOL/L (ref 5–15)
AST SERPL-CCNC: 30 U/L (ref 1–32)
BILIRUB SERPL-MCNC: 0.8 MG/DL (ref 0–1.2)
BUN SERPL-MCNC: 9 MG/DL (ref 8–23)
BUN/CREAT SERPL: 9.2 (ref 7–25)
CALCIUM SPEC-SCNC: 8.4 MG/DL (ref 8.6–10.5)
CHLORIDE SERPL-SCNC: 106 MMOL/L (ref 98–107)
CHOLEST SERPL-MCNC: 109 MG/DL (ref 0–200)
CO2 SERPL-SCNC: 23 MMOL/L (ref 22–29)
CREAT SERPL-MCNC: 0.98 MG/DL (ref 0.57–1)
EGFRCR SERPLBLD CKD-EPI 2021: 66.2 ML/MIN/1.73
EXPIRATION DATE: NORMAL
GLOBULIN UR ELPH-MCNC: 2.6 GM/DL
GLUCOSE SERPL-MCNC: 95 MG/DL (ref 65–99)
HDLC SERPL-MCNC: 31 MG/DL (ref 40–60)
INR PPP: 1 (ref 0.9–1.1)
LDLC SERPL CALC-MCNC: 47 MG/DL (ref 0–100)
LDLC/HDLC SERPL: 1.29 {RATIO}
Lab: NORMAL
POTASSIUM SERPL-SCNC: 4.2 MMOL/L (ref 3.5–5.2)
PROT SERPL-MCNC: 6.6 G/DL (ref 6–8.5)
SODIUM SERPL-SCNC: 141 MMOL/L (ref 136–145)
TRIGL SERPL-MCNC: 190 MG/DL (ref 0–150)
VLDLC SERPL-MCNC: 31 MG/DL (ref 5–40)

## 2024-12-12 PROCEDURE — 3074F SYST BP LT 130 MM HG: CPT | Performed by: STUDENT IN AN ORGANIZED HEALTH CARE EDUCATION/TRAINING PROGRAM

## 2024-12-12 PROCEDURE — 80053 COMPREHEN METABOLIC PANEL: CPT | Performed by: STUDENT IN AN ORGANIZED HEALTH CARE EDUCATION/TRAINING PROGRAM

## 2024-12-12 PROCEDURE — 80061 LIPID PANEL: CPT | Performed by: STUDENT IN AN ORGANIZED HEALTH CARE EDUCATION/TRAINING PROGRAM

## 2024-12-12 PROCEDURE — 3044F HG A1C LEVEL LT 7.0%: CPT | Performed by: STUDENT IN AN ORGANIZED HEALTH CARE EDUCATION/TRAINING PROGRAM

## 2024-12-12 PROCEDURE — 77063 BREAST TOMOSYNTHESIS BI: CPT

## 2024-12-12 PROCEDURE — 85610 PROTHROMBIN TIME: CPT | Performed by: STUDENT IN AN ORGANIZED HEALTH CARE EDUCATION/TRAINING PROGRAM

## 2024-12-12 PROCEDURE — 36416 COLLJ CAPILLARY BLOOD SPEC: CPT | Performed by: STUDENT IN AN ORGANIZED HEALTH CARE EDUCATION/TRAINING PROGRAM

## 2024-12-12 PROCEDURE — 77067 SCR MAMMO BI INCL CAD: CPT

## 2024-12-12 PROCEDURE — 82043 UR ALBUMIN QUANTITATIVE: CPT | Performed by: STUDENT IN AN ORGANIZED HEALTH CARE EDUCATION/TRAINING PROGRAM

## 2024-12-12 PROCEDURE — 99214 OFFICE O/P EST MOD 30 MIN: CPT | Performed by: STUDENT IN AN ORGANIZED HEALTH CARE EDUCATION/TRAINING PROGRAM

## 2024-12-12 PROCEDURE — 3078F DIAST BP <80 MM HG: CPT | Performed by: STUDENT IN AN ORGANIZED HEALTH CARE EDUCATION/TRAINING PROGRAM

## 2024-12-12 PROCEDURE — G2211 COMPLEX E/M VISIT ADD ON: HCPCS | Performed by: STUDENT IN AN ORGANIZED HEALTH CARE EDUCATION/TRAINING PROGRAM

## 2024-12-12 PROCEDURE — 82570 ASSAY OF URINE CREATININE: CPT | Performed by: STUDENT IN AN ORGANIZED HEALTH CARE EDUCATION/TRAINING PROGRAM

## 2024-12-12 PROCEDURE — 1125F AMNT PAIN NOTED PAIN PRSNT: CPT | Performed by: STUDENT IN AN ORGANIZED HEALTH CARE EDUCATION/TRAINING PROGRAM

## 2024-12-12 RX ORDER — CEFDINIR 300 MG/1
300 CAPSULE ORAL 2 TIMES DAILY
Qty: 10 CAPSULE | Refills: 0 | Status: SHIPPED | OUTPATIENT
Start: 2024-12-12 | End: 2024-12-17

## 2024-12-12 RX ORDER — TACROLIMUS 0.5 MG/1
1 CAPSULE ORAL 2 TIMES DAILY
Qty: 180 CAPSULE | Refills: 3 | Status: SHIPPED | OUTPATIENT
Start: 2024-12-12

## 2024-12-12 RX ORDER — LEVOTHYROXINE SODIUM 200 UG/1
200 TABLET ORAL DAILY
COMMUNITY

## 2024-12-12 NOTE — PROGRESS NOTES
Follow Up Office Visit      Date: 2024   Patient Name: Lizette Frias  : 1965   MRN: [id number]     Chief Complaint:    Chief Complaint   Patient presents with    Sinusitis     Headache  Congestion  cough    Hyperlipidemia     fu     Answers submitted by the patient for this visit:  Primary Reason for Visit (Submitted on 2024)  What is the primary reason for your visit?: Abdominal Pain  Abdominal Pain Questionnaire (Submitted on 2024)  Chief Complaint: Abdominal pain  Chronicity: recurrent  Onset: 1 to 4 weeks ago  Onset quality: sudden  Frequency: 2 to 4 times per day  Progression since onset: unchanged  Pain location: suprapubic region  Pain - numeric: 7/10  Pain quality: cramping  Radiates to: back  anorexia: Yes  belching: Yes  flatus: No  hematochezia: No  melena: No  Aggravated by: bowel movement, eating, palpation  Relieved by: nothing      History of Present Illness: Lizette Frias is a 59 y.o. female who is here today to follow up with chronic care management.    History of Present Illness  The patient is a 59-year-old female who presents for chronic care management.    Diarrhea Related to Donepezil  She has identified donepezil as the cause of her diarrhea, which has improved since discontinuing the medication. She had been on donepezil for approximately one month without any signs of her body adjusting to it. She also considered the possibility of flavored spiked water causing her symptoms, but after resuming its consumption, she did not experience any adverse effects. Her memory issues are not severe enough to warrant the continuation of donepezil.  - Onset: During the month she was on donepezil.  - Duration: Approximately one month.  - Character: Diarrhea.  - Alleviating Factors: Discontinuing donepezil.  - Severity: Improved after discontinuing donepezil.    Family History of Cancer and Related Screenings  She believes she has already undergone genetic testing due to her  family history of cancer, recalling that she was categorized in the yellow zone. She has a significant family history of ovarian cancer, with most women on her maternal side having succumbed to the disease. She has undergone a hysterectomy and oophorectomy. She has been undergoing mammograms every six months due to a history of fibroids, which required a biopsy last year due to their prevalence. The biopsy results were benign. She expresses constant concern about her health, having previously been diagnosed with thyroid and skin cancer. She is scheduled for a mammogram today and plans to continue with regular blood work. She is considering the addition of breast MRIs to her routine screenings.    Suspected Sinus Infection  She suspects a sinus infection, having experienced congestion for several weeks and the onset of a severe headache yesterday. She reports green nasal discharge and episodes of nosebleeds. She has not previously taken cephalosporins and has a known allergy to ERYTHROMYCIN.  - Onset: Congestion for several weeks; severe headache onset yesterday.  - Duration: Several weeks for congestion; severe headache since yesterday.  - Character: Congestion, green nasal discharge, severe headache, nosebleeds.    Thyroid Management  Her endocrinologist, Dr. Oquendo, increased her Synthroid dosage to 200 mcg, suspecting it might be contributing to her diarrhea.    Medication Management  She is currently on rosuvastatin and reports no changes in symptoms. She discontinued meloxicam due to confusion with methocarbamol and was informed by the pain clinic that it was prescribed for heart and kidney protection. She was also taking pregabalin for pain management. She found Vascepa effective but discontinued it due to cost.    Post-Transplant Care  She is nearing the end of her Tacrolimus supply and has been unable to contact  for a refill. She is interested in transferring her care to [facility]. She was advised  by an infectious disease specialist that COVID-19 vaccines are ineffective for transplant patients. She contracted COVID-19 post-vaccination, which resulted in a severe illness requiring respiratory support. She no longer has contact with the infectious disease specialist.    FAMILY HISTORY  Almost all the women on her mother's side of the family have passed from ovarian cancer. Some members on her father's side have also had ovarian cancer.    ALLERGIES  - Allergic to PENICILLINS  - Allergic to ERYTHROMYCIN    MEDICATIONS  - Discontinued: donepezil, meloxicam, Vascepa  - Current: Synthroid, rosuvastatin, pregabalin      Subjective      Review of Systems:   Review of Systems   Constitutional:  Positive for unexpected weight loss. Negative for fever.   Gastrointestinal:  Positive for abdominal pain, diarrhea and nausea. Negative for constipation and vomiting.   Genitourinary:  Negative for dysuria, frequency and hematuria.   Musculoskeletal:  Positive for myalgias. Negative for arthralgias.       I have reviewed the patients family history, social history, past medical history, past surgical history and have updated it as appropriate.     Medications:     Current Outpatient Medications:     Azelastine-Fluticasone 137-50 MCG/ACT suspension, spray 1 spray into each nostril twice a day, Disp: , Rfl:     buPROPion XL (WELLBUTRIN XL) 300 MG 24 hr tablet, TAKE 1 TABLET BY MOUTH EVERY DAY IN THE MORNING, Disp: 90 tablet, Rfl: 0    calcium carbonate-vitamin d 600-400 MG-UNIT per tablet, Take 1 tablet by mouth Daily., Disp: , Rfl:     Cholecalciferol 50 MCG (2000 UT) capsule, Take 1 tablet by mouth Daily., Disp: , Rfl:     [facility] Melatonin 5 MG tablet tablet, TAKE 1 TABLET BY MOUTH EVERY DAY AT NIGHT, Disp: 90 tablet, Rfl: 1    diphenoxylate-atropine (Lomotil) 2.5-0.025 MG per tablet, Take 1 tablet by mouth 4 (Four) Times a Day As Needed for Diarrhea., Disp: 40 tablet, Rfl: 0    mirtazapine (REMERON) 15 MG tablet, TAKE 1 TABLET  BY MOUTH EVERYDAY AT BEDTIME, Disp: 90 tablet, Rfl: 1    pantoprazole (PROTONIX) 40 MG EC tablet, Take 1 tablet by mouth 2 (Two) Times a Day. 30 minutes prior to first meal and 30 minutes prior to last meal of the day, Disp: 180 tablet, Rfl: 3    pregabalin (LYRICA) 150 MG capsule, Take 1 capsule by mouth 2 (Two) Times a Day., Disp: 60 capsule, Rfl: 2    Prolia 60 MG/ML solution prefilled syringe syringe, TO BE ADMINISTERED IN PHYSICIAN'S OFFICE. INJECT ONE SYRINGE SUBCUTANEOUSLY ONCE EVERY 6 MONTHS. REFRIGERATE. USE WITHIN 14 DAYS ONCE AT ROOM TEMPERATURE., Disp: 1 each, Rfl: 1    rOPINIRole (REQUIP) 4 MG tablet, TAKE 1 TABLET BY MOUTH EVERY DAY AT NIGHT, Disp: 90 tablet, Rfl: 1    rosuvastatin (Crestor) 20 MG tablet, Take 1 tablet by mouth Every Night., Disp: 90 tablet, Rfl: 3    tacrolimus (PROGRAF) 0.5 MG capsule, Take 2 capsules by mouth 2 (Two) Times a Day., Disp: 180 capsule, Rfl: 3    telmisartan (MICARDIS) 20 MG tablet, TAKE 1 TABLET BY MOUTH EVERY DAY, Disp: 90 tablet, Rfl: 1    traZODone (DESYREL) 100 MG tablet, TAKE 2 TABLETS BY MOUTH EVERY NIGHT, Disp: 180 tablet, Rfl: 1    cefdinir (OMNICEF) 300 MG capsule, Take 1 capsule by mouth 2 (Two) Times a Day for 5 days., Disp: 10 capsule, Rfl: 0    levothyroxine (SYNTHROID, LEVOTHROID) 200 MCG tablet, Take 1 tablet by mouth Daily., Disp: , Rfl:     Allergies:   Allergies   Allergen Reactions    Penicillins Shortness Of Breath, Itching, Other (See Comments) and Rash    Cephalosporins Diarrhea    Cyclosporine Swelling       Objective     Physical Exam: Please see above  Vital Signs:   Vitals:    12/12/24 1413   BP: 118/78   BP Location: Left arm   Patient Position: Sitting   Cuff Size: Adult   Pulse: 74   Resp: 18   Temp: 98 °F (36.7 °C)   TempSrc: Temporal   Weight: 66.2 kg (146 lb)     Body mass index is 27.6 kg/m².          Physical Exam  Vitals and nursing note reviewed.   Constitutional:       General: She is not in acute distress.     Appearance: Normal  appearance. She is normal weight. She is not ill-appearing or toxic-appearing.   HENT:      Nose: No congestion or rhinorrhea.   Eyes:      General:         Right eye: No discharge.         Left eye: No discharge.      Conjunctiva/sclera: Conjunctivae normal.   Pulmonary:      Effort: Pulmonary effort is normal. No respiratory distress.   Abdominal:      General: Abdomen is flat. There is no distension.   Musculoskeletal:      Cervical back: Normal range of motion.   Skin:     Coloration: Skin is not jaundiced.      Findings: No rash.   Neurological:      General: No focal deficit present.      Mental Status: She is alert. Mental status is at baseline.      Coordination: Coordination normal.      Gait: Gait normal.   Psychiatric:         Mood and Affect: Mood normal.         Behavior: Behavior normal.         Thought Content: Thought content normal.         Judgment: Judgment normal.         Procedures    Results:   Results      Labs:   Hemoglobin A1C   Date Value Ref Range Status   04/12/2024 4.8 4.5 - 5.7 % Final   02/13/2024 4.8 <5.7 % Final     CREATININE   Date Value Ref Range Status   11/22/2024 169 mg/dL Final     Comment:     REFERENCE RANGE: Ref Range>=20     TSH   Date Value Ref Range Status   11/26/2024 0.011 (L) 0.270 - 4.200 uIU/mL Final   09/04/2019 5.685 (H) 0.300 - 5.000 uIU/mL Final        Imaging:   No valid procedures specified.     Assessment / Plan      Assessment/Plan:   Problem List Items Addressed This Visit       Hyperlipidemia LDL goal <70 - Primary    Relevant Orders    Lipid Panel (Completed)    Postoperative hypothyroidism    Overview     >>OVERVIEW FOR HYPOTHYROID WRITTEN ON 1/11/2022  4:53 PM BY JB AL MD    Due to remote thyroidectomy for cancer         Relevant Medications    levothyroxine (SYNTHROID, LEVOTHROID) 200 MCG tablet    Stage 2 chronic kidney disease    Overview     5/18/23:           Relevant Orders    Comprehensive Metabolic Panel (Completed)     Microalbumin / Creatinine Urine Ratio - Urine, Clean Catch (Completed)    Liver transplant recipient    Relevant Medications    tacrolimus (PROGRAF) 0.5 MG capsule    Other Relevant Orders    Ambulatory Referral to Transplant Surgery    Family history of cancer    Overview     No additional screening per [facility] testing in 10/2023    Meets high risk screening per [facility] protocol         Transaminitis    Relevant Orders    Comprehensive Metabolic Panel (Completed)    POC INR (Completed)    Bacterial sinusitis    Relevant Medications    cefdinir (OMNICEF) 300 MG capsule    Anticoagulated    Relevant Orders    Amb Referral to [facility]     Other Visit Diagnoses       Encounter for vaccination        Liver damage        Relevant Orders    POC INR (Completed)            Assessment & Plan  1. Memory impairment  - Significant improvement following discontinuation of donepezil  - Donepezil will be discontinued    2. Bacterial sinusitis  - Symptoms persisting for over three weeks, unilateral sinus pressure, and green nasal discharge  - Prescription for cefdinir for five days  - Use nasal saline spray as needed to alleviate symptoms    3. Chronic kidney disease stage 2  - Continued monitoring of kidney function in conjunction with other lab studies    4. Thyroid management  - Synthroid dosage adjusted to 200 mcg  - Additional testing during follow-up visits to monitor TSH and T4 levels    5. Coronary artery disease  - Updated cholesterol panel to be ordered today  - Ensure rosuvastatin 20 mg is sufficient  - Goal to maintain LDL level below 70    6. Health maintenance  - Undergo mammogram today  - Reassess breast cancer risk based on mammogram results  - If lifetime risk exceeds 20 percent, add breast MRIs to screening regimen    7. Medication management  - Refill for Tacrolimus  - Referral to transplant clinic for transition from   - COVID-19 vaccination deferred until further advice from transplant clinic or  infectious disease specialist    Follow-up  - Scheduled for follow-up visit on 01/16/2025    PROCEDURE  Procedure Performed  Hysterectomy and oophorectomy    Follow Up:   Return in about 5 weeks (around 1/16/2025) for Next scheduled follow up.        Patient has been erroneously marked as diabetic. Based on the available clinical information, she does not have diabetes and should therefore be excluded from diabetic health maintenance and quality measures for the remainder of the reporting period.      Patient or patient representative verbalized consent for the use of Ambient Listening during the visit with  Lars Fermin MD for chart documentation. 12/13/2024  14:53 EST    Lars Fermin MD  [facility]

## 2024-12-12 NOTE — TELEPHONE ENCOUNTER
I have sent a message to the anticoagulation clinic so that this can be coordinated with them and her transplant clinic.  Thanks.

## 2024-12-13 ENCOUNTER — TELEPHONE (OUTPATIENT)
Dept: PHARMACY | Facility: HOSPITAL | Age: 59
End: 2024-12-13
Payer: MEDICARE

## 2024-12-13 PROBLEM — Z79.01 ANTICOAGULATED: Status: ACTIVE | Noted: 2024-12-13

## 2024-12-13 LAB
ALBUMIN UR-MCNC: 5.8 MG/DL
CREAT UR-MCNC: 366.8 MG/DL
MICROALBUMIN/CREAT UR: 15.8 MG/G (ref 0–29)

## 2024-12-13 NOTE — TELEPHONE ENCOUNTER
"Received referral for anticoag clinic -   Per patient, she is not on warfarin. INR was checked due to liver enzymes since patient is a liver transplant recipient.   Patient states she is a \"bleeder\" and has not been on anticoagulants since having liver transplant. Was previously on blood thinner due to blood clot in liver, but that was prior to transplant.   Patient states she was confused when talking about warfarin and high goal range she thought warfarin was another name for tacrolimus which she is on due to liver transplant.   Holding this note to Dr. Fermin, please let me know how to proceed. She does still want to transfer care from  to  for transplant clinic.     Rachelle Aguiar, PharmD   12/13/2024  14:43 EST    "

## 2024-12-13 NOTE — TELEPHONE ENCOUNTER
Thanks for the information!  I would recommend no additional adjustments with medications for her INR and since this level is normal currently, we will get her established with a new transplant clinic to discuss additional management.  Thank you for your help!

## 2024-12-21 DIAGNOSIS — F51.01 PRIMARY INSOMNIA: ICD-10-CM

## 2024-12-23 ENCOUNTER — DOCUMENTATION (OUTPATIENT)
Dept: GENETICS | Facility: HOSPITAL | Age: 59
End: 2024-12-23
Payer: MEDICARE

## 2024-12-23 RX ORDER — TRAZODONE HYDROCHLORIDE 100 MG/1
150 TABLET ORAL NIGHTLY
Qty: 135 TABLET | Refills: 3 | Status: SHIPPED | OUTPATIENT
Start: 2024-12-23

## 2024-12-24 ENCOUNTER — TELEPHONE (OUTPATIENT)
Dept: INTERNAL MEDICINE | Facility: CLINIC | Age: 59
End: 2024-12-24
Payer: MEDICARE

## 2024-12-24 NOTE — TELEPHONE ENCOUNTER
Tried to reach patient no answer left voicemail to return call    RELAY:  Lars Fermin MD [facility]  Please check to see if this patient is interested in the genetic testing offered through [facility] [place] based on her breast cancer risk assessment.  Thanks.

## 2024-12-24 NOTE — TELEPHONE ENCOUNTER
----- Message from Lars Fermin sent at 12/23/2024  7:49 PM EST -----  Please check to see if this patient is interested in the genetic testing offered through [facility] based on her breast cancer risk assessment.  Thanks.

## 2024-12-27 DIAGNOSIS — Z13.79 GENETIC TESTING: Primary | ICD-10-CM

## 2024-12-27 NOTE — TELEPHONE ENCOUNTER
Sounds good.     Monica: would you be able to connect with her again since she is interested.  Thanks.

## 2025-01-09 DIAGNOSIS — E78.5 HYPERLIPIDEMIA LDL GOAL <70: ICD-10-CM

## 2025-01-09 RX ORDER — ROSUVASTATIN CALCIUM 20 MG/1
20 TABLET, COATED ORAL
Qty: 90 TABLET | Refills: 3 | Status: SHIPPED | OUTPATIENT
Start: 2025-01-09

## 2025-01-16 ENCOUNTER — OFFICE VISIT (OUTPATIENT)
Dept: INTERNAL MEDICINE | Facility: CLINIC | Age: 60
End: 2025-01-16
Payer: MEDICARE

## 2025-01-16 ENCOUNTER — OFFICE VISIT (OUTPATIENT)
Dept: ORTHOPEDIC SURGERY | Facility: CLINIC | Age: 60
End: 2025-01-16
Payer: MEDICARE

## 2025-01-16 ENCOUNTER — HOSPITAL ENCOUNTER (OUTPATIENT)
Dept: GENERAL RADIOLOGY | Facility: HOSPITAL | Age: 60
Discharge: HOME | End: 2025-01-16
Admitting: STUDENT IN AN ORGANIZED HEALTH CARE EDUCATION/TRAINING PROGRAM
Payer: MEDICARE

## 2025-01-16 VITALS
BODY MASS INDEX: 27.53 KG/M2 | TEMPERATURE: 97.3 F | WEIGHT: 145.6 LBS | DIASTOLIC BLOOD PRESSURE: 70 MMHG | HEART RATE: 74 BPM | RESPIRATION RATE: 18 BRPM | SYSTOLIC BLOOD PRESSURE: 118 MMHG

## 2025-01-16 VITALS
SYSTOLIC BLOOD PRESSURE: 132 MMHG | HEIGHT: 61 IN | BODY MASS INDEX: 27.38 KG/M2 | DIASTOLIC BLOOD PRESSURE: 78 MMHG | WEIGHT: 145 LBS

## 2025-01-16 DIAGNOSIS — Z94.4 LIVER TRANSPLANT RECIPIENT: ICD-10-CM

## 2025-01-16 DIAGNOSIS — Z23 ENCOUNTER FOR VACCINATION: ICD-10-CM

## 2025-01-16 DIAGNOSIS — S61.012A LACERATION OF LEFT THUMB WITHOUT FOREIGN BODY WITHOUT DAMAGE TO NAIL, INITIAL ENCOUNTER: ICD-10-CM

## 2025-01-16 DIAGNOSIS — G56.03 BILATERAL CARPAL TUNNEL SYNDROME: ICD-10-CM

## 2025-01-16 DIAGNOSIS — N18.2 STAGE 2 CHRONIC KIDNEY DISEASE: Primary | ICD-10-CM

## 2025-01-16 DIAGNOSIS — S69.92XA INJURY OF LEFT THUMB, INITIAL ENCOUNTER: ICD-10-CM

## 2025-01-16 DIAGNOSIS — R74.01 TRANSAMINITIS: ICD-10-CM

## 2025-01-16 DIAGNOSIS — E89.0 POSTOPERATIVE HYPOTHYROIDISM: ICD-10-CM

## 2025-01-16 DIAGNOSIS — M65.312 TRIGGER FINGER OF LEFT THUMB: Primary | ICD-10-CM

## 2025-01-16 DIAGNOSIS — Z80.9 FAMILY HISTORY OF CANCER: ICD-10-CM

## 2025-01-16 PROCEDURE — 1125F AMNT PAIN NOTED PAIN PRSNT: CPT | Performed by: STUDENT IN AN ORGANIZED HEALTH CARE EDUCATION/TRAINING PROGRAM

## 2025-01-16 PROCEDURE — 73140 X-RAY EXAM OF FINGER(S): CPT

## 2025-01-16 PROCEDURE — 3078F DIAST BP <80 MM HG: CPT | Performed by: STUDENT IN AN ORGANIZED HEALTH CARE EDUCATION/TRAINING PROGRAM

## 2025-01-16 PROCEDURE — 99214 OFFICE O/P EST MOD 30 MIN: CPT | Performed by: STUDENT IN AN ORGANIZED HEALTH CARE EDUCATION/TRAINING PROGRAM

## 2025-01-16 PROCEDURE — 3074F SYST BP LT 130 MM HG: CPT | Performed by: STUDENT IN AN ORGANIZED HEALTH CARE EDUCATION/TRAINING PROGRAM

## 2025-01-16 PROCEDURE — G2211 COMPLEX E/M VISIT ADD ON: HCPCS | Performed by: STUDENT IN AN ORGANIZED HEALTH CARE EDUCATION/TRAINING PROGRAM

## 2025-01-16 RX ORDER — LIDOCAINE HYDROCHLORIDE 10 MG/ML
0.5 INJECTION, SOLUTION EPIDURAL; INFILTRATION; INTRACAUDAL; PERINEURAL
Status: COMPLETED | OUTPATIENT
Start: 2025-01-16 | End: 2025-01-16

## 2025-01-16 RX ORDER — TRIAMCINOLONE ACETONIDE 40 MG/ML
20 INJECTION, SUSPENSION INTRA-ARTICULAR; INTRAMUSCULAR
Status: COMPLETED | OUTPATIENT
Start: 2025-01-16 | End: 2025-01-16

## 2025-01-16 RX ADMIN — LIDOCAINE HYDROCHLORIDE 0.5 ML: 10 INJECTION, SOLUTION EPIDURAL; INFILTRATION; INTRACAUDAL; PERINEURAL at 14:25

## 2025-01-16 RX ADMIN — TRIAMCINOLONE ACETONIDE 20 MG: 40 INJECTION, SUSPENSION INTRA-ARTICULAR; INTRAMUSCULAR at 14:25

## 2025-01-16 NOTE — PROGRESS NOTES
[facility]     Office Visit       Date: 01/16/2025   Patient Name: Lizette Frias  MRN: [id number]  YOB: 1965    Referring Physician: No ref. provider found     Chief Complaint:   Chief Complaint   Patient presents with    Follow-up     3 month follow up -- Tendinitis of extensor tendon of hand     History of Present Illness:   Lizette Frias is a 59 y.o. female presenting to clinic for follow-up of right de Quervain's tenosynovitis.  At the patient's last clinic visit she received a corticosteroid injection and a thumb spica splint.  She reports that she has done well and her right wrist pain is no longer symptomatic.  She is today for additional complaint.  She now reports left thumb pain, catching and locking.  This occurred several weeks ago.  Incidentally, 2 weeks later she excellently cut the dorsal part of her left thumb.  She received an x-ray this morning.  She states that she has had right trigger thumb in the past and that her left thumb is acting similar symptoms.  She also reports numbness and tingling into bilateral hands that she feels affects primarily the thumb index and long fingers.  This occurs intermittently with activities but not specifically at night.  She is not tender any prior bracing injections or nerve studies.  No other complaints or concerns.    PMH: Hyperlipidemia, sleep apnea, CKD stage III, GERD, CVA, history of liver transplant, hypertension, CAD, MORRISON, rheumatoid arthritis with positive rheumatoid factor.     Subjective   Review of Systems:   Review of Systems   Pertinent review of systems per HPI    I reviewed the patient's chief complaint, history of present illness, review of systems, past medical history, surgical history, family history, social history, medications and allergy list in the EMR on 01/16/2025 and agree with the findings above.    Objective    Vital Signs:   Vitals:  "   01/16/25 1357   BP: 132/78   Weight: 65.8 kg (145 lb)   Height: 154.9 cm (61\")     General: No acute distress. Alert and oriented.   Cardiovascular: Palpable radial pulse.   Respiratory: Breathing is nonlabored.   Ortho Exam:  Examination of bilateral upper extremities demonstrates no deformity.  No skin abrasions.  There is a small healing transverse 1 cm laceration over the dorsal aspect of the left thumb proximal phalanx.  No erythema warmth or drainage.  This mildly tender palpation.  Patient is able to actively flex and extend the left thumb IP joint.  She is mildly tender at the thumb A1 pulley and there is subtle catching of the IP joint during examination.  The remainder the hand wrist and digits are nontender.  Positive Tinel, Durkan's, Phalen's at bilateral wrist.  Nontender over the pronator with negative Tinel's.  Negative Tinel's at the cubital tunnels.  5/5 APB and FDI strength.  Sensation is decreased light touch throughout the median nerve distribution bilaterally.  Warm well-perfused distally.     Imaging / Studies:    Imaging Results (Last 24 Hours)       ** No results found for the last 24 hours. **          Left thumb x-rays obtained on 1/16/2025 were personally reviewed interpreted by myself.  No acute fractures or dislocations.  Mild arthritic changes.    Procedure Note:  After reviewing the risks, benefits and alternatives to a steroid injection, which include but are not limited to; hypopigmentation, fat necrosis/atrophy, pain, swelling, bleeding, bruising, damage to nearby nerves/vessels, allergic reaction , transient elevation in blood glucose levels and infection a verbal consent was obtained. A time-out was then performed and the affected hand was prepped with chlorhexadine soap and ethyl chloride was used to numb the skin. The left thumb flexor tendon sheath was injected with 0.5cc: 0.5cc mixture of Kenalog - 40 mg/ml and Lidocaine - 1% / 2 ml. The injection was well tolerated and a " sterile dressing was applied. There were no complications. I advised the patient that they might experience some local discomfort for the next couple days and can apply ice to the site as needed.    Assessment / Plan    Assessment/Plan:   Lizette Frias is a 59 y.o. female with left trigger thumb and bilateral carpal tunnel syndrome.    I discussed with the patient their clinical findings demonstrate a trigger finger.  We had a lengthy discussion regarding the pathophysiology of inflammation of the tendon-sheath unit, using the analogy of a subway tunnel.  Both conservative and surgical options were discussed.  Conservative treatments in the form of: observation, gentle stretching exercises, nighttime coban wrapping, and injection were presented. Discussed that approximately 70% of patients have complete symptoms resolution after 1 steroid injection, and should they fail 1 injection, they may still be considered for a second steroid injection.  Also discussed that the patient may not see any improvement from the steroid injection for sometimes 2 weeks. Operative treatments in the form of A1 pulley release was also discussed.  After expressing understanding of all options, the patient elects to proceed with corticosteroid injection today, gentle stretching, and nighttime splinting.    Patiently, I discussed with the patient their clinical findings demonstrate carpal tunnel syndrome.  The pathophysiology of the condition, including compression of the median nerve in the carpal tunnel, was explained in detail.  It was also discussed that with more severe symptomatology, such as persistent and worsening paresthesias, that permanent nerve damage may result, which may make improvement after surgery less predictable.  Both conservative and surgical options were discussed.  Conservative treatments in the form of: observation, gentle nerve gliding exercises, night time splinting, and injection were presented.  Operative  treatment in the form of open carpal tunnel release was presented and reiterated that the goal of surgery is to prevent further compression and damage to the nerve.  Further workup with electrodiagnostic studies was also discussed and recommended.  I will provide her with response today to be worn at night as well as no gliding exercises.  She will return to clinic with EMG results. They were agreeable with the plan.  All questions and concerns were addressed.       ICD-10-CM ICD-9-CM   1. Trigger finger of left thumb  M65.312 727.03   2. Bilateral carpal tunnel syndrome  G56.03 354.0     Follow Up:   Return for Follow Up- After Testing.      Nimisha Mccray MD  [facility]

## 2025-01-16 NOTE — PROGRESS NOTES
Follow Up Office Visit      Date: 2025   Patient Name: Lizette Frias  : 1965   MRN: [id number]     Chief Complaint:    Chief Complaint   Patient presents with    Hyperlipidemia     fu     Answers submitted by the patient for this visit:  Primary Reason for Visit (Submitted on 2025)  What is the primary reason for your visit?: Extremity Pain  Lower Extremity Injury Questionnaire (Submitted on 2025)  Chief Complaint: Extremity pain  Injury: No  Pain location: left hand  Pain quality: aching  Pain - numeric: 4/10  Progression since onset: unchanged  tingling: No  difficulty holding things: No  upper extremity swelling: No  redness: No  Additional information: My thumb joint catches when I bend it and straightened it      History of Present Illness: Lizette Frias is a 59 y.o. female who is here today to follow up with chronic care management.    History of Present Illness  The patient is a 59-year-old female who presents for chronic care management.    Pneumonia  She experienced a bout of pneumonia in 2024, which significantly impacted her health, leading to prolonged periods of sleep from [place] to [place]. She is currently considering the possibility of receiving another pneumonia vaccine, as she typically receives it annually.  - Onset: 2024.  - Duration: Prolonged periods of sleep from [place] to [place].  - Character: Significant impact on health.  - Timing: Considering another pneumonia vaccine.    Shingles  She has a history of shingles, which she believes was triggered by the Shingrix vaccine. Despite this, she is open to completing the vaccine series if it will prevent future outbreaks.  - Onset: Believes triggered by Shingrix vaccine.  - Character: History of shingles.  - Timing: Open to completing the vaccine series.    Breast Cancer Screenings  She has been contacted by the high-risk clinic regarding her breast cancer screenings and is awaiting  further instructions. She has a history of two different types of cancers and is concerned about the increasing number of fibroadenomas in her breasts.  - Character: History of two different types of cancers; increasing number of fibroadenomas.  - Timing: Awaiting further instructions from the high-risk clinic.    Left Thumb Injury  She sustained an injury to her left thumb five days ago while attempting to pry open a pair of scissors, resulting in a knot and significant soreness. She did not seek immediate medical attention as she was able to control the bleeding. However, the following morning, she noticed that the thumb was catching every time she bent or opened it, causing severe pain. The pain is so intense that it disrupts her sleep when she grabs her pillow or blankets. She has an appointment with Dr. Medrano, an orthopedic specialist, at 2:00 PM today for her hands, which she believes is related to arthritis. She has a history of trigger finger in the same thumb, which required surgical intervention.  - Onset: Five days ago.  - Location: Left thumb.  - Character: Knot and significant soreness; catching when bent or opened; severe pain.  - Timing: Pain disrupts sleep; appointment with orthopedic specialist today.  - Severity: Severe pain disrupting sleep.    Medication Management  She has been unable to switch clinics due to a lack of options through [facility]. She is requesting management of her medications, including tacrolimus, which she has been taking consistently for the past four years. She does not require a refill at this time as it was refilled last month. She occasionally forgets to take her medication due to her work schedule, which includes double shifts from Monday to Wednesday. She recalls being off tacrolimus for over six weeks following a COVID-19 infection a few years ago, during which she remained stable.  - Character: Consistent use of tacrolimus for four years; occasionally forgets due  to work schedule.  - Timing: Double shifts from Monday to Wednesday; off tacrolimus for over six weeks following COVID-19 infection.    Thyroid Medication  She is currently on a 200 mcg dose of thyroid medication.    Dermatology Care  She is under the care of a dermatologist, whom she sees annually in January. She has a history of basal cell carcinoma.  - Timing: Annual visits in January.  - Character: History of basal cell carcinoma.    Supplemental Information  She is in counseling from the divorce.    FAMILY HISTORY  Her mother passed away in October from squamous cell cancer of the mouth and dementia.    MEDICATIONS  - Current: Tacrolimus    IMMUNIZATIONS  - Pneumovax 23: Received in 2015  - Shingrix: Previously received      Subjective      Review of Systems:   Review of Systems   Skin:  Positive for wound and bruise.   Neurological:  Negative for numbness.   All other systems reviewed and are negative.      I have reviewed the patients family history, social history, past medical history, past surgical history and have updated it as appropriate.     Medications:     Current Outpatient Medications:     Azelastine-Fluticasone 137-50 MCG/ACT suspension, spray 1 spray into each nostril twice a day, Disp: , Rfl:     buPROPion XL (WELLBUTRIN XL) 300 MG 24 hr tablet, TAKE 1 TABLET BY MOUTH EVERY DAY IN THE MORNING, Disp: 90 tablet, Rfl: 0    calcium carbonate-vitamin d 600-400 MG-UNIT per tablet, Take 1 tablet by mouth Daily., Disp: , Rfl:     Cholecalciferol 50 MCG (2000 UT) capsule, Take 1 tablet by mouth Daily., Disp: , Rfl:     [facility] Melatonin 5 MG tablet tablet, TAKE 1 TABLET BY MOUTH EVERY DAY AT NIGHT, Disp: 90 tablet, Rfl: 1    levothyroxine (SYNTHROID, LEVOTHROID) 200 MCG tablet, Take 1 tablet by mouth Daily., Disp: , Rfl:     mirtazapine (REMERON) 15 MG tablet, TAKE 1 TABLET BY MOUTH EVERYDAY AT BEDTIME, Disp: 90 tablet, Rfl: 1    pantoprazole (PROTONIX) 40 MG EC tablet, Take 1 tablet by mouth 2 (Two) Times a  Day. 30 minutes prior to first meal and 30 minutes prior to last meal of the day, Disp: 180 tablet, Rfl: 3    pregabalin (LYRICA) 150 MG capsule, Take 1 capsule by mouth 2 (Two) Times a Day., Disp: 60 capsule, Rfl: 2    Prolia 60 MG/ML solution prefilled syringe syringe, TO BE ADMINISTERED IN PHYSICIAN'S OFFICE. INJECT ONE SYRINGE SUBCUTANEOUSLY ONCE EVERY 6 MONTHS. REFRIGERATE. USE WITHIN 14 DAYS ONCE AT ROOM TEMPERATURE., Disp: 1 each, Rfl: 1    rOPINIRole (REQUIP) 4 MG tablet, TAKE 1 TABLET BY MOUTH EVERY DAY AT NIGHT, Disp: 90 tablet, Rfl: 1    rosuvastatin (CRESTOR) 20 MG tablet, TAKE 1 TABLET BY MOUTH EVERY DAY AT NIGHT, Disp: 90 tablet, Rfl: 3    tacrolimus (PROGRAF) 0.5 MG capsule, Take 2 capsules by mouth 2 (Two) Times a Day., Disp: 180 capsule, Rfl: 3    telmisartan (MICARDIS) 20 MG tablet, TAKE 1 TABLET BY MOUTH EVERY DAY, Disp: 90 tablet, Rfl: 1    traZODone (DESYREL) 100 MG tablet, TAKE 1.5 TABLETS BY MOUTH EVERY NIGHT, Disp: 135 tablet, Rfl: 3    Zoster Vac Recomb Adjuvanted 50 MCG/0.5ML reconstituted suspension, Inject 0.5 mL into the appropriate muscle as directed by prescriber 1 (One) Time for 1 dose., Disp: 1 each, Rfl: 0  No current facility-administered medications for this visit.    Allergies:   Allergies   Allergen Reactions    Penicillins Shortness Of Breath, Itching, Other (See Comments) and Rash    Cephalosporins Diarrhea    Cyclosporine Swelling       Objective     Physical Exam: Please see above  Vital Signs:   Vitals:    01/16/25 1152   BP: 118/70   BP Location: Right arm   Patient Position: Sitting   Cuff Size: Adult   Pulse: 74   Resp: 18   Temp: 97.3 °F (36.3 °C)   TempSrc: Temporal   Weight: 66 kg (145 lb 9.6 oz)   PainSc:   2   PainLoc: Finger     Body mass index is 27.53 kg/m².          Physical Exam  Vitals and nursing note reviewed.   Constitutional:       General: She is not in acute distress.     Appearance: Normal appearance. She is normal weight. She is not ill-appearing or  toxic-appearing.   HENT:      Nose: No congestion or rhinorrhea.   Eyes:      General:         Right eye: No discharge.         Left eye: No discharge.      Conjunctiva/sclera: Conjunctivae normal.   Pulmonary:      Effort: Pulmonary effort is normal. No respiratory distress.   Abdominal:      General: Abdomen is flat. There is no distension.   Musculoskeletal:         General: Tenderness (Significant pain with any passive or active range of motion of the left thumb at the DIP and MCP) present.      Cervical back: Normal range of motion.   Skin:     Coloration: Skin is not jaundiced.      Findings: Bruising present. No rash.      Comments: Left thumb laceration as noted below   Neurological:      General: No focal deficit present.      Mental Status: She is alert. Mental status is at baseline.      Coordination: Coordination normal.      Gait: Gait normal.   Psychiatric:         Mood and Affect: Mood normal.         Behavior: Behavior normal.         Thought Content: Thought content normal.         Judgment: Judgment normal.         Procedures    Results:   Results  - Laboratory Studies:    - Creatinine: Low    - GFR: Above 60    - ALT: 39    Labs:   Hemoglobin A1C   Date Value Ref Range Status   04/12/2024 4.8 4.5 - 5.7 % Final   02/13/2024 4.8 <5.7 % Final     CREATININE   Date Value Ref Range Status   11/22/2024 169 mg/dL Final     Comment:     REFERENCE RANGE: Ref Range>=20     TSH   Date Value Ref Range Status   11/26/2024 0.011 (L) 0.270 - 4.200 uIU/mL Final   09/04/2019 5.685 (H) 0.300 - 5.000 uIU/mL Final        Imaging:   No valid procedures specified.     Assessment / Plan      Assessment/Plan:   Problem List Items Addressed This Visit       Postoperative hypothyroidism    Overview     >>OVERVIEW FOR HYPOTHYROID WRITTEN ON 1/11/2022  4:53 PM BY JB AL MD    Due to remote thyroidectomy for cancer         Stage 2 chronic kidney disease - Primary    Overview     5/18/23 and 10/2024:            Liver transplant recipient    Family history of cancer    Overview     No additional screening per [facility] testing in 10/2023    Meets high risk screening per [facility] protocol         Relevant Orders    Ambulatory Referral to Genetic Counseling/Testing    Transaminitis    Injury of left thumb    Relevant Orders    XR Finger 2+ View Left    Laceration of left thumb without foreign body without damage to nail    Overview               Other Visit Diagnoses       Encounter for vaccination        Relevant Medications    Zoster Vac Recomb Adjuvanted 50 MCG/0.5ML reconstituted suspension            Assessment & Plan  1. Pneumonia  - Had pneumonia in December 2024, causing extended sleep periods  - Received Pneumovax 23 in 2015  - Eligible for 20-valent vaccine at age 64    2. Shingles  - Experienced mild case of shingles after receiving Shingrix vaccine  - Advised that Shingrix is not live and encouraged to complete the series  - Agreed to try the vaccine again; prescription to be sent to pharmacy    3. Breast cancer screening  - Previous biopsies negative  - Last breast MRI in December 2024 was negative  - Increasing number of fibroadenomas in breasts  - [facility] testing in 2023 yielded satisfactory results  - Referral to genetic counselor for further steps based on screening criteria  - Lifetime risk for breast cancer to be recalculated  - If risk exceeds 20%, alternating breast MRIs and mammograms recommended    4. Left thumb injury  - Laceration healing well  - Advised to continue applying over-the-counter Neosporin until wound is closed  - X-ray of left thumb to be ordered to check for fractures extending into joint space  - Image to be uploaded for Dr. Medrano's review    5. Chronic kidney disease stage 2  - Kidney function excellent over past visits, GFR above 60  - Mild chronic kidney disease stage 2 likely due to age-related filtration decline  - Annual monitoring of kidney function to be scheduled    6.  Medication management  - INR levels within normal range during last visit  - Liver enzymes slightly elevated (ALT: 39), but improved from previous month  - Thyroid function slightly overtreated  - No additional labs ordered today  - INR and liver enzymes to be rechecked during next visit  - Thyroid function to be rechecked during next visit  - Advised to continue current medication regimen and inform of any needed changes    7. Basal cell carcinoma  - History of basal cell carcinoma  - Advised to continue annual dermatology visits  - Inform dermatologist of any skin changes    PROCEDURE  Procedure Performed  Surgical intervention for trigger finger in the left thumb.    Follow Up:   Return in about 3 months (around 4/16/2025) for Medicare Wellness.        Patient or patient representative verbalized consent for the use of Ambient Listening during the visit with  Lars Fermin MD for chart documentation. 1/16/2025  12:51 EST    Lars Fermin MD  [facility]

## 2025-01-16 NOTE — PROGRESS NOTES
Procedure   - Hand/Upper Extremity Injection: L thumb A1 for trigger finger on 1/16/2025 2:25 PM  Indications: pain  Details: 27 G needle, volar approach  Medications: 0.5 mL lidocaine PF 1% 1 %; 20 mg triamcinolone acetonide 40 MG/ML  Outcome: tolerated well, no immediate complications  Procedure, treatment alternatives, risks and benefits explained, specific risks discussed. Consent was given by the patient. Immediately prior to procedure a time out was called to verify the correct patient, procedure, equipment, support staff and site/side marked as required. Patient was prepped and draped in the usual sterile fashion.

## 2025-01-20 ENCOUNTER — TELEPHONE (OUTPATIENT)
Dept: INTERNAL MEDICINE | Facility: CLINIC | Age: 60
End: 2025-01-20
Payer: MEDICARE

## 2025-01-20 RX ORDER — DONEPEZIL HYDROCHLORIDE 5 MG/1
5 TABLET, FILM COATED ORAL
Qty: 90 TABLET | Refills: 1 | OUTPATIENT
Start: 2025-01-20

## 2025-01-20 NOTE — TELEPHONE ENCOUNTER
----- Message from Lars Fermin sent at 1/20/2025  8:02 AM EST -----  Please relay the following message to the patient:      Your attached results are within normal limits and negative for a fracture, which is great news.  No additional interventions are needed based on these results.  I will continue to send you results as I receive them.  If you have any additional questions or concerns, please let us know.  We look forward to seeing you soon!

## 2025-01-20 NOTE — TELEPHONE ENCOUNTER
Left detailed voice mail message advising of message from Lars Davis MD [facility]  Please relay the following message to the patient:      Your attached results are within normal limits and negative for a fracture, which is great news.  No additional interventions are needed based on these results.  I will continue to send you results as I receive them.  If you have any additional questions or concerns, please let us know.  We look forward to seeing you soon!  Office number given to call back if she has questions and or concerns

## 2025-01-20 NOTE — TELEPHONE ENCOUNTER
Rx Refill Note  Requested Prescriptions     Pending Prescriptions Disp Refills    donepezil (ARICEPT) 5 MG tablet [Pharmacy Med Name: DONEPEZIL HCL 5 MG TABLET] 90 tablet 1     Sig: TAKE 1 TABLET BY MOUTH EVERY DAY AT NIGHT      Last filled: 10/24/24, 30 with 3 refills  Last office visit with prescribing clinician: 10/24/2024      Next office visit with prescribing clinician: 1/30/2025     Raina Haines MA  01/20/25, 09:15 EST

## 2025-01-23 DIAGNOSIS — Z94.4 LIVER TRANSPLANT RECIPIENT: ICD-10-CM

## 2025-01-23 RX ORDER — TACROLIMUS 0.5 MG/1
1 CAPSULE ORAL 2 TIMES DAILY
Qty: 180 CAPSULE | Refills: 3 | Status: SHIPPED | OUTPATIENT
Start: 2025-01-23

## 2025-01-28 ENCOUNTER — TELEPHONE (OUTPATIENT)
Dept: INTERNAL MEDICINE | Facility: CLINIC | Age: 60
End: 2025-01-28
Payer: MEDICARE

## 2025-01-28 NOTE — TELEPHONE ENCOUNTER
Caller: Lizette Frias    Relationship: Self    Best call back number:[phone]    What is the best time to reach you: ANYTIME     Who are you requesting to speak with (clinical staff, provider,  specific staff member): NURSE     What was the call regarding: THE PATIENT STATES THAT THERE IS A RECALL ON SYNTHROID SHE NEEDS TO KNOW IF SHE NEEDS TO STOP TAKING THAT

## 2025-01-28 NOTE — TELEPHONE ENCOUNTER
If it was only limited to a certain batch of Synthroid, the pharmacy will let her know and she would likely be able to continue taking this medication.  If there are long-term implications with this based on what the pharmacy recommends, we can consider transitioning to alternative medications.  Thanks.

## 2025-01-29 NOTE — TELEPHONE ENCOUNTER
Patient notified of message. Good verb given. Stated she will ask her pharmacy, and let us know.

## 2025-02-01 DIAGNOSIS — I25.10 CORONARY ARTERY DISEASE INVOLVING NATIVE CORONARY ARTERY OF NATIVE HEART WITHOUT ANGINA PECTORIS: ICD-10-CM

## 2025-02-01 DIAGNOSIS — I95.2 HYPOTENSION DUE TO DRUGS: ICD-10-CM

## 2025-02-01 DIAGNOSIS — N18.2 STAGE 2 CHRONIC KIDNEY DISEASE: ICD-10-CM

## 2025-02-03 RX ORDER — TELMISARTAN 20 MG/1
20 TABLET ORAL DAILY
Qty: 90 TABLET | Refills: 1 | Status: SHIPPED | OUTPATIENT
Start: 2025-02-03

## 2025-02-22 DIAGNOSIS — F41.9 ANXIETY DISORDER, UNSPECIFIED: ICD-10-CM

## 2025-02-22 DIAGNOSIS — E89.0 POSTPROCEDURAL HYPOTHYROIDISM: ICD-10-CM

## 2025-02-22 DIAGNOSIS — G25.81 RESTLESS LEG SYNDROME: ICD-10-CM

## 2025-02-24 RX ORDER — LEVOTHYROXINE SODIUM 200 UG/1
200 TABLET ORAL EVERY MORNING
Qty: 90 TABLET | Refills: 1 | Status: SHIPPED | OUTPATIENT
Start: 2025-02-24

## 2025-02-24 RX ORDER — DONEPEZIL HYDROCHLORIDE 5 MG/1
5 TABLET, FILM COATED ORAL
Qty: 90 TABLET | Refills: 1 | OUTPATIENT
Start: 2025-02-24

## 2025-02-24 RX ORDER — MIRTAZAPINE 15 MG/1
15 TABLET, FILM COATED ORAL
Qty: 90 TABLET | Refills: 1 | Status: SHIPPED | OUTPATIENT
Start: 2025-02-24

## 2025-02-24 RX ORDER — ROPINIROLE 4 MG/1
4 TABLET, FILM COATED ORAL
Qty: 90 TABLET | Refills: 1 | Status: SHIPPED | OUTPATIENT
Start: 2025-02-24

## 2025-02-24 NOTE — TELEPHONE ENCOUNTER
Rx Refill Note  Requested Prescriptions     Pending Prescriptions Disp Refills    donepezil (ARICEPT) 5 MG tablet [Pharmacy Med Name: DONEPEZIL HCL 5 MG TABLET] 90 tablet 1     Sig: TAKE 1 TABLET BY MOUTH EVERY DAY AT NIGHT      Last filled:  Last office visit with prescribing clinician: 10/24/2024      Next office visit with prescribing clinician: Visit date not found     Raina Haines MA  02/24/25, 11:39 EST

## 2025-03-05 DIAGNOSIS — K21.00 GASTROESOPHAGEAL REFLUX DISEASE WITH ESOPHAGITIS WITHOUT HEMORRHAGE: ICD-10-CM

## 2025-03-05 DIAGNOSIS — F32.A ANXIETY AND DEPRESSION: ICD-10-CM

## 2025-03-05 DIAGNOSIS — F41.9 ANXIETY AND DEPRESSION: ICD-10-CM

## 2025-03-05 RX ORDER — PANTOPRAZOLE SODIUM 40 MG/1
TABLET, DELAYED RELEASE ORAL
Qty: 180 TABLET | Refills: 0 | Status: SHIPPED | OUTPATIENT
Start: 2025-03-05

## 2025-03-05 RX ORDER — BUPROPION HYDROCHLORIDE 300 MG/1
300 TABLET ORAL EVERY MORNING
Qty: 90 TABLET | Refills: 0 | Status: SHIPPED | OUTPATIENT
Start: 2025-03-05

## 2025-03-18 ENCOUNTER — PATIENT MESSAGE (OUTPATIENT)
Dept: INTERNAL MEDICINE | Facility: CLINIC | Age: 60
End: 2025-03-18
Payer: MEDICARE

## 2025-03-18 DIAGNOSIS — F51.01 PRIMARY INSOMNIA: ICD-10-CM

## 2025-03-18 RX ORDER — TRAZODONE HYDROCHLORIDE 100 MG/1
200 TABLET ORAL NIGHTLY
Qty: 180 TABLET | Refills: 1 | OUTPATIENT
Start: 2025-03-18

## 2025-03-20 RX ORDER — TRAZODONE HYDROCHLORIDE 100 MG/1
150 TABLET ORAL NIGHTLY
Qty: 135 TABLET | Refills: 3 | Status: SHIPPED | OUTPATIENT
Start: 2025-03-20

## 2025-04-16 DIAGNOSIS — F41.9 ANXIETY DISORDER, UNSPECIFIED: ICD-10-CM

## 2025-04-16 DIAGNOSIS — F51.01 PRIMARY INSOMNIA: ICD-10-CM

## 2025-04-16 DIAGNOSIS — G25.81 RESTLESS LEG SYNDROME: ICD-10-CM

## 2025-04-16 DIAGNOSIS — E89.0 POSTPROCEDURAL HYPOTHYROIDISM: ICD-10-CM

## 2025-04-16 DIAGNOSIS — F41.9 ANXIETY AND DEPRESSION: ICD-10-CM

## 2025-04-16 DIAGNOSIS — E78.5 HYPERLIPIDEMIA LDL GOAL <70: ICD-10-CM

## 2025-04-16 DIAGNOSIS — F32.A ANXIETY AND DEPRESSION: ICD-10-CM

## 2025-04-16 DIAGNOSIS — K21.00 GASTROESOPHAGEAL REFLUX DISEASE WITH ESOPHAGITIS WITHOUT HEMORRHAGE: ICD-10-CM

## 2025-04-16 DIAGNOSIS — Z94.4 LIVER TRANSPLANT RECIPIENT: ICD-10-CM

## 2025-04-16 RX ORDER — TACROLIMUS 0.5 MG/1
1 CAPSULE ORAL 2 TIMES DAILY
Qty: 180 CAPSULE | Refills: 3 | Status: SHIPPED | OUTPATIENT
Start: 2025-04-16

## 2025-04-16 RX ORDER — ROSUVASTATIN CALCIUM 20 MG/1
20 TABLET, COATED ORAL
Qty: 90 TABLET | Refills: 3 | Status: SHIPPED | OUTPATIENT
Start: 2025-04-16

## 2025-04-16 RX ORDER — TRAZODONE HYDROCHLORIDE 100 MG/1
150 TABLET ORAL NIGHTLY
Qty: 90 TABLET | Refills: 3 | Status: SHIPPED | OUTPATIENT
Start: 2025-04-16

## 2025-04-16 RX ORDER — LEVOTHYROXINE SODIUM 200 UG/1
200 TABLET ORAL EVERY MORNING
Qty: 90 TABLET | Refills: 1 | Status: SHIPPED | OUTPATIENT
Start: 2025-04-16

## 2025-04-16 RX ORDER — ROPINIROLE 4 MG/1
4 TABLET, FILM COATED ORAL
Qty: 90 TABLET | Refills: 1 | Status: SHIPPED | OUTPATIENT
Start: 2025-04-16

## 2025-04-16 RX ORDER — PANTOPRAZOLE SODIUM 40 MG/1
40 TABLET, DELAYED RELEASE ORAL DAILY
Qty: 90 TABLET | Refills: 3 | Status: SHIPPED | OUTPATIENT
Start: 2025-04-16

## 2025-04-16 RX ORDER — BUPROPION HYDROCHLORIDE 300 MG/1
300 TABLET ORAL DAILY
Qty: 90 TABLET | Refills: 3 | Status: SHIPPED | OUTPATIENT
Start: 2025-04-16

## 2025-04-16 RX ORDER — MIRTAZAPINE 15 MG/1
15 TABLET, FILM COATED ORAL
Qty: 90 TABLET | Refills: 1 | Status: SHIPPED | OUTPATIENT
Start: 2025-04-16

## 2025-04-24 ENCOUNTER — OFFICE VISIT (OUTPATIENT)
Dept: INTERNAL MEDICINE | Facility: CLINIC | Age: 60
End: 2025-04-24
Payer: MEDICARE

## 2025-04-24 VITALS
TEMPERATURE: 98.4 F | SYSTOLIC BLOOD PRESSURE: 136 MMHG | DIASTOLIC BLOOD PRESSURE: 84 MMHG | RESPIRATION RATE: 18 BRPM | WEIGHT: 143.6 LBS | HEIGHT: 62 IN | HEART RATE: 74 BPM | BODY MASS INDEX: 26.43 KG/M2

## 2025-04-24 DIAGNOSIS — M81.8 OTHER OSTEOPOROSIS WITHOUT CURRENT PATHOLOGICAL FRACTURE: ICD-10-CM

## 2025-04-24 DIAGNOSIS — E78.5 HYPERLIPIDEMIA LDL GOAL <70: ICD-10-CM

## 2025-04-24 DIAGNOSIS — Z23 ENCOUNTER FOR VACCINATION: ICD-10-CM

## 2025-04-24 DIAGNOSIS — E89.0 POSTOPERATIVE HYPOTHYROIDISM: ICD-10-CM

## 2025-04-24 DIAGNOSIS — R80.9 ALBUMINURIA: ICD-10-CM

## 2025-04-24 DIAGNOSIS — I10 PRIMARY HYPERTENSION: ICD-10-CM

## 2025-04-24 DIAGNOSIS — F51.01 PRIMARY INSOMNIA: ICD-10-CM

## 2025-04-24 DIAGNOSIS — Z00.00 HEALTHCARE MAINTENANCE: ICD-10-CM

## 2025-04-24 DIAGNOSIS — F32.A ANXIETY AND DEPRESSION: ICD-10-CM

## 2025-04-24 DIAGNOSIS — N18.2 STAGE 2 CHRONIC KIDNEY DISEASE: ICD-10-CM

## 2025-04-24 DIAGNOSIS — R74.01 TRANSAMINITIS: ICD-10-CM

## 2025-04-24 DIAGNOSIS — C44.41 BASAL CELL CARCINOMA (BCC) OF SCALP: ICD-10-CM

## 2025-04-24 DIAGNOSIS — Z12.31 ENCOUNTER FOR SCREENING MAMMOGRAM FOR MALIGNANT NEOPLASM OF BREAST: ICD-10-CM

## 2025-04-24 DIAGNOSIS — Z80.9 FAMILY HISTORY OF CANCER: ICD-10-CM

## 2025-04-24 DIAGNOSIS — Z00.00 MEDICARE ANNUAL WELLNESS VISIT, SUBSEQUENT: Primary | ICD-10-CM

## 2025-04-24 DIAGNOSIS — F41.9 ANXIETY AND DEPRESSION: ICD-10-CM

## 2025-04-24 DIAGNOSIS — J30.1 NON-SEASONAL ALLERGIC RHINITIS DUE TO POLLEN: ICD-10-CM

## 2025-04-24 DIAGNOSIS — Z13.79 GENETIC TESTING: ICD-10-CM

## 2025-04-24 PROBLEM — D09.9 SQUAMOUS CELL CARCINOMA IN SITU: Status: ACTIVE | Noted: 2025-04-24

## 2025-04-24 PROBLEM — G47.30 SLEEP APNEA: Status: ACTIVE | Noted: 2020-04-01

## 2025-04-24 PROBLEM — R74.8 ALKALINE PHOSPHATASE ELEVATION: Status: RESOLVED | Noted: 2023-12-15 | Resolved: 2025-04-24

## 2025-04-24 LAB
ALBUMIN SERPL-MCNC: 4.5 G/DL (ref 3.5–5.2)
ALBUMIN/GLOB SERPL: 1.6 G/DL
ALP SERPL-CCNC: 91 U/L (ref 39–117)
ALT SERPL W P-5'-P-CCNC: 32 U/L (ref 1–33)
ANION GAP SERPL CALCULATED.3IONS-SCNC: 12 MMOL/L (ref 5–15)
AST SERPL-CCNC: 22 U/L (ref 1–32)
BILIRUB SERPL-MCNC: 0.8 MG/DL (ref 0–1.2)
BUN SERPL-MCNC: 21 MG/DL (ref 8–23)
BUN/CREAT SERPL: 22.8 (ref 7–25)
CALCIUM SPEC-SCNC: 9.5 MG/DL (ref 8.6–10.5)
CHLORIDE SERPL-SCNC: 104 MMOL/L (ref 98–107)
CHOLEST SERPL-MCNC: 138 MG/DL (ref 0–200)
CO2 SERPL-SCNC: 25 MMOL/L (ref 22–29)
CREAT SERPL-MCNC: 0.92 MG/DL (ref 0.57–1)
EGFRCR SERPLBLD CKD-EPI 2021: 71 ML/MIN/1.73
GLOBULIN UR ELPH-MCNC: 2.8 GM/DL
GLUCOSE SERPL-MCNC: 83 MG/DL (ref 65–99)
HDLC SERPL-MCNC: 34 MG/DL (ref 40–60)
LDLC SERPL CALC-MCNC: 59 MG/DL (ref 0–100)
LDLC/HDLC SERPL: 1.38 {RATIO}
POTASSIUM SERPL-SCNC: 4.5 MMOL/L (ref 3.5–5.2)
PROT SERPL-MCNC: 7.3 G/DL (ref 6–8.5)
SODIUM SERPL-SCNC: 141 MMOL/L (ref 136–145)
TRIGL SERPL-MCNC: 286 MG/DL (ref 0–150)
VLDLC SERPL-MCNC: 45 MG/DL (ref 5–40)

## 2025-04-24 PROCEDURE — 82043 UR ALBUMIN QUANTITATIVE: CPT | Performed by: STUDENT IN AN ORGANIZED HEALTH CARE EDUCATION/TRAINING PROGRAM

## 2025-04-24 PROCEDURE — 84443 ASSAY THYROID STIM HORMONE: CPT | Performed by: STUDENT IN AN ORGANIZED HEALTH CARE EDUCATION/TRAINING PROGRAM

## 2025-04-24 PROCEDURE — 84436 ASSAY OF TOTAL THYROXINE: CPT | Performed by: STUDENT IN AN ORGANIZED HEALTH CARE EDUCATION/TRAINING PROGRAM

## 2025-04-24 PROCEDURE — 82570 ASSAY OF URINE CREATININE: CPT | Performed by: STUDENT IN AN ORGANIZED HEALTH CARE EDUCATION/TRAINING PROGRAM

## 2025-04-24 PROCEDURE — 80053 COMPREHEN METABOLIC PANEL: CPT | Performed by: STUDENT IN AN ORGANIZED HEALTH CARE EDUCATION/TRAINING PROGRAM

## 2025-04-24 PROCEDURE — 80061 LIPID PANEL: CPT | Performed by: STUDENT IN AN ORGANIZED HEALTH CARE EDUCATION/TRAINING PROGRAM

## 2025-04-24 NOTE — LETTER
[facility]  Vaccine Consent Form    Patient Name:  Lizette Frias  Patient :  1965     Vaccine(s) Ordered    Pneumococcal Conjugate Vaccine 20-Valent All        Screening Checklist  The following questions should be completed prior to vaccination. If you answer “yes” to any question, it does not necessarily mean you should not be vaccinated. It just means we may need to clarify or ask more questions. If a question is unclear, please ask your healthcare provider to explain it.    Yes No   Any fever or moderate to severe illness today (mild illness and/or antibiotic treatment are not contraindications)?     Do you have a history of a serious reaction to any previous vaccinations, such as anaphylaxis, encephalopathy within 7 days, Guillain-Drayton syndrome within 6 weeks, seizure?     Have you received any live vaccine(s) (e.g MMR, LYNDSEY) or any other vaccines in the last month (to ensure duplicate doses aren't given)?     Do you have an anaphylactic allergy to latex (DTaP, DTaP-IPV, Hep A, Hep B, MenB, RV, Td, Tdap), baker’s yeast (Hep B, HPV), polysorbates (RSV, nirsevimab, PCV 20, Rotavirrus, Tdap, Shingrix), or gelatin (LYNDSEY, MMR)?     Do you have an anaphylactic allergy to neomycin (Rabies, LYNDSEY, MMR, IPV, Hep A), polymyxin B (IPV), or streptomycin (IPV)?      Any cancer, leukemia, AIDS, or other immune system disorder? (LYNDSEY, MMR, RV)     Do you have a parent, brother, or sister with an immune system problem (if immune competence of vaccine recipient clinically verified, can proceed)? (MMR, LYNDSEY)     Any recent steroid treatments for >2 weeks, chemotherapy, or radiation treatment? (LYNDSEY, MMR)     Have you received antibody-containing blood transfusions or IVIG in the past 11 months (recommended interval is dependent on product)? (MMR, LYNDSEY)     Have you taken antiviral drugs (acyclovir, famciclovir, valacyclovir for LYNDSEY) in the last 24 or 48 hours, respectively?      Are you pregnant or planning to become  "pregnant within 1 month? (LYNDSEY, MMR, HPV, IPV, MenB, Abrexvy; For Hep B- refer to Engerix-B; For RSV - Abrysvo is indicated for 32-36 weeks of pregnancy from September to January)     For infants, have you ever been told your child has had intussusception or a medical emergency involving obstruction of the intestine (Rotavirus)? If not for an infant, can skip this question.         *Ordering Physicians/APC should be consulted if \"yes\" is checked by the patient or guardian above.  I have received, read, and understand the Vaccine Information Statement (VIS) for each vaccine ordered.  I have considered my or my child's health status as well as the health status of my close contacts.  I have taken the opportunity to discuss my vaccine questions with my or my child's health care provider.   I have requested that the ordered vaccine(s) be given to me or my child.  I understand the benefits and risks of the vaccines.  I understand that I should remain in the clinic for 15 minutes after receiving the vaccine(s).  _________________________________________________________  Signature of Patient or Parent/Legal Guardian ____________________  Date     "

## 2025-04-24 NOTE — PATIENT INSTRUCTIONS
Health Maintenance for Postmenopausal Women  Menopause is a normal process in which your reproductive ability comes to an end. This process happens gradually over a span of months to years, usually between the ages of 48 and 55. Menopause is complete when you have missed 12 consecutive menstrual periods.  It is important to talk with your health care provider about some of the most common conditions that affect postmenopausal women, such as heart disease, cancer, and bone loss (osteoporosis). Adopting a healthy lifestyle and getting preventive care can help to promote your health and wellness. Those actions can also lower your chances of developing some of these common conditions.  What should I know about menopause?  During menopause, you may experience a number of symptoms, such as:  Moderate-to-severe hot flashes.  Night sweats.  Decrease in sex drive.  Mood swings.  Headaches.  Tiredness.  Irritability.  Memory problems.  Insomnia.     Choosing to treat or not to treat menopausal changes is an individual decision that you make with your health care provider.  What should I know about hormone replacement therapy and supplements?  Hormone therapy products are effective for treating symptoms that are associated with menopause, such as hot flashes and night sweats. Hormone replacement carries certain risks, especially as you become older. If you are thinking about using estrogen or estrogen with progestin treatments, discuss the benefits and risks with your health care provider.  What should I know about heart disease and stroke?  Heart disease, heart attack, and stroke become more likely as you age. This may be due, in part, to the hormonal changes that your body experiences during menopause. These can affect how your body processes dietary fats, triglycerides, and cholesterol. Heart attack and stroke are both medical emergencies.    There are many things that you can do to help prevent heart disease and  stroke:  Have your blood pressure checked at least every 1-2 years. High blood pressure causes heart disease and increases the risk of stroke.  If you are 55-79 years old, ask your health care provider if you should take aspirin to prevent a heart attack or a stroke.  Do not use any tobacco products, including cigarettes, chewing tobacco, or electronic cigarettes. If you need help quitting, ask your health care provider.  It is important to eat a healthy diet and maintain a healthy weight.  Be sure to include plenty of vegetables, fruits, low-fat dairy products, and lean protein.  Avoid eating foods that are high in solid fats, added sugars, or salt (sodium).  Get regular exercise. This is one of the most important things that you can do for your health.  Try to exercise for at least 150 minutes each week. The type of exercise that you do should increase your heart rate and make you sweat. This is known as moderate-intensity exercise.  Try to do strengthening exercises at least twice each week. Do these in addition to the moderate-intensity exercise.  Know your numbers. Ask your health care provider to check your cholesterol and your blood glucose. Continue to have your blood tested as directed by your health care provider.     What should I know about cancer screening?  There are several types of cancer. Take the following steps to reduce your risk and to catch any cancer development as early as possible.  Breast Cancer  Practice breast self-awareness.  This means understanding how your breasts normally appear and feel.  It also means doing regular breast self-exams. Let your health care provider know about any changes, no matter how small.  If you are 40 or older, have a clinician do a breast exam (clinical breast exam or CBE) every year. Depending on your age, family history, and medical history, it may be recommended that you also have a yearly breast X-ray (mammogram).  If you have a family history of breast  cancer, talk with your health care provider about genetic screening.  If you are at high risk for breast cancer, talk with your health care provider about having an MRI and a mammogram every year.  Breast cancer (BRCA) gene test is recommended for women who have family members with BRCA-related cancers. Results of the assessment will determine the need for genetic counseling and BRCA1 and for BRCA2 testing. BRCA-related cancers include these types:  Breast. This occurs in males or females.  Ovarian.  Tubal. This may also be called fallopian tube cancer.  Cancer of the abdominal or pelvic lining (peritoneal cancer).  Prostate.  Pancreatic.     Cervical, Uterine, and Ovarian Cancer  Your health care provider may recommend that you be screened regularly for cancer of the pelvic organs. These include your ovaries, uterus, and vagina. This screening involves a pelvic exam, which includes checking for microscopic changes to the surface of your cervix (Pap test).  For women ages 21-65, health care providers may recommend a pelvic exam and a Pap test every three years. For women ages 30-65, they may recommend the Pap test and pelvic exam, combined with testing for human papilloma virus (HPV), every five years. Some types of HPV increase your risk of cervical cancer. Testing for HPV may also be done on women of any age who have unclear Pap test results.  Other health care providers may not recommend any screening for nonpregnant women who are considered low risk for pelvic cancer and have no symptoms. Ask your health care provider if a screening pelvic exam is right for you.  If you have had past treatment for cervical cancer or a condition that could lead to cancer, you need Pap tests and screening for cancer for at least 20 years after your treatment. If Pap tests have been discontinued for you, your risk factors (such as having a new sexual partner) need to be reassessed to determine if you should start having screenings  again. Some women have medical problems that increase the chance of getting cervical cancer. In these cases, your health care provider may recommend that you have screening and Pap tests more often.  If you have a family history of uterine cancer or ovarian cancer, talk with your health care provider about genetic screening.  If you have vaginal bleeding after reaching menopause, tell your health care provider.  There are currently no reliable tests available to screen for ovarian cancer.     Lung Cancer  Lung cancer screening is recommended for adults 55-80 years old who are at high risk for lung cancer because of a history of smoking. A yearly low-dose CT scan of the lungs is recommended if you:  Currently smoke.  Have a history of at least 30 pack-years of smoking and you currently smoke or have quit within the past 15 years. A pack-year is smoking an average of one pack of cigarettes per day for one year.     Yearly screening should:  Continue until it has been 15 years since you quit.  Stop if you develop a health problem that would prevent you from having lung cancer treatment.     Colorectal Cancer  This type of cancer can be detected and can often be prevented.  Routine colorectal cancer screening usually begins at age 50 and continues through age 75.  If you have risk factors for colon cancer, your health care provider may recommend that you be screened at an earlier age.  If you have a family history of colorectal cancer, talk with your health care provider about genetic screening.  Your health care provider may also recommend using home test kits to check for hidden blood in your stool.  A small camera at the end of a tube can be used to examine your colon directly (sigmoidoscopy or colonoscopy). This is done to check for the earliest forms of colorectal cancer.  Direct examination of the colon should be repeated every 5-10 years until age 75. However, if early forms of precancerous polyps or small  growths are found or if you have a family history or genetic risk for colorectal cancer, you may need to be screened more often.     Skin Cancer  Check your skin from head to toe regularly.  Monitor any moles. Be sure to tell your health care provider:  About any new moles or changes in moles, especially if there is a change in a mole's shape or color.  If you have a mole that is larger than the size of a pencil eraser.  If any of your family members has a history of skin cancer, especially at a young age, talk with your health care provider about genetic screening.  Always use sunscreen. Apply sunscreen liberally and repeatedly throughout the day.  Whenever you are outside, protect yourself by wearing long sleeves, pants, a wide-brimmed hat, and sunglasses.     What should I know about osteoporosis?  Osteoporosis is a condition in which bone destruction happens more quickly than new bone creation. After menopause, you may be at an increased risk for osteoporosis. To help prevent osteoporosis or the bone fractures that can happen because of osteoporosis, the following is recommended:  If you are 19-50 years old, get at least 1,000 mg of calcium and at least 600 mg of vitamin D per day.  If you are older than age 50 but younger than age 70, get at least 1,200 mg of calcium and at least 600 mg of vitamin D per day.  If you are older than age 70, get at least 1,200 mg of calcium and at least 800 mg of vitamin D per day.     Smoking and excessive alcohol intake increase the risk of osteoporosis. Eat foods that are rich in calcium and vitamin D, and do weight-bearing exercises several times each week as directed by your health care provider.  What should I know about how menopause affects my mental health?  Depression may occur at any age, but it is more common as you become older. Common symptoms of depression include:  Low or sad mood.  Changes in sleep patterns.  Changes in appetite or eating patterns.  Feeling an  overall lack of motivation or enjoyment of activities that you previously enjoyed.  Frequent crying spells.     Talk with your health care provider if you think that you are experiencing depression.  What should I know about immunizations?  It is important that you get and maintain your immunizations. These include:  Tetanus, diphtheria, and pertussis (Tdap) booster vaccine.  Influenza every year before the flu season begins.  Pneumonia vaccine.  Shingles vaccine.     Your health care provider may also recommend other immunizations.  This information is not intended to replace advice given to you by your health care provider. Make sure you discuss any questions you have with your health care provider.  Document Released: 02/09/2007 Document Revised: 07/07/2017 Document Reviewed: 09/20/2016  [employer] Interactive Patient Education © 2018 [employer]            Healthy Delicious Recipes from [employer]: https://Gigalocal.org/  [place] Plant Based Meal Recipes: https://PlaySquare.[employer]/  Heart Healthy Recipes from [employer]: https://abcardio.org/wp-content/uploads/2020/06/ABC_Cookbook.pdf  [place] Healthy Living Guide: https://www.hsp.[employer].edu/nutritionsource/2022/01/06/healthy-living-guide-1148-0040/  SNAP Cookbook for Budgets: http://onJouleX.net/dss/documents/good-and-cheap.pdf     [facility] Physical/Wellness Visit Information    Most insurance companies will cover a physical or wellness visit one time per year with no co-pay or out of pocket expense. Billing guidelines require that if any chronic medical problems or new problems (ex. sore throat, allergies, etc.) are addressed during one of these visits, these items must coded as well. In this situation, a co-pay or additional expense may be incurred. This includes lab testing that is related to chronic medical conditions. Lab tests ordered for medical conditions that require treatment with a medication or other  therapy like hyperlipidemia (high cholesterol) cannot be coded as preventive.  We often perform your follow-up visit for chronic medical problems or address a new problem presented during the visit in conjunction with your physical or wellness visit to eliminate the need to return to the office later for an additional visit.

## 2025-04-24 NOTE — PROGRESS NOTES
Subjective   The ABCs of the Annual Wellness Visit  Medicare Wellness Visit      Lizette Frias is a 60 y.o. patient who presents for a Medicare Wellness Visit.    The following portions of the patient's history were reviewed and   updated as appropriate: allergies, current medications, past family history, past medical history, past social history, past surgical history, and problem list.    Compared to one year ago, the patient's physical   health is better.  Compared to one year ago, the patient's mental   health is better.    Recent Hospitalizations:  She was not admitted to the hospital during the last year.     Current Medical Providers:  Patient Care Team:  Lars Fermin MD as PCP - General (Family Medicine)  Ming Stroud MD as Consulting Physician (Endocrinology)  Bill Ventura III, MD as Cardiologist (Cardiology)  Carmita Nelson APRN (Inactive) as Nurse Practitioner (Obstetrics and Gynecology)  Isaiah Law MD as Consulting Physician (Pain Medicine)  Brielle Taylor MD as Consulting Physician (Gastroenterology)  Edmond Angel APRN (Nurse Practitioner)  Nimisha Mccray MD as Consulting Physician (Orthopedic Surgery)    Outpatient Medications Prior to Visit   Medication Sig Dispense Refill    Azelastine-Fluticasone 137-50 MCG/ACT suspension spray 1 spray into each nostril twice a day      buPROPion XL (Wellbutrin XL) 300 MG 24 hr tablet Take 1 tablet by mouth Daily. 90 tablet 3    calcium carbonate-vitamin d 600-400 MG-UNIT per tablet Take 1 tablet by mouth Daily.      Cholecalciferol 50 MCG (2000 UT) capsule Take 1 tablet by mouth Daily.      [facility] Melatonin 5 MG tablet tablet TAKE 1 TABLET BY MOUTH EVERY DAY AT NIGHT 90 tablet 1    levothyroxine (SYNTHROID, LEVOTHROID) 200 MCG tablet Take 1 tablet by mouth Every Morning. 90 tablet 1    mirtazapine (REMERON) 15 MG tablet Take 1 tablet by mouth every night at bedtime. 90 tablet 1    pantoprazole (PROTONIX) 40 MG EC  tablet Take 1 tablet by mouth Daily. 90 tablet 3    pregabalin (LYRICA) 150 MG capsule Take 1 capsule by mouth 2 (Two) Times a Day. 60 capsule 2    Prolia 60 MG/ML solution prefilled syringe syringe TO BE ADMINISTERED IN PHYSICIAN'S OFFICE. INJECT ONE SYRINGE SUBCUTANEOUSLY ONCE EVERY 6 MONTHS. REFRIGERATE. USE WITHIN 14 DAYS ONCE AT ROOM TEMPERATURE. 1 each 1    rOPINIRole (REQUIP) 4 MG tablet Take 1 tablet by mouth every night at bedtime. 90 tablet 1    rosuvastatin (CRESTOR) 20 MG tablet Take 1 tablet by mouth every night at bedtime. 90 tablet 3    tacrolimus (PROGRAF) 0.5 MG capsule Take 2 capsules by mouth 2 (Two) Times a Day. 180 capsule 3    telmisartan (MICARDIS) 20 MG tablet TAKE 1 TABLET BY MOUTH EVERY DAY 90 tablet 1    traZODone (DESYREL) 100 MG tablet Take 1.5 tablets by mouth Every Night. 90 tablet 3    buPROPion XL (WELLBUTRIN XL) 300 MG 24 hr tablet TAKE 1 TABLET BY MOUTH EVERY DAY IN THE MORNING 90 tablet 0     No facility-administered medications prior to visit.     No opioid medication identified on active medication list. I have reviewed chart for other potential  high risk medication/s and harmful drug interactions in the elderly.      Aspirin is not on active medication list.  Aspirin use is not indicated based on review of current medical condition/s. Risk of harm outweighs potential benefits.  .    Patient Active Problem List   Diagnosis    Menopause    Personal history of malignant neoplasm of thyroid    Renal impairment    Vision impairment    Other osteoporosis without current pathological fracture    Osteoarthritis    Meningioma    Hyperlipidemia LDL goal <70    Anxiety and depression    S/P cholecystectomy    Leukopenia    Immunosuppression due to drug therapy    Insomnia    Sleep apnea    Gout    Cytokine release syndrome, grade 2    Postoperative hypothyroidism    Stage 2 chronic kidney disease    Gastroesophageal reflux disease with esophagitis without hemorrhage    Cerebrovascular  accident (CVA)    Osteoarthritis of left hip    Vocal cord dysfunction    Gastroparesis    Liver transplant recipient    Albuminuria    Primary osteoarthritis of both feet    Primary hypertension    Coronary artery disease involving native coronary artery of native heart without angina pectoris    Nonalcoholic steatohepatitis (MORRISON)    Personal history of immunosuppression therapy    High risk social situation    Non-seasonal allergic rhinitis due to pollen    Corneal irritation of left eye    Tooth pain    Plantar fasciitis    Family history of cancer    Chronic fatigue    Positive KATIE (antinuclear antibody)    Breast, fat necrosis    Transaminitis    Tetrahydrocannabinol (THC) use disorder, mild, abuse    Bacterial sinusitis    Groin strain    Chronic right hip pain    Housing insecurity    Chronic midline low back pain with bilateral sciatica    Paget disease of bone    Closed nondisplaced fracture of right acetabulum with routine healing    Restless leg syndrome    Anemia in chronic kidney disease    Chronic mastoiditis    Impacted cerumen    Mixed conductive and sensorineural hearing loss, bilateral    Otitis externa of right ear    Restless legs syndrome    Rheumatoid arthritis involving multiple sites with positive rheumatoid factor    Tendinitis of extensor tendon of hand    Memory loss    Anticoagulated    Injury of left thumb    Laceration of left thumb without foreign body without damage to nail    Trigger finger of left thumb    Bilateral carpal tunnel syndrome    Basal cell carcinoma (BCC) of scalp     Advance Care Planning Advance Directive is on file.  ACP discussion was held with the patient during this visit. Patient has an advance directive in EMR which is still valid.             Objective   Vitals:    04/24/25 1436   BP: 136/84   BP Location: Right arm   Patient Position: Sitting   Cuff Size: Adult   Pulse: 74   Resp: 18   Temp: 98.4 °F (36.9 °C)   TempSrc: Temporal   Weight: 65.1 kg (143 lb 9.6  "oz)   Height: 157.5 cm (62\")   PainSc: 0-No pain       Estimated body mass index is 26.26 kg/m² as calculated from the following:    Height as of this encounter: 157.5 cm (62\").    Weight as of this encounter: 65.1 kg (143 lb 9.6 oz).         Gait and Balance Evaluation:  Normal         Does the patient have evidence of cognitive impairment? No                                                                                                Health  Risk Assessment    Smoking Status:  Social History     Tobacco Use   Smoking Status Never    Passive exposure: Never   Smokeless Tobacco Never     Alcohol Consumption:  Social History     Substance and Sexual Activity   Alcohol Use No       Fall Risk Screen  STEADI Fall Risk Assessment was completed, and patient is at HIGH risk for falls. Assessment completed on:2025    Depression Screening   Little interest or pleasure in doing things? Not at all   Feeling down, depressed, or hopeless? Not at all   PHQ-2 Total Score 0      Health Habits and Functional and Cognitive Screenin/17/2025    11:24 AM   Functional & Cognitive Status   Do you have difficulty preparing food and eating? No   Do you have difficulty bathing yourself, getting dressed or grooming yourself? No   Do you have difficulty using the toilet? No   Do you have difficulty moving around from place to place? No   Do you have trouble with steps or getting out of a bed or a chair? No   Current Diet Frequent Junk Food   Dental Exam Up to date   Eye Exam Up to date   Exercise (times per week) 3 times per week   Current Exercises Include House Cleaning;Other;Yard Work   Do you need help using the phone?  No   Are you deaf or do you have serious difficulty hearing?  Yes   Do you need help to go to places out of walking distance? No   Do you need help shopping? No   Do you need help preparing meals?  No   Do you need help with housework?  No   Do you need help with laundry? No   Do you need help taking your " medications? No   Do you need help managing money? No   Do you ever drive or ride in a car without wearing a seat belt? No   Have you felt unusual stress, anger or loneliness in the last month? No   Who do you live with? Alone   If you need help, do you have trouble finding someone available to you? No   Have you been bothered in the last four weeks by sexual problems? No   Do you have difficulty concentrating, remembering or making decisions? Yes           Age-appropriate Screening Schedule:  Refer to the list below for future screening recommendations based on patient's age, sex and/or medical conditions. Orders for these recommended tests are listed in the plan section. The patient has been provided with a written plan.    Health Maintenance List  Health Maintenance   Topic Date Due    Pneumococcal Vaccine 50+ (2 of 2 - PCV) 03/21/2016    COVID-19 Vaccine (7 - 2024-25 season) 07/24/2025 (Originally 9/1/2024)    INFLUENZA VACCINE  07/01/2025    MAMMOGRAM  12/12/2025    LIPID PANEL  12/12/2025    ANNUAL WELLNESS VISIT  04/24/2026    TDAP/TD VACCINES (2 - Td or Tdap) 08/29/2027    COLORECTAL CANCER SCREENING  12/31/2029    HEPATITIS C SCREENING  Completed    Hepatitis B  Completed    HEMOGLOBIN A1C  Discontinued    URINE MICROALBUMIN-CREATININE RATIO (uACR)  Discontinued    ZOSTER VACCINE  Discontinued                                                                                                                                                CMS Preventative Services Quick Reference  Risk Factors Identified During Encounter  Dental Screening Recommended  Vision Screening Recommended    The above risks/problems have been discussed with the patient.  Pertinent information has been shared with the patient in the After Visit Summary.  An After Visit Summary and PPPS were made available to the patient.    Follow Up:   Next Medicare Wellness visit to be scheduled in 1 year.         Additional E&M Note during same  encounter follows:  Patient has additional, significant, and separately identifiable condition(s)/problem(s) that require work above and beyond the Medicare Wellness Visit     Chief Complaint  Medicare Wellness-subsequent    Subjective   HPI  Lizette is also being seen today for an annual adult preventative physical exam.  and Lizette is also being seen today for additional medical problem/s.    Review of Systems   All other systems reviewed and are negative.     History of Present Illness  The patient is a 60-year-old female who presents for a Medicare wellness visit, health maintenance, and chronic care management.    Overall Health Improvement  Overall improvement in physical and mental health compared to the previous year is reported. Sons are designated as next of kin for medical decision-making in the event of incapacity. Preference for CPR and ventilator use only if there is a reasonable chance of recovery is expressed, and long-term ventilation or life support with a low chance of improvement towards independence is not desired. Genetic counselors have not contacted her regarding family history and potential high-risk conditions. Allergy injections are received from [facility], but the limited availability in [place] is inconvenient. Interest in transferring care to our facility is expressed. Appointments with the ophthalmologist and dentist are scheduled for May 2025. The liver transplant clinic appointment was missed in February 2025, and a follow-up is due in July 2025. No feelings of unsafety or experiences of abuse are reported. Susceptibility to pneumonia is noted, and she wishes to receive the vaccine.    Basal Cell Carcinoma  Basal cell carcinoma is diagnosed and currently spreading. Itching and pain upon scratching are experienced. Due to recurrence, dermatology visits will occur every 6 months. A Mohs procedure is scheduled for June 2025 at [facility] in [place].  -  Onset: Currently spreading.  - Character: Itching and pain upon scratching.  - Timing: Dermatology visits every 6 months; Mohs procedure scheduled for June 2025.    Prolia Treatment Interruption  Prolia has not been received for some time due to dental work, and Ludy is currently managing this treatment.  - Onset: Not received for some time.  - Alleviating/Aggravating Factors: Interrupted due to dental work.  - Management: Ludy is currently managing this treatment.    Anxiety and Depression Management  Anxiety and depression symptoms are well-managed on the current regimen. The recent house purchase in February 2025 has caused some stress. She believes she may be able to reduce trazodone dosage soon, from 2 tablets to 1.5 tablets. Mirtazapine is noted to be particularly effective in managing racing thoughts at night.  - Onset: Recent house purchase in February 2025 caused some stress.  - Character: Well-managed symptoms; effective management of racing thoughts at night with mirtazapine.  - Timing: Considering reducing trazodone dosage soon.  - Severity: Symptoms are well-managed.    SOCIAL HISTORY  She has never been a smoker.    FAMILY HISTORY  Her mother passed away from squamous cell cancer.    Immunizations:  Immunization History   Administered Date(s) Administered    Arexvy (RSV, Adults 60+ yrs) 06/03/2024    COVID-19 (MODERNA) Monovalent Original Booster 02/21/2022    COVID-19 (PFIZER) 12YRS+ (COMIRNATY) 06/03/2024    COVID-19 (PFIZER) BIVALENT 12+YRS 07/13/2023    COVID-19 (PFIZER) Purple Cap Monovalent 03/04/2021, 03/30/2021, 09/15/2021    Fluzone  >6mos 10/09/2024    Fluzone (or Fluarix & Flulaval for VFC) >6mos 08/29/2017, 12/06/2022, 12/06/2022, 10/13/2023    Hep A, Unspecified 01/21/2019    Hep B, Unspecified 12/05/2018    Hepatitis B 03/16/2023    Hepatitis B Adult/Adolescent IM 12/05/2018, 05/15/2023    Pneumococcal Polysaccharide (PPSV23) 03/21/2015    RSV, unspecified (Vaccine or MAB)  06/03/2024    Shingrix 06/03/2024    Tdap 08/29/2017       Colorectal Screening:   Up-To-Date   Last Completed Colonoscopy            Upcoming       COLORECTAL CANCER SCREENING (COLONOSCOPY - Every 10 Years) Next due on 12/31/2029 09/08/2024  Cologuard component of Cologuard - Stool, Per Rectum    12/31/2019  COLONOSCOPY    09/02/2019  COLONOSCOPY (Done)    05/16/2019  COLONOSCOPY    02/04/2019  COLONOSCOPY     Only the first 5 history entries have been loaded, but more history exists.                        Pap: s/p total hysterectomy  Last Completed Pap Smear    This patient has no relevant Health Maintenance data.        Mammogram:  ordered  Last Completed Mammogram            Awaiting Completion       MAMMOGRAM (Yearly) Order placed this encounter      04/24/2025  Order placed for Mammo Screening Digital Tomosynthesis Bilateral With CAD by Lars Fermin MD    12/12/2024  Mammo Screening Digital Tomosynthesis Bilateral With CAD    11/13/2023  Mammo Diagnostic Digital Tomosynthesis Left With CAD    10/04/2023  Mammo Screening Digital Tomosynthesis Bilateral With CAD    10/03/2022  Mammo Screening Digital Tomosynthesis Bilateral With CAD      Only the first 5 history entries have been loaded, but more history exists.                             CT for Smoker (Age 50-80, 20 pk yr):  N/A  Bone Density/DEXA (Age 65 or high risk): DEXA scan ordered  Hep C (Age 18-79 once):  previously negative  HIV (Age 15-65 once): previously negative  A1c: N/A  Lipid panel: ordered    Dermatology: established  Ophthalmologist: established  Dentist: established    Tobacco Use: Low Risk  (4/24/2025)    Patient History     Smoking Tobacco Use: Never     Smokeless Tobacco Use: Never     Passive Exposure: Never       Social History     Substance and Sexual Activity   Alcohol Use No        Social History     Substance and Sexual Activity   Drug Use Never    Comment: Tried an edible last week.         Diet/Physical activity:  "counseled on 04/24/25      PHQ-2 Depression Screening  Little interest or pleasure in doing things? Not at all   Feeling down, depressed, or hopeless? Not at all   PHQ-2 Total Score 0         Intimate partner violence: (Screen on initial visit, pregnant women, women with injuries, older adult with injury or evidence of neglect): no concerns  Violence can be a problem in many people's lives, so I now ask every patient about trauma or abuse they may have experienced in a relationship.  Stress/Safety - Do you feel safe in your relationship?  Afraid/Abused - Have you ever been in a relationship where you were threatened, hurt, or afraid?  Friend/Family - Are your friends aware you have been hurt?  Emergency Plan - Do you have a safe place to go and the resources you need in an emergency?    Osteoporosis: no concerns  Ost menopausal women < 65 with RF (advancing age, previous fracture, glucocorticoid therapy, parental hip fracture, low body weight, current cigarette smoking, excessive alcohol consumption, rheumatoid arthritis, secondary osteoporosis [hypogonadism/premature menopause, malabsorption, chronic liver disease, IBD]).  All women 65 or older          Objective   Vital Signs:  /84 (BP Location: Right arm, Patient Position: Sitting, Cuff Size: Adult)   Pulse 74   Temp 98.4 °F (36.9 °C) (Temporal)   Resp 18   Ht 157.5 cm (62\")   Wt 65.1 kg (143 lb 9.6 oz)   BMI 26.26 kg/m²   Physical Exam  Vitals and nursing note reviewed.   Constitutional:       General: She is not in acute distress.     Appearance: Normal appearance. She is normal weight. She is not ill-appearing or toxic-appearing.   HENT:      Nose: No congestion or rhinorrhea.   Eyes:      General:         Right eye: No discharge.         Left eye: No discharge.      Conjunctiva/sclera: Conjunctivae normal.   Cardiovascular:      Rate and Rhythm: Normal rate and regular rhythm.      Heart sounds: Normal heart sounds. No murmur heard.     No " friction rub.   Pulmonary:      Effort: Pulmonary effort is normal. No respiratory distress.      Breath sounds: Normal breath sounds. No wheezing or rhonchi.   Abdominal:      General: Abdomen is flat. Bowel sounds are normal. There is no distension.      Palpations: Abdomen is soft. There is no mass.      Tenderness: There is no abdominal tenderness. There is no guarding or rebound.   Musculoskeletal:      Cervical back: Normal range of motion.      Right lower leg: No edema.      Left lower leg: No edema.   Skin:     Findings: No lesion or rash.   Neurological:      General: No focal deficit present.      Mental Status: She is alert. Mental status is at baseline.      Coordination: Coordination normal.      Gait: Gait normal.   Psychiatric:         Mood and Affect: Mood normal.         Behavior: Behavior normal.         Thought Content: Thought content normal.         Judgment: Judgment normal.         The following data was reviewed by: Lars Fermin MD on 04/24/2025:  Results  - Labs:    - TSH: Slightly off    - Kidney function test: Normal      Common labs          10/9/2024    14:38 11/26/2024    15:05 12/12/2024    14:54   Common Labs   Glucose  86  95    BUN  12  9    Creatinine  0.76  0.98    Sodium  139  141    Potassium  4.3  4.2    Chloride  106  106    Calcium  8.6  8.4    Albumin  4.4  4.0    Total Bilirubin  0.8  0.8    Alkaline Phosphatase  86  78    AST (SGOT)  29  30    ALT (SGPT)  41  39    WBC 4.74  6.39     Hemoglobin 12.9  14.5     Hematocrit 38.7  42.5     Platelets 150  220     Total Cholesterol   109    Triglycerides   190    HDL Cholesterol   31    LDL Cholesterol    47    Microalbumin, Urine   5.8           Assessment and Plan Additional age appropriate preventative wellness advice topics were discussed during today's preventative wellness exam(some topics already addressed during AWV portion of the note above):    Physical Activity: Advised cardiovascular activity 150 minutes per week  as tolerated. (example brisk walk for 30 minutes, 5 days a week).     Nutrition: Discussed nutrition plan with patient. Information shared in after visit summary. Goal is for a well balanced diet to enhance overall health.     Healthy Weight: Discussed current and goal BMI with patient. Steps to attain this goal discussed. Information shared in after visit summary.    Problem List Items Addressed This Visit       Other osteoporosis without current pathological fracture    Relevant Orders    DEXA Bone Density Axial    Hyperlipidemia LDL goal <70    Relevant Orders    Lipid Panel    Anxiety and depression          Overview    Failed multiple medications.  Great relationship with current counselor.        Insomnia    Postoperative hypothyroidism    Overview   Due to remote thyroidectomy for cancer         Relevant Orders    TSH    T4    Stage 2 chronic kidney disease    Overview   5/18/23 and 10/2024:           Relevant Orders    Comprehensive Metabolic Panel    Albuminuria    Overview   Patient experienced significant UTIs with SGLT2         Relevant Orders    Microalbumin / Creatinine Urine Ratio - Urine, Clean Catch    Primary hypertension    Relevant Orders    Comprehensive Metabolic Panel    Non-seasonal allergic rhinitis due to pollen    Family history of cancer    Overview   No additional screening per [facility] testing in 10/2023    Meets high risk screening per [facility] protocol         Relevant Orders    Ambulatory Referral to Genetic Counseling/Testing    Transaminitis    Relevant Orders    Comprehensive Metabolic Panel    Basal cell carcinoma (BCC) of scalp     Other Visit Diagnoses         Medicare annual wellness visit, subsequent    -  Primary    Relevant Orders    Code Status and Medical Interventions:      Healthcare maintenance        Relevant Orders    Comprehensive Metabolic Panel      Encounter for screening mammogram for malignant neoplasm of breast        Relevant Orders    Mammo Screening Digital  Tomosynthesis Bilateral With CAD      Encounter for vaccination        Relevant Orders    Pneumococcal Conjugate Vaccine 20-Valent All            Assessment & Plan  1. Medicare wellness visit  - Colon cancer screening not due until 2029  - Last mammogram in December 2024 showed benign results  - [facility] testing in 2023 did not reveal concerning genetic profiles  - Never smoked and has had a hysterectomy  - Kidney function satisfactory with stage II chronic kidney disease (last checked October 2024)  - Slight presence of protein in urine previously  - Blood pressure readings normal today  - Majority of vaccines coordinated by transplant clinic  - Mammogram scheduled for December 2025  - Referral for genetic counseling placed again  - DEXA scan ordered based on osteoporosis diagnosis and Prolia treatment  - Follow-up bone density test planned for December 2025  - Cholesterol levels to be rechecked to ensure LDL cholesterol is <70  - CMP to be conducted today  - Urine to be checked again for protein presence  - TSH levels to be checked again today  - Pneumonia vaccine to be administered today    2. Basal cell carcinoma  - Reports spreading, itching, and pain  - Will contact dermatologist at [facility] in [place] to expedite Mohs procedure scheduled for June 2025  - Will inform us if any issues with coordination    3. Osteoporosis  - Has not received Prolia injection recently due to dental work  - Infusion center to be notified that orders for Prolia have been signed and dental work is completed, so treatment can resume    4. Anxiety and depression  - Reports feeling better and considering reducing trazodone dosage from 2 tablets to 1.5 tablets  - Mirtazapine significantly helped with racing thoughts at night  - Will continue current regimen of trazodone and mirtazapine    Follow-up  - Patient to follow up in 6 months    Healthcare Maintenance:  Counseling provided based on age appropriate USPSTF  guidelines.       Lizette Frias voices understanding and acceptance of this advice and will call back with any further questions or concerns. AVS with preventive healthcare tips printed for patient.     “Discussed risks/benefits to vaccination, reviewed components of the vaccine, discussed VIS, discussed informed consent, informed consent obtained. Patient/Parent was allowed to accept or refuse vaccine. Questions answered to satisfactory state of patient/Parent. We reviewed typical age appropriate and seasonally appropriate vaccinations. Reviewed immunization history and updated state vaccination form as needed. Patient was counseled on Prevnar 20    Follow Up:   Return in about 6 months (around 10/24/2025) for Recheck.      Lars Fermin MD  [facility]      Patient was given instructions and counseling regarding her condition or for health maintenance advice. Please see specific information pulled into the AVS if appropriate.

## 2025-04-25 LAB
T4 SERPL-MCNC: 11.4 MCG/DL (ref 4.5–11.7)
TSH SERPL DL<=0.05 MIU/L-ACNC: <0.005 UIU/ML (ref 0.27–4.2)

## 2025-04-26 LAB
ALBUMIN UR-MCNC: 2.7 MG/DL
CREAT UR-MCNC: 240.7 MG/DL
MICROALBUMIN/CREAT UR: 11.2 MG/G (ref 0–29)

## 2025-04-29 ENCOUNTER — RESULTS FOLLOW-UP (OUTPATIENT)
Dept: INTERNAL MEDICINE | Facility: CLINIC | Age: 60
End: 2025-04-29
Payer: MEDICARE

## 2025-04-30 NOTE — TELEPHONE ENCOUNTER
Left voice mail message for Pt to call back    Relay  Sounds good. I would recommend decreasing the dose of her Synthroid slightly and this has been sent to her pharmacy. Thanks

## 2025-05-14 ENCOUNTER — TELEPHONE (OUTPATIENT)
Dept: INTERNAL MEDICINE | Facility: CLINIC | Age: 60
End: 2025-05-14
Payer: MEDICARE

## 2025-05-14 NOTE — TELEPHONE ENCOUNTER
Caller: YAZMIN NAJERA    Relationship: Other    Best call back number: [phone]    What orders are you requesting (i.e. lab or imaging): ORDER FOR GENETIC TESTING       Where will you receive your lab/imaging services: [facility] FAX NUMBER [phone]    Additional notes: THE CALLER STATES THAT THEY RECEIVED A SPECIMEN FOR THE PATIENT BUT THEY DID NOT GET AN ORDER PLEASE FAX THAT ORDER TO [facility]

## 2025-05-22 ENCOUNTER — TELEPHONE (OUTPATIENT)
Dept: INTERNAL MEDICINE | Facility: CLINIC | Age: 60
End: 2025-05-22
Payer: MEDICARE

## 2025-05-22 NOTE — TELEPHONE ENCOUNTER
Caller: YAZMIN NAJERA     Relationship: Other     Best call back number: [phone]     What orders are you requesting (i.e. lab or imaging): ORDER FOR GENETIC TESTING         Where will you receive your lab/imaging services: [facility] FAX NUMBER [phone]     Additional notes: THE CALLER STATES THAT THEY RECEIVED A SPECIMEN FOR THE PATIENT BUT THEY DID NOT GET AN ORDER. PLEASE FAX THAT ORDER TO [facility]. THEY HAVE REACHED OUT PRIOR BUT HAVE YET TO RECEIVE AN ORDER FOR THIS TEST.

## 2025-05-26 DIAGNOSIS — K21.00 GASTROESOPHAGEAL REFLUX DISEASE WITH ESOPHAGITIS WITHOUT HEMORRHAGE: ICD-10-CM

## 2025-05-27 RX ORDER — PANTOPRAZOLE SODIUM 40 MG/1
TABLET, DELAYED RELEASE ORAL
Qty: 180 TABLET | OUTPATIENT
Start: 2025-05-27

## 2025-05-30 DIAGNOSIS — Z94.4 LIVER TRANSPLANT RECIPIENT: ICD-10-CM

## 2025-05-30 DIAGNOSIS — F41.9 ANXIETY DISORDER, UNSPECIFIED: ICD-10-CM

## 2025-05-30 RX ORDER — SPIRONOLACTONE 25 MG/1
25 TABLET ORAL DAILY
Qty: 90 TABLET | Refills: 1 | OUTPATIENT
Start: 2025-05-30

## 2025-05-30 RX ORDER — BUPROPION HYDROCHLORIDE 300 MG/1
300 TABLET ORAL EVERY MORNING
Qty: 90 TABLET | Refills: 3 | OUTPATIENT
Start: 2025-05-30

## 2025-06-03 ENCOUNTER — TELEPHONE (OUTPATIENT)
Dept: INTERNAL MEDICINE | Facility: CLINIC | Age: 60
End: 2025-06-03
Payer: MEDICARE

## 2025-06-03 NOTE — TELEPHONE ENCOUNTER
Caller: WILLIAM    Relationship to patient: [employer].     Best call back number: [phone]     Patient is needing: RECEIVED SAMPLE FOR TESTING, NO ORDER PLACE. PLEASE FAX ORDER [phone] THEY WILL NOT PROCEED WITH TESTING WITHOUT AN ORDER.

## 2025-06-16 ENCOUNTER — APPOINTMENT (OUTPATIENT)
Facility: HOSPITAL | Age: 60
End: 2025-06-16
Payer: MEDICARE

## 2025-06-16 ENCOUNTER — HOSPITAL ENCOUNTER (EMERGENCY)
Facility: HOSPITAL | Age: 60
Discharge: HOME | End: 2025-06-16
Attending: EMERGENCY MEDICINE | Admitting: EMERGENCY MEDICINE
Payer: MEDICARE

## 2025-06-16 VITALS
WEIGHT: 147.2 LBS | BODY MASS INDEX: 27.79 KG/M2 | HEART RATE: 68 BPM | SYSTOLIC BLOOD PRESSURE: 104 MMHG | DIASTOLIC BLOOD PRESSURE: 72 MMHG | HEIGHT: 61 IN | RESPIRATION RATE: 18 BRPM | TEMPERATURE: 98.9 F | OXYGEN SATURATION: 95 %

## 2025-06-16 DIAGNOSIS — H60.501 ACUTE OTITIS EXTERNA OF RIGHT EAR, UNSPECIFIED TYPE: ICD-10-CM

## 2025-06-16 DIAGNOSIS — R42 VERTIGO: Primary | ICD-10-CM

## 2025-06-16 LAB
ALBUMIN SERPL-MCNC: 4.3 G/DL (ref 3.5–5.2)
ALBUMIN/GLOB SERPL: 2.2 G/DL
ALP SERPL-CCNC: 94 U/L (ref 39–117)
ALT SERPL W P-5'-P-CCNC: 23 U/L (ref 1–33)
AMORPH URATE CRY URNS QL MICRO: NORMAL /HPF
ANION GAP SERPL CALCULATED.3IONS-SCNC: 8.9 MMOL/L (ref 5–15)
AST SERPL-CCNC: 22 U/L (ref 1–32)
BACTERIA UR QL AUTO: NORMAL /HPF
BASOPHILS # BLD AUTO: 0.04 10*3/MM3 (ref 0–0.2)
BASOPHILS NFR BLD AUTO: 0.6 % (ref 0–1.5)
BILIRUB SERPL-MCNC: 0.9 MG/DL (ref 0–1.2)
BILIRUB UR QL STRIP: NEGATIVE
BUN SERPL-MCNC: 20.3 MG/DL (ref 8–23)
BUN/CREAT SERPL: 22.1 (ref 7–25)
CALCIUM SPEC-SCNC: 9.3 MG/DL (ref 8.6–10.5)
CHLORIDE SERPL-SCNC: 106 MMOL/L (ref 98–107)
CLARITY UR: CLEAR
CO2 SERPL-SCNC: 24.1 MMOL/L (ref 22–29)
COLOR UR: YELLOW
CREAT SERPL-MCNC: 0.92 MG/DL (ref 0.57–1)
DEPRECATED RDW RBC AUTO: 40.9 FL (ref 37–54)
EGFRCR SERPLBLD CKD-EPI 2021: 71 ML/MIN/1.73
EOSINOPHIL # BLD AUTO: 0.07 10*3/MM3 (ref 0–0.4)
EOSINOPHIL NFR BLD AUTO: 1.1 % (ref 0.3–6.2)
ERYTHROCYTE [DISTWIDTH] IN BLOOD BY AUTOMATED COUNT: 13.1 % (ref 12.3–15.4)
GLOBULIN UR ELPH-MCNC: 2 GM/DL
GLUCOSE SERPL-MCNC: 98 MG/DL (ref 65–99)
GLUCOSE UR STRIP-MCNC: NEGATIVE MG/DL
HCT VFR BLD AUTO: 38.5 % (ref 34–46.6)
HGB BLD-MCNC: 13.3 G/DL (ref 12–15.9)
HGB UR QL STRIP.AUTO: ABNORMAL
HOLD SPECIMEN: NORMAL
HOLD SPECIMEN: NORMAL
HYALINE CASTS UR QL AUTO: NORMAL /LPF
IMM GRANULOCYTES # BLD AUTO: 0.05 10*3/MM3 (ref 0–0.05)
IMM GRANULOCYTES NFR BLD AUTO: 0.8 % (ref 0–0.5)
KETONES UR QL STRIP: NEGATIVE
LEUKOCYTE ESTERASE UR QL STRIP.AUTO: NEGATIVE
LYMPHOCYTES # BLD AUTO: 3.2 10*3/MM3 (ref 0.7–3.1)
LYMPHOCYTES NFR BLD AUTO: 50.9 % (ref 19.6–45.3)
MCH RBC QN AUTO: 29.5 PG (ref 26.6–33)
MCHC RBC AUTO-ENTMCNC: 34.5 G/DL (ref 31.5–35.7)
MCV RBC AUTO: 85.4 FL (ref 79–97)
MONOCYTES # BLD AUTO: 0.47 10*3/MM3 (ref 0.1–0.9)
MONOCYTES NFR BLD AUTO: 7.5 % (ref 5–12)
MUCOUS THREADS URNS QL MICRO: NORMAL /HPF
NEUTROPHILS NFR BLD AUTO: 2.46 10*3/MM3 (ref 1.7–7)
NEUTROPHILS NFR BLD AUTO: 39.1 % (ref 42.7–76)
NITRITE UR QL STRIP: NEGATIVE
PH UR STRIP.AUTO: 5.5 [PH] (ref 5–8)
PLATELET # BLD AUTO: 153 10*3/MM3 (ref 140–450)
PMV BLD AUTO: 11 FL (ref 6–12)
POTASSIUM SERPL-SCNC: 4.4 MMOL/L (ref 3.5–5.2)
PROT SERPL-MCNC: 6.3 G/DL (ref 6–8.5)
PROT UR QL STRIP: NEGATIVE
RBC # BLD AUTO: 4.51 10*6/MM3 (ref 3.77–5.28)
RBC # UR STRIP: NORMAL /HPF
REF LAB TEST METHOD: NORMAL
SODIUM SERPL-SCNC: 139 MMOL/L (ref 136–145)
SP GR UR STRIP: >=1.03 (ref 1–1.03)
SQUAMOUS #/AREA URNS HPF: NORMAL /HPF
UROBILINOGEN UR QL STRIP: ABNORMAL
WBC # UR STRIP: NORMAL /HPF
WBC NRBC COR # BLD AUTO: 6.29 10*3/MM3 (ref 3.4–10.8)
WHOLE BLOOD HOLD COAG: NORMAL

## 2025-06-16 PROCEDURE — 25010000002 ONDANSETRON PER 1 MG: Performed by: EMERGENCY MEDICINE

## 2025-06-16 PROCEDURE — 85025 COMPLETE CBC W/AUTO DIFF WBC: CPT | Performed by: EMERGENCY MEDICINE

## 2025-06-16 PROCEDURE — 99285 EMERGENCY DEPT VISIT HI MDM: CPT | Performed by: EMERGENCY MEDICINE

## 2025-06-16 PROCEDURE — 80053 COMPREHEN METABOLIC PANEL: CPT | Performed by: EMERGENCY MEDICINE

## 2025-06-16 PROCEDURE — 25510000001 IOPAMIDOL PER 1 ML: Performed by: EMERGENCY MEDICINE

## 2025-06-16 PROCEDURE — 81001 URINALYSIS AUTO W/SCOPE: CPT | Performed by: EMERGENCY MEDICINE

## 2025-06-16 PROCEDURE — 70498 CT ANGIOGRAPHY NECK: CPT

## 2025-06-16 PROCEDURE — 25810000003 LACTATED RINGERS SOLUTION: Performed by: EMERGENCY MEDICINE

## 2025-06-16 PROCEDURE — 96375 TX/PRO/DX INJ NEW DRUG ADDON: CPT

## 2025-06-16 PROCEDURE — 96374 THER/PROPH/DIAG INJ IV PUSH: CPT

## 2025-06-16 PROCEDURE — 70496 CT ANGIOGRAPHY HEAD: CPT

## 2025-06-16 PROCEDURE — 25010000002 KETOROLAC TROMETHAMINE PER 15 MG: Performed by: EMERGENCY MEDICINE

## 2025-06-16 RX ORDER — ONDANSETRON 2 MG/ML
4 INJECTION INTRAMUSCULAR; INTRAVENOUS ONCE
Status: COMPLETED | OUTPATIENT
Start: 2025-06-16 | End: 2025-06-16

## 2025-06-16 RX ORDER — IOPAMIDOL 755 MG/ML
100 INJECTION, SOLUTION INTRAVASCULAR
Status: COMPLETED | OUTPATIENT
Start: 2025-06-16 | End: 2025-06-16

## 2025-06-16 RX ORDER — OFLOXACIN 3 MG/ML
5 SOLUTION AURICULAR (OTIC) DAILY
Qty: 5 ML | Refills: 0 | Status: SHIPPED | OUTPATIENT
Start: 2025-06-16 | End: 2025-07-06

## 2025-06-16 RX ORDER — BUTALBITAL, ACETAMINOPHEN AND CAFFEINE 50; 325; 40 MG/1; MG/1; MG/1
2 TABLET ORAL ONCE
Status: COMPLETED | OUTPATIENT
Start: 2025-06-16 | End: 2025-06-16

## 2025-06-16 RX ORDER — MECLIZINE HYDROCHLORIDE 25 MG/1
50 TABLET ORAL ONCE
Status: COMPLETED | OUTPATIENT
Start: 2025-06-16 | End: 2025-06-16

## 2025-06-16 RX ORDER — SODIUM CHLORIDE 0.9 % (FLUSH) 0.9 %
10 SYRINGE (ML) INJECTION AS NEEDED
Status: DISCONTINUED | OUTPATIENT
Start: 2025-06-16 | End: 2025-06-16 | Stop reason: HOSPADM

## 2025-06-16 RX ORDER — KETOROLAC TROMETHAMINE 15 MG/ML
15 INJECTION, SOLUTION INTRAMUSCULAR; INTRAVENOUS ONCE
Status: COMPLETED | OUTPATIENT
Start: 2025-06-16 | End: 2025-06-16

## 2025-06-16 RX ORDER — MECLIZINE HYDROCHLORIDE 25 MG/1
50 TABLET ORAL 2 TIMES DAILY
Qty: 16 TABLET | Refills: 0 | Status: SHIPPED | OUTPATIENT
Start: 2025-06-16

## 2025-06-16 RX ADMIN — MECLIZINE HYDROCHLORIDE 50 MG: 25 TABLET ORAL at 15:07

## 2025-06-16 RX ADMIN — SODIUM CHLORIDE, POTASSIUM CHLORIDE, SODIUM LACTATE AND CALCIUM CHLORIDE 1000 ML: 600; 310; 30; 20 INJECTION, SOLUTION INTRAVENOUS at 14:07

## 2025-06-16 RX ADMIN — ONDANSETRON 4 MG: 2 INJECTION INTRAMUSCULAR; INTRAVENOUS at 15:07

## 2025-06-16 RX ADMIN — IOPAMIDOL 75 ML: 755 INJECTION, SOLUTION INTRAVENOUS at 14:58

## 2025-06-16 RX ADMIN — KETOROLAC TROMETHAMINE 15 MG: 15 INJECTION, SOLUTION INTRAMUSCULAR; INTRAVENOUS at 15:57

## 2025-06-16 RX ADMIN — BUTALBITAL, ACETAMINOPHEN, AND CAFFEINE 2 TABLET: 325; 50; 40 TABLET ORAL at 15:58

## 2025-06-16 NOTE — FSED PROVIDER NOTE
Subjective  History of Present Illness:    Patient presents to the emerged department with what she describes as dizziness.  She was seen here by the urgent care physician for further evaluation and treatment.  She states that she has had symptoms for the last several days.  Denies any numbness, tingling or weakness in any of her arms or legs.  Also was told that she has a right ear infection prior to being sent here today.  Denies any fever, chills, cough or congestion.  Does have a complicated past medical history which includes liver transplant many years ago.  Describes 1 week of a headache prior to the dizziness starting.      Nurses Notes reviewed and agree, including vitals, allergies, social history and prior medical history.     REVIEW OF SYSTEMS: All systems reviewed and not pertinent unless noted.  Review of Systems   HENT:  Positive for ear pain.    Neurological:  Positive for dizziness.   All other systems reviewed and are negative.      Past Medical History:   Diagnosis Date    Acromioclavicular separation     Acute hypoxemic respiratory failure due to COVID-19 02/01/2022    Allergic     Environmental, pcn, cyclosporine    Anemia     Ankle sprain     Anxiety     Arthritis of back     Bleeding disorder     Bursitis of hip     Cervical disc disorder     Cholelithiasis     Chronic obstructive pulmonary disease, unspecified 12/03/2021    Chronic pain disorder     Chronic systolic (congestive) heart failure 12/03/2021    Cirrhosis of liver     Clotting disorder     Pre-transplant    Colon polyp     COVID     CTS (carpal tunnel syndrome)     Deep vein thrombosis Pretransplant    Blood clot in liver    Depression     Diabetes     Dislocation, shoulder     Endometriosis     Fracture of ankle     Fracture of hip     Fracture of wrist     Fracture, foot     Fractures     Frozen shoulder     GERD (gastroesophageal reflux disease)     Gout     Headache     Headache, tension-type     Heart murmur 1969    Hip  arthrosis     History of transfusion     HL (hearing loss)     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, mastoid damage    Hypercholesteremia     Hypertension     Hyperthyroidism     Hypothyroid     Joint pain     Knee swelling     Low back pain 1982    Low back strain     Lumbosacral disc disease     Memory loss     Meningioma     Migraine     Neck strain     Osteoarthritis     Osteopenia     Osteoporosis     Ovarian cyst     Pancreatitis     Pretransplant    Periarthritis of shoulder     Peripheral neuropathy     PMS (premenstrual syndrome)     PONV (postoperative nausea and vomiting)     Prediabetes 2022    Renal impairment     Rheumatoid arthritis involving multiple sites with positive rheumatoid factor 2024    Severe malnutrition (CMS/HCC) 2022    Shingles     Skin cancer     Sleep apnea     Stress fracture     Stroke     Tennis elbow     Thyroid cancer     Vision impairment     left eye       Allergies:    Penicillins, Cephalosporins, and Cyclosporine      Past Surgical History:   Procedure Laterality Date    ANKLE OPEN REDUCTION INTERNAL FIXATION      APPENDECTOMY      BREAST BIOPSY Left     BENIGN     SECTION      x3    CHOLECYSTECTOMY      COLONOSCOPY      D & C HYSTEROSCOPY LAPAROSCOPY      x2 for endometriosis    ENDOSCOPY      FRACTURE SURGERY  1983    Right ankle has screws    HAND SURGERY      LIVER SURGERY      removed cysts     LIVER TRANSPLANTATION      ORIF ANKLE FRACTURE      ORTHOPEDIC SURGERY      THYROIDECTOMY      TONSILLECTOMY      TOTAL ABDOMINAL HYSTERECTOMY WITH SALPINGO OOPHORECTOMY Bilateral     TRIGGER POINT INJECTION      UPPER GASTROINTESTINAL ENDOSCOPY      WISDOM TOOTH EXTRACTION           Social History     Socioeconomic History    Marital status:    Tobacco Use    Smoking status: Never     Passive exposure: Never    Smokeless tobacco: Never   Vaping Use    Vaping status: Never Used   Substance and Sexual Activity    Alcohol use: No    Drug use: Never      "Comment: Tried an edible last week.     Sexual activity: Not Currently     Partners: Male     Birth control/protection: Post-menopausal, Surgical         Family History   Problem Relation Age of Onset    Arthritis Mother     Osteoporosis Mother         stage 4 cancer,    Rheum arthritis Mother     Anemia Mother     Broken bones Mother         Ankle    Rheumatologic disease Mother     Scoliosis Mother     Dementia Mother     Squamous cell carcinoma Mother     Alcohol abuse Father     Mental illness Father     Hyperlipidemia Father     Hypertension Father     Colon cancer Father     Cancer Father     Hearing loss Father     Broken bones Father         Shoulder    Broken bones Brother         Elbow    Breast cancer Maternal Grandmother     Stroke Maternal Grandmother     Neuropathy Maternal Grandmother     Mental illness Paternal Grandmother     Ovarian cancer Paternal Grandmother     ADD / ADHD Son     ADD / ADHD Son     Ovarian cancer Maternal Aunt     Ovarian cancer Paternal Aunt        Objective  Physical Exam:  /72   Pulse 72   Temp 98.9 °F (37.2 °C) (Oral)   Resp 18   Ht 154.9 cm (61\")   Wt 66.8 kg (147 lb 3.2 oz)   LMP  (LMP Unknown) Comment: Last pap 3/3/2022 [facility]  SpO2 97%   BMI 27.81 kg/m²      Physical Exam  Vitals and nursing note reviewed.   Constitutional:       General: She is not in acute distress.     Appearance: Normal appearance. She is normal weight. She is not ill-appearing, toxic-appearing or diaphoretic.   HENT:      Head: Normocephalic and atraumatic.      Ears:      Comments: Patient has otitis externa on the right with some erythema to the TM.  There is some thick drainage deep within the external auditory canal.  Eyes:      Extraocular Movements: Extraocular movements intact.      Conjunctiva/sclera: Conjunctivae normal.      Pupils: Pupils are equal, round, and reactive to light.   Cardiovascular:      Rate and Rhythm: Normal rate and regular rhythm. "   Pulmonary:      Effort: Pulmonary effort is normal.      Breath sounds: Normal breath sounds.   Abdominal:      General: Abdomen is flat.      Palpations: Abdomen is soft.      Comments: Mild generalized abdominal tenderness without guarding or rebound.  Normal bowel sounds.  Nonsurgical abdomen.   Musculoskeletal:         General: Normal range of motion.   Skin:     General: Skin is warm and dry.      Capillary Refill: Capillary refill takes less than 2 seconds.   Neurological:      General: No focal deficit present.      Mental Status: She is alert and oriented to person, place, and time.      Sensory: No sensory deficit.      Motor: No weakness.   Psychiatric:         Mood and Affect: Mood normal.         Behavior: Behavior normal.         Thought Content: Thought content normal.         Judgment: Judgment normal.         Procedures    ED Course:         Lab Results (last 24 hours)       Procedure Component Value Units Date/Time    Urinalysis With Microscopic If Indicated (No Culture) - Urine, Clean Catch [[id number]]  (Abnormal) Collected: 06/16/25 1356    Specimen: Urine, Clean Catch Updated: 06/16/25 1410     Color, UA Yellow     Appearance, UA Clear     pH, UA 5.5     Specific Gravity, UA >=1.030     Glucose, UA Negative     Ketones, UA Negative     Bilirubin, UA Negative     Blood, UA Trace     Protein, UA Negative     Leuk Esterase, UA Negative     Nitrite, UA Negative     Urobilinogen, UA 0.2 E.U./dL    Urinalysis, Microscopic Only - Urine, Clean Catch [[id number]] Collected: 06/16/25 1356    Specimen: Urine, Clean Catch Updated: 06/16/25 1413     RBC, UA 0-2 /HPF      WBC, UA 0-2 /HPF      Bacteria, UA None Seen /HPF      Squamous Epithelial Cells, UA 0-2 /HPF      Hyaline Casts, UA None Seen /LPF      Amorphous Crystals, UA Small/1+ /HPF      Mucus, UA Trace /HPF      Methodology Manual Light Microscopy    Comprehensive Metabolic Panel [[id number]] Collected: 06/16/25 1407    Specimen: Blood Updated:  06/16/25 1429     Glucose 98 mg/dL      BUN 20.3 mg/dL      Creatinine 0.92 mg/dL      Sodium 139 mmol/L      Potassium 4.4 mmol/L      Chloride 106 mmol/L      CO2 24.1 mmol/L      Calcium 9.3 mg/dL      Total Protein 6.3 g/dL      Albumin 4.3 g/dL      ALT (SGPT) 23 U/L      AST (SGOT) 22 U/L      Alkaline Phosphatase 94 U/L      Total Bilirubin 0.9 mg/dL      Globulin 2.0 gm/dL      A/G Ratio 2.2 g/dL      BUN/Creatinine Ratio 22.1     Anion Gap 8.9 mmol/L      eGFR 71.0 mL/min/1.73     Narrative:      GFR Categories in Chronic Kidney Disease (CKD)              GFR Category          GFR (mL/min/1.73)    Interpretation  G1                    90 or greater        Normal or high (1)  G2                    60-89                Mild decrease (1)  G3a                   45-59                Mild to moderate decrease  G3b                   30-44                Moderate to severe decrease  G4                    15-29                Severe decrease  G5                    14 or less           Kidney failure    (1)In the absence of evidence of kidney disease, neither GFR category G1 or G2 fulfill the criteria for CKD.    eGFR calculation 2021 CKD-EPI creatinine equation, which does not include race as a factor    CBC & Differential [[id number]]  (Abnormal) Collected: 06/16/25 1407    Specimen: Blood Updated: 06/16/25 1412    Narrative:      The following orders were created for panel order CBC & Differential.  Procedure                               Abnormality         Status                     ---------                               -----------         ------                     CBC Auto Differential[[id number]]        Abnormal            Final result                 Please view results for these tests on the individual orders.    CBC Auto Differential [[id number]]  (Abnormal) Collected: 06/16/25 1407    Specimen: Blood Updated: 06/16/25 1412     WBC 6.29 10*3/mm3      RBC 4.51 10*6/mm3      Hemoglobin 13.3 g/dL       Hematocrit 38.5 %      MCV 85.4 fL      MCH 29.5 pg      MCHC 34.5 g/dL      RDW 13.1 %      RDW-SD 40.9 fl      MPV 11.0 fL      Platelets 153 10*3/mm3      Neutrophil % 39.1 %      Lymphocyte % 50.9 %      Monocyte % 7.5 %      Eosinophil % 1.1 %      Basophil % 0.6 %      Immature Grans % 0.8 %      Neutrophils, Absolute 2.46 10*3/mm3      Lymphocytes, Absolute 3.20 10*3/mm3      Monocytes, Absolute 0.47 10*3/mm3      Eosinophils, Absolute 0.07 10*3/mm3      Basophils, Absolute 0.04 10*3/mm3      Immature Grans, Absolute 0.05 10*3/mm3              CT Angiogram Head  Result Date: 6/16/2025  CT ANGIOGRAM NECK, CT ANGIOGRAM HEAD Date of Exam: 6/16/2025 2:40 PM EDT Indication: dizziness, 7 day Hx of HA. Comparison: CT head from August 3, 2024 Technique: CTA of the head and neck was performed before and after the uneventful intravenous administration of 75 mL Isovue-370. Reconstructed coronal and sagittal images were also obtained. In addition, a 3-D volume rendered image was created for interpretation. Automated exposure control and iterative reconstruction methods were used. Findings: There is a two-vessel aortic arch with common origin of the innominate and left common carotid arteries, normal variant. The right common carotid artery is widely patent. There is partially calcified plaque along the right carotid bifurcation that is not  hemodynamically significant. The right internal carotid artery is widely patent. The left common carotid artery is widely patent with mild plaque distally. There is partially calcified plaque along the left carotid bifurcation that is not hemodynamically significant. The left internal carotid artery is widely patent. The right vertebral artery is widely patent. The left vertebral artery is widely patent. The left vertebral is slightly dominant. The bilateral middle cerebral, bilateral anterior cerebral, and anterior communicating arteries are widely patent. The basilar and bilateral  posterior cerebral arteries are widely patent. There is a patent right posterior communicating artery. No definite left posterior communicating artery is identified. No intracranial aneurysm is identified. The visualized portions of the bilateral superior cerebellar, anteroinferior cerebellar, and posterior inferior cerebellar arteries are unremarkable.     Impression: Impression: Major arterial vasculature within head and neck appears widely patent, with no hemodynamically significant stenosis, dissection, thrombus, or aneurysm. Electronically Signed: Myles Blakely MD  6/16/2025 3:34 PM EDT  Workstation ID: [id number]    CT Angiogram Neck  Result Date: 6/16/2025  CT ANGIOGRAM NECK, CT ANGIOGRAM HEAD Date of Exam: 6/16/2025 2:40 PM EDT Indication: dizziness, 7 day Hx of HA. Comparison: CT head from August 3, 2024 Technique: CTA of the head and neck was performed before and after the uneventful intravenous administration of 75 mL Isovue-370. Reconstructed coronal and sagittal images were also obtained. In addition, a 3-D volume rendered image was created for interpretation. Automated exposure control and iterative reconstruction methods were used. Findings: There is a two-vessel aortic arch with common origin of the innominate and left common carotid arteries, normal variant. The right common carotid artery is widely patent. There is partially calcified plaque along the right carotid bifurcation that is not  hemodynamically significant. The right internal carotid artery is widely patent. The left common carotid artery is widely patent with mild plaque distally. There is partially calcified plaque along the left carotid bifurcation that is not hemodynamically significant. The left internal carotid artery is widely patent. The right vertebral artery is widely patent. The left vertebral artery is widely patent. The left vertebral is slightly dominant. The bilateral middle cerebral, bilateral anterior cerebral, and  anterior communicating arteries are widely patent. The basilar and bilateral posterior cerebral arteries are widely patent. There is a patent right posterior communicating artery. No definite left posterior communicating artery is identified. No intracranial aneurysm is identified. The visualized portions of the bilateral superior cerebellar, anteroinferior cerebellar, and posterior inferior cerebellar arteries are unremarkable.     Impression: Impression: Major arterial vasculature within head and neck appears widely patent, with no hemodynamically significant stenosis, dissection, thrombus, or aneurysm. Electronically Signed: Myles Blakely MD  6/16/2025 3:34 PM EDT  Workstation ID: [id number]         [facility]  Number of Diagnoses or Management Options  Acute otitis externa of right ear, unspecified type  Vertigo  Diagnosis management comments: Patient was evaluated and labs are drawn.  CTA of the head and neck was negative.  This may be related to her recent illness causing the headache and otitis externa on the right.  Treated with ofloxacin and given a migraine cocktail prior to discharge.  Family improved condition and was encouraged to follow-up with her primary care physician on outpatient basis and will return to the emergency department with any worsening symptoms       Amount and/or Complexity of Data Reviewed  Clinical lab tests: reviewed        Medications   sodium chloride 0.9 % flush 10 mL (has no administration in time range)   lactated ringers bolus 1,000 mL (0 mL Intravenous Stopped 6/16/25 1558)   meclizine (ANTIVERT) tablet 50 mg (50 mg Oral Given 6/16/25 1507)   ondansetron (ZOFRAN) injection 4 mg (4 mg Intravenous Given 6/16/25 1507)   iopamidol (ISOVUE-370) 76 % injection 100 mL (75 mL Intravenous Given 6/16/25 1458)   ketorolac (TORADOL) injection 15 mg (15 mg Intravenous Given 6/16/25 1557)   butalbital-acetaminophen-caffeine (FIORICET, ESGIC) -40 MG per tablet 2 tablet (2 tablets Oral  Given 6/16/25 1853)       Data interpreted: Nursing notes reviewed, vital signs reviewed.  Labs independently interpreted by me (CBC, CMP, lipase, UA, troponin, ABG, lactic acid, procalcitonin).  Imaging independently interpreted by me (x-ray, CT scan).  EKG independently interpreted by me.  O2 saturation:    Counseling: Discussed the results above with the patient regarding need for admission or discharge.  Patient understands and agrees plan of care.      -----  ED Disposition       ED Disposition   Discharge    Condition   Stable    Comment   --             Final diagnoses:   Vertigo   Acute otitis externa of right ear, unspecified type      Your Follow-Up Providers       Lars Fermin MD. Call in 2 days.    Specialty: Family Medicine  [address]  [address]  [place] [id number] [phone]               [facility] EMERGENCY DEPARTMENT [place].    Specialty: Emergency Medicine  Follow up details: As needed  [address]  [facility] [address] [phone]                     Contact information for after-discharge care    Follow-up information has not been specified.                    Your medication list        START taking these medications        Instructions Last Dose Given Next Dose Due   meclizine 25 MG tablet  Commonly known as: ANTIVERT      Take 2 tablets by mouth 3 (Three) Times a Day As Needed for Dizziness       ofloxacin 0.3 % otic solution  Commonly known as: FLOXIN      Administer 5 drops to the right ear Daily for 7 days.              CONTINUE taking these medications        Instructions Last Dose Given Next Dose Due   Azelastine-Fluticasone 137-50 MCG/ACT suspension      spray 1 spray into each nostril twice a day       buPROPion  MG 24 hr tablet  Commonly known as: Wellbutrin XL      Take 1 tablet by mouth Daily.       calcium carbonate-vitamin d 600-400 MG-UNIT per tablet      Take 1 tablet by mouth Daily.       cetirizine 10 MG  tablet  Commonly known as: zyrTEC      Take 1 tablet by mouth Daily.       Cholecalciferol 50 MCG (2000 UT) capsule      Take 1 tablet by mouth Daily.       [facility] Melatonin 5 MG tablet tablet  Generic drug: melatonin      TAKE 1 TABLET BY MOUTH EVERY DAY AT NIGHT       doxycycline 100 MG capsule  Commonly known as: VIBRAMYCIN      TAKE 1 CAPSULE TWICE A DAY BY ORAL ROUTE WITH MEAL(S) FOR 5 DAYS.       levothyroxine 175 MCG tablet  Commonly known as: SYNTHROID, LEVOTHROID      Take 1 tablet by mouth Every Morning.       Synthroid 200 MCG tablet  Generic drug: levothyroxine           mirtazapine 15 MG tablet  Commonly known as: REMERON      Take 1 tablet by mouth every night at bedtime.       pantoprazole 40 MG EC tablet  Commonly known as: PROTONIX      Take 1 tablet by mouth Daily.       Prolia 60 MG/ML solution prefilled syringe syringe  Generic drug: denosumab      TO BE ADMINISTERED IN PHYSICIAN'S OFFICE. INJECT ONE SYRINGE SUBCUTANEOUSLY ONCE EVERY 6 MONTHS. REFRIGERATE. USE WITHIN 14 DAYS ONCE AT ROOM TEMPERATURE.       rOPINIRole 4 MG tablet  Commonly known as: REQUIP      Take 1 tablet by mouth every night at bedtime.       rosuvastatin 20 MG tablet  Commonly known as: CRESTOR      Take 1 tablet by mouth every night at bedtime.       SEROQUEL PO      Take  by mouth.       tacrolimus 0.5 MG capsule  Commonly known as: PROGRAF      Take 2 capsules by mouth 2 (Two) Times a Day.       telmisartan 20 MG tablet  Commonly known as: MICARDIS      TAKE 1 TABLET BY MOUTH EVERY DAY       traZODone 100 MG tablet  Commonly known as: DESYREL      Take 1.5 tablets by mouth Every Night.                 Where to Get Your Medications        These medications were sent to [facility] [address], [address], [place] [address]      Hours: Monday to Friday 9 AM to 5:30 PM Phone: [phone]   meclizine 25 MG tablet  ofloxacin 0.3 % otic solution

## 2025-06-18 ENCOUNTER — PATIENT OUTREACH (OUTPATIENT)
Dept: CASE MANAGEMENT | Facility: OTHER | Age: 60
End: 2025-06-18
Payer: MEDICARE

## 2025-06-18 NOTE — OUTREACH NOTE
AMBULATORY CASE MANAGEMENT NOTE    Names and Relationships of Patient/Support Persons: Contact: Lizette Frias; Relationship: Self -     Patient Outreach  RN-ACM outreach with patient. Discussed 6/16/25 ED visit regarding vertigo and acute otitis externa of right ear. Patient treated and discharged home. Patient states to be compliant with ED recommendation; taking medication and using ear drops as directed and states dizziness has improved. Patient states to have episodes of headaches to right side of head and across forehead. Patient states to have nasal drainage. Patient states no difficulty with hearing; fever; chest pain; SOB; appetite or sleeping. Patient states to be compliant with medications; medical appointments having 6/27/25 GI appointment. Patient states improvement regarding constipation. Reviewed with patient ED AVS recommendations; education; role of RN-ACM and HRCM case management services. Patient verbalized understanding. Patient states to appreciate outreach and declines needs for further outreach at this time. No further questions voiced at this time.   Adult Patient Profile  Questions/Answers      Flowsheet Row Most Recent Value   Symptoms/Conditions Managed at Home other (see comments)  [vertigo,  History of liver transplant CAD,  HTN]   Barriers to Taking Medication as Prescribed none   Name of Support/Comfort Primary Source Patient states to have assistance as needed   People in Home alone        Social Work Assessment  Questions/Answers      Flowsheet Row Most Recent Value   People in Home alone   Functional Status Comments Patient states to be independent with ADL's,  transportation and ambulating without assistive device        Send Education  Questions/Answers      Flowsheet Row Most Recent Value   Annual Wellness Visit:  Patient Has Completed   Other Patient Education/Resources  24/7 [facility] Nurse Call Line, Advanced Care Planning, [facility]   24/7 Nurse Call Line Education  Method Verbal   ACP Education Method Verbal   Advanced Directives: Patient Has        SDOH updated and reviewed with the patient during this program:  --     Food Insecurity: No Food Insecurity (6/18/2025)    Hunger Vital Sign     Worried About Running Out of Food in the Last Year: Never true     Ran Out of Food in the Last Year: Never true      --     Transportation Needs: No Transportation Needs (6/18/2025)    PRAPARE - Transportation     Lack of Transportation (Medical): No     Lack of Transportation (Non-Medical): No       Education Documentation  Unresolved/Worsening Symptoms, taught by Johana Meyer, RN at 6/18/2025 11:32 AM.  Learner: Patient  Readiness: Acceptance  Method: Explanation  Response: Verbalizes Understanding    Safety, taught by Johana Meyer, RN at 6/18/2025 11:32 AM.  Learner: Patient  Readiness: Acceptance  Method: Explanation  Response: Verbalizes Understanding    Home Safety, taught by Johana Meyer, RN at 6/18/2025 11:32 AM.  Learner: Patient  Readiness: Acceptance  Method: Explanation  Response: Verbalizes Understanding          Johana YOUSSEF  Ambulatory Case Management    6/18/2025, 11:33 EDT

## 2025-07-15 ENCOUNTER — TELEPHONE (OUTPATIENT)
Dept: INTERNAL MEDICINE | Facility: CLINIC | Age: 60
End: 2025-07-15
Payer: MEDICARE

## 2025-07-15 NOTE — TELEPHONE ENCOUNTER
Caller: LIZBET    Relationship to patient: LARY Clements call back number: [phone]     Patient is needing: AUTHORIZATION FOR GENETIC TESTING HAS BEEN DENIED BECAUSE PATIENT DOES NOT PRESENT WITH THE CLINICAL INDICATION AND OR RISK FACTORS FOR TESTING THAT WOULD SUPPORT THE USE OF THIS TEST. PEER TO PEER CAN BE REQUESTED.

## 2025-07-17 ENCOUNTER — OFFICE VISIT (OUTPATIENT)
Dept: INTERNAL MEDICINE | Facility: CLINIC | Age: 60
End: 2025-07-17
Payer: MEDICARE

## 2025-07-17 VITALS
SYSTOLIC BLOOD PRESSURE: 110 MMHG | BODY MASS INDEX: 26.6 KG/M2 | HEART RATE: 68 BPM | RESPIRATION RATE: 12 BRPM | WEIGHT: 140.8 LBS | DIASTOLIC BLOOD PRESSURE: 70 MMHG | TEMPERATURE: 97.8 F

## 2025-07-17 DIAGNOSIS — M77.8 RIGHT WRIST TENDINITIS: Primary | ICD-10-CM

## 2025-07-17 RX ORDER — METHYLPREDNISOLONE 4 MG/1
TABLET ORAL
Qty: 21 TABLET | Refills: 0 | Status: SHIPPED | OUTPATIENT
Start: 2025-07-17

## 2025-07-17 NOTE — PROGRESS NOTES
Follow Up Office Visit      Date: 2025   Patient Name: Lizette Frias  : 1965   MRN: [id number]     Chief Complaint:    Chief Complaint   Patient presents with    Wrist Pain     Suspected tendonitis        History of Present Illness: Lizette Frias is a 60 y.o. female who is here today to follow up.    HPI  History of Present Illness  The patient presents for evaluation of right wrist pain.    Right Wrist Pain  - Discomfort in the right wrist, progressively worsening  - Mild swelling and redness in the wrist  - Skin in the area is particularly sensitive  - No specific injury recalled  - Daily activities include frequent use of hands (moving objects at a food pantry, crafting through [employer])  - Recent incident of noticing a bump on the wrist after weeding, accompanied by soreness  - History of arthritis  - Previously taken steroids  - Has a wrist brace at home but has not used it recently  - Experiences pain when squeezing her hand  - Has not sought any specific treatment for wrist pain  - Does not typically take pain medication  - On tacrolimus for a couple of years for liver transplant  - Took steroids a few months ago for bronchitis  - Attempted to manage pain with ibuprofen for several days, which alleviated discomfort but led to a bump on the wrist  - Received an injection in the wrist a few months ago, which did not provide relief  - Ibuprofen taken for headaches did not alleviate wrist pain    She had a liver transplant in  and tends to get bronchitis.    Social History:  - Occupations: Volunteer at a food pantry  - Hobbies: Crafting, [employer]    PAST SURGICAL HISTORY:  - Liver transplant in         Subjective      Review of Systems:   Review of Systems    I have reviewed the patients family history, social history, past medical history, past surgical history and have updated it as appropriate.     Medications:     Current Outpatient Medications:     Azelastine-Fluticasone 137-50  MCG/ACT suspension, spray 1 spray into each nostril twice a day, Disp: , Rfl:     buPROPion XL (Wellbutrin XL) 300 MG 24 hr tablet, Take 1 tablet by mouth Daily., Disp: 90 tablet, Rfl: 3    calcium carbonate-vitamin d 600-400 MG-UNIT per tablet, Take 1 tablet by mouth Daily., Disp: , Rfl:     cetirizine (zyrTEC) 10 MG tablet, Take 1 tablet by mouth Daily., Disp: , Rfl:     Cholecalciferol 50 MCG (2000 UT) capsule, Take 1 tablet by mouth Daily., Disp: , Rfl:     [employer] Melatonin 5 MG tablet tablet, TAKE 1 TABLET BY MOUTH EVERY DAY AT NIGHT, Disp: 90 tablet, Rfl: 1    levothyroxine (SYNTHROID, LEVOTHROID) 175 MCG tablet, Take 1 tablet by mouth Every Morning., Disp: 90 tablet, Rfl: 1    mirtazapine (REMERON) 15 MG tablet, Take 1 tablet by mouth every night at bedtime., Disp: 90 tablet, Rfl: 1    pantoprazole (PROTONIX) 40 MG EC tablet, Take 1 tablet by mouth Daily., Disp: 90 tablet, Rfl: 3    Prolia 60 MG/ML solution prefilled syringe syringe, TO BE ADMINISTERED IN PHYSICIAN'S OFFICE. INJECT ONE SYRINGE SUBCUTANEOUSLY ONCE EVERY 6 MONTHS. REFRIGERATE. USE WITHIN 14 DAYS ONCE AT ROOM TEMPERATURE., Disp: 1 each, Rfl: 1    QUEtiapine Fumarate (SEROQUEL PO), Take  by mouth., Disp: , Rfl:     rOPINIRole (REQUIP) 4 MG tablet, Take 1 tablet by mouth every night at bedtime., Disp: 90 tablet, Rfl: 1    rosuvastatin (CRESTOR) 20 MG tablet, Take 1 tablet by mouth every night at bedtime., Disp: 90 tablet, Rfl: 3    tacrolimus (PROGRAF) 0.5 MG capsule, Take 2 capsules by mouth 2 (Two) Times a Day., Disp: 180 capsule, Rfl: 3    telmisartan (MICARDIS) 20 MG tablet, TAKE 1 TABLET BY MOUTH EVERY DAY, Disp: 90 tablet, Rfl: 1    traZODone (DESYREL) 100 MG tablet, Take 1.5 tablets by mouth Every Night., Disp: 90 tablet, Rfl: 3    methylPREDNISolone (MEDROL) 4 MG dose pack, Take as directed on package instructions., Disp: 21 tablet, Rfl: 0    Allergies:   Allergies   Allergen Reactions    Penicillins Shortness Of Breath, Itching, Other (See  Comments) and Rash    Cephalosporins Diarrhea    Cyclosporine Swelling       Objective     Physical Exam: Please see above  Vital Signs:   Vitals:    07/17/25 1414   BP: 110/70   BP Location: Right arm   Patient Position: Sitting   Cuff Size: Adult   Pulse: 68   Resp: 12   Temp: 97.8 °F (36.6 °C)   TempSrc: Infrared   Weight: 63.9 kg (140 lb 12.8 oz)        Physical Exam  Physical Exam  General: Patient appears well.  Respiratory: Respiratory effort is normal.  Extremities: Right wrist shows slight swelling and redness at the radial aspect. Pain is present with range of motion of the wrist and squeezing. Area is slightly warm and uncomfortable. Sensation is intact. Radial pulse is strong. Capillary refill is brisk. Pain with .       Procedures    Results:   Labs:   Hemoglobin A1C   Date Value Ref Range Status   04/12/2024 4.8 4.5 - 5.7 % Final   02/13/2024 4.8 <5.7 % Final     CREATININE   Date Value Ref Range Status   11/22/2024 169 mg/dL Final     Comment:     REFERENCE RANGE: Ref Range>=20     TSH   Date Value Ref Range Status   04/24/2025 <0.005 (L) 0.270 - 4.200 uIU/mL Final   09/04/2019 5.685 (H) 0.300 - 5.000 uIU/mL Final        Imaging:   No valid procedures specified.     Assessment / Plan      Assessment/Plan:   Problem List Items Addressed This Visit    None  Visit Diagnoses         Right wrist tendinitis    -  Primary    Relevant Medications    methylPREDNISolone (MEDROL) 4 MG dose pack    Other Relevant Orders    Ambulatory Referral to Orthopedic Surgery              Assessment & Plan  1. Right wrist pain  - Symptoms: swelling, redness, tenderness, pain with range of motion and squeezing  - Physical examination: slight swelling, redness at radial aspect, pain with range of motion, warm area; sensation intact, radial pulse strong, capillary refill brisk  - Potential diagnosis: joint arthritis or tendinitis  - Advised to use a wrist brace  - Previous treatments (ibuprofen) were ineffective  -  Prescribe oral steroids to manage inflammation (take with food)  - Referral to Dr. Nimisha Mccray for further evaluation and management    Follow Up:   Return if symptoms worsen or fail to improve, for Next scheduled follow up.      Patient or patient representative verbalized consent for the use of Ambient Listening during the visit with  AISLINN Hurd for chart documentation. 7/20/2025  13:20 EDT    AISLINN Hurd  [place] RAHEL Peace

## 2025-07-29 ENCOUNTER — OFFICE VISIT (OUTPATIENT)
Age: 60
End: 2025-07-29
Payer: MEDICARE

## 2025-07-29 VITALS
DIASTOLIC BLOOD PRESSURE: 72 MMHG | BODY MASS INDEX: 26.43 KG/M2 | WEIGHT: 143.6 LBS | SYSTOLIC BLOOD PRESSURE: 120 MMHG | HEIGHT: 62 IN

## 2025-07-29 DIAGNOSIS — M77.8 TENDINITIS OF EXTENSOR TENDON OF HAND: Primary | ICD-10-CM

## 2025-07-29 NOTE — PROGRESS NOTES
[facility] Orthopedic     Office Visit       Date: 07/29/2025   Patient Name: Lizette Frias  MRN: [id number]  YOB: 1965    Referring Physician: Mona Arcos APRN     Chief Complaint:   Chief Complaint   Patient presents with    Follow-up     6 Month Follow Up - Trigger finger of left thumb/ Bilateral carpal tunnel syndrome      History of Present Illness:   Lizette Frias is a 60 y.o. female presenting to clinic for follow-up of left trigger thumb and bilateral carpal tunnel syndrome.  I have treated the patient in the past for right de Quervain's tenosynovitis.  At her last clinic visit she was provided a left thumb trigger finger injection.  An EMG was also ordered.  She reports that her left thumb has no longer been triggering but she does have some pain to the dorsal aspect of her left thumb.  She did not obtain her EMG.  She complains today mostly of right radial sided wrist pain.  This has responded in the past to bracing and injection.  No other complaints or concerns.    PMH: Hyperlipidemia, sleep apnea, CKD stage III, GERD, CVA, history of liver transplant, hypertension, CAD, MORRISON, rheumatoid arthritis with positive rheumatoid factor.     Subjective   Review of Systems:   Review of Systems   Constitutional: Negative.  Negative for chills, fatigue and fever.   HENT: Negative.  Negative for congestion and dental problem.    Eyes: Negative.  Negative for blurred vision.   Respiratory: Negative.  Negative for shortness of breath.    Cardiovascular: Negative.  Negative for leg swelling.   Gastrointestinal: Negative.  Negative for abdominal pain.   Endocrine: Negative.  Negative for polyuria.   Genitourinary: Negative.  Negative for difficulty urinating.   Musculoskeletal:  Positive for arthralgias.   Skin: Negative.    Allergic/Immunologic: Negative.    Neurological: Negative.    Hematological: Negative.  Negative for  "adenopathy.   Psychiatric/Behavioral: Negative.  Negative for behavioral problems.    All other systems reviewed and are negative.     Pertinent review of systems per HPI    I reviewed the patient's chief complaint, history of present illness, review of systems, past medical history, surgical history, family history, social history, medications and allergy list in the EMR on 07/29/2025 and agree with the findings above.    Objective    Vital Signs:   Vitals:    07/29/25 1323   BP: 120/72   BP Location: Left arm   Patient Position: Sitting   Weight: 65.1 kg (143 lb 9.6 oz)   Height: 157.5 cm (62\")     General: No acute distress. Alert and oriented.   Cardiovascular: Palpable radial pulse.   Respiratory: Breathing is nonlabored.   Ortho Exam:  Examination of the right upper extremity:   No skin lesions or ecchymosis. No edema.   No thenar, hypothenar, or interosseous wasting appreciated   Tender to palpation over the thumb CMC joint   Nontender to palpation over the scaphoid  Full and painless active flexion/extension/pronosupination of the wrist  Able to make a composite fist  negative Thumb CMC joint grind   negative Thumb MCP joint hyper extension  Firm endpoint with radial/ulnar deviation of the thumb MCP at 0 and 30 degrees of flexion  positive Finkelstein's test, positive Eickhoff test, tender over the first extensor compartment.  Median/radial/ulnar sensory intact to light touch  Digits are warm and well-perfused         Imaging / Studies:    Imaging Results (Last 24 Hours)       ** No results found for the last 24 hours. **          Assessment / Plan    Assessment/Plan:   Lizette Frias is a 60 y.o. female with symptomatic right de Quervain's tenosynovitis.    The patient continues to have symptomatic right de Quervain's tenosynovitis.  This has responded in the past to bracing and injection.  We discussed her options moving forward including continued bracing, anti-inflammatories, injection, or surgery.  The " patient is most interested in definitive treatment in the form of percent compartment release.  I explained to her the risk, benefits, alternatives and prognosis, she elects to proceed with a right first extensor compartment release.  Additionally, postoperatively we will consider a course of hand therapy for postoperative evaluation of the right wrist as well as the left thumb, as she has symptomatic scar tissue along the dorsal aspect of the thumb proximal phalanx.  She expressed understanding and was agreeable.    The risks and benefits of the procedure were discussed with the patient and/or appropriate guardian, which include but are not limited to: the risk of bleeding, pain, infection, wound complications, neurovascular damage, post-operative stiffness, tendon and/or ligament injury, persistent pain, need for additional surgeries in the future, and general risks from anesthesia.  First extensor compartment release specific risk include damage to the dorsal radial sensory nerve, persistent pain, tendon instability, need for additional procedures.  We also discussed the post-operative rehabilitation, length of immobilization, the need for therapy, and the overall expected outcomes from the procedure. Proper time was given to answer the patient's questions regarding the procedure. The patient expressed understanding. Knowing what the risks are and what the conservative treatment is, the patient elected to forgo any further conservative treatment options and proceed with the surgical intervention. Surgical consent was obtained in the clinic and signed by myself and the patient. They will follow up with me postoperatively.       ICD-10-CM ICD-9-CM   1. Tendinitis of extensor tendon of hand  M77.8 727.05     Follow Up:   Return for Follow Up- After surgery.      Nimisha Mccray MD  [facility] Orthopedic & Hand Surgeon

## 2025-08-01 DIAGNOSIS — I95.2 HYPOTENSION DUE TO DRUGS: ICD-10-CM

## 2025-08-01 DIAGNOSIS — N18.2 STAGE 2 CHRONIC KIDNEY DISEASE: ICD-10-CM

## 2025-08-01 DIAGNOSIS — I25.10 CORONARY ARTERY DISEASE INVOLVING NATIVE CORONARY ARTERY OF NATIVE HEART WITHOUT ANGINA PECTORIS: ICD-10-CM

## 2025-08-01 RX ORDER — TELMISARTAN 20 MG/1
20 TABLET ORAL DAILY
Qty: 90 TABLET | Refills: 1 | Status: SHIPPED | OUTPATIENT
Start: 2025-08-01

## 2025-08-22 ENCOUNTER — DOCUMENTATION (OUTPATIENT)
Dept: ORTHOPEDIC SURGERY | Facility: CLINIC | Age: 60
End: 2025-08-22

## 2025-08-27 ENCOUNTER — OFFICE VISIT (OUTPATIENT)
Dept: INTERNAL MEDICINE | Facility: CLINIC | Age: 60
End: 2025-08-27
Payer: MEDICARE

## 2025-08-27 VITALS
SYSTOLIC BLOOD PRESSURE: 118 MMHG | DIASTOLIC BLOOD PRESSURE: 62 MMHG | HEART RATE: 74 BPM | RESPIRATION RATE: 18 BRPM | TEMPERATURE: 98.2 F | WEIGHT: 143 LBS | BODY MASS INDEX: 26.16 KG/M2

## 2025-08-27 DIAGNOSIS — I63.9 CEREBROVASCULAR ACCIDENT (CVA), UNSPECIFIED MECHANISM: ICD-10-CM

## 2025-08-27 DIAGNOSIS — G89.29 CHRONIC MIDLINE LOW BACK PAIN WITH BILATERAL SCIATICA: ICD-10-CM

## 2025-08-27 DIAGNOSIS — M54.41 CHRONIC MIDLINE LOW BACK PAIN WITH BILATERAL SCIATICA: ICD-10-CM

## 2025-08-27 DIAGNOSIS — F32.A ANXIETY AND DEPRESSION: Primary | ICD-10-CM

## 2025-08-27 DIAGNOSIS — R41.3 MEMORY LOSS: ICD-10-CM

## 2025-08-27 DIAGNOSIS — N64.1 BREAST, FAT NECROSIS: ICD-10-CM

## 2025-08-27 DIAGNOSIS — F41.9 ANXIETY AND DEPRESSION: Primary | ICD-10-CM

## 2025-08-27 DIAGNOSIS — G25.81 RESTLESS LEG SYNDROME: ICD-10-CM

## 2025-08-27 DIAGNOSIS — M77.8 TENDINITIS OF EXTENSOR TENDON OF HAND: ICD-10-CM

## 2025-08-27 DIAGNOSIS — M54.42 CHRONIC MIDLINE LOW BACK PAIN WITH BILATERAL SCIATICA: ICD-10-CM

## 2025-08-27 RX ORDER — ROPINIROLE 5 MG/1
5 TABLET, FILM COATED ORAL
Qty: 30 TABLET | Refills: 1 | Status: SHIPPED | OUTPATIENT
Start: 2025-08-27

## 2025-08-27 RX ORDER — MIRTAZAPINE 7.5 MG/1
7.5 TABLET, FILM COATED ORAL
Qty: 30 TABLET | Refills: 1 | Status: SHIPPED | OUTPATIENT
Start: 2025-08-27